# Patient Record
Sex: MALE | Race: WHITE | Employment: OTHER | URBAN - METROPOLITAN AREA
[De-identification: names, ages, dates, MRNs, and addresses within clinical notes are randomized per-mention and may not be internally consistent; named-entity substitution may affect disease eponyms.]

---

## 2017-01-10 ENCOUNTER — OFFICE VISIT (OUTPATIENT)
Dept: CARDIOLOGY CLINIC | Age: 67
End: 2017-01-10

## 2017-01-10 VITALS
BODY MASS INDEX: 29.62 KG/M2 | WEIGHT: 200 LBS | DIASTOLIC BLOOD PRESSURE: 62 MMHG | HEIGHT: 69 IN | SYSTOLIC BLOOD PRESSURE: 110 MMHG | HEART RATE: 66 BPM | OXYGEN SATURATION: 95 %

## 2017-01-10 DIAGNOSIS — I25.10 CORONARY ARTERY DISEASE INVOLVING NATIVE CORONARY ARTERY OF NATIVE HEART WITHOUT ANGINA PECTORIS: Primary | ICD-10-CM

## 2017-01-10 DIAGNOSIS — R06.02 SOB (SHORTNESS OF BREATH): ICD-10-CM

## 2017-01-10 DIAGNOSIS — I48.3 TYPICAL ATRIAL FLUTTER (HCC): ICD-10-CM

## 2017-01-10 DIAGNOSIS — I10 ESSENTIAL HYPERTENSION: ICD-10-CM

## 2017-01-10 DIAGNOSIS — I42.9 CARDIOMYOPATHY (HCC): ICD-10-CM

## 2017-01-10 PROCEDURE — 93000 ELECTROCARDIOGRAM COMPLETE: CPT | Performed by: NURSE PRACTITIONER

## 2017-01-10 PROCEDURE — 99214 OFFICE O/P EST MOD 30 MIN: CPT | Performed by: NURSE PRACTITIONER

## 2017-01-10 RX ORDER — RANITIDINE 150 MG/1
150 TABLET ORAL DAILY
COMMUNITY
Start: 2016-11-07 | End: 2019-01-08

## 2017-01-16 ENCOUNTER — HOSPITAL ENCOUNTER (OUTPATIENT)
Dept: NON INVASIVE DIAGNOSTICS | Age: 67
Discharge: HOME | End: 2017-01-16
Attending: INTERNAL MEDICINE | Admitting: INTERNAL MEDICINE

## 2017-01-16 DIAGNOSIS — R07.9 CHEST PAIN: ICD-10-CM

## 2017-01-16 LAB
LV EF: 50 %
LVEF MODALITY: NORMAL

## 2017-03-21 RX ORDER — FUROSEMIDE 20 MG/1
40 TABLET ORAL DAILY
Qty: 60 TABLET | Refills: 3 | Status: SHIPPED | OUTPATIENT
Start: 2017-03-21 | End: 2017-11-28 | Stop reason: SDUPTHER

## 2017-05-31 RX ORDER — ISOSORBIDE MONONITRATE 30 MG/1
TABLET, EXTENDED RELEASE ORAL
Qty: 30 TABLET | Refills: 1 | Status: SHIPPED | OUTPATIENT
Start: 2017-05-31 | End: 2017-08-22 | Stop reason: SDUPTHER

## 2017-08-23 RX ORDER — ISOSORBIDE MONONITRATE 30 MG/1
TABLET, EXTENDED RELEASE ORAL
Qty: 30 TABLET | Refills: 1 | Status: SHIPPED | OUTPATIENT
Start: 2017-08-23 | End: 2017-11-28 | Stop reason: SDUPTHER

## 2017-11-28 ENCOUNTER — OFFICE VISIT (OUTPATIENT)
Dept: CARDIOLOGY CLINIC | Age: 67
End: 2017-11-28

## 2017-11-28 ENCOUNTER — HOSPITAL ENCOUNTER (OUTPATIENT)
Dept: OTHER | Age: 67
Discharge: HOME | End: 2017-11-28
Attending: NURSE PRACTITIONER | Admitting: NURSE PRACTITIONER

## 2017-11-28 VITALS
HEIGHT: 69 IN | BODY MASS INDEX: 30.21 KG/M2 | DIASTOLIC BLOOD PRESSURE: 82 MMHG | HEART RATE: 86 BPM | SYSTOLIC BLOOD PRESSURE: 142 MMHG | WEIGHT: 204 LBS

## 2017-11-28 DIAGNOSIS — I25.5 ISCHEMIC CARDIOMYOPATHY: ICD-10-CM

## 2017-11-28 DIAGNOSIS — I48.3 TYPICAL ATRIAL FLUTTER (HCC): ICD-10-CM

## 2017-11-28 DIAGNOSIS — Z79.899 LONG-TERM USE OF HIGH-RISK MEDICATION: ICD-10-CM

## 2017-11-28 DIAGNOSIS — I25.10 CORONARY ARTERY DISEASE INVOLVING NATIVE CORONARY ARTERY OF NATIVE HEART WITHOUT ANGINA PECTORIS: Primary | ICD-10-CM

## 2017-11-28 LAB
A/G RATIO: 1.5 (ref 1.1–2.2)
ALBUMIN SERPL-MCNC: 4.2 G/DL (ref 3.4–5)
ALP BLD-CCNC: 71 U/L (ref 40–129)
ALT SERPL-CCNC: 25 U/L (ref 10–40)
ANION GAP SERPL CALCULATED.3IONS-SCNC: 14 MMOL/L (ref 3–16)
AST SERPL-CCNC: 23 U/L (ref 15–37)
BILIRUB SERPL-MCNC: <0.2 MG/DL (ref 0–1)
BUN BLDV-MCNC: 17 MG/DL (ref 7–20)
CALCIUM SERPL-MCNC: 9.8 MG/DL (ref 8.3–10.6)
CHLORIDE BLD-SCNC: 106 MMOL/L (ref 99–110)
CHOLESTEROL, TOTAL: 180 MG/DL (ref 0–199)
CO2: 23 MMOL/L (ref 21–32)
CREAT SERPL-MCNC: 0.8 MG/DL (ref 0.8–1.3)
ESTIMATED AVERAGE GLUCOSE: 131.2 MG/DL
GFR AFRICAN AMERICAN: >60
GFR NON-AFRICAN AMERICAN: >60
GLOBULIN: 2.8 G/DL
GLUCOSE BLD-MCNC: 106 MG/DL (ref 70–99)
HBA1C MFR BLD: 6.2 %
HDLC SERPL-MCNC: 41 MG/DL (ref 40–60)
LDL CHOLESTEROL CALCULATED: 121 MG/DL
POTASSIUM SERPL-SCNC: 5.3 MMOL/L (ref 3.5–5.1)
SODIUM BLD-SCNC: 143 MMOL/L (ref 136–145)
TOTAL PROTEIN: 7 G/DL (ref 6.4–8.2)
TRIGL SERPL-MCNC: 88 MG/DL (ref 0–150)
VLDLC SERPL CALC-MCNC: 18 MG/DL

## 2017-11-28 PROCEDURE — 4004F PT TOBACCO SCREEN RCVD TLK: CPT | Performed by: NURSE PRACTITIONER

## 2017-11-28 PROCEDURE — 1123F ACP DISCUSS/DSCN MKR DOCD: CPT | Performed by: NURSE PRACTITIONER

## 2017-11-28 PROCEDURE — G8598 ASA/ANTIPLAT THER USED: HCPCS | Performed by: NURSE PRACTITIONER

## 2017-11-28 PROCEDURE — G8484 FLU IMMUNIZE NO ADMIN: HCPCS | Performed by: NURSE PRACTITIONER

## 2017-11-28 PROCEDURE — 4040F PNEUMOC VAC/ADMIN/RCVD: CPT | Performed by: NURSE PRACTITIONER

## 2017-11-28 PROCEDURE — 3017F COLORECTAL CA SCREEN DOC REV: CPT | Performed by: NURSE PRACTITIONER

## 2017-11-28 PROCEDURE — 93000 ELECTROCARDIOGRAM COMPLETE: CPT | Performed by: NURSE PRACTITIONER

## 2017-11-28 PROCEDURE — G8417 CALC BMI ABV UP PARAM F/U: HCPCS | Performed by: NURSE PRACTITIONER

## 2017-11-28 PROCEDURE — G8427 DOCREV CUR MEDS BY ELIG CLIN: HCPCS | Performed by: NURSE PRACTITIONER

## 2017-11-28 PROCEDURE — 99214 OFFICE O/P EST MOD 30 MIN: CPT | Performed by: NURSE PRACTITIONER

## 2017-11-28 RX ORDER — ISOSORBIDE MONONITRATE 30 MG/1
TABLET, EXTENDED RELEASE ORAL
Qty: 30 TABLET | Refills: 1 | Status: SHIPPED | OUTPATIENT
Start: 2017-11-28 | End: 2018-07-06 | Stop reason: SDUPTHER

## 2017-11-28 RX ORDER — SIMVASTATIN 20 MG
20 TABLET ORAL NIGHTLY
Qty: 90 TABLET | Refills: 0 | Status: SHIPPED | OUTPATIENT
Start: 2017-11-28 | End: 2019-01-08 | Stop reason: ALTCHOICE

## 2017-11-28 RX ORDER — CARVEDILOL 12.5 MG/1
TABLET ORAL
Qty: 60 TABLET | Refills: 5 | Status: SHIPPED | OUTPATIENT
Start: 2017-11-28 | End: 2019-01-08 | Stop reason: SDUPTHER

## 2017-11-28 RX ORDER — LISINOPRIL 10 MG/1
10 TABLET ORAL DAILY
Qty: 90 TABLET | Refills: 0 | Status: SHIPPED | OUTPATIENT
Start: 2017-11-28 | End: 2018-07-06 | Stop reason: SDUPTHER

## 2017-11-28 RX ORDER — FUROSEMIDE 40 MG/1
40 TABLET ORAL DAILY
Qty: 30 TABLET | Refills: 3 | Status: SHIPPED | OUTPATIENT
Start: 2017-11-28 | End: 2019-01-08

## 2017-11-28 RX ORDER — SPIRONOLACTONE 25 MG/1
12.5 TABLET ORAL DAILY
Qty: 30 TABLET | Refills: 0 | Status: SHIPPED | OUTPATIENT
Start: 2017-11-28 | End: 2019-01-08 | Stop reason: SDUPTHER

## 2017-11-28 NOTE — LETTER
[facility]  [address]  [address] [address]  Phone: [phone]  Fax: [phone]    Demetrius Reagan NP        November 28, 2017     [address] [address]    Patient: Yuly Traylor  MR Number: [id number]  YOB: 1950  Date of Visit: 11/28/2017    Dear Dr. Thao Guido:      [facility]     Outpatient Follow Up Kandy Shepard is 79 y.o. male who presents today with a history of CAD s/p CABG May '14 with post-op short run of SVT and brief AF, CM, HTN and hyperlipidemia. His other history includes carotid stenosis and PVD s/p Lt common and external iliac PTA       CHIEF COMPLAINT / HPI:  Follow Up secondary to CAD & CM    Subjective:   He denies significant chest pain/angina (Lt chest pain with shoulder radiation). He has surgical discomfort if/when he strains. There is SOB if he walks too far, ~ 1 block and going up steps. Yesterday, he was cleaning gutters and became nervous and light headed / jittery. It went away after sitting down and having a cola. The patient has 2 pillow orthopnea; denies PND. The patient has swelling in his legs by the end of the day. The patients weight is stable at 197# . The patient is not experiencing palpitations or dizziness. These symptoms are stable since the last OV. With regard to medication therapy the patient has been (?) compliant with prescribed regimen however he's been out of medications for about a month. They have tolerated therapy to date.      Current Outpatient Prescriptions   Medication Sig Dispense Refill    aspirin 81 MG EC tablet Take 1 tablet by mouth daily      spironolactone (ALDACTONE) 25 MG tablet Take 0.5 tablets by mouth daily 30 tablet 3    lisinopril (PRINIVIL;ZESTRIL) 10 MG tablet Take 1 tablet by mouth daily TAKE 1 TABLET BY MOUTH EVERY DAY 90 tablet 3  simvastatin (ZOCOR) 20 MG tablet Take 1 tablet by mouth nightly 90 tablet 3    fluticasone-salmeterol (ADVAIR DISKUS) 250-50 MCG/DOSE AEPB Inhale 1 puff into the lungs 2 times daily. 3 each 3    isosorbide mononitrate (IMDUR) 30 MG extended release tablet TAKE 1 TABLET BY MOUTH EVERY DAY 30 tablet 1    furosemide (LASIX) 20 MG tablet Take 2 tablets by mouth daily 60 tablet 3    ranitidine (ZANTAC) 150 MG tablet Take 150 mg by mouth daily      rivaroxaban (XARELTO) 20 MG TABS tablet Take 1 tablet by mouth daily (with breakfast) 30 tablet 5    nitroGLYCERIN (NITROSTAT) 0.4 MG SL tablet Place 1 tablet under the tongue every 5 minutes as needed for Chest pain 25 tablet 3    carvedilol (COREG) 12.5 MG tablet TAKE 1 TABLET BY MOUTH TWICE DAILY WITH MEALS 60 tablet 5    albuterol (PROVENTIL HFA;VENTOLIN HFA) 108 (90 BASE) MCG/ACT inhaler Inhale 2 puffs into the lungs every 6 hours as needed for Wheezing. Objective:   PHYSICAL EXAM:    Vitals:    11/28/17 0909 11/28/17 0920 11/28/17 0936   BP: (!) 140/90 (!) 140/90 (!) 142/82   Site: Left Arm Right Arm Left Arm   Position: Sitting Sitting    Cuff Size: Medium Adult Medium Adult Medium Adult   Pulse: 86     Weight: 204 lb (92.5 kg)     Height: 5' 9\" (1.753 m)          VITALS:  BP (!) 140/90 (Site: Right Arm, Position: Sitting, Cuff Size: Medium Adult)   Pulse 86   Ht 5' 9\" (1.753 m)   Wt 204 lb (92.5 kg)   BMI 30.13 kg/m²    CONSTITUTIONAL: Cooperative, no apparent distress, and appears well nourished / developed  NEUROLOGIC:  Awake and orientated to person, place and time. PSYCH: Calm affect. SKIN: Warm and dry. HEENT: Sclera non-icteric, normocephalic, neck supple, no elevation of JVP, normal carotid pulses with no bruits and thyroid normal size.   LUNGS:  No increased work of breathing and clear to auscultation, no crackles or wheezing  CARDIOVASCULAR:  Regular rate 88 and rhythm with no murmurs, gallops, ~statin / diuretic / ace-I / aldosterone antagonist  Comprehensive Metabolic Panel    LIPID PANEL    Hemoglobin A1C         I had the opportunity to review the clinical symptoms and presentation of Leandra Cardoza. Plan:     1. EKG: sinus rhythm 100  2. CMP / lipid with courtesy A1c  3. Resume: lasix 40 mg daily, Xarelto 20 mg daily, imdur 30 mg daily, carvedilol 12.5 mg bid  4. F/U in four weeks    Overall the patient is stable from CV standpoint    I have addresed the patient's cardiac risk factors and adjusted pharmacologic treatment as needed. In addition, I have reinforced the need for patient directed risk factor modification. Further evaluation will be based upon the patient's clinical course and testing results. All questions and concerns were addressed to the patient. Alternatives to my treatment were discussed. The patient is not currently smokin pk every 3-4 days The risks related to smoking were reviewed with the patient. Recommend maintaining a smoke-free lifestyle. Products available for smoking cessation were discussed    Patient is on a beta-blocker  Patient is on an ace-i  Patient is on a statin     Antiplatelet therapy / anti-coagulation has been recommended / prescribed for this patient. Education conducted on adverse reactions including bleeding was discussed. Daily weight, low sodium diet were discussed. Patient instructed to call the office with a weight gain: > 3 # over night or 5# in one week; swelling, SOB/orthopnea/PND    The patient verbalizes understanding not to stop medications without discussing with us. Discussed exercise: 30-60 minutes 7 days/week  Discussed Low saturated fat/HAYDEE diet. Thank you for allowing to us to participate in the care of  [place] . If you have questions, please do not hesitate to call me. I look forward to following Leandra along with you.     Sincerely,        Pam Blue NP

## 2017-11-28 NOTE — COMMUNICATION BODY
[address]     Outpatient Follow Up Note    Germán Jakob Tineo is 79 y.o. male who presents today with a history of CAD s/p CABG May '14 with post-op short run of SVT and brief AF, CM, HTN and hyperlipidemia. His other history includes carotid stenosis and PVD s/p Lt common and external iliac PTA       CHIEF COMPLAINT / HPI:  Follow Up secondary to CAD & CM    Subjective:   He denies significant chest pain/angina (Lt chest pain with shoulder radiation). He has surgical discomfort if/when he strains. There is SOB if he walks too far, ~ 1 block and going up steps. Yesterday, he was cleaning gutters and became nervous and light headed / jittery. It went away after sitting down and having a cola. The patient has 2 pillow orthopnea; denies PND. The patient has swelling in his legs by the end of the day. The patients weight is stable at 197# . The patient is not experiencing palpitations or dizziness. These symptoms are stable since the last OV. With regard to medication therapy the patient has been (?) compliant with prescribed regimen however he's been out of medications for about a month. They have tolerated therapy to date. Current Outpatient Prescriptions   Medication Sig Dispense Refill    aspirin 81 MG EC tablet Take 1 tablet by mouth daily      spironolactone (ALDACTONE) 25 MG tablet Take 0.5 tablets by mouth daily 30 tablet 3    lisinopril (PRINIVIL;ZESTRIL) 10 MG tablet Take 1 tablet by mouth daily TAKE 1 TABLET BY MOUTH EVERY DAY 90 tablet 3    simvastatin (ZOCOR) 20 MG tablet Take 1 tablet by mouth nightly 90 tablet 3    fluticasone-salmeterol (ADVAIR DISKUS) 250-50 MCG/DOSE AEPB Inhale 1 puff into the lungs 2 times daily.  3 each 3    isosorbide mononitrate (IMDUR) 30 MG extended release tablet TAKE 1 TABLET BY MOUTH EVERY DAY 30 tablet 1    furosemide (LASIX) 20 MG tablet Take 2 tablets by mouth daily 60 tablet 3    ranitidine (ZANTAC) 150 MG tablet Take 150 mg by mouth daily      rivaroxaban (XARELTO) 20 MG TABS tablet Take 1 tablet by mouth daily (with breakfast) 30 tablet 5    nitroGLYCERIN (NITROSTAT) 0.4 MG SL tablet Place 1 tablet under the tongue every 5 minutes as needed for Chest pain 25 tablet 3    carvedilol (COREG) 12.5 MG tablet TAKE 1 TABLET BY MOUTH TWICE DAILY WITH MEALS 60 tablet 5    albuterol (PROVENTIL HFA;VENTOLIN HFA) 108 (90 BASE) MCG/ACT inhaler Inhale 2 puffs into the lungs every 6 hours as needed for Wheezing. Objective:   PHYSICAL EXAM:    Vitals:    11/28/17 0909 11/28/17 0920 11/28/17 0936   BP: (!) 140/90 (!) 140/90 (!) 142/82   Site: Left Arm Right Arm Left Arm   Position: Sitting Sitting    Cuff Size: Medium Adult Medium Adult Medium Adult   Pulse: 86     Weight: 204 lb (92.5 kg)     Height: 5' 9\" (1.753 m)          VITALS:  BP (!) 140/90 (Site: Right Arm, Position: Sitting, Cuff Size: Medium Adult)   Pulse 86   Ht 5' 9\" (1.753 m)   Wt 204 lb (92.5 kg)   BMI 30.13 kg/m²    CONSTITUTIONAL: Cooperative, no apparent distress, and appears well nourished / developed  NEUROLOGIC:  Awake and orientated to person, place and time. PSYCH: Calm affect. SKIN: Warm and dry. HEENT: Sclera non-icteric, normocephalic, neck supple, no elevation of JVP, normal carotid pulses with no bruits and thyroid normal size. LUNGS:  No increased work of breathing and clear to auscultation, no crackles or wheezing  CARDIOVASCULAR:  Regular rate 88 and rhythm with no murmurs, gallops, rubs, or abnormal heart sounds, normal PMI. The apical impulses not displaced  JVP less than 8 cm H2O  Heart tones are crisp and normal  Cervical veins are not engorged  The carotid upstroke is normal in amplitude and contour without delay or bruit  JVP is not elevated  ABDOMEN:  Normal bowel sounds, non-distended and non-tender to palpation  EXT: RLE edema, no calf tenderness. Pulses are present bilaterally.     DATA:      Radiology Review:  Pertinent images / reports were reviewed as a part

## 2017-12-01 ENCOUNTER — TELEPHONE (OUTPATIENT)
Dept: CARDIOLOGY CLINIC | Age: 67
End: 2017-12-01

## 2017-12-01 DIAGNOSIS — I10 ESSENTIAL HYPERTENSION: Chronic | ICD-10-CM

## 2017-12-01 DIAGNOSIS — I25.110 CORONARY ARTERY DISEASE INVOLVING NATIVE CORONARY ARTERY OF NATIVE HEART WITH UNSTABLE ANGINA PECTORIS (HCC): Chronic | ICD-10-CM

## 2017-12-01 NOTE — TELEPHONE ENCOUNTER
----- Message from Pam Blue NP sent at 11/30/2017  8:41 AM EST -----  Stop spironolactone and recheck K+ after one week  LDL up: likely reflective of being off meds > recheck in six months

## 2018-07-09 RX ORDER — LISINOPRIL 10 MG/1
TABLET ORAL
Qty: 30 TABLET | Refills: 0 | Status: SHIPPED | OUTPATIENT
Start: 2018-07-09 | End: 2018-10-05 | Stop reason: SDUPTHER

## 2018-07-09 RX ORDER — ISOSORBIDE MONONITRATE 30 MG/1
TABLET, EXTENDED RELEASE ORAL
Qty: 30 TABLET | Refills: 0 | Status: SHIPPED | OUTPATIENT
Start: 2018-07-09 | End: 2019-01-08 | Stop reason: SDUPTHER

## 2018-12-13 RX ORDER — RIVAROXABAN 20 MG/1
TABLET, FILM COATED ORAL
Qty: 30 TABLET | Refills: 0 | Status: SHIPPED | OUTPATIENT
Start: 2018-12-13 | End: 2019-01-08

## 2019-01-08 ENCOUNTER — OFFICE VISIT (OUTPATIENT)
Dept: CARDIOLOGY CLINIC | Age: 69
End: 2019-01-08
Payer: MEDICARE

## 2019-01-08 VITALS
HEIGHT: 69 IN | OXYGEN SATURATION: 95 % | HEART RATE: 70 BPM | WEIGHT: 198.1 LBS | BODY MASS INDEX: 29.34 KG/M2 | DIASTOLIC BLOOD PRESSURE: 80 MMHG | SYSTOLIC BLOOD PRESSURE: 152 MMHG

## 2019-01-08 DIAGNOSIS — R53.83 OTHER FATIGUE: ICD-10-CM

## 2019-01-08 DIAGNOSIS — I10 ESSENTIAL HYPERTENSION: ICD-10-CM

## 2019-01-08 DIAGNOSIS — I48.3 TYPICAL ATRIAL FLUTTER (HCC): ICD-10-CM

## 2019-01-08 DIAGNOSIS — I25.5 ISCHEMIC CARDIOMYOPATHY: ICD-10-CM

## 2019-01-08 DIAGNOSIS — I25.110 CORONARY ARTERY DISEASE INVOLVING NATIVE CORONARY ARTERY OF NATIVE HEART WITH UNSTABLE ANGINA PECTORIS (HCC): Primary | ICD-10-CM

## 2019-01-08 DIAGNOSIS — I65.23 BILATERAL CAROTID ARTERY STENOSIS: ICD-10-CM

## 2019-01-08 PROCEDURE — 1123F ACP DISCUSS/DSCN MKR DOCD: CPT | Performed by: NURSE PRACTITIONER

## 2019-01-08 PROCEDURE — 99214 OFFICE O/P EST MOD 30 MIN: CPT | Performed by: NURSE PRACTITIONER

## 2019-01-08 PROCEDURE — 4004F PT TOBACCO SCREEN RCVD TLK: CPT | Performed by: NURSE PRACTITIONER

## 2019-01-08 PROCEDURE — 4040F PNEUMOC VAC/ADMIN/RCVD: CPT | Performed by: NURSE PRACTITIONER

## 2019-01-08 PROCEDURE — G8598 ASA/ANTIPLAT THER USED: HCPCS | Performed by: NURSE PRACTITIONER

## 2019-01-08 PROCEDURE — G8419 CALC BMI OUT NRM PARAM NOF/U: HCPCS | Performed by: NURSE PRACTITIONER

## 2019-01-08 PROCEDURE — 1101F PT FALLS ASSESS-DOCD LE1/YR: CPT | Performed by: NURSE PRACTITIONER

## 2019-01-08 PROCEDURE — G8427 DOCREV CUR MEDS BY ELIG CLIN: HCPCS | Performed by: NURSE PRACTITIONER

## 2019-01-08 PROCEDURE — G8484 FLU IMMUNIZE NO ADMIN: HCPCS | Performed by: NURSE PRACTITIONER

## 2019-01-08 PROCEDURE — 93000 ELECTROCARDIOGRAM COMPLETE: CPT | Performed by: NURSE PRACTITIONER

## 2019-01-08 PROCEDURE — 3017F COLORECTAL CA SCREEN DOC REV: CPT | Performed by: NURSE PRACTITIONER

## 2019-01-08 RX ORDER — ISOSORBIDE MONONITRATE 30 MG/1
TABLET, EXTENDED RELEASE ORAL
Qty: 90 TABLET | Refills: 5 | OUTPATIENT
Start: 2019-01-08

## 2019-01-08 RX ORDER — ATORVASTATIN CALCIUM 40 MG/1
40 TABLET, FILM COATED ORAL DAILY
COMMUNITY
End: 2019-11-10

## 2019-01-08 RX ORDER — SPIRONOLACTONE 25 MG/1
TABLET ORAL
Qty: 45 TABLET | Refills: 2 | OUTPATIENT
Start: 2019-01-08

## 2019-01-08 RX ORDER — CARVEDILOL 12.5 MG/1
TABLET ORAL
Qty: 60 TABLET | Refills: 5 | Status: SHIPPED | OUTPATIENT
Start: 2019-01-08 | End: 2020-01-08

## 2019-01-08 RX ORDER — SPIRONOLACTONE 25 MG/1
12.5 TABLET ORAL DAILY
Qty: 30 TABLET | Refills: 2 | Status: SHIPPED | OUTPATIENT
Start: 2019-01-08 | End: 2020-01-16

## 2019-01-08 RX ORDER — ISOSORBIDE MONONITRATE 30 MG/1
TABLET, EXTENDED RELEASE ORAL
Qty: 30 TABLET | Refills: 5 | Status: SHIPPED | OUTPATIENT
Start: 2019-01-08 | End: 2019-12-23

## 2019-01-08 RX ORDER — CARVEDILOL 12.5 MG/1
TABLET ORAL
Qty: 180 TABLET | Refills: 5 | OUTPATIENT
Start: 2019-01-08

## 2019-01-08 RX ORDER — WARFARIN SODIUM 5 MG/1
5 TABLET ORAL
Qty: 90 TABLET | Refills: 3 | OUTPATIENT
Start: 2019-01-08

## 2019-01-08 RX ORDER — WARFARIN SODIUM 5 MG/1
5 TABLET ORAL
Qty: 30 TABLET | Refills: 3 | Status: SHIPPED | OUTPATIENT
Start: 2019-01-08 | End: 2019-11-19 | Stop reason: ALTCHOICE

## 2019-01-11 ENCOUNTER — TELEPHONE (OUTPATIENT)
Dept: PHARMACY | Age: 69
End: 2019-01-11

## 2019-01-11 DIAGNOSIS — Z86.79 HX OF ATRIAL FLUTTER: Primary | ICD-10-CM

## 2019-01-14 ENCOUNTER — ANTI-COAG VISIT (OUTPATIENT)
Dept: PHARMACY | Age: 69
End: 2019-01-14
Payer: MEDICARE

## 2019-01-14 DIAGNOSIS — Z86.79 HX OF ATRIAL FLUTTER: ICD-10-CM

## 2019-01-14 LAB — INTERNATIONAL NORMALIZATION RATIO, POC: 3.2

## 2019-01-14 PROCEDURE — 99213 OFFICE O/P EST LOW 20 MIN: CPT

## 2019-01-14 PROCEDURE — 85610 PROTHROMBIN TIME: CPT

## 2019-01-18 ENCOUNTER — TELEPHONE (OUTPATIENT)
Dept: PHARMACY | Age: 69
End: 2019-01-18

## 2019-01-28 ENCOUNTER — ANTI-COAG VISIT (OUTPATIENT)
Dept: PHARMACY | Age: 69
End: 2019-01-28
Payer: MEDICARE

## 2019-01-28 DIAGNOSIS — Z86.79 HX OF ATRIAL FLUTTER: ICD-10-CM

## 2019-01-28 LAB — INTERNATIONAL NORMALIZATION RATIO, POC: 3.1

## 2019-01-28 PROCEDURE — 85610 PROTHROMBIN TIME: CPT

## 2019-01-28 PROCEDURE — 99211 OFF/OP EST MAY X REQ PHY/QHP: CPT

## 2019-02-04 ENCOUNTER — TELEPHONE (OUTPATIENT)
Dept: PHARMACY | Age: 69
End: 2019-02-04

## 2019-02-06 ENCOUNTER — ANTI-COAG VISIT (OUTPATIENT)
Dept: PHARMACY | Age: 69
End: 2019-02-06
Payer: MEDICARE

## 2019-02-06 DIAGNOSIS — Z86.79 HX OF ATRIAL FLUTTER: ICD-10-CM

## 2019-02-06 LAB — INTERNATIONAL NORMALIZATION RATIO, POC: 1.4

## 2019-02-06 PROCEDURE — 85610 PROTHROMBIN TIME: CPT

## 2019-02-06 PROCEDURE — 99211 OFF/OP EST MAY X REQ PHY/QHP: CPT

## 2019-02-13 ENCOUNTER — ANTI-COAG VISIT (OUTPATIENT)
Dept: PHARMACY | Age: 69
End: 2019-02-13
Payer: MEDICARE

## 2019-02-13 DIAGNOSIS — Z86.79 HX OF ATRIAL FLUTTER: ICD-10-CM

## 2019-02-13 LAB — INTERNATIONAL NORMALIZATION RATIO, POC: 2.3

## 2019-02-13 PROCEDURE — 85610 PROTHROMBIN TIME: CPT

## 2019-02-13 PROCEDURE — 99211 OFF/OP EST MAY X REQ PHY/QHP: CPT

## 2019-02-27 ENCOUNTER — ANTI-COAG VISIT (OUTPATIENT)
Dept: PHARMACY | Age: 69
End: 2019-02-27
Payer: MEDICARE

## 2019-02-27 DIAGNOSIS — Z86.79 HX OF ATRIAL FLUTTER: ICD-10-CM

## 2019-02-27 LAB — INTERNATIONAL NORMALIZATION RATIO, POC: 2.7

## 2019-02-27 PROCEDURE — 99211 OFF/OP EST MAY X REQ PHY/QHP: CPT

## 2019-02-27 PROCEDURE — 85610 PROTHROMBIN TIME: CPT

## 2019-03-12 ENCOUNTER — OFFICE VISIT (OUTPATIENT)
Dept: CARDIOLOGY CLINIC | Age: 69
End: 2019-03-12
Payer: MEDICARE

## 2019-03-12 VITALS
BODY MASS INDEX: 30.78 KG/M2 | WEIGHT: 207.8 LBS | SYSTOLIC BLOOD PRESSURE: 124 MMHG | OXYGEN SATURATION: 90 % | HEIGHT: 69 IN | DIASTOLIC BLOOD PRESSURE: 72 MMHG | HEART RATE: 70 BPM

## 2019-03-12 DIAGNOSIS — I48.3 TYPICAL ATRIAL FLUTTER (HCC): ICD-10-CM

## 2019-03-12 DIAGNOSIS — I10 ESSENTIAL HYPERTENSION: ICD-10-CM

## 2019-03-12 DIAGNOSIS — I25.5 ISCHEMIC CARDIOMYOPATHY: ICD-10-CM

## 2019-03-12 DIAGNOSIS — I25.110 CORONARY ARTERY DISEASE INVOLVING NATIVE CORONARY ARTERY OF NATIVE HEART WITH UNSTABLE ANGINA PECTORIS (HCC): Primary | ICD-10-CM

## 2019-03-12 PROCEDURE — G8598 ASA/ANTIPLAT THER USED: HCPCS | Performed by: NURSE PRACTITIONER

## 2019-03-12 PROCEDURE — 1101F PT FALLS ASSESS-DOCD LE1/YR: CPT | Performed by: NURSE PRACTITIONER

## 2019-03-12 PROCEDURE — 1123F ACP DISCUSS/DSCN MKR DOCD: CPT | Performed by: NURSE PRACTITIONER

## 2019-03-12 PROCEDURE — G8484 FLU IMMUNIZE NO ADMIN: HCPCS | Performed by: NURSE PRACTITIONER

## 2019-03-12 PROCEDURE — 4004F PT TOBACCO SCREEN RCVD TLK: CPT | Performed by: NURSE PRACTITIONER

## 2019-03-12 PROCEDURE — 4040F PNEUMOC VAC/ADMIN/RCVD: CPT | Performed by: NURSE PRACTITIONER

## 2019-03-12 PROCEDURE — 99214 OFFICE O/P EST MOD 30 MIN: CPT | Performed by: NURSE PRACTITIONER

## 2019-03-12 PROCEDURE — G8427 DOCREV CUR MEDS BY ELIG CLIN: HCPCS | Performed by: NURSE PRACTITIONER

## 2019-03-12 PROCEDURE — 3017F COLORECTAL CA SCREEN DOC REV: CPT | Performed by: NURSE PRACTITIONER

## 2019-03-12 PROCEDURE — G8417 CALC BMI ABV UP PARAM F/U: HCPCS | Performed by: NURSE PRACTITIONER

## 2019-03-13 ENCOUNTER — TELEPHONE (OUTPATIENT)
Dept: CARDIOLOGY CLINIC | Age: 69
End: 2019-03-13

## 2019-03-27 ENCOUNTER — ANTI-COAG VISIT (OUTPATIENT)
Dept: PHARMACY | Age: 69
End: 2019-03-27
Payer: MEDICARE

## 2019-03-27 DIAGNOSIS — Z86.79 HX OF ATRIAL FLUTTER: ICD-10-CM

## 2019-03-27 LAB — INTERNATIONAL NORMALIZATION RATIO, POC: 2.3

## 2019-03-27 PROCEDURE — 99211 OFF/OP EST MAY X REQ PHY/QHP: CPT

## 2019-03-27 PROCEDURE — 85610 PROTHROMBIN TIME: CPT

## 2019-04-24 ENCOUNTER — ANTI-COAG VISIT (OUTPATIENT)
Dept: PHARMACY | Age: 69
End: 2019-04-24
Payer: MEDICARE

## 2019-04-24 DIAGNOSIS — Z86.79 HX OF ATRIAL FLUTTER: ICD-10-CM

## 2019-04-24 LAB — INTERNATIONAL NORMALIZATION RATIO, POC: 2.3

## 2019-04-24 PROCEDURE — 99211 OFF/OP EST MAY X REQ PHY/QHP: CPT

## 2019-04-24 PROCEDURE — 85610 PROTHROMBIN TIME: CPT

## 2019-04-24 NOTE — PROGRESS NOTES
Mr. Harinder Umanzor \"Elmer\" is a 71 y.o. y/o male with history of Aflutter   He presents today for anticoagulation monitoring and adjustment. Pertinent PMH: CAD, HTN, PVD  Historically on Xarelto, switched d/t cost   Patient Reported Findings:  Yes     No  [x]   []       Patient verifies current dosing regimen as listed   []   [x]       S/S bleeding/bruising/swelling/SOB - states that he is prone to nose bleeds   []   [x]       Blood in urine or stool  []   [x]       Procedures scheduled in the future at this time  []   [x]       Missed Dose   []   [x]       Extra Dose  []   [x]       Change in medications   []   [x]       Change in health/diet/appetite His normal diet consists of high vit k regularly (kale, spinach, broccoli, brussel sprouts, amena and mustard greens), eats them 3x/week. Has some other lower vitamin vegetable other days which include one salad per week. []   [x]       Change in alcohol use- does not drink  []   [x]       Change in activity  []   [x]       Hospital admission  []   [x]       Emergency department visit  []   [x]       Other complaints    Clinical Outcomes:  Yes     No  []   [x]       Major bleeding event  []   [x]       Thromboembolic event  Takes warfarin in PM  Duration of warfarin Therapy: indefinite  INR Range:  2.0-3.0     INR is 2.3 again today   Continue weekly dose of 1.25 mg on Monday only and 2.5 mg all other days of the week. Encouraged to maintain a consistency of vegetables/salads.   Return to clinic in 6 weeks on 6/5    Referring cardiologist is Dr. Liang Rogel  INR (no units)   Date Value   04/24/2019 2.3   03/27/2019 2.3   02/27/2019 2.7   02/13/2019 2.3   12/28/2016 1.00   05/12/2014 1.04

## 2019-06-05 ENCOUNTER — ANTI-COAG VISIT (OUTPATIENT)
Dept: PHARMACY | Age: 69
End: 2019-06-05
Payer: MEDICARE

## 2019-06-05 DIAGNOSIS — Z86.79 HX OF ATRIAL FLUTTER: ICD-10-CM

## 2019-06-05 LAB — INTERNATIONAL NORMALIZATION RATIO, POC: 3.8

## 2019-06-05 PROCEDURE — 85610 PROTHROMBIN TIME: CPT

## 2019-06-05 PROCEDURE — 99211 OFF/OP EST MAY X REQ PHY/QHP: CPT

## 2019-06-05 NOTE — PROGRESS NOTES
Mr. Alber Swartz \"Elmer\" is a 71 y.o. y/o male with history of Aflutter   He presents today for anticoagulation monitoring and adjustment. Pertinent PMH: CAD, HTN, PVD  Historically on Xarelto, switched d/t cost   Patient Reported Findings:  Yes     No  [x]   []       Patient verifies current dosing regimen as listed   []   [x]       S/S bleeding/bruising/swelling/SOB - states that he is prone to nose bleeds   []   [x]       Blood in urine or stool  []   [x]       Procedures scheduled in the future at this time  []   [x]       Missed Dose   []   [x]       Extra Dose  []   [x]       Change in medications   []   [x]       Change in health/diet/appetite His normal diet consists of high vit k regularly (kale, spinach, broccoli, brussel sprouts, amena and mustard greens), eats them 3x/week. Has some other lower vitamin vegetable other days which include one salad per week ---> Pt went on vacation to [place] last week and did not eat any vegetables. He says now that he is home, he will go back to his normal vegetable intake.  []   [x]       Change in alcohol use- does not drink  []   [x]       Change in activity  []   [x]       Hospital admission  []   [x]       Emergency department visit  []   [x]       Other complaints    Clinical Outcomes:  Yes     No  []   [x]       Major bleeding event  []   [x]       Thromboembolic event  Takes warfarin in PM  Duration of warfarin Therapy: indefinite  INR Range:  2.0-3.0     INR is 3.8 d/t decrease in Vit K intake    Hold today's dose and then continue weekly dose of 1.25 mg on Monday only and 2.5 mg all other days of the week. Encouraged to maintain a consistency of vegetables/salads. Return to clinic in 4 weeks on 7/3. Return to 6 week appt when appropriate.     Referring cardiologist is Dr. Diana Otero  INR (no units)   Date Value   06/05/2019 3.8   04/24/2019 2.3   03/27/2019 2.3   02/27/2019 2.7   12/28/2016 1.00   05/12/2014 1.04

## 2019-07-03 ENCOUNTER — ANTI-COAG VISIT (OUTPATIENT)
Dept: PHARMACY | Age: 69
End: 2019-07-03
Payer: MEDICARE

## 2019-07-03 DIAGNOSIS — Z86.79 HX OF ATRIAL FLUTTER: ICD-10-CM

## 2019-07-03 LAB — INTERNATIONAL NORMALIZATION RATIO, POC: 3.1

## 2019-07-03 PROCEDURE — 99211 OFF/OP EST MAY X REQ PHY/QHP: CPT

## 2019-07-03 PROCEDURE — 85610 PROTHROMBIN TIME: CPT

## 2019-08-07 ENCOUNTER — ANTI-COAG VISIT (OUTPATIENT)
Dept: PHARMACY | Age: 69
End: 2019-08-07
Payer: MEDICARE

## 2019-08-07 DIAGNOSIS — Z86.79 HX OF ATRIAL FLUTTER: ICD-10-CM

## 2019-08-07 LAB — INTERNATIONAL NORMALIZATION RATIO, POC: 2.5

## 2019-08-07 PROCEDURE — 99211 OFF/OP EST MAY X REQ PHY/QHP: CPT

## 2019-08-07 PROCEDURE — 85610 PROTHROMBIN TIME: CPT

## 2019-09-18 ENCOUNTER — ANTI-COAG VISIT (OUTPATIENT)
Dept: PHARMACY | Age: 69
End: 2019-09-18
Payer: MEDICARE

## 2019-09-18 DIAGNOSIS — Z86.79 HX OF ATRIAL FLUTTER: ICD-10-CM

## 2019-09-18 LAB — INTERNATIONAL NORMALIZATION RATIO, POC: 4.1

## 2019-09-18 PROCEDURE — 85610 PROTHROMBIN TIME: CPT

## 2019-09-18 PROCEDURE — 99211 OFF/OP EST MAY X REQ PHY/QHP: CPT

## 2019-09-27 ENCOUNTER — APPOINTMENT (OUTPATIENT)
Dept: PHARMACY | Age: 69
End: 2019-09-27
Payer: MEDICARE

## 2019-09-27 ENCOUNTER — TELEPHONE (OUTPATIENT)
Dept: PHARMACY | Age: 69
End: 2019-09-27

## 2019-09-30 ENCOUNTER — ANTI-COAG VISIT (OUTPATIENT)
Dept: PHARMACY | Age: 69
End: 2019-09-30
Payer: MEDICARE

## 2019-09-30 DIAGNOSIS — Z86.79 HX OF ATRIAL FLUTTER: ICD-10-CM

## 2019-09-30 LAB — INTERNATIONAL NORMALIZATION RATIO, POC: 3

## 2019-09-30 PROCEDURE — 99211 OFF/OP EST MAY X REQ PHY/QHP: CPT

## 2019-09-30 PROCEDURE — 85610 PROTHROMBIN TIME: CPT

## 2019-10-24 ENCOUNTER — ANTI-COAG VISIT (OUTPATIENT)
Dept: PHARMACY | Age: 69
End: 2019-10-24
Payer: MEDICARE

## 2019-10-24 DIAGNOSIS — Z86.79 HX OF ATRIAL FLUTTER: ICD-10-CM

## 2019-10-24 LAB — INTERNATIONAL NORMALIZATION RATIO, POC: 4.1

## 2019-10-24 PROCEDURE — 99211 OFF/OP EST MAY X REQ PHY/QHP: CPT

## 2019-10-24 PROCEDURE — 85610 PROTHROMBIN TIME: CPT

## 2019-10-31 ENCOUNTER — TELEPHONE (OUTPATIENT)
Dept: CARDIOLOGY CLINIC | Age: 69
End: 2019-10-31

## 2019-11-10 ENCOUNTER — APPOINTMENT (OUTPATIENT)
Dept: GENERAL RADIOLOGY | Age: 69
DRG: 308 | End: 2019-11-10
Payer: MEDICARE

## 2019-11-10 ENCOUNTER — HOSPITAL ENCOUNTER (INPATIENT)
Age: 69
LOS: 5 days | Discharge: HOME | DRG: 308 | End: 2019-11-15
Attending: EMERGENCY MEDICINE | Admitting: INTERNAL MEDICINE
Payer: MEDICARE

## 2019-11-10 DIAGNOSIS — Z79.01 ANTICOAGULATED ON COUMADIN: ICD-10-CM

## 2019-11-10 DIAGNOSIS — I50.9 ACUTE ON CHRONIC CONGESTIVE HEART FAILURE, UNSPECIFIED HEART FAILURE TYPE (HCC): Primary | ICD-10-CM

## 2019-11-10 DIAGNOSIS — I48.91 ATRIAL FIBRILLATION, UNSPECIFIED TYPE (HCC): ICD-10-CM

## 2019-11-10 PROBLEM — D64.9 ANEMIA: Status: ACTIVE | Noted: 2019-11-10

## 2019-11-10 LAB
ANION GAP SERPL CALCULATED.3IONS-SCNC: 12 MMOL/L (ref 3–16)
BASOPHILS ABSOLUTE: 0.1 K/UL (ref 0–0.2)
BASOPHILS RELATIVE PERCENT: 1.1 %
BUN BLDV-MCNC: 15 MG/DL (ref 7–20)
CALCIUM SERPL-MCNC: 9.3 MG/DL (ref 8.3–10.6)
CHLORIDE BLD-SCNC: 104 MMOL/L (ref 99–110)
CO2: 20 MMOL/L (ref 21–32)
CREAT SERPL-MCNC: 1 MG/DL (ref 0.8–1.3)
EOSINOPHILS ABSOLUTE: 0.1 K/UL (ref 0–0.6)
EOSINOPHILS RELATIVE PERCENT: 0.9 %
GFR AFRICAN AMERICAN: >60
GFR NON-AFRICAN AMERICAN: >60
GLUCOSE BLD-MCNC: 115 MG/DL (ref 70–99)
HCT VFR BLD CALC: 32 % (ref 40.5–52.5)
HEMOGLOBIN: 10 G/DL (ref 13.5–17.5)
INR BLD: 2.89 (ref 0.86–1.14)
LYMPHOCYTES ABSOLUTE: 2 K/UL (ref 1–5.1)
LYMPHOCYTES RELATIVE PERCENT: 29.6 %
MCH RBC QN AUTO: 24.3 PG (ref 26–34)
MCHC RBC AUTO-ENTMCNC: 31.4 G/DL (ref 31–36)
MCV RBC AUTO: 77.6 FL (ref 80–100)
MONOCYTES ABSOLUTE: 0.6 K/UL (ref 0–1.3)
MONOCYTES RELATIVE PERCENT: 8.6 %
NEUTROPHILS ABSOLUTE: 3.9 K/UL (ref 1.7–7.7)
NEUTROPHILS RELATIVE PERCENT: 59.8 %
PDW BLD-RTO: 18 % (ref 12.4–15.4)
PLATELET # BLD: 362 K/UL (ref 135–450)
PMV BLD AUTO: 7.8 FL (ref 5–10.5)
POTASSIUM SERPL-SCNC: 4.7 MMOL/L (ref 3.5–5.1)
PRO-BNP: 5710 PG/ML (ref 0–124)
PRO-BNP: 5884 PG/ML (ref 0–124)
PROTHROMBIN TIME: 33 SEC (ref 9.8–13)
RBC # BLD: 4.12 M/UL (ref 4.2–5.9)
SODIUM BLD-SCNC: 136 MMOL/L (ref 136–145)
TROPONIN: <0.01 NG/ML
WBC # BLD: 6.6 K/UL (ref 4–11)

## 2019-11-10 PROCEDURE — 6370000000 HC RX 637 (ALT 250 FOR IP): Performed by: PHYSICIAN ASSISTANT

## 2019-11-10 PROCEDURE — 80048 BASIC METABOLIC PNL TOTAL CA: CPT

## 2019-11-10 PROCEDURE — 99285 EMERGENCY DEPT VISIT HI MDM: CPT

## 2019-11-10 PROCEDURE — 85610 PROTHROMBIN TIME: CPT

## 2019-11-10 PROCEDURE — 6370000000 HC RX 637 (ALT 250 FOR IP): Performed by: INTERNAL MEDICINE

## 2019-11-10 PROCEDURE — 84439 ASSAY OF FREE THYROXINE: CPT

## 2019-11-10 PROCEDURE — 2060000000 HC ICU INTERMEDIATE R&B

## 2019-11-10 PROCEDURE — 2500000003 HC RX 250 WO HCPCS: Performed by: PHYSICIAN ASSISTANT

## 2019-11-10 PROCEDURE — 84484 ASSAY OF TROPONIN QUANT: CPT

## 2019-11-10 PROCEDURE — 2580000003 HC RX 258: Performed by: INTERNAL MEDICINE

## 2019-11-10 PROCEDURE — 71046 X-RAY EXAM CHEST 2 VIEWS: CPT

## 2019-11-10 PROCEDURE — 6360000002 HC RX W HCPCS: Performed by: PHYSICIAN ASSISTANT

## 2019-11-10 PROCEDURE — 36415 COLL VENOUS BLD VENIPUNCTURE: CPT

## 2019-11-10 PROCEDURE — 6360000002 HC RX W HCPCS: Performed by: INTERNAL MEDICINE

## 2019-11-10 PROCEDURE — 84443 ASSAY THYROID STIM HORMONE: CPT

## 2019-11-10 PROCEDURE — 2500000003 HC RX 250 WO HCPCS: Performed by: INTERNAL MEDICINE

## 2019-11-10 PROCEDURE — 96375 TX/PRO/DX INJ NEW DRUG ADDON: CPT

## 2019-11-10 PROCEDURE — 83880 ASSAY OF NATRIURETIC PEPTIDE: CPT

## 2019-11-10 PROCEDURE — 85025 COMPLETE CBC W/AUTO DIFF WBC: CPT

## 2019-11-10 PROCEDURE — 94640 AIRWAY INHALATION TREATMENT: CPT

## 2019-11-10 PROCEDURE — 93005 ELECTROCARDIOGRAM TRACING: CPT | Performed by: EMERGENCY MEDICINE

## 2019-11-10 PROCEDURE — 96374 THER/PROPH/DIAG INJ IV PUSH: CPT

## 2019-11-10 RX ORDER — HYDRALAZINE HYDROCHLORIDE 20 MG/ML
10 INJECTION INTRAMUSCULAR; INTRAVENOUS EVERY 6 HOURS PRN
Status: DISCONTINUED | OUTPATIENT
Start: 2019-11-10 | End: 2019-11-15 | Stop reason: HOSPADM

## 2019-11-10 RX ORDER — CARVEDILOL 6.25 MG/1
12.5 TABLET ORAL 2 TIMES DAILY WITH MEALS
Status: DISCONTINUED | OUTPATIENT
Start: 2019-11-10 | End: 2019-11-12

## 2019-11-10 RX ORDER — WARFARIN SODIUM 2.5 MG/1
1.25 TABLET ORAL WEEKLY
Status: DISCONTINUED | OUTPATIENT
Start: 2019-11-11 | End: 2019-11-11 | Stop reason: CLARIF

## 2019-11-10 RX ORDER — POTASSIUM CHLORIDE 7.45 MG/ML
10 INJECTION INTRAVENOUS PRN
Status: DISCONTINUED | OUTPATIENT
Start: 2019-11-10 | End: 2019-11-15 | Stop reason: HOSPADM

## 2019-11-10 RX ORDER — SODIUM CHLORIDE 0.9 % (FLUSH) 0.9 %
10 SYRINGE (ML) INJECTION EVERY 12 HOURS SCHEDULED
Status: DISCONTINUED | OUTPATIENT
Start: 2019-11-10 | End: 2019-11-15 | Stop reason: HOSPADM

## 2019-11-10 RX ORDER — ISOSORBIDE MONONITRATE 30 MG/1
30 TABLET, EXTENDED RELEASE ORAL DAILY
Status: DISCONTINUED | OUTPATIENT
Start: 2019-11-10 | End: 2019-11-12

## 2019-11-10 RX ORDER — ACETAMINOPHEN 325 MG/1
650 TABLET ORAL EVERY 4 HOURS PRN
Status: DISCONTINUED | OUTPATIENT
Start: 2019-11-10 | End: 2019-11-15 | Stop reason: HOSPADM

## 2019-11-10 RX ORDER — WARFARIN SODIUM 2.5 MG/1
2.5 TABLET ORAL
Status: DISCONTINUED | OUTPATIENT
Start: 2019-11-10 | End: 2019-11-10

## 2019-11-10 RX ORDER — ALBUTEROL SULFATE 90 UG/1
2 AEROSOL, METERED RESPIRATORY (INHALATION) EVERY 6 HOURS PRN
Status: DISCONTINUED | OUTPATIENT
Start: 2019-11-10 | End: 2019-11-15 | Stop reason: HOSPADM

## 2019-11-10 RX ORDER — FUROSEMIDE 10 MG/ML
40 INJECTION INTRAMUSCULAR; INTRAVENOUS ONCE
Status: COMPLETED | OUTPATIENT
Start: 2019-11-10 | End: 2019-11-10

## 2019-11-10 RX ORDER — ONDANSETRON 2 MG/ML
4 INJECTION INTRAMUSCULAR; INTRAVENOUS EVERY 6 HOURS PRN
Status: DISCONTINUED | OUTPATIENT
Start: 2019-11-10 | End: 2019-11-15 | Stop reason: HOSPADM

## 2019-11-10 RX ORDER — WARFARIN SODIUM 5 MG/1
5 TABLET ORAL
Status: DISCONTINUED | OUTPATIENT
Start: 2019-11-10 | End: 2019-11-10

## 2019-11-10 RX ORDER — ASPIRIN 81 MG/1
324 TABLET, CHEWABLE ORAL ONCE
Status: COMPLETED | OUTPATIENT
Start: 2019-11-10 | End: 2019-11-10

## 2019-11-10 RX ORDER — DILTIAZEM HYDROCHLORIDE 5 MG/ML
10 INJECTION INTRAVENOUS ONCE
Status: COMPLETED | OUTPATIENT
Start: 2019-11-10 | End: 2019-11-10

## 2019-11-10 RX ORDER — FUROSEMIDE 10 MG/ML
20 INJECTION INTRAMUSCULAR; INTRAVENOUS 2 TIMES DAILY
Status: DISCONTINUED | OUTPATIENT
Start: 2019-11-10 | End: 2019-11-14

## 2019-11-10 RX ORDER — SODIUM CHLORIDE 0.9 % (FLUSH) 0.9 %
10 SYRINGE (ML) INJECTION PRN
Status: DISCONTINUED | OUTPATIENT
Start: 2019-11-10 | End: 2019-11-15 | Stop reason: HOSPADM

## 2019-11-10 RX ORDER — SPIRONOLACTONE 25 MG/1
12.5 TABLET ORAL DAILY
Status: DISCONTINUED | OUTPATIENT
Start: 2019-11-10 | End: 2019-11-15 | Stop reason: HOSPADM

## 2019-11-10 RX ORDER — WARFARIN SODIUM 2.5 MG/1
2.5 TABLET ORAL
Status: DISCONTINUED | OUTPATIENT
Start: 2019-11-12 | End: 2019-11-11 | Stop reason: CLARIF

## 2019-11-10 RX ORDER — 0.9 % SODIUM CHLORIDE 0.9 %
500 INTRAVENOUS SOLUTION INTRAVENOUS PRN
Status: DISCONTINUED | OUTPATIENT
Start: 2019-11-10 | End: 2019-11-15 | Stop reason: HOSPADM

## 2019-11-10 RX ORDER — POTASSIUM CHLORIDE 20 MEQ/1
40 TABLET, EXTENDED RELEASE ORAL PRN
Status: DISCONTINUED | OUTPATIENT
Start: 2019-11-10 | End: 2019-11-15 | Stop reason: HOSPADM

## 2019-11-10 RX ORDER — NITROGLYCERIN 0.4 MG/1
0.4 TABLET SUBLINGUAL EVERY 5 MIN PRN
Status: DISCONTINUED | OUTPATIENT
Start: 2019-11-10 | End: 2019-11-15 | Stop reason: HOSPADM

## 2019-11-10 RX ADMIN — CARVEDILOL 12.5 MG: 6.25 TABLET, FILM COATED ORAL at 16:48

## 2019-11-10 RX ADMIN — ISOSORBIDE MONONITRATE 30 MG: 30 TABLET, EXTENDED RELEASE ORAL at 18:06

## 2019-11-10 RX ADMIN — ASPIRIN 81 MG 324 MG: 81 TABLET ORAL at 14:28

## 2019-11-10 RX ADMIN — Medication 2 PUFF: at 20:54

## 2019-11-10 RX ADMIN — WARFARIN SODIUM 2.5 MG: 2.5 TABLET ORAL at 18:06

## 2019-11-10 RX ADMIN — DILTIAZEM HYDROCHLORIDE 10 MG: 5 INJECTION INTRAVENOUS at 14:32

## 2019-11-10 RX ADMIN — DILTIAZEM HYDROCHLORIDE 10 MG: 5 INJECTION INTRAVENOUS at 16:50

## 2019-11-10 RX ADMIN — DILTIAZEM HYDROCHLORIDE 5 MG/HR: 5 INJECTION INTRAVENOUS at 16:53

## 2019-11-10 RX ADMIN — SPIRONOLACTONE 12.5 MG: 25 TABLET ORAL at 17:38

## 2019-11-10 RX ADMIN — FUROSEMIDE 20 MG: 10 INJECTION, SOLUTION INTRAMUSCULAR; INTRAVENOUS at 18:48

## 2019-11-10 RX ADMIN — NITROGLYCERIN 0.5 INCH: 20 OINTMENT TOPICAL at 14:29

## 2019-11-10 RX ADMIN — FUROSEMIDE 40 MG: 10 INJECTION, SOLUTION INTRAMUSCULAR; INTRAVENOUS at 14:31

## 2019-11-10 RX ADMIN — Medication 10 ML: at 20:50

## 2019-11-10 ASSESSMENT — PAIN DESCRIPTION - PAIN TYPE: TYPE: ACUTE PAIN;CHRONIC PAIN

## 2019-11-10 ASSESSMENT — PAIN SCALES - GENERAL
PAINLEVEL_OUTOF10: 0
PAINLEVEL_OUTOF10: 0
PAINLEVEL_OUTOF10: 8

## 2019-11-10 ASSESSMENT — ENCOUNTER SYMPTOMS
FACIAL SWELLING: 0
COUGH: 0
BACK PAIN: 0
SHORTNESS OF BREATH: 1
ABDOMINAL PAIN: 0
NAUSEA: 0
WHEEZING: 0
DIARRHEA: 0
VOMITING: 0
RHINORRHEA: 0
EYE DISCHARGE: 0
STRIDOR: 0
EYE ITCHING: 0

## 2019-11-10 ASSESSMENT — PAIN DESCRIPTION - LOCATION: LOCATION: ABDOMEN

## 2019-11-11 PROBLEM — D50.9 MICROCYTIC ANEMIA: Status: ACTIVE | Noted: 2019-11-11

## 2019-11-11 LAB
A/G RATIO: 1.3 (ref 1.1–2.2)
ALBUMIN SERPL-MCNC: 3.5 G/DL (ref 3.4–5)
ALP BLD-CCNC: 77 U/L (ref 40–129)
ALT SERPL-CCNC: 20 U/L (ref 10–40)
ANION GAP SERPL CALCULATED.3IONS-SCNC: 14 MMOL/L (ref 3–16)
AST SERPL-CCNC: 16 U/L (ref 15–37)
BASOPHILS ABSOLUTE: 0.1 K/UL (ref 0–0.2)
BASOPHILS RELATIVE PERCENT: 0.9 %
BILIRUB SERPL-MCNC: 0.5 MG/DL (ref 0–1)
BUN BLDV-MCNC: 13 MG/DL (ref 7–20)
CALCIUM SERPL-MCNC: 8.6 MG/DL (ref 8.3–10.6)
CHLORIDE BLD-SCNC: 104 MMOL/L (ref 99–110)
CO2: 19 MMOL/L (ref 21–32)
CREAT SERPL-MCNC: 0.9 MG/DL (ref 0.8–1.3)
EKG ATRIAL RATE: 119 BPM
EKG ATRIAL RATE: 144 BPM
EKG DIAGNOSIS: NORMAL
EKG DIAGNOSIS: NORMAL
EKG Q-T INTERVAL: 308 MS
EKG Q-T INTERVAL: 372 MS
EKG QRS DURATION: 90 MS
EKG QRS DURATION: 94 MS
EKG QTC CALCULATION (BAZETT): 442 MS
EKG QTC CALCULATION (BAZETT): 540 MS
EKG R AXIS: 47 DEGREES
EKG R AXIS: 51 DEGREES
EKG T AXIS: 151 DEGREES
EKG T AXIS: 174 DEGREES
EKG VENTRICULAR RATE: 124 BPM
EKG VENTRICULAR RATE: 127 BPM
EOSINOPHILS ABSOLUTE: 0.1 K/UL (ref 0–0.6)
EOSINOPHILS RELATIVE PERCENT: 2.2 %
GFR AFRICAN AMERICAN: >60
GFR NON-AFRICAN AMERICAN: >60
GLOBULIN: 2.7 G/DL
GLUCOSE BLD-MCNC: 93 MG/DL (ref 70–99)
HCT VFR BLD CALC: 28.4 % (ref 40.5–52.5)
HEMOGLOBIN: 8.9 G/DL (ref 13.5–17.5)
INR BLD: 2.75 (ref 0.86–1.14)
IRON SATURATION: 8 % (ref 20–50)
IRON: 25 UG/DL (ref 59–158)
LV EF: 18 %
LVEF MODALITY: NORMAL
LYMPHOCYTES ABSOLUTE: 1.6 K/UL (ref 1–5.1)
LYMPHOCYTES RELATIVE PERCENT: 23.3 %
MCH RBC QN AUTO: 24.1 PG (ref 26–34)
MCHC RBC AUTO-ENTMCNC: 31.5 G/DL (ref 31–36)
MCV RBC AUTO: 76.5 FL (ref 80–100)
MONOCYTES ABSOLUTE: 0.4 K/UL (ref 0–1.3)
MONOCYTES RELATIVE PERCENT: 6 %
NEUTROPHILS ABSOLUTE: 4.7 K/UL (ref 1.7–7.7)
NEUTROPHILS RELATIVE PERCENT: 67.6 %
PDW BLD-RTO: 17.9 % (ref 12.4–15.4)
PLATELET # BLD: 325 K/UL (ref 135–450)
PMV BLD AUTO: 7.6 FL (ref 5–10.5)
POTASSIUM REFLEX MAGNESIUM: 3.9 MMOL/L (ref 3.5–5.1)
PRO-BNP: 3217 PG/ML (ref 0–124)
PROTHROMBIN TIME: 31.4 SEC (ref 9.8–13)
RBC # BLD: 3.71 M/UL (ref 4.2–5.9)
SODIUM BLD-SCNC: 137 MMOL/L (ref 136–145)
T4 FREE: 1.5 NG/DL (ref 0.9–1.8)
TOTAL IRON BINDING CAPACITY: 310 UG/DL (ref 260–445)
TOTAL PROTEIN: 6.2 G/DL (ref 6.4–8.2)
TSH SERPL DL<=0.05 MIU/L-ACNC: 1.14 UIU/ML (ref 0.27–4.2)
WBC # BLD: 6.9 K/UL (ref 4–11)

## 2019-11-11 PROCEDURE — 97530 THERAPEUTIC ACTIVITIES: CPT

## 2019-11-11 PROCEDURE — 6370000000 HC RX 637 (ALT 250 FOR IP): Performed by: PHYSICIAN ASSISTANT

## 2019-11-11 PROCEDURE — 6360000002 HC RX W HCPCS: Performed by: INTERNAL MEDICINE

## 2019-11-11 PROCEDURE — 80053 COMPREHEN METABOLIC PANEL: CPT

## 2019-11-11 PROCEDURE — 83550 IRON BINDING TEST: CPT

## 2019-11-11 PROCEDURE — 2060000000 HC ICU INTERMEDIATE R&B

## 2019-11-11 PROCEDURE — 6370000000 HC RX 637 (ALT 250 FOR IP): Performed by: INTERNAL MEDICINE

## 2019-11-11 PROCEDURE — 97165 OT EVAL LOW COMPLEX 30 MIN: CPT

## 2019-11-11 PROCEDURE — 99222 1ST HOSP IP/OBS MODERATE 55: CPT | Performed by: INTERNAL MEDICINE

## 2019-11-11 PROCEDURE — 83540 ASSAY OF IRON: CPT

## 2019-11-11 PROCEDURE — 93306 TTE W/DOPPLER COMPLETE: CPT

## 2019-11-11 PROCEDURE — 83880 ASSAY OF NATRIURETIC PEPTIDE: CPT

## 2019-11-11 PROCEDURE — 2500000003 HC RX 250 WO HCPCS: Performed by: INTERNAL MEDICINE

## 2019-11-11 PROCEDURE — 94761 N-INVAS EAR/PLS OXIMETRY MLT: CPT

## 2019-11-11 PROCEDURE — 82728 ASSAY OF FERRITIN: CPT

## 2019-11-11 PROCEDURE — 2700000000 HC OXYGEN THERAPY PER DAY

## 2019-11-11 PROCEDURE — 93005 ELECTROCARDIOGRAM TRACING: CPT | Performed by: INTERNAL MEDICINE

## 2019-11-11 PROCEDURE — 85025 COMPLETE CBC W/AUTO DIFF WBC: CPT

## 2019-11-11 PROCEDURE — 85610 PROTHROMBIN TIME: CPT

## 2019-11-11 PROCEDURE — 97161 PT EVAL LOW COMPLEX 20 MIN: CPT

## 2019-11-11 PROCEDURE — 2580000003 HC RX 258: Performed by: INTERNAL MEDICINE

## 2019-11-11 PROCEDURE — 94640 AIRWAY INHALATION TREATMENT: CPT

## 2019-11-11 PROCEDURE — 36415 COLL VENOUS BLD VENIPUNCTURE: CPT

## 2019-11-11 RX ORDER — DIGOXIN 0.25 MG/ML
250 INJECTION INTRAMUSCULAR; INTRAVENOUS
Status: COMPLETED | OUTPATIENT
Start: 2019-11-11 | End: 2019-11-11

## 2019-11-11 RX ORDER — PANTOPRAZOLE SODIUM 40 MG/1
40 TABLET, DELAYED RELEASE ORAL
Status: DISCONTINUED | OUTPATIENT
Start: 2019-11-11 | End: 2019-11-15 | Stop reason: HOSPADM

## 2019-11-11 RX ORDER — WARFARIN SODIUM 2.5 MG/1
2.5 TABLET ORAL
Status: DISCONTINUED | OUTPATIENT
Start: 2019-11-12 | End: 2019-11-12 | Stop reason: DRUGHIGH

## 2019-11-11 RX ORDER — WARFARIN SODIUM 2.5 MG/1
1.25 TABLET ORAL
Status: DISCONTINUED | OUTPATIENT
Start: 2019-11-11 | End: 2019-11-12 | Stop reason: DRUGHIGH

## 2019-11-11 RX ORDER — PANTOPRAZOLE SODIUM 40 MG/1
40 TABLET, DELAYED RELEASE ORAL
Status: DISCONTINUED | OUTPATIENT
Start: 2019-11-12 | End: 2019-11-11

## 2019-11-11 RX ADMIN — Medication 10 ML: at 17:10

## 2019-11-11 RX ADMIN — CARVEDILOL 12.5 MG: 6.25 TABLET, FILM COATED ORAL at 10:01

## 2019-11-11 RX ADMIN — CARVEDILOL 12.5 MG: 6.25 TABLET, FILM COATED ORAL at 17:09

## 2019-11-11 RX ADMIN — WARFARIN SODIUM 1.25 MG: 2.5 TABLET ORAL at 17:08

## 2019-11-11 RX ADMIN — DIGOXIN 250 MCG: 0.25 INJECTION INTRAMUSCULAR; INTRAVENOUS at 12:58

## 2019-11-11 RX ADMIN — SPIRONOLACTONE 12.5 MG: 25 TABLET ORAL at 10:01

## 2019-11-11 RX ADMIN — DILTIAZEM HYDROCHLORIDE 10 MG/HR: 5 INJECTION INTRAVENOUS at 03:55

## 2019-11-11 RX ADMIN — DILTIAZEM HYDROCHLORIDE 10 MG/HR: 5 INJECTION INTRAVENOUS at 17:40

## 2019-11-11 RX ADMIN — FUROSEMIDE 20 MG: 10 INJECTION, SOLUTION INTRAMUSCULAR; INTRAVENOUS at 17:09

## 2019-11-11 RX ADMIN — DILTIAZEM HYDROCHLORIDE 15 MG/HR: 5 INJECTION INTRAVENOUS at 02:01

## 2019-11-11 RX ADMIN — Medication 2 PUFF: at 08:10

## 2019-11-11 RX ADMIN — DIGOXIN 250 MCG: 0.25 INJECTION INTRAMUSCULAR; INTRAVENOUS at 10:00

## 2019-11-11 RX ADMIN — Medication 10 ML: at 21:56

## 2019-11-11 RX ADMIN — PANTOPRAZOLE SODIUM 40 MG: 40 TABLET, DELAYED RELEASE ORAL at 17:08

## 2019-11-11 RX ADMIN — Medication 10 ML: at 10:01

## 2019-11-11 RX ADMIN — Medication 2 PUFF: at 21:21

## 2019-11-11 ASSESSMENT — PAIN SCALES - GENERAL
PAINLEVEL_OUTOF10: 0

## 2019-11-12 LAB
ANION GAP SERPL CALCULATED.3IONS-SCNC: 11 MMOL/L (ref 3–16)
BASOPHILS ABSOLUTE: 0.1 K/UL (ref 0–0.2)
BASOPHILS RELATIVE PERCENT: 1.4 %
BUN BLDV-MCNC: 15 MG/DL (ref 7–20)
CALCIUM SERPL-MCNC: 9.1 MG/DL (ref 8.3–10.6)
CHLORIDE BLD-SCNC: 106 MMOL/L (ref 99–110)
CO2: 24 MMOL/L (ref 21–32)
CREAT SERPL-MCNC: 1 MG/DL (ref 0.8–1.3)
EOSINOPHILS ABSOLUTE: 0.2 K/UL (ref 0–0.6)
EOSINOPHILS RELATIVE PERCENT: 3.7 %
FERRITIN: 43.3 NG/ML (ref 30–400)
GFR AFRICAN AMERICAN: >60
GFR NON-AFRICAN AMERICAN: >60
GLUCOSE BLD-MCNC: 120 MG/DL (ref 70–99)
HCT VFR BLD CALC: 32.3 % (ref 40.5–52.5)
HEMOGLOBIN: 9.9 G/DL (ref 13.5–17.5)
INR BLD: 3.12 (ref 0.86–1.14)
LYMPHOCYTES ABSOLUTE: 1.9 K/UL (ref 1–5.1)
LYMPHOCYTES RELATIVE PERCENT: 28.9 %
MCH RBC QN AUTO: 24 PG (ref 26–34)
MCHC RBC AUTO-ENTMCNC: 30.7 G/DL (ref 31–36)
MCV RBC AUTO: 78.2 FL (ref 80–100)
MONOCYTES ABSOLUTE: 0.6 K/UL (ref 0–1.3)
MONOCYTES RELATIVE PERCENT: 9.1 %
NEUTROPHILS ABSOLUTE: 3.7 K/UL (ref 1.7–7.7)
NEUTROPHILS RELATIVE PERCENT: 56.9 %
PDW BLD-RTO: 18.2 % (ref 12.4–15.4)
PLATELET # BLD: 320 K/UL (ref 135–450)
PMV BLD AUTO: 7.9 FL (ref 5–10.5)
POTASSIUM SERPL-SCNC: 4.9 MMOL/L (ref 3.5–5.1)
PROTHROMBIN TIME: 35.6 SEC (ref 9.8–13)
RBC # BLD: 4.14 M/UL (ref 4.2–5.9)
SODIUM BLD-SCNC: 141 MMOL/L (ref 136–145)
WBC # BLD: 6.5 K/UL (ref 4–11)

## 2019-11-12 PROCEDURE — 6360000002 HC RX W HCPCS: Performed by: INTERNAL MEDICINE

## 2019-11-12 PROCEDURE — 2580000003 HC RX 258: Performed by: INTERNAL MEDICINE

## 2019-11-12 PROCEDURE — 94640 AIRWAY INHALATION TREATMENT: CPT

## 2019-11-12 PROCEDURE — 99233 SBSQ HOSP IP/OBS HIGH 50: CPT | Performed by: NURSE PRACTITIONER

## 2019-11-12 PROCEDURE — 85610 PROTHROMBIN TIME: CPT

## 2019-11-12 PROCEDURE — 6370000000 HC RX 637 (ALT 250 FOR IP): Performed by: INTERNAL MEDICINE

## 2019-11-12 PROCEDURE — 2060000000 HC ICU INTERMEDIATE R&B

## 2019-11-12 PROCEDURE — 6360000002 HC RX W HCPCS: Performed by: NURSE PRACTITIONER

## 2019-11-12 PROCEDURE — 99233 SBSQ HOSP IP/OBS HIGH 50: CPT | Performed by: INTERNAL MEDICINE

## 2019-11-12 PROCEDURE — 2580000003 HC RX 258

## 2019-11-12 PROCEDURE — 80048 BASIC METABOLIC PNL TOTAL CA: CPT

## 2019-11-12 PROCEDURE — 36415 COLL VENOUS BLD VENIPUNCTURE: CPT

## 2019-11-12 PROCEDURE — 85025 COMPLETE CBC W/AUTO DIFF WBC: CPT

## 2019-11-12 PROCEDURE — 6370000000 HC RX 637 (ALT 250 FOR IP): Performed by: PHYSICIAN ASSISTANT

## 2019-11-12 PROCEDURE — 6370000000 HC RX 637 (ALT 250 FOR IP): Performed by: NURSE PRACTITIONER

## 2019-11-12 RX ORDER — SODIUM CHLORIDE 9 MG/ML
INJECTION, SOLUTION INTRAVENOUS
Status: COMPLETED
Start: 2019-11-12 | End: 2019-11-12

## 2019-11-12 RX ORDER — METOPROLOL SUCCINATE 25 MG/1
25 TABLET, EXTENDED RELEASE ORAL DAILY
Status: DISCONTINUED | OUTPATIENT
Start: 2019-11-12 | End: 2019-11-13

## 2019-11-12 RX ORDER — DIGOXIN 0.25 MG/ML
125 INJECTION INTRAMUSCULAR; INTRAVENOUS ONCE
Status: COMPLETED | OUTPATIENT
Start: 2019-11-12 | End: 2019-11-12

## 2019-11-12 RX ORDER — DIGOXIN 125 MCG
125 TABLET ORAL DAILY
Status: DISCONTINUED | OUTPATIENT
Start: 2019-11-13 | End: 2019-11-14

## 2019-11-12 RX ADMIN — Medication 10 ML: at 20:17

## 2019-11-12 RX ADMIN — DIGOXIN 125 MCG: 0.25 INJECTION INTRAMUSCULAR; INTRAVENOUS at 11:19

## 2019-11-12 RX ADMIN — Medication 2 PUFF: at 19:47

## 2019-11-12 RX ADMIN — METOPROLOL SUCCINATE 25 MG: 25 TABLET, FILM COATED, EXTENDED RELEASE ORAL at 13:19

## 2019-11-12 RX ADMIN — Medication: at 12:18

## 2019-11-12 RX ADMIN — FUROSEMIDE 20 MG: 10 INJECTION, SOLUTION INTRAMUSCULAR; INTRAVENOUS at 20:26

## 2019-11-12 RX ADMIN — FUROSEMIDE 20 MG: 10 INJECTION, SOLUTION INTRAMUSCULAR; INTRAVENOUS at 13:21

## 2019-11-12 RX ADMIN — SODIUM CHLORIDE: 9 INJECTION, SOLUTION INTRAVENOUS at 12:18

## 2019-11-12 RX ADMIN — PANTOPRAZOLE SODIUM 40 MG: 40 TABLET, DELAYED RELEASE ORAL at 09:16

## 2019-11-12 RX ADMIN — Medication 10 ML: at 11:49

## 2019-11-12 RX ADMIN — SPIRONOLACTONE 12.5 MG: 25 TABLET ORAL at 11:31

## 2019-11-12 RX ADMIN — IRON SUCROSE 200 MG: 20 INJECTION, SOLUTION INTRAVENOUS at 11:32

## 2019-11-12 ASSESSMENT — PAIN SCALES - GENERAL
PAINLEVEL_OUTOF10: 0
PAINLEVEL_OUTOF10: 0

## 2019-11-13 LAB
HCT VFR BLD CALC: 36.4 % (ref 40.5–52.5)
HEMOGLOBIN: 11.3 G/DL (ref 13.5–17.5)
INR BLD: 2.58 (ref 0.86–1.14)
PROTHROMBIN TIME: 29.4 SEC (ref 9.8–13)

## 2019-11-13 PROCEDURE — 2060000000 HC ICU INTERMEDIATE R&B

## 2019-11-13 PROCEDURE — 2580000003 HC RX 258: Performed by: INTERNAL MEDICINE

## 2019-11-13 PROCEDURE — 92960 CARDIOVERSION ELECTRIC EXT: CPT

## 2019-11-13 PROCEDURE — 36415 COLL VENOUS BLD VENIPUNCTURE: CPT

## 2019-11-13 PROCEDURE — 85018 HEMOGLOBIN: CPT

## 2019-11-13 PROCEDURE — 99233 SBSQ HOSP IP/OBS HIGH 50: CPT | Performed by: NURSE PRACTITIONER

## 2019-11-13 PROCEDURE — 92960 CARDIOVERSION ELECTRIC EXT: CPT | Performed by: INTERNAL MEDICINE

## 2019-11-13 PROCEDURE — 6360000002 HC RX W HCPCS: Performed by: INTERNAL MEDICINE

## 2019-11-13 PROCEDURE — 85610 PROTHROMBIN TIME: CPT

## 2019-11-13 PROCEDURE — 6370000000 HC RX 637 (ALT 250 FOR IP): Performed by: NURSE PRACTITIONER

## 2019-11-13 PROCEDURE — 2700000000 HC OXYGEN THERAPY PER DAY

## 2019-11-13 PROCEDURE — 85014 HEMATOCRIT: CPT

## 2019-11-13 PROCEDURE — 99223 1ST HOSP IP/OBS HIGH 75: CPT | Performed by: INTERNAL MEDICINE

## 2019-11-13 PROCEDURE — 94640 AIRWAY INHALATION TREATMENT: CPT

## 2019-11-13 PROCEDURE — 6370000000 HC RX 637 (ALT 250 FOR IP): Performed by: INTERNAL MEDICINE

## 2019-11-13 PROCEDURE — 5A2204Z RESTORATION OF CARDIAC RHYTHM, SINGLE: ICD-10-PCS | Performed by: INTERNAL MEDICINE

## 2019-11-13 RX ORDER — CARVEDILOL 3.12 MG/1
3.12 TABLET ORAL 2 TIMES DAILY WITH MEALS
Status: DISCONTINUED | OUTPATIENT
Start: 2019-11-13 | End: 2019-11-14

## 2019-11-13 RX ORDER — SODIUM CHLORIDE 0.9 % (FLUSH) 0.9 %
10 SYRINGE (ML) INJECTION EVERY 12 HOURS SCHEDULED
Status: CANCELLED | OUTPATIENT
Start: 2019-11-13

## 2019-11-13 RX ORDER — SODIUM CHLORIDE 0.9 % (FLUSH) 0.9 %
10 SYRINGE (ML) INJECTION PRN
Status: CANCELLED | OUTPATIENT
Start: 2019-11-13

## 2019-11-13 RX ORDER — AMIODARONE HYDROCHLORIDE 200 MG/1
400 TABLET ORAL 2 TIMES DAILY
Status: DISCONTINUED | OUTPATIENT
Start: 2019-11-13 | End: 2019-11-15 | Stop reason: HOSPADM

## 2019-11-13 RX ORDER — DIGOXIN 0.25 MG/ML
250 INJECTION INTRAMUSCULAR; INTRAVENOUS ONCE
Status: COMPLETED | OUTPATIENT
Start: 2019-11-13 | End: 2019-11-13

## 2019-11-13 RX ADMIN — Medication 10 ML: at 15:08

## 2019-11-13 RX ADMIN — CARVEDILOL 3.12 MG: 3.12 TABLET, FILM COATED ORAL at 17:17

## 2019-11-13 RX ADMIN — METOPROLOL SUCCINATE 25 MG: 25 TABLET, FILM COATED, EXTENDED RELEASE ORAL at 08:13

## 2019-11-13 RX ADMIN — Medication 2 PUFF: at 20:11

## 2019-11-13 RX ADMIN — Medication 10 ML: at 21:53

## 2019-11-13 RX ADMIN — FUROSEMIDE 20 MG: 10 INJECTION, SOLUTION INTRAMUSCULAR; INTRAVENOUS at 17:13

## 2019-11-13 RX ADMIN — DIGOXIN 250 MCG: 250 INJECTION, SOLUTION INTRAMUSCULAR; INTRAVENOUS at 09:49

## 2019-11-13 RX ADMIN — FUROSEMIDE 20 MG: 10 INJECTION, SOLUTION INTRAMUSCULAR; INTRAVENOUS at 08:19

## 2019-11-13 RX ADMIN — IRON SUCROSE 200 MG: 20 INJECTION, SOLUTION INTRAVENOUS at 15:07

## 2019-11-13 RX ADMIN — Medication 10 ML: at 17:13

## 2019-11-13 RX ADMIN — SPIRONOLACTONE: 25 TABLET ORAL at 08:13

## 2019-11-13 RX ADMIN — AMIODARONE HYDROCHLORIDE 400 MG: 200 TABLET ORAL at 21:52

## 2019-11-13 RX ADMIN — DIGOXIN 125 MCG: 125 TABLET ORAL at 08:13

## 2019-11-13 RX ADMIN — AMIODARONE HYDROCHLORIDE 400 MG: 200 TABLET ORAL at 09:48

## 2019-11-13 ASSESSMENT — PAIN SCALES - GENERAL
PAINLEVEL_OUTOF10: 0

## 2019-11-14 LAB
ANION GAP SERPL CALCULATED.3IONS-SCNC: 10 MMOL/L (ref 3–16)
BASOPHILS ABSOLUTE: 0.1 K/UL (ref 0–0.2)
BASOPHILS RELATIVE PERCENT: 1.3 %
BUN BLDV-MCNC: 16 MG/DL (ref 7–20)
CALCIUM SERPL-MCNC: 9.5 MG/DL (ref 8.3–10.6)
CHLORIDE BLD-SCNC: 104 MMOL/L (ref 99–110)
CO2: 25 MMOL/L (ref 21–32)
CREAT SERPL-MCNC: 1 MG/DL (ref 0.8–1.3)
EOSINOPHILS ABSOLUTE: 0.2 K/UL (ref 0–0.6)
EOSINOPHILS RELATIVE PERCENT: 3 %
GFR AFRICAN AMERICAN: >60
GFR NON-AFRICAN AMERICAN: >60
GLUCOSE BLD-MCNC: 119 MG/DL (ref 70–99)
HCT VFR BLD CALC: 36.7 % (ref 40.5–52.5)
HEMOGLOBIN: 11.7 G/DL (ref 13.5–17.5)
INR BLD: 1.9 (ref 0.86–1.14)
LYMPHOCYTES ABSOLUTE: 2.1 K/UL (ref 1–5.1)
LYMPHOCYTES RELATIVE PERCENT: 25.3 %
MCH RBC QN AUTO: 24.6 PG (ref 26–34)
MCHC RBC AUTO-ENTMCNC: 32 G/DL (ref 31–36)
MCV RBC AUTO: 77.1 FL (ref 80–100)
MONOCYTES ABSOLUTE: 0.7 K/UL (ref 0–1.3)
MONOCYTES RELATIVE PERCENT: 8.8 %
NEUTROPHILS ABSOLUTE: 5.1 K/UL (ref 1.7–7.7)
NEUTROPHILS RELATIVE PERCENT: 61.6 %
PDW BLD-RTO: 18.1 % (ref 12.4–15.4)
PLATELET # BLD: 354 K/UL (ref 135–450)
PMV BLD AUTO: 7.3 FL (ref 5–10.5)
POTASSIUM SERPL-SCNC: 4.6 MMOL/L (ref 3.5–5.1)
PRO-BNP: 1617 PG/ML (ref 0–124)
PROTHROMBIN TIME: 21.7 SEC (ref 9.8–13)
RBC # BLD: 4.75 M/UL (ref 4.2–5.9)
SODIUM BLD-SCNC: 139 MMOL/L (ref 136–145)
WBC # BLD: 8.2 K/UL (ref 4–11)

## 2019-11-14 PROCEDURE — 85610 PROTHROMBIN TIME: CPT

## 2019-11-14 PROCEDURE — 80048 BASIC METABOLIC PNL TOTAL CA: CPT

## 2019-11-14 PROCEDURE — 6370000000 HC RX 637 (ALT 250 FOR IP): Performed by: INTERNAL MEDICINE

## 2019-11-14 PROCEDURE — 6360000002 HC RX W HCPCS: Performed by: INTERNAL MEDICINE

## 2019-11-14 PROCEDURE — 36415 COLL VENOUS BLD VENIPUNCTURE: CPT

## 2019-11-14 PROCEDURE — 7100000010 HC PHASE II RECOVERY - FIRST 15 MIN

## 2019-11-14 PROCEDURE — 99233 SBSQ HOSP IP/OBS HIGH 50: CPT | Performed by: INTERNAL MEDICINE

## 2019-11-14 PROCEDURE — 97116 GAIT TRAINING THERAPY: CPT

## 2019-11-14 PROCEDURE — 2060000000 HC ICU INTERMEDIATE R&B

## 2019-11-14 PROCEDURE — 94761 N-INVAS EAR/PLS OXIMETRY MLT: CPT

## 2019-11-14 PROCEDURE — 83880 ASSAY OF NATRIURETIC PEPTIDE: CPT

## 2019-11-14 PROCEDURE — 85025 COMPLETE CBC W/AUTO DIFF WBC: CPT

## 2019-11-14 PROCEDURE — 94640 AIRWAY INHALATION TREATMENT: CPT

## 2019-11-14 PROCEDURE — 2580000003 HC RX 258: Performed by: INTERNAL MEDICINE

## 2019-11-14 PROCEDURE — 6370000000 HC RX 637 (ALT 250 FOR IP): Performed by: PHYSICIAN ASSISTANT

## 2019-11-14 RX ORDER — FUROSEMIDE 20 MG/1
20 TABLET ORAL ONCE
Status: COMPLETED | OUTPATIENT
Start: 2019-11-14 | End: 2019-11-14

## 2019-11-14 RX ORDER — FUROSEMIDE 20 MG/1
20 TABLET ORAL 2 TIMES DAILY
Status: DISCONTINUED | OUTPATIENT
Start: 2019-11-14 | End: 2019-11-15 | Stop reason: HOSPADM

## 2019-11-14 RX ORDER — CARVEDILOL 6.25 MG/1
6.25 TABLET ORAL 2 TIMES DAILY WITH MEALS
Status: DISCONTINUED | OUTPATIENT
Start: 2019-11-14 | End: 2019-11-15 | Stop reason: HOSPADM

## 2019-11-14 RX ADMIN — Medication 2 PUFF: at 21:11

## 2019-11-14 RX ADMIN — SACUBITRIL AND VALSARTAN 0.5 TABLET: 24; 26 TABLET, FILM COATED ORAL at 09:50

## 2019-11-14 RX ADMIN — SACUBITRIL AND VALSARTAN 0.5 TABLET: 24; 26 TABLET, FILM COATED ORAL at 20:35

## 2019-11-14 RX ADMIN — Medication 10 ML: at 09:53

## 2019-11-14 RX ADMIN — FUROSEMIDE 20 MG: 20 TABLET ORAL at 09:49

## 2019-11-14 RX ADMIN — Medication 10 ML: at 20:44

## 2019-11-14 RX ADMIN — Medication 2 PUFF: at 10:02

## 2019-11-14 RX ADMIN — APIXABAN 5 MG: 5 TABLET, FILM COATED ORAL at 20:29

## 2019-11-14 RX ADMIN — FUROSEMIDE 20 MG: 20 TABLET ORAL at 20:32

## 2019-11-14 RX ADMIN — SPIRONOLACTONE 12.5 MG: 25 TABLET ORAL at 09:50

## 2019-11-14 RX ADMIN — CARVEDILOL 6.25 MG: 6.25 TABLET, FILM COATED ORAL at 20:31

## 2019-11-14 RX ADMIN — PANTOPRAZOLE SODIUM 40 MG: 40 TABLET, DELAYED RELEASE ORAL at 06:14

## 2019-11-14 RX ADMIN — AMIODARONE HYDROCHLORIDE 400 MG: 200 TABLET ORAL at 09:49

## 2019-11-14 RX ADMIN — APIXABAN 5 MG: 5 TABLET, FILM COATED ORAL at 09:50

## 2019-11-14 RX ADMIN — AMIODARONE HYDROCHLORIDE 400 MG: 200 TABLET ORAL at 20:30

## 2019-11-14 ASSESSMENT — PAIN DESCRIPTION - PAIN TYPE
TYPE: ACUTE PAIN;CHRONIC PAIN
TYPE: ACUTE PAIN;CHRONIC PAIN

## 2019-11-14 ASSESSMENT — PAIN SCALES - GENERAL
PAINLEVEL_OUTOF10: 0

## 2019-11-14 ASSESSMENT — PAIN SCALES - WONG BAKER
WONGBAKER_NUMERICALRESPONSE: 0

## 2019-11-14 ASSESSMENT — PAIN DESCRIPTION - LOCATION
LOCATION: ABDOMEN
LOCATION: ABDOMEN

## 2019-11-15 ENCOUNTER — TELEPHONE (OUTPATIENT)
Dept: CARDIOLOGY CLINIC | Age: 69
End: 2019-11-15

## 2019-11-15 VITALS
OXYGEN SATURATION: 96 % | WEIGHT: 196.87 LBS | HEIGHT: 69 IN | HEART RATE: 99 BPM | BODY MASS INDEX: 29.16 KG/M2 | DIASTOLIC BLOOD PRESSURE: 67 MMHG | SYSTOLIC BLOOD PRESSURE: 101 MMHG | TEMPERATURE: 98 F | RESPIRATION RATE: 18 BRPM

## 2019-11-15 DIAGNOSIS — I48.91 ATRIAL FIBRILLATION WITH RVR (HCC): Primary | ICD-10-CM

## 2019-11-15 LAB
ANION GAP SERPL CALCULATED.3IONS-SCNC: 12 MMOL/L (ref 3–16)
BASOPHILS ABSOLUTE: 0.1 K/UL (ref 0–0.2)
BASOPHILS RELATIVE PERCENT: 1 %
BUN BLDV-MCNC: 16 MG/DL (ref 7–20)
CALCIUM SERPL-MCNC: 9 MG/DL (ref 8.3–10.6)
CHLORIDE BLD-SCNC: 105 MMOL/L (ref 99–110)
CO2: 19 MMOL/L (ref 21–32)
CREAT SERPL-MCNC: 0.9 MG/DL (ref 0.8–1.3)
EOSINOPHILS ABSOLUTE: 0.2 K/UL (ref 0–0.6)
EOSINOPHILS RELATIVE PERCENT: 1.9 %
GFR AFRICAN AMERICAN: >60
GFR NON-AFRICAN AMERICAN: >60
GLUCOSE BLD-MCNC: 131 MG/DL (ref 70–99)
HCT VFR BLD CALC: 38.7 % (ref 40.5–52.5)
HEMOGLOBIN: 11.9 G/DL (ref 13.5–17.5)
LYMPHOCYTES ABSOLUTE: 2.2 K/UL (ref 1–5.1)
LYMPHOCYTES RELATIVE PERCENT: 20.2 %
MCH RBC QN AUTO: 24.1 PG (ref 26–34)
MCHC RBC AUTO-ENTMCNC: 30.6 G/DL (ref 31–36)
MCV RBC AUTO: 78.7 FL (ref 80–100)
MONOCYTES ABSOLUTE: 0.8 K/UL (ref 0–1.3)
MONOCYTES RELATIVE PERCENT: 6.9 %
NEUTROPHILS ABSOLUTE: 7.7 K/UL (ref 1.7–7.7)
NEUTROPHILS RELATIVE PERCENT: 70 %
PDW BLD-RTO: 18.5 % (ref 12.4–15.4)
PLATELET # BLD: 362 K/UL (ref 135–450)
PMV BLD AUTO: 7.4 FL (ref 5–10.5)
POTASSIUM SERPL-SCNC: 4.8 MMOL/L (ref 3.5–5.1)
RBC # BLD: 4.92 M/UL (ref 4.2–5.9)
SODIUM BLD-SCNC: 136 MMOL/L (ref 136–145)
WBC # BLD: 11 K/UL (ref 4–11)

## 2019-11-15 PROCEDURE — 94640 AIRWAY INHALATION TREATMENT: CPT

## 2019-11-15 PROCEDURE — 36415 COLL VENOUS BLD VENIPUNCTURE: CPT

## 2019-11-15 PROCEDURE — 80048 BASIC METABOLIC PNL TOTAL CA: CPT

## 2019-11-15 PROCEDURE — 94761 N-INVAS EAR/PLS OXIMETRY MLT: CPT

## 2019-11-15 PROCEDURE — 2580000003 HC RX 258: Performed by: INTERNAL MEDICINE

## 2019-11-15 PROCEDURE — 99232 SBSQ HOSP IP/OBS MODERATE 35: CPT | Performed by: NURSE PRACTITIONER

## 2019-11-15 PROCEDURE — 6370000000 HC RX 637 (ALT 250 FOR IP): Performed by: PHYSICIAN ASSISTANT

## 2019-11-15 PROCEDURE — 99233 SBSQ HOSP IP/OBS HIGH 50: CPT | Performed by: INTERNAL MEDICINE

## 2019-11-15 PROCEDURE — 85025 COMPLETE CBC W/AUTO DIFF WBC: CPT

## 2019-11-15 PROCEDURE — 6370000000 HC RX 637 (ALT 250 FOR IP): Performed by: INTERNAL MEDICINE

## 2019-11-15 RX ORDER — FUROSEMIDE 20 MG/1
20 TABLET ORAL 2 TIMES DAILY
Qty: 60 TABLET | Refills: 3 | Status: SHIPPED | OUTPATIENT
Start: 2019-11-15 | End: 2020-06-29 | Stop reason: SDUPTHER

## 2019-11-15 RX ORDER — AMIODARONE HYDROCHLORIDE 400 MG/1
400 TABLET ORAL 2 TIMES DAILY
Qty: 60 TABLET | Refills: 3 | Status: SHIPPED | OUTPATIENT
Start: 2019-11-15 | End: 2019-11-15 | Stop reason: SDUPTHER

## 2019-11-15 RX ORDER — AMIODARONE HYDROCHLORIDE 200 MG/1
400 TABLET ORAL DAILY
Qty: 30 TABLET | Refills: 3 | Status: SHIPPED | OUTPATIENT
Start: 2019-11-18 | End: 2019-12-17

## 2019-11-15 RX ORDER — AMIODARONE HYDROCHLORIDE 400 MG/1
400 TABLET ORAL 2 TIMES DAILY
Qty: 60 TABLET | Refills: 3 | Status: SHIPPED | OUTPATIENT
Start: 2019-11-15 | End: 2019-11-15

## 2019-11-15 RX ADMIN — PANTOPRAZOLE SODIUM 40 MG: 40 TABLET, DELAYED RELEASE ORAL at 07:22

## 2019-11-15 RX ADMIN — Medication 10 ML: at 08:38

## 2019-11-15 RX ADMIN — SPIRONOLACTONE 12.5 MG: 25 TABLET ORAL at 08:37

## 2019-11-15 RX ADMIN — Medication 2 PUFF: at 08:05

## 2019-11-15 RX ADMIN — APIXABAN 5 MG: 5 TABLET, FILM COATED ORAL at 08:37

## 2019-11-15 RX ADMIN — SACUBITRIL AND VALSARTAN 0.5 TABLET: 24; 26 TABLET, FILM COATED ORAL at 08:36

## 2019-11-15 RX ADMIN — FUROSEMIDE 20 MG: 20 TABLET ORAL at 08:37

## 2019-11-15 RX ADMIN — AMIODARONE HYDROCHLORIDE 400 MG: 200 TABLET ORAL at 08:37

## 2019-11-15 RX ADMIN — CARVEDILOL 6.25 MG: 6.25 TABLET, FILM COATED ORAL at 08:37

## 2019-11-15 ASSESSMENT — PAIN SCALES - GENERAL
PAINLEVEL_OUTOF10: 0

## 2019-11-16 ENCOUNTER — CARE COORDINATION (OUTPATIENT)
Dept: CASE MANAGEMENT | Age: 69
End: 2019-11-16

## 2019-11-17 ENCOUNTER — CARE COORDINATION (OUTPATIENT)
Dept: CASE MANAGEMENT | Age: 69
End: 2019-11-17

## 2019-11-18 ENCOUNTER — TELEPHONE (OUTPATIENT)
Dept: OTHER | Age: 69
End: 2019-11-18

## 2019-11-18 RX ORDER — AMIODARONE HYDROCHLORIDE 200 MG/1
TABLET ORAL
Qty: 200 TABLET | Refills: 3 | Status: SHIPPED | OUTPATIENT
Start: 2019-11-18 | End: 2019-12-17

## 2019-11-19 ENCOUNTER — OFFICE VISIT (OUTPATIENT)
Dept: CARDIOLOGY CLINIC | Age: 69
End: 2019-11-19
Payer: MEDICARE

## 2019-11-19 VITALS
WEIGHT: 193 LBS | BODY MASS INDEX: 28.58 KG/M2 | HEART RATE: 60 BPM | DIASTOLIC BLOOD PRESSURE: 60 MMHG | OXYGEN SATURATION: 97 % | SYSTOLIC BLOOD PRESSURE: 84 MMHG | HEIGHT: 69 IN

## 2019-11-19 DIAGNOSIS — I95.89 HYPOTENSION DUE TO HYPOVOLEMIA: ICD-10-CM

## 2019-11-19 DIAGNOSIS — I25.5 ISCHEMIC CARDIOMYOPATHY: Primary | ICD-10-CM

## 2019-11-19 DIAGNOSIS — I50.43 ACUTE ON CHRONIC COMBINED SYSTOLIC AND DIASTOLIC CONGESTIVE HEART FAILURE (HCC): ICD-10-CM

## 2019-11-19 DIAGNOSIS — E86.1 HYPOTENSION DUE TO HYPOVOLEMIA: ICD-10-CM

## 2019-11-19 DIAGNOSIS — I48.91 ATRIAL FIBRILLATION WITH RVR (HCC): ICD-10-CM

## 2019-11-19 PROCEDURE — 3017F COLORECTAL CA SCREEN DOC REV: CPT | Performed by: NURSE PRACTITIONER

## 2019-11-19 PROCEDURE — 99214 OFFICE O/P EST MOD 30 MIN: CPT | Performed by: NURSE PRACTITIONER

## 2019-11-19 PROCEDURE — G8427 DOCREV CUR MEDS BY ELIG CLIN: HCPCS | Performed by: NURSE PRACTITIONER

## 2019-11-19 PROCEDURE — 4040F PNEUMOC VAC/ADMIN/RCVD: CPT | Performed by: NURSE PRACTITIONER

## 2019-11-19 PROCEDURE — 1111F DSCHRG MED/CURRENT MED MERGE: CPT | Performed by: NURSE PRACTITIONER

## 2019-11-19 PROCEDURE — G8598 ASA/ANTIPLAT THER USED: HCPCS | Performed by: NURSE PRACTITIONER

## 2019-11-19 PROCEDURE — G8484 FLU IMMUNIZE NO ADMIN: HCPCS | Performed by: NURSE PRACTITIONER

## 2019-11-19 PROCEDURE — G8417 CALC BMI ABV UP PARAM F/U: HCPCS | Performed by: NURSE PRACTITIONER

## 2019-11-19 PROCEDURE — 4004F PT TOBACCO SCREEN RCVD TLK: CPT | Performed by: NURSE PRACTITIONER

## 2019-11-19 PROCEDURE — 1123F ACP DISCUSS/DSCN MKR DOCD: CPT | Performed by: NURSE PRACTITIONER

## 2019-11-19 ASSESSMENT — ENCOUNTER SYMPTOMS
GASTROINTESTINAL NEGATIVE: 1
RESPIRATORY NEGATIVE: 1

## 2019-11-20 ENCOUNTER — TELEPHONE (OUTPATIENT)
Dept: PHARMACY | Age: 69
End: 2019-11-20

## 2019-11-21 ENCOUNTER — TELEPHONE (OUTPATIENT)
Dept: CARDIAC CATH/INVASIVE PROCEDURES | Age: 69
End: 2019-11-21

## 2019-11-22 ENCOUNTER — CARE COORDINATION (OUTPATIENT)
Dept: CASE MANAGEMENT | Age: 69
End: 2019-11-22

## 2019-11-26 ENCOUNTER — HOSPITAL ENCOUNTER (OUTPATIENT)
Dept: CARDIAC CATH/INVASIVE PROCEDURES | Age: 69
Discharge: HOME | End: 2019-11-26
Attending: INTERNAL MEDICINE | Admitting: INTERNAL MEDICINE
Payer: MEDICARE

## 2019-11-26 VITALS
HEART RATE: 62 BPM | WEIGHT: 193 LBS | TEMPERATURE: 98.5 F | SYSTOLIC BLOOD PRESSURE: 152 MMHG | DIASTOLIC BLOOD PRESSURE: 80 MMHG | RESPIRATION RATE: 18 BRPM | BODY MASS INDEX: 28.58 KG/M2 | HEIGHT: 69 IN

## 2019-11-26 LAB
EKG ATRIAL RATE: 300 BPM
EKG ATRIAL RATE: 58 BPM
EKG DIAGNOSIS: NORMAL
EKG DIAGNOSIS: NORMAL
EKG P AXIS: 23 DEGREES
EKG P-R INTERVAL: 206 MS
EKG Q-T INTERVAL: 436 MS
EKG Q-T INTERVAL: 476 MS
EKG QRS DURATION: 84 MS
EKG QRS DURATION: 94 MS
EKG QTC CALCULATION (BAZETT): 467 MS
EKG QTC CALCULATION (BAZETT): 502 MS
EKG R AXIS: 33 DEGREES
EKG R AXIS: 39 DEGREES
EKG T AXIS: 53 DEGREES
EKG T AXIS: 68 DEGREES
EKG VENTRICULAR RATE: 58 BPM
EKG VENTRICULAR RATE: 80 BPM
GFR AFRICAN AMERICAN: >60
GFR NON-AFRICAN AMERICAN: 55
GLUCOSE BLD-MCNC: 130 MG/DL (ref 70–99)
PERFORMED ON: ABNORMAL
POC BUN: 19 MG/DL (ref 7–18)
POC CREATININE: 1.3 MG/DL (ref 0.8–1.3)
POC HEMATOCRIT: 38 % (ref 40.5–52.5)
POC POTASSIUM: 4.5 MMOL/L (ref 3.5–5.1)
POC SAMPLE TYPE: ABNORMAL
POC SODIUM: 141 MMOL/L (ref 136–145)

## 2019-11-26 PROCEDURE — 84132 ASSAY OF SERUM POTASSIUM: CPT

## 2019-11-26 PROCEDURE — 84520 ASSAY OF UREA NITROGEN: CPT

## 2019-11-26 PROCEDURE — 84295 ASSAY OF SERUM SODIUM: CPT

## 2019-11-26 PROCEDURE — 85014 HEMATOCRIT: CPT

## 2019-11-26 PROCEDURE — 7100000010 HC PHASE II RECOVERY - FIRST 15 MIN

## 2019-11-26 PROCEDURE — 82947 ASSAY GLUCOSE BLOOD QUANT: CPT

## 2019-11-26 PROCEDURE — 93005 ELECTROCARDIOGRAM TRACING: CPT | Performed by: INTERNAL MEDICINE

## 2019-11-26 PROCEDURE — 2500000003 HC RX 250 WO HCPCS

## 2019-11-26 PROCEDURE — 92960 CARDIOVERSION ELECTRIC EXT: CPT | Performed by: INTERNAL MEDICINE

## 2019-11-26 PROCEDURE — 92960 CARDIOVERSION ELECTRIC EXT: CPT

## 2019-11-26 PROCEDURE — 82565 ASSAY OF CREATININE: CPT

## 2019-11-27 ENCOUNTER — CARE COORDINATION (OUTPATIENT)
Dept: CASE MANAGEMENT | Age: 69
End: 2019-11-27

## 2019-12-04 ENCOUNTER — CARE COORDINATION (OUTPATIENT)
Dept: CASE MANAGEMENT | Age: 69
End: 2019-12-04

## 2019-12-09 ENCOUNTER — TELEPHONE (OUTPATIENT)
Dept: CARDIAC CATH/INVASIVE PROCEDURES | Age: 69
End: 2019-12-09

## 2019-12-10 ENCOUNTER — ANTI-COAG VISIT (OUTPATIENT)
Dept: PHARMACY | Age: 69
End: 2019-12-10

## 2019-12-10 PROBLEM — Z86.79 HX OF ATRIAL FLUTTER: Status: RESOLVED | Noted: 2019-01-14 | Resolved: 2019-12-10

## 2019-12-17 ENCOUNTER — HOSPITAL ENCOUNTER (OUTPATIENT)
Age: 69
Discharge: HOME | End: 2019-12-17
Payer: MEDICARE

## 2019-12-17 ENCOUNTER — OFFICE VISIT (OUTPATIENT)
Dept: CARDIOLOGY CLINIC | Age: 69
End: 2019-12-17
Payer: MEDICARE

## 2019-12-17 VITALS
HEART RATE: 52 BPM | RESPIRATION RATE: 20 BRPM | SYSTOLIC BLOOD PRESSURE: 104 MMHG | DIASTOLIC BLOOD PRESSURE: 64 MMHG | HEIGHT: 69 IN | BODY MASS INDEX: 30.21 KG/M2 | WEIGHT: 204 LBS

## 2019-12-17 DIAGNOSIS — I48.91 ATRIAL FIBRILLATION WITH RVR (HCC): Primary | ICD-10-CM

## 2019-12-17 DIAGNOSIS — R94.31 ECG ABNORMAL: ICD-10-CM

## 2019-12-17 DIAGNOSIS — Z79.899 ON AMIODARONE THERAPY: ICD-10-CM

## 2019-12-17 LAB
ALT SERPL-CCNC: 18 U/L (ref 10–40)
AST SERPL-CCNC: 15 U/L (ref 15–37)
TSH REFLEX: 2.6 UIU/ML (ref 0.27–4.2)

## 2019-12-17 PROCEDURE — 84443 ASSAY THYROID STIM HORMONE: CPT

## 2019-12-17 PROCEDURE — G8598 ASA/ANTIPLAT THER USED: HCPCS | Performed by: INTERNAL MEDICINE

## 2019-12-17 PROCEDURE — G8417 CALC BMI ABV UP PARAM F/U: HCPCS | Performed by: INTERNAL MEDICINE

## 2019-12-17 PROCEDURE — 84450 TRANSFERASE (AST) (SGOT): CPT

## 2019-12-17 PROCEDURE — 4040F PNEUMOC VAC/ADMIN/RCVD: CPT | Performed by: INTERNAL MEDICINE

## 2019-12-17 PROCEDURE — 93000 ELECTROCARDIOGRAM COMPLETE: CPT | Performed by: INTERNAL MEDICINE

## 2019-12-17 PROCEDURE — 99214 OFFICE O/P EST MOD 30 MIN: CPT | Performed by: INTERNAL MEDICINE

## 2019-12-17 PROCEDURE — 36415 COLL VENOUS BLD VENIPUNCTURE: CPT

## 2019-12-17 PROCEDURE — 4004F PT TOBACCO SCREEN RCVD TLK: CPT | Performed by: INTERNAL MEDICINE

## 2019-12-17 PROCEDURE — 84460 ALANINE AMINO (ALT) (SGPT): CPT

## 2019-12-17 PROCEDURE — G8427 DOCREV CUR MEDS BY ELIG CLIN: HCPCS | Performed by: INTERNAL MEDICINE

## 2019-12-17 PROCEDURE — G8484 FLU IMMUNIZE NO ADMIN: HCPCS | Performed by: INTERNAL MEDICINE

## 2019-12-17 PROCEDURE — 1123F ACP DISCUSS/DSCN MKR DOCD: CPT | Performed by: INTERNAL MEDICINE

## 2019-12-17 PROCEDURE — 3017F COLORECTAL CA SCREEN DOC REV: CPT | Performed by: INTERNAL MEDICINE

## 2019-12-17 RX ORDER — AMIODARONE HYDROCHLORIDE 200 MG/1
200 TABLET ORAL DAILY
Qty: 30 TABLET | Refills: 3
Start: 2019-12-17 | End: 2020-03-24

## 2019-12-20 ENCOUNTER — CARE COORDINATION (OUTPATIENT)
Dept: CASE MANAGEMENT | Age: 69
End: 2019-12-20

## 2019-12-23 RX ORDER — ISOSORBIDE MONONITRATE 30 MG/1
TABLET, EXTENDED RELEASE ORAL
Qty: 90 TABLET | Refills: 1 | Status: SHIPPED | OUTPATIENT
Start: 2019-12-23 | End: 2020-07-15

## 2020-01-03 ENCOUNTER — CARE COORDINATION (OUTPATIENT)
Dept: CASE MANAGEMENT | Age: 70
End: 2020-01-03

## 2020-01-09 RX ORDER — CARVEDILOL 12.5 MG/1
TABLET ORAL
Qty: 60 TABLET | Refills: 1 | Status: SHIPPED | OUTPATIENT
Start: 2020-01-09 | End: 2020-01-20

## 2020-01-16 ENCOUNTER — CARE COORDINATION (OUTPATIENT)
Dept: CASE MANAGEMENT | Age: 70
End: 2020-01-16

## 2020-01-16 NOTE — CARE COORDINATION
[facility] Transitions Follow Up Call    2020    Patient: Pérez Al  Patient : 1950   MRN: <[id number]>  Reason for Admission:   Discharge Date: 19 RARS: Readmission Risk Score: 11         Attempted to contact patient for follow up BPCI-A transition call. Left voicemail message to return call with an update on condition since discharge. Contact information provided. Will continue to follow up. Care Transitions Subsequent and Final Call    Subsequent and Final Calls  Care Transitions Interventions                          Other Interventions:             Follow Up  Future Appointments   Date Time Provider Maricel Choudhury   2020 10:15 AM GEORGE Gil - CNP [facility]   3/3/2020  4:00 PM ECHO ROOM 1 [facility]   2020  3:45 PM Jannet Quijano MD [facility] Castillo LPN

## 2020-01-20 ENCOUNTER — OFFICE VISIT (OUTPATIENT)
Dept: CARDIOLOGY CLINIC | Age: 70
End: 2020-01-20
Payer: MEDICARE

## 2020-01-20 VITALS
DIASTOLIC BLOOD PRESSURE: 62 MMHG | HEART RATE: 54 BPM | SYSTOLIC BLOOD PRESSURE: 100 MMHG | BODY MASS INDEX: 29.92 KG/M2 | HEIGHT: 69 IN | WEIGHT: 202 LBS

## 2020-01-20 PROCEDURE — 4040F PNEUMOC VAC/ADMIN/RCVD: CPT | Performed by: NURSE PRACTITIONER

## 2020-01-20 PROCEDURE — 99214 OFFICE O/P EST MOD 30 MIN: CPT | Performed by: NURSE PRACTITIONER

## 2020-01-20 PROCEDURE — G8417 CALC BMI ABV UP PARAM F/U: HCPCS | Performed by: NURSE PRACTITIONER

## 2020-01-20 PROCEDURE — 4004F PT TOBACCO SCREEN RCVD TLK: CPT | Performed by: NURSE PRACTITIONER

## 2020-01-20 PROCEDURE — 1123F ACP DISCUSS/DSCN MKR DOCD: CPT | Performed by: NURSE PRACTITIONER

## 2020-01-20 PROCEDURE — G8484 FLU IMMUNIZE NO ADMIN: HCPCS | Performed by: NURSE PRACTITIONER

## 2020-01-20 PROCEDURE — 3017F COLORECTAL CA SCREEN DOC REV: CPT | Performed by: NURSE PRACTITIONER

## 2020-01-20 PROCEDURE — G8427 DOCREV CUR MEDS BY ELIG CLIN: HCPCS | Performed by: NURSE PRACTITIONER

## 2020-01-20 RX ORDER — CARVEDILOL 6.25 MG/1
6.25 TABLET ORAL 2 TIMES DAILY WITH MEALS
COMMUNITY
End: 2020-04-01 | Stop reason: SDUPTHER

## 2020-01-20 NOTE — PROGRESS NOTES
[facility]     Outpatient Follow Up Note    Chaka Delacruz is 71 y.o. male who presents today with a history of CAD s/p CABG May '14 with post-op short run of SVT and brief AF, s/p DCCV Nov '19; CM, HTN and hyperlipidemia. His other history includes carotid stenosis and PVD s/p Lt common and external iliac PTA       Interval hx: appt EP 12/17/19:  Amiodarone was decreased   after 6 months if his EF remains loq, will need to implant a ICD for reducing the risk of SCD    CHIEF COMPLAINT / HPI:  Follow Up secondary to CAD    Subjective:   He denies chest discomfort. He has SOB, ie going up/down basement steps 3 x. He does ok walking on level ground as long as he doesn't go far. He walks around the garage at work for about 7 miles total a day and does ok. He sleeps with 2 pillows and denies PND. He's had no swelling. His wt is 196# and stable at home  He's had no palpitations / fluttering. These symptoms are stable since the last OV. His BP has been running 140/60  With regard to medication therapy the patient has been compliant with prescribed regimen. He's been out of medications for about a month.      Past Medical History:   Diagnosis Date    Acid reflux     Acute MI ([address]     CAD (coronary artery disease)     COPD (chronic obstructive pulmonary disease) (HCC)     Diverticulitis     Hypertension     Peripheral vascular disease (HCC)      Social History:    Social History     Tobacco Use   Smoking Status Current Every Day Smoker    Packs/day: 0.50    Years: 50.00    Pack years: 25.00    Types: Cigarettes    Start date: 5/12/2014   Smokeless Tobacco Never Used   Tobacco Comment    5-6 CIGS/DAY     Current Outpatient Medications   Medication Sig Dispense Refill    carvedilol (COREG) 6.25 MG tablet Take 6.25 mg by mouth 2 times daily (with meals)      spironolactone (ALDACTONE) 25 MG tablet TAKE 1/2 TABLET BY MOUTH DAILY 45 tablet 1    isosorbide mononitrate (IMDUR) 30 MG extended release tablet TAKE 1 TABLET BY MOUTH EVERY DAY 90 tablet 1    amiodarone (CORDARONE) 200 MG tablet Take 1 tablet by mouth daily 30 tablet 3    sacubitril-valsartan (ENTRESTO) 24-26 MG per tablet Take 0.5 tablets by mouth 2 times daily 30 tablet 11    apixaban (ELIQUIS) 5 MG TABS tablet Take 1 tablet by mouth 2 times daily 60 tablet 6    furosemide (LASIX) 20 MG tablet Take 1 tablet by mouth 2 times daily 60 tablet 3    fluticasone-salmeterol (ADVAIR DISKUS) 250-50 MCG/DOSE AEPB Inhale 1 puff into the lungs 2 times daily. 3 each 3    albuterol (PROVENTIL HFA;VENTOLIN HFA) 108 (90 BASE) MCG/ACT inhaler Inhale 2 puffs into the lungs every 6 hours as needed for Wheezing.  linaclotide (LINZESS) 145 MCG capsule Take 145 mcg by mouth as needed       nitroGLYCERIN (NITROSTAT) 0.4 MG SL tablet Place 1 tablet under the tongue every 5 minutes as needed for Chest pain (Patient not taking: Reported on 1/20/2020) 25 tablet 3     No current facility-administered medications for this visit. REVIEW OF SYSTEMS:    CONSTITUTIONAL: - weight gain , - fatigue, weakness, night sweats or fever. HEENT: No new vision difficulties or ringing in the ears. RESPIRATORY: No new SOB, PND, orthopnea or cough. CARDIOVASCULAR: See HPI  GI: No nausea, vomiting, diarrhea, constipation, abdominal pain or changes in bowel habits. : No urinary frequency, urgency, incontinence hematuria or dysuria. SKIN: No cyanosis or skin lesions. MUSCULOSKELETAL: No new muscle or joint pain.   NEUROLOGICAL: light headed  PSYCHIATRIC: No anxiety, pain, insomnia or depression    Objective:   PHYSICAL EXAM:    Vitals:    01/20/20 1003 01/20/20 1020   BP: 110/80 100/62   Site: Right Upper Arm    Position: Sitting    Cuff Size: Large Adult    Pulse: 54    Weight: 202 lb (91.6 kg)    Height: 5' 9\" (1.753 m)         VITALS:  /80 (Site: Right Upper Arm, Position: Sitting, Cuff Size: Large Adult)   Pulse 54   Ht 5' 9\" (1.753 m)   Wt 202 lb (91.6 kg)   BMI 29.83 kg/m²   CONSTITUTIONAL: Cooperative, no apparent distress, and appears well nourished / developed  NEUROLOGIC:  Awake and orientated to person, place and time. PSYCH: Calm affect. SKIN: Warm and dry. HEENT: Sclera non-icteric, normocephalic, neck supple, no elevation of JVP, normal carotid pulses with no bruits and thyroid normal size. LUNGS:  No increased work of breathing and clear to auscultation, no crackles or wheezing  CARDIOVASCULAR:  Regular rate 64 and rhythm with no murmurs, gallops, rubs, or abnormal heart sounds, normal PMI. The apical impulses not displaced  JVP less than 8 cm H2O  Heart tones are crisp and normal  Cervical veins are not engorged  The carotid upstroke is normal in amplitude and contour without delay or bruit  JVP is not elevated  ABDOMEN:  Normal bowel sounds, non-distended and non-tender to palpation  EXT: RLE edema, no calf tenderness. Pulses are present bilaterally.     DATA:      LABS: 12/31/18:    trig 83 HCL 34    Na+ 140 K+ 4.2 chl 105 CO2 20 BUN 14 creatinine 0.87 glu 106   A1c 6%      10/29/19:   Na+ 137 K+ 4.6 chl 106 CO2 18 BUN 16 creatinine 1.08 glu 88   H/H 9.3 / 29.9      Radiology Review:  Pertinent images / reports were reviewed as a part of this visit and reveals the following:    [id number] Score for Atrial Fibrillation Stroke Risk   Risk   Factors  Component Value   C CHF Yes 1   H HTN Yes 1   A2 Age >= 76 No,  (75 y.o.) 0   D DM No 0   S2 Prior Stroke/TIA No 0   V Vascular Disease No 1   A Age 74-69 Yes,  (75 y.o.) 1   Sc Sex male 0    [id number]  Score  4   Score last updated 1/20/20 10:33 AM        Stress Test: Nov '16:  Summary    Moderate sized inferior fixed defect consistent with infarction in the    territory of the mid and distal RCA .    Small-moderate sized apical completely reversible defect consistent with    ischemia in the territory of the distal LAD .    Global hypokinesis with LV enlargement and EF 31 %     regimen        I had the opportunity to review the clinical symptoms and presentation of Leandra Cummings. Plan:     1. continue present management   2. Echo as planned and f/u with Dr. Dora Macdonald  3. F/U in 4 months with Dr. Jennifer Shea    Overall the patient is stable from CV standpoint    I have addresed the patient's cardiac risk factors and adjusted pharmacologic treatment as needed. In addition, I have reinforced the need for patient directed risk factor modification. Further evaluation will be based upon the patient's clinical course and testing results. All questions and concerns were addressed to the patient. Alternatives to my treatment were discussed. The patient is currently smoking. The risks related to smoking were reviewed with the patient. Recommend maintaining a smoke-free lifestyle. Products available for smoking cessation were discussed    Patient is on a beta-blocker  Patient is on an ARB  Patient is on a statin     Antiplatelet therapy / anti-coagulation has been recommended / prescribed for this patient. Education conducted on adverse reactions including bleeding was discussed. Daily weight, low sodium diet were discussed. Patient instructed to call the office with a weight gain: > 3 # over night or 5# in one week; swelling, SOB/orthopnea/PND    The patient verbalizes understanding not to stop medications without discussing with us. Discussed exercise: 30-60 minutes 7 days/week; He can't walk far d/t hips/legs hurt. Discussed Low saturated fat diet. Thank you for allowing to us to participate in the care of [address] [place] [address] [address]    Documentation of today's visit sent to PCP

## 2020-01-20 NOTE — COMMUNICATION BODY
daily. 3 each 3    albuterol (PROVENTIL HFA;VENTOLIN HFA) 108 (90 BASE) MCG/ACT inhaler Inhale 2 puffs into the lungs every 6 hours as needed for Wheezing.  linaclotide (LINZESS) 145 MCG capsule Take 145 mcg by mouth as needed       nitroGLYCERIN (NITROSTAT) 0.4 MG SL tablet Place 1 tablet under the tongue every 5 minutes as needed for Chest pain (Patient not taking: Reported on 1/20/2020) 25 tablet 3       Objective:   PHYSICAL EXAM:    Vitals:    01/20/20 1003 01/20/20 1020   BP: 110/80 100/62   Site: Right Upper Arm    Position: Sitting    Cuff Size: Large Adult    Pulse: 54    Weight: 202 lb (91.6 kg)    Height: 5' 9\" (1.753 m)         VITALS:  /80 (Site: Right Upper Arm, Position: Sitting, Cuff Size: Large Adult)   Pulse 54   Ht 5' 9\" (1.753 m)   Wt 202 lb (91.6 kg)   BMI 29.83 kg/m²    CONSTITUTIONAL: Cooperative, no apparent distress, and appears well nourished / developed  NEUROLOGIC:  Awake and orientated to person, place and time. PSYCH: Calm affect. SKIN: Warm and dry. HEENT: Sclera non-icteric, normocephalic, neck supple, no elevation of JVP, normal carotid pulses with no bruits and thyroid normal size. LUNGS:  No increased work of breathing and clear to auscultation, no crackles or wheezing  CARDIOVASCULAR:  Regular rate 64 and rhythm with no murmurs, gallops, rubs, or abnormal heart sounds, normal PMI. The apical impulses not displaced  JVP less than 8 cm H2O  Heart tones are crisp and normal  Cervical veins are not engorged  The carotid upstroke is normal in amplitude and contour without delay or bruit  JVP is not elevated  ABDOMEN:  Normal bowel sounds, non-distended and non-tender to palpation  EXT: RLE edema, no calf tenderness. Pulses are present bilaterally.     DATA:      LABS: 12/31/18:    trig 83 HCL 34    Na+ 140 K+ 4.2 chl 105 CO2 20 BUN 14 creatinine 0.87 glu 106   A1c 6%      10/29/19:   Na+ 137 K+ 4.6 chl 106 CO2 18 BUN 16 creatinine 1.08 glu 88   H/H 9.3 / 29.9      Radiology Review:  Pertinent images / reports were reviewed as a part of this visit and reveals the following:    [id number] Score for Atrial Fibrillation Stroke Risk   Risk   Factors  Component Value   C CHF Yes 1   H HTN Yes 1   A2 Age >= 76 No,  (75 y.o.) 0   D DM No 0   S2 Prior Stroke/TIA No 0   V Vascular Disease No 1   A Age 74-69 Yes,  (75 y.o.) 1   Sc Sex male 0    [id number]  Score  4   Score last updated 1/20/20 10:33 AM    Echo: 11/11/19:  Summary   Moderately dilated left ventricle. Global left ventricular function is severely decreased with ejection   fraction estimated from 15% to 20 %. There is severe diffuse hypokinesis. Grade II diastolic dysfunction with elevated LV filling pressures. The mitral valve normal in structure. Mild mitral regurgitation. The left atrium is severely dilated. Procedure: 11/26/19:  Elective cardioversion, on amiodarone, also on anticoagulation. Assessment:     1. Coronary artery disease involving native coronary artery of native heart without angina pectoris   ~stable : offers no c/o angina  ~RF: continues to smoke  ~ASA / BB / nitrate / statin     2. Ischemic cardiomyopathy   ~LVEF declined : 15-20% on echo from Nov '19  ~EF 50% by echo from Jan '17; improved from inf HK, EF 35-40% on cath Dec '16  ~neg for decompensation : Entesto / carvedilol / lasix    3. Typical atrial flutter [facility])   ~s/p DCCV Nov '19  ~regular to auscultation  ~AC managed in coumadin clinic; INR therapeutic   ~CHADsVASc 4  ~did not follow through with sleep study      4. Essential HTN  ~controlled on current regimen        I had the opportunity to review the clinical symptoms and presentation of Leandra Blair. Plan:     1. continue present management   2. Echo as planned and f/u with Dr. Danyell Santo  3. F/U in 4 months with Dr. Nicole Amos    Overall the patient is stable from CV standpoint    Thank you for allowing to us to participate in the care of [address] [facility] Blas Bates Marissa Katz

## 2020-01-20 NOTE — LETTER
 amiodarone (CORDARONE) 200 MG tablet Take 1 tablet by mouth daily 30 tablet 3    sacubitril-valsartan (ENTRESTO) 24-26 MG per tablet Take 0.5 tablets by mouth 2 times daily 30 tablet 11    apixaban (ELIQUIS) 5 MG TABS tablet Take 1 tablet by mouth 2 times daily 60 tablet 6    furosemide (LASIX) 20 MG tablet Take 1 tablet by mouth 2 times daily 60 tablet 3    fluticasone-salmeterol (ADVAIR DISKUS) 250-50 MCG/DOSE AEPB Inhale 1 puff into the lungs 2 times daily. 3 each 3    albuterol (PROVENTIL HFA;VENTOLIN HFA) 108 (90 BASE) MCG/ACT inhaler Inhale 2 puffs into the lungs every 6 hours as needed for Wheezing.  linaclotide (LINZESS) 145 MCG capsule Take 145 mcg by mouth as needed       nitroGLYCERIN (NITROSTAT) 0.4 MG SL tablet Place 1 tablet under the tongue every 5 minutes as needed for Chest pain (Patient not taking: Reported on 1/20/2020) 25 tablet 3       Objective:   PHYSICAL EXAM:    Vitals:    01/20/20 1003 01/20/20 1020   BP: 110/80 100/62   Site: Right Upper Arm    Position: Sitting    Cuff Size: Large Adult    Pulse: 54    Weight: 202 lb (91.6 kg)    Height: 5' 9\" (1.753 m)         VITALS:  /80 (Site: Right Upper Arm, Position: Sitting, Cuff Size: Large Adult)   Pulse 54   Ht 5' 9\" (1.753 m)   Wt 202 lb (91.6 kg)   BMI 29.83 kg/m²    CONSTITUTIONAL: Cooperative, no apparent distress, and appears well nourished / developed  NEUROLOGIC:  Awake and orientated to person, place and time. PSYCH: Calm affect. SKIN: Warm and dry. HEENT: Sclera non-icteric, normocephalic, neck supple, no elevation of JVP, normal carotid pulses with no bruits and thyroid normal size. LUNGS:  No increased work of breathing and clear to auscultation, no crackles or wheezing  CARDIOVASCULAR:  Regular rate 64 and rhythm with no murmurs, gallops, rubs, or abnormal heart sounds, normal PMI. The apical impulses not displaced  JVP less than 8 cm H2O  Heart tones are crisp and normal  Cervical veins are not engorged The carotid upstroke is normal in amplitude and contour without delay or bruit  JVP is not elevated  ABDOMEN:  Normal bowel sounds, non-distended and non-tender to palpation  EXT: RLE edema, no calf tenderness. Pulses are present bilaterally. DATA:      LABS: 10/29/19:   Na+ 137 K+ 4.6 chl 106 CO2 18 BUN 16 creatinine 1.08 glu 88   H/H 9.3 / 29.9      Radiology Review:  Pertinent images / reports were reviewed as a part of this visit and reveals the following:    [id number] Score for Atrial Fibrillation Stroke Risk   Risk   Factors  Component Value   C CHF Yes 1   H HTN Yes 1   A2 Age >= 76 No,  (75 y.o.) 0   D DM No 0   S2 Prior Stroke/TIA No 0   V Vascular Disease No 1   A Age 74-69 Yes,  (75 y.o.) 1   Sc Sex male 0    [id number]  Score  4   Score last updated 1/20/20 10:33 AM    Echo: 11/11/19:  Summary   Moderately dilated left ventricle. Global left ventricular function is severely decreased with ejection   fraction estimated from 15% to 20 %. There is severe diffuse hypokinesis. Grade II diastolic dysfunction with elevated LV filling pressures. The mitral valve normal in structure. Mild mitral regurgitation. The left atrium is severely dilated. Procedure: 11/26/19:  Elective cardioversion, on amiodarone, also on anticoagulation. Assessment:     1. Coronary artery disease involving native coronary artery of native heart without angina pectoris   ~stable : offers no c/o angina  ~RF: continues to smoke  ~ASA / BB / nitrate / statin     2. Ischemic cardiomyopathy   ~LVEF declined : 15-20% on echo from Nov '19  ~EF 50% by echo from Jan '17; improved from inf HK, EF 35-40% on cath Dec '16  ~neg for decompensation : Entesto / carvedilol / lasix    3. Typical atrial flutter [facility])   ~s/p DCCV Nov '19  ~regular to auscultation  ~AC managed in coumadin clinic; INR therapeutic   ~CHADsVASc 4  ~did not follow through with sleep study      4.  Essential HTN  ~controlled on current regimen

## 2020-01-29 ENCOUNTER — CARE COORDINATION (OUTPATIENT)
Dept: CASE MANAGEMENT | Age: 70
End: 2020-01-29

## 2020-01-29 NOTE — CARE COORDINATION
[facility] Transitions Follow Up Call    2020    Patient: Karen Seals  Patient : 1950   MRN: <[id number]>  Reason for Admission:   Discharge Date: 19 RARS: Readmission Risk Score: 11       Attempted to contact patient for follow up BPCI-A transition call. Left voicemail message to return call with an update on condition since discharge. Contact information provided. Will continue to follow up. Care Transitions Subsequent and Final Call    Subsequent and Final Calls  Care Transitions Interventions                          Other Interventions:             Follow Up  Future Appointments   Date Time Provider Maricel Choudhury   3/3/2020  4:00 PM ECHO ROOM 1 [facility]   2020  3:45 PM Roslyn Garcia MD [facility]   2020 11:00 AM Leonel Galicia MD [facility]       Spring Reich LPN

## 2020-02-06 ENCOUNTER — TELEPHONE (OUTPATIENT)
Dept: CARDIOLOGY CLINIC | Age: 70
End: 2020-02-06

## 2020-02-06 NOTE — TELEPHONE ENCOUNTER
Type of paperwork is being dropped off: (FMLA/ DISABILITY/PATIENT ASSISTANCE)  [employer] [de-identified] treatment [place]      Who is the patient's provider: Sakina Lincoln        Where is the paperwork to be sent: Patient to  paperwork.     michael

## 2020-02-10 ENCOUNTER — CARE COORDINATION (OUTPATIENT)
Dept: CASE MANAGEMENT | Age: 70
End: 2020-02-10

## 2020-02-10 ENCOUNTER — TELEPHONE (OUTPATIENT)
Dept: CARDIOLOGY CLINIC | Age: 70
End: 2020-02-10

## 2020-02-10 NOTE — TELEPHONE ENCOUNTER
Spoke with patient informed him that we have received his paper work and that it will be filled out as soon as possible.

## 2020-02-12 ENCOUNTER — TELEPHONE (OUTPATIENT)
Dept: CARDIOLOGY CLINIC | Age: 70
End: 2020-02-12

## 2020-03-06 ENCOUNTER — TELEPHONE (OUTPATIENT)
Dept: CARDIOLOGY CLINIC | Age: 70
End: 2020-03-06

## 2020-03-11 RX ORDER — PRAVASTATIN SODIUM 80 MG/1
80 TABLET ORAL DAILY
Qty: 90 TABLET | Refills: 1 | Status: SHIPPED | OUTPATIENT
Start: 2020-03-11 | End: 2020-09-30 | Stop reason: SDUPTHER

## 2020-03-11 RX ORDER — PRAVASTATIN SODIUM 80 MG/1
80 TABLET ORAL DAILY
Qty: 30 TABLET | Refills: 5 | Status: SHIPPED | OUTPATIENT
Start: 2020-03-11 | End: 2020-03-11

## 2020-03-11 NOTE — TELEPHONE ENCOUNTER
Pt rx was signed off on on 03/11/2020 as a 30 day with 5 refills--- pt would like a 90 day instead-- resending the rx for the 90 day request.

## 2020-03-24 RX ORDER — AMIODARONE HYDROCHLORIDE 200 MG/1
200 TABLET ORAL DAILY
Qty: 60 TABLET | Refills: 3 | Status: SHIPPED | OUTPATIENT
Start: 2020-03-24 | End: 2020-03-25

## 2020-03-25 RX ORDER — AMIODARONE HYDROCHLORIDE 200 MG/1
200 TABLET ORAL DAILY
Qty: 90 TABLET | Refills: 1 | Status: SHIPPED | OUTPATIENT
Start: 2020-03-25 | End: 2020-09-30 | Stop reason: SDUPTHER

## 2020-04-01 RX ORDER — CARVEDILOL 6.25 MG/1
6.25 TABLET ORAL 2 TIMES DAILY WITH MEALS
Qty: 180 TABLET | Refills: 1 | Status: SHIPPED | OUTPATIENT
Start: 2020-04-01 | End: 2020-06-11 | Stop reason: SDUPTHER

## 2020-04-03 ENCOUNTER — TELEPHONE (OUTPATIENT)
Dept: CARDIOLOGY CLINIC | Age: 70
End: 2020-04-03

## 2020-06-11 RX ORDER — CARVEDILOL 12.5 MG/1
6.25 TABLET ORAL 2 TIMES DAILY WITH MEALS
Qty: 180 TABLET | Refills: 1 | Status: SHIPPED | OUTPATIENT
Start: 2020-06-11 | End: 2020-06-11

## 2020-06-11 RX ORDER — CARVEDILOL 12.5 MG/1
12.5 TABLET ORAL 2 TIMES DAILY WITH MEALS
Qty: 180 TABLET | Refills: 3 | Status: SHIPPED | OUTPATIENT
Start: 2020-06-11 | End: 2021-08-20

## 2020-06-23 NOTE — PROGRESS NOTES
Jerad Peguero MD   pravastatin (PRAVACHOL) 80 MG tablet TAKE 1 TABLET BY MOUTH DAILY 3/11/20  Yes Jerad Peguero MD   spironolactone (ALDACTONE) 25 MG tablet TAKE 1/2 TABLET BY MOUTH DAILY 1/16/20  Yes GEORGE Jamil CNP   isosorbide mononitrate (IMDUR) 30 MG extended release tablet TAKE 1 TABLET BY MOUTH EVERY DAY 12/23/19  Yes GEORGE Jamil CNP   sacubitril-valsartan (ENTRESTO) 24-26 MG per tablet Take 0.5 tablets by mouth 2 times daily 12/17/19  Yes Reagan Mariscal MD   apixaban (ELIQUIS) 5 MG TABS tablet Take 1 tablet by mouth 2 times daily 12/14/19  Yes GEORGE Jamil CNP   furosemide (LASIX) 20 MG tablet Take 1 tablet by mouth 2 times daily 11/15/19  Yes Jose Martinez MD   linaclotide [facility]) 145 MCG capsule Take 145 mcg by mouth as needed    Yes Historical Provider, MD   nitroGLYCERIN (NITROSTAT) 0.4 MG SL tablet Place 1 tablet under the tongue every 5 minutes as needed for Chest pain 11/21/16  Yes Jerad Peguero MD   fluticasone-salmeterol (ADVAIR DISKUS) 250-50 MCG/DOSE AEPB Inhale 1 puff into the lungs 2 times daily. 11/3/14  Yes Sung Middleton MD   albuterol (PROVENTIL HFA;VENTOLIN HFA) 108 (90 BASE) MCG/ACT inhaler Inhale 2 puffs into the lungs every 6 hours as needed for Wheezing. Yes Historical Provider, MD        Allergies:  Patient has no known allergies. Review of Systems:   · Constitutional: there has been no unanticipated weight loss. There's been no change in energy level, sleep pattern, or activity level. · Eyes: No visual changes or diplopia. No scleral icterus. · ENT: No Headaches, hearing loss or vertigo. No mouth sores or sore throat. · Cardiovascular: Reviewed in HPI  · Respiratory: No cough or wheezing, no sputum production. No hematemesis. · Gastrointestinal: No abdominal pain, appetite loss, blood in stools. No change in bowel or bladder habits. · Genitourinary: No dysuria, trouble voiding, or hematuria.   · Musculoskeletal:  No gait from Jan '17; improved from inf HK, EF 35-40% on cath Dec '16  ~neg for decompensation : Entesto / carvedilol / lasix  Echo in 6 months      3. Typical atrial flutter [facility])   ~s/p DCCV Nov '19  ~regular to auscultation  ~AC managed in coumadin clinic; INR therapeutic           4. Essential HTN  ~controlled on current regimen  Blood pressure 118/70, pulse 71, temperature 97.7 °F (36.5 °C), height 5' 9\" (1.753 m), weight 201 lb (91.2 kg). Plan:  Jose Martell has a stable cardiac status. Cardiac test and lab results personally reviewed by me during this office visit and discussed. No med changes. Continue risk factor modifications. Call for any change in symptoms. Return for regular follow up in 6 months with echo. I appreciate the opportunity of cooperating in the care of this individual.    Ivelisse Moncada. [facility], M.D., [facility]    Patient's problem list, medications, allergies, past medical, surgical, social and family histories were reviewed and updated as appropriate. Scribe's attestation: This note was scribed in the presence of  [facility] MD, by Scooter Olivier RN. The scribe's documentation has been prepared under my direction and personally reviewed by me in its entirety. I confirm that the note above accurately reflects all work, treatment, procedures, and medical decision making performed by me.

## 2020-06-29 ENCOUNTER — OFFICE VISIT (OUTPATIENT)
Dept: CARDIOLOGY CLINIC | Age: 70
End: 2020-06-29
Payer: MEDICARE

## 2020-06-29 VITALS
BODY MASS INDEX: 29.77 KG/M2 | HEART RATE: 71 BPM | SYSTOLIC BLOOD PRESSURE: 118 MMHG | TEMPERATURE: 97.7 F | DIASTOLIC BLOOD PRESSURE: 70 MMHG | HEIGHT: 69 IN | WEIGHT: 201 LBS

## 2020-06-29 PROCEDURE — 3017F COLORECTAL CA SCREEN DOC REV: CPT | Performed by: INTERNAL MEDICINE

## 2020-06-29 PROCEDURE — 4040F PNEUMOC VAC/ADMIN/RCVD: CPT | Performed by: INTERNAL MEDICINE

## 2020-06-29 PROCEDURE — 1123F ACP DISCUSS/DSCN MKR DOCD: CPT | Performed by: INTERNAL MEDICINE

## 2020-06-29 PROCEDURE — G8427 DOCREV CUR MEDS BY ELIG CLIN: HCPCS | Performed by: INTERNAL MEDICINE

## 2020-06-29 PROCEDURE — 4004F PT TOBACCO SCREEN RCVD TLK: CPT | Performed by: INTERNAL MEDICINE

## 2020-06-29 PROCEDURE — G8417 CALC BMI ABV UP PARAM F/U: HCPCS | Performed by: INTERNAL MEDICINE

## 2020-06-29 PROCEDURE — 99214 OFFICE O/P EST MOD 30 MIN: CPT | Performed by: INTERNAL MEDICINE

## 2020-06-29 RX ORDER — FUROSEMIDE 20 MG/1
20 TABLET ORAL 2 TIMES DAILY
Qty: 60 TABLET | Refills: 3 | Status: SHIPPED | OUTPATIENT
Start: 2020-06-29 | End: 2020-11-03 | Stop reason: SDUPTHER

## 2020-06-29 RX ORDER — NITROGLYCERIN 0.4 MG/1
0.4 TABLET SUBLINGUAL EVERY 5 MIN PRN
Qty: 25 TABLET | Refills: 3 | Status: SHIPPED | OUTPATIENT
Start: 2020-06-29 | End: 2022-03-15

## 2020-06-29 RX ORDER — SPIRONOLACTONE 25 MG/1
TABLET ORAL
Qty: 45 TABLET | Refills: 1 | Status: SHIPPED | OUTPATIENT
Start: 2020-06-29 | End: 2021-01-05 | Stop reason: SDUPTHER

## 2020-07-15 RX ORDER — ISOSORBIDE MONONITRATE 30 MG/1
TABLET, EXTENDED RELEASE ORAL
Qty: 90 TABLET | Refills: 1 | Status: SHIPPED | OUTPATIENT
Start: 2020-07-15 | End: 2021-04-23 | Stop reason: SDUPTHER

## 2020-08-04 RX ORDER — APIXABAN 5 MG/1
TABLET, FILM COATED ORAL
Qty: 60 TABLET | Refills: 6 | Status: SHIPPED | OUTPATIENT
Start: 2020-08-04 | End: 2021-07-12

## 2020-09-30 RX ORDER — PRAVASTATIN SODIUM 80 MG/1
80 TABLET ORAL DAILY
Qty: 90 TABLET | Refills: 3 | Status: SHIPPED | OUTPATIENT
Start: 2020-09-30 | End: 2022-03-15

## 2020-09-30 RX ORDER — AMIODARONE HYDROCHLORIDE 200 MG/1
200 TABLET ORAL DAILY
Qty: 90 TABLET | Refills: 3 | Status: SHIPPED | OUTPATIENT
Start: 2020-09-30 | End: 2021-12-13

## 2020-11-03 RX ORDER — FUROSEMIDE 20 MG/1
20 TABLET ORAL 2 TIMES DAILY
Qty: 180 TABLET | Refills: 1 | Status: SHIPPED | OUTPATIENT
Start: 2020-11-03 | End: 2020-11-09

## 2020-11-03 NOTE — TELEPHONE ENCOUNTER
RX APPROVAL:      Refill:   Requested Prescriptions      No prescriptions requested or ordered in this encounter      Last OV: 6/29/20  Last EKG:   Last Labs:   Lab Results   Component Value Date    GLUCOSE 131 11/15/2019    BUN 16 11/15/2019    CREATININE 1.3 11/26/2019    CREATININE 0.9 11/15/2019    LABGLOM 55 11/26/2019     11/15/2019    K 4.8 11/15/2019    K 3.9 11/11/2019     11/15/2019    CO2 19 11/15/2019    CALCIUM 9.0 11/15/2019     Lab Results   Component Value Date     11/15/2019     11/15/2019    CO2 19 11/15/2019    ANIONGAP 12 11/15/2019    GLUCOSE 131 11/15/2019    BUN 16 11/15/2019    CREATININE 1.3 11/26/2019    CREATININE 0.9 11/15/2019    LABGLOM 55 11/26/2019    GFRAA >60 11/26/2019    GFRAA >60 01/30/2012    CALCIUM 9.0 11/15/2019    PROT 6.2 11/11/2019    LABALBU 3.5 11/11/2019    BILITOT 0.5 11/11/2019    ALKPHOS 77 11/11/2019    AST 15 12/17/2019    ALT 18 12/17/2019    GLOB 2.7 11/11/2019     Lab Results   Component Value Date    ALT 18 12/17/2019    AST 15 12/17/2019     Lab Results   Component Value Date    K 4.8 11/15/2019    K 3.9 11/11/2019       Plan and labs reviewed

## 2020-11-09 RX ORDER — FUROSEMIDE 20 MG/1
TABLET ORAL
Qty: 180 TABLET | Refills: 3 | Status: SHIPPED | OUTPATIENT
Start: 2020-11-09 | End: 2021-06-08 | Stop reason: SDUPTHER

## 2020-11-09 NOTE — TELEPHONE ENCOUNTER
RX APPROVAL:      Refill:   Requested Prescriptions     Pending Prescriptions Disp Refills    furosemide (LASIX) 20 MG tablet [Pharmacy Med Name: FUROSEMIDE 20MG TABLETS] 60 tablet      Sig: TAKE 1 TABLET BY MOUTH TWICE DAILY      Last OV: 9/2/2020   Last EKG:   Last Labs: Results in Care Everywhere.    Most recent [facility] results list below: [facility] 9/21/20  Lab Results   Component Value Date    GLUCOSE 131 11/15/2019    BUN 16 11/15/2019    CREATININE 1.3 11/26/2019    CREATININE 0.9 11/15/2019    LABGLOM 55 11/26/2019     11/15/2019    K 4.8 11/15/2019    K 3.9 11/11/2019     11/15/2019    CO2 19 11/15/2019    CALCIUM 9.0 11/15/2019     Lab Results   Component Value Date     11/15/2019     11/15/2019    CO2 19 11/15/2019    ANIONGAP 12 11/15/2019    GLUCOSE 131 11/15/2019    BUN 16 11/15/2019    CREATININE 1.3 11/26/2019    CREATININE 0.9 11/15/2019    LABGLOM 55 11/26/2019    GFRAA >60 11/26/2019    GFRAA >60 01/30/2012    CALCIUM 9.0 11/15/2019    PROT 6.2 11/11/2019    LABALBU 3.5 11/11/2019    BILITOT 0.5 11/11/2019    ALKPHOS 77 11/11/2019    AST 15 12/17/2019    ALT 18 12/17/2019    GLOB 2.7 11/11/2019     Lab Results   Component Value Date    ALT 18 12/17/2019    AST 15 12/17/2019     Lab Results   Component Value Date    K 4.8 11/15/2019    K 3.9 11/11/2019       Plan and labs reviewed

## 2021-01-05 RX ORDER — SPIRONOLACTONE 25 MG/1
TABLET ORAL
Qty: 45 TABLET | Refills: 2 | Status: SHIPPED | OUTPATIENT
Start: 2021-01-05 | End: 2021-08-19

## 2021-01-11 RX ORDER — SACUBITRIL AND VALSARTAN 24; 26 MG/1; MG/1
TABLET, FILM COATED ORAL
Qty: 30 TABLET | Refills: 11 | Status: SHIPPED | OUTPATIENT
Start: 2021-01-11 | End: 2022-01-12

## 2021-01-11 NOTE — TELEPHONE ENCOUNTER
Received refill request for entresto from [facility]'s pharmacy.     Last ov:6/9/2020 with LES    Last labs:  alt, ast 12/17/2019    Last Refill: 12/17/2019    Next appointment:patient on recall list for LES at [facility]

## 2021-04-23 NOTE — TELEPHONE ENCOUNTER
Received refill request for isosorbide from [facility] pharmacy. Last ov:6/29/2020 LES    Last Refill:7/15/2020 #90 with 1 refill    Next appointment:on recall list with LES, but not scheduled.

## 2021-04-24 RX ORDER — ISOSORBIDE MONONITRATE 30 MG/1
TABLET, EXTENDED RELEASE ORAL
Qty: 90 TABLET | Refills: 0 | Status: ON HOLD | OUTPATIENT
Start: 2021-04-24 | End: 2022-01-28 | Stop reason: HOSPADM

## 2021-06-08 RX ORDER — FUROSEMIDE 20 MG/1
TABLET ORAL
Qty: 180 TABLET | Refills: 3 | Status: SHIPPED | OUTPATIENT
Start: 2021-06-08 | End: 2022-01-12

## 2021-06-08 NOTE — TELEPHONE ENCOUNTER
Received refill request for furosemide from [facility] pharmacy. Last ov: 6/29/2020 LES    Last labs: cmp 9/21/2020 in care everywhere    Last Refill: 11/9/2020 #180 with 3 refills    Next appointment:on recall list with LES but not yet scheduled.

## 2021-06-16 ENCOUNTER — HOSPITAL ENCOUNTER (OUTPATIENT)
Dept: VASCULAR LAB | Age: 71
Discharge: HOME | End: 2021-06-16
Payer: MEDICARE

## 2021-06-16 DIAGNOSIS — M79.89 LEG SWELLING: ICD-10-CM

## 2021-06-16 PROCEDURE — 93971 EXTREMITY STUDY: CPT

## 2021-06-29 ENCOUNTER — TELEPHONE (OUTPATIENT)
Dept: CARDIOLOGY CLINIC | Age: 71
End: 2021-06-29

## 2021-06-29 NOTE — TELEPHONE ENCOUNTER
Pt is having by pass vascular surgery, Tiffanie Morse from Dr Jacquelyn Bonilla Vascular Surgeon office wants pt to see dr Mayte Osullivan for cardiac clearance   Pt is not scheduled at this time, there are no openings anytime soon with Dr Mayte Osullivan need date and time of when pt can be scheduled.     Tiffanie Morse  [phone]

## 2021-07-12 ENCOUNTER — OFFICE VISIT (OUTPATIENT)
Dept: CARDIOLOGY CLINIC | Age: 71
End: 2021-07-12

## 2021-07-12 VITALS
HEIGHT: 69 IN | HEART RATE: 58 BPM | OXYGEN SATURATION: 96 % | BODY MASS INDEX: 31.4 KG/M2 | WEIGHT: 212 LBS | DIASTOLIC BLOOD PRESSURE: 64 MMHG | SYSTOLIC BLOOD PRESSURE: 118 MMHG

## 2021-07-12 DIAGNOSIS — I25.10 CORONARY ARTERY DISEASE INVOLVING NATIVE CORONARY ARTERY OF NATIVE HEART WITHOUT ANGINA PECTORIS: Primary | ICD-10-CM

## 2021-07-12 DIAGNOSIS — M79.605 LEFT LEG PAIN: ICD-10-CM

## 2021-07-12 DIAGNOSIS — E78.5 HYPERLIPIDEMIA, UNSPECIFIED HYPERLIPIDEMIA TYPE: ICD-10-CM

## 2021-07-12 DIAGNOSIS — R20.0 LEFT LEG NUMBNESS: ICD-10-CM

## 2021-07-12 DIAGNOSIS — I10 ESSENTIAL HYPERTENSION: ICD-10-CM

## 2021-07-12 DIAGNOSIS — Z01.818 PRE-OPERATIVE CLEARANCE: ICD-10-CM

## 2021-07-12 DIAGNOSIS — I50.22 CHRONIC SYSTOLIC HEART FAILURE (HCC): ICD-10-CM

## 2021-07-12 PROCEDURE — 4004F PT TOBACCO SCREEN RCVD TLK: CPT | Performed by: INTERNAL MEDICINE

## 2021-07-12 PROCEDURE — 93000 ELECTROCARDIOGRAM COMPLETE: CPT | Performed by: INTERNAL MEDICINE

## 2021-07-12 PROCEDURE — G8417 CALC BMI ABV UP PARAM F/U: HCPCS | Performed by: INTERNAL MEDICINE

## 2021-07-12 PROCEDURE — 1123F ACP DISCUSS/DSCN MKR DOCD: CPT | Performed by: INTERNAL MEDICINE

## 2021-07-12 PROCEDURE — 3017F COLORECTAL CA SCREEN DOC REV: CPT | Performed by: INTERNAL MEDICINE

## 2021-07-12 PROCEDURE — 4040F PNEUMOC VAC/ADMIN/RCVD: CPT | Performed by: INTERNAL MEDICINE

## 2021-07-12 PROCEDURE — 99214 OFFICE O/P EST MOD 30 MIN: CPT | Performed by: INTERNAL MEDICINE

## 2021-07-12 PROCEDURE — G8427 DOCREV CUR MEDS BY ELIG CLIN: HCPCS | Performed by: INTERNAL MEDICINE

## 2021-07-12 RX ORDER — ASPIRIN 81 MG/1
81 TABLET ORAL DAILY
COMMUNITY
End: 2022-03-15

## 2021-07-12 NOTE — PROGRESS NOTES
[address]   Cardiac Follow Up     Referring Provider:  Jeremiah Parker DO     Chief Complaint   Patient presents with    Cardiac Clearance     Referral from Dr. Silvina Dickinson - Surgery Left Leg      History of Present Illness:  Sindhu Whyte is a 70 y.o.  male with a history of CAD s/p CABG May 2014 with post-op short run of SVT and brief AF, s/p DCCV Nov 2019; also cardiomyopathy, hypertension, and hyperlipidemia. His other history includes carotid stenosis and PAD s/p Lt common & external iliac PTA.      Today, he is here for regular follow up and also CV evaluation for leg surgery (Dr. Mukesh Estevez vascular surgeon). He has had testing at [facility] which ruled out DVT; he said a CT \"did not take. \" He is unsure why he needs surgery, states no one giving him answers. He c/o a lot of pain in his calf and shin when he walks, getting more difficult to walk. He denies exertional chest pain, RODGERS/PND, palpitations, light-headedness, edema. He has had both Covid vaccines. He took himself off Eliquis due to cost (3 months ago); he does take a baby asa. Past Medical History:   has a past medical history of Acid reflux, Acute MI ([address] CAD (coronary artery disease), COPD (chronic obstructive pulmonary disease) ([address] Diverticulitis, Hypertension, and Peripheral vascular disease ([address] Surgical History:   has a past surgical history that includes vascular surgery; Cardiac surgery; Colonoscopy; and Cardiac catheterization. Social History:   reports that he has been smoking cigarettes. He started smoking about 7 years ago. He has a 25.00 pack-year smoking history. He has never used smokeless tobacco. He reports current alcohol use of about 12.0 standard drinks of alcohol per week. He reports that he does not use drugs. Family History:  family history includes Heart Disease in his father, maternal uncle, mother, and sister.      Home Medications:  Prior to Admission medications    Medication Sig Start Date End Date Taking? Authorizing Provider   aspirin 81 MG EC tablet Take 81 mg by mouth daily   Yes Historical Provider, MD   furosemide (LASIX) 20 MG tablet TAKE 1 TABLET BY MOUTH TWICE DAILY 6/8/21  Yes Mike Mckenzie MD   isosorbide mononitrate (IMDUR) 30 MG extended release tablet TAKE 1 TABLET BY MOUTH EVERY DAY 4/24/21  Yes GEORGE Garcia CNP   ENTRESTO 24-26 MG per tablet TAKE 1/2 TABLET BY MOUTH TWICE DAILY 1/11/21  Yes Mike Mckenzie MD   spironolactone (ALDACTONE) 25 MG tablet TAKE 1/2 TABLET BY MOUTH DAILY 1/5/21  Yes Mike Mckenzie MD   amiodarone (CORDARONE) 200 MG tablet Take 1 tablet by mouth daily 9/30/20  Yes Mike Mckenzie MD   pravastatin (PRAVACHOL) 80 MG tablet Take 1 tablet by mouth daily 9/30/20  Yes Mike Mckenzie MD   nitroGLYCERIN (NITROSTAT) 0.4 MG SL tablet Place 1 tablet under the tongue every 5 minutes as needed for Chest pain 6/29/20  Yes Mike Mckenzie MD   carvedilol (COREG) 12.5 MG tablet Take 1 tablet by mouth 2 times daily (with meals) 6/11/20  Yes GEORGE Garcia CNP   linaclotide (LINZESS) 145 MCG capsule Take 145 mcg by mouth as needed    Yes Historical Provider, MD   fluticasone-salmeterol (ADVAIR DISKUS) 250-50 MCG/DOSE AEPB Inhale 1 puff into the lungs 2 times daily. 11/3/14  Yes Monroe Stahl MD   albuterol (PROVENTIL HFA;VENTOLIN HFA) 108 (90 BASE) MCG/ACT inhaler Inhale 2 puffs into the lungs every 6 hours as needed for Wheezing. Yes Historical Provider, MD   ELIQUIS 5 MG TABS tablet TAKE 1 TABLET BY MOUTH TWICE DAILY  Patient not taking: Reported on 7/12/2021 8/4/20   GEORGE Garcia CNP      Allergies:  Patient has no known allergies. Review of Systems:   · Constitutional: there has been no unanticipated weight loss. There's been no change in energy level, sleep pattern, or activity level. · Eyes: No visual changes or diplopia. No scleral icterus. · ENT: No Headaches, hearing loss or vertigo.  No mouth sores or sore throat. · Cardiovascular: Reviewed in HPI  · Respiratory: No cough or wheezing, no sputum production. No hematemesis. · Gastrointestinal: No abdominal pain, appetite loss, blood in stools. No change in bowel or bladder habits. · Genitourinary: No dysuria, trouble voiding, or hematuria. · Musculoskeletal:  No gait disturbance, weakness or joint complaints. · Integumentary: No rash or pruritis. · Neurological: No headache, diplopia, change in muscle strength, numbness or tingling. No change in gait, balance, coordination, mood, affect, memory, mentation, behavior. · Psychiatric: No anxiety, no depression. · Endocrine: No malaise, fatigue or temperature intolerance. No excessive thirst, fluid intake, or urination. No tremor. · Hematologic/Lymphatic: No abnormal bruising or bleeding, blood clots or swollen lymph nodes. · Allergic/Immunologic: No nasal congestion or hives. Physical Examination:    Vitals:    07/12/21 0928   BP: 118/64   Pulse: 58   SpO2: 96%        Wt Readings from Last 1 Encounters:   07/12/21 212 lb (96.2 kg)     Constitutional and General Appearance: NAD  Skin:good turgor,intact without lesions  HEENT: EOMI ,normal  Neck:no JVD     Respiratory:  · Normal excursion and expansion without use of accessory muscles  · Resp Auscultation: Normal breath sounds without dullness  Cardiovascular:  · The apical impulses not displaced  · Heart tones are crisp and normal  · Cervical veins are not engorged  · The carotid upstroke is normal in amplitude and contour without delay or bruit  · Peripheral pulses are symmetrical and full  · There is no clubbing, cyanosis of the extremities.   · No edema  · Femoral Arteries: 2+ and equal  · Pedal Pulses: 2+ and equal  Abdomen:  · No masses or tenderness  · Liver/Spleen: No Abnormalities Noted  Neurological/Psychiatric:  · Alert and oriented in all spheres  · Moves all extremities well  · Exhibits normal gait balance and coordination  · No abnormalities of direction and personally reviewed by me in its entirety. I confirm that the note above accurately reflects all work, treatment, procedures, and medical decision making performed by me.

## 2021-07-14 ENCOUNTER — HOSPITAL ENCOUNTER (OUTPATIENT)
Dept: NON INVASIVE DIAGNOSTICS | Age: 71
Discharge: HOME | End: 2021-07-14
Payer: MEDICARE

## 2021-07-14 DIAGNOSIS — I50.22 CHRONIC SYSTOLIC HEART FAILURE (HCC): ICD-10-CM

## 2021-07-14 DIAGNOSIS — I25.10 CORONARY ARTERY DISEASE INVOLVING NATIVE CORONARY ARTERY OF NATIVE HEART WITHOUT ANGINA PECTORIS: ICD-10-CM

## 2021-07-14 DIAGNOSIS — M79.605 LEFT LEG PAIN: ICD-10-CM

## 2021-07-14 DIAGNOSIS — E78.5 HYPERLIPIDEMIA, UNSPECIFIED HYPERLIPIDEMIA TYPE: ICD-10-CM

## 2021-07-14 LAB
LV EF: 53 %
LVEF MODALITY: NORMAL

## 2021-07-14 PROCEDURE — 6360000002 HC RX W HCPCS: Performed by: INTERNAL MEDICINE

## 2021-07-14 PROCEDURE — 3430000000 HC RX DIAGNOSTIC RADIOPHARMACEUTICAL: Performed by: INTERNAL MEDICINE

## 2021-07-14 PROCEDURE — 93017 CV STRESS TEST TRACING ONLY: CPT | Performed by: INTERNAL MEDICINE

## 2021-07-14 PROCEDURE — A9502 TC99M TETROFOSMIN: HCPCS | Performed by: INTERNAL MEDICINE

## 2021-07-14 PROCEDURE — 78452 HT MUSCLE IMAGE SPECT MULT: CPT | Performed by: INTERNAL MEDICINE

## 2021-07-14 RX ADMIN — REGADENOSON 0.4 MG: 0.08 INJECTION, SOLUTION INTRAVENOUS at 08:25

## 2021-07-14 RX ADMIN — TETROFOSMIN 30 MILLICURIE: 1.38 INJECTION, POWDER, LYOPHILIZED, FOR SOLUTION INTRAVENOUS at 08:38

## 2021-07-14 RX ADMIN — TETROFOSMIN 10 MILLICURIE: 1.38 INJECTION, POWDER, LYOPHILIZED, FOR SOLUTION INTRAVENOUS at 07:43

## 2021-07-14 NOTE — PROGRESS NOTES
Instructed on Lexiscan Stress Test Procedure including possible side effects/ adverse reactions. Patient verbalizes  understanding and denies having any questions . See [address] [place] [address] Cardiology.

## 2021-07-26 ENCOUNTER — TELEPHONE (OUTPATIENT)
Dept: CARDIOLOGY CLINIC | Age: 71
End: 2021-07-26

## 2021-07-26 NOTE — TELEPHONE ENCOUNTER
Blanca Jiménez from Dr. Claire Mohamud office calling requesting cardiac clearance to be faxed to their office at Fax# [phone]. Patient's cardiac clearance OV was on 7/12/21 with LES.

## 2021-07-26 NOTE — TELEPHONE ENCOUNTER
Mr. James Almonte had wanted a 2nd opinion because he was not getting answers to his questions re his claudication and upcoming procedure. I spoke to Mr. James Almonte ([phone]). Because Dr. Marylen Furnace cannot see him until 8/11, he will just proceed with the bypass with Dr. Omkar Olivo as scheduled; he will cancel vj't with Dr. Marylen Furnace. He now understands what the procedure is and how it should improve blood flow to his leg. Recent nuclear stress test stable per Dr. Merritt Aldrich. OK to send letter to Dr. Vinayak Marmolejo office?

## 2021-07-26 NOTE — TELEPHONE ENCOUNTER
Saw LES on 7/12. Stress test was done on 7/14. After seeing result LES referred to Dr. Rosio Turner for decreased pulse and numbness in left leg. Called Dr Liz Bermeo office. Fem pop bypass with Dr Jeane Pope on 8/11. Patient has new pt appt with Dr Rosio Turner on 8/11. Tried to reach patient to clear up confusion. No availability to leave message. Dr Alarcon's office will also to reach him.

## 2021-07-27 NOTE — TELEPHONE ENCOUNTER
He appears to be at moderate surgical risk. No further testing would be helpful. He may proceed with surgery 8/11 with moderate cardiac risk.      (Please have Dr. Tyrese Frausto review when he returns to be sure he aggress.-Surgery on 8/11)    [facility]

## 2021-07-29 NOTE — TELEPHONE ENCOUNTER
Rosa Bryant called back. Patient has been admitted to [facility]. [place] [id number] with worsening vascular issue in left leg. Surgeon will likely move surgery up. Cardiologist at [facility]. [place] [address] will give surgical risk.

## 2021-07-29 NOTE — TELEPHONE ENCOUNTER
Left message for Timmy Jimenez at Dr Alarcon's office. Will leave message for LES to review upon his return.

## 2021-08-19 RX ORDER — SPIRONOLACTONE 25 MG/1
TABLET ORAL
Qty: 45 TABLET | Refills: 2 | Status: SHIPPED | OUTPATIENT
Start: 2021-08-19 | End: 2022-01-12

## 2021-08-20 RX ORDER — CARVEDILOL 12.5 MG/1
TABLET ORAL
Qty: 180 TABLET | Refills: 3 | Status: SHIPPED | OUTPATIENT
Start: 2021-08-20 | End: 2021-10-25

## 2021-10-25 RX ORDER — CARVEDILOL 12.5 MG/1
TABLET ORAL
Qty: 180 TABLET | Refills: 3 | Status: ON HOLD | OUTPATIENT
Start: 2021-10-25 | End: 2022-01-28 | Stop reason: HOSPADM

## 2021-12-13 RX ORDER — AMIODARONE HYDROCHLORIDE 200 MG/1
200 TABLET ORAL DAILY
Qty: 90 TABLET | Refills: 3 | Status: SHIPPED | OUTPATIENT
Start: 2021-12-13 | End: 2022-04-10

## 2022-01-01 NOTE — PATIENT INSTRUCTIONS
OK to stay off Eliquis for now. Continue daily baby aspirin. <<-----Click here for Discharge Medication Review

## 2022-01-12 ENCOUNTER — HOSPITAL ENCOUNTER (INPATIENT)
Age: 72
LOS: 16 days | Discharge: NURSING, REHAB OR LONG-TERM CARE | DRG: 177 | End: 2022-01-28
Attending: INTERNAL MEDICINE | Admitting: INTERNAL MEDICINE
Payer: MEDICARE

## 2022-01-12 ENCOUNTER — TELEPHONE (OUTPATIENT)
Dept: CARDIOLOGY CLINIC | Age: 72
End: 2022-01-12

## 2022-01-12 ENCOUNTER — APPOINTMENT (OUTPATIENT)
Dept: GENERAL RADIOLOGY | Age: 72
DRG: 177 | End: 2022-01-12
Payer: MEDICARE

## 2022-01-12 DIAGNOSIS — J96.01 ACUTE RESPIRATORY FAILURE WITH HYPOXIA (HCC): ICD-10-CM

## 2022-01-12 DIAGNOSIS — U07.1 COVID: Primary | ICD-10-CM

## 2022-01-12 PROBLEM — E78.00 HIGH CHOLESTEROL: Status: ACTIVE | Noted: 2022-01-12

## 2022-01-12 PROBLEM — J96.00 ACUTE RESPIRATORY FAILURE DUE TO COVID-19 (HCC): Status: ACTIVE | Noted: 2022-01-12

## 2022-01-12 LAB
A/G RATIO: 0.9 (ref 1.1–2.2)
ALBUMIN SERPL-MCNC: 3.6 G/DL (ref 3.4–5)
ALP BLD-CCNC: 71 U/L (ref 40–129)
ALT SERPL-CCNC: 17 U/L (ref 10–40)
ANION GAP SERPL CALCULATED.3IONS-SCNC: 16 MMOL/L (ref 3–16)
AST SERPL-CCNC: 18 U/L (ref 15–37)
BASE EXCESS VENOUS: -5.8 MMOL/L (ref -3–3)
BASOPHILS ABSOLUTE: 0 K/UL (ref 0–0.2)
BASOPHILS RELATIVE PERCENT: 0.1 %
BILIRUB SERPL-MCNC: 0.3 MG/DL (ref 0–1)
BUN BLDV-MCNC: 37 MG/DL (ref 7–20)
C-REACTIVE PROTEIN: 97 MG/L (ref 0–5.1)
CALCIUM SERPL-MCNC: 9.3 MG/DL (ref 8.3–10.6)
CARBOXYHEMOGLOBIN: 6.2 % (ref 0–1.5)
CHLORIDE BLD-SCNC: 100 MMOL/L (ref 99–110)
CO2: 18 MMOL/L (ref 21–32)
CREAT SERPL-MCNC: 1.2 MG/DL (ref 0.8–1.3)
D DIMER: 452 NG/ML DDU (ref 0–229)
EOSINOPHILS ABSOLUTE: 0 K/UL (ref 0–0.6)
EOSINOPHILS RELATIVE PERCENT: 0.1 %
GFR AFRICAN AMERICAN: >60
GFR NON-AFRICAN AMERICAN: 60
GLUCOSE BLD-MCNC: 159 MG/DL (ref 70–99)
HCO3 VENOUS: 18.2 MMOL/L (ref 23–29)
HCT VFR BLD CALC: 37.1 % (ref 40.5–52.5)
HEMOGLOBIN, VEN, REDUCED: 5 %
HEMOGLOBIN: 11.5 G/DL (ref 13.5–17.5)
LACTIC ACID, SEPSIS: 2.3 MMOL/L (ref 0.4–1.9)
LACTIC ACID, SEPSIS: 2.5 MMOL/L (ref 0.4–1.9)
LIPASE: 19 U/L (ref 13–60)
LYMPHOCYTES ABSOLUTE: 1.1 K/UL (ref 1–5.1)
LYMPHOCYTES RELATIVE PERCENT: 8.8 %
MCH RBC QN AUTO: 23.7 PG (ref 26–34)
MCHC RBC AUTO-ENTMCNC: 31 G/DL (ref 31–36)
MCV RBC AUTO: 76.6 FL (ref 80–100)
METHEMOGLOBIN VENOUS: 0.1 %
MONOCYTES ABSOLUTE: 0.8 K/UL (ref 0–1.3)
MONOCYTES RELATIVE PERCENT: 6.3 %
NEUTROPHILS ABSOLUTE: 10.7 K/UL (ref 1.7–7.7)
NEUTROPHILS RELATIVE PERCENT: 84.7 %
O2 CONTENT, VEN: 15 VOL %
O2 SAT, VEN: 95 %
O2 THERAPY: ABNORMAL
PCO2, VEN: 30.4 MMHG (ref 40–50)
PDW BLD-RTO: 20.8 % (ref 12.4–15.4)
PH VENOUS: 7.39 (ref 7.35–7.45)
PLATELET # BLD: 280 K/UL (ref 135–450)
PMV BLD AUTO: 8 FL (ref 5–10.5)
PO2, VEN: 77.2 MMHG (ref 25–40)
POTASSIUM SERPL-SCNC: 4.9 MMOL/L (ref 3.5–5.1)
PRO-BNP: 1192 PG/ML (ref 0–124)
PROCALCITONIN: 0.12 NG/ML (ref 0–0.15)
RBC # BLD: 4.84 M/UL (ref 4.2–5.9)
SODIUM BLD-SCNC: 134 MMOL/L (ref 136–145)
TCO2 CALC VENOUS: 43 MMOL/L
TOTAL PROTEIN: 7.6 G/DL (ref 6.4–8.2)
TROPONIN: <0.01 NG/ML
WBC # BLD: 12.6 K/UL (ref 4–11)

## 2022-01-12 PROCEDURE — 2500000003 HC RX 250 WO HCPCS: Performed by: INTERNAL MEDICINE

## 2022-01-12 PROCEDURE — 2700000000 HC OXYGEN THERAPY PER DAY

## 2022-01-12 PROCEDURE — 82803 BLOOD GASES ANY COMBINATION: CPT

## 2022-01-12 PROCEDURE — 84145 PROCALCITONIN (PCT): CPT

## 2022-01-12 PROCEDURE — 99284 EMERGENCY DEPT VISIT MOD MDM: CPT

## 2022-01-12 PROCEDURE — 6370000000 HC RX 637 (ALT 250 FOR IP): Performed by: INTERNAL MEDICINE

## 2022-01-12 PROCEDURE — 83690 ASSAY OF LIPASE: CPT

## 2022-01-12 PROCEDURE — XW033E5 INTRODUCTION OF REMDESIVIR ANTI-INFECTIVE INTO PERIPHERAL VEIN, PERCUTANEOUS APPROACH, NEW TECHNOLOGY GROUP 5: ICD-10-PCS | Performed by: INTERNAL MEDICINE

## 2022-01-12 PROCEDURE — 6360000002 HC RX W HCPCS: Performed by: INTERNAL MEDICINE

## 2022-01-12 PROCEDURE — 6360000002 HC RX W HCPCS: Performed by: PHYSICIAN ASSISTANT

## 2022-01-12 PROCEDURE — 93005 ELECTROCARDIOGRAM TRACING: CPT | Performed by: EMERGENCY MEDICINE

## 2022-01-12 PROCEDURE — 84484 ASSAY OF TROPONIN QUANT: CPT

## 2022-01-12 PROCEDURE — 94761 N-INVAS EAR/PLS OXIMETRY MLT: CPT

## 2022-01-12 PROCEDURE — XW033H5 INTRODUCTION OF TOCILIZUMAB INTO PERIPHERAL VEIN, PERCUTANEOUS APPROACH, NEW TECHNOLOGY GROUP 5: ICD-10-PCS | Performed by: INTERNAL MEDICINE

## 2022-01-12 PROCEDURE — 2580000003 HC RX 258: Performed by: INTERNAL MEDICINE

## 2022-01-12 PROCEDURE — 83605 ASSAY OF LACTIC ACID: CPT

## 2022-01-12 PROCEDURE — 85025 COMPLETE CBC W/AUTO DIFF WBC: CPT

## 2022-01-12 PROCEDURE — 86140 C-REACTIVE PROTEIN: CPT

## 2022-01-12 PROCEDURE — 83880 ASSAY OF NATRIURETIC PEPTIDE: CPT

## 2022-01-12 PROCEDURE — 96374 THER/PROPH/DIAG INJ IV PUSH: CPT

## 2022-01-12 PROCEDURE — 2060000000 HC ICU INTERMEDIATE R&B

## 2022-01-12 PROCEDURE — 71045 X-RAY EXAM CHEST 1 VIEW: CPT

## 2022-01-12 PROCEDURE — 36415 COLL VENOUS BLD VENIPUNCTURE: CPT

## 2022-01-12 PROCEDURE — 85379 FIBRIN DEGRADATION QUANT: CPT

## 2022-01-12 PROCEDURE — 80053 COMPREHEN METABOLIC PANEL: CPT

## 2022-01-12 RX ORDER — POTASSIUM CHLORIDE 20 MEQ/1
40 TABLET, EXTENDED RELEASE ORAL PRN
Status: DISCONTINUED | OUTPATIENT
Start: 2022-01-12 | End: 2022-01-28 | Stop reason: HOSPADM

## 2022-01-12 RX ORDER — 0.9 % SODIUM CHLORIDE 0.9 %
30 INTRAVENOUS SOLUTION INTRAVENOUS PRN
Status: DISCONTINUED | OUTPATIENT
Start: 2022-01-12 | End: 2022-01-17

## 2022-01-12 RX ORDER — NITROGLYCERIN 0.4 MG/1
0.4 TABLET SUBLINGUAL EVERY 5 MIN PRN
Status: DISCONTINUED | OUTPATIENT
Start: 2022-01-12 | End: 2022-01-28 | Stop reason: HOSPADM

## 2022-01-12 RX ORDER — HYDRALAZINE HYDROCHLORIDE 20 MG/ML
10 INJECTION INTRAMUSCULAR; INTRAVENOUS EVERY 6 HOURS PRN
Status: DISCONTINUED | OUTPATIENT
Start: 2022-01-12 | End: 2022-01-28 | Stop reason: HOSPADM

## 2022-01-12 RX ORDER — ISOSORBIDE MONONITRATE 30 MG/1
30 TABLET, EXTENDED RELEASE ORAL DAILY
Status: DISCONTINUED | OUTPATIENT
Start: 2022-01-13 | End: 2022-01-28 | Stop reason: HOSPADM

## 2022-01-12 RX ORDER — SODIUM CHLORIDE 9 MG/ML
INJECTION, SOLUTION INTRAVENOUS CONTINUOUS
Status: DISCONTINUED | OUTPATIENT
Start: 2022-01-12 | End: 2022-01-14

## 2022-01-12 RX ORDER — ACETAMINOPHEN 650 MG/1
650 SUPPOSITORY RECTAL EVERY 6 HOURS PRN
Status: DISCONTINUED | OUTPATIENT
Start: 2022-01-12 | End: 2022-01-28 | Stop reason: HOSPADM

## 2022-01-12 RX ORDER — IPRATROPIUM BROMIDE AND ALBUTEROL SULFATE 2.5; .5 MG/3ML; MG/3ML
1 SOLUTION RESPIRATORY (INHALATION)
Status: DISCONTINUED | OUTPATIENT
Start: 2022-01-13 | End: 2022-01-13

## 2022-01-12 RX ORDER — ONDANSETRON 2 MG/ML
4 INJECTION INTRAMUSCULAR; INTRAVENOUS EVERY 6 HOURS PRN
Status: DISCONTINUED | OUTPATIENT
Start: 2022-01-12 | End: 2022-01-28 | Stop reason: HOSPADM

## 2022-01-12 RX ORDER — SODIUM CHLORIDE 0.9 % (FLUSH) 0.9 %
10 SYRINGE (ML) INJECTION PRN
Status: DISCONTINUED | OUTPATIENT
Start: 2022-01-12 | End: 2022-01-28 | Stop reason: HOSPADM

## 2022-01-12 RX ORDER — ALBUTEROL SULFATE 90 UG/1
2 AEROSOL, METERED RESPIRATORY (INHALATION) EVERY 6 HOURS PRN
Status: DISCONTINUED | OUTPATIENT
Start: 2022-01-12 | End: 2022-01-13

## 2022-01-12 RX ORDER — ASPIRIN 81 MG/1
81 TABLET ORAL DAILY
Status: DISCONTINUED | OUTPATIENT
Start: 2022-01-13 | End: 2022-01-28 | Stop reason: HOSPADM

## 2022-01-12 RX ORDER — CARVEDILOL 6.25 MG/1
12.5 TABLET ORAL 2 TIMES DAILY WITH MEALS
Status: DISCONTINUED | OUTPATIENT
Start: 2022-01-13 | End: 2022-01-18

## 2022-01-12 RX ORDER — DEXAMETHASONE SODIUM PHOSPHATE 10 MG/ML
6 INJECTION, SOLUTION INTRAMUSCULAR; INTRAVENOUS EVERY 24 HOURS
Status: DISCONTINUED | OUTPATIENT
Start: 2022-01-12 | End: 2022-01-12 | Stop reason: DRUGHIGH

## 2022-01-12 RX ORDER — ACETAMINOPHEN 325 MG/1
650 TABLET ORAL EVERY 6 HOURS PRN
Status: DISCONTINUED | OUTPATIENT
Start: 2022-01-12 | End: 2022-01-28 | Stop reason: HOSPADM

## 2022-01-12 RX ORDER — SODIUM CHLORIDE 0.9 % (FLUSH) 0.9 %
5-40 SYRINGE (ML) INJECTION EVERY 12 HOURS SCHEDULED
Status: DISCONTINUED | OUTPATIENT
Start: 2022-01-12 | End: 2022-01-28 | Stop reason: HOSPADM

## 2022-01-12 RX ORDER — SODIUM CHLORIDE 9 MG/ML
25 INJECTION, SOLUTION INTRAVENOUS PRN
Status: DISCONTINUED | OUTPATIENT
Start: 2022-01-12 | End: 2022-01-28 | Stop reason: HOSPADM

## 2022-01-12 RX ORDER — AZITHROMYCIN 250 MG/1
500 TABLET, FILM COATED ORAL EVERY 24 HOURS
Status: DISCONTINUED | OUTPATIENT
Start: 2022-01-12 | End: 2022-01-13

## 2022-01-12 RX ORDER — 0.9 % SODIUM CHLORIDE 0.9 %
500 INTRAVENOUS SOLUTION INTRAVENOUS PRN
Status: DISCONTINUED | OUTPATIENT
Start: 2022-01-12 | End: 2022-01-28 | Stop reason: HOSPADM

## 2022-01-12 RX ORDER — DEXAMETHASONE SODIUM PHOSPHATE 10 MG/ML
6 INJECTION, SOLUTION INTRAMUSCULAR; INTRAVENOUS ONCE
Status: COMPLETED | OUTPATIENT
Start: 2022-01-12 | End: 2022-01-12

## 2022-01-12 RX ORDER — PRAVASTATIN SODIUM 40 MG
80 TABLET ORAL DAILY
Status: DISCONTINUED | OUTPATIENT
Start: 2022-01-13 | End: 2022-01-12

## 2022-01-12 RX ORDER — POTASSIUM CHLORIDE 7.45 MG/ML
10 INJECTION INTRAVENOUS PRN
Status: DISCONTINUED | OUTPATIENT
Start: 2022-01-12 | End: 2022-01-28 | Stop reason: HOSPADM

## 2022-01-12 RX ORDER — AMIODARONE HYDROCHLORIDE 200 MG/1
200 TABLET ORAL DAILY
Status: DISCONTINUED | OUTPATIENT
Start: 2022-01-13 | End: 2022-01-28 | Stop reason: HOSPADM

## 2022-01-12 RX ORDER — ONDANSETRON 4 MG/1
4 TABLET, ORALLY DISINTEGRATING ORAL EVERY 8 HOURS PRN
Status: DISCONTINUED | OUTPATIENT
Start: 2022-01-12 | End: 2022-01-28 | Stop reason: HOSPADM

## 2022-01-12 RX ADMIN — REMDESIVIR 200 MG: 100 INJECTION, POWDER, LYOPHILIZED, FOR SOLUTION INTRAVENOUS at 23:13

## 2022-01-12 RX ADMIN — DEXAMETHASONE SODIUM PHOSPHATE 6 MG: 10 INJECTION, SOLUTION INTRAMUSCULAR; INTRAVENOUS at 16:32

## 2022-01-12 RX ADMIN — ENOXAPARIN SODIUM 40 MG: 40 INJECTION SUBCUTANEOUS at 22:49

## 2022-01-12 RX ADMIN — DEXAMETHASONE SODIUM PHOSPHATE 14 MG: 4 INJECTION, SOLUTION INTRA-ARTICULAR; INTRALESIONAL; INTRAMUSCULAR; INTRAVENOUS; SOFT TISSUE at 23:17

## 2022-01-12 RX ADMIN — SODIUM CHLORIDE: 9 INJECTION, SOLUTION INTRAVENOUS at 23:09

## 2022-01-12 RX ADMIN — AZITHROMYCIN MONOHYDRATE 500 MG: 250 TABLET ORAL at 22:50

## 2022-01-12 RX ADMIN — TOCILIZUMAB 800 MG: 20 INJECTION, SOLUTION, CONCENTRATE INTRAVENOUS at 23:14

## 2022-01-12 RX ADMIN — Medication 10 ML: at 22:51

## 2022-01-12 RX ADMIN — Medication 1000 MG: at 22:50

## 2022-01-12 ASSESSMENT — ENCOUNTER SYMPTOMS
DIARRHEA: 0
ABDOMINAL PAIN: 0
SHORTNESS OF BREATH: 1
VOMITING: 0
WHEEZING: 0
RHINORRHEA: 0
COUGH: 0
NAUSEA: 0

## 2022-01-12 ASSESSMENT — PAIN SCALES - GENERAL
PAINLEVEL_OUTOF10: 0

## 2022-01-12 NOTE — TELEPHONE ENCOUNTER
Spoke to [place] she stated that the patient needs to go to the ED and if he don't want to go to [facility] then he needs to get someone to drive him somewhere else. ~Called and spoke to Fern and he verbalized understanding and he will be driving the patient here to [facility].

## 2022-01-12 NOTE — ED PROVIDER NOTES
[facility]        Pt Name: Yarelis Amador  MRN: [id number]  [place] 1950  Date of evaluation: 1/12/2022  Provider: Donn Felix PA-C  PCP: Sebastian Davis DO  Note Started: 3:58 PM EST       BRANDON. I have evaluated this patient. My supervising physician was available for consultation. CHIEF COMPLAINT       Chief Complaint   Patient presents with    Shortness of Breath     Pt to triage with c/o sob x 2 weeks, states he was at [facility] and was told he had covid, states he has \"been progressively getting worse since being discharged and cant breathe. \"       HISTORY OF PRESENT ILLNESS   (Location, Timing/Onset, Context/Setting, Quality, Duration, Modifying Factors, Severity, Associated Signs and Symptoms)  Note limiting factors. Chief Complaint: SOB     Leandra Noriega is a 70 y.o. male who presents for evaluation of increasing shortness of breath that started this morning. Patient states that he was diagnosed with COVID-19 and was inpatient at [facility] couple of weeks ago. States he has been progressively getting worse but significantly short of breath today. States that he cannot breathe. He has no history of CHF but does have history of COPD. No prior oxygen requirement. He denies any chest pain. No additional information is able to obtain at this time. Nursing Notes were all reviewed and agreed with or any disagreements were addressed in the HPI. REVIEW OF SYSTEMS    (2-9 systems for level 4, 10 or more for level 5)     Review of Systems   Unable to perform ROS: Acuity of condition   Constitutional: Positive for chills. Negative for appetite change and fever. HENT: Negative for congestion and rhinorrhea. Respiratory: Positive for shortness of breath. Negative for cough and wheezing. Cardiovascular: Negative for chest pain.    Gastrointestinal: Negative for abdominal pain, diarrhea, nausea and vomiting. Genitourinary: Negative for difficulty urinating, dysuria and hematuria. Musculoskeletal: Negative for neck pain and neck stiffness. Skin: Negative for rash. Neurological: Negative for headaches. Positives and Pertinent negatives as per HPI. Except as noted above in the ROS, all other systems were reviewed and negative. PAST MEDICAL HISTORY     Past Medical History:   Diagnosis Date    Acid reflux     Acute MI ([facility]     CAD (coronary artery disease)     COPD (chronic obstructive pulmonary disease) ([facility]     Diverticulitis     Hypertension     Peripheral vascular disease ([facility] [facility]          SURGICAL HISTORY     Past Surgical History:   Procedure Laterality Date    CARDIAC CATHETERIZATION      CARDIAC SURGERY      stent x1 --3 yrs, 5/12/14 CABG 3 V    COLONOSCOPY      VASCULAR SURGERY           CURRENTMEDICATIONS       Previous Medications    ALBUTEROL (PROVENTIL HFA;VENTOLIN HFA) 108 (90 BASE) MCG/ACT INHALER    Inhale 2 puffs into the lungs every 6 hours as needed for Wheezing. AMIODARONE (CORDARONE) 200 MG TABLET    TAKE 1 TABLET BY MOUTH DAILY    ASPIRIN 81 MG EC TABLET    Take 81 mg by mouth daily    CARVEDILOL (COREG) 12.5 MG TABLET    TAKE 1 TABLET BY MOUTH TWICE DAILY WITH MEALS    FLUTICASONE-SALMETEROL (ADVAIR DISKUS) 250-50 MCG/DOSE AEPB    Inhale 1 puff into the lungs 2 times daily. ISOSORBIDE MONONITRATE (IMDUR) 30 MG EXTENDED RELEASE TABLET    TAKE 1 TABLET BY MOUTH EVERY DAY    LINACLOTIDE (LINZESS) 145 MCG CAPSULE    Take 145 mcg by mouth as needed     NITROGLYCERIN (NITROSTAT) 0.4 MG SL TABLET    Place 1 tablet under the tongue every 5 minutes as needed for Chest pain    PRAVASTATIN (PRAVACHOL) 80 MG TABLET    Take 1 tablet by mouth daily    SACUBITRIL-VALSARTAN (ENTRESTO PO)    Take by mouth         ALLERGIES     Patient has no known allergies.     FAMILYHISTORY       Family History   Problem Relation Age of Onset    Heart Disease Mother     Heart Disease Sister     Heart Disease Father     Heart Disease Maternal Uncle     Anesth Problems Neg Hx     Malig Hyperten Neg Hx     Hypotension Neg Hx     Malig Hypertherm Neg Hx     Pseudochol. Deficiency Neg Hx           SOCIAL HISTORY       Social History     Tobacco Use    Smoking status: Current Every Day Smoker     Packs/day: 0.50     Years: 50.00     Pack years: 25.00     Types: Cigarettes     Start date: 5/12/2014    Smokeless tobacco: Never Used    Tobacco comment: 5-6 CIGS/DAY   Vaping Use    Vaping Use: Never used   Substance Use Topics    Alcohol use: Yes     Alcohol/week: 12.0 standard drinks     Types: 12 Cans of beer per week     Comment: occasional    Drug use: No       SCREENINGS             PHYSICAL EXAM    (up to 7 for level 4, 8 or more for level 5)     ED Triage Vitals [01/12/22 1547]   BP Temp Temp Source Pulse Resp SpO2 Height Weight   135/78 97.2 °F (36.2 °C) Temporal 78 -- (!) 85 % 5' 9\" (1.753 m) 209 lb (94.8 kg)       Physical Exam  Vitals and nursing note reviewed. Constitutional:       Appearance: He is well-developed. He is ill-appearing. He is not diaphoretic. HENT:      Head: Normocephalic and atraumatic. Right Ear: External ear normal.      Left Ear: External ear normal.      Nose: Nose normal.   Eyes:      General:         Right eye: No discharge. Left eye: No discharge. Cardiovascular:      Rate and Rhythm: Normal rate and regular rhythm. Heart sounds: Normal heart sounds. Pulmonary:      Effort: Pulmonary effort is normal. Tachypnea present. No respiratory distress. Breath sounds: Rhonchi present. Abdominal:      General: There is no distension. Palpations: Abdomen is soft. Tenderness: There is no abdominal tenderness. Musculoskeletal:         General: Normal range of motion. Cervical back: Normal range of motion and neck supple. Skin:     General: Skin is warm and dry.    Neurological:      Mental Status: He is alert and oriented to person, place, and time.    Psychiatric:         Behavior: Behavior normal.         DIAGNOSTIC RESULTS   LABS:    Labs Reviewed   CBC WITH AUTO DIFFERENTIAL - Abnormal; Notable for the following components:       Result Value    WBC 12.6 (*)     Hemoglobin 11.5 (*)     Hematocrit 37.1 (*)     MCV 76.6 (*)     MCH 23.7 (*)     RDW 20.8 (*)     Neutrophils Absolute 10.7 (*)     All other components within normal limits    Narrative:     Performed at:  [facility] [address]   Phone [phone]   COMPREHENSIVE METABOLIC PANEL - Abnormal; Notable for the following components:    Sodium 134 (*)     CO2 18 (*)     Glucose 159 (*)     BUN 37 (*)     GFR Non-African American 60 (*)     Albumin/Globulin Ratio 0.9 (*)     All other components within normal limits    Narrative:     Performed at:  [facility] [address]   Phone 21  - Abnormal; Notable for the following components:    Pro-BNP 1,192 (*)     All other components within normal limits    Narrative:     Performed at:  [facility] [address]   Phone [phone]   D-DIMER, QUANTITATIVE - Abnormal; Notable for the following components:    D-Dimer, Quant 452 (*)     All other components within normal limits    Narrative:     Performed at:  [facility] [address]   Phone [phone]   C-REACTIVE PROTEIN - Abnormal; Notable for the following components:    CRP 97.0 (*)     All other components within normal limits    Narrative:     Performed at:  [facility] [address]   Phone [phone]   LACTATE, SEPSIS - Abnormal; Notable for the following components:    Lactic Acid, Sepsis 2.5 (*)     All other components within normal limits    Narrative:     Performed at:  [facility]  [address] [place],  [place], [address]   Phone [phone]   BLOOD GAS, VENOUS - Abnormal; Notable for the following components:    pCO2, Juan 30.4 (*)     pO2, Juan 77.2 (*)     HCO3, Venous 18.2 (*)     Base Excess, Juan -5.8 (*)     Carboxyhemoglobin 6.2 (*)     All other components within normal limits    Narrative:     Performed at:  [facility] [address] [place],  [place], [address]   Phone [phone]   LIPASE    Narrative:     Performed at:  [facility] [address]  [place], [address]   Phone [phone]   TROPONIN    Narrative:     Performed at:  [facility] [address] [place]  [place], [address]   Phone [phone]   PROCALCITONIN    Narrative:     Performed at:  [facility] [address] [place]  [place], [address]   Phone [phone]   LACTATE, SEPSIS       When ordered only abnormal lab results are displayed. All other labs were within normal range or not returned as of this dictation. EKG: When ordered, EKG's are interpreted by the Emergency Department Physician in the absence of a cardiologist.  Please see their note for interpretation of EKG. RADIOLOGY:   Non-plain film images such as CT, Ultrasound and MRI are read by the radiologist. Plain radiographic images are visualized and preliminarily interpreted by the ED Provider with the below findings:        Interpretation per the Radiologist below, if available at the time of this note:    XR CHEST PORTABLE   Final Result   Bilateral peripheral airspace opacities suggesting COVID pneumonia           No results found.         PROCEDURES   Unless otherwise noted below, none     Procedures    CRITICAL CARE TIME      There was a high probability of life-threatening clinical deterioration in the patient's condition requiring my urgent intervention. I personally saw the patient and independently provided 36 minutes of non-concurrent critical care out of the total shared critical care time provided, excluding separately reportable procedures.       CONSULTS:  None      EMERGENCY DEPARTMENT COURSE and DIFFERENTIAL DIAGNOSIS/MDM:   Vitals:    Vitals:    01/12/22 1547 01/12/22 1630 01/12/22 1714   BP: 135/78     Pulse: 78     Resp:   24   Temp: 97.2 °F (36.2 °C)     TempSrc: Temporal     SpO2: (!) 85% 91% 97%   Weight: 209 lb (94.8 kg)     Height: 5' 9\" (1.753 m)         Patient was given the following medications:  Medications   dexamethasone (PF) (DECADRON) injection 6 mg (6 mg IntraVENous Given 1/12/22 1632)           Patient presents for evaluation of increasing shortness of breath with recent diagnosis of COVID. On exam, he appears short of breath, tachypneic and slightly cyanotic. Satting 83% on 2 L. Bumped up to 4 and will be reevaluated. Coarse breath sounds bilaterally. Chest is nontender and abdomen is benign. Patient was given IV Decadron for COVID hypoxia. Please see attending note for EKG interpretation. On reevaluation, patient still satting in the mid [de-identified]. He was placed on 15 L high flow nasal cannula oxygen by respiratory. CBC and CMP are relatively unremarkable. Troponin negative. Lipase 19. BNP 1192. D-dimer slightly elevated at 452. CRP 97 with a lactate of 2.5 the procalcitonin is 0.12. Empiric antibiotics are withheld. Chest x-ray shows bilateral peripheral airspace opacities suggesting COVID-pneumonia. Patient will be admitted for further evaluation management of respiratory with hypoxia likely secondary to COVID with new oxygen requirement. I spoke with admitting physician, Dr. Elmer Barr, who will resume care of the patient at this time. Patient was informed and agreeable. He is stable for admission. FINAL IMPRESSION      1. COVID    2.  Acute respiratory failure with hypoxia ([address] DISPOSITION/PLAN   DISPOSITION Decision To Admit 01/12/2022 05:34:43 PM      PATIENT REFERRED TO:  No follow-up provider specified.     DISCHARGE MEDICATIONS:  New Prescriptions    No medications on file       DISCONTINUED MEDICATIONS:  Discontinued Medications    ENTRESTO 24-26 MG PER TABLET    TAKE 1/2 TABLET BY MOUTH TWICE DAILY    FUROSEMIDE (LASIX) 20 MG TABLET    TAKE 1 TABLET BY MOUTH TWICE DAILY    SPIRONOLACTONE (ALDACTONE) 25 MG TABLET    TAKE 1/2 TABLET BY MOUTH DAILY              (Please note that portions of this note were completed with a voice recognition program.  Efforts were made to edit the dictations but occasionally words are mis-transcribed.)    Nathan Reyes PA-C (electronically signed)           Salome Warner PA-C  01/12/22 7668

## 2022-01-12 NOTE — H&P
History and Physical  Dr. Enma Reddy  1/12/2022    PCP: Zena Chambers DO    Cc:   Chief Complaint   Patient presents with    Shortness of Breath     Pt to triage with c/o sob x 2 weeks, states he was at [facility] and was told he had covid, states he has \"been progressively getting worse since being discharged and cant breathe. \"       HPI:  Maddie Guadalupe is a 70 y.o. male who has a past medical history of Acid reflux, Acute MI ([facility] [address] CAD (coronary artery disease), COPD (chronic obstructive pulmonary disease) ([facility] [address] Diverticulitis, Hypertension, and Peripheral vascular disease ([facility] [address] Patient presents with Acute respiratory failure due to COVID-19 [facility]). HPI  (1-3 for expanded problem focused, ?4 for detailed/comprehensive)       Maddie Guadalupe is a 70 y.o. male who presents for evaluation of increasing shortness of breath that started this morning. Patient states that he was diagnosed with COVID-19 and was inpatient at [facility] couple of weeks ago. States he has been progressively getting worse but significantly short of breath today. States that he cannot breathe. He has no history of CHF but does have history of COPD. No prior oxygen requirement. He denies any chest pain    In ER, he requires 10-15L high flow  Inflammatory markers all elevated  Pt to be admitted for oxygen support, remdesivir, steroids    Problem list of hospitalization thus far: Active Hospital Problems    Diagnosis     HTN (hypertension) [I10]      Priority: Medium    COVID [U07.1]     Acute respiratory failure due to COVID-19 (HCC) [U07.1, J96.00]     High cholesterol [E78.00]     Acute hypoxemic respiratory failure due to COVID-19 (HCC) [U07.1, J96.01]          Review of Systems: (1 system for EPF, 2-9 for detailed, 10+ for comprehensive)  Constitutional: Negative for chills and fever. HENT: Negative for dental problem, nosebleeds and rhinorrhea. Eyes: Negative for photophobia and visual disturbance. Respiratory: Negative for cough, chest tightness and positive for shortness of breath. Cardiovascular: Negative for chest pain and leg swelling. Gastrointestinal: Negative for diarrhea, nausea and vomiting. Endocrine: Negative for polydipsia and polyphagia. Genitourinary: Negative for frequency, hematuria and urgency. Musculoskeletal: Negative for back pain and myalgias. Skin: Negative for rash. Allergic/Immunologic: Negative for food allergies. Neurological: Negative for dizziness, seizures, syncope and facial asymmetry. Hematological: Negative for adenopathy. Psychiatric/Behavioral: Negative for dysphoric mood. The patient is not nervous/anxious.              Past Medical History:   Past Medical History:   Diagnosis Date    Acid reflux     Acute MI ([facility] [address]     CAD (coronary artery disease)     COPD (chronic obstructive pulmonary disease) ([facility] [address]     Diverticulitis     Hypertension     Peripheral vascular disease ([facility] [address]        Past Surgical History:   Past Surgical History:   Procedure Laterality Date    CARDIAC CATHETERIZATION      CARDIAC SURGERY      stent x1 --3 yrs, 5/12/14 CABG 3 V    COLONOSCOPY      VASCULAR SURGERY         Social History:   Social History     Tobacco History     Smoking Status  Current Every Day Smoker Smoking Start Date  5/12/2014 Smoking Frequency  0.5 packs/day for 50 years (25 pk yrs) Smoking Tobacco Type  Cigarettes    Smokeless Tobacco Use  Never Used    Tobacco Comment  5-6 CIGS/DAY          Alcohol History     Alcohol Use Status  Yes Drinks/Week  12 Cans of beer per week Amount  12.0 standard drinks of alcohol/wk Comment  occasional          Drug Use     Drug Use Status  No          Sexual Activity     Sexually Active  Not Asked                Fam History:   Family History   Problem Relation Age of Onset    Heart Disease Mother     Heart Disease Sister     Heart Disease Father     Heart Disease Maternal Uncle     Anesth Problems Neg Hx     Malig Hyperten Neg Hx     Hypotension Neg Hx     Malig Hypertherm Neg Hx     Pseudochol. Deficiency Neg Hx        PFSH: The above PMHx, PSHx, SocHx, FamHx has been reviewed by myself. (1 area for detailed, 2-3 for comprehensive)      Code Status: Prior    Meds  following list of home medications fromelectronic chart has been reviewed by myself  Prior to Admission medications    Medication Sig Start Date End Date Taking? Authorizing Provider   Sacubitril-Valsartan (ENTRESTO PO) Take by mouth   Yes Historical Provider, MD   amiodarone (CORDARONE) 200 MG tablet TAKE 1 TABLET BY MOUTH DAILY 12/13/21  Yes Jameson Hightower MD   carvedilol (COREG) 12.5 MG tablet TAKE 1 TABLET BY MOUTH TWICE DAILY WITH MEALS 10/25/21  Yes GEORGE Mercedes CNP   aspirin 81 MG EC tablet Take 81 mg by mouth daily   Yes Historical Provider, MD   isosorbide mononitrate (IMDUR) 30 MG extended release tablet TAKE 1 TABLET BY MOUTH EVERY DAY 4/24/21  Yes GEORGE Mercedes CNP   nitroGLYCERIN (NITROSTAT) 0.4 MG SL tablet Place 1 tablet under the tongue every 5 minutes as needed for Chest pain 6/29/20  Yes Jameson Hightower MD   linaclotide [facility]) 145 MCG capsule Take 145 mcg by mouth as needed    Yes Historical Provider, MD   pravastatin (PRAVACHOL) 80 MG tablet Take 1 tablet by mouth daily 9/30/20   Jameson Hightower MD   fluticasone-salmeterol (ADVAIR DISKUS) 250-50 MCG/DOSE AEPB Inhale 1 puff into the lungs 2 times daily. 11/3/14   Urban Ludwig MD   albuterol (PROVENTIL HFA;VENTOLIN HFA) 108 (90 BASE) MCG/ACT inhaler Inhale 2 puffs into the lungs every 6 hours as needed for Wheezing.     Historical Provider, MD         No Known Allergies          EXAM: (2-7 system for EPF/Detailed, ?8 for Comprehensive)  /78   Pulse 78   Temp 97.2 °F (36.2 °C) (Temporal)   Resp 24   Ht 5' 9\" (1.753 m)   Wt 209 lb (94.8 kg)   SpO2 97%   BMI 30.86 kg/m²   Constitutional: vitals as above: alert, appears stated age and cooperative    Psychiatric: normal insight and judgment, oriented to person, place, time, and general circumstances    Head: Normocephalic, without obvious abnormality, atraumatic    Eyes:lids and lashes normal, conjunctivae and sclerae normal and pupils equal, round, reactive to light and accomodation    EMNT: external ears normal, nares midline    Neck: no carotid bruit, supple, symmetrical, trachea midline and thyroid not enlarged, symmetric, no tenderness/mass/nodules     Respiratory: crackles bilat  Cardiovascular: normal rate, regular rhythm, normal S1 and S2 and no murmurs    Gastrointestinal: soft, non-tender, non-distended, normal bowel sounds, no masses or organomegaly    Extremities: no clubbing, no edema    Skin:No rashes or nodules noted.     Neurologic:negative         LABS:  Labs Reviewed   CBC WITH AUTO DIFFERENTIAL - Abnormal; Notable for the following components:       Result Value    WBC 12.6 (*)     Hemoglobin 11.5 (*)     Hematocrit 37.1 (*)     MCV 76.6 (*)     MCH 23.7 (*)     RDW 20.8 (*)     Neutrophils Absolute 10.7 (*)     All other components within normal limits    Narrative:     Performed at:  [facility] [address], [place], [address]   Phone [phone]   COMPREHENSIVE METABOLIC PANEL - Abnormal; Notable for the following components:    Sodium 134 (*)     CO2 18 (*)     Glucose 159 (*)     BUN 37 (*)     GFR Non-African American 60 (*)     Albumin/Globulin Ratio 0.9 (*)     All other components within normal limits    Narrative:     Performed at:  [facility] [address], [place], [address]   Phone 21  - Abnormal; Notable for the following components:    Pro-BNP 1,192 (*)     All other components within normal limits    Narrative:     Performed at:  [facility] [address], [place], [address]   Phone [phone]   D-DIMER, QUANTITATIVE - Abnormal; Notable for the following components:    D-Dimer, Quant 452 (*)     All other components within normal limits    Narrative:     Performed at:  [facility] [address]  [place], [facility] [employer]   Phone [phone]   C-REACTIVE PROTEIN - Abnormal; Notable for the following components:    CRP 97.0 (*)     All other components within normal limits    Narrative:     Performed at:  [facility] [address]  [place], [facility] [employer]   Phone [phone]   LACTATE, SEPSIS - Abnormal; Notable for the following components:    Lactic Acid, Sepsis 2.5 (*)     All other components within normal limits    Narrative:     Performed at:  [facility] [address] [place]  [place], [facility] [employer]   Phone [phone]   BLOOD GAS, VENOUS - Abnormal; Notable for the following components:    pCO2, Juan 30.4 (*)     pO2, Juan 77.2 (*)     HCO3, Venous 18.2 (*)     Base Excess, Juan -5.8 (*)     Carboxyhemoglobin 6.2 (*)     All other components within normal limits    Narrative:     Performed at:  [facility] [address]  [place], [address] [employer]   Phone [phone]   LIPASE    Narrative:     Performed at:  [facility] [address] [place],  [place], [address] [employer]   Phone [phone]   TROPONIN    Narrative:     Performed at:  [facility] [address]  [place], [address] [employer]   Phone [phone]   PROCALCITONIN    Narrative:     Performed at:  [facility] [address]  [place], [address] [employer]   Phone [phone]   LACTATE, SEPSIS         IMAGING:  Imaging results from the ER have been reviewed in the computerized chart.   XR CHEST PORTABLE    Result Date: 1/12/2022  EXAMINATION: ONE XRAY VIEW OF THE CHEST 1/12/2022 4:08 pm COMPARISON: 11/10/2019 HISTORY: ORDERING SYSTEM PROVIDED HISTORY: SOB TECHNOLOGIST PROVIDED HISTORY: Reason for exam:->SOB FINDINGS: Cardiomediastinal silhouette stable. Bilateral peripheral airspace opacities. No pneumothorax. No pleural effusion. Status post median sternotomy. Bilateral peripheral airspace opacities suggesting COVID pneumonia         EKG:   EKG from ER, reviewed by self  it shows sinus rhythm at 75  Old chart reviewed, EKG dated 11/26/19 is reviewed, there is  difference noted. Old study shows sinus carmen at 62    Lab Results   Component Value Date    GLUCOSE 159 01/12/2022     Lab Results   Component Value Date    POCGLU 130 11/26/2019     /78   Pulse 78   Temp 97.2 °F (36.2 °C) (Temporal)   Resp 24   Ht 5' 9\" (1.753 m)   Wt 209 lb (94.8 kg)   SpO2 97%   BMI 30.86 kg/m²     MEDICAL DECISION MAKING:    Principal Problem:    [facility] -New Problem to me. Pt on high flow o2  Plan: admit, cont hi flow o2. Iv decadron ordered. Pharmacy consulted for remdesivir, actemra  Active Problems:    HTN (hypertension) -Established problem. Stable. 135/78  Plan: Pt home BP meds reviewed and will be continued. IV Hydralazine ordered for control of extremely high blood pressures. Will monitor labs to assess Creat/K for possible complications of medications. COVID  Plan: as above. If oxygenation worse, may need to transition to airvo    High cholesterol -Established problem. Stable. Plan: cont on statin, monitor for myalgias    Acute hypoxemic respiratory failure [facility])          Diagnoses as listed above, designated as new or established and plan outlined for each. Data Reviewed:   (1) Lab tests were reviewed or ordered. (1) Radiology tests were reviewed or ordered. (1) Medical test (Echo, EKG, PFT/genoveva) were ordered. (1)History was not obtained from someone other than patient  (1) Old records were reviewed - see HPI/MDM for pertinent details if review done.   (2) Case was discussed with another health care provider: [facility]  (2) Imaging was viewed by myself. (2) EKG  was viewed by myself. Total critical care time caring for this patient with life threatening illness, including direct patient contact, management of life support systems, review of data including imaging and labs, discussions with other team members and physicians at least 34 minutes today        The patient is being placed in inpatient status with the expectation of requiring a hospital stay spanning at least two midnights for care and treatment of the problems noted in the problem list.  This determination is also based on thepatients comorbidities and past medical history, the severity and timing of the signs and symptoms upon presentation.     (Please note that portions of this note were completed with a voice recognition program.  Efforts were made to edit the dictations but occasionally words are mis-transcribed.)      Electronically signed by: Constance Thorpe MD 1/12/2022

## 2022-01-13 ENCOUNTER — APPOINTMENT (OUTPATIENT)
Dept: GENERAL RADIOLOGY | Age: 72
DRG: 177 | End: 2022-01-13
Payer: MEDICARE

## 2022-01-13 LAB
A/G RATIO: 0.9 (ref 1.1–2.2)
ALBUMIN SERPL-MCNC: 3.1 G/DL (ref 3.4–5)
ALP BLD-CCNC: 64 U/L (ref 40–129)
ALT SERPL-CCNC: 13 U/L (ref 10–40)
ANION GAP SERPL CALCULATED.3IONS-SCNC: 15 MMOL/L (ref 3–16)
AST SERPL-CCNC: 16 U/L (ref 15–37)
BASOPHILS ABSOLUTE: 0 K/UL (ref 0–0.2)
BASOPHILS RELATIVE PERCENT: 0.1 %
BILIRUB SERPL-MCNC: 0.3 MG/DL (ref 0–1)
BUN BLDV-MCNC: 35 MG/DL (ref 7–20)
C-REACTIVE PROTEIN: 123.6 MG/L (ref 0–5.1)
CALCIUM SERPL-MCNC: 8.8 MG/DL (ref 8.3–10.6)
CHLORIDE BLD-SCNC: 105 MMOL/L (ref 99–110)
CO2: 17 MMOL/L (ref 21–32)
CREAT SERPL-MCNC: 1.1 MG/DL (ref 0.8–1.3)
D DIMER: 377 NG/ML DDU (ref 0–229)
EKG ATRIAL RATE: 75 BPM
EKG DIAGNOSIS: NORMAL
EKG P AXIS: 91 DEGREES
EKG P-R INTERVAL: 160 MS
EKG Q-T INTERVAL: 444 MS
EKG QRS DURATION: 80 MS
EKG QTC CALCULATION (BAZETT): 495 MS
EKG R AXIS: 30 DEGREES
EKG T AXIS: 60 DEGREES
EKG VENTRICULAR RATE: 75 BPM
EOSINOPHILS ABSOLUTE: 0 K/UL (ref 0–0.6)
EOSINOPHILS RELATIVE PERCENT: 0 %
GFR AFRICAN AMERICAN: >60
GFR NON-AFRICAN AMERICAN: >60
GLUCOSE BLD-MCNC: 191 MG/DL (ref 70–99)
HCT VFR BLD CALC: 31.9 % (ref 40.5–52.5)
HEMOGLOBIN: 10.3 G/DL (ref 13.5–17.5)
LYMPHOCYTES ABSOLUTE: 0.7 K/UL (ref 1–5.1)
LYMPHOCYTES RELATIVE PERCENT: 10.3 %
MCH RBC QN AUTO: 23.9 PG (ref 26–34)
MCHC RBC AUTO-ENTMCNC: 32.2 G/DL (ref 31–36)
MCV RBC AUTO: 74.2 FL (ref 80–100)
MONOCYTES ABSOLUTE: 0.2 K/UL (ref 0–1.3)
MONOCYTES RELATIVE PERCENT: 2.5 %
NEUTROPHILS ABSOLUTE: 5.8 K/UL (ref 1.7–7.7)
NEUTROPHILS RELATIVE PERCENT: 87.1 %
PDW BLD-RTO: 21 % (ref 12.4–15.4)
PLATELET # BLD: 240 K/UL (ref 135–450)
PMV BLD AUTO: 8 FL (ref 5–10.5)
POTASSIUM REFLEX MAGNESIUM: 5.3 MMOL/L (ref 3.5–5.1)
PROCALCITONIN: 0.11 NG/ML (ref 0–0.15)
RBC # BLD: 4.3 M/UL (ref 4.2–5.9)
SODIUM BLD-SCNC: 137 MMOL/L (ref 136–145)
TOTAL PROTEIN: 6.7 G/DL (ref 6.4–8.2)
WBC # BLD: 6.6 K/UL (ref 4–11)

## 2022-01-13 PROCEDURE — 2580000003 HC RX 258: Performed by: INTERNAL MEDICINE

## 2022-01-13 PROCEDURE — 93010 ELECTROCARDIOGRAM REPORT: CPT | Performed by: INTERNAL MEDICINE

## 2022-01-13 PROCEDURE — 97165 OT EVAL LOW COMPLEX 30 MIN: CPT

## 2022-01-13 PROCEDURE — 97530 THERAPEUTIC ACTIVITIES: CPT

## 2022-01-13 PROCEDURE — 80053 COMPREHEN METABOLIC PANEL: CPT

## 2022-01-13 PROCEDURE — 94640 AIRWAY INHALATION TREATMENT: CPT

## 2022-01-13 PROCEDURE — 6370000000 HC RX 637 (ALT 250 FOR IP): Performed by: INTERNAL MEDICINE

## 2022-01-13 PROCEDURE — 36415 COLL VENOUS BLD VENIPUNCTURE: CPT

## 2022-01-13 PROCEDURE — 97116 GAIT TRAINING THERAPY: CPT

## 2022-01-13 PROCEDURE — 71045 X-RAY EXAM CHEST 1 VIEW: CPT

## 2022-01-13 PROCEDURE — 94761 N-INVAS EAR/PLS OXIMETRY MLT: CPT

## 2022-01-13 PROCEDURE — 97161 PT EVAL LOW COMPLEX 20 MIN: CPT

## 2022-01-13 PROCEDURE — 2060000000 HC ICU INTERMEDIATE R&B

## 2022-01-13 PROCEDURE — 6360000002 HC RX W HCPCS: Performed by: INTERNAL MEDICINE

## 2022-01-13 PROCEDURE — 84145 PROCALCITONIN (PCT): CPT

## 2022-01-13 PROCEDURE — 97535 SELF CARE MNGMENT TRAINING: CPT

## 2022-01-13 PROCEDURE — 86140 C-REACTIVE PROTEIN: CPT

## 2022-01-13 PROCEDURE — 85379 FIBRIN DEGRADATION QUANT: CPT

## 2022-01-13 PROCEDURE — 85025 COMPLETE CBC W/AUTO DIFF WBC: CPT

## 2022-01-13 PROCEDURE — 2500000003 HC RX 250 WO HCPCS: Performed by: INTERNAL MEDICINE

## 2022-01-13 RX ORDER — ALBUTEROL SULFATE 90 UG/1
2 AEROSOL, METERED RESPIRATORY (INHALATION) EVERY 4 HOURS PRN
Status: DISCONTINUED | OUTPATIENT
Start: 2022-01-13 | End: 2022-01-28 | Stop reason: HOSPADM

## 2022-01-13 RX ORDER — ALBUTEROL SULFATE 90 UG/1
2 AEROSOL, METERED RESPIRATORY (INHALATION) 2 TIMES DAILY
Status: DISCONTINUED | OUTPATIENT
Start: 2022-01-13 | End: 2022-01-28 | Stop reason: HOSPADM

## 2022-01-13 RX ADMIN — Medication 2 PUFF: at 08:53

## 2022-01-13 RX ADMIN — Medication 2 PUFF: at 21:06

## 2022-01-13 RX ADMIN — SACUBITRIL AND VALSARTAN 0.5 TABLET: 24; 26 TABLET, FILM COATED ORAL at 21:28

## 2022-01-13 RX ADMIN — CARVEDILOL 12.5 MG: 6.25 TABLET, FILM COATED ORAL at 18:20

## 2022-01-13 RX ADMIN — ACETAMINOPHEN 650 MG: 325 TABLET ORAL at 21:29

## 2022-01-13 RX ADMIN — ENOXAPARIN SODIUM 40 MG: 40 INJECTION SUBCUTANEOUS at 21:29

## 2022-01-13 RX ADMIN — SACUBITRIL AND VALSARTAN 0.5 TABLET: 24; 26 TABLET, FILM COATED ORAL at 09:04

## 2022-01-13 RX ADMIN — SODIUM CHLORIDE: 9 INJECTION, SOLUTION INTRAVENOUS at 19:56

## 2022-01-13 RX ADMIN — Medication 10 ML: at 09:17

## 2022-01-13 RX ADMIN — REMDESIVIR 100 MG: 100 INJECTION, POWDER, LYOPHILIZED, FOR SOLUTION INTRAVENOUS at 13:06

## 2022-01-13 RX ADMIN — SODIUM CHLORIDE, PRESERVATIVE FREE 10 ML: 5 INJECTION INTRAVENOUS at 09:05

## 2022-01-13 RX ADMIN — AMIODARONE HYDROCHLORIDE 200 MG: 200 TABLET ORAL at 09:08

## 2022-01-13 RX ADMIN — DEXAMETHASONE SODIUM PHOSPHATE 20 MG: 4 INJECTION, SOLUTION INTRA-ARTICULAR; INTRALESIONAL; INTRAMUSCULAR; INTRAVENOUS; SOFT TISSUE at 09:16

## 2022-01-13 RX ADMIN — Medication 10 ML: at 21:46

## 2022-01-13 RX ADMIN — ENOXAPARIN SODIUM 40 MG: 40 INJECTION SUBCUTANEOUS at 09:04

## 2022-01-13 RX ADMIN — CARVEDILOL 12.5 MG: 6.25 TABLET, FILM COATED ORAL at 09:09

## 2022-01-13 RX ADMIN — ASPIRIN 81 MG: 81 TABLET, COATED ORAL at 09:09

## 2022-01-13 RX ADMIN — ISOSORBIDE MONONITRATE 30 MG: 30 TABLET, EXTENDED RELEASE ORAL at 09:10

## 2022-01-13 ASSESSMENT — PAIN SCALES - GENERAL
PAINLEVEL_OUTOF10: 0
PAINLEVEL_OUTOF10: 5
PAINLEVEL_OUTOF10: 0

## 2022-01-13 NOTE — PLAN OF CARE
Problem: Airway Clearance - Ineffective  Goal: Achieve or maintain patent airway  Outcome: Ongoing     Problem: Gas Exchange - Impaired  Goal: Absence of hypoxia  Outcome: Ongoing     Problem: Breathing Pattern - Ineffective  Goal: Ability to achieve and maintain a regular respiratory rate  Outcome: Ongoing     Problem:  Body Temperature -  Risk of, Imbalanced  Goal: Ability to maintain a body temperature within defined limits  Outcome: Ongoing     Problem: Isolation Precautions - Risk of Spread of Infection  Goal: Prevent transmission of infection  Outcome: Ongoing     Problem: Nutrition Deficits  Goal: Optimize nutritional status  Outcome: Ongoing     Problem: Risk for Fluid Volume Deficit  Goal: Maintain normal heart rhythm  Outcome: Ongoing     Problem: Loneliness or Risk for Loneliness  Goal: Demonstrate positive use of time alone when socialization is not possible  Outcome: Ongoing     Problem: Fatigue  Goal: Verbalize increase energy and improved vitality  Outcome: Ongoing     Problem: Patient Education: Go to Patient Education Activity  Goal: Patient/Family Education  Outcome: Ongoing

## 2022-01-13 NOTE — RT PROTOCOL NOTE
RT Inhaler-Nebulizer Bronchodilator Protocol Note    There is a bronchodilator order in the chart from a provider indicating to follow the RT Bronchodilator Protocol and there is an Initiate RT Inhaler-Nebulizer Bronchodilator Protocol order as well (see protocol at bottom of note). CXR Findings:  XR CHEST PORTABLE    Result Date: 1/12/2022  Bilateral peripheral airspace opacities suggesting COVID pneumonia       The findings from the last RT Protocol Assessment were as follows:   History Pulmonary Disease: Chronic pulmonary disease  Respiratory Pattern: Dyspnea on exertion or RR 21-25 bpm  Breath Sounds: Slightly diminished and/or crackles  Cough: Strong, spontaneous, non-productive  Indication for Bronchodilator Therapy: On home bronchodilators  Bronchodilator Assessment Score: 6    Aerosolized bronchodilator medication orders have been revised according to the RT Inhaler-Nebulizer Bronchodilator Protocol below. Respiratory Therapist to perform RT Therapy Protocol Assessment initially then follow the protocol. Repeat RT Therapy Protocol Assessment PRN for score 0-3 or on second treatment, BID, and PRN for scores above 3. No Indications  adjust the frequency to every 6 hours PRN wheezing or bronchospasm, if no treatments needed after 48 hours then discontinue using Per Protocol order mode. If indication present, adjust the RT bronchodilator orders based on the Bronchodilator Assessment Score as indicated below. Use Inhaler orders unless patient has one or more of the following: on home nebulizer, not able to hold breath for 10 seconds, is not alert and oriented, cannot activate and use MDI correctly, or respiratory rate 25 breaths per minute or more, then use the equivalent nebulizer order(s) with same Frequency and PRN reasons based on the score. If a patient is on this medication at home then do not decrease Frequency below that used at home.     0-3  enter or revise RT bronchodilator order(s) to equivalent RT Bronchodilator order with Frequency of every 4 hours PRN for wheezing or increased work of breathing using Per Protocol order mode. 4-6  enter or revise RT Bronchodilator order(s) to two equivalent RT bronchodilator orders with one order with BID Frequency and one order with Frequency of every 4 hours PRN wheezing or increased work of breathing using Per Protocol order mode. 7-10  enter or revise RT Bronchodilator order(s) to two equivalent RT bronchodilator orders with one order with TID Frequency and one order with Frequency of every 4 hours PRN wheezing or increased work of breathing using Per Protocol order mode. 11-13  enter or revise RT Bronchodilator order(s) to one equivalent RT bronchodilator order with QID Frequency and an Albuterol order with Frequency of every 4 hours PRN wheezing or increased work of breathing using Per Protocol order mode. Greater than 13  enter or revise RT Bronchodilator order(s) to one equivalent RT bronchodilator order with every 4 hours Frequency and an Albuterol order with Frequency of every 2 hours PRN wheezing or increased work of breathing using Per Protocol order mode. RT to enter RT Home Evaluation for COPD & MDI Assessment order using Per Protocol order mode.     Electronically signed by Flory Samuel RCP on 1/13/2022 at 12:07 AM

## 2022-01-13 NOTE — PROGRESS NOTES
Admitted to [address] [place] [address]. Placed patient on heart monitor. 94% on 15L O2 HFNC Aerosolized. Temp 97.2. NSR 66.  RR 25. /75 (95). Denies SOB. Removed all his clothing. No skin breakdown with two nurse evaluation. LS crackles left base. Skin pink warm dry and intact. S1S2. ABD obese round +BSx4. Left leg incisional scar healing. Left posterior tib +palpable pulse and Left dorsalis pedis +weak pulse. Right posterior tib +palpable pulse and right dorsalis pedis pulse +weak pulse. +Covid infection in droplet plus isolation. Released his doctor's orders. States he cannot remember his medications and asks nurse to call his wife. Oriented him to room, white board, call light and instructions re: safety, fall prevention and  Calling nurse for all his needs. SR up x3. Bed alarm engaged. Urinal, tissues, personal cell phone and call light within reach.

## 2022-01-13 NOTE — PROGRESS NOTES
Patient arrived to room 5913 from ER on 15L HFNC. Patient able to ambulate to bed. Orientation to room and call completed. Fall precautions in place. Call light and bedside table within reach.

## 2022-01-13 NOTE — PROGRESS NOTES
Occupational Therapy   Occupational Therapy Initial Assessment  Date: 2022   Patient Name: Erika Nath  MRN: [id number]     : 1950    Date of Service: 2022    Discharge Recommendations:    Erika Nath scored a 19/24 on the AM-PAC ADL Inpatient form. Current research shows that an AM-PAC score of 18 or greater is typically associated with a discharge to the patient's home setting. Based on the patient's AM-PAC score, and their current ADL deficits, it is recommended that the patient have 2-3 sessions per week of Occupational Therapy at d/c to increase the patient's independence. At this time, this patient demonstrates the endurance and safety to discharge home with home health OT services (home vs OP services) and a follow up treatment frequency of 2-3x/wk. Please see assessment section for further patient specific details. If patient discharges prior to next session this note will serve as a discharge summary. Please see below for the latest assessment towards goals. HOME HEALTH CARE: LEVEL 1 STANDARD    - Initial home health evaluation to occur within 24-48 hours, in patient home   - Therapy to evaluate with goal of regaining prior level of functioning   - Therapy to evaluate if patient has [address] needs for personal care    OT Equipment Recommendations  Other: continue to assess pending pt progress    Assessment   Performance deficits / Impairments: Decreased functional mobility ; Decreased ADL status; Decreased endurance;Decreased balance;Decreased high-level IADLs  Assessment: Pt is currently functioning below occupational baseline and demo the deficits listed above, pt would benefit from continued skilled OT services to address these deficits and increase IND, safety, and ease with all occupational pursuits  Treatment Diagnosis: Decreased ADL/IADL status, functional mobility, and functional tranfers d/t Acute respiratory failure due to COVID-19 [facility])  Prognosis: Good  Decision Making: Low Complexity  OT Education: OT Role;Plan of Care;Transfer Training;Energy Conservation  Patient Education: eval, importance of proning/sidelying, d/c recommendations- pt verbalized understanding  Barriers to Learning: none  REQUIRES OT FOLLOW UP: Yes  Activity Tolerance  Activity Tolerance: Patient Tolerated treatment well;Patient limited by fatigue  Activity Tolerance: 02 with bed mobility 87%, 02 with ambulation to chair 85%, 02 with 24' ambulation 81%, static stance edge of recliner 91%, brushing teeth in stand 84%- recovers to above 90% with 2-3 minute rest breaks. BP at /70  Safety Devices  Safety Devices in place: Yes  Type of devices: Left in chair;Call light within reach;Nurse notified           Patient Diagnosis(es): The primary encounter diagnosis was COVID. A diagnosis of Acute respiratory failure with hypoxia (HCC) was also pertinent to this visit. has a past medical history of Acid reflux, Acute MI ([facility] [address] CAD (coronary artery disease), COPD (chronic obstructive pulmonary disease) ([facility] [address] Diverticulitis, Hypertension, and Peripheral vascular disease ([facility] [address] has a past surgical history that includes vascular surgery; Cardiac surgery; Colonoscopy; and Cardiac catheterization. Treatment Diagnosis: Decreased ADL/IADL status, functional mobility, and functional tranfers d/t Acute respiratory failure due to COVID-19 [facility])      Restrictions  Restrictions/Precautions  Restrictions/Precautions: Isolation,Fall Risk (COVID(+))  Required Braces or Orthoses?: No  Position Activity Restriction  Sternal Precautions: 02 with bed mobility 87%, 02 with ambulation to chair 85%, 02 with 24' ambulation 81%, static stance edge of recliner 91%, brushing teeth in stand 84%- recovers to above 90% with 2-3 minute rest breaks. BP at /70  Other position/activity restrictions: Per H&P \"76 y.o. male who presents for evaluation of increasing shortness of breath that started this morning. Patient states that he was diagnosed with COVID-19 and was inpatient at [facility] couple of weeks ago. States he has been progressively getting worse but significantly short of breath today. States that he cannot breathe. He has no history of CHF but does have history of COPD. No prior oxygen requirement. He denies any chest pain. No additional information is able to obtain at this time. \"    Subjective   General  Chart Reviewed: Yes  Patient assessed for rehabilitation services?: Yes  Additional Pertinent Hx: PMH: Acid reflux, Acute MI ([facility] [address] CAD (coronary artery disease), COPD (chronic obstructive pulmonary disease) ([facility] [address] Diverticulitis, Hypertension, and Peripheral vascular disease ([facility] [address]   Family / Caregiver Present: No  Referring Practitioner: Brenda Chang MD  Diagnosis: Acute respiratory failure due to COVID-19 [facility])  Subjective  Subjective: Pt supine in bed upon arrival, pleasant and agreeable to OT evaluation and treat  Patient Currently in Pain: No  Vital Signs  Resp: 21  Patient Currently in Pain: No  Oxygen Therapy  SpO2: 94 %  Pulse Oximeter Device Mode: Continuous  Pulse Oximeter Device Location: Forehead  O2 Device: High flow nasal cannula  O2 Flow Rate (L/min): 15 L/min    Social/Functional History  Social/Functional History  Lives With: Spouse  Type of Home: House  Home Layout: Two level,Laundry in basement  Home Access: Stairs to enter with rails  Entrance Stairs - Number of Steps: 5 STEPS  Entrance Stairs - Rails: Right  Bathroom Shower/Tub: Walk-in shower,Shower chair with back  Bathroom Toilet: Standard  Bathroom Equipment: Grab bars around toilet  ADL Assistance: [address] [place] [address] Avenue: Independent  Homemaking Responsibilities: Yes  Ambulation Assistance: Independent  Transfer Assistance: Independent  Active : Yes  Occupation: Full time employment  Type of occupation:  3 days a week  Additional Comments: pt reports no falls in the last 6 month       Objective   Vision: Impaired  Vision Exceptions: Wears glasses for reading  Hearing: Exceptions to [facility]  Hearing Exceptions: Hard of hearing/hearing concerns    Orientation  Overall Orientation Status: Within Normal Limits  Observation/Palpation  Posture: Good  Balance  Sitting Balance: Supervision  Standing Balance: Stand by assistance  Standing Balance  Time: 5-7 mintues total  Activity: functional mobility/transfers, ADL completion, x3 static stance edge of recliner  Comment: no LOB, no device, decreased standing tolerance d/t fatigue and decreasing 02 sats while in stance  Functional Mobility  Functional - Mobility Device: No device  Activity: Other  Assist Level: Stand by assistance  ADL  Grooming: Setup;Stand by assistance (completed oral care while in stance at sink)  UE Dressing: Setup (gown change)  LE Dressing: Setup;Stand by assistance (don pants)  Additional Comments: pt declined additional ADLs  Tone RUE  RUE Tone: Normotonic  Tone LUE  LUE Tone: Normotonic  Coordination  Movements Are Fluid And Coordinated: Yes  Bed mobility  Supine to Sit: Supervision  Sit to Supine: Unable to assess (pt in recliner chair at EOS)  Scooting: Supervision  Comment: HOB slightly elevated, use of rail  Transfers  Sit to stand: Stand by assistance  Stand to sit: Stand by assistance  Cognition  Overall Cognitive Status: WNL  Perception  Overall Perceptual Status: WFL  Sensation  Overall Sensation Status: [facility]         Plan   Plan  Times per week: 3-5x/wk  Times per day: Daily  Current Treatment Recommendations: Strengthening,Balance Training,Functional Mobility Training,Endurance Training,Patient/Caregiver Education & Training,Safety Education & Training,Equipment Evaluation, Education, & procurement,Self-Care / ADL    G-Code     OutComes Score                                                  AM-PAC Score        AM-[facility] Inpatient Daily Activity Raw Score: 19 (01/13/22 0837)  AM-PAC Inpatient ADL T-Scale Score : 40.22 (01/13/22 1237)  ADL Inpatient CMS 0-100% Score: 42.8 (01/13/22 1237)  ADL Inpatient CMS G-Code Modifier : CK (01/13/22 1237)    Goals  Short term goals  Time Frame for Short term goals: d/c  Short term goal 1: pt will complete functional transfer to ADL surface at MOD I level  Short term goal 2: pt will complete UB ADLs at IND level  Short term goal 3: Pt will complete LB ADLs at MOD I level  Short term goal 4: Pt will complete toileting at MOD I level  Short term goal 5: Pt will IND verbalize and demo use of energy conservation techniques  Long term goals  Time Frame for Long term goals : LTG=STG  Patient Goals   Patient goals : to return home       Therapy Time   Individual Concurrent Group Co-treatment   Time In       1004   Time Out       1100   Minutes       56   Timed Code Treatment Minutes: Maricel Magana, [address] [place], [place] [address]

## 2022-01-13 NOTE — PROGRESS NOTES
Physical Therapy    Facility/Department: [facility] 5C  Initial Assessment    NAME: Neha Murphy  : 1950  MRN: [id number]    Date of Service: 2022    Discharge Recommendations:      PT Equipment Recommendations  Equipment Needed: No  Other: Needs met     Leandra Gonzalez scored a 18/24 on the AM-PAC short mobility form. Current research shows that an AM-PAC score of 18 or greater is typically associated with a discharge to the patient's home setting. Based on the patient's AM-PAC score and their current functional mobility deficits, it is recommended that the patient have 2-3 sessions per week of Physical Therapy at d/c to increase the patient's independence. At this time, this patient demonstrates the endurance and safety to discharge home with 24 supervision and home PT and a follow up treatment frequency of 2-3x/wk. Please see assessment section for further patient specific details. If patient discharges prior to next session this note will serve as a discharge summary. Please see below for the latest assessment towards goals. HOME HEALTH CARE: LEVEL 1 STANDARD  - Initial home health evaluation to occur within 24-48 hours, in patient home   - Therapy to evaluate with goal of regaining prior level of functioning   - Therapy to evaluate if patient has [address] needs for personal care    Assessment   Body structures, Functions, Activity limitations: Decreased functional mobility ; Decreased endurance  Assessment: The patient is a 71 yo male admitted to [facility] for acute respiratory failure after d/c home from [facility] where he was hospitalized for COVID-19. At this time the patient requires 14L of O2 to maintain SpO2 >90% at rest. The patient desaturates with minimal ambulation or dynamic activity but recovers steadily over 2-3 minutes with seated rest. The patient would benefit from continued skilled PT to safely progress tolerance to activity.   Treatment Diagnosis: Decreased tolerance to activity  Prognosis: Good  Decision Making: Medium Complexity  Exam: Balance, functional mobility  Clinical Presentation: Evolving  PT Education: Goals;PT Role;Plan of Care;Energy Conservation  Patient Education: Pt verbalized understanding  Barriers to Learning: None  REQUIRES PT FOLLOW UP: Yes  Activity Tolerance  Activity Tolerance: Patient Tolerated treatment well;Patient limited by endurance;Treatment limited secondary to medical complications (free text)  Activity Tolerance: 02 with bed mobility 87%, 02 with ambulation to chair 85%, 02 with 24' ambulation 81%, static stance edge of recliner 91%, brushing teeth in stand 84%- recovers to above 90% with 2-3 minute rest breaks. BP at /70       Patient Diagnosis(es): The primary encounter diagnosis was COVID. A diagnosis of Acute respiratory failure with hypoxia (HCC) was also pertinent to this visit. has a past medical history of Acid reflux, Acute MI ([facility] [address] CAD (coronary artery disease), COPD (chronic obstructive pulmonary disease) ([facility] [address] Diverticulitis, Hypertension, and Peripheral vascular disease ([facility] [address] has a past surgical history that includes vascular surgery; Cardiac surgery; Colonoscopy; and Cardiac catheterization. Restrictions  Restrictions/Precautions  Restrictions/Precautions: Isolation,Fall Risk (COVID(+))  Required Braces or Orthoses?: No  Position Activity Restriction  Sternal Precautions: 02 with bed mobility 87%, 02 with ambulation to chair 85%, 02 with 24' ambulation 81%, static stance edge of recliner 91%, brushing teeth in stand 84%- recovers to above 90% with 2-3 minute rest breaks. BP at /70  Other position/activity restrictions: Per H&P \"76 y.o. male who presents for evaluation of increasing shortness of breath that started this morning. Patient states that he was diagnosed with COVID-19 and was inpatient at [facility] couple of weeks ago.   States he has been progressively getting worse but significantly short of breath today. States that he cannot breathe. He has no history of CHF but does have history of COPD. No prior oxygen requirement. He denies any chest pain. No additional information is able to obtain at this time. \"     Vision/Hearing  Vision: Impaired  Vision Exceptions: Wears glasses for reading  Hearing: Exceptions to [facility]  Hearing Exceptions: Hard of hearing/hearing concerns       Subjective  General  Chart Reviewed: Yes  Patient assessed for rehabilitation services?: Yes  Family / Caregiver Present: No  Diagnosis: Acute respiratory failure due to COVID-19  Follows Commands: Within Functional Limits  General Comment  Comments: Pt semi-reclined in bed upon therapist arrival.  Subjective  Subjective: Pt agreeable to PT/OT eval. Denies pain.   Pain Screening  Patient Currently in Pain: No  Vital Signs  Patient Currently in Pain: No       Orientation  Orientation  Overall Orientation Status: Within Functional Limits     Social/Functional History  Social/Functional History  Lives With: Spouse  Type of Home: House  Home Layout: Two level,Laundry in basement  Home Access: Stairs to enter with rails  Entrance Stairs - Number of Steps: 5 STEPS  Entrance Stairs - Rails: Right  Bathroom Shower/Tub: Walk-in shower,Shower chair with back  Bathroom Toilet: Standard  Bathroom Equipment: Grab bars around toilet  ADL Assistance: [facility]: Independent  Homemaking Responsibilities: Yes  Ambulation Assistance: Independent  Transfer Assistance: Independent  Active : Yes  Occupation: Full time employment  Type of occupation:  3 days a week  Additional Comments: pt reports no falls in the last 6 month     Cognition   WFL    Objective  AROM RLE (degrees)  RLE AROM: WNL  AROM LLE (degrees)  LLE AROM : WNL  Strength RLE  Strength RLE: WFL  Comment: Grossly at least 3/5 as observed through functional mobility  Strength LLE  Strength LLE: WFL  Comment: Grossly at least 3/5 as observed through functional mobility        Sensation  Overall Sensation Status: WFL     Bed mobility  Supine to Sit: Supervision  Scooting: Supervision  Comment: HOB slightly elevated, use of rail     Transfers  Sit to Stand: Stand by assistance (from EOB and chair)  Stand to sit: Stand by assistance (to chair x2 trials)  Bed to Chair: Stand by assistance (via stand step without AD, therapist assisted with line management)     Ambulation  Ambulation?: Yes  Ambulation 1  Surface: level tile  Device: No Device  Other Apparatus: O2 (14L via HFNC)  Assistance: Stand by assistance  Quality of Gait: Decreased step length (B), increased medial/lateral sway  Distance: 24 ft  Comments: Pt with moderate RODGERS after ambulation trial, requires ~6 minutes seated rest break to feel ready for standing trial. See Act [place]ner for SpO2. Balance  Posture: Good  Sitting - Static: Good  Sitting - Dynamic: Good  Standing - Static: Fair;+  Standing - Dynamic: Fair;+  Comments: Pt sat EOB x5 minutes for room set up indep. Pt stood 4 + 2 minutes with supervision-SBA. Pt with moderate RODGERS standing for burshing teeth on 2nd trial, fatgiues faster. Other activity  See OT note for assist with ADLs. Plan   Plan  Times per week: 2-3x  Current Treatment Recommendations: Strengthening,Functional Mobility Training,Transfer Training,Balance Training,Endurance Training,Gait Training,Stair training,Patient/Caregiver Education & Training,Safety Education & Training,Equipment Evaluation, Education, & procurement,Home Exercise Program  Safety Devices  Type of devices:  All fall risk precautions in place,Left in chair,Call light within reach,Gait belt    AM-PAC Score  AM-PAC Inpatient Mobility Raw Score : 18 (01/13/22 1158)  AM-PAC Inpatient T-Scale Score : 43.63 (01/13/22 1158)  Mobility Inpatient CMS 0-100% Score: 46.58 (01/13/22 1158)  Mobility Inpatient CMS G-Code Modifier : CK (01/13/22 1158)          Goals  Short term goals  Time Frame for Short term goals: Before discharge  Short term goal 1: Pt will complete bed mobility indep  Short term goal 2: Pt will complete sit<>stand indep  Short term goal 3: Pt will ambulate 50 ft with RW and supervision  Short term goal 4: Pt will ascend/descend 5 steps with (R) rail with supervision  Short term goal 5: Pt will tolerate standing x5 minutes without UE support indep in order to complete ADLs  Patient Goals   Patient goals : Go home when I am ready, I don't want to do this again       Therapy Time   Individual Concurrent Group Co-treatment   Time In 1003         Time Out 1059         Minutes 56         Timed Code Treatment Minutes: [address], PT, DPT #[id number]

## 2022-01-13 NOTE — PROGRESS NOTES
Nursing reassessment. VSS. Sinus Neil 54. Denies pain. Remains on HFNC reduced to 14L and O2 sats 94%. RR  21. Fine crackles bases. Resting quietly. Respirations easy. SR up x3. Call light in reach.

## 2022-01-13 NOTE — PLAN OF CARE
Problem: FLUID AND ELECTROLYTE IMBALANCE  Goal: Fluid and electrolyte balance are achieved/maintained  1/13/2022 0005 by Radha Roberts RN  Outcome: Ongoing  1/13/2022 0005 by Radha Roberts RN  Outcome: Ongoing

## 2022-01-13 NOTE — PROGRESS NOTES
Clinical Pharmacy Note    Pharmacy consulted by Dr. Kiley Giraldo to start remdesivir and Actemra. Patient is COVID positive and currently on 15L O2. CRP on admission is 97.0. Patient qualifies for both. Orders have been placed.     Zander Moran, PharmD  1/12/2022 10:08 PM

## 2022-01-13 NOTE — PLAN OF CARE
Problem: Gas Exchange - Impaired  Goal: Promote optimal lung function  1/13/2022 1751 by Kira Santana RN  Outcome: Ongoing  1/13/2022 1014 by Kira Santana RN  Outcome: Ongoing

## 2022-01-13 NOTE — PLAN OF CARE
4 Eyes Skin Assessment     NAME:  Jorge Luis Monroe  YOB: 1950  MEDICAL RECORD NUMBER:  [id number]    The patient is being assess for  Admission    I agree that 2 RN's have performed a thorough Head to Toe Skin Assessment on the patient. ALL assessment sites listed below have been assessed. Areas assessed by both nurses:    Head, Face, Ears, Shoulders, Back, Chest, Arms, Elbows, Hands, Sacrum. Buttock, Coccyx, Ischium and Legs. Feet and Heels        Does the Patient have a Wound?  No noted wound(s)       Nelson Prevention initiated:  Yes   Wound Care Orders initiated:  No    Pressure Injury (Stage 3,4, Unstageable, DTI, NWPT, and Complex wounds) if present place consult order under [de-identified] No    New and Established Ostomies if present place consult order under : No      Nurse 1 eSignature: Electronically signed by Fany Roman RN on 1/12/22 at 10:25 PM EST    **SHARE this note so that the co-signing nurse is able to place an eSignature**    Nurse 2 eSignature: Electronically signed by Jennie Maher RN on 1/12/22 at 11:00 PM EST

## 2022-01-13 NOTE — PROGRESS NOTES
Progress Note - Dr. Skylar Badillo - Internal Medicine  [place] [address] [facility] Ismael Nogueira  [phone]    Hospital Day: 1  Code Status: Full Code  Current Diet: ADULT DIET; Regular        CC: follow up on medical issues    Subjective:   Leandra Garza is a 70 y.o. male. Pt seen and examined  Chart reviewed since last visit, labs and imaging below        Doing ok  Remains in covid icu on high flow 15L  On remdesivir, high dose dexamethasone    He denies chest pain, complains of shortness of breath, denies nausea,  denies emesis. 10 system Review of Systems is reviewed with patient, and pertinent positives are noted in HPI above . Otherwise, Review of systems is negative. I have reviewed the patient's medical and social history in detail and updated the computerized patient record. To recap: He  has a past medical history of Acid reflux, Acute MI ([facility] [address] CAD (coronary artery disease), COPD (chronic obstructive pulmonary disease) ([facility] [address] Diverticulitis, Hypertension, and Peripheral vascular disease ([address] . He  has a past surgical history that includes vascular surgery; Cardiac surgery; Colonoscopy; and Cardiac catheterization. Wendy Stephenson He  reports that he has been smoking cigarettes. He started smoking about 7 years ago. He has a 25.00 pack-year smoking history. He has never used smokeless tobacco. He reports current alcohol use of about 12.0 standard drinks of alcohol per week. He reports that he does not use drugs. .        Active Hospital Problems    Diagnosis Date Noted    HTN (hypertension) [I10] 08/24/2012     Priority: Medium    COVID [U07.1] 01/12/2022    Acute respiratory failure due to COVID-19 ([facility] [address] [U07.1, J96.00] 01/12/2022    High cholesterol [E78.00] 01/12/2022    Acute hypoxemic respiratory failure due to COVID-19 ([facility] [address] [U07.1, J96.01] 01/12/2022       Current Facility-Administered Medications: albuterol sulfate  (90 Base) MCG/ACT inhaler 2 puff, 2 puff, Inhalation, Q4H PRN  albuterol sulfate  (90 Base) MCG/ACT inhaler 2 puff, 2 puff, Inhalation, BID  ipratropium (ATROVENT HFA) 17 MCG/ACT inhaler 2 puff, 2 puff, Inhalation, BID  linaclotide (LINZESS) capsule 145 mcg, 145 mcg, Oral, Daily PRN  nitroGLYCERIN (NITROSTAT) SL tablet 0.4 mg, 0.4 mg, SubLINGual, Q5 Min PRN  isosorbide mononitrate (IMDUR) extended release tablet 30 mg, 30 mg, Oral, Daily  aspirin EC tablet 81 mg, 81 mg, Oral, Daily  carvedilol (COREG) tablet 12.5 mg, 12.5 mg, Oral, BID WC  amiodarone (CORDARONE) tablet 200 mg, 200 mg, Oral, Daily  sacubitril-valsartan (ENTRESTO) 24-26 MG per tablet 0.5 tablet, 0.5 tablet, Oral, BID  0.9 % sodium chloride infusion, , IntraVENous, Continuous  sodium chloride flush 0.9 % injection 5-40 mL, 5-40 mL, IntraVENous, 2 times per day  sodium chloride flush 0.9 % injection 10 mL, 10 mL, IntraVENous, PRN  0.9 % sodium chloride infusion, 25 mL, IntraVENous, PRN  enoxaparin (LOVENOX) injection 40 mg, 40 mg, SubCUTAneous, BID  ondansetron (ZOFRAN-ODT) disintegrating tablet 4 mg, 4 mg, Oral, Q8H PRN **OR** ondansetron (ZOFRAN) injection 4 mg, 4 mg, IntraVENous, Q6H PRN  magnesium hydroxide (MILK OF MAGNESIA) 400 MG/5ML suspension 30 mL, 30 mL, Oral, Daily PRN  acetaminophen (TYLENOL) tablet 650 mg, 650 mg, Oral, Q6H PRN **OR** acetaminophen (TYLENOL) suppository 650 mg, 650 mg, Rectal, Q6H PRN  cefTRIAXone (ROCEPHIN) 1000 mg in sterile water 10 mL IV syringe, 1,000 mg, IntraVENous, Q24H **AND** azithromycin (ZITHROMAX) tablet 500 mg, 500 mg, Oral, Q24H  hydrALAZINE (APRESOLINE) injection 10 mg, 10 mg, IntraVENous, Q6H PRN  potassium chloride (KLOR-CON M) extended release tablet 40 mEq, 40 mEq, Oral, PRN **OR** potassium bicarb-citric acid (EFFER-K) effervescent tablet 40 mEq, 40 mEq, Oral, PRN **OR** potassium chloride 10 mEq/100 mL IVPB (Peripheral Line), 10 mEq, IntraVENous, PRN  0.9 % sodium chloride bolus, 500 mL, IntraVENous, PRN  dexamethasone (DECADRON) 20 mg in sodium chloride 0.9 % IVPB, 20 mg, IntraVENous, Daily  [COMPLETED] remdesivir 200 mg in sodium chloride 0.9 % 250 mL IVPB, 200 mg, IntraVENous, Once **FOLLOWED BY** remdesivir 100 mg in sodium chloride 0.9 % 250 mL IVPB, 100 mg, IntraVENous, Q24H  0.9 % sodium chloride bolus, 30 mL, IntraVENous, PRN         Objective:  /68   Pulse 58   Temp 97.1 °F (36.2 °C) (Temporal)   Resp 22   Ht 5' 9\" (1.753 m)   Wt 204 lb 6.4 oz (92.7 kg)   SpO2 92%   BMI 30.18 kg/m²      Patient Vitals for the past 24 hrs:   BP Temp Temp src Pulse Resp SpO2 Height Weight   01/13/22 0730    58 22 92 %     01/13/22 0700      (!) 86 %     01/13/22 0600 131/68   54 19 92 %     01/13/22 0500 113/61   56 18 95 %     01/13/22 0400 (!) 132/52 97.1 °F (36.2 °C) Temporal 54 21 94 %  204 lb 6.4 oz (92.7 kg)   01/13/22 0317    57 21 91 %     01/13/22 0316    57 21 92 %     01/13/22 0300 (!) 161/83   58 21 92 %     01/13/22 0200 (!) 154/81   62 20 96 %     01/13/22 0100 (!) 154/81   71 23 97 %     01/13/22 0000 (!) 136/53 97 °F (36.1 °C) Temporal 60 18 97 %     01/12/22 2330    61       01/12/22 2327 (!) 152/74 97 °F (36.1 °C) Temporal 66 25 94 %     01/12/22 2300 132/60   53 20 91 %     01/12/22 2230 123/65   61 21 95 %     01/12/22 2215 123/63   62 19 92 %     01/12/22 2200 132/65   64 23 (!) 89 %     01/12/22 2145 (!) 157/68   66 20 93 %     01/12/22 2130 (!) 157/75 97.2 °F (36.2 °C) Temporal 66 25 94 %     01/12/22 2115    62 20 (!) 89 %     01/12/22 2015 137/80   56 22 97 %     01/12/22 1840 (!) 166/76   70 22 96 %     01/12/22 1825 (!) 147/80   70 20 98 %     01/12/22 1810 (!) 159/80   70 24 96 %     01/12/22 1755 (!) 162/80   70 29 90 %     01/12/22 1740 (!) 146/74   67 25 93 %     01/12/22 1725 (!) 148/78   72 21 99 %     01/12/22 1714     24 97 %     01/12/22 1710 (!) 139/118   69 28 98 %     01/12/22 1655 (!) 140/87   77 17 97 %     01/12/22 1640 127/77   75 26 (!) 84 %     01/12/22 1630      91 %     01/12/22 1625    73 26 93 %     01/12/22 1547 135/78 97.2 °F (36.2 °C) Temporal 78  (!) 85 % 5' 9\" (1.753 m) 209 lb (94.8 kg)     Patient Vitals for the past 96 hrs (Last 3 readings):   Weight   01/13/22 0400 204 lb 6.4 oz (92.7 kg)   01/12/22 1547 209 lb (94.8 kg)           Intake/Output Summary (Last 24 hours) at 1/13/2022 7798  Last data filed at 1/13/2022 7540  Gross per 24 hour   Intake 749 ml   Output 600 ml   Net 149 ml         Physical Exam:   Vitals as above  General appearance: alert, appears stated age and cooperative    Head: Normocephalic, without obvious abnormality, atraumatic    Lungs: crackles bilat  Heart: regular rate and rhythm, S1, S2 normal, no murmur    Abdomen: soft, non-tender; bowel sounds normal; no masses, no organomegaly    Extremities: extremities normal, atraumatic, no cyanosis, no edema      Labs:  Lab Results   Component Value Date    WBC 6.6 01/13/2022    HGB 10.3 (L) 01/13/2022    HCT 31.9 (L) 01/13/2022     01/13/2022    CHOL 180 11/28/2017    TRIG 88 11/28/2017    HDL 41 11/28/2017    ALT 13 01/13/2022    AST 16 01/13/2022     01/13/2022    K 5.3 (H) 01/13/2022     01/13/2022    CREATININE 1.1 01/13/2022    BUN 35 (H) 01/13/2022    CO2 17 (L) 01/13/2022    TSH 1.14 11/10/2019    INR 1.90 (H) 11/14/2019    LABA1C 6.2 11/28/2017    LABMICR YES 07/31/2016     Lab Results   Component Value Date    TROPONINI <0.01 01/12/2022       Recent Imaging Results are Reviewed:  XR CHEST PORTABLE    Result Date: 1/12/2022  EXAMINATION: ONE XRAY VIEW OF THE CHEST 1/12/2022 4:08 pm COMPARISON: 11/10/2019 HISTORY: ORDERING SYSTEM PROVIDED HISTORY: SOB TECHNOLOGIST PROVIDED HISTORY: Reason for exam:->SOB FINDINGS: Cardiomediastinal silhouette stable. Bilateral peripheral airspace opacities. No pneumothorax. No pleural effusion. Status post median sternotomy. Bilateral peripheral airspace opacities suggesting COVID pneumonia       Lab Results   Component Value Date    GLUCOSE 191 01/13/2022     Lab Results   Component Value Date    POCGLU 130 11/26/2019     /68   Pulse 58   Temp 97.1 °F (36.2 °C) (Temporal)   Resp 22   Ht 5' 9\" (1.753 m)   Wt 204 lb 6.4 oz (92.7 kg)   SpO2 92%   BMI 30.18 kg/m²     Assessment and Plan:  Principal Problem:    Acute respiratory failure due to COVID-19 [facility]) -Established problem. Stable. Still on high flow o2. Plan: cont for now. If decompensates, may need airvo. Is a full code. Cont iv decadron, iv remdesivir  Active Problems:    HTN (hypertension) -Established problem. Stable. 131/68  Plan: stay on same meds    COVID -Established problem. Stable. Plan: see plan above    High cholesterol -Established problem. Stable. Plan: cont statin    Acute [facility]emic respiratory [facility])      Total critical care time caring for this patient with life threatening illness, including direct patient contact, management of life support systems, review of data including imaging and labs, discussions with other team members and physicians at least 34 minutes today        Dr Woodalld All to cover starting this afternoon.     (Please note that portions of this note were completed with a voice recognition program.  Efforts were made to edit the dictations but occasionally words are mis-transcribed.)        Leanna Degroot MD  1/13/2022

## 2022-01-13 NOTE — PROGRESS NOTES
Confirmed home medications with wife. Per wife, patient gets up ambulates independentlly. Turns ad elizabeth in bed. Reviewed safety plan for patient to always call nurse before getting out of bed. Reviewed the call light system with him. Showed him the locations of the Nurse button on the call light system. Patient verbalized understanding. Per patient's wife, patient is a heavy smoker at least 1.5-2 ppd.  2 weeks ago, he was hospitalized and did not smoke and pt states he would like not to restart smoking. He is not currently drinking but that patient is alcoholic.

## 2022-01-13 NOTE — PLAN OF CARE
Problem: Gas Exchange - Impaired  Goal: Absence of hypoxia  1/13/2022 1014 by Theo Roland RN  Outcome: Ongoing  1/13/2022 0004 by Rick Holder RN  Outcome: Ongoing  1/13/2022 0004 by Rick Holder RN  Outcome: Ongoing  1/13/2022 0002 by Rick Holder RN  Outcome: Ongoing     Problem: Gas Exchange - Impaired  Goal: Promote optimal lung function  Outcome: Ongoing

## 2022-01-13 NOTE — PROGRESS NOTES
Aspiration Screen    Name: Rory Manning  : 1950  Medical Diagnosis: Acute respiratory failure with hypoxia ([facility] [address] [J96.01]  Acute hypoxemic respiratory failure due to COVID-19 (HCC) [U07.1, J96.01]  COVID [U07.1]    Swallow screen to rule out aspiration completed per pneumonia protocol. Patient demonstrates some mild increased risk indicators for potential aspiration per swallow screen at this time. No further swallowing intervention is warranted at this time. Continue current diet as tolerated. Please consult speech therapy for bedside swallow evaluation if symptoms of swallowing dysfunction arise.     Zaida Zuñiga M.A., [address] [place] [address]  Speech-Language Pathologist

## 2022-01-13 NOTE — ED NOTES
[facility]. Pt resting comfortably at this time with no questions/complaints. Pt on 15L High flow sat 97%. Pt updated on POC and has personal belongings and call light within reach. Dinner tray provided.            Patrick [facility]  01/12/22 9262

## 2022-01-13 NOTE — CARE COORDINATION
Discharge Planning Assessment  Readmission Risk Score - 14%   discharge planner met with patient to discuss reason for admission, current living situation, and potential needs at the time of discharge. Below is the information that patient provided to CAROL. Demographics: Yes    Current type of dwellin story home with 5 steps to get in. Patient from ECF/ confirmed with: N/A    Living arrangements: Home with Wife    Level of function/Support: Independent with ADL's    PCP: Dr. Mena Suarez    Last Visit to PCP: Last week    DME: Patient denied any use of DME's. Patient agreed to a home oxygen referral, if the need is determined upon discharge. Active with any community resources/agencies/skilled home care: Patient denied any community service involvement, at this time. Medication compliance issues: Patient denied any issues. Financial issues that could impact healthcare: Chart shows patient has [employer]. Other: Patient reported that he worked for [employer] 30 years as a  at [employer] prior to a leg surgery he had that caused him not to be able to continue. 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 Patient reported that he has been  to his wife for 34 years.       Tentative discharge plan:  Home with Wife    Transportation at the time of discharge: Wife      Reba Mcgill EDWINA MONTERO, [facility]    [phone]

## 2022-01-14 LAB
ANION GAP SERPL CALCULATED.3IONS-SCNC: 12 MMOL/L (ref 3–16)
BASOPHILS ABSOLUTE: 0 K/UL (ref 0–0.2)
BASOPHILS RELATIVE PERCENT: 0.1 %
BUN BLDV-MCNC: 36 MG/DL (ref 7–20)
C-REACTIVE PROTEIN: 76.3 MG/L (ref 0–5.1)
CALCIUM SERPL-MCNC: 8.6 MG/DL (ref 8.3–10.6)
CHLORIDE BLD-SCNC: 109 MMOL/L (ref 99–110)
CO2: 17 MMOL/L (ref 21–32)
CREAT SERPL-MCNC: 1 MG/DL (ref 0.8–1.3)
EOSINOPHILS ABSOLUTE: 0 K/UL (ref 0–0.6)
EOSINOPHILS RELATIVE PERCENT: 0 %
GFR AFRICAN AMERICAN: >60
GFR NON-AFRICAN AMERICAN: >60
GLUCOSE BLD-MCNC: 242 MG/DL (ref 70–99)
GLUCOSE BLD-MCNC: 264 MG/DL (ref 70–99)
GLUCOSE BLD-MCNC: 280 MG/DL (ref 70–99)
GLUCOSE BLD-MCNC: 404 MG/DL (ref 70–99)
HCT VFR BLD CALC: 31.4 % (ref 40.5–52.5)
HEMOGLOBIN: 10 G/DL (ref 13.5–17.5)
LV EF: 53 %
LVEF MODALITY: NORMAL
LYMPHOCYTES ABSOLUTE: 0.5 K/UL (ref 1–5.1)
LYMPHOCYTES RELATIVE PERCENT: 4.8 %
MCH RBC QN AUTO: 24 PG (ref 26–34)
MCHC RBC AUTO-ENTMCNC: 31.9 G/DL (ref 31–36)
MCV RBC AUTO: 75.1 FL (ref 80–100)
MONOCYTES ABSOLUTE: 0.4 K/UL (ref 0–1.3)
MONOCYTES RELATIVE PERCENT: 3.9 %
NEUTROPHILS ABSOLUTE: 8.7 K/UL (ref 1.7–7.7)
NEUTROPHILS RELATIVE PERCENT: 91.2 %
PDW BLD-RTO: 20.7 % (ref 12.4–15.4)
PERFORMED ON: ABNORMAL
PLATELET # BLD: 247 K/UL (ref 135–450)
PMV BLD AUTO: 8.5 FL (ref 5–10.5)
POTASSIUM SERPL-SCNC: 5.2 MMOL/L (ref 3.5–5.1)
RBC # BLD: 4.18 M/UL (ref 4.2–5.9)
SODIUM BLD-SCNC: 138 MMOL/L (ref 136–145)
WBC # BLD: 9.6 K/UL (ref 4–11)

## 2022-01-14 PROCEDURE — 93306 TTE W/DOPPLER COMPLETE: CPT

## 2022-01-14 PROCEDURE — 99223 1ST HOSP IP/OBS HIGH 75: CPT | Performed by: INTERNAL MEDICINE

## 2022-01-14 PROCEDURE — 80048 BASIC METABOLIC PNL TOTAL CA: CPT

## 2022-01-14 PROCEDURE — 2500000003 HC RX 250 WO HCPCS: Performed by: INTERNAL MEDICINE

## 2022-01-14 PROCEDURE — 6360000002 HC RX W HCPCS: Performed by: INTERNAL MEDICINE

## 2022-01-14 PROCEDURE — 36415 COLL VENOUS BLD VENIPUNCTURE: CPT

## 2022-01-14 PROCEDURE — 2700000000 HC OXYGEN THERAPY PER DAY

## 2022-01-14 PROCEDURE — 6370000000 HC RX 637 (ALT 250 FOR IP): Performed by: INTERNAL MEDICINE

## 2022-01-14 PROCEDURE — 2060000000 HC ICU INTERMEDIATE R&B

## 2022-01-14 PROCEDURE — 94761 N-INVAS EAR/PLS OXIMETRY MLT: CPT

## 2022-01-14 PROCEDURE — 85025 COMPLETE CBC W/AUTO DIFF WBC: CPT

## 2022-01-14 PROCEDURE — 2580000003 HC RX 258: Performed by: INTERNAL MEDICINE

## 2022-01-14 PROCEDURE — 94640 AIRWAY INHALATION TREATMENT: CPT

## 2022-01-14 PROCEDURE — 86140 C-REACTIVE PROTEIN: CPT

## 2022-01-14 RX ORDER — FUROSEMIDE 10 MG/ML
80 INJECTION INTRAMUSCULAR; INTRAVENOUS EVERY 8 HOURS
Status: DISCONTINUED | OUTPATIENT
Start: 2022-01-14 | End: 2022-01-15

## 2022-01-14 RX ORDER — DEXTROSE MONOHYDRATE 25 G/50ML
12.5 INJECTION, SOLUTION INTRAVENOUS PRN
Status: DISCONTINUED | OUTPATIENT
Start: 2022-01-14 | End: 2022-01-28 | Stop reason: HOSPADM

## 2022-01-14 RX ORDER — NICOTINE POLACRILEX 4 MG
15 LOZENGE BUCCAL PRN
Status: DISCONTINUED | OUTPATIENT
Start: 2022-01-14 | End: 2022-01-28 | Stop reason: HOSPADM

## 2022-01-14 RX ORDER — DEXTROSE MONOHYDRATE 50 MG/ML
100 INJECTION, SOLUTION INTRAVENOUS PRN
Status: DISCONTINUED | OUTPATIENT
Start: 2022-01-14 | End: 2022-01-28 | Stop reason: HOSPADM

## 2022-01-14 RX ORDER — INSULIN LISPRO 100 [IU]/ML
0-12 INJECTION, SOLUTION INTRAVENOUS; SUBCUTANEOUS EVERY 4 HOURS
Status: DISCONTINUED | OUTPATIENT
Start: 2022-01-14 | End: 2022-01-17 | Stop reason: DRUGHIGH

## 2022-01-14 RX ORDER — INSULIN LISPRO 100 [IU]/ML
0-12 INJECTION, SOLUTION INTRAVENOUS; SUBCUTANEOUS EVERY 4 HOURS
Status: DISCONTINUED | OUTPATIENT
Start: 2022-01-14 | End: 2022-01-14

## 2022-01-14 RX ADMIN — ENOXAPARIN SODIUM 40 MG: 40 INJECTION SUBCUTANEOUS at 20:39

## 2022-01-14 RX ADMIN — AMIODARONE HYDROCHLORIDE 200 MG: 200 TABLET ORAL at 10:01

## 2022-01-14 RX ADMIN — SACUBITRIL AND VALSARTAN 0.5 TABLET: 24; 26 TABLET, FILM COATED ORAL at 20:39

## 2022-01-14 RX ADMIN — FUROSEMIDE 80 MG: 10 INJECTION, SOLUTION INTRAMUSCULAR; INTRAVENOUS at 18:13

## 2022-01-14 RX ADMIN — ASPIRIN 81 MG: 81 TABLET, COATED ORAL at 10:01

## 2022-01-14 RX ADMIN — Medication 2 PUFF: at 21:04

## 2022-01-14 RX ADMIN — ISOSORBIDE MONONITRATE 30 MG: 30 TABLET, EXTENDED RELEASE ORAL at 10:01

## 2022-01-14 RX ADMIN — CARVEDILOL 12.5 MG: 6.25 TABLET, FILM COATED ORAL at 10:01

## 2022-01-14 RX ADMIN — Medication 10 ML: at 20:56

## 2022-01-14 RX ADMIN — INSULIN LISPRO 6 UNITS: 100 INJECTION, SOLUTION INTRAVENOUS; SUBCUTANEOUS at 12:00

## 2022-01-14 RX ADMIN — SACUBITRIL AND VALSARTAN 0.5 TABLET: 24; 26 TABLET, FILM COATED ORAL at 10:01

## 2022-01-14 RX ADMIN — INSULIN LISPRO 12 UNITS: 100 INJECTION, SOLUTION INTRAVENOUS; SUBCUTANEOUS at 20:51

## 2022-01-14 RX ADMIN — REMDESIVIR 100 MG: 100 INJECTION, POWDER, LYOPHILIZED, FOR SOLUTION INTRAVENOUS at 11:19

## 2022-01-14 RX ADMIN — Medication 2 PUFF: at 21:08

## 2022-01-14 RX ADMIN — ENOXAPARIN SODIUM 40 MG: 40 INJECTION SUBCUTANEOUS at 10:02

## 2022-01-14 RX ADMIN — DEXAMETHASONE SODIUM PHOSPHATE 20 MG: 4 INJECTION, SOLUTION INTRA-ARTICULAR; INTRALESIONAL; INTRAMUSCULAR; INTRAVENOUS; SOFT TISSUE at 10:19

## 2022-01-14 RX ADMIN — INSULIN LISPRO 6 UNITS: 100 INJECTION, SOLUTION INTRAVENOUS; SUBCUTANEOUS at 18:18

## 2022-01-14 RX ADMIN — Medication 2 PUFF: at 12:30

## 2022-01-14 RX ADMIN — ACETAMINOPHEN 650 MG: 325 TABLET ORAL at 20:39

## 2022-01-14 RX ADMIN — FUROSEMIDE 80 MG: 10 INJECTION, SOLUTION INTRAMUSCULAR; INTRAVENOUS at 10:02

## 2022-01-14 RX ADMIN — SODIUM CHLORIDE: 9 INJECTION, SOLUTION INTRAVENOUS at 18:17

## 2022-01-14 RX ADMIN — Medication 2 PUFF: at 12:31

## 2022-01-14 RX ADMIN — Medication 10 ML: at 10:02

## 2022-01-14 ASSESSMENT — PAIN SCALES - GENERAL
PAINLEVEL_OUTOF10: 0
PAINLEVEL_OUTOF10: 4
PAINLEVEL_OUTOF10: 6
PAINLEVEL_OUTOF10: 0

## 2022-01-14 ASSESSMENT — PAIN DESCRIPTION - PAIN TYPE: TYPE: ACUTE PAIN

## 2022-01-14 ASSESSMENT — PAIN DESCRIPTION - DESCRIPTORS: DESCRIPTORS: HEADACHE

## 2022-01-14 ASSESSMENT — PAIN DESCRIPTION - LOCATION: LOCATION: HEAD

## 2022-01-14 NOTE — PROGRESS NOTES
Hospitalist Progress Note      PCP: Rudolph Carr DO    Date of Admission: 1/12/2022    Chief Complaint: [address] Course: Admitted to the hospital with COVID-19 pneumonia transferred to the [facility] ICU    Subjective:   Currently tolerating high flow oxygen at 15 L      Medications:  Reviewed    Infusion Medications    dextrose      sodium chloride 75 mL/hr at 01/14/22 1817    sodium chloride       Scheduled Medications    furosemide  80 mg IntraVENous Q8H    insulin lispro  0-12 Units SubCUTAneous Q4H    albuterol sulfate HFA  2 puff Inhalation BID    ipratropium  2 puff Inhalation BID    isosorbide mononitrate  30 mg Oral Daily    aspirin  81 mg Oral Daily    carvedilol  12.5 mg Oral BID WC    amiodarone  200 mg Oral Daily    sacubitril-valsartan  0.5 tablet Oral BID    sodium chloride flush  5-40 mL IntraVENous 2 times per day    enoxaparin  40 mg SubCUTAneous BID    dexamethasone (DECADRON) IVPB  20 mg IntraVENous Daily    remdesivir IVPB  100 mg IntraVENous Q24H     PRN Meds: perflutren lipid microspheres, glucose, dextrose, glucagon (rDNA), dextrose, albuterol sulfate HFA, linaclotide, nitroGLYCERIN, sodium chloride flush, sodium chloride, ondansetron **OR** ondansetron, magnesium hydroxide, acetaminophen **OR** acetaminophen, hydrALAZINE, potassium chloride **OR** potassium alternative oral replacement **OR** potassium chloride, sodium chloride, sodium chloride      Intake/Output Summary (Last 24 hours) at 1/14/2022 1835  Last data filed at 1/14/2022 1305  Gross per 24 hour   Intake 2389.74 ml   Output 3685 ml   Net -1295.26 ml       Physical Exam Performed:    /69   Pulse 50   Temp 97.1 °F (36.2 °C) (Temporal)   Resp 19   Ht 5' 9\" (1.753 m)   Wt 203 lb 7.8 oz (92.3 kg)   SpO2 93%   BMI 30.05 kg/m²     General appearance: No apparent distress, appears stated age and cooperative. HEENT: Pupils equal, round, and reactive to light. Conjunctivae/corneas clear.   Neck: Supple, with full range of motion. No jugular venous distention. Trachea midline. Respiratory:  Normal respiratory effort. Clear to auscultation, bilaterally without Rales/Wheezes/Rhonchi. Cardiovascular: Regular rate and rhythm with normal S1/S2 without murmurs, rubs or gallops. Abdomen: Soft, non-tender, non-distended with normal bowel sounds. Musculoskeletal: No clubbing, cyanosis or edema bilaterally. Full range of motion without deformity. Skin: Skin color, texture, turgor normal.  No rashes or lesions. Neurologic:  Neurovascularly intact without any focal sensory/motor deficits. Cranial nerves: II-XII intact, grossly non-focal.  Psychiatric: Alert and oriented, thought content appropriate, normal insight  Capillary Refill: Brisk,3 seconds, normal   Peripheral Pulses: +2 palpable, equal bilaterally       Labs:   Recent Labs     01/12/22  1610 01/13/22  0525 01/14/22  0442   WBC 12.6* 6.6 9.6   HGB 11.5* 10.3* 10.0*   HCT 37.1* 31.9* 31.4*    240 247     Recent Labs     01/12/22  1610 01/13/22  0525 01/14/22  0442   * 137 138   K 4.9 5.3* 5.2*    105 109   CO2 18* 17* 17*   BUN 37* 35* 36*   CREATININE 1.2 1.1 1.0   CALCIUM 9.3 8.8 8.6     Recent Labs     01/12/22  1610 01/13/22  0525   AST 18 16   ALT 17 13   BILITOT 0.3 0.3   ALKPHOS 71 64     No results for input(s): INR in the last 72 hours.   Recent Labs     01/12/22  1610   TROPONINI <0.01       Urinalysis:      Lab Results   Component Value Date    NITRU POSITIVE 07/31/2016    WBCUA 287 07/31/2016    BACTERIA 4+ 07/31/2016    RBCUA 30 07/31/2016    BLOODU MODERATE 07/31/2016    SPECGRAV 1.028 07/31/2016    GLUCOSEU Negative 07/31/2016    GLUCOSEU NEGATIVE 01/30/2012       Radiology:  XR CHEST PORTABLE   Final Result   No significant change         XR CHEST PORTABLE   Final Result   Bilateral peripheral airspace opacities suggesting COVID pneumonia                 Assessment/Plan:    Active Hospital Problems    Diagnosis     HTN

## 2022-01-14 NOTE — CONSULTS
[address] [facility]  [phone]      Chief Complaint   Patient presents with    Shortness of Breath     Pt to triage with c/o sob x 2 weeks, states he was at [facility] and was told he had covid, states he has \"been progressively getting worse since being discharged and cant breathe. \"        Reason for consult:  Shortness of breath    History of Present Illness:  Edmond Ryan is a 70 y.o. patient who presented to the hospital with complaints of dyspnea. I have been asked to provide consultation regarding further management and testing. He is s/p CABG in 2014. Patient isn't able to tell me who is cardiologist is. Reviewing chart he has been followed by Dr. Antony Larkin who last saw him 7/12/2021 at which time he had a Carie Kindler that was negative. He has not had recent chest pain. Past several weeks he has had worsening RODGERS and orthopnea. He was admitted to [facility]. [facility] last week. Not sure what was done for him there. He states he had been taking outpatient diuretics but they were stopped by the hospitalist at [facility]. He denies diabetes. He is a former smoker. He had a Carie Kindler in 7/2021 that was normal.    Patient found to have COVID+ PCR despite having both vaccine doses. No fever or chills. CXR with severe pulmonary edema on my read. He was admitted here about 2 days ago and managed per COVID protocol with Remdesivir and decadron. He has not been receiving IV diuretics yet. He is currently on 15 LPM face mask support but is able to talk in full sentences without distress on this regimen. During my eval I turned him all the way down and sats maintained > 92%, however he dropped to 80% upon trying to sit up so I could listen to his lungs. He states he was recently told he was supposed to have a \"defibrillator. \"  After discussion it sounds more likely that someone was contemplating a cardioversion. He is currently in sinus rhythm and on oral amiodarone.     His LVEF in 2019 had plummeted to 15-20% but had normalized after medical therapy. He is currently on Entresto and carvedilol. Past Medical History:   has a past medical history of Acid reflux, Acute MI ([facility] [address] CAD (coronary artery disease), COPD (chronic obstructive pulmonary disease) ([facility] Diverticulitis, Hypertension, and Peripheral vascular disease ([facility] Surgical History:   has a past surgical history that includes vascular surgery; Cardiac surgery; Colonoscopy; and Cardiac catheterization. Social History:   reports that he has been smoking cigarettes. He started smoking about 7 years ago. He has a 25.00 pack-year smoking history. He has never used smokeless tobacco. He reports current alcohol use of about 12.0 standard drinks of alcohol per week. He reports that he does not use drugs. Family History:  No family history of premature coronary artery disease, aortic disease, or valve disease. Home Medications:  Were reviewed and are listed in nursing record. and/or listed below  Prior to Admission medications    Medication Sig Start Date End Date Taking?  Authorizing Provider   sacubitril-valsartan (ENTRESTO) 24-26 MG per tablet Take 0.5 tablets by mouth 2 times daily    Yes Historical Provider, MD   amiodarone (CORDARONE) 200 MG tablet TAKE 1 TABLET BY MOUTH DAILY 12/13/21  Yes Wolfgang Cockayne, MD   carvedilol (COREG) 12.5 MG tablet TAKE 1 TABLET BY MOUTH TWICE DAILY WITH MEALS 10/25/21  Yes GEORGE Ashford CNP   aspirin 81 MG EC tablet Take 81 mg by mouth daily   Yes Historical Provider, MD   isosorbide mononitrate (IMDUR) 30 MG extended release tablet TAKE 1 TABLET BY MOUTH EVERY DAY 4/24/21  Yes GEORGE Ashford CNP   pravastatin (PRAVACHOL) 80 MG tablet Take 1 tablet by mouth daily 9/30/20  Yes Wolfgang Cockayne, MD   linaclotide (LINZESS) 145 MCG capsule Take 145 mcg by mouth as needed    Yes Historical Provider, MD   nitroGLYCERIN (NITROSTAT) 0.4 MG SL tablet Place 1 tablet under the tongue every 5 minutes as needed for Chest pain 6/29/20   Rosemarie Barrett MD   fluticasone-salmeterol (ADVAIR DISKUS) 250-50 MCG/DOSE AEPB Inhale 1 puff into the lungs 2 times daily. 11/3/14   Sharla Cruz MD   albuterol (PROVENTIL HFA;VENTOLIN HFA) 108 (90 BASE) MCG/ACT inhaler Inhale 2 puffs into the lungs every 6 hours as needed for Wheezing.     Historical Provider, MD        Current Medications:  Current Facility-Administered Medications   Medication Dose Route Frequency Provider Last Rate Last Admin    perflutren lipid microspheres (DEFINITY) injection 1.65 mg  1.5 mL IntraVENous ONCE PRN Bre Farias MD        albuterol sulfate  (90 Base) MCG/ACT inhaler 2 puff  2 puff Inhalation Q4H PRN Tasha Dimas MD        albuterol sulfate  (90 Base) MCG/ACT inhaler 2 puff  2 puff Inhalation BID Tasha Dimas MD   2 puff at 01/13/22 2106    ipratropium (ATROVENT HFA) 17 MCG/ACT inhaler 2 puff  2 puff Inhalation BID Tasha Dimas MD   2 puff at 01/13/22 2106    linaclotide (LINZESS) capsule 145 mcg  145 mcg Oral Daily PRN Tasha Dimas MD        nitroGLYCERIN (NITROSTAT) SL tablet 0.4 mg  0.4 mg SubLINGual Q5 Min PRN Tasha Dimas MD        isosorbide mononitrate (IMDUR) extended release tablet 30 mg  30 mg Oral Daily Tasha Dimas MD   30 mg at 01/13/22 0910    aspirin EC tablet 81 mg  81 mg Oral Daily Tasha Dimas MD   81 mg at 01/13/22 0909    carvedilol (COREG) tablet 12.5 mg  12.5 mg Oral BID  Tasha Dimas MD   12.5 mg at 01/13/22 1820    amiodarone (CORDARONE) tablet 200 mg  200 mg Oral Daily Tasha Dimas MD   200 mg at 01/13/22 0908    sacubitril-valsartan (ENTRESTO) 24-26 MG per tablet 0.5 tablet  0.5 tablet Oral BID Tasha Dimas MD   0.5 tablet at 01/13/22 2128    0.9 % sodium chloride infusion   IntraVENous Continuous Tasha Dimas MD 75 mL/hr at 01/14/22 0553 Rate Verify at 01/14/22 0553    sodium chloride flush 0.9 % injection 5-40 mL  5-40 mL IntraVENous 2 times per day Elsi Mcdaniels MD   10 mL at 01/13/22 2146    sodium chloride flush 0.9 % injection 10 mL  10 mL IntraVENous PRN Elsi Mcdaniels MD   10 mL at 01/13/22 0905    0.9 % sodium chloride infusion  25 mL IntraVENous PRN Elsi Mcdaniels MD        enoxaparin (LOVENOX) injection 40 mg  40 mg SubCUTAneous BID Elsi Mcdaniels MD   40 mg at 01/13/22 2129    ondansetron (ZOFRAN-ODT) disintegrating tablet 4 mg  4 mg Oral Q8H PRN Elsi Mcdaniels MD        Or    ondansetron [facility]) injection 4 mg  4 mg IntraVENous Q6H PRN Elsi Mcdaniels MD        magnesium hydroxide (MILK OF MAGNESIA) 400 MG/5ML suspension 30 mL  30 mL Oral Daily PRN Elsi Mcdaniels MD        acetaminophen (TYLENOL) tablet 650 mg  650 mg Oral Q6H PRN Elsi Mcdaniels MD   650 mg at 01/13/22 2129    Or    acetaminophen (TYLENOL) suppository 650 mg  650 mg Rectal Q6H PRN Elsi Mcdaniels MD        hydrALAZINE (APRESOLINE) injection 10 mg  10 mg IntraVENous Q6H PRN Elsi Mcdaniels MD        potassium chloride (KLOR-CON M) extended release tablet 40 mEq  40 mEq Oral PRN Elsi Mcdaniels MD        Or    potassium bicarb-citric acid (EFFER-K) effervescent tablet 40 mEq  40 mEq Oral PRN Elsi Mcdaniels MD        Or    potassium chloride 10 mEq/100 mL IVPB (Peripheral Line)  10 mEq IntraVENous PRN Elsi Mcdaniels MD        0.9 % sodium chloride bolus  500 mL IntraVENous PRN Elsi Mcdaniels MD        dexamethasone (DECADRON) 20 mg in sodium chloride 0.9 % IVPB  20 mg IntraVENous Daily Elsi Mcdaniels MD   Stopped at 01/13/22 0955    remdesivir 100 mg in sodium chloride 0.9 % 250 mL IVPB  100 mg IntraVENous Q24H Elsi Mcdaniels MD   Stopped at 01/13/22 1350    0.9 % sodium chloride bolus  30 mL IntraVENous PRN Elsi Mcdaniels MD            Allergies:  Patient has no known allergies. Review of Systems:     · Constitutional: there has been no unanticipated weight loss.  There's been no change in energy level, sleep pattern, or activity level. · Eyes: No visual changes or diplopia. No scleral icterus. · ENT: No Headaches, hearing loss or vertigo. No mouth sores or sore throat. · Cardiovascular: Reviewed in HPI  · Respiratory: No cough or wheezing, no sputum production. No hematemesis. · Gastrointestinal: No abdominal pain, appetite loss, blood in stools. No change in bowel or bladder habits. · Genitourinary: No dysuria, trouble voiding, or hematuria. · Musculoskeletal:  No gait disturbance, weakness or joint complaints. · Integumentary: No rash or pruritis. · Neurological: No headache, diplopia, change in muscle strength, numbness or tingling. No change in gait, balance, coordination, mood, affect, memory, mentation, behavior. · Psychiatric: No anxiety, no depression. · Endocrine: No malaise, fatigue or temperature intolerance. No excessive thirst, fluid intake, or urination. No tremor. · Hematologic/Lymphatic: No abnormal bruising or bleeding, blood clots or swollen lymph nodes. · Allergic/Immunologic: No nasal congestion or hives. Physical Examination:    Vitals:    01/14/22 0401   BP: 136/71   Pulse: 57   Resp: 18   Temp: 97.3 °F (36.3 °C)   SpO2: 97%    Weight: 203 lb 7.8 oz (92.3 kg)       Body mass index is 30.05 kg/m². General Appearance:  Alert, cooperative, no distress, appears stated age. On full face mask.    Head:  Normocephalic, without obvious abnormality, atraumatic   Eyes:  PERRL, conjunctiva/corneas clear       Nose: Nares normal, no drainage or sinus tenderness   Throat: Lips, mucosa, and tongue normal   Neck: Supple, symmetrical, trachea midline, no adenopathy, thyroid: not enlarged, symmetric, no tenderness/mass/nodules, no carotid bruit or JVD       Lungs:   Diffuse crackles, respirations unlabored   Chest Wall:  No tenderness or deformity   Heart:  Regular rate and rhythm, S1, S2 normal, no murmur, rub or gallop   Abdomen:   Soft, non-tender, bowel sounds active all four quadrants,  no masses, no organomegaly           Extremities: Extremities normal, atraumatic, no cyanosis or edema   Pulses: 2+ and symmetric   Skin: Skin color, texture, turgor normal, no rashes or lesions   Psych: Normal mood and affect   Neurologic: Normal gross motor and sensory exam.         Labs  Lab Results   Component Value Date    PROBNP 1,192 01/12/2022    PROBNP 1,617 11/14/2019    PROBNP 3,217 11/11/2019       Lab Results   Component Value Date    CHOL 180 11/28/2017    TRIG 88 11/28/2017    HDL 41 11/28/2017    HDL 37 04/29/2010    LDLCALC 121 11/28/2017    LABVLDL 18 11/28/2017         CBC:   Lab Results   Component Value Date    WBC 9.6 01/14/2022    RBC 4.18 01/14/2022    HGB 10.0 01/14/2022    HCT 31.4 01/14/2022    MCV 75.1 01/14/2022    RDW 20.7 01/14/2022     01/14/2022     CMP:    Lab Results   Component Value Date     01/14/2022    K 5.2 01/14/2022    K 5.3 01/13/2022     01/14/2022    CO2 17 01/14/2022    BUN 36 01/14/2022    CREATININE 1.0 01/14/2022    GFRAA >60 01/14/2022    GFRAA >60 01/30/2012    AGRATIO 0.9 01/13/2022    LABGLOM >60 01/14/2022    GLUCOSE 242 01/14/2022    PROT 6.7 01/13/2022    CALCIUM 8.6 01/14/2022    BILITOT 0.3 01/13/2022    ALKPHOS 64 01/13/2022    AST 16 01/13/2022    ALT 13 01/13/2022     PT/INR:  No results found for: PTINR  Lab Results   Component Value Date    TROPONINI <0.01 01/12/2022       EKG:  I have reviewed EKG with the following interpretation:  Impression:  Poor data quality, interpretation may be adversely affected  Sinus rhythm with Premature atrial complexes with Aberrant conduction  Prolonged QT    Echo:  11/11/2019  LVEF 15-20% with severe diffuse hypkinesis    Stress:   7/2021 - LVEF 53%,      SPECT images demonstrate a small area of mildly decreased perfusion in the    mid inferior, mid-inferolateral, and basal inferior segments.  The defect is    present at rest and worsens with stress, consistent with mixed ischemia and  scar. There is associated wall motion abnormality.        The sum stress score is 8. No visual TID. Calculated TID is 1.09.        Left ventricular ejection fraction is normal at 53%.        Moderate risk scan given ischemia and mildly elevated LV volumes   Per Dr Saeed Dempsey this was a stable finding of moderate risk ischemia so he was allowed to proceed with his vascular surgery        Cath:   12/27/2016 by Dr Manuela Zimmermanr with occluded SVG to RCA,patent LIMA to LAD,patent SVG to diag,OM1 with good retrograde filling of LAD,patent LIMA to LAD  EF 35-40% with inf hypokinesis,LVEDP 25    MRI/EP/Other: DCCV 11/13/2019 - cardioverted to NSR with ERAF and then loaded with amiodarone    Old notes reviewed  Telemetry reviewd  Ekg personally reviewed  Chest xray personally reviewed  Echo, cath, and   Medications and labs reviewed    High complexity/medical decision making due to extensive data review, extensive history review, independent review of data    High risk due to acute illness, evaluation of drug-drug interactions, medication management and diagnostic interventions    Assessment  Patient Active Problem List   Diagnosis    CAD (coronary artery disease)    HTN (hypertension)    Unstable angina ([address]    PVD (peripheral vascular disease) ([address]    COPD (chronic obstructive pulmonary disease) ([address]    Dyspnea    Chest pain    Acute on chronic congestive heart failure ([address]    Atrial fibrillation with RVR ([address]    Anemia    Microcytic anemia    Anticoagulated on Coumadin    Hypotension due to hypovolemia    COVID    Acute respiratory failure due to COVID-19 ([facility]    High cholesterol    Acute hypoxemic respiratory failure due to COVID-19 [facility])           Assessment/Plan:  1. Acute on chronic combined systolic/diastolic heart failure  2. Coronary artery disease  3. Abnormal lexiscan  4.  COVID 19 positive    Overall it appears to me that his CHF is the predominant problem  Formal echo read pending, however LVEF is > 50% so doubtful that he has COVID-myocarditis but rather progression of underlying ischemic cardiomyopathy with volume overload. Plan  1. Start lasix 40 mg IV q8 hours  2. When able to lay flat with just nasal cannual oxygen recommend a heart cath to evaluate his CABG grafts in more detail  3. Continue Entresto, will uptitrate as long as patient's BP/HR tolerate. I will address the patient's cardiac risk factors and adjusted pharmacologic treatment as needed. In addition, I have reinforced the need for patient directed risk factor modification. Tobacco use was discussed with the patient and educated on the negative effects. I have asked the patient to not utilize these agents. Thank you for allowing to us to participate in the care or Leandra Javonfunmilayo Guy. Further evaluation will be based upon the patient's clinical course and testing results. All questions and concerns were addressed to the patient/family. Alternatives to my treatment were discussed. The note was completed using EMR. Every effort was made to ensure accuracy; however, inadvertent computerized transcription errors may be present.       Vale Kasper MD, MD 1/14/2022 8:41 AM

## 2022-01-14 NOTE — CARE COORDINATION
Chart reviewed for discharge planning. Barrier(s) to discharge-      Oxygen - Patient still on 15L HFNC      Other - Patient may need referred for home OT and PT, as well as home oxygen upon discharge. Tentative discharge plan- Patient reported on 1/13 that he will discharge home with his Wife. Tentative discharge date- Unknown    *Case management will continue to follow progress and update discharge plan as needed.     Teresa MONTERO, EDWINA, [facility]    [phone]

## 2022-01-14 NOTE — PLAN OF CARE
Problem: Airway Clearance - Ineffective  Goal: Achieve or maintain patent airway  Outcome: Ongoing     Problem: Gas Exchange - Impaired  Goal: Absence of hypoxia  Outcome: Ongoing     Problem: Breathing Pattern - Ineffective  Goal: Ability to achieve and maintain a regular respiratory rate  Outcome: Ongoing     Problem:  Body Temperature -  Risk of, Imbalanced  Goal: Complications related to the disease process, condition or treatment will be avoided or minimized  Outcome: Ongoing     Problem: Isolation Precautions - Risk of Spread of Infection  Goal: Prevent transmission of infection  Outcome: Ongoing     Problem: Nutrition Deficits  Goal: Optimize nutritional status  Outcome: Ongoing     Problem: Risk for Fluid Volume Deficit  Goal: Maintain normal serum potassium, sodium, calcium, phosphorus, and pH  Outcome: Ongoing     Problem: Loneliness or Risk for Loneliness  Goal: Demonstrate positive use of time alone when socialization is not possible  Outcome: Ongoing     Problem: Fatigue  Goal: Verbalize increase energy and improved vitality  Outcome: Ongoing     Problem: Patient Education: Go to Patient Education Activity  Goal: Patient/Family Education  Outcome: Ongoing     Problem: FLUID AND ELECTROLYTE IMBALANCE  Goal: Fluid and electrolyte balance are achieved/maintained  Outcome: Ongoing     Problem: Pain:  Goal: Pain level will decrease  Description: Pain level will decrease  Outcome: Ongoing

## 2022-01-15 LAB
GLUCOSE BLD-MCNC: 170 MG/DL (ref 70–99)
GLUCOSE BLD-MCNC: 219 MG/DL (ref 70–99)
GLUCOSE BLD-MCNC: 266 MG/DL (ref 70–99)
GLUCOSE BLD-MCNC: 287 MG/DL (ref 70–99)
GLUCOSE BLD-MCNC: 311 MG/DL (ref 70–99)
GLUCOSE BLD-MCNC: 328 MG/DL (ref 70–99)
PERFORMED ON: ABNORMAL

## 2022-01-15 PROCEDURE — 99233 SBSQ HOSP IP/OBS HIGH 50: CPT | Performed by: NURSE PRACTITIONER

## 2022-01-15 PROCEDURE — 94761 N-INVAS EAR/PLS OXIMETRY MLT: CPT

## 2022-01-15 PROCEDURE — 2500000003 HC RX 250 WO HCPCS: Performed by: INTERNAL MEDICINE

## 2022-01-15 PROCEDURE — 2580000003 HC RX 258: Performed by: INTERNAL MEDICINE

## 2022-01-15 PROCEDURE — 2060000000 HC ICU INTERMEDIATE R&B

## 2022-01-15 PROCEDURE — 2700000000 HC OXYGEN THERAPY PER DAY

## 2022-01-15 PROCEDURE — 6360000002 HC RX W HCPCS: Performed by: INTERNAL MEDICINE

## 2022-01-15 PROCEDURE — 94640 AIRWAY INHALATION TREATMENT: CPT

## 2022-01-15 PROCEDURE — 6370000000 HC RX 637 (ALT 250 FOR IP): Performed by: INTERNAL MEDICINE

## 2022-01-15 PROCEDURE — 6360000002 HC RX W HCPCS: Performed by: HOSPITALIST

## 2022-01-15 RX ORDER — FUROSEMIDE 10 MG/ML
40 INJECTION INTRAMUSCULAR; INTRAVENOUS EVERY 8 HOURS
Status: DISCONTINUED | OUTPATIENT
Start: 2022-01-15 | End: 2022-01-22

## 2022-01-15 RX ADMIN — SACUBITRIL AND VALSARTAN 0.5 TABLET: 24; 26 TABLET, FILM COATED ORAL at 10:07

## 2022-01-15 RX ADMIN — ISOSORBIDE MONONITRATE 30 MG: 30 TABLET, EXTENDED RELEASE ORAL at 10:07

## 2022-01-15 RX ADMIN — SODIUM CHLORIDE, PRESERVATIVE FREE 10 ML: 5 INJECTION INTRAVENOUS at 18:29

## 2022-01-15 RX ADMIN — REMDESIVIR 100 MG: 100 INJECTION, POWDER, LYOPHILIZED, FOR SOLUTION INTRAVENOUS at 12:27

## 2022-01-15 RX ADMIN — Medication 2 PUFF: at 20:20

## 2022-01-15 RX ADMIN — Medication 2 PUFF: at 12:47

## 2022-01-15 RX ADMIN — DEXAMETHASONE SODIUM PHOSPHATE 20 MG: 4 INJECTION, SOLUTION INTRA-ARTICULAR; INTRALESIONAL; INTRAMUSCULAR; INTRAVENOUS; SOFT TISSUE at 10:24

## 2022-01-15 RX ADMIN — AMIODARONE HYDROCHLORIDE 200 MG: 200 TABLET ORAL at 10:07

## 2022-01-15 RX ADMIN — INSULIN LISPRO 4 UNITS: 100 INJECTION, SOLUTION INTRAVENOUS; SUBCUTANEOUS at 04:42

## 2022-01-15 RX ADMIN — FUROSEMIDE 40 MG: 10 INJECTION, SOLUTION INTRAMUSCULAR; INTRAVENOUS at 10:16

## 2022-01-15 RX ADMIN — ASPIRIN 81 MG: 81 TABLET, COATED ORAL at 10:07

## 2022-01-15 RX ADMIN — INSULIN LISPRO 2 UNITS: 100 INJECTION, SOLUTION INTRAVENOUS; SUBCUTANEOUS at 10:14

## 2022-01-15 RX ADMIN — FUROSEMIDE 40 MG: 10 INJECTION, SOLUTION INTRAMUSCULAR; INTRAVENOUS at 01:28

## 2022-01-15 RX ADMIN — INSULIN LISPRO 6 UNITS: 100 INJECTION, SOLUTION INTRAVENOUS; SUBCUTANEOUS at 22:20

## 2022-01-15 RX ADMIN — INSULIN LISPRO 6 UNITS: 100 INJECTION, SOLUTION INTRAVENOUS; SUBCUTANEOUS at 12:32

## 2022-01-15 RX ADMIN — ENOXAPARIN SODIUM 40 MG: 40 INJECTION SUBCUTANEOUS at 10:06

## 2022-01-15 RX ADMIN — FUROSEMIDE 40 MG: 10 INJECTION, SOLUTION INTRAMUSCULAR; INTRAVENOUS at 18:30

## 2022-01-15 RX ADMIN — ENOXAPARIN SODIUM 40 MG: 40 INJECTION SUBCUTANEOUS at 20:55

## 2022-01-15 RX ADMIN — Medication 10 ML: at 20:50

## 2022-01-15 RX ADMIN — Medication 10 ML: at 10:16

## 2022-01-15 RX ADMIN — INSULIN LISPRO 8 UNITS: 100 INJECTION, SOLUTION INTRAVENOUS; SUBCUTANEOUS at 18:30

## 2022-01-15 RX ADMIN — INSULIN LISPRO 8 UNITS: 100 INJECTION, SOLUTION INTRAVENOUS; SUBCUTANEOUS at 00:13

## 2022-01-15 RX ADMIN — SACUBITRIL AND VALSARTAN 0.5 TABLET: 24; 26 TABLET, FILM COATED ORAL at 20:55

## 2022-01-15 ASSESSMENT — PAIN SCALES - GENERAL
PAINLEVEL_OUTOF10: 0

## 2022-01-15 NOTE — PLAN OF CARE
Problem: Airway Clearance - Ineffective  Goal: Achieve or maintain patent airway  Outcome: Ongoing     Problem: Gas Exchange - Impaired  Goal: Absence of hypoxia  Outcome: Ongoing  Goal: Promote optimal lung function  Outcome: Ongoing     Problem: Breathing Pattern - Ineffective  Goal: Ability to achieve and maintain a regular respiratory rate  Outcome: Ongoing     Problem:  Body Temperature -  Risk of, Imbalanced  Goal: Ability to maintain a body temperature within defined limits  Outcome: Met This Shift  Goal: Complications related to the disease process, condition or treatment will be avoided or minimized  Outcome: Met This Shift     Problem: Risk for Fluid Volume Deficit  Goal: Maintain normal heart rhythm  Outcome: Ongoing  Goal: Maintain absence of muscle cramping  Outcome: Ongoing  Goal: Maintain normal serum potassium, sodium, calcium, phosphorus, and pH  Outcome: Ongoing     Problem: Fatigue  Goal: Verbalize increase energy and improved vitality  Outcome: Met This Shift     Problem: Patient Education: Go to Patient Education Activity  Goal: Patient/Family Education  Outcome: Ongoing

## 2022-01-15 NOTE — PROGRESS NOTES
[facility]  HEART FAILURE  Progress Note      Admit Date 1/12/2022     Reason for Consult:      Reason for Consultation/Chief Complaint: SOB    HPI:    Reena Mulligan is a 70 y.o. male with PMH CAD, CABG 2014, ICM, HFrEF with recovered EF, COPD, HTN, PVD, AF, DCCV in 2019  admitted with SOB, COVID+      Subjective:  Patient is being seen for SOB. There were no acute overnight cardiac events. Today Mr. Eileen Arias denies chest pain or palpitations, still with SOB but is feeling improvement.        Baseline Weight:    Wt Readings from Last 3 Encounters:   01/15/22 199 lb 1.6 oz (90.3 kg)   07/12/21 212 lb (96.2 kg)   06/29/20 201 lb (91.2 kg)          NYHA Class III  Objective:   /66   Pulse (!) 46   Temp 98 °F (36.7 °C) (Temporal)   Resp 17   Ht 5' 9\" (1.753 m)   Wt 199 lb 1.6 oz (90.3 kg)   SpO2 94%   BMI 29.40 kg/m²       Intake/Output Summary (Last 24 hours) at 1/15/2022 0940  Last data filed at 1/15/2022 0935  Gross per 24 hour   Intake 455 ml   Output 5675 ml   Net -5220 ml      Wt Readings from Last 3 Encounters:   01/15/22 199 lb 1.6 oz (90.3 kg)   07/12/21 212 lb (96.2 kg)   06/29/20 201 lb (91.2 kg)      In: 455 [P.O.:455]  Out: 5675     TELEMETRY: SB    Physical Exam:  General Appearance:  Non-obese/Well Nourished  Respiratory:  · Resp Auscultation: rales bilaterally  Cardiovascular:  · Auscultation: carmen,normal S1S2, no m/g/r/c  · Palpation: Normal    · Pedal Pulses: 2+ and equal   Abdomen:  · Soft, NT, ND, + bs  Extremities:  · No Cyanosis or Clubbing  · Extremities: negative  Neurological/Psychiatric:  · Oriented to time, place, and person  · Non-anxious    MEDICATIONS:   Scheduled Meds:   Scheduled Meds:   furosemide  40 mg IntraVENous Q8H    insulin lispro  0-12 Units SubCUTAneous Q4H    albuterol sulfate HFA  2 puff Inhalation BID    ipratropium  2 puff Inhalation BID    isosorbide mononitrate  30 mg Oral Daily    aspirin  81 mg Oral Daily    carvedilol  12.5 mg Oral BID WC    amiodarone  200 mg Oral Daily    sacubitril-valsartan  0.5 tablet Oral BID    sodium chloride flush  5-40 mL IntraVENous 2 times per day    enoxaparin  40 mg SubCUTAneous BID    dexamethasone (DECADRON) IVPB  20 mg IntraVENous Daily    remdesivir IVPB  100 mg IntraVENous Q24H     Continuous Infusions:   dextrose      sodium chloride       PRN Meds:.perflutren lipid microspheres, glucose, dextrose, glucagon (rDNA), dextrose, albuterol sulfate HFA, linaclotide, nitroGLYCERIN, sodium chloride flush, sodium chloride, ondansetron **OR** ondansetron, magnesium hydroxide, acetaminophen **OR** acetaminophen, hydrALAZINE, potassium chloride **OR** potassium alternative oral replacement **OR** potassium chloride, sodium chloride, sodium chloride  Continuous Infusions:   dextrose      sodium chloride         Intake/Output Summary (Last 24 hours) at 1/15/2022 0940  Last data filed at 1/15/2022 0935  Gross per 24 hour   Intake 455 ml   Output 5675 ml   Net -5220 ml       Lab Data:  CBC:   Lab Results   Component Value Date    WBC 9.6 01/14/2022    HGB 10.0 01/14/2022     01/14/2022     BMP:  Lab Results   Component Value Date     01/14/2022    K 5.2 01/14/2022    K 5.3 01/13/2022     01/14/2022    CO2 17 01/14/2022    BUN 36 01/14/2022    CREATININE 1.0 01/14/2022    GLUCOSE 242 01/14/2022     INR:   Lab Results   Component Value Date    INR 1.90 11/14/2019    INR 2.58 11/13/2019    INR 3.12 11/12/2019        CARDIAC LABS  ENZYMES:  Recent Labs     01/12/22  1610   TROPONINI <0.01     FASTING LIPID PANEL:  Lab Results   Component Value Date    HDL 41 11/28/2017    HDL 37 04/29/2010    LDLCALC 121 11/28/2017    TRIG 88 11/28/2017    TSH 1.14 11/10/2019     LIVER PROFILE:  Lab Results   Component Value Date    AST 16 01/13/2022    AST 18 01/12/2022    ALT 13 01/13/2022    ALT 17 01/12/2022     BNP:   Lab Results   Component Value Date    PROBNP 1,192 01/12/2022    PROBNP 1,617 11/14/2019 PROBNP 3,217 11/11/2019     Iron Studies:    Lab Results   Component Value Date    FERRITIN 43.3 11/11/2019     Lab Results   Component Value Date    IRON 25 (L) 11/11/2019    TIBC 310 11/11/2019    FERRITIN 43.3 11/11/2019          1. WEIGHT: Admit Weight: 209 lb (94.8 kg)      Today  Weight: 199 lb 1.6 oz (90.3 kg)   2. I/O     Intake/Output Summary (Last 24 hours) at 1/15/2022 0940  Last data filed at 1/15/2022 0935  Gross per 24 hour   Intake 455 ml   Output 5675 ml   Net -5220 ml       Cardiac Testing: Echo 1/14/22:   Technically difficult examination. Left ventricular systolic function is low normal with ejection fraction   estimated at 50-55%. No definitive regional wall motion abnormalities are noted. Diastolic filling parameters suggests grade II diastolic dysfunction. Mild mitral regurgitation. The left atrium is mildly dilated. Aortic valve appears sclerotic but opens adequately. Mild aortic regurgitation is present. Inadequate tricuspid regurgitation to estimate systolic pulmonary artery   pressure. Echo:  11/11/2019  LVEF 15-20% with severe diffuse hypkinesis     Stress:   7/2021 - LVEF 53%,       SPECT images demonstrate a small area of mildly decreased perfusion in the    mid inferior, mid-inferolateral, and basal inferior segments. The defect is    present at rest and worsens with stress, consistent with mixed ischemia and    scar. There is associated wall motion abnormality.        The sum stress score is 8. No visual TID. Calculated TID is 1.09.        Left ventricular ejection fraction is normal at 53%.        Moderate risk scan given ischemia and mildly elevated LV volumes       [facility] 12/27/2016      Cath with occluded SVG to RCA,patent LIMA to LAD,patent SVG to diag,OM1 with good retrograde filling of LAD,patent LIMA to LAD  EF 35-40% with inf hypokinesis,LVEDP 25        Assessment/Plan:     1. AHF- 47oo out on IV lasix, continue diuresis  2.  CAD- plan for [facility] when more

## 2022-01-15 NOTE — PROGRESS NOTES
Perfect served Hospitalist on-call. 1/14/22 8:35 PM   [facility] or Facility: [facility] From: Preston Reyes RE: Justin Burdick 1950 RM: [id number] I have seen this is scheduled furosemide 80mg q8hrs for CHF. He is also getting Cont 0.9%NS @ 75ml/hr. I will discontinue the IV fluids.  Need Callback: NO CALLBACK RE ICU ROUTINE  Read 8:35 PM

## 2022-01-15 NOTE — PLAN OF CARE
1/15/22 1:11 AM Perfect served Hospitalist on-call  [phone] Hospital or Facility: [facility] From: Marj Robles RE: Sumi Nails 1950 I have seen cardiologist recommended lasix 40 mg IV q8 hours (per cardiology note Dr Lydia Mixon). Instead scheduled 80 mg IV q8 hours. Can we change the dosage.  Need Callback: NO CALLBACK REQ ICU ROUTINE  Read 1:12 AM

## 2022-01-15 NOTE — PROGRESS NOTES
Hospitalist Progress Note      PCP: Lieutenant Elida DO    Date of Admission: 1/12/2022    Chief Complaint: [address] Course: Admitted to the hospital with COVID-19 pneumonia transferred to the [facility] ICU    Subjective:   Currently tolerating high flow oxygen at 15 L  He dropped to 87 on 13 liters. Medications:  Reviewed    Infusion Medications    dextrose      sodium chloride Stopped (01/15/22 1220)     Scheduled Medications    furosemide  40 mg IntraVENous Q8H    insulin lispro  0-12 Units SubCUTAneous Q4H    albuterol sulfate HFA  2 puff Inhalation BID    ipratropium  2 puff Inhalation BID    isosorbide mononitrate  30 mg Oral Daily    aspirin  81 mg Oral Daily    carvedilol  12.5 mg Oral BID WC    amiodarone  200 mg Oral Daily    sacubitril-valsartan  0.5 tablet Oral BID    sodium chloride flush  5-40 mL IntraVENous 2 times per day    enoxaparin  40 mg SubCUTAneous BID    dexamethasone (DECADRON) IVPB  20 mg IntraVENous Daily    remdesivir IVPB  100 mg IntraVENous Q24H     PRN Meds: perflutren lipid microspheres, glucose, dextrose, glucagon (rDNA), dextrose, albuterol sulfate HFA, linaclotide, nitroGLYCERIN, sodium chloride flush, sodium chloride, ondansetron **OR** ondansetron, magnesium hydroxide, acetaminophen **OR** acetaminophen, hydrALAZINE, potassium chloride **OR** potassium alternative oral replacement **OR** potassium chloride, sodium chloride, sodium chloride      Intake/Output Summary (Last 24 hours) at 1/15/2022 1437  Last data filed at 1/15/2022 1350  Gross per 24 hour   Intake 1622 ml   Output 4575 ml   Net -2953 ml       Physical Exam Performed:    /71   Pulse 55   Temp 96.1 °F (35.6 °C) (Temporal)   Resp 20   Ht 5' 9\" (1.753 m)   Wt 199 lb 1.6 oz (90.3 kg)   SpO2 93%   BMI 29.40 kg/m²     General appearance: No apparent distress, appears stated age and cooperative. HEENT: Pupils equal, round, and reactive to light. Conjunctivae/corneas clear.   Neck: Supple, with full range of motion. No jugular venous distention. Trachea midline. Respiratory:  Normal respiratory effort. Clear to auscultation, bilaterally without Rales/Wheezes/Rhonchi. Cardiovascular: Regular rate and rhythm with normal S1/S2 without murmurs, rubs or gallops. Abdomen: Soft, non-tender, non-distended with normal bowel sounds. Musculoskeletal: No clubbing, cyanosis or edema bilaterally. Full range of motion without deformity. Skin: Skin color, texture, turgor normal.  No rashes or lesions. Neurologic:  Neurovascularly intact without any focal sensory/motor deficits. Cranial nerves: II-XII intact, grossly non-focal.  Psychiatric: Alert and oriented, thought content appropriate, normal insight  Capillary Refill: Brisk,3 seconds, normal   Peripheral Pulses: +2 palpable, equal bilaterally       Labs:   Recent Labs     01/12/22  1610 01/13/22  0525 01/14/22  0442   WBC 12.6* 6.6 9.6   HGB 11.5* 10.3* 10.0*   HCT 37.1* 31.9* 31.4*    240 247     Recent Labs     01/12/22  1610 01/13/22  0525 01/14/22  0442   * 137 138   K 4.9 5.3* 5.2*    105 109   CO2 18* 17* 17*   BUN 37* 35* 36*   CREATININE 1.2 1.1 1.0   CALCIUM 9.3 8.8 8.6     Recent Labs     01/12/22  1610 01/13/22  0525   AST 18 16   ALT 17 13   BILITOT 0.3 0.3   ALKPHOS 71 64     No results for input(s): INR in the last 72 hours.   Recent Labs     01/12/22  1610   TROPONINI <0.01       Urinalysis:      Lab Results   Component Value Date    NITRU POSITIVE 07/31/2016    WBCUA 287 07/31/2016    BACTERIA 4+ 07/31/2016    RBCUA 30 07/31/2016    BLOODU MODERATE 07/31/2016    SPECGRAV 1.028 07/31/2016    GLUCOSEU Negative 07/31/2016    GLUCOSEU NEGATIVE 01/30/2012       Radiology:  XR CHEST PORTABLE   Final Result   No significant change         XR CHEST PORTABLE   Final Result   Bilateral peripheral airspace opacities suggesting COVID pneumonia                 Assessment/Plan:    Active Hospital Problems    Diagnosis     HTN (hypertension) [I10]      Priority: Medium    COVID [U07.1]     Acute respiratory failure due to COVID-19 (HCC) [U07.1, J96.00]     High cholesterol [E78.00]     Acute hypoxemic respiratory failure due to COVID-19 (HCC) [U07.1, J96.01]    Acute respiratory failure due to COVID-19 [facility]) -Established problem. Stable. Still on high flow o2. cont for now. If decompensates, may need airvo. Is a full code. Cont iv decadron, iv remdesivir      HTN (hypertension) -Established problem. Stable. 131/68   stay on same meds    COVID  Stable. see plan above    High cholesterol -Established problem. Stable. cont statin    Acute hypoxemic respiratory failure due to COVID-19 [facility])      DVT Prophylaxis: lovenox  Diet: ADULT DIET; Regular  Code Status: Full Code    PT/OT Eval Status: eval and treat    Dispo -he remains critically ill at this time but is tolerating high flow nasal cannula we will continue to monitor closely if he continues to progress he may end up on Airvo or possibly intubated.     Erby Hodgkin, MD

## 2022-01-15 NOTE — PROGRESS NOTES
Blood pressure 105/71, pulse 53, temperature 96.9 °F (36.1 °C), temperature source Temporal, resp. rate 18, height 5' 9\" (1.753 m), weight 199 lb 1.6 oz (90.3 kg), SpO2 90 %. 15 L High flow o2 cont, placed NRB 15 L indep/intermittently as needed. Spo2 down to mid 80's on 13 L high flow only, increased at this time. perfect serve message notification to hospitalist  R/t carmen allen, response AM Carvedilol held per hospitalist.    A/O x4, call light within reach.

## 2022-01-15 NOTE — RT PROTOCOL NOTE
RT Inhaler-Nebulizer Bronchodilator Protocol Note    There is a bronchodilator order in the chart from a provider indicating to follow the RT Bronchodilator Protocol and there is an Initiate RT Inhaler-Nebulizer Bronchodilator Protocol order as well (see protocol at bottom of note). CXR Findings:  No results found. The findings from the last RT Protocol Assessment were as follows:   History Pulmonary Disease: Chronic pulmonary disease  Respiratory Pattern: Dyspnea on exertion or RR 21-25 bpm  Breath Sounds: Slightly diminished and/or crackles  Cough: Strong, spontaneous, non-productive  Indication for Bronchodilator Therapy: On home bronchodilators  Bronchodilator Assessment Score: 6    Aerosolized bronchodilator medication orders have been revised according to the RT Inhaler-Nebulizer Bronchodilator Protocol below. Respiratory Therapist to perform RT Therapy Protocol Assessment initially then follow the protocol. Repeat RT Therapy Protocol Assessment PRN for score 0-3 or on second treatment, BID, and PRN for scores above 3. No Indications  adjust the frequency to every 6 hours PRN wheezing or bronchospasm, if no treatments needed after 48 hours then discontinue using Per Protocol order mode. If indication present, adjust the RT bronchodilator orders based on the Bronchodilator Assessment Score as indicated below. Use Inhaler orders unless patient has one or more of the following: on home nebulizer, not able to hold breath for 10 seconds, is not alert and oriented, cannot activate and use MDI correctly, or respiratory rate 25 breaths per minute or more, then use the equivalent nebulizer order(s) with same Frequency and PRN reasons based on the score. If a patient is on this medication at home then do not decrease Frequency below that used at home.     0-3  enter or revise RT bronchodilator order(s) to equivalent RT Bronchodilator order with Frequency of every 4 hours PRN for wheezing or increased work of breathing using Per Protocol order mode. 4-6  enter or revise RT Bronchodilator order(s) to two equivalent RT bronchodilator orders with one order with BID Frequency and one order with Frequency of every 4 hours PRN wheezing or increased work of breathing using Per Protocol order mode. 7-10  enter or revise RT Bronchodilator order(s) to two equivalent RT bronchodilator orders with one order with TID Frequency and one order with Frequency of every 4 hours PRN wheezing or increased work of breathing using Per Protocol order mode. 11-13  enter or revise RT Bronchodilator order(s) to one equivalent RT bronchodilator order with QID Frequency and an Albuterol order with Frequency of every 4 hours PRN wheezing or increased work of breathing using Per Protocol order mode. Greater than 13  enter or revise RT Bronchodilator order(s) to one equivalent RT bronchodilator order with every 4 hours Frequency and an Albuterol order with Frequency of every 2 hours PRN wheezing or increased work of breathing using Per Protocol order mode. RT to enter RT Home Evaluation for COPD & MDI Assessment order using Per Protocol order mode.     Electronically signed by Huseyin Marcelino RCP on 1/15/2022 at 5:49 PM

## 2022-01-16 LAB
GLUCOSE BLD-MCNC: 166 MG/DL (ref 70–99)
GLUCOSE BLD-MCNC: 172 MG/DL (ref 70–99)
GLUCOSE BLD-MCNC: 207 MG/DL (ref 70–99)
GLUCOSE BLD-MCNC: 219 MG/DL (ref 70–99)
GLUCOSE BLD-MCNC: 262 MG/DL (ref 70–99)
GLUCOSE BLD-MCNC: 357 MG/DL (ref 70–99)
GLUCOSE BLD-MCNC: 368 MG/DL (ref 70–99)
PERFORMED ON: ABNORMAL

## 2022-01-16 PROCEDURE — 2500000003 HC RX 250 WO HCPCS: Performed by: INTERNAL MEDICINE

## 2022-01-16 PROCEDURE — 6370000000 HC RX 637 (ALT 250 FOR IP): Performed by: INTERNAL MEDICINE

## 2022-01-16 PROCEDURE — 6360000002 HC RX W HCPCS: Performed by: HOSPITALIST

## 2022-01-16 PROCEDURE — 94640 AIRWAY INHALATION TREATMENT: CPT

## 2022-01-16 PROCEDURE — 94761 N-INVAS EAR/PLS OXIMETRY MLT: CPT

## 2022-01-16 PROCEDURE — 2060000000 HC ICU INTERMEDIATE R&B

## 2022-01-16 PROCEDURE — 99232 SBSQ HOSP IP/OBS MODERATE 35: CPT | Performed by: NURSE PRACTITIONER

## 2022-01-16 PROCEDURE — 6360000002 HC RX W HCPCS: Performed by: INTERNAL MEDICINE

## 2022-01-16 PROCEDURE — 2580000003 HC RX 258: Performed by: INTERNAL MEDICINE

## 2022-01-16 PROCEDURE — 2700000000 HC OXYGEN THERAPY PER DAY

## 2022-01-16 RX ADMIN — SACUBITRIL AND VALSARTAN 0.5 TABLET: 24; 26 TABLET, FILM COATED ORAL at 08:10

## 2022-01-16 RX ADMIN — SODIUM CHLORIDE 30 ML: 9 INJECTION, SOLUTION INTRAVENOUS at 10:39

## 2022-01-16 RX ADMIN — FUROSEMIDE 40 MG: 10 INJECTION, SOLUTION INTRAMUSCULAR; INTRAVENOUS at 17:54

## 2022-01-16 RX ADMIN — INSULIN LISPRO 2 UNITS: 100 INJECTION, SOLUTION INTRAVENOUS; SUBCUTANEOUS at 08:15

## 2022-01-16 RX ADMIN — Medication 10 ML: at 20:35

## 2022-01-16 RX ADMIN — Medication 10 ML: at 08:16

## 2022-01-16 RX ADMIN — Medication 2 PUFF: at 08:41

## 2022-01-16 RX ADMIN — ASPIRIN 81 MG: 81 TABLET, COATED ORAL at 08:10

## 2022-01-16 RX ADMIN — INSULIN LISPRO 6 UNITS: 100 INJECTION, SOLUTION INTRAVENOUS; SUBCUTANEOUS at 17:57

## 2022-01-16 RX ADMIN — FUROSEMIDE 40 MG: 10 INJECTION, SOLUTION INTRAMUSCULAR; INTRAVENOUS at 02:21

## 2022-01-16 RX ADMIN — FUROSEMIDE 40 MG: 10 INJECTION, SOLUTION INTRAMUSCULAR; INTRAVENOUS at 10:35

## 2022-01-16 RX ADMIN — REMDESIVIR 100 MG: 100 INJECTION, POWDER, LYOPHILIZED, FOR SOLUTION INTRAVENOUS at 10:42

## 2022-01-16 RX ADMIN — Medication 2 PUFF: at 21:02

## 2022-01-16 RX ADMIN — INSULIN LISPRO 10 UNITS: 100 INJECTION, SOLUTION INTRAVENOUS; SUBCUTANEOUS at 20:35

## 2022-01-16 RX ADMIN — ISOSORBIDE MONONITRATE 30 MG: 30 TABLET, EXTENDED RELEASE ORAL at 08:09

## 2022-01-16 RX ADMIN — INSULIN LISPRO 4 UNITS: 100 INJECTION, SOLUTION INTRAVENOUS; SUBCUTANEOUS at 12:33

## 2022-01-16 RX ADMIN — ENOXAPARIN SODIUM 40 MG: 40 INJECTION SUBCUTANEOUS at 20:35

## 2022-01-16 RX ADMIN — INSULIN LISPRO 2 UNITS: 100 INJECTION, SOLUTION INTRAVENOUS; SUBCUTANEOUS at 00:19

## 2022-01-16 RX ADMIN — Medication 2 PUFF: at 08:40

## 2022-01-16 RX ADMIN — SACUBITRIL AND VALSARTAN 0.5 TABLET: 24; 26 TABLET, FILM COATED ORAL at 20:34

## 2022-01-16 RX ADMIN — ENOXAPARIN SODIUM 40 MG: 40 INJECTION SUBCUTANEOUS at 08:10

## 2022-01-16 RX ADMIN — DEXAMETHASONE SODIUM PHOSPHATE 20 MG: 4 INJECTION, SOLUTION INTRA-ARTICULAR; INTRALESIONAL; INTRAMUSCULAR; INTRAVENOUS; SOFT TISSUE at 11:33

## 2022-01-16 RX ADMIN — INSULIN LISPRO 2 UNITS: 100 INJECTION, SOLUTION INTRAVENOUS; SUBCUTANEOUS at 04:25

## 2022-01-16 RX ADMIN — AMIODARONE HYDROCHLORIDE 200 MG: 200 TABLET ORAL at 08:09

## 2022-01-16 ASSESSMENT — PAIN SCALES - GENERAL
PAINLEVEL_OUTOF10: 0

## 2022-01-16 NOTE — PROGRESS NOTES
ABG drawn x  1 attempt(s) from Right Brachial. Patient had positive modified Dustin's Test with good collateral circulation. Patient was on 100% O2 via ventilator at time of puncture. Pressure held for 5 minutes. No bleeding or bruising noted at puncture site.   Patient tolerated procedure well

## 2022-01-16 NOTE — PROGRESS NOTES
Shift assessment completed. Routine vitals stable. Scheduled medications given. Patient is awake, alert and oriented. Respirations are symmetrical at this time. No  C/o pain noted. Pt voided in urinal and made comfortable in bed. Will cont to monitor. Call light within reach.

## 2022-01-16 NOTE — PROGRESS NOTES
Vitals:    01/15/22 1826   BP: 123/74   Pulse: 53   Resp: 22   Temp: 96 °F (35.6 °C)   SpO2: 95%     Perfect serve message hospitalist of HR, 1700 dose coreg held per provider

## 2022-01-16 NOTE — PROGRESS NOTES
Hospitalist Progress Note      PCP: Sindy Mendez DO    Date of Admission: 1/12/2022    Chief Complaint: [facility] Course: Admitted to the hospital with COVID-19 pneumonia transferred to the [facility] ICU    Subjective:   Overnight he progressed and is now maxed on heated high flow 60 L/min at 100%    Medications:  Reviewed    Infusion Medications    dextrose      sodium chloride Stopped (01/15/22 1220)     Scheduled Medications    furosemide  40 mg IntraVENous Q8H    insulin lispro  0-12 Units SubCUTAneous Q4H    albuterol sulfate HFA  2 puff Inhalation BID    ipratropium  2 puff Inhalation BID    isosorbide mononitrate  30 mg Oral Daily    aspirin  81 mg Oral Daily    carvedilol  12.5 mg Oral BID WC    amiodarone  200 mg Oral Daily    sacubitril-valsartan  0.5 tablet Oral BID    sodium chloride flush  5-40 mL IntraVENous 2 times per day    enoxaparin  40 mg SubCUTAneous BID    dexamethasone (DECADRON) IVPB  20 mg IntraVENous Daily     PRN Meds: perflutren lipid microspheres, glucose, dextrose, glucagon (rDNA), dextrose, albuterol sulfate HFA, linaclotide, nitroGLYCERIN, sodium chloride flush, sodium chloride, ondansetron **OR** ondansetron, magnesium hydroxide, acetaminophen **OR** acetaminophen, hydrALAZINE, potassium chloride **OR** potassium alternative oral replacement **OR** potassium chloride, sodium chloride, sodium chloride      Intake/Output Summary (Last 24 hours) at 1/16/2022 1355  Last data filed at 1/16/2022 1133  Gross per 24 hour   Intake 366 ml   Output 1950 ml   Net -1584 ml       Physical Exam Performed:    BP 91/65   Pulse 52   Temp 96.4 °F (35.8 °C) (Temporal)   Resp 17   Ht 5' 9\" (1.753 m)   Wt 200 lb 9.9 oz (91 kg)   SpO2 92%   BMI 29.63 kg/m²     General appearance: No apparent distress, appears stated age and cooperative. HEENT: Pupils equal, round, and reactive to light. Conjunctivae/corneas clear. Neck: Supple, with full range of motion.  No jugular venous distention. Trachea midline. Respiratory:  Normal respiratory effort. Clear to auscultation, bilaterally without Rales/Wheezes/Rhonchi. Cardiovascular: Regular rate and rhythm with normal S1/S2 without murmurs, rubs or gallops. Abdomen: Soft, non-tender, non-distended with normal bowel sounds. Musculoskeletal: No clubbing, cyanosis or edema bilaterally. Full range of motion without deformity. Skin: Skin color, texture, turgor normal.  No rashes or lesions. Neurologic:  Neurovascularly intact without any focal sensory/motor deficits. Cranial nerves: II-XII intact, grossly non-focal.  Psychiatric: Alert and oriented, thought content appropriate, normal insight  Capillary Refill: Brisk,3 seconds, normal   Peripheral Pulses: +2 palpable, equal bilaterally       Labs:   Recent Labs     01/14/22  0442   WBC 9.6   HGB 10.0*   HCT 31.4*        Recent Labs     01/14/22 0442      K 5.2*      CO2 17*   BUN 36*   CREATININE 1.0   CALCIUM 8.6     No results for input(s): AST, ALT, BILIDIR, BILITOT, ALKPHOS in the last 72 hours. No results for input(s): INR in the last 72 hours. No results for input(s): Jayme Min in the last 72 hours.     Urinalysis:      Lab Results   Component Value Date    NITRU POSITIVE 07/31/2016    WBCUA 287 07/31/2016    BACTERIA 4+ 07/31/2016    RBCUA 30 07/31/2016    BLOODU MODERATE 07/31/2016    SPECGRAV 1.028 07/31/2016    GLUCOSEU Negative 07/31/2016    GLUCOSEU NEGATIVE 01/30/2012       Radiology:  XR CHEST PORTABLE   Final Result   No significant change         XR CHEST PORTABLE   Final Result   Bilateral peripheral airspace opacities suggesting COVID pneumonia                 Assessment/Plan:    Active Hospital Problems    Diagnosis     HTN (hypertension) [I10]      Priority: Medium    COVID [U07.1]     Acute respiratory failure due to COVID-19 ([facility] [address] [U07.1, J96.00]     High cholesterol [E78.00]     Acute hypoxemic respiratory failure due to COVID-19

## 2022-01-16 NOTE — PROGRESS NOTES
Pt. O2 sats decrease when sleep at 77%. When pt is aroused pt 02 sats return to 88-91%. Pt put on non rebreather mask with airvo. O2 sats are in 90's with non- rebreather. Will cont to monitor.

## 2022-01-16 NOTE — PROGRESS NOTES
[facility]  HEART FAILURE  Progress Note      Admit Date 1/12/2022     Reason for Consult:      Reason for Consultation/Chief Complaint: SOB    HPI:    Kenny Hunt is a 70 y.o. male with PMH CAD, CABG 2014, ICM, HFrEF with recovered EF, COPD, HTN, PVD, AF, DCCV in 2019  admitted with SOB, COVID+      Subjective:  Patient is being seen for SOB. There were no acute overnight cardiac events. Today Mr. Sylvie Pritchard denies chest pain or palpitations, c/o increased SOB overnight but is feeling better now with increased O2    Baseline Weight:    Wt Readings from Last 3 Encounters:   01/16/22 200 lb 9.9 oz (91 kg)   07/12/21 212 lb (96.2 kg)   06/29/20 201 lb (91.2 kg)          NYHA Class III  Objective:   /68   Pulse 53   Temp 96.7 °F (35.9 °C) (Temporal)   Resp 18   Ht 5' 9\" (1.753 m)   Wt 200 lb 9.9 oz (91 kg)   SpO2 90%   BMI 29.63 kg/m²       Intake/Output Summary (Last 24 hours) at 1/16/2022 0904  Last data filed at 1/16/2022 8290  Gross per 24 hour   Intake 1413 ml   Output 2350 ml   Net -937 ml      Wt Readings from Last 3 Encounters:   01/16/22 200 lb 9.9 oz (91 kg)   07/12/21 212 lb (96.2 kg)   06/29/20 201 lb (91.2 kg)      In: 5167 [P.O.:720;  I.V.:94.2]  Out: 2350     TELEMETRY: SB    Physical Exam:  General Appearance:  Non-obese/Well Nourished  Respiratory:  · Resp Auscultation: rales bilaterally  Cardiovascular:  · Auscultation: carmen,normal S1S2, no m/g/r/c  · Palpation: Normal    · Pedal Pulses: 2+ and equal   Abdomen:  · Soft, NT, ND, + bs  Extremities:  · No Cyanosis or Clubbing  · Extremities: negative  Neurological/Psychiatric:  · Oriented to time, place, and person  · Non-anxious    MEDICATIONS:   Scheduled Meds:   Scheduled Meds:   furosemide  40 mg IntraVENous Q8H    insulin lispro  0-12 Units SubCUTAneous Q4H    albuterol sulfate HFA  2 puff Inhalation BID    ipratropium  2 puff Inhalation BID    isosorbide mononitrate  30 mg Oral Daily    aspirin  81 mg Oral Daily    carvedilol  12.5 mg Oral BID     amiodarone  200 mg Oral Daily    sacubitril-valsartan  0.5 tablet Oral BID    sodium chloride flush  5-40 mL IntraVENous 2 times per day    enoxaparin  40 mg SubCUTAneous BID    dexamethasone (DECADRON) IVPB  20 mg IntraVENous Daily    remdesivir IVPB  100 mg IntraVENous Q24H     Continuous Infusions:   dextrose      sodium chloride Stopped (01/15/22 1220)     PRN Meds:.perflutren lipid microspheres, glucose, dextrose, glucagon (rDNA), dextrose, albuterol sulfate HFA, linaclotide, nitroGLYCERIN, sodium chloride flush, sodium chloride, ondansetron **OR** ondansetron, magnesium hydroxide, acetaminophen **OR** acetaminophen, hydrALAZINE, potassium chloride **OR** potassium alternative oral replacement **OR** potassium chloride, sodium chloride, sodium chloride  Continuous Infusions:   dextrose      sodium chloride Stopped (01/15/22 1220)       Intake/Output Summary (Last 24 hours) at 1/16/2022 0904  Last data filed at 1/16/2022 0832  Gross per 24 hour   Intake 1413 ml   Output 2350 ml   Net -937 ml       Lab Data:  CBC:   Lab Results   Component Value Date    WBC 9.6 01/14/2022    HGB 10.0 01/14/2022     01/14/2022     BMP:  Lab Results   Component Value Date     01/14/2022    K 5.2 01/14/2022    K 5.3 01/13/2022     01/14/2022    CO2 17 01/14/2022    BUN 36 01/14/2022    CREATININE 1.0 01/14/2022    GLUCOSE 242 01/14/2022     INR:   Lab Results   Component Value Date    INR 1.90 11/14/2019    INR 2.58 11/13/2019    INR 3.12 11/12/2019        CARDIAC LABS  ENZYMES:  No results for input(s): CKMB, CKMBINDEX, TROPONINI in the last 72 hours.     Invalid input(s): CKTOTAL;3  FASTING LIPID PANEL:  Lab Results   Component Value Date    HDL 41 11/28/2017    HDL 37 04/29/2010    LDLCALC 121 11/28/2017    TRIG 88 11/28/2017    TSH 1.14 11/10/2019     LIVER PROFILE:  Lab Results   Component Value Date    AST 16 01/13/2022    AST 18 01/12/2022    ALT 13 01/13/2022 ALT 17 01/12/2022     BNP:   Lab Results   Component Value Date    PROBNP 1,192 01/12/2022    PROBNP 1,617 11/14/2019    PROBNP 3,217 11/11/2019     Iron Studies:    Lab Results   Component Value Date    FERRITIN 43.3 11/11/2019     Lab Results   Component Value Date    IRON 25 (L) 11/11/2019    TIBC 310 11/11/2019    FERRITIN 43.3 11/11/2019          1. WEIGHT: Admit Weight: 209 lb (94.8 kg)      Today  Weight: 200 lb 9.9 oz (91 kg)   2. I/O     Intake/Output Summary (Last 24 hours) at 1/16/2022 0904  Last data filed at 1/16/2022 0832  Gross per 24 hour   Intake 1413 ml   Output 2350 ml   Net -937 ml       Cardiac Testing: Echo 1/14/22:   Technically difficult examination. Left ventricular systolic function is low normal with ejection fraction   estimated at 50-55%. No definitive regional wall motion abnormalities are noted. Diastolic filling parameters suggests grade II diastolic dysfunction. Mild mitral regurgitation. The left atrium is mildly dilated. Aortic valve appears sclerotic but opens adequately. Mild aortic regurgitation is present. Inadequate tricuspid regurgitation to estimate systolic pulmonary artery   pressure. Echo:  11/11/2019  LVEF 15-20% with severe diffuse hypkinesis     Stress:   7/2021 - LVEF 53%,       SPECT images demonstrate a small area of mildly decreased perfusion in the    mid inferior, mid-inferolateral, and basal inferior segments. The defect is    present at rest and worsens with stress, consistent with mixed ischemia and    scar. There is associated wall motion abnormality.        The sum stress score is 8. No visual TID.  Calculated TID is 1.09.        Left ventricular ejection fraction is normal at 53%.        Moderate risk scan given ischemia and mildly elevated LV volumes       [facility] 12/27/2016      Cath with occluded SVG to RCA,patent LIMA to LAD,patent SVG to diag,OM1 with good retrograde filling of LAD,patent LIMA to LAD  EF 35-40% with inf hypokinesis,LVEDP 25        Assessment/Plan:     1. AHF- 5L out on IV lasix, continue diuresis today  2. CAD- plan for LHC when more stable, continur ASA, BB, imdur  3. Abnl ST- as above  4. ICM- continue entresto, imdur, coreg on hold for hypotension  5.  AF - rate consistent in the 50s, BB on hold, continue Amio        I appreciate the opportunity of cooperating in the care of this individual.    GEORGE Jacobsen - CNP, ACNP, [address] 1/16/2022, 9:04 AM  Heart Failure  The [address], [address]  Ph: [phone]      Core Measures:   · Discharge instructions:   · LVEF documented:   · ACEI for LV dysfunction:   · Smoking Cessation:

## 2022-01-17 ENCOUNTER — TELEPHONE (OUTPATIENT)
Dept: CARDIOLOGY CLINIC | Age: 72
End: 2022-01-17

## 2022-01-17 LAB
ANION GAP SERPL CALCULATED.3IONS-SCNC: 16 MMOL/L (ref 3–16)
BUN BLDV-MCNC: 60 MG/DL (ref 7–20)
C-REACTIVE PROTEIN: 11.1 MG/L (ref 0–5.1)
CALCIUM SERPL-MCNC: 8.8 MG/DL (ref 8.3–10.6)
CHLORIDE BLD-SCNC: 99 MMOL/L (ref 99–110)
CO2: 18 MMOL/L (ref 21–32)
CREAT SERPL-MCNC: 1.4 MG/DL (ref 0.8–1.3)
D DIMER: 256 NG/ML DDU (ref 0–229)
GFR AFRICAN AMERICAN: >60
GFR NON-AFRICAN AMERICAN: 50
GLUCOSE BLD-MCNC: 245 MG/DL (ref 70–99)
GLUCOSE BLD-MCNC: 268 MG/DL (ref 70–99)
GLUCOSE BLD-MCNC: 295 MG/DL (ref 70–99)
GLUCOSE BLD-MCNC: 307 MG/DL (ref 70–99)
GLUCOSE BLD-MCNC: 351 MG/DL (ref 70–99)
GLUCOSE BLD-MCNC: 384 MG/DL (ref 70–99)
HCT VFR BLD CALC: 37.6 % (ref 40.5–52.5)
HEMOGLOBIN: 11.7 G/DL (ref 13.5–17.5)
MCH RBC QN AUTO: 23.2 PG (ref 26–34)
MCHC RBC AUTO-ENTMCNC: 31.2 G/DL (ref 31–36)
MCV RBC AUTO: 74.3 FL (ref 80–100)
PDW BLD-RTO: 20.6 % (ref 12.4–15.4)
PERFORMED ON: ABNORMAL
PLATELET # BLD: 317 K/UL (ref 135–450)
PMV BLD AUTO: 8 FL (ref 5–10.5)
POTASSIUM SERPL-SCNC: 4.9 MMOL/L (ref 3.5–5.1)
PRO-BNP: 396 PG/ML (ref 0–124)
RBC # BLD: 5.06 M/UL (ref 4.2–5.9)
SODIUM BLD-SCNC: 133 MMOL/L (ref 136–145)
WBC # BLD: 18 K/UL (ref 4–11)

## 2022-01-17 PROCEDURE — 2700000000 HC OXYGEN THERAPY PER DAY

## 2022-01-17 PROCEDURE — 94640 AIRWAY INHALATION TREATMENT: CPT

## 2022-01-17 PROCEDURE — 6360000002 HC RX W HCPCS: Performed by: INTERNAL MEDICINE

## 2022-01-17 PROCEDURE — 85027 COMPLETE CBC AUTOMATED: CPT

## 2022-01-17 PROCEDURE — 6370000000 HC RX 637 (ALT 250 FOR IP): Performed by: INTERNAL MEDICINE

## 2022-01-17 PROCEDURE — 2580000003 HC RX 258: Performed by: INTERNAL MEDICINE

## 2022-01-17 PROCEDURE — 80048 BASIC METABOLIC PNL TOTAL CA: CPT

## 2022-01-17 PROCEDURE — 94761 N-INVAS EAR/PLS OXIMETRY MLT: CPT

## 2022-01-17 PROCEDURE — 36415 COLL VENOUS BLD VENIPUNCTURE: CPT

## 2022-01-17 PROCEDURE — 85379 FIBRIN DEGRADATION QUANT: CPT

## 2022-01-17 PROCEDURE — 2060000000 HC ICU INTERMEDIATE R&B

## 2022-01-17 PROCEDURE — 99232 SBSQ HOSP IP/OBS MODERATE 35: CPT | Performed by: NURSE PRACTITIONER

## 2022-01-17 PROCEDURE — 6360000002 HC RX W HCPCS: Performed by: HOSPITALIST

## 2022-01-17 PROCEDURE — 83880 ASSAY OF NATRIURETIC PEPTIDE: CPT

## 2022-01-17 PROCEDURE — 86140 C-REACTIVE PROTEIN: CPT

## 2022-01-17 RX ORDER — INSULIN LISPRO 100 [IU]/ML
0-9 INJECTION, SOLUTION INTRAVENOUS; SUBCUTANEOUS NIGHTLY
Status: DISCONTINUED | OUTPATIENT
Start: 2022-01-17 | End: 2022-01-28 | Stop reason: HOSPADM

## 2022-01-17 RX ORDER — INSULIN LISPRO 100 [IU]/ML
0-18 INJECTION, SOLUTION INTRAVENOUS; SUBCUTANEOUS
Status: DISCONTINUED | OUTPATIENT
Start: 2022-01-17 | End: 2022-01-28 | Stop reason: HOSPADM

## 2022-01-17 RX ADMIN — INSULIN LISPRO 10 UNITS: 100 INJECTION, SOLUTION INTRAVENOUS; SUBCUTANEOUS at 00:10

## 2022-01-17 RX ADMIN — DEXAMETHASONE SODIUM PHOSPHATE 20 MG: 4 INJECTION, SOLUTION INTRA-ARTICULAR; INTRALESIONAL; INTRAMUSCULAR; INTRAVENOUS; SOFT TISSUE at 08:28

## 2022-01-17 RX ADMIN — FUROSEMIDE 40 MG: 10 INJECTION, SOLUTION INTRAMUSCULAR; INTRAVENOUS at 18:21

## 2022-01-17 RX ADMIN — Medication 2 PUFF: at 20:27

## 2022-01-17 RX ADMIN — ENOXAPARIN SODIUM 30 MG: 100 INJECTION SUBCUTANEOUS at 21:18

## 2022-01-17 RX ADMIN — SODIUM CHLORIDE 25 ML: 9 INJECTION, SOLUTION INTRAVENOUS at 08:28

## 2022-01-17 RX ADMIN — FUROSEMIDE 40 MG: 10 INJECTION, SOLUTION INTRAMUSCULAR; INTRAVENOUS at 02:07

## 2022-01-17 RX ADMIN — Medication 10 ML: at 08:18

## 2022-01-17 RX ADMIN — ISOSORBIDE MONONITRATE 30 MG: 30 TABLET, EXTENDED RELEASE ORAL at 08:17

## 2022-01-17 RX ADMIN — INSULIN LISPRO 12 UNITS: 100 INJECTION, SOLUTION INTRAVENOUS; SUBCUTANEOUS at 16:47

## 2022-01-17 RX ADMIN — INSULIN LISPRO 4 UNITS: 100 INJECTION, SOLUTION INTRAVENOUS; SUBCUTANEOUS at 03:58

## 2022-01-17 RX ADMIN — AMIODARONE HYDROCHLORIDE 200 MG: 200 TABLET ORAL at 08:17

## 2022-01-17 RX ADMIN — ASPIRIN 81 MG: 81 TABLET, COATED ORAL at 08:18

## 2022-01-17 RX ADMIN — FUROSEMIDE 40 MG: 10 INJECTION, SOLUTION INTRAMUSCULAR; INTRAVENOUS at 08:14

## 2022-01-17 RX ADMIN — Medication 2 PUFF: at 07:07

## 2022-01-17 RX ADMIN — INSULIN LISPRO 15 UNITS: 100 INJECTION, SOLUTION INTRAVENOUS; SUBCUTANEOUS at 12:35

## 2022-01-17 RX ADMIN — INSULIN LISPRO 7 UNITS: 100 INJECTION, SOLUTION INTRAVENOUS; SUBCUTANEOUS at 21:18

## 2022-01-17 RX ADMIN — SACUBITRIL AND VALSARTAN 0.5 TABLET: 24; 26 TABLET, FILM COATED ORAL at 08:18

## 2022-01-17 RX ADMIN — INSULIN LISPRO 6 UNITS: 100 INJECTION, SOLUTION INTRAVENOUS; SUBCUTANEOUS at 08:18

## 2022-01-17 RX ADMIN — ACETAMINOPHEN 650 MG: 325 TABLET ORAL at 18:21

## 2022-01-17 RX ADMIN — ENOXAPARIN SODIUM 40 MG: 40 INJECTION SUBCUTANEOUS at 08:17

## 2022-01-17 RX ADMIN — CARVEDILOL 12.5 MG: 6.25 TABLET, FILM COATED ORAL at 08:17

## 2022-01-17 ASSESSMENT — PAIN SCALES - GENERAL
PAINLEVEL_OUTOF10: 8
PAINLEVEL_OUTOF10: 0

## 2022-01-17 NOTE — PROGRESS NOTES
Assessment completed and documented. VSS. Pt on airvo 50L, 89% satting low 90s. meds given per [facility]. Pt sitting up in bed eating breakfast. Pt denies pain or needs. Call light within reach, will continue to monitor.

## 2022-01-17 NOTE — PLAN OF CARE
Problem: Airway Clearance - Ineffective  Goal: Achieve or maintain patent airway  Outcome: Ongoing     Problem: Gas Exchange - Impaired  Goal: Absence of hypoxia  Outcome: Ongoing     Problem: Breathing Pattern - Ineffective  Goal: Ability to achieve and maintain a regular respiratory rate  Outcome: Ongoing     Problem:  Body Temperature -  Risk of, Imbalanced  Goal: Complications related to the disease process, condition or treatment will be avoided or minimized  Outcome: Ongoing     Problem: Isolation Precautions - Risk of Spread of Infection  Goal: Prevent transmission of infection  Outcome: Ongoing     Problem: Nutrition Deficits  Goal: Optimize nutritional status  Outcome: Ongoing     Problem: Risk for Fluid Volume Deficit  Goal: Maintain normal serum potassium, sodium, calcium, phosphorus, and pH  Outcome: Ongoing     Problem: Loneliness or Risk for Loneliness  Goal: Demonstrate positive use of time alone when socialization is not possible  Outcome: Ongoing     Problem: Fatigue  Goal: Verbalize increase energy and improved vitality  Outcome: Ongoing     Problem: Patient Education: Go to Patient Education Activity  Goal: Patient/Family Education  Outcome: Ongoing     Problem: FLUID AND ELECTROLYTE IMBALANCE  Goal: Fluid and electrolyte balance are achieved/maintained  Outcome: Ongoing     Problem: Pain:  Goal: Pain level will decrease  Description: Pain level will decrease  Outcome: Ongoing     Problem: Falls - Risk of:  Goal: Will remain free from falls  Description: Will remain free from falls  Outcome: Ongoing

## 2022-01-17 NOTE — CARE COORDINATION
Chart reviewed for discharge planning.   Barrier(s) to discharge-      Oxygen - Patient still on 50L HFNC      Other - Patient may need referred for home OT and PT, as well as home oxygen upon discharge.           Tentative discharge plan- Patient reported on 1/13 that he will discharge home with his Wife.      Tentative discharge date- Unknown     *Case management will continue to follow progress and update discharge plan as needed.     Antonino MONTERO, EDWINA, [facility]    [phone]

## 2022-01-17 NOTE — PROGRESS NOTES
Hospitalist Progress Note      PCP: Aysha Natarajan DO    Date of Admission: 1/12/2022    Chief Complaint: [address]: Admitted to the hospital with COVID-19 pneumonia transferred to the [facility] ICU    Subjective:   Overnight he progressed and is now maxed on heated high flow 50 L/min at 95%    Medications:  Reviewed    Infusion Medications    dextrose      sodium chloride Stopped (01/17/22 1008)     Scheduled Medications    enoxaparin  30 mg SubCUTAneous BID    insulin lispro  0-18 Units SubCUTAneous TID WC    insulin lispro  0-9 Units SubCUTAneous Nightly    furosemide  40 mg IntraVENous Q8H    albuterol sulfate HFA  2 puff Inhalation BID    ipratropium  2 puff Inhalation BID    isosorbide mononitrate  30 mg Oral Daily    aspirin  81 mg Oral Daily    carvedilol  12.5 mg Oral BID WC    amiodarone  200 mg Oral Daily    sacubitril-valsartan  0.5 tablet Oral BID    sodium chloride flush  5-40 mL IntraVENous 2 times per day    dexamethasone (DECADRON) IVPB  20 mg IntraVENous Daily     PRN Meds: perflutren lipid microspheres, glucose, dextrose, glucagon (rDNA), dextrose, albuterol sulfate HFA, linaclotide, nitroGLYCERIN, sodium chloride flush, sodium chloride, ondansetron **OR** ondansetron, magnesium hydroxide, acetaminophen **OR** acetaminophen, hydrALAZINE, potassium chloride **OR** potassium alternative oral replacement **OR** potassium chloride, sodium chloride      Intake/Output Summary (Last 24 hours) at 1/17/2022 1541  Last data filed at 1/17/2022 1223  Gross per 24 hour   Intake 934.85 ml   Output 1325 ml   Net -390.15 ml       Physical Exam Performed:    /63   Pulse 59   Temp 97 °F (36.1 °C) (Temporal)   Resp 20   Ht 5' 9\" (1.753 m)   Wt 200 lb (90.7 kg)   SpO2 95%   BMI 29.53 kg/m²     General appearance: No apparent distress, appears stated age and cooperative. HEENT: Pupils equal, round, and reactive to light. Conjunctivae/corneas clear.   Neck: Supple, with full range of motion. No jugular venous distention. Trachea midline. Respiratory:  Normal respiratory effort. Clear to auscultation, bilaterally without Rales/Wheezes/Rhonchi. Cardiovascular: Regular rate and rhythm with normal S1/S2 without murmurs, rubs or gallops. Abdomen: Soft, non-tender, non-distended with normal bowel sounds. Musculoskeletal: No clubbing, cyanosis or edema bilaterally. Full range of motion without deformity. Skin: Skin color, texture, turgor normal.  No rashes or lesions. Neurologic:  Neurovascularly intact without any focal sensory/motor deficits. Cranial nerves: II-XII intact, grossly non-focal.  Psychiatric: Alert and oriented, thought content appropriate, normal insight  Capillary Refill: Brisk,3 seconds, normal   Peripheral Pulses: +2 palpable, equal bilaterally       Labs:   Recent Labs     01/17/22  1118   WBC 18.0*   HGB 11.7*   HCT 37.6*        Recent Labs     01/17/22  1118   *   K 4.9   CL 99   CO2 18*   BUN 60*   CREATININE 1.4*   CALCIUM 8.8     No results for input(s): AST, ALT, BILIDIR, BILITOT, ALKPHOS in the last 72 hours. No results for input(s): INR in the last 72 hours. No results for input(s): Zhen Estrada in the last 72 hours.     Urinalysis:      Lab Results   Component Value Date    NITRU POSITIVE 07/31/2016    WBCUA 287 07/31/2016    BACTERIA 4+ 07/31/2016    RBCUA 30 07/31/2016    BLOODU MODERATE 07/31/2016    SPECGRAV 1.028 07/31/2016    GLUCOSEU Negative 07/31/2016    GLUCOSEU NEGATIVE 01/30/2012       Radiology:  XR CHEST PORTABLE   Final Result   No significant change         XR CHEST PORTABLE   Final Result   Bilateral peripheral airspace opacities suggesting COVID pneumonia                 Assessment/Plan:    Active Hospital Problems    Diagnosis     HTN (hypertension) [I10]      Priority: Medium    COVID [U07.1]     Acute respiratory failure due to COVID-19 ([facility] [address] [U07.1, J96.00]     High cholesterol [E78.00]     Acute hypoxemic

## 2022-01-17 NOTE — PROGRESS NOTES
[facility]  HEART FAILURE  Progress Note      Admit Date 1/12/2022     Reason for Consult:      Reason for Consultation/Chief Complaint: SOB    HPI:    Rory Manning is a 70 y.o. male with PMH CAD, CABG 2014, ICM, HFrEF with recovered EF, COPD, HTN, PVD, AF, DCCV in 2019  admitted with SOB, COVID+      Subjective:  Patient is being seen for SOB. There were no acute overnight cardiac events.    Today Mr. Ronnie Santillan is sitting on the side of the bed, tells me he feels better today, denies CP or palpitations, still with SOB    Baseline Weight:    Wt Readings from Last 3 Encounters:   01/17/22 200 lb (90.7 kg)   07/12/21 212 lb (96.2 kg)   06/29/20 201 lb (91.2 kg)          NYHA Class III  Objective:   /61   Pulse 65   Temp 97.2 °F (36.2 °C) (Temporal)   Resp 17   Ht 5' 9\" (1.753 m)   Wt 200 lb (90.7 kg)   SpO2 93%   BMI 29.53 kg/m²       Intake/Output Summary (Last 24 hours) at 1/17/2022 0946  Last data filed at 1/17/2022 0831  Gross per 24 hour   Intake 1200 ml   Output 2100 ml   Net -900 ml      Wt Readings from Last 3 Encounters:   01/17/22 200 lb (90.7 kg)   07/12/21 212 lb (96.2 kg)   06/29/20 201 lb (91.2 kg)      In: 1200 [P.O.:1200]  Out: 2500     TELEMETRY: SB    Physical Exam:  General Appearance:  Non-obese/Well Nourished  Respiratory:  · Resp Auscultation: rales bilaterally  Cardiovascular:  · Auscultation: regular, normal S1S2, no m/g/r/c  · Palpation: Normal    · Pedal Pulses: 2+ and equal   Abdomen:  · Soft, NT, ND, + bs  Extremities:  · No Cyanosis or Clubbing  · Extremities: negative  Neurological/Psychiatric:  · Oriented to time, place, and person  · Non-anxious    MEDICATIONS:   Scheduled Meds:   Scheduled Meds:   furosemide  40 mg IntraVENous Q8H    insulin lispro  0-12 Units SubCUTAneous Q4H    albuterol sulfate HFA  2 puff Inhalation BID    ipratropium  2 puff Inhalation BID    isosorbide mononitrate  30 mg Oral Daily    aspirin  81 mg Oral Daily    carvedilol  12.5 mg Oral BID     amiodarone  200 mg Oral Daily    sacubitril-valsartan  0.5 tablet Oral BID    sodium chloride flush  5-40 mL IntraVENous 2 times per day    enoxaparin  40 mg SubCUTAneous BID    dexamethasone (DECADRON) IVPB  20 mg IntraVENous Daily     Continuous Infusions:   dextrose      sodium chloride 25 mL (01/17/22 0828)     PRN Meds:.perflutren lipid microspheres, glucose, dextrose, glucagon (rDNA), dextrose, albuterol sulfate HFA, linaclotide, nitroGLYCERIN, sodium chloride flush, sodium chloride, ondansetron **OR** ondansetron, magnesium hydroxide, acetaminophen **OR** acetaminophen, hydrALAZINE, potassium chloride **OR** potassium alternative oral replacement **OR** potassium chloride, sodium chloride, sodium chloride  Continuous Infusions:   dextrose      sodium chloride 25 mL (01/17/22 0828)       Intake/Output Summary (Last 24 hours) at 1/17/2022 0946  Last data filed at 1/17/2022 0831  Gross per 24 hour   Intake 1200 ml   Output 2100 ml   Net -900 ml       Lab Data:  CBC:   Lab Results   Component Value Date    WBC 9.6 01/14/2022    HGB 10.0 01/14/2022     01/14/2022     BMP:  Lab Results   Component Value Date     01/14/2022    K 5.2 01/14/2022    K 5.3 01/13/2022     01/14/2022    CO2 17 01/14/2022    BUN 36 01/14/2022    CREATININE 1.0 01/14/2022    GLUCOSE 242 01/14/2022     INR:   Lab Results   Component Value Date    INR 1.90 11/14/2019    INR 2.58 11/13/2019    INR 3.12 11/12/2019        CARDIAC LABS  ENZYMES:  No results for input(s): CKMB, CKMBINDEX, TROPONINI in the last 72 hours.     Invalid input(s): CKTOTAL;3  FASTING LIPID PANEL:  Lab Results   Component Value Date    HDL 41 11/28/2017    HDL 37 04/29/2010    LDLCALC 121 11/28/2017    TRIG 88 11/28/2017    TSH 1.14 11/10/2019     LIVER PROFILE:  Lab Results   Component Value Date    AST 16 01/13/2022    AST 18 01/12/2022    ALT 13 01/13/2022    ALT 17 01/12/2022     BNP:   Lab Results   Component Value Date PROBNP 1,192 01/12/2022    PROBNP 1,617 11/14/2019    PROBNP 3,217 11/11/2019     Iron Studies:    Lab Results   Component Value Date    FERRITIN 43.3 11/11/2019     Lab Results   Component Value Date    IRON 25 (L) 11/11/2019    TIBC 310 11/11/2019    FERRITIN 43.3 11/11/2019          1. WEIGHT: Admit Weight: 209 lb (94.8 kg)      Today  Weight: 200 lb (90.7 kg)   2. I/O     Intake/Output Summary (Last 24 hours) at 1/17/2022 0946  Last data filed at 1/17/2022 0831  Gross per 24 hour   Intake 1200 ml   Output 2100 ml   Net -900 ml       Cardiac Testing: Echo 1/14/22:   Technically difficult examination. Left ventricular systolic function is low normal with ejection fraction   estimated at 50-55%. No definitive regional wall motion abnormalities are noted. Diastolic filling parameters suggests grade II diastolic dysfunction. Mild mitral regurgitation. The left atrium is mildly dilated. Aortic valve appears sclerotic but opens adequately. Mild aortic regurgitation is present. Inadequate tricuspid regurgitation to estimate systolic pulmonary artery   pressure. Echo:  11/11/2019  LVEF 15-20% with severe diffuse hypkinesis     Stress:   7/2021 - LVEF 53%,       SPECT images demonstrate a small area of mildly decreased perfusion in the    mid inferior, mid-inferolateral, and basal inferior segments. The defect is    present at rest and worsens with stress, consistent with mixed ischemia and    scar. There is associated wall motion abnormality.        The sum stress score is 8. No visual TID. Calculated TID is 1.09.        Left ventricular ejection fraction is normal at 53%.        Moderate risk scan given ischemia and mildly elevated LV volumes       [facility] 12/27/2016      Cath with occluded SVG to RCA,patent LIMA to LAD,patent SVG to diag,OM1 with good retrograde filling of LAD,patent LIMA to LAD  EF 35-40% with inf hypokinesis,LVEDP 25        Assessment/Plan:     1.  AHF- 6L out on IV lasix, labs still pending today, continue diuresis for now  2. CAD- continue ASA, BB, imdur, discussed with Cardio, will not need LHC prior to d/c, will address as an outpt  3. ICM- continue entresto, imdur, coreg   4.  AF -rate improved today, continue Amio and coreg        I appreciate the opportunity of cooperating in the care of this individual.    Mimi Bundy, APRN - CNP, ACNP, [address] 1/17/2022, 9:46 AM  Heart Failure  [facility], [address] [place] [address]  Ph: [phone]      Core Measures:   · Discharge instructions:   · LVEF documented:   · ACEI for LV dysfunction:   · Smoking Cessation:

## 2022-01-17 NOTE — TELEPHONE ENCOUNTER
Wife states pt is in [facility] on COVID unit. Pt had past covid, but not current and is not able to be with pt. Asking to speak to NPKV to get clarification on pt's heart condition. Please call wife before 2 pm today or anytime tomorrow 1/18/22.

## 2022-01-18 LAB
ANION GAP SERPL CALCULATED.3IONS-SCNC: 15 MMOL/L (ref 3–16)
BASOPHILS ABSOLUTE: 0 K/UL (ref 0–0.2)
BASOPHILS RELATIVE PERCENT: 0.1 %
BUN BLDV-MCNC: 70 MG/DL (ref 7–20)
C-REACTIVE PROTEIN: 6.3 MG/L (ref 0–5.1)
CALCIUM SERPL-MCNC: 8.7 MG/DL (ref 8.3–10.6)
CHLORIDE BLD-SCNC: 98 MMOL/L (ref 99–110)
CO2: 19 MMOL/L (ref 21–32)
CREAT SERPL-MCNC: 1.5 MG/DL (ref 0.8–1.3)
D DIMER: 221 NG/ML DDU (ref 0–229)
EOSINOPHILS ABSOLUTE: 0 K/UL (ref 0–0.6)
EOSINOPHILS RELATIVE PERCENT: 0 %
GFR AFRICAN AMERICAN: 56
GFR NON-AFRICAN AMERICAN: 46
GLUCOSE BLD-MCNC: 217 MG/DL (ref 70–99)
GLUCOSE BLD-MCNC: 218 MG/DL (ref 70–99)
GLUCOSE BLD-MCNC: 231 MG/DL (ref 70–99)
GLUCOSE BLD-MCNC: 244 MG/DL (ref 70–99)
GLUCOSE BLD-MCNC: 307 MG/DL (ref 70–99)
GLUCOSE BLD-MCNC: 374 MG/DL (ref 70–99)
HCT VFR BLD CALC: 37 % (ref 40.5–52.5)
HEMOGLOBIN: 11.8 G/DL (ref 13.5–17.5)
LYMPHOCYTES ABSOLUTE: 0.3 K/UL (ref 1–5.1)
LYMPHOCYTES RELATIVE PERCENT: 2.3 %
MCH RBC QN AUTO: 23.9 PG (ref 26–34)
MCHC RBC AUTO-ENTMCNC: 32 G/DL (ref 31–36)
MCV RBC AUTO: 74.5 FL (ref 80–100)
MONOCYTES ABSOLUTE: 0.3 K/UL (ref 0–1.3)
MONOCYTES RELATIVE PERCENT: 2.2 %
NEUTROPHILS ABSOLUTE: 13.3 K/UL (ref 1.7–7.7)
NEUTROPHILS RELATIVE PERCENT: 95.4 %
PDW BLD-RTO: 20.2 % (ref 12.4–15.4)
PERFORMED ON: ABNORMAL
PLATELET # BLD: 284 K/UL (ref 135–450)
PMV BLD AUTO: 7.9 FL (ref 5–10.5)
POTASSIUM SERPL-SCNC: 4.7 MMOL/L (ref 3.5–5.1)
PROCALCITONIN: 0.06 NG/ML (ref 0–0.15)
RBC # BLD: 4.96 M/UL (ref 4.2–5.9)
SODIUM BLD-SCNC: 132 MMOL/L (ref 136–145)
WBC # BLD: 13.9 K/UL (ref 4–11)

## 2022-01-18 PROCEDURE — 99232 SBSQ HOSP IP/OBS MODERATE 35: CPT | Performed by: NURSE PRACTITIONER

## 2022-01-18 PROCEDURE — 85025 COMPLETE CBC W/AUTO DIFF WBC: CPT

## 2022-01-18 PROCEDURE — 6370000000 HC RX 637 (ALT 250 FOR IP): Performed by: STUDENT IN AN ORGANIZED HEALTH CARE EDUCATION/TRAINING PROGRAM

## 2022-01-18 PROCEDURE — 6360000002 HC RX W HCPCS: Performed by: HOSPITALIST

## 2022-01-18 PROCEDURE — 36415 COLL VENOUS BLD VENIPUNCTURE: CPT

## 2022-01-18 PROCEDURE — 6360000002 HC RX W HCPCS: Performed by: INTERNAL MEDICINE

## 2022-01-18 PROCEDURE — 85379 FIBRIN DEGRADATION QUANT: CPT

## 2022-01-18 PROCEDURE — 6370000000 HC RX 637 (ALT 250 FOR IP): Performed by: INTERNAL MEDICINE

## 2022-01-18 PROCEDURE — 2060000000 HC ICU INTERMEDIATE R&B

## 2022-01-18 PROCEDURE — 2700000000 HC OXYGEN THERAPY PER DAY

## 2022-01-18 PROCEDURE — 94640 AIRWAY INHALATION TREATMENT: CPT

## 2022-01-18 PROCEDURE — 94761 N-INVAS EAR/PLS OXIMETRY MLT: CPT

## 2022-01-18 PROCEDURE — 84145 PROCALCITONIN (PCT): CPT

## 2022-01-18 PROCEDURE — 80048 BASIC METABOLIC PNL TOTAL CA: CPT

## 2022-01-18 PROCEDURE — 2580000003 HC RX 258: Performed by: INTERNAL MEDICINE

## 2022-01-18 PROCEDURE — 86140 C-REACTIVE PROTEIN: CPT

## 2022-01-18 PROCEDURE — 97530 THERAPEUTIC ACTIVITIES: CPT

## 2022-01-18 RX ORDER — CARVEDILOL 6.25 MG/1
6.25 TABLET ORAL 2 TIMES DAILY WITH MEALS
Status: DISCONTINUED | OUTPATIENT
Start: 2022-01-18 | End: 2022-01-24

## 2022-01-18 RX ORDER — DEXAMETHASONE SODIUM PHOSPHATE 10 MG/ML
10 INJECTION, SOLUTION INTRAMUSCULAR; INTRAVENOUS DAILY
Status: DISCONTINUED | OUTPATIENT
Start: 2022-01-19 | End: 2022-01-25

## 2022-01-18 RX ADMIN — INSULIN LISPRO 6 UNITS: 100 INJECTION, SOLUTION INTRAVENOUS; SUBCUTANEOUS at 09:00

## 2022-01-18 RX ADMIN — Medication 2 PUFF: at 08:51

## 2022-01-18 RX ADMIN — FUROSEMIDE 40 MG: 10 INJECTION, SOLUTION INTRAMUSCULAR; INTRAVENOUS at 17:14

## 2022-01-18 RX ADMIN — Medication 10 ML: at 09:00

## 2022-01-18 RX ADMIN — Medication 10 ML: at 21:32

## 2022-01-18 RX ADMIN — INSULIN GLARGINE 5 UNITS: 100 INJECTION, SOLUTION SUBCUTANEOUS at 11:13

## 2022-01-18 RX ADMIN — SACUBITRIL AND VALSARTAN 0.5 TABLET: 24; 26 TABLET, FILM COATED ORAL at 21:32

## 2022-01-18 RX ADMIN — FUROSEMIDE 40 MG: 10 INJECTION, SOLUTION INTRAMUSCULAR; INTRAVENOUS at 08:59

## 2022-01-18 RX ADMIN — ASPIRIN 81 MG: 81 TABLET, COATED ORAL at 08:59

## 2022-01-18 RX ADMIN — INSULIN LISPRO 7 UNITS: 100 INJECTION, SOLUTION INTRAVENOUS; SUBCUTANEOUS at 21:33

## 2022-01-18 RX ADMIN — FUROSEMIDE 40 MG: 10 INJECTION, SOLUTION INTRAMUSCULAR; INTRAVENOUS at 02:30

## 2022-01-18 RX ADMIN — DEXAMETHASONE SODIUM PHOSPHATE 20 MG: 4 INJECTION, SOLUTION INTRA-ARTICULAR; INTRALESIONAL; INTRAMUSCULAR; INTRAVENOUS; SOFT TISSUE at 09:16

## 2022-01-18 RX ADMIN — Medication 2 PUFF: at 20:49

## 2022-01-18 RX ADMIN — INSULIN LISPRO 6 UNITS: 100 INJECTION, SOLUTION INTRAVENOUS; SUBCUTANEOUS at 11:13

## 2022-01-18 RX ADMIN — SACUBITRIL AND VALSARTAN 0.5 TABLET: 24; 26 TABLET, FILM COATED ORAL at 09:14

## 2022-01-18 RX ADMIN — ENOXAPARIN SODIUM 30 MG: 100 INJECTION SUBCUTANEOUS at 21:32

## 2022-01-18 RX ADMIN — SODIUM CHLORIDE 25 ML: 9 INJECTION, SOLUTION INTRAVENOUS at 09:16

## 2022-01-18 RX ADMIN — ISOSORBIDE MONONITRATE 30 MG: 30 TABLET, EXTENDED RELEASE ORAL at 08:59

## 2022-01-18 RX ADMIN — INSULIN LISPRO 12 UNITS: 100 INJECTION, SOLUTION INTRAVENOUS; SUBCUTANEOUS at 17:18

## 2022-01-18 RX ADMIN — AMIODARONE HYDROCHLORIDE 200 MG: 200 TABLET ORAL at 08:59

## 2022-01-18 RX ADMIN — ENOXAPARIN SODIUM 30 MG: 100 INJECTION SUBCUTANEOUS at 08:59

## 2022-01-18 ASSESSMENT — PAIN SCALES - GENERAL
PAINLEVEL_OUTOF10: 0

## 2022-01-18 NOTE — PROGRESS NOTES
Assessment completed and documented. VSS. Pt on airvo 50L, 77%. meds given per [facility]. Pt sitting up in bed eating breakfast. Pt denies pain or needs. Call light within reach, will continue to monitor.

## 2022-01-18 NOTE — PROGRESS NOTES
Physical Therapy  Facility/Department: [facility] 5C  Daily Treatment Note  NAME: Maddie Guadalupe  : 1950  MRN: [id number]    Date of Service: 2022    Discharge Recommendations:Leandra Hopkins scored a 16/24 on the AM-PAC short mobility form. Current research shows that an AM-PAC score of 17 or less is typically not associated with a discharge to the patient's home setting. Based on the patient's AM-PAC score and their current functional mobility deficits, it is recommended that the patient have 5-7 sessions per week of Physical Therapy at d/c to increase the patient's independence. At this time, this patient demonstrates the endurance, and/or tolerance for 3 hours of therapy each day, with a treatment frequency of 5-7x/wk. Please see assessment section for further patient specific details. If patient discharges prior to next session this note will serve as a discharge summary. Please see below for the latest assessment towards goals. PT Equipment Recommendations  Equipment Needed: No (TBD)    Assessment   Body structures, Functions, Activity limitations: Decreased functional mobility ; Decreased strength;Decreased endurance;Decreased balance  Assessment: The patient is a 71 yo male admitted to [facility] for acute respiratory failure after d/c home from [facility] where he was hospitalized for COVID-19. At this time the patient is on 50L/min, 77% FiO2 heated high flow. The patient only tolerates sitting EOB 10 min and standing at bedside for 1 min with reported weakness in LEs. The patient would benefit from continued skilled PT to safely progress tolerance to activity.   Treatment Diagnosis: Decreased tolerance to activity  PT Education: Goals;PT Role;Plan of Care;Energy Conservation  Patient Education: Pt verbalized understanding  Barriers to Learning: None  REQUIRES PT FOLLOW UP: Yes  Activity Tolerance  Activity Tolerance: Patient limited by fatigue;Patient limited by endurance  Activity assistance (from EOB)  Stand to sit: Stand by assistance  Comment: Pt declined transfer to the chair with nursing and therapy; encouraged pt to get up in chair later with RN if he's feeling better  Ambulation  Ambulation?: No (unable to tolerate)     Balance  Posture: Fair  Sitting - Static: Good  Sitting - Dynamic: Fair  Standing - Static: Fair  Standing - Dynamic: Fair  Comments: Pt sits EOB for 10 minutes with SBA with BUE support; Pt stood 1x for 1 minute; SpO2 drops to 85% upon initial sitting EOB, then recovered to % while on EOB; pt fatigues quickly with standing         AM-PAC Score  AM-PAC Inpatient Mobility Raw Score : 16 (01/18/22 1332)  AM-PAC Inpatient T-Scale Score : 40.78 (01/18/22 1332)  Mobility Inpatient CMS 0-100% Score: 54.16 (01/18/22 1332)  Mobility Inpatient CMS G-Code Modifier : CK (01/18/22 1332)          Goals  Short term goals  Time Frame for Short term goals: Before discharge  Short term goal 1: Pt will complete bed mobility indep  Short term goal 2: Pt will complete sit<>stand indep  Short term goal 3: Pt will ambulate 50 ft with RW and supervision  Short term goal 4: Pt will ascend/descend 5 steps with (R) rail with supervision  Short term goal 5: Pt will tolerate standing x5 minutes without UE support indep in order to complete ADLs  Patient Goals   Patient goals : Go home when I am ready, I don't want to do this again  No goals met    Plan    Plan  Times per week: 3-5x  Current Treatment Recommendations: Strengthening,Functional Mobility Training,Transfer Training,Balance Training,Endurance Training,Gait Training,Stair training,Patient/Caregiver Education & Training,Safety Education & Training,Equipment Evaluation, Education, & procurement,Home Exercise Program  Safety Devices  Type of devices:  All fall risk precautions in place,Call light within reach,Bed alarm in place,Gait belt,Patient at risk for falls,Left in bed,Nurse notified Cristopher Cardenas RN)     Therapy Time   Individual Concurrent Group Co-treatment   Time In 5054         Time Out 1316         Minutes 30         Timed Code Treatment Minutes: [address] [facility], [address] [facility], [address] [place]

## 2022-01-18 NOTE — PROGRESS NOTES
Nutrition Assessment     Type and Reason for Visit: Initial (LOS)    Nutrition Recommendations/Plan:   No nutrition rec's @ this time. Nutrition Assessment:  Pt's nutrition status is stable AEB po intake greater than 75% of meals & no significant, unintentional wt loss identified. Pt is maxed out on airvo, but still maintaining po intake of % of meals. Pt is at low risk for nutrition compromise @ this time. Malnutrition Assessment:  Malnutrition Status: No malnutrition    Nutrition Related Findings: no edema noted; LBM 1/17      Current Nutrition Therapies:    ADULT DIET;  Regular    Anthropometric Measures:  · Height: 5' 9\" (175.3 cm)  · Current Body Wt: 212 lb (96.2 kg)   · BMI: 31.3    Nutrition Diagnosis:   No nutrition diagnosis at this time     Nutrition Interventions:   Food and/or Nutrient Delivery:  Continue Current Diet  Nutrition Education/Counseling:  Education not indicated   Coordination of Nutrition Care:  Continue to monitor while inpatient    Goals:  po intake to remain greater than 75% of meals       Nutrition Monitoring and Evaluation:   Behavioral-Environmental Outcomes:  None Identified   Food/Nutrient Intake Outcomes:  Food and Nutrient Intake  Physical Signs/Symptoms Outcomes:  None Identified     Discharge Planning:    No discharge needs at this time     Electronically signed by Natalie Nathan RD, LD on 1/18/22 at 2:07 PM EST    Contact: [phone]

## 2022-01-18 NOTE — RT PROTOCOL NOTE
RT Inhaler-Nebulizer Bronchodilator Protocol Note    There is a bronchodilator order in the chart from a provider indicating to follow the RT Bronchodilator Protocol and there is an Initiate RT Inhaler-Nebulizer Bronchodilator Protocol order as well (see protocol at bottom of note). CXR Findings:  No results found. The findings from the last RT Protocol Assessment were as follows:   History Pulmonary Disease: Chronic pulmonary disease  Respiratory Pattern: Dyspnea on exertion or RR 21-25 bpm  Breath Sounds: Slightly diminished and/or crackles  Cough: Strong, spontaneous, non-productive  Indication for Bronchodilator Therapy: On home bronchodilators  Bronchodilator Assessment Score: 6    Aerosolized bronchodilator medication orders have been revised according to the RT Inhaler-Nebulizer Bronchodilator Protocol below. Respiratory Therapist to perform RT Therapy Protocol Assessment initially then follow the protocol. Repeat RT Therapy Protocol Assessment PRN for score 0-3 or on second treatment, BID, and PRN for scores above 3. No Indications  adjust the frequency to every 6 hours PRN wheezing or bronchospasm, if no treatments needed after 48 hours then discontinue using Per Protocol order mode. If indication present, adjust the RT bronchodilator orders based on the Bronchodilator Assessment Score as indicated below. Use Inhaler orders unless patient has one or more of the following: on home nebulizer, not able to hold breath for 10 seconds, is not alert and oriented, cannot activate and use MDI correctly, or respiratory rate 25 breaths per minute or more, then use the equivalent nebulizer order(s) with same Frequency and PRN reasons based on the score. If a patient is on this medication at home then do not decrease Frequency below that used at home.     0-3  enter or revise RT bronchodilator order(s) to equivalent RT Bronchodilator order with Frequency of every 4 hours PRN for wheezing or increased work of breathing using Per Protocol order mode. 4-6  enter or revise RT Bronchodilator order(s) to two equivalent RT bronchodilator orders with one order with BID Frequency and one order with Frequency of every 4 hours PRN wheezing or increased work of breathing using Per Protocol order mode. 7-10  enter or revise RT Bronchodilator order(s) to two equivalent RT bronchodilator orders with one order with TID Frequency and one order with Frequency of every 4 hours PRN wheezing or increased work of breathing using Per Protocol order mode. 11-13  enter or revise RT Bronchodilator order(s) to one equivalent RT bronchodilator order with QID Frequency and an Albuterol order with Frequency of every 4 hours PRN wheezing or increased work of breathing using Per Protocol order mode. Greater than 13  enter or revise RT Bronchodilator order(s) to one equivalent RT bronchodilator order with every 4 hours Frequency and an Albuterol order with Frequency of every 2 hours PRN wheezing or increased work of breathing using Per Protocol order mode. RT to enter RT Home Evaluation for COPD & MDI Assessment order using Per Protocol order mode.     Electronically signed by Nga Baeza RCP on 1/18/2022 at 9:29 AM

## 2022-01-18 NOTE — PROGRESS NOTES
Hospitalist Progress Note      PCP: Jodi Clemente DO    Date of Admission: 1/12/2022    Chief Complaint: [address] Course: Admitted to the hospital with COVID-19 pneumonia transferred to the [facility] ICU    Subjective:   Currently on 77% FiO2, on heated nasal cannula, labs pending. Medications:  Reviewed    Infusion Medications    dextrose      sodium chloride 25 mL (01/18/22 0916)     Scheduled Medications    carvedilol  6.25 mg Oral BID WC    [START ON 1/19/2022] dexamethasone  10 mg IntraVENous Daily    insulin glargine  5 Units SubCUTAneous Daily    enoxaparin  30 mg SubCUTAneous BID    insulin lispro  0-18 Units SubCUTAneous TID WC    insulin lispro  0-9 Units SubCUTAneous Nightly    furosemide  40 mg IntraVENous Q8H    albuterol sulfate HFA  2 puff Inhalation BID    ipratropium  2 puff Inhalation BID    isosorbide mononitrate  30 mg Oral Daily    aspirin  81 mg Oral Daily    amiodarone  200 mg Oral Daily    sacubitril-valsartan  0.5 tablet Oral BID    sodium chloride flush  5-40 mL IntraVENous 2 times per day     PRN Meds: perflutren lipid microspheres, glucose, dextrose, glucagon (rDNA), dextrose, albuterol sulfate HFA, linaclotide, nitroGLYCERIN, sodium chloride flush, sodium chloride, ondansetron **OR** ondansetron, magnesium hydroxide, acetaminophen **OR** acetaminophen, hydrALAZINE, potassium chloride **OR** potassium alternative oral replacement **OR** potassium chloride, sodium chloride      Intake/Output Summary (Last 24 hours) at 1/18/2022 1251  Last data filed at 1/18/2022 0858  Gross per 24 hour   Intake 960 ml   Output 1500 ml   Net -540 ml       Physical Exam Performed:    /64   Pulse 58   Temp 96.5 °F (35.8 °C) (Temporal)   Resp 21   Ht 5' 9\" (1.753 m)   Wt 201 lb (91.2 kg)   SpO2 94%   BMI 29.68 kg/m²     General appearance: No apparent distress, appears stated age and cooperative. HEENT: Pupils equal, round, and reactive to light. Conjunctivae/corneas clear. Neck: Supple, with full range of motion. No jugular venous distention. Trachea midline. Respiratory:  Normal respiratory effort. Clear to auscultation, bilaterally without Rales/Wheezes/Rhonchi. Cardiovascular: Regular rate and rhythm with normal S1/S2 without murmurs, rubs or gallops. Abdomen: Soft, non-tender, non-distended with normal bowel sounds. Musculoskeletal: No clubbing, cyanosis or edema bilaterally. Full range of motion without deformity. Skin: Skin color, texture, turgor normal.  No rashes or lesions. Neurologic:  Neurovascularly intact without any focal sensory/motor deficits. Cranial nerves: II-XII intact, grossly non-focal.  Psychiatric: Alert and oriented, thought content appropriate, normal insight  Capillary Refill: Brisk,3 seconds, normal   Peripheral Pulses: +2 palpable, equal bilaterally       Labs:   Recent Labs     01/17/22  1118 01/18/22  1040   WBC 18.0* 13.9*   HGB 11.7* 11.8*   HCT 37.6* 37.0*    284     Recent Labs     01/17/22  1118 01/18/22  1040   * 132*   K 4.9 4.7   CL 99 98*   CO2 18* 19*   BUN 60* 70*   CREATININE 1.4* 1.5*   CALCIUM 8.8 8.7     No results for input(s): AST, ALT, BILIDIR, BILITOT, ALKPHOS in the last 72 hours. No results for input(s): INR in the last 72 hours. No results for input(s): Chyrel Lions in the last 72 hours.     Urinalysis:      Lab Results   Component Value Date    NITRU POSITIVE 07/31/2016    WBCUA 287 07/31/2016    BACTERIA 4+ 07/31/2016    RBCUA 30 07/31/2016    BLOODU MODERATE 07/31/2016    SPECGRAV 1.028 07/31/2016    GLUCOSEU Negative 07/31/2016    GLUCOSEU NEGATIVE 01/30/2012       Radiology:  XR CHEST PORTABLE   Final Result   No significant change         XR CHEST PORTABLE   Final Result   Bilateral peripheral airspace opacities suggesting COVID pneumonia                 Assessment/Plan:    Active Hospital Problems    Diagnosis     HTN (hypertension) [I10]      Priority: Medium    COVID [U07.1]     Acute respiratory failure due to COVID-19 (HCC) [U07.1, J96.00]     High cholesterol [E78.00]     Acute hypoxemic respiratory failure due to COVID-19 (HCC) [U07.1, J96.01]      Acute respiratory failure due to COVID-19 ([address]  Worse and is now on Heated high flow. Currently on 77% FiO2, improving since yesterday. D-dimer 221, CRP 6.3 complete course of 5 days of remdesivir, currently on Decadron 10 mg daily, had 1 dose of Actemra    HTN (hypertension)    stay on same meds  Controlled    High cholesterol - Stable. cont statin    Hyperglycemia   due to steroids. ,  Obtain hemoglobin A1c. Start Lantus and lispro  Last known hemoglobin A1c 6.2  Continue sliding scale insulin    Coronary artery disease  Cardiology following, continue aspirin, beta-blocker, Imdur, per Ginger no need for left heart cath prior to discharge, will be addressed as an outpatient    Ischemic cardiomyopathy  Continue Entresto, Imdur, Coreg, cardiology following    History of A. fib   Rate is controlled, continue amiodarone and Coreg    DVT Prophylaxis: lovenox  Diet: ADULT DIET; Regular  Code Status: Full Code    PT/OT Eval Status: eval and treat    Dispo -he remains critically ill and has progressed to heated high flow oxygen.   Continue to monitor      Had a long discussion with patient wife, and her sister on the phone, answered all concerns and questions    Orinda Bamberger, MD

## 2022-01-18 NOTE — PROGRESS NOTES
01/18/22 0928   RT Protocol   History Pulmonary Disease 2   Respiratory pattern 2   Breath sounds 2   Cough 0   Bronchodilator Assessment Score 6

## 2022-01-18 NOTE — PROGRESS NOTES
[facility]  HEART FAILURE  Progress Note      Admit Date 1/12/2022     Reason for Consult:      Reason for Consultation/Chief Complaint: SOB    HPI:    Vivian Shaw is a 70 y.o. male with PMH CAD, CABG 2014, ICM, HFrEF with recovered EF, COPD, HTN, PVD, AF, DCCV in 2019  admitted with SOB, COVID+      Subjective:  Patient is being seen for SOB. There were no acute overnight cardiac events.    Today Mr. Raiza Garcia is sleeping today, NAD    Baseline Weight:    Wt Readings from Last 3 Encounters:   01/18/22 201 lb (91.2 kg)   07/12/21 212 lb (96.2 kg)   06/29/20 201 lb (91.2 kg)          NYHA Class III  Objective:   /64   Pulse 58   Temp 96.5 °F (35.8 °C) (Temporal)   Resp 20   Ht 5' 9\" (1.753 m)   Wt 201 lb (91.2 kg)   SpO2 94%   BMI 29.68 kg/m²       Intake/Output Summary (Last 24 hours) at 1/18/2022 2173  Last data filed at 1/18/2022 0858  Gross per 24 hour   Intake 1414.85 ml   Output 1500 ml   Net -85.15 ml      Wt Readings from Last 3 Encounters:   01/18/22 201 lb (91.2 kg)   07/12/21 212 lb (96.2 kg)   06/29/20 201 lb (91.2 kg)      In: 1894.9 [P.O.:1440; I.V.:34.6]  Out: 2300     TELEMETRY: SB    Physical Exam:  General Appearance:  Non-obese/Well Nourished  Respiratory:  · Resp Auscultation: rales bilaterally  Cardiovascular:  · Auscultation: regular, normal S1S2, no m/g/r/c  · Palpation: Normal    · Pedal Pulses: 2+ and equal   Abdomen:  · Soft, NT, ND, + bs  Extremities:  · No Cyanosis or Clubbing  · Extremities: negative  Neurological/Psychiatric:  · Oriented to time, place, and person  · Non-anxious    MEDICATIONS:   Scheduled Meds:   Scheduled Meds:   carvedilol  6.25 mg Oral BID WC    enoxaparin  30 mg SubCUTAneous BID    insulin lispro  0-18 Units SubCUTAneous TID WC    insulin lispro  0-9 Units SubCUTAneous Nightly    furosemide  40 mg IntraVENous Q8H    albuterol sulfate HFA  2 puff Inhalation BID    ipratropium  2 puff Inhalation BID    isosorbide mononitrate  30 mg Oral Daily    aspirin  81 mg Oral Daily    amiodarone  200 mg Oral Daily    sacubitril-valsartan  0.5 tablet Oral BID    sodium chloride flush  5-40 mL IntraVENous 2 times per day    dexamethasone (DECADRON) IVPB  20 mg IntraVENous Daily     Continuous Infusions:   dextrose      sodium chloride 25 mL (01/18/22 0916)     PRN Meds:.perflutren lipid microspheres, glucose, dextrose, glucagon (rDNA), dextrose, albuterol sulfate HFA, linaclotide, nitroGLYCERIN, sodium chloride flush, sodium chloride, ondansetron **OR** ondansetron, magnesium hydroxide, acetaminophen **OR** acetaminophen, hydrALAZINE, potassium chloride **OR** potassium alternative oral replacement **OR** potassium chloride, sodium chloride  Continuous Infusions:   dextrose      sodium chloride 25 mL (01/18/22 0916)       Intake/Output Summary (Last 24 hours) at 1/18/2022 0938  Last data filed at 1/18/2022 0858  Gross per 24 hour   Intake 1414.85 ml   Output 1500 ml   Net -85.15 ml       Lab Data:  CBC:   Lab Results   Component Value Date    WBC 18.0 01/17/2022    HGB 11.7 01/17/2022     01/17/2022     BMP:  Lab Results   Component Value Date     01/17/2022    K 4.9 01/17/2022    K 5.3 01/13/2022    CL 99 01/17/2022    CO2 18 01/17/2022    BUN 60 01/17/2022    CREATININE 1.4 01/17/2022    GLUCOSE 351 01/17/2022     INR:   Lab Results   Component Value Date    INR 1.90 11/14/2019    INR 2.58 11/13/2019    INR 3.12 11/12/2019        CARDIAC LABS  ENZYMES:  No results for input(s): CKMB, CKMBINDEX, TROPONINI in the last 72 hours.     Invalid input(s): CKTOTAL;3  FASTING LIPID PANEL:  Lab Results   Component Value Date    HDL 41 11/28/2017    HDL 37 04/29/2010    LDLCALC 121 11/28/2017    TRIG 88 11/28/2017    TSH 1.14 11/10/2019     LIVER PROFILE:  Lab Results   Component Value Date    AST 16 01/13/2022    AST 18 01/12/2022    ALT 13 01/13/2022    ALT 17 01/12/2022     BNP:   Lab Results   Component Value Date    PROBNP 396 01/17/2022 PROBNP 1,192 01/12/2022    PROBNP 1,617 11/14/2019     Iron Studies:    Lab Results   Component Value Date    FERRITIN 43.3 11/11/2019     Lab Results   Component Value Date    IRON 25 (L) 11/11/2019    TIBC 310 11/11/2019    FERRITIN 43.3 11/11/2019          1. WEIGHT: Admit Weight: 209 lb (94.8 kg)      Today  Weight: 201 lb (91.2 kg)   2. I/O     Intake/Output Summary (Last 24 hours) at 1/18/2022 0938  Last data filed at 1/18/2022 0858  Gross per 24 hour   Intake 1414.85 ml   Output 1500 ml   Net -85.15 ml       Cardiac Testing: Echo 1/14/22:   Technically difficult examination. Left ventricular systolic function is low normal with ejection fraction   estimated at 50-55%. No definitive regional wall motion abnormalities are noted. Diastolic filling parameters suggests grade II diastolic dysfunction. Mild mitral regurgitation. The left atrium is mildly dilated. Aortic valve appears sclerotic but opens adequately. Mild aortic regurgitation is present. Inadequate tricuspid regurgitation to estimate systolic pulmonary artery   pressure. Echo:  11/11/2019  LVEF 15-20% with severe diffuse hypkinesis     Stress:   7/2021 - LVEF 53%,       SPECT images demonstrate a small area of mildly decreased perfusion in the    mid inferior, mid-inferolateral, and basal inferior segments. The defect is    present at rest and worsens with stress, consistent with mixed ischemia and    scar. There is associated wall motion abnormality.        The sum stress score is 8. No visual TID. Calculated TID is 1.09.        Left ventricular ejection fraction is normal at 53%.        Moderate risk scan given ischemia and mildly elevated LV volumes       [facility] 12/27/2016      Cath with occluded SVG to RCA,patent LIMA to LAD,patent SVG to diag,OM1 with good retrograde filling of LAD,patent LIMA to LAD  EF 35-40% with inf hypokinesis,LVEDP 25        Assessment/Plan:     1.  AHF- 6L out on IV lasix, labs still pending today, continue diuresis for now  2. CAD- continue ASA, BB, imdur, discussed with Cardio, will not need C prior to d/c, will address as an outpt  3. ICM- continue entresto, imdur, coreg   4.  AF -carmen again yesterday and coreg held, will decrease dose, continue Amio         I appreciate the opportunity of cooperating in the care of this individual.    Regina Piper, APRN - CNP, ACNP, [address] 1/18/2022, 9:38 AM  Heart Failure  The [address], [facility]  Ph: [phone]      Core Measures:   · Discharge instructions:   · LVEF documented:   · ACEI for LV dysfunction:   · Smoking Cessation:

## 2022-01-19 LAB
ANION GAP SERPL CALCULATED.3IONS-SCNC: 13 MMOL/L (ref 3–16)
BASOPHILS ABSOLUTE: 0 K/UL (ref 0–0.2)
BASOPHILS RELATIVE PERCENT: 0.2 %
BUN BLDV-MCNC: 69 MG/DL (ref 7–20)
CALCIUM SERPL-MCNC: 8.8 MG/DL (ref 8.3–10.6)
CHLORIDE BLD-SCNC: 100 MMOL/L (ref 99–110)
CO2: 21 MMOL/L (ref 21–32)
CREAT SERPL-MCNC: 1.3 MG/DL (ref 0.8–1.3)
EOSINOPHILS ABSOLUTE: 0 K/UL (ref 0–0.6)
EOSINOPHILS RELATIVE PERCENT: 0 %
GFR AFRICAN AMERICAN: >60
GFR NON-AFRICAN AMERICAN: 54
GLUCOSE BLD-MCNC: 212 MG/DL (ref 70–99)
GLUCOSE BLD-MCNC: 246 MG/DL (ref 70–99)
GLUCOSE BLD-MCNC: 410 MG/DL (ref 70–99)
GLUCOSE BLD-MCNC: 450 MG/DL (ref 70–99)
GLUCOSE BLD-MCNC: 453 MG/DL (ref 70–99)
GLUCOSE BLD-MCNC: >600 MG/DL (ref 70–99)
HCT VFR BLD CALC: 36.1 % (ref 40.5–52.5)
HEMOGLOBIN: 11.5 G/DL (ref 13.5–17.5)
LYMPHOCYTES ABSOLUTE: 0.4 K/UL (ref 1–5.1)
LYMPHOCYTES RELATIVE PERCENT: 2.7 %
MCH RBC QN AUTO: 23.9 PG (ref 26–34)
MCHC RBC AUTO-ENTMCNC: 31.9 G/DL (ref 31–36)
MCV RBC AUTO: 75 FL (ref 80–100)
MONOCYTES ABSOLUTE: 0.3 K/UL (ref 0–1.3)
MONOCYTES RELATIVE PERCENT: 2.4 %
NEUTROPHILS ABSOLUTE: 13.5 K/UL (ref 1.7–7.7)
NEUTROPHILS RELATIVE PERCENT: 94.7 %
PDW BLD-RTO: 20.5 % (ref 12.4–15.4)
PERFORMED ON: ABNORMAL
PLATELET # BLD: 274 K/UL (ref 135–450)
PMV BLD AUTO: 8.6 FL (ref 5–10.5)
POTASSIUM SERPL-SCNC: 5 MMOL/L (ref 3.5–5.1)
RBC # BLD: 4.82 M/UL (ref 4.2–5.9)
SODIUM BLD-SCNC: 134 MMOL/L (ref 136–145)
WBC # BLD: 14.3 K/UL (ref 4–11)

## 2022-01-19 PROCEDURE — 6360000002 HC RX W HCPCS: Performed by: INTERNAL MEDICINE

## 2022-01-19 PROCEDURE — 2580000003 HC RX 258: Performed by: INTERNAL MEDICINE

## 2022-01-19 PROCEDURE — 94640 AIRWAY INHALATION TREATMENT: CPT

## 2022-01-19 PROCEDURE — 80048 BASIC METABOLIC PNL TOTAL CA: CPT

## 2022-01-19 PROCEDURE — 2060000000 HC ICU INTERMEDIATE R&B

## 2022-01-19 PROCEDURE — 99232 SBSQ HOSP IP/OBS MODERATE 35: CPT | Performed by: NURSE PRACTITIONER

## 2022-01-19 PROCEDURE — 97530 THERAPEUTIC ACTIVITIES: CPT

## 2022-01-19 PROCEDURE — 6370000000 HC RX 637 (ALT 250 FOR IP): Performed by: NURSE PRACTITIONER

## 2022-01-19 PROCEDURE — 6360000002 HC RX W HCPCS: Performed by: HOSPITALIST

## 2022-01-19 PROCEDURE — 94761 N-INVAS EAR/PLS OXIMETRY MLT: CPT

## 2022-01-19 PROCEDURE — 2700000000 HC OXYGEN THERAPY PER DAY

## 2022-01-19 PROCEDURE — 6360000002 HC RX W HCPCS: Performed by: STUDENT IN AN ORGANIZED HEALTH CARE EDUCATION/TRAINING PROGRAM

## 2022-01-19 PROCEDURE — 36415 COLL VENOUS BLD VENIPUNCTURE: CPT

## 2022-01-19 PROCEDURE — 85025 COMPLETE CBC W/AUTO DIFF WBC: CPT

## 2022-01-19 PROCEDURE — 6370000000 HC RX 637 (ALT 250 FOR IP): Performed by: INTERNAL MEDICINE

## 2022-01-19 PROCEDURE — 6370000000 HC RX 637 (ALT 250 FOR IP): Performed by: STUDENT IN AN ORGANIZED HEALTH CARE EDUCATION/TRAINING PROGRAM

## 2022-01-19 RX ORDER — LANOLIN ALCOHOL/MO/W.PET/CERES
3 CREAM (GRAM) TOPICAL NIGHTLY PRN
Status: DISCONTINUED | OUTPATIENT
Start: 2022-01-19 | End: 2022-01-28 | Stop reason: HOSPADM

## 2022-01-19 RX ADMIN — FUROSEMIDE 40 MG: 10 INJECTION, SOLUTION INTRAMUSCULAR; INTRAVENOUS at 17:28

## 2022-01-19 RX ADMIN — Medication 10 ML: at 10:15

## 2022-01-19 RX ADMIN — Medication 2 PUFF: at 19:50

## 2022-01-19 RX ADMIN — MELATONIN TAB 3 MG 3 MG: 3 TAB at 22:01

## 2022-01-19 RX ADMIN — CARVEDILOL 6.25 MG: 6.25 TABLET, FILM COATED ORAL at 17:28

## 2022-01-19 RX ADMIN — FUROSEMIDE 40 MG: 10 INJECTION, SOLUTION INTRAMUSCULAR; INTRAVENOUS at 10:14

## 2022-01-19 RX ADMIN — ENOXAPARIN SODIUM 30 MG: 100 INJECTION SUBCUTANEOUS at 21:23

## 2022-01-19 RX ADMIN — FUROSEMIDE 40 MG: 10 INJECTION, SOLUTION INTRAMUSCULAR; INTRAVENOUS at 05:20

## 2022-01-19 RX ADMIN — SACUBITRIL AND VALSARTAN 0.5 TABLET: 24; 26 TABLET, FILM COATED ORAL at 21:22

## 2022-01-19 RX ADMIN — ASPIRIN 81 MG: 81 TABLET, COATED ORAL at 10:16

## 2022-01-19 RX ADMIN — SACUBITRIL AND VALSARTAN 0.5 TABLET: 24; 26 TABLET, FILM COATED ORAL at 10:22

## 2022-01-19 RX ADMIN — Medication 2 PUFF: at 08:28

## 2022-01-19 RX ADMIN — Medication 10 ML: at 21:23

## 2022-01-19 RX ADMIN — INSULIN LISPRO 6 UNITS: 100 INJECTION, SOLUTION INTRAVENOUS; SUBCUTANEOUS at 10:14

## 2022-01-19 RX ADMIN — AMIODARONE HYDROCHLORIDE 200 MG: 200 TABLET ORAL at 10:13

## 2022-01-19 RX ADMIN — ISOSORBIDE MONONITRATE 30 MG: 30 TABLET, EXTENDED RELEASE ORAL at 10:13

## 2022-01-19 RX ADMIN — DEXAMETHASONE SODIUM PHOSPHATE 10 MG: 10 INJECTION INTRAMUSCULAR; INTRAVENOUS at 10:13

## 2022-01-19 RX ADMIN — INSULIN LISPRO 18 UNITS: 100 INJECTION, SOLUTION INTRAVENOUS; SUBCUTANEOUS at 12:24

## 2022-01-19 RX ADMIN — CARVEDILOL 6.25 MG: 6.25 TABLET, FILM COATED ORAL at 10:14

## 2022-01-19 RX ADMIN — INSULIN LISPRO 18 UNITS: 100 INJECTION, SOLUTION INTRAVENOUS; SUBCUTANEOUS at 17:28

## 2022-01-19 RX ADMIN — ENOXAPARIN SODIUM 30 MG: 100 INJECTION SUBCUTANEOUS at 10:13

## 2022-01-19 RX ADMIN — INSULIN LISPRO 9 UNITS: 100 INJECTION, SOLUTION INTRAVENOUS; SUBCUTANEOUS at 21:22

## 2022-01-19 ASSESSMENT — PAIN SCALES - GENERAL
PAINLEVEL_OUTOF10: 0

## 2022-01-19 NOTE — PROGRESS NOTES
Shift assessment completed. VSS. Patient in SR on the monitor, denies any pain. A&Ox4. Remains on airvo. Bed alarm engaged. Call light within reach. See flowsheets for further details.

## 2022-01-19 NOTE — PROGRESS NOTES
[facility]  HEART FAILURE  Progress Note      Admit Date 1/12/2022     Reason for Consult:      Reason for Consultation/Chief Complaint: SOB    HPI:    Monae Medel is a 70 y.o. male with PMH CAD, CABG 2014, ICM, HFrEF with recovered EF, COPD, HTN, PVD, AF, DCCV in 2019  admitted with SOB, COVID+. BB has been held and decreased for carmen and hypotension    Subjective:  Patient is being seen for SOB. There were no acute overnight cardiac events. Today Mr. William Campos is sitting up on the side of the bed and is feeling better today, he denies CP or palpitations, still with SOB. Baseline Weight:    Wt Readings from Last 3 Encounters:   01/19/22 192 lb 0.3 oz (87.1 kg)   07/12/21 212 lb (96.2 kg)   06/29/20 201 lb (91.2 kg)          NYHA Class III  Objective:   /66   Pulse 61   Temp 98 °F (36.7 °C) (Temporal)   Resp 16   Ht 5' 9\" (1.753 m)   Wt 192 lb 0.3 oz (87.1 kg)   SpO2 95%   BMI 28.36 kg/m²       Intake/Output Summary (Last 24 hours) at 1/19/2022 1428  Last data filed at 1/19/2022 1424  Gross per 24 hour   Intake 750 ml   Output 1950 ml   Net -1200 ml      Wt Readings from Last 3 Encounters:   01/19/22 192 lb 0.3 oz (87.1 kg)   07/12/21 212 lb (96.2 kg)   06/29/20 201 lb (91.2 kg)      In: 1230 [P.O.:1200;  I.V.:30]  Out: 2850     TELEMETRY: SB    Physical Exam:  General Appearance:  Non-obese/Well Nourished  Respiratory:  · Resp Auscultation: CTA  Cardiovascular:  · Auscultation: regular, normal S1S2, no m/g/r/c  · Palpation: Normal    · Pedal Pulses: 2+ and equal   Abdomen:  · Soft, NT, ND, + bs  Extremities:  · No Cyanosis or Clubbing  · Extremities: negative  Neurological/Psychiatric:  · Oriented to time, place, and person  · Non-anxious    MEDICATIONS:   Scheduled Meds:   Scheduled Meds:   insulin glargine  10 Units SubCUTAneous Daily    carvedilol  6.25 mg Oral BID WC    dexamethasone  10 mg IntraVENous Daily    enoxaparin  30 mg SubCUTAneous BID    insulin lispro  0-18 Units SubCUTAneous TID WC    insulin lispro  0-9 Units SubCUTAneous Nightly    furosemide  40 mg IntraVENous Q8H    albuterol sulfate HFA  2 puff Inhalation BID    ipratropium  2 puff Inhalation BID    isosorbide mononitrate  30 mg Oral Daily    aspirin  81 mg Oral Daily    amiodarone  200 mg Oral Daily    sacubitril-valsartan  0.5 tablet Oral BID    sodium chloride flush  5-40 mL IntraVENous 2 times per day     Continuous Infusions:   dextrose      sodium chloride 25 mL (01/18/22 0916)     PRN Meds:.melatonin, perflutren lipid microspheres, glucose, dextrose, glucagon (rDNA), dextrose, albuterol sulfate HFA, linaclotide, nitroGLYCERIN, sodium chloride flush, sodium chloride, ondansetron **OR** ondansetron, magnesium hydroxide, acetaminophen **OR** acetaminophen, hydrALAZINE, potassium chloride **OR** potassium alternative oral replacement **OR** potassium chloride, sodium chloride  Continuous Infusions:   dextrose      sodium chloride 25 mL (01/18/22 0916)       Intake/Output Summary (Last 24 hours) at 1/19/2022 1428  Last data filed at 1/19/2022 1424  Gross per 24 hour   Intake 750 ml   Output 1950 ml   Net -1200 ml       Lab Data:  CBC:   Lab Results   Component Value Date    WBC 14.3 01/19/2022    HGB 11.5 01/19/2022     01/19/2022     BMP:  Lab Results   Component Value Date     01/19/2022    K 5.0 01/19/2022    K 5.3 01/13/2022     01/19/2022    CO2 21 01/19/2022    BUN 69 01/19/2022    CREATININE 1.3 01/19/2022    GLUCOSE 246 01/19/2022     INR:   Lab Results   Component Value Date    INR 1.90 11/14/2019    INR 2.58 11/13/2019    INR 3.12 11/12/2019        CARDIAC LABS  ENZYMES:  No results for input(s): CKMB, CKMBINDEX, TROPONINI in the last 72 hours.     Invalid input(s): CKTOTAL;3  FASTING LIPID PANEL:  Lab Results   Component Value Date    HDL 41 11/28/2017    HDL 37 04/29/2010    LDLCALC 121 11/28/2017    TRIG 88 11/28/2017    TSH 1.14 11/10/2019     LIVER PROFILE:  Lab Results with good retrograde filling of LAD,patent LIMA to LAD  EF 35-40% with inf hypokinesis,LVEDP 25    Assessment/Plan:     1. AHF- 8L out with IV lasix, continue diuresis  2. CAD- continue ASA, BB, imdur, discussed with Cardio, will not need [place] prior to d/c, will address as an outpt  3. ICM- continue entresto, imdur, coreg   4.  AF -BB dose decreased for carmen/hypotension, rate controlled today, continue Amio        I appreciate the opportunity of cooperating in the care of this individual.    Vikas Beckwith, GEORGE - CNP, ACNP, [address] 1/19/2022, 2:28 PM  Heart Failure  The [address], [address] [place] [address]  Ph: [phone]      Core Measures:   · Discharge instructions:   · LVEF documented:   · ACEI for LV dysfunction:   · Smoking Cessation:

## 2022-01-19 NOTE — PROGRESS NOTES
Hospitalist Progress Note      PCP: Saundra Sahu DO    Date of Admission: 1/12/2022    Chief Complaint: [address] Course: Admitted to the hospital with COVID-19 pneumonia transferred to the [facility] ICU    Subjective:   Currently on 77%>70%  FiO2, on heated nasal cannula, no acute event overnight    Medications:  Reviewed    Infusion Medications    dextrose      sodium chloride 25 mL (01/18/22 0916)     Scheduled Medications    insulin glargine  10 Units SubCUTAneous Daily    carvedilol  6.25 mg Oral BID WC    dexamethasone  10 mg IntraVENous Daily    enoxaparin  30 mg SubCUTAneous BID    insulin lispro  0-18 Units SubCUTAneous TID WC    insulin lispro  0-9 Units SubCUTAneous Nightly    furosemide  40 mg IntraVENous Q8H    albuterol sulfate HFA  2 puff Inhalation BID    ipratropium  2 puff Inhalation BID    isosorbide mononitrate  30 mg Oral Daily    aspirin  81 mg Oral Daily    amiodarone  200 mg Oral Daily    sacubitril-valsartan  0.5 tablet Oral BID    sodium chloride flush  5-40 mL IntraVENous 2 times per day     PRN Meds: melatonin, perflutren lipid microspheres, glucose, dextrose, glucagon (rDNA), dextrose, albuterol sulfate HFA, linaclotide, nitroGLYCERIN, sodium chloride flush, sodium chloride, ondansetron **OR** ondansetron, magnesium hydroxide, acetaminophen **OR** acetaminophen, hydrALAZINE, potassium chloride **OR** potassium alternative oral replacement **OR** potassium chloride, sodium chloride      Intake/Output Summary (Last 24 hours) at 1/19/2022 1204  Last data filed at 1/19/2022 1000  Gross per 24 hour   Intake 750 ml   Output 1525 ml   Net -775 ml       Physical Exam Performed:    /62   Pulse 71   Temp 98.9 °F (37.2 °C) (Temporal)   Resp 20   Ht 5' 9\" (1.753 m)   Wt 192 lb 0.3 oz (87.1 kg)   SpO2 91%   BMI 28.36 kg/m²     General appearance: No apparent distress, appears stated age and cooperative. HEENT: Pupils equal, round, and reactive to light. Problems    Diagnosis     HTN (hypertension) [I10]      Priority: Medium    COVID [U07.1]     Acute respiratory failure due to COVID-19 (HCC) [U07.1, J96.00]     High cholesterol [E78.00]     Acute hypoxemic respiratory failure due to COVID-19 (HCC) [U07.1, J96.01]      Acute respiratory failure due to COVID-19 ([address]  Worse and is now on Heated high flow. Currently on 70% FiO2, improving since yesterday. D-dimer 221, CRP 6.3 complete course of 5 days of remdesivir, currently on Decadron 10 mg daily, had 1 dose of Actemra    HTN (hypertension)    stay on same meds  Controlled    High cholesterol - Stable. cont statin    Hyperglycemia   due to steroids. ,  Obtain hemoglobin A1c. Start Lantus and lispro  Last known hemoglobin A1c 6.2  Continue sliding scale insulin    Coronary artery disease  Cardiology following, continue aspirin, beta-blocker, Imdur, per Crads no need for left heart cath prior to discharge, will be addressed as an outpatient    Ischemic cardiomyopathy  Continue Entresto, Imdur, Coreg, cardiology following    History of A. fib   Rate is controlled, continue amiodarone and Coreg    DVT Prophylaxis: lovenox  Diet: ADULT DIET; Regular  Code Status: Full Code    PT/OT Eval Status: eval and treat    Dispo -he remains critically ill and has progressed to heated high flow oxygen.   Continue to monitor      Elisa Rivas MD

## 2022-01-19 NOTE — PROGRESS NOTES
Physician Progress Note      PATIENT:               Sriram Enrique  CSN #:                  [id number]  :                       1950  ADMIT DATE:       2022 3:59 PM  [address] DATE:  RESPONDING  PROVIDER #:        FADI BRITTON          QUERY TEXT:    Patient admitted with Covid-19 pneumonia. Documentation reflects Acute on   Chronic Combined HF in Cardiology notes dated  and 1/15. .  If possible,   please document in the progress notes and discharge summary if Acute on   Chronic Combined Heart failure was: The medical record reflects the following:  Risk Factors: CAD, HTN, MI, Smoking  Clinical Indicators: Pt presented to ED with increasing SOB, reports recent   diagnosis of Covid. Noted history of CABG, with stenting x2. BNP of 1,192. Cardiology consultant with assessment of severe pulmonary edema on CXR, has   history of LVEF of 15-20% which normalized after medical therapy, currently   taking Entresto and Carvedilol, now with Acute on Chronic combined systolic   diastolic HF, progression of underlying ischemic cardiomyopathy with volume   overload, begin  Lasix IV Q 8 hrs. Per notes of Card consultant on 1/15/22,   Acute heart failure, 4700 out on Lasix continue  Treatment:  Lasix, Entresto, Imdur, Coreg, Echo, Lab work, [employer] monitoring,   Weights.   Options provided:  -- Acute on Chronic Combined Heart failure confirmed after study  -- Acute on Chronic Combined Heart failure ruled out after study  -- Other - I will add my own diagnosis  -- Disagree - Not applicable / Not valid  -- Disagree - Clinically unable to determine / Unknown  -- Refer to Clinical Documentation Reviewer    PROVIDER RESPONSE TEXT:    Acute on chronic diastolic HF    Query created by: Nacho Hudson on 2022 4:59 PM      Electronically signed by:  Ruben Alberts 2022 10:00 AM

## 2022-01-19 NOTE — PLAN OF CARE
Problem: Airway Clearance - Ineffective  Goal: Achieve or maintain patent airway  Outcome: Ongoing     Problem: Gas Exchange - Impaired  Goal: Absence of hypoxia  Outcome: Ongoing  Goal: Promote optimal lung function  Outcome: Ongoing     Problem: Breathing Pattern - Ineffective  Goal: Ability to achieve and maintain a regular respiratory rate  Outcome: Ongoing     Problem:  Body Temperature -  Risk of, Imbalanced  Goal: Ability to maintain a body temperature within defined limits  Outcome: Ongoing  Goal: Will regain or maintain usual level of consciousness  Outcome: Ongoing  Goal: Complications related to the disease process, condition or treatment will be avoided or minimized  Outcome: Ongoing     Problem: Isolation Precautions - Risk of Spread of Infection  Goal: Prevent transmission of infection  Outcome: Ongoing     Problem: Nutrition Deficits  Goal: Optimize nutritional status  Outcome: Ongoing     Problem: Risk for Fluid Volume Deficit  Goal: Maintain normal heart rhythm  Outcome: Ongoing  Goal: Maintain absence of muscle cramping  Outcome: Ongoing  Goal: Maintain normal serum potassium, sodium, calcium, phosphorus, and pH  Outcome: Ongoing     Problem: Loneliness or Risk for Loneliness  Goal: Demonstrate positive use of time alone when socialization is not possible  Outcome: Ongoing     Problem: Fatigue  Goal: Verbalize increase energy and improved vitality  Outcome: Ongoing     Problem: Patient Education: Go to Patient Education Activity  Goal: Patient/Family Education  Outcome: Ongoing     Problem: FLUID AND ELECTROLYTE IMBALANCE  Goal: Fluid and electrolyte balance are achieved/maintained  Outcome: Ongoing     Problem: Pain:  Goal: Pain level will decrease  Description: Pain level will decrease  Outcome: Ongoing  Goal: Control of acute pain  Description: Control of acute pain  Outcome: Ongoing  Goal: Control of chronic pain  Description: Control of chronic pain  Outcome: Ongoing     Problem: Falls - Risk of:  Goal: Will remain free from falls  Description: Will remain free from falls  Outcome: Ongoing  Goal: Absence of physical injury  Description: Absence of physical injury  Outcome: Ongoing

## 2022-01-19 NOTE — PROGRESS NOTES
Physical Therapy  Facility/Department: [facility]C  Daily Treatment Note  NAME: Kena Arboleda  : 1950  MRN: [id number]    Date of Service: 2022    Discharge Recommendations:  Kena Arboleda scored a 15/24 on the AM-PAC short mobility form. Current research shows that an AM-PAC score of 17 or less is typically not associated with a discharge to the patient's home setting. Based on the patient's AM-PAC score and their current functional mobility deficits, it is recommended that the patient have 5-7 sessions per week of Physical Therapy at d/c to increase the patient's independence. At this time, this patient demonstrates the endurance, and/or tolerance for 3 hours of therapy each day, with a treatment frequency of 5-7x/wk. Please see assessment section for further patient specific details. If patient discharges prior to next session this note will serve as a discharge summary. Please see below for the latest assessment towards goals. 5-7 sessions per week   PT Equipment Recommendations  Equipment Needed: No    Assessment   Body structures, Functions, Activity limitations: Decreased functional mobility ; Decreased strength;Decreased endurance;Decreased balance  Assessment: Limites session this date as patient willing to perform seated EOB activities only. SBA for bed mobility and independent to sit EOB with stable oxygen saturation.  Patient would continue to benefit from skilled PT to address above deficits and faciltiate return to baseline function  Treatment Diagnosis: Decreased tolerance to activity  Clinical Presentation: Evolving  PT Education: Goals;PT Role;Plan of Care;Energy Conservation  Patient Education: Proning, attempting to eat meals OOB, performing UE/LE exercises, importance of OOB activity  - Pt verbalized understanding  Barriers to Learning: None  REQUIRES PT FOLLOW UP: Yes  Activity Tolerance  Activity Tolerance: Patient Tolerated treatment well     Patient Diagnosis(es): The primary encounter diagnosis was COVID. A diagnosis of Acute respiratory failure with hypoxia (HCC) was also pertinent to this visit. has a past medical history of Acid reflux, Acute MI ([facility] [address] CAD (coronary artery disease), COPD (chronic obstructive pulmonary disease) ([facility] [address] Diverticulitis, Hypertension, and Peripheral vascular disease ([facility] [address] has a past surgical history that includes vascular surgery; Cardiac surgery; Colonoscopy; and Cardiac catheterization. Restrictions  Restrictions/Precautions  Restrictions/Precautions: Isolation,Fall Risk (COVID+, high fall risk)  Required Braces or Orthoses?: No  Position Activity Restriction  Sternal Precautions: 02 with bed mobility 87%, 02 with ambulation to chair 85%, 02 with 24' ambulation 81%, static stance edge of recliner 91%, brushing teeth in stand 84%- recovers to above 90% with 2-3 minute rest breaks. BP at /70  Other position/activity restrictions: Per H&P \"76 y.o. male who presents for evaluation of increasing shortness of breath that started this morning. Patient states that he was diagnosed with COVID-19 and was inpatient at [facility] couple of weeks ago. States he has been progressively getting worse but significantly short of breath today. States that he cannot breathe. He has no history of CHF but does have history of COPD. No prior oxygen requirement. He denies any chest pain. No additional information is able to obtain at this time. \"  Subjective   General  Chart Reviewed: Yes  Family / Caregiver Present: No  Subjective  Subjective: Reported mild epigastric discomfort in bed -resloved sitting up but reported back pain seated EOB. Agreeable to therapy, stating he just wants to sit EOB  General Comment  Comments: supine in bed upon arrival with alarm on.           Orientation  Orientation  Overall Orientation Status: Within Functional Limits  Cognition      Objective   Bed mobility  Supine to Sit: Stand by assistance  Scooting: Stand by assistance  Comment: SpO2 decreased briefly to 88% on heated high flow O2 sit to supine. Recovered to 93-95% with short rest break. Transfers  Sit to Stand: Unable to assess (declined - states he \"feels too wobbly\")  Bed to Chair: Unable to assess (declined - states he just wants to sit on EOB)        Balance  Sitting - Static: Good  Sitting - Dynamic: Good  Comments: Sat EOB independently 25-30 minutes. SpO2 % on 63% Fi02,50L O2 heated nasal cannula. Other exercises  Other exercises?: No (declined exercises, did perorm teeth brushing task seated EOB)                         AM-PAC Score  AM-PAC Inpatient Mobility Raw Score : 15 (01/19/22 1539)  AM-PAC Inpatient T-Scale Score : 39.45 (01/19/22 1539)  Mobility Inpatient CMS 0-100% Score: 57.7 (01/19/22 1539)  Mobility Inpatient CMS G-Code Modifier : CK (01/19/22 1539)          Goals  Short term goals  Time Frame for Short term goals: Before discharge  Short term goal 1: Pt will complete bed mobility indep  Short term goal 2: Pt will complete sit<>stand indep  Short term goal 3: Pt will ambulate 50 ft with RW and supervision  Short term goal 4: Pt will ascend/descend 5 steps with (R) rail with supervision  Short term goal 5: Pt will tolerate standing x5 minutes without UE support indep in order to complete ADLs  Patient Goals   Patient goals : Go home when I am ready, I don't want to do this again  No goals met this treatment. Plan    Plan  Times per week: 3-5x  Times per day: Daily  Current Treatment Recommendations: Strengthening,Functional Mobility Training,Transfer Training,Balance Training,Endurance Training,Gait Training,Stair training,Patient/Caregiver Education & Training,Safety Education & Training,Home Exercise Program  Safety Devices  Type of devices:  All fall risk precautions in place,Call light within reach,Bed alarm in place,Gait belt,Patient at risk for falls,Left in bed,Nurse notified  Restraints  Initially in place:  No     Therapy Time   Individual Concurrent Group Co-treatment   Time In 1832         Time Out 1453         Minutes 38         Timed Code Treatment Minutes: 821 Hanna Ruiz, PT     Thanks, Cheri Fleischer, PT, DPT [id number]

## 2022-01-20 ENCOUNTER — APPOINTMENT (OUTPATIENT)
Dept: GENERAL RADIOLOGY | Age: 72
DRG: 177 | End: 2022-01-20
Payer: MEDICARE

## 2022-01-20 LAB
ANION GAP SERPL CALCULATED.3IONS-SCNC: 13 MMOL/L (ref 3–16)
BASOPHILS ABSOLUTE: 0 K/UL (ref 0–0.2)
BASOPHILS RELATIVE PERCENT: 0.1 %
BUN BLDV-MCNC: 75 MG/DL (ref 7–20)
CALCIUM SERPL-MCNC: 8.8 MG/DL (ref 8.3–10.6)
CHLORIDE BLD-SCNC: 97 MMOL/L (ref 99–110)
CO2: 21 MMOL/L (ref 21–32)
CREAT SERPL-MCNC: 1.4 MG/DL (ref 0.8–1.3)
EOSINOPHILS ABSOLUTE: 0 K/UL (ref 0–0.6)
EOSINOPHILS RELATIVE PERCENT: 0 %
GFR AFRICAN AMERICAN: >60
GFR NON-AFRICAN AMERICAN: 50
GLUCOSE BLD-MCNC: 151 MG/DL (ref 70–99)
GLUCOSE BLD-MCNC: 186 MG/DL (ref 70–99)
GLUCOSE BLD-MCNC: 241 MG/DL (ref 70–99)
GLUCOSE BLD-MCNC: 301 MG/DL (ref 70–99)
GLUCOSE BLD-MCNC: 434 MG/DL (ref 70–99)
HCT VFR BLD CALC: 35.5 % (ref 40.5–52.5)
HEMOGLOBIN: 11.2 G/DL (ref 13.5–17.5)
LYMPHOCYTES ABSOLUTE: 0.5 K/UL (ref 1–5.1)
LYMPHOCYTES RELATIVE PERCENT: 3.2 %
MCH RBC QN AUTO: 23.5 PG (ref 26–34)
MCHC RBC AUTO-ENTMCNC: 31.6 G/DL (ref 31–36)
MCV RBC AUTO: 74.6 FL (ref 80–100)
MONOCYTES ABSOLUTE: 0.5 K/UL (ref 0–1.3)
MONOCYTES RELATIVE PERCENT: 2.9 %
NEUTROPHILS ABSOLUTE: 14.5 K/UL (ref 1.7–7.7)
NEUTROPHILS RELATIVE PERCENT: 93.8 %
PDW BLD-RTO: 20.2 % (ref 12.4–15.4)
PERFORMED ON: ABNORMAL
PLATELET # BLD: 281 K/UL (ref 135–450)
PMV BLD AUTO: 8.1 FL (ref 5–10.5)
POTASSIUM SERPL-SCNC: 4.9 MMOL/L (ref 3.5–5.1)
RBC # BLD: 4.76 M/UL (ref 4.2–5.9)
SODIUM BLD-SCNC: 131 MMOL/L (ref 136–145)
WBC # BLD: 15.4 K/UL (ref 4–11)

## 2022-01-20 PROCEDURE — 2580000003 HC RX 258: Performed by: INTERNAL MEDICINE

## 2022-01-20 PROCEDURE — 6370000000 HC RX 637 (ALT 250 FOR IP): Performed by: INTERNAL MEDICINE

## 2022-01-20 PROCEDURE — 6370000000 HC RX 637 (ALT 250 FOR IP): Performed by: NURSE PRACTITIONER

## 2022-01-20 PROCEDURE — 36415 COLL VENOUS BLD VENIPUNCTURE: CPT

## 2022-01-20 PROCEDURE — 85025 COMPLETE CBC W/AUTO DIFF WBC: CPT

## 2022-01-20 PROCEDURE — 6360000002 HC RX W HCPCS: Performed by: INTERNAL MEDICINE

## 2022-01-20 PROCEDURE — 6360000002 HC RX W HCPCS: Performed by: HOSPITALIST

## 2022-01-20 PROCEDURE — 6360000002 HC RX W HCPCS: Performed by: STUDENT IN AN ORGANIZED HEALTH CARE EDUCATION/TRAINING PROGRAM

## 2022-01-20 PROCEDURE — 99232 SBSQ HOSP IP/OBS MODERATE 35: CPT | Performed by: NURSE PRACTITIONER

## 2022-01-20 PROCEDURE — 71045 X-RAY EXAM CHEST 1 VIEW: CPT

## 2022-01-20 PROCEDURE — 94761 N-INVAS EAR/PLS OXIMETRY MLT: CPT

## 2022-01-20 PROCEDURE — 2060000000 HC ICU INTERMEDIATE R&B

## 2022-01-20 PROCEDURE — 80048 BASIC METABOLIC PNL TOTAL CA: CPT

## 2022-01-20 PROCEDURE — 94640 AIRWAY INHALATION TREATMENT: CPT

## 2022-01-20 RX ADMIN — Medication 10 ML: at 08:31

## 2022-01-20 RX ADMIN — CARVEDILOL 6.25 MG: 6.25 TABLET, FILM COATED ORAL at 08:30

## 2022-01-20 RX ADMIN — ASPIRIN 81 MG: 81 TABLET, COATED ORAL at 08:30

## 2022-01-20 RX ADMIN — Medication 10 ML: at 21:57

## 2022-01-20 RX ADMIN — FUROSEMIDE 40 MG: 10 INJECTION, SOLUTION INTRAMUSCULAR; INTRAVENOUS at 08:28

## 2022-01-20 RX ADMIN — INSULIN LISPRO 18 UNITS: 100 INJECTION, SOLUTION INTRAVENOUS; SUBCUTANEOUS at 17:21

## 2022-01-20 RX ADMIN — Medication 2 PUFF: at 09:35

## 2022-01-20 RX ADMIN — DEXAMETHASONE SODIUM PHOSPHATE 10 MG: 10 INJECTION INTRAMUSCULAR; INTRAVENOUS at 08:29

## 2022-01-20 RX ADMIN — ENOXAPARIN SODIUM 30 MG: 100 INJECTION SUBCUTANEOUS at 08:30

## 2022-01-20 RX ADMIN — ENOXAPARIN SODIUM 30 MG: 100 INJECTION SUBCUTANEOUS at 21:56

## 2022-01-20 RX ADMIN — FUROSEMIDE 40 MG: 10 INJECTION, SOLUTION INTRAMUSCULAR; INTRAVENOUS at 03:18

## 2022-01-20 RX ADMIN — INSULIN LISPRO 3 UNITS: 100 INJECTION, SOLUTION INTRAVENOUS; SUBCUTANEOUS at 22:07

## 2022-01-20 RX ADMIN — INSULIN LISPRO 12 UNITS: 100 INJECTION, SOLUTION INTRAVENOUS; SUBCUTANEOUS at 12:19

## 2022-01-20 RX ADMIN — AMIODARONE HYDROCHLORIDE 200 MG: 200 TABLET ORAL at 08:30

## 2022-01-20 RX ADMIN — INSULIN LISPRO 3 UNITS: 100 INJECTION, SOLUTION INTRAVENOUS; SUBCUTANEOUS at 08:33

## 2022-01-20 RX ADMIN — SACUBITRIL AND VALSARTAN 0.5 TABLET: 24; 26 TABLET, FILM COATED ORAL at 21:56

## 2022-01-20 ASSESSMENT — PAIN SCALES - GENERAL
PAINLEVEL_OUTOF10: 1
PAINLEVEL_OUTOF10: 0

## 2022-01-20 NOTE — CARE COORDINATION
Chart reviewed for discharge planning.   Barrier(s) to discharge-      Oxygen - Patient still on 45L HFNC      Other - Patient may need referred for home OT and PT, as well as home oxygen upon discharge.           Tentative discharge plan- Patient reported on 1/13 that he will discharge home with his Wife. PT and OT scores may reflect that ARU or SNF needs to be considered.     Tentative discharge date- Unknown     *Case management will continue to follow progress and update discharge plan as needed.       Cynthia MONTERO, EDWINA, [facility]    [phone]    Electronically signed by Stephanie Torres on 1/20/2022 at 12:59 PM

## 2022-01-20 NOTE — PROGRESS NOTES
PM assessment complete and documented. Meds given per MAR. Assisted pt to [facility]. Tolerated well. Airvo secure with sats noted. Assisted pt to bed. Denies further needs at present. Will continue to monitor.

## 2022-01-20 NOTE — PROGRESS NOTES
Sleeping between care. No changes noted in assessment. Pt denies needs at present. Will continue to monitor.

## 2022-01-20 NOTE — PROGRESS NOTES
[facility]  HEART FAILURE  Progress Note      Admit Date 1/12/2022     Reason for Consult:      Reason for Consultation/Chief Complaint: SOB    HPI:    Samuel Vo is a 70 y.o. male with PMH CAD, CABG 2014, ICM, HFrEF with recovered EF, COPD, HTN, PVD, AF, DCCV in 2019  admitted with SOB, COVID+. BB has been held and decreased for carmen and hypotension    Subjective:  Patient is being seen for SOB. There were no acute overnight cardiac events. Today Mr. Yesenia Gonzalez is sleeping, NAD    Baseline Weight:    Wt Readings from Last 3 Encounters:   01/20/22 194 lb 8 oz (88.2 kg)   07/12/21 212 lb (96.2 kg)   06/29/20 201 lb (91.2 kg)          NYHA Class III  Objective:   /68   Pulse 51   Temp 96.5 °F (35.8 °C) (Temporal)   Resp 14   Ht 5' 9\" (1.753 m)   Wt 194 lb 8 oz (88.2 kg)   SpO2 97%   BMI 28.72 kg/m²       Intake/Output Summary (Last 24 hours) at 1/20/2022 0948  Last data filed at 1/20/2022 0850  Gross per 24 hour   Intake 750 ml   Output 1625 ml   Net -875 ml      Wt Readings from Last 3 Encounters:   01/20/22 194 lb 8 oz (88.2 kg)   07/12/21 212 lb (96.2 kg)   06/29/20 201 lb (91.2 kg)      In: 750 [P.O.:720;  I.V.:30]  Out: 2025     TELEMETRY: SB    Physical Exam:  General Appearance:  Non-obese/Well Nourished  Respiratory:  · Resp Auscultation: CTA  Cardiovascular:  · Auscultation: regular, normal S1S2, no m/g/r/c  · Palpation: Normal    · Pedal Pulses: 2+ and equal   Abdomen:  · Soft, NT, ND, + bs  Extremities:  · No Cyanosis or Clubbing  · Extremities: negative  Neurological/Psychiatric:  · Oriented to time, place, and person  · Non-anxious    MEDICATIONS:   Scheduled Meds:   Scheduled Meds:   insulin glargine  10 Units SubCUTAneous Daily    carvedilol  6.25 mg Oral BID WC    dexamethasone  10 mg IntraVENous Daily    enoxaparin  30 mg SubCUTAneous BID    insulin lispro  0-18 Units SubCUTAneous TID WC    insulin lispro  0-9 Units SubCUTAneous Nightly    furosemide  40 mg IntraVENous Q8H    albuterol sulfate HFA  2 puff Inhalation BID    ipratropium  2 puff Inhalation BID    isosorbide mononitrate  30 mg Oral Daily    aspirin  81 mg Oral Daily    amiodarone  200 mg Oral Daily    sacubitril-valsartan  0.5 tablet Oral BID    sodium chloride flush  5-40 mL IntraVENous 2 times per day     Continuous Infusions:   dextrose      sodium chloride 25 mL (01/18/22 0916)     PRN Meds:.melatonin, perflutren lipid microspheres, glucose, dextrose, glucagon (rDNA), dextrose, albuterol sulfate HFA, linaclotide, nitroGLYCERIN, sodium chloride flush, sodium chloride, ondansetron **OR** ondansetron, magnesium hydroxide, acetaminophen **OR** acetaminophen, hydrALAZINE, potassium chloride **OR** potassium alternative oral replacement **OR** potassium chloride, sodium chloride  Continuous Infusions:   dextrose      sodium chloride 25 mL (01/18/22 0916)       Intake/Output Summary (Last 24 hours) at 1/20/2022 0948  Last data filed at 1/20/2022 0850  Gross per 24 hour   Intake 750 ml   Output 1625 ml   Net -875 ml       Lab Data:  CBC:   Lab Results   Component Value Date    WBC 15.4 01/20/2022    HGB 11.2 01/20/2022     01/20/2022     BMP:  Lab Results   Component Value Date     01/20/2022    K 4.9 01/20/2022    K 5.3 01/13/2022    CL 97 01/20/2022    CO2 21 01/20/2022    BUN 75 01/20/2022    CREATININE 1.4 01/20/2022    GLUCOSE 186 01/20/2022     INR:   Lab Results   Component Value Date    INR 1.90 11/14/2019    INR 2.58 11/13/2019    INR 3.12 11/12/2019        CARDIAC LABS  ENZYMES:  No results for input(s): CKMB, CKMBINDEX, TROPONINI in the last 72 hours.     Invalid input(s): CKTOTAL;3  FASTING LIPID PANEL:  Lab Results   Component Value Date    HDL 41 11/28/2017    HDL 37 04/29/2010    LDLCALC 121 11/28/2017    TRIG 88 11/28/2017    TSH 1.14 11/10/2019     LIVER PROFILE:  Lab Results   Component Value Date    AST 16 01/13/2022    AST 18 01/12/2022    ALT 13 01/13/2022    ALT 17 01/12/2022     BNP:   Lab Results   Component Value Date    PROBNP 396 01/17/2022    PROBNP 1,192 01/12/2022    PROBNP 1,617 11/14/2019     Iron Studies:    Lab Results   Component Value Date    FERRITIN 43.3 11/11/2019     Lab Results   Component Value Date    IRON 25 (L) 11/11/2019    TIBC 310 11/11/2019    FERRITIN 43.3 11/11/2019          1. WEIGHT: Admit Weight: 209 lb (94.8 kg)      Today  Weight: 194 lb 8 oz (88.2 kg)   2. I/O     Intake/Output Summary (Last 24 hours) at 1/20/2022 0948  Last data filed at 1/20/2022 0850  Gross per 24 hour   Intake 750 ml   Output 1625 ml   Net -875 ml       Cardiac Testing: Echo 1/14/22:   Technically difficult examination. Left ventricular systolic function is low normal with ejection fraction   estimated at 50-55%. No definitive regional wall motion abnormalities are noted. Diastolic filling parameters suggests grade II diastolic dysfunction. Mild mitral regurgitation. The left atrium is mildly dilated. Aortic valve appears sclerotic but opens adequately. Mild aortic regurgitation is present. Inadequate tricuspid regurgitation to estimate systolic pulmonary artery   pressure. Echo:  11/11/2019  LVEF 15-20% with severe diffuse hypkinesis     Stress:   7/2021 - LVEF 53%,       SPECT images demonstrate a small area of mildly decreased perfusion in the    mid inferior, mid-inferolateral, and basal inferior segments. The defect is    present at rest and worsens with stress, consistent with mixed ischemia and    scar. There is associated wall motion abnormality.        The sum stress score is 8. No visual TID.  Calculated TID is 1.09.        Left ventricular ejection fraction is normal at 53%.        Moderate risk scan given ischemia and mildly elevated LV volumes       [facility] 12/27/2016      Cath with occluded SVG to RCA,patent LIMA to LAD,patent SVG to diag,OM1 with good retrograde filling of LAD,patent LIMA to LAD  EF 35-40% with inf hypokinesis,LVEDP 25    Assessment/Plan:     1. AHF- 9L out with IV lasix, continue diuresis  2. CAD- continue ASA, BB, imdur, discussed with Cardio, will not need C prior to d/c, will address as an outpt  3. ICM- continue entresto, imdur, coreg   4.  AF -BB dose decreased for carmen/hypotension - pt tolerating,  continue Amio        I appreciate the opportunity of cooperating in the care of this individual.    Ciara Dsouza, APRN - CNP, ACNP, [address] 1/20/2022, 9:48 AM  Heart Failure  The [address], [address]  Ph: [phone]      Core Measures:   · Discharge instructions:   · LVEF documented:   · ACEI for LV dysfunction:   · Smoking Cessation:

## 2022-01-20 NOTE — CARE COORDINATION
Discharge Planning:     (CM) called Magalie Cortes at [address] [place] [address] and provided a referral for the patient for LTAC. [facility] following.     Cynthia MONTERO, EDWINA, [facility]    [phone]      Electronically signed by Stephanie Torres on 1/20/2022 at 2:15 PM

## 2022-01-20 NOTE — PROGRESS NOTES
Physical Therapy  Tennillevita Campbell  Attempted PT treatment this afternoon however upon arrival pt reporting he does not want to participate in therapy at this time as he is waiting for lunch. Pt reports he will consider working with therapy if therapist comes back later. Will attempt to follow up with pt as schedule allows.    Idalia Cummnigs [place], DPT [id number]

## 2022-01-20 NOTE — PROGRESS NOTES
Hospitalist Progress Note      PCP: Kaur Nunez DO    Date of Admission: 1/12/2022    Chief Complaint: [address] Course: Admitted to the hospital with COVID-19 pneumonia transferred to the [facility] ICU    Subjective:   Currently on 77%>70%>80%  FiO2, on heated nasal cannula, hyperglycemia, had been started on Lantus. Medications:  Reviewed    Infusion Medications    dextrose      sodium chloride 25 mL (01/18/22 0916)     Scheduled Medications    insulin glargine  15 Units SubCUTAneous BID    carvedilol  6.25 mg Oral BID WC    dexamethasone  10 mg IntraVENous Daily    enoxaparin  30 mg SubCUTAneous BID    insulin lispro  0-18 Units SubCUTAneous TID WC    insulin lispro  0-9 Units SubCUTAneous Nightly    furosemide  40 mg IntraVENous Q8H    albuterol sulfate HFA  2 puff Inhalation BID    ipratropium  2 puff Inhalation BID    isosorbide mononitrate  30 mg Oral Daily    aspirin  81 mg Oral Daily    amiodarone  200 mg Oral Daily    sacubitril-valsartan  0.5 tablet Oral BID    sodium chloride flush  5-40 mL IntraVENous 2 times per day     PRN Meds: melatonin, perflutren lipid microspheres, glucose, dextrose, glucagon (rDNA), dextrose, albuterol sulfate HFA, linaclotide, nitroGLYCERIN, sodium chloride flush, sodium chloride, ondansetron **OR** ondansetron, magnesium hydroxide, acetaminophen **OR** acetaminophen, hydrALAZINE, potassium chloride **OR** potassium alternative oral replacement **OR** potassium chloride, sodium chloride      Intake/Output Summary (Last 24 hours) at 1/20/2022 1342  Last data filed at 1/20/2022 0850  Gross per 24 hour   Intake    Output 1325 ml   Net -1325 ml       Physical Exam Performed:    /68   Pulse 51   Temp 96.6 °F (35.9 °C) (Temporal)   Resp 14   Ht 5' 9\" (1.753 m)   Wt 194 lb 8 oz (88.2 kg)   SpO2 97%   BMI 28.72 kg/m²     General appearance: No apparent distress, appears stated age and cooperative.   HEENT: Pupils equal, round, and reactive to light. Conjunctivae/corneas clear. Neck: Supple, with full range of motion. No jugular venous distention. Trachea midline. Respiratory:  Normal respiratory effort. Clear to auscultation, bilaterally without Rales/Wheezes/Rhonchi. Cardiovascular: Regular rate and rhythm with normal S1/S2 without murmurs, rubs or gallops. Abdomen: Soft, non-tender, non-distended with normal bowel sounds. Musculoskeletal: No clubbing, cyanosis or edema bilaterally. Full range of motion without deformity. Skin: Skin color, texture, turgor normal.  No rashes or lesions. Neurologic:  Neurovascularly intact without any focal sensory/motor deficits. Cranial nerves: II-XII intact, grossly non-focal.  Psychiatric: Alert and oriented, thought content appropriate, normal insight  Capillary Refill: Brisk,3 seconds, normal   Peripheral Pulses: +2 palpable, equal bilaterally       Labs:   Recent Labs     01/18/22  1040 01/19/22  0521 01/20/22  0446   WBC 13.9* 14.3* 15.4*   HGB 11.8* 11.5* 11.2*   HCT 37.0* 36.1* 35.5*    274 281     Recent Labs     01/18/22  1040 01/19/22  0521 01/20/22  0446   * 134* 131*   K 4.7 5.0 4.9   CL 98* 100 97*   CO2 19* 21 21   BUN 70* 69* 75*   CREATININE 1.5* 1.3 1.4*   CALCIUM 8.7 8.8 8.8     No results for input(s): AST, ALT, BILIDIR, BILITOT, ALKPHOS in the last 72 hours. No results for input(s): INR in the last 72 hours. No results for input(s): Ofelia Memo in the last 72 hours. Urinalysis:      Lab Results   Component Value Date    NITRU POSITIVE 07/31/2016    WBCUA 287 07/31/2016    BACTERIA 4+ 07/31/2016    RBCUA 30 07/31/2016    BLOODU MODERATE 07/31/2016    SPECGRAV 1.028 07/31/2016    GLUCOSEU Negative 07/31/2016    GLUCOSEU NEGATIVE 01/30/2012       Radiology:  XR CHEST PORTABLE   Final Result   Improved aeration of the lungs bilaterally with residual interstitial   prominence favoring moderate pulmonary vascular congestion.          XR CHEST PORTABLE   Final Result   No significant change         XR CHEST PORTABLE   Final Result   Bilateral peripheral airspace opacities suggesting COVID pneumonia                 Assessment/Plan:    Active Hospital Problems    Diagnosis     HTN (hypertension) [I10]      Priority: Medium    COVID [U07.1]     Acute respiratory failure due to COVID-19 (HCC) [U07.1, J96.00]     High cholesterol [E78.00]     Acute hypoxemic respiratory failure due to COVID-19 (HCC) [U07.1, J96.01]      Acute respiratory failure due to COVID-19 ([address]  Worse and is now on Heated high flow. Currently on 70% FiO2, improving since yesterday. D-dimer 221, CRP 6.3 complete course of 5 days of remdesivir, currently on Decadron 10 mg daily, had 1 dose of Actemra  CXR with improved aeration of lungs bilaterally    HTN (hypertension)    stay on same meds  Controlled    High cholesterol - Stable. cont statin    Hyperglycemia   due to steroids. ,  Obtain hemoglobin A1c. Start Lantus and lispro  Last known hemoglobin A1c 6.2  Continue sliding scale insulin, start  on Lantus twice daily    Coronary artery disease  Cardiology following, continue aspirin, beta-blocker, Imdur, per Crads no need for left heart cath prior to discharge, will be addressed as an outpatient    Ischemic cardiomyopathy  Continue Entresto, Imdur, Coreg, cardiology following    History of A. fib   Rate is controlled, continue amiodarone and Coreg    DVT Prophylaxis: lovenox  Diet: ADULT DIET; Regular  Code Status: Full Code    PT/OT Eval Status: eval and treat    Dispo -he remains critically ill and has progressed to heated high flow oxygen.   Continue to monitor      Kuldip Raymundo MD

## 2022-01-21 LAB
ANION GAP SERPL CALCULATED.3IONS-SCNC: 15 MMOL/L (ref 3–16)
BASOPHILS ABSOLUTE: 0 K/UL (ref 0–0.2)
BASOPHILS RELATIVE PERCENT: 0.2 %
BUN BLDV-MCNC: 72 MG/DL (ref 7–20)
CALCIUM SERPL-MCNC: 8.7 MG/DL (ref 8.3–10.6)
CHLORIDE BLD-SCNC: 95 MMOL/L (ref 99–110)
CO2: 20 MMOL/L (ref 21–32)
CREAT SERPL-MCNC: 1.4 MG/DL (ref 0.8–1.3)
EOSINOPHILS ABSOLUTE: 0 K/UL (ref 0–0.6)
EOSINOPHILS RELATIVE PERCENT: 0.1 %
GFR AFRICAN AMERICAN: >60
GFR NON-AFRICAN AMERICAN: 50
GLUCOSE BLD-MCNC: 223 MG/DL (ref 70–99)
GLUCOSE BLD-MCNC: 279 MG/DL (ref 70–99)
GLUCOSE BLD-MCNC: 305 MG/DL (ref 70–99)
GLUCOSE BLD-MCNC: 347 MG/DL (ref 70–99)
GLUCOSE BLD-MCNC: 357 MG/DL (ref 70–99)
HCT VFR BLD CALC: 37.1 % (ref 40.5–52.5)
HEMOGLOBIN: 11.8 G/DL (ref 13.5–17.5)
LYMPHOCYTES ABSOLUTE: 0.4 K/UL (ref 1–5.1)
LYMPHOCYTES RELATIVE PERCENT: 2.6 %
MCH RBC QN AUTO: 23.8 PG (ref 26–34)
MCHC RBC AUTO-ENTMCNC: 32 G/DL (ref 31–36)
MCV RBC AUTO: 74.4 FL (ref 80–100)
MONOCYTES ABSOLUTE: 0.4 K/UL (ref 0–1.3)
MONOCYTES RELATIVE PERCENT: 3.3 %
NEUTROPHILS ABSOLUTE: 12.8 K/UL (ref 1.7–7.7)
NEUTROPHILS RELATIVE PERCENT: 93.8 %
PDW BLD-RTO: 20.2 % (ref 12.4–15.4)
PERFORMED ON: ABNORMAL
PLATELET # BLD: 270 K/UL (ref 135–450)
PMV BLD AUTO: 8.3 FL (ref 5–10.5)
POTASSIUM SERPL-SCNC: 4.7 MMOL/L (ref 3.5–5.1)
RBC # BLD: 4.98 M/UL (ref 4.2–5.9)
SODIUM BLD-SCNC: 130 MMOL/L (ref 136–145)
WBC # BLD: 13.7 K/UL (ref 4–11)

## 2022-01-21 PROCEDURE — 94640 AIRWAY INHALATION TREATMENT: CPT

## 2022-01-21 PROCEDURE — 2700000000 HC OXYGEN THERAPY PER DAY

## 2022-01-21 PROCEDURE — 36415 COLL VENOUS BLD VENIPUNCTURE: CPT

## 2022-01-21 PROCEDURE — 99232 SBSQ HOSP IP/OBS MODERATE 35: CPT | Performed by: NURSE PRACTITIONER

## 2022-01-21 PROCEDURE — 94761 N-INVAS EAR/PLS OXIMETRY MLT: CPT

## 2022-01-21 PROCEDURE — 6370000000 HC RX 637 (ALT 250 FOR IP): Performed by: NURSE PRACTITIONER

## 2022-01-21 PROCEDURE — 97530 THERAPEUTIC ACTIVITIES: CPT

## 2022-01-21 PROCEDURE — 6370000000 HC RX 637 (ALT 250 FOR IP): Performed by: INTERNAL MEDICINE

## 2022-01-21 PROCEDURE — 6360000002 HC RX W HCPCS: Performed by: INTERNAL MEDICINE

## 2022-01-21 PROCEDURE — 80048 BASIC METABOLIC PNL TOTAL CA: CPT

## 2022-01-21 PROCEDURE — 6360000002 HC RX W HCPCS: Performed by: STUDENT IN AN ORGANIZED HEALTH CARE EDUCATION/TRAINING PROGRAM

## 2022-01-21 PROCEDURE — 85025 COMPLETE CBC W/AUTO DIFF WBC: CPT

## 2022-01-21 PROCEDURE — 2580000003 HC RX 258: Performed by: INTERNAL MEDICINE

## 2022-01-21 PROCEDURE — 6370000000 HC RX 637 (ALT 250 FOR IP): Performed by: STUDENT IN AN ORGANIZED HEALTH CARE EDUCATION/TRAINING PROGRAM

## 2022-01-21 PROCEDURE — 6360000002 HC RX W HCPCS: Performed by: HOSPITALIST

## 2022-01-21 PROCEDURE — 2060000000 HC ICU INTERMEDIATE R&B

## 2022-01-21 RX ADMIN — ENOXAPARIN SODIUM 30 MG: 100 INJECTION SUBCUTANEOUS at 22:20

## 2022-01-21 RX ADMIN — INSULIN LISPRO 6 UNITS: 100 INJECTION, SOLUTION INTRAVENOUS; SUBCUTANEOUS at 19:34

## 2022-01-21 RX ADMIN — INSULIN LISPRO 15 UNITS: 100 INJECTION, SOLUTION INTRAVENOUS; SUBCUTANEOUS at 12:13

## 2022-01-21 RX ADMIN — SACUBITRIL AND VALSARTAN 0.5 TABLET: 24; 26 TABLET, FILM COATED ORAL at 22:20

## 2022-01-21 RX ADMIN — Medication 2 PUFF: at 09:22

## 2022-01-21 RX ADMIN — INSULIN GLARGINE 5 UNITS: 100 INJECTION, SOLUTION SUBCUTANEOUS at 12:13

## 2022-01-21 RX ADMIN — FUROSEMIDE 40 MG: 10 INJECTION, SOLUTION INTRAMUSCULAR; INTRAVENOUS at 16:45

## 2022-01-21 RX ADMIN — SACUBITRIL AND VALSARTAN 0.5 TABLET: 24; 26 TABLET, FILM COATED ORAL at 10:08

## 2022-01-21 RX ADMIN — DEXAMETHASONE SODIUM PHOSPHATE 10 MG: 10 INJECTION INTRAMUSCULAR; INTRAVENOUS at 08:17

## 2022-01-21 RX ADMIN — FUROSEMIDE 40 MG: 10 INJECTION, SOLUTION INTRAMUSCULAR; INTRAVENOUS at 01:41

## 2022-01-21 RX ADMIN — ISOSORBIDE MONONITRATE 30 MG: 30 TABLET, EXTENDED RELEASE ORAL at 08:17

## 2022-01-21 RX ADMIN — INSULIN LISPRO 12 UNITS: 100 INJECTION, SOLUTION INTRAVENOUS; SUBCUTANEOUS at 16:45

## 2022-01-21 RX ADMIN — ENOXAPARIN SODIUM 30 MG: 100 INJECTION SUBCUTANEOUS at 08:17

## 2022-01-21 RX ADMIN — INSULIN LISPRO 9 UNITS: 100 INJECTION, SOLUTION INTRAVENOUS; SUBCUTANEOUS at 08:21

## 2022-01-21 RX ADMIN — FUROSEMIDE 40 MG: 10 INJECTION, SOLUTION INTRAMUSCULAR; INTRAVENOUS at 08:21

## 2022-01-21 RX ADMIN — Medication 10 ML: at 22:20

## 2022-01-21 RX ADMIN — Medication 2 PUFF: at 21:19

## 2022-01-21 RX ADMIN — CARVEDILOL 6.25 MG: 6.25 TABLET, FILM COATED ORAL at 08:17

## 2022-01-21 RX ADMIN — Medication 10 ML: at 08:17

## 2022-01-21 RX ADMIN — AMIODARONE HYDROCHLORIDE 200 MG: 200 TABLET ORAL at 08:17

## 2022-01-21 RX ADMIN — ASPIRIN 81 MG: 81 TABLET, COATED ORAL at 08:17

## 2022-01-21 ASSESSMENT — PAIN SCALES - GENERAL
PAINLEVEL_OUTOF10: 0

## 2022-01-21 NOTE — PROGRESS NOTES
Patient's wife called and updated. Patient's wife is requesting attending doctor to call and update her personally.

## 2022-01-21 NOTE — PROGRESS NOTES
01/21/22 1300   RT Protocol   History Pulmonary Disease 2   Respiratory pattern 2   Breath sounds 2   Cough 0   Bronchodilator Assessment Score 6

## 2022-01-21 NOTE — PROGRESS NOTES
[facility]  HEART FAILURE  Progress Note      Admit Date 1/12/2022     Reason for Consult:      Reason for Consultation/Chief Complaint: SOB    HPI:    Angelica Harry is a 70 y.o. male with PMH CAD, CABG 2014, ICM, HFrEF with recovered EF, COPD, HTN, PVD, AF, DCCV in 2019  admitted with SOB, COVID+. BB has been held and decreased for carmen and hypotension    Subjective:  Patient is being seen for SOB. There were no acute overnight cardiac events.    Today Mr. Radha Zendejas is feeling a little better, c/o fatigue and continued SOB, no CP or palpitations    Baseline Weight:    Wt Readings from Last 3 Encounters:   01/21/22 187 lb (84.8 kg)   07/12/21 212 lb (96.2 kg)   06/29/20 201 lb (91.2 kg)          NYHA Class III  Objective:   /62   Pulse 77   Temp 97.5 °F (36.4 °C) (Temporal)   Resp 20   Ht 5' 9\" (1.753 m)   Wt 187 lb (84.8 kg)   SpO2 95%   BMI 27.62 kg/m²       Intake/Output Summary (Last 24 hours) at 1/21/2022 6356  Last data filed at 1/21/2022 0932  Gross per 24 hour   Intake 525 ml   Output 1965 ml   Net -1440 ml      Wt Readings from Last 3 Encounters:   01/21/22 187 lb (84.8 kg)   07/12/21 212 lb (96.2 kg)   06/29/20 201 lb (91.2 kg)      In: 525 [P.O.:495; I.V.:30]  Out: 2315     TELEMETRY: SB    Physical Exam:  General Appearance:  Non-obese/Well Nourished  Respiratory:  · Resp Auscultation: CTA  Cardiovascular:  · Auscultation: regular, normal S1S2, no m/g/r/c  · Palpation: Normal    · Pedal Pulses: 2+ and equal   Abdomen:  · Soft, NT, ND, + bs  Extremities:  · No Cyanosis or Clubbing  · Extremities: negative  Neurological/Psychiatric:  · Oriented to time, place, and person  · Non-anxious    MEDICATIONS:   Scheduled Meds:   Scheduled Meds:   insulin glargine  15 Units SubCUTAneous BID    carvedilol  6.25 mg Oral BID WC    dexamethasone  10 mg IntraVENous Daily    enoxaparin  30 mg SubCUTAneous BID    insulin lispro  0-18 Units SubCUTAneous TID WC    insulin lispro  0-9 Units SubCUTAneous Nightly    furosemide  40 mg IntraVENous Q8H    albuterol sulfate HFA  2 puff Inhalation BID    ipratropium  2 puff Inhalation BID    isosorbide mononitrate  30 mg Oral Daily    aspirin  81 mg Oral Daily    amiodarone  200 mg Oral Daily    sacubitril-valsartan  0.5 tablet Oral BID    sodium chloride flush  5-40 mL IntraVENous 2 times per day     Continuous Infusions:   dextrose      sodium chloride 25 mL (01/18/22 0916)     PRN Meds:.melatonin, perflutren lipid microspheres, glucose, dextrose, glucagon (rDNA), dextrose, albuterol sulfate HFA, linaclotide, nitroGLYCERIN, sodium chloride flush, sodium chloride, ondansetron **OR** ondansetron, magnesium hydroxide, acetaminophen **OR** acetaminophen, hydrALAZINE, potassium chloride **OR** potassium alternative oral replacement **OR** potassium chloride, sodium chloride  Continuous Infusions:   dextrose      sodium chloride 25 mL (01/18/22 0916)       Intake/Output Summary (Last 24 hours) at 1/21/2022 0949  Last data filed at 1/21/2022 0932  Gross per 24 hour   Intake 525 ml   Output 1965 ml   Net -1440 ml       Lab Data:  CBC:   Lab Results   Component Value Date    WBC 13.7 01/21/2022    HGB 11.8 01/21/2022     01/21/2022     BMP:  Lab Results   Component Value Date     01/21/2022    K 4.7 01/21/2022    K 5.3 01/13/2022    CL 95 01/21/2022    CO2 20 01/21/2022    BUN 72 01/21/2022    CREATININE 1.4 01/21/2022    GLUCOSE 279 01/21/2022     INR:   Lab Results   Component Value Date    INR 1.90 11/14/2019    INR 2.58 11/13/2019    INR 3.12 11/12/2019        CARDIAC LABS  ENZYMES:  No results for input(s): CKMB, CKMBINDEX, TROPONINI in the last 72 hours.     Invalid input(s): CKTOTAL;3  FASTING LIPID PANEL:  Lab Results   Component Value Date    HDL 41 11/28/2017    HDL 37 04/29/2010    LDLCALC 121 11/28/2017    TRIG 88 11/28/2017    TSH 1.14 11/10/2019     LIVER PROFILE:  Lab Results   Component Value Date    AST 16 01/13/2022    AST 18 01/12/2022    ALT 13 01/13/2022    ALT 17 01/12/2022     BNP:   Lab Results   Component Value Date    PROBNP 396 01/17/2022    PROBNP 1,192 01/12/2022    PROBNP 1,617 11/14/2019     Iron Studies:    Lab Results   Component Value Date    FERRITIN 43.3 11/11/2019     Lab Results   Component Value Date    IRON 25 (L) 11/11/2019    TIBC 310 11/11/2019    FERRITIN 43.3 11/11/2019          1. WEIGHT: Admit Weight: 209 lb (94.8 kg)      Today  Weight: 187 lb (84.8 kg)   2. I/O     Intake/Output Summary (Last 24 hours) at 1/21/2022 0949  Last data filed at 1/21/2022 0932  Gross per 24 hour   Intake 525 ml   Output 1965 ml   Net -1440 ml       Cardiac Testing: Echo 1/14/22:   Technically difficult examination. Left ventricular systolic function is low normal with ejection fraction   estimated at 50-55%. No definitive regional wall motion abnormalities are noted. Diastolic filling parameters suggests grade II diastolic dysfunction. Mild mitral regurgitation. The left atrium is mildly dilated. Aortic valve appears sclerotic but opens adequately. Mild aortic regurgitation is present. Inadequate tricuspid regurgitation to estimate systolic pulmonary artery   pressure. Echo:  11/11/2019  LVEF 15-20% with severe diffuse hypkinesis     Stress:   7/2021 - LVEF 53%,       SPECT images demonstrate a small area of mildly decreased perfusion in the    mid inferior, mid-inferolateral, and basal inferior segments. The defect is    present at rest and worsens with stress, consistent with mixed ischemia and    scar. There is associated wall motion abnormality.        The sum stress score is 8. No visual TID.  Calculated TID is 1.09.        Left ventricular ejection fraction is normal at 53%.        Moderate risk scan given ischemia and mildly elevated LV volumes       [facility] 12/27/2016      Cath with occluded SVG to RCA,patent LIMA to LAD,patent SVG to diag,OM1 with good retrograde filling of LAD,patent LIMA to LAD  EF 35-40% with inf hypokinesis,LVEDP 25    Assessment/Plan:     1. AHF- 10L out with IV lasix, can probably switch to po soon  2. CAD- continue ASA, BB, imdur, will not need C prior to d/c, will address as an outpt  3. ICM- continue entresto, imdur, coreg   4.  AF -BB dose decreased for carmen/hypotension - pt tolerating,  continue Amio        I appreciate the opportunity of cooperating in the care of this individual.    Ailyn Sauceda, APRN - CNP, ACNP, [address] 1/21/2022, 9:49 AM  Heart Failure  The [address], [address]  Ph: [phone]      Core Measures:   · Discharge instructions:   · LVEF documented:   · ACEI for LV dysfunction:   · Smoking Cessation:

## 2022-01-21 NOTE — PROGRESS NOTES
cooperative. HEENT: Pupils equal, round, and reactive to light. Conjunctivae/corneas clear. Neck: Supple, with full range of motion. No jugular venous distention. Trachea midline. Respiratory:  Normal respiratory effort. Clear to auscultation, bilaterally without Rales/Wheezes/Rhonchi. Cardiovascular: Regular rate and rhythm with normal S1/S2 without murmurs, rubs or gallops. Abdomen: Soft, non-tender, non-distended with normal bowel sounds. Musculoskeletal: No clubbing, cyanosis or edema bilaterally. Full range of motion without deformity. Skin: Skin color, texture, turgor normal.  No rashes or lesions. Neurologic:  Neurovascularly intact without any focal sensory/motor deficits. Cranial nerves: II-XII intact, grossly non-focal.  Psychiatric: Alert and oriented, thought content appropriate, normal insight  Capillary Refill: Brisk,3 seconds, normal   Peripheral Pulses: +2 palpable, equal bilaterally       Labs:   Recent Labs     01/19/22  0521 01/20/22  0446 01/21/22  0253   WBC 14.3* 15.4* 13.7*   HGB 11.5* 11.2* 11.8*   HCT 36.1* 35.5* 37.1*    281 270     Recent Labs     01/19/22  0521 01/20/22  0446 01/21/22  0253   * 131* 130*   K 5.0 4.9 4.7    97* 95*   CO2 21 21 20*   BUN 69* 75* 72*   CREATININE 1.3 1.4* 1.4*   CALCIUM 8.8 8.8 8.7     No results for input(s): AST, ALT, BILIDIR, BILITOT, ALKPHOS in the last 72 hours. No results for input(s): INR in the last 72 hours. No results for input(s): Polo Sol in the last 72 hours.     Urinalysis:      Lab Results   Component Value Date    NITRU POSITIVE 07/31/2016    WBCUA 287 07/31/2016    BACTERIA 4+ 07/31/2016    RBCUA 30 07/31/2016    BLOODU MODERATE 07/31/2016    SPECGRAV 1.028 07/31/2016    GLUCOSEU Negative 07/31/2016    GLUCOSEU NEGATIVE 01/30/2012       Radiology:  XR CHEST PORTABLE   Final Result   Improved aeration of the lungs bilaterally with residual interstitial   prominence favoring moderate pulmonary vascular congestion. XR CHEST PORTABLE   Final Result   No significant change         XR CHEST PORTABLE   Final Result   Bilateral peripheral airspace opacities suggesting COVID pneumonia                 Assessment/Plan:    Active Hospital Problems    Diagnosis     HTN (hypertension) [I10]      Priority: Medium    COVID [U07.1]     Acute respiratory failure due to COVID-19 (HCC) [U07.1, J96.00]     High cholesterol [E78.00]     Acute hypoxemic respiratory failure due to COVID-19 (HCC) [U07.1, J96.01]      Acute respiratory failure due to COVID-19 (HCC)  Worse and is now on Heated high flow. Currently on 70% FiO2, improving since yesterday. D-dimer 221, CRP 6.3 complete course of 5 days of remdesivir, currently on Decadron 10 mg daily, had 1 dose of Actemra  CXR with improved aeration of lungs bilaterally    HTN (hypertension)    stay on same meds  Controlled    High cholesterol - Stable. cont statin    Hyperglycemia   due to steroids. ,  Obtain hemoglobin A1c. Start Lantus and lispro  Last known hemoglobin A1c 6.2  Continue sliding scale insulin, start  on Lantus twice daily now increased to 20 units twice daily    Coronary artery disease  Cardiology following, continue aspirin, beta-blocker, Imdur, per Crads no need for left heart cath prior to discharge, will be addressed as an outpatient    Ischemic cardiomyopathy  Continue Entresto, Imdur, Coreg, cardiology following    History of A. fib   Rate is controlled, continue amiodarone and Coreg    DVT Prophylaxis: lovenox  Diet: ADULT DIET; Regular  Code Status: Full Code    PT/OT Eval Status: 16    Dispo -he remains critically ill and has progressed to heated high flow oxygen.   Continue to monitor      Dea Ferreira MD

## 2022-01-21 NOTE — PROGRESS NOTES
Physical Therapy  Facility/Department: [facility]C  Daily Treatment Note  NAME: Rosie Reed  : 1950  MRN: [id number]    Date of Service: 2022    Discharge Recommendations:Leandra Macario Si scored a 17/24 on the AM-PAC short mobility form. Current research shows that an AM-PAC score of 17 or less is typically not associated with a discharge to the patient's home setting. Based on the patient's AM-PAC score and their current functional mobility deficits, it is recommended that the patient have 5-7 sessions per week of Physical Therapy at d/c to increase the patient's independence. At this time, this patient demonstrates the endurance, and/or tolerance for 3 hours of therapy each day, with a treatment frequency of 5-7x/wk. Please see assessment section for further patient specific details. If patient discharges prior to next session this note will serve as a discharge summary. Please see below for the latest assessment towards goals. 5-7 sessions per week   PT Equipment Recommendations  Equipment Needed: No  Other: Needs met    Assessment   Body structures, Functions, Activity limitations: Decreased functional mobility ; Decreased strength;Decreased endurance;Decreased balance  Assessment: Pt presents as below his baseline function. SpO2 improved when coming to sit at EOB and maintained during transfer. However, pt requires CGA for very limited ambulation. Pt would benefit from skilled PT services to promote safe return to PLOF.   Treatment Diagnosis: Decreased tolerance to activity  Prognosis: Good  Exam: Balance, functional mobility  Clinical Presentation: Evolving  PT Education: Goals;PT Role;Plan of Care;Energy Conservation  Patient Education: Proning, attempting to eat meals OOB, performing UE/LE exercises, importance of OOB activity  - Pt verbalized understanding  Barriers to Learning: None  REQUIRES PT FOLLOW UP: Yes  Activity Tolerance  Activity Tolerance: Patient Tolerated treatment well Patient Diagnosis(es): The primary encounter diagnosis was COVID. A diagnosis of Acute respiratory failure with hypoxia (HCC) was also pertinent to this visit. has a past medical history of Acid reflux, Acute MI ([facility] [address] CAD (coronary artery disease), COPD (chronic obstructive pulmonary disease) ([facility] [address] Diverticulitis, Hypertension, and Peripheral vascular disease ([facility] [address] has a past surgical history that includes vascular surgery; Cardiac surgery; Colonoscopy; and Cardiac catheterization. Restrictions  Restrictions/Precautions  Restrictions/Precautions: Isolation,Fall Risk (COVID+, high fall risk)  Required Braces or Orthoses?: No  Position Activity Restriction  Sternal Precautions: 02 with bed mobility 87%, 02 with ambulation to chair 85%, 02 with 24' ambulation 81%, static stance edge of recliner 91%, brushing teeth in stand 84%- recovers to above 90% with 2-3 minute rest breaks. BP at /70  Other position/activity restrictions: Per H&P \"76 y.o. male who presents for evaluation of increasing shortness of breath that started this morning. Patient states that he was diagnosed with COVID-19 and was inpatient at [facility] couple of weeks ago. States he has been progressively getting worse but significantly short of breath today. States that he cannot breathe. He has no history of CHF but does have history of COPD. No prior oxygen requirement. He denies any chest pain. No additional information is able to obtain at this time. \"  Subjective   General  Chart Reviewed: Yes  Response To Previous Treatment: Not applicable  Family / Caregiver Present: No  Subjective  Subjective: Pt reporting 6/10 generalized pain, RN notified.  Agreeable and motivated to get up to bedside chair  General Comment  Comments: Pt supine in bed upon arrival.  Pain Screening  Patient Currently in Pain: Denies  Vital Signs  Patient Currently in Pain: Denies       Orientation  Orientation  Overall Orientation Status: Within Functional Limits  Cognition      Objective   Bed mobility  Supine to Sit: Stand by assistance  Scooting: Stand by assistance  Comment: SpO2 reading at 86% supine in bed. Increasing to 95% once seated at EOB  Transfers  Sit to Stand: Contact guard assistance  Stand to sit: Contact guard assistance  Stand Pivot Transfers: Contact guard assistance  Ambulation  Ambulation?: No  Stairs/Curb  Stairs?: No     Balance  Posture: Fair  Sitting - Static: Good  Sitting - Dynamic: Good  Standing - Static: Fair  Standing - Dynamic: Fair            G-Code     OutComes Score                                                     AM-PAC Score  AM-PAC Inpatient Mobility Raw Score : 17 (01/21/22 1256)  AM-PAC Inpatient T-Scale Score : 42.13 (01/21/22 1256)  Mobility Inpatient CMS 0-100% Score: 50.57 (01/21/22 1256)  Mobility Inpatient CMS G-Code Modifier : CK (01/21/22 1256)          Goals  Short term goals  Time Frame for Short term goals: Before discharge  Short term goal 1: Pt will complete bed mobility indep  Short term goal 2: Pt will complete sit<>stand indep  Short term goal 3: Pt will ambulate 50 ft with RW and supervision  Short term goal 4: Pt will ascend/descend 5 steps with (R) rail with supervision  Short term goal 5: Pt will tolerate standing x5 minutes without UE support indep in order to complete ADLs  Patient Goals   Patient goals : Go home when I am ready, I don't want to do this again   **none met    Plan    Plan  Times per week: 3-5x  Times per day: Daily  Current Treatment Recommendations: Strengthening,Functional Mobility Training,Transfer Training,Balance Training,Endurance Training,Gait Training,Stair training,Patient/Caregiver Education & Training,Safety Education & Training,Home Exercise Program  Safety Devices  Type of devices:  All fall risk precautions in place,Call light within reach,Gait belt,Patient at risk for falls,Nurse notified,Chair alarm in place,Left in chair  Restraints  Initially in place: No     Therapy Time   Individual Concurrent Group Co-treatment   Time In 1122         Time Out 1150         Minutes 28         Timed Code Treatment Minutes: [address] [facility],[address], [place], DPT, [id number]  Bryanna Rodriguez, PT

## 2022-01-21 NOTE — RT PROTOCOL NOTE
RT Inhaler-Nebulizer Bronchodilator Protocol Note    There is a bronchodilator order in the chart from a provider indicating to follow the RT Bronchodilator Protocol and there is an Initiate RT Inhaler-Nebulizer Bronchodilator Protocol order as well (see protocol at bottom of note). CXR Findings:  XR CHEST PORTABLE    Result Date: 1/20/2022  Improved aeration of the lungs bilaterally with residual interstitial prominence favoring moderate pulmonary vascular congestion. The findings from the last RT Protocol Assessment were as follows:   History Pulmonary Disease: Chronic pulmonary disease  Respiratory Pattern: Dyspnea on exertion or RR 21-25 bpm  Breath Sounds: Slightly diminished and/or crackles  Cough: Strong, spontaneous, non-productive  Indication for Bronchodilator Therapy: On home bronchodilators  Bronchodilator Assessment Score: 6    Aerosolized bronchodilator medication orders have been revised according to the RT Inhaler-Nebulizer Bronchodilator Protocol below. Respiratory Therapist to perform RT Therapy Protocol Assessment initially then follow the protocol. Repeat RT Therapy Protocol Assessment PRN for score 0-3 or on second treatment, BID, and PRN for scores above 3. No Indications  adjust the frequency to every 6 hours PRN wheezing or bronchospasm, if no treatments needed after 48 hours then discontinue using Per Protocol order mode. If indication present, adjust the RT bronchodilator orders based on the Bronchodilator Assessment Score as indicated below. Use Inhaler orders unless patient has one or more of the following: on home nebulizer, not able to hold breath for 10 seconds, is not alert and oriented, cannot activate and use MDI correctly, or respiratory rate 25 breaths per minute or more, then use the equivalent nebulizer order(s) with same Frequency and PRN reasons based on the score.   If a patient is on this medication at home then do not decrease Frequency below that used at home. 0-3  enter or revise RT bronchodilator order(s) to equivalent RT Bronchodilator order with Frequency of every 4 hours PRN for wheezing or increased work of breathing using Per Protocol order mode. 4-6  enter or revise RT Bronchodilator order(s) to two equivalent RT bronchodilator orders with one order with BID Frequency and one order with Frequency of every 4 hours PRN wheezing or increased work of breathing using Per Protocol order mode. 7-10  enter or revise RT Bronchodilator order(s) to two equivalent RT bronchodilator orders with one order with TID Frequency and one order with Frequency of every 4 hours PRN wheezing or increased work of breathing using Per Protocol order mode. 11-13  enter or revise RT Bronchodilator order(s) to one equivalent RT bronchodilator order with QID Frequency and an Albuterol order with Frequency of every 4 hours PRN wheezing or increased work of breathing using Per Protocol order mode. Greater than 13  enter or revise RT Bronchodilator order(s) to one equivalent RT bronchodilator order with every 4 hours Frequency and an Albuterol order with Frequency of every 2 hours PRN wheezing or increased work of breathing using Per Protocol order mode. RT to enter RT Home Evaluation for COPD & MDI Assessment order using Per Protocol order mode.     Electronically signed by Luisa Estrada RCP on 1/21/2022 at 2:00 PM

## 2022-01-22 LAB
ANION GAP SERPL CALCULATED.3IONS-SCNC: 16 MMOL/L (ref 3–16)
BASOPHILS ABSOLUTE: 0 K/UL (ref 0–0.2)
BASOPHILS RELATIVE PERCENT: 0.3 %
BUN BLDV-MCNC: 67 MG/DL (ref 7–20)
CALCIUM SERPL-MCNC: 8.6 MG/DL (ref 8.3–10.6)
CHLORIDE BLD-SCNC: 95 MMOL/L (ref 99–110)
CO2: 20 MMOL/L (ref 21–32)
CREAT SERPL-MCNC: 1.3 MG/DL (ref 0.8–1.3)
EOSINOPHILS ABSOLUTE: 0 K/UL (ref 0–0.6)
EOSINOPHILS RELATIVE PERCENT: 0.1 %
GFR AFRICAN AMERICAN: >60
GFR NON-AFRICAN AMERICAN: 54
GLUCOSE BLD-MCNC: 164 MG/DL (ref 70–99)
GLUCOSE BLD-MCNC: 256 MG/DL (ref 70–99)
GLUCOSE BLD-MCNC: 296 MG/DL (ref 70–99)
GLUCOSE BLD-MCNC: 318 MG/DL (ref 70–99)
GLUCOSE BLD-MCNC: 439 MG/DL (ref 70–99)
HCT VFR BLD CALC: 36 % (ref 40.5–52.5)
HEMOGLOBIN: 11.8 G/DL (ref 13.5–17.5)
LYMPHOCYTES ABSOLUTE: 0.5 K/UL (ref 1–5.1)
LYMPHOCYTES RELATIVE PERCENT: 3.5 %
MCH RBC QN AUTO: 24.4 PG (ref 26–34)
MCHC RBC AUTO-ENTMCNC: 32.7 G/DL (ref 31–36)
MCV RBC AUTO: 74.8 FL (ref 80–100)
MONOCYTES ABSOLUTE: 0.4 K/UL (ref 0–1.3)
MONOCYTES RELATIVE PERCENT: 3 %
NEUTROPHILS ABSOLUTE: 13.1 K/UL (ref 1.7–7.7)
NEUTROPHILS RELATIVE PERCENT: 93.1 %
PDW BLD-RTO: 20.4 % (ref 12.4–15.4)
PERFORMED ON: ABNORMAL
PLATELET # BLD: 227 K/UL (ref 135–450)
PMV BLD AUTO: 8.7 FL (ref 5–10.5)
POTASSIUM SERPL-SCNC: 4.9 MMOL/L (ref 3.5–5.1)
RBC # BLD: 4.82 M/UL (ref 4.2–5.9)
SODIUM BLD-SCNC: 131 MMOL/L (ref 136–145)
WBC # BLD: 14.1 K/UL (ref 4–11)

## 2022-01-22 PROCEDURE — 6370000000 HC RX 637 (ALT 250 FOR IP): Performed by: NURSE PRACTITIONER

## 2022-01-22 PROCEDURE — 6360000002 HC RX W HCPCS: Performed by: HOSPITALIST

## 2022-01-22 PROCEDURE — 85025 COMPLETE CBC W/AUTO DIFF WBC: CPT

## 2022-01-22 PROCEDURE — 80048 BASIC METABOLIC PNL TOTAL CA: CPT

## 2022-01-22 PROCEDURE — 6360000002 HC RX W HCPCS: Performed by: STUDENT IN AN ORGANIZED HEALTH CARE EDUCATION/TRAINING PROGRAM

## 2022-01-22 PROCEDURE — 6360000002 HC RX W HCPCS: Performed by: CLINICAL NURSE SPECIALIST

## 2022-01-22 PROCEDURE — 6360000002 HC RX W HCPCS: Performed by: INTERNAL MEDICINE

## 2022-01-22 PROCEDURE — 94640 AIRWAY INHALATION TREATMENT: CPT

## 2022-01-22 PROCEDURE — 36415 COLL VENOUS BLD VENIPUNCTURE: CPT

## 2022-01-22 PROCEDURE — 2060000000 HC ICU INTERMEDIATE R&B

## 2022-01-22 PROCEDURE — 99232 SBSQ HOSP IP/OBS MODERATE 35: CPT | Performed by: CLINICAL NURSE SPECIALIST

## 2022-01-22 PROCEDURE — 2580000003 HC RX 258: Performed by: INTERNAL MEDICINE

## 2022-01-22 PROCEDURE — 6370000000 HC RX 637 (ALT 250 FOR IP): Performed by: INTERNAL MEDICINE

## 2022-01-22 PROCEDURE — 2700000000 HC OXYGEN THERAPY PER DAY

## 2022-01-22 RX ORDER — FUROSEMIDE 10 MG/ML
40 INJECTION INTRAMUSCULAR; INTRAVENOUS 2 TIMES DAILY
Status: DISCONTINUED | OUTPATIENT
Start: 2022-01-22 | End: 2022-01-24

## 2022-01-22 RX ADMIN — ENOXAPARIN SODIUM 30 MG: 100 INJECTION SUBCUTANEOUS at 20:04

## 2022-01-22 RX ADMIN — INSULIN GLARGINE 25 UNITS: 100 INJECTION, SOLUTION SUBCUTANEOUS at 20:11

## 2022-01-22 RX ADMIN — SACUBITRIL AND VALSARTAN 0.5 TABLET: 24; 26 TABLET, FILM COATED ORAL at 20:04

## 2022-01-22 RX ADMIN — Medication 2 PUFF: at 21:23

## 2022-01-22 RX ADMIN — INSULIN LISPRO 3 UNITS: 100 INJECTION, SOLUTION INTRAVENOUS; SUBCUTANEOUS at 09:38

## 2022-01-22 RX ADMIN — ISOSORBIDE MONONITRATE 30 MG: 30 TABLET, EXTENDED RELEASE ORAL at 09:36

## 2022-01-22 RX ADMIN — INSULIN LISPRO 9 UNITS: 100 INJECTION, SOLUTION INTRAVENOUS; SUBCUTANEOUS at 17:43

## 2022-01-22 RX ADMIN — SACUBITRIL AND VALSARTAN 0.5 TABLET: 24; 26 TABLET, FILM COATED ORAL at 09:36

## 2022-01-22 RX ADMIN — INSULIN LISPRO 12 UNITS: 100 INJECTION, SOLUTION INTRAVENOUS; SUBCUTANEOUS at 11:58

## 2022-01-22 RX ADMIN — FUROSEMIDE 40 MG: 10 INJECTION, SOLUTION INTRAMUSCULAR; INTRAVENOUS at 01:49

## 2022-01-22 RX ADMIN — SODIUM CHLORIDE, PRESERVATIVE FREE 10 ML: 5 INJECTION INTRAVENOUS at 17:43

## 2022-01-22 RX ADMIN — ASPIRIN 81 MG: 81 TABLET, COATED ORAL at 09:36

## 2022-01-22 RX ADMIN — FUROSEMIDE 40 MG: 10 INJECTION, SOLUTION INTRAMUSCULAR; INTRAVENOUS at 17:43

## 2022-01-22 RX ADMIN — CARVEDILOL 6.25 MG: 6.25 TABLET, FILM COATED ORAL at 17:43

## 2022-01-22 RX ADMIN — FUROSEMIDE 40 MG: 10 INJECTION, SOLUTION INTRAMUSCULAR; INTRAVENOUS at 09:36

## 2022-01-22 RX ADMIN — DEXAMETHASONE SODIUM PHOSPHATE 10 MG: 10 INJECTION INTRAMUSCULAR; INTRAVENOUS at 09:36

## 2022-01-22 RX ADMIN — CARVEDILOL 6.25 MG: 6.25 TABLET, FILM COATED ORAL at 09:36

## 2022-01-22 RX ADMIN — AMIODARONE HYDROCHLORIDE 200 MG: 200 TABLET ORAL at 09:36

## 2022-01-22 RX ADMIN — ENOXAPARIN SODIUM 30 MG: 100 INJECTION SUBCUTANEOUS at 09:36

## 2022-01-22 RX ADMIN — Medication 10 ML: at 09:37

## 2022-01-22 RX ADMIN — INSULIN LISPRO 9 UNITS: 100 INJECTION, SOLUTION INTRAVENOUS; SUBCUTANEOUS at 20:11

## 2022-01-22 RX ADMIN — Medication 10 ML: at 20:05

## 2022-01-22 ASSESSMENT — PAIN SCALES - GENERAL
PAINLEVEL_OUTOF10: 0

## 2022-01-22 NOTE — PROGRESS NOTES
CHG bath done. Changed gown. Offered oral care. Michelle care with johnson packet. Patient tolerated well.

## 2022-01-22 NOTE — PROGRESS NOTES
[facility]   Daily Progress Note      Admit Date:  1/12/2022    HPI:    Mr. Radha Zendejas is a 70year old male with history of coronary disease, CABG in 3281, ICM, systolic heart failure with recovered LVEF, copd, hypertension, peripheral vascular disease, AF with CV 2019. He was vaccinated with moderna    He is admitted with shortness of breath for 2 weeks and covid + at [facility]. Shortness of breath became worse. No prior oxygen requirement. In ER required 10-15L of high flow. He was hypotensive and bradycardic, beta blocker held  He received decadron, remdesivir and actemra    Subjective:  Patient is being seen for acute on chronic systolic heart failure. There were no acute overnight cardiac events. Creat 1.3 bun 67 potassium 4.9 sodium 131. He is lying in the bed on airvo 30%. He denies any increased shortness of breath, palpitations or chest pain. Weight 204->189 (weight in sept was 211)    Objective:   /70   Pulse 76   Temp 97 °F (36.1 °C) (Temporal)   Resp 17   Ht 5' 9\" (1.753 m)   Wt 187 lb 8 oz (85 kg)   SpO2 92%   BMI 27.69 kg/m²     Intake/Output Summary (Last 24 hours) at 1/22/2022 1128  Last data filed at 1/22/2022 3559  Gross per 24 hour   Intake 270 ml   Output 1650 ml   Net -1380 ml          Physical Exam:  General:  Awake, alert, oriented in NAD  Skin:  Warm and dry. No unusual bruising or rash  Neck:  Supple. No JVD or carotid bruit appreciated  Chest:  Normal effort.   Decreased bilaterally in the bases  Cardiovascular:  RRR, S1/S2, no murmur/gallop/rub  Abdomen:  Soft, nontender, +bowel sounds, obese  Extremities:  No edema  Neurological: No focal deficits  Psychological: Normal mood and affect      Medications:    insulin glargine  25 Units SubCUTAneous BID    carvedilol  6.25 mg Oral BID WC    dexamethasone  10 mg IntraVENous Daily    enoxaparin  30 mg SubCUTAneous BID    insulin lispro  0-18 Units SubCUTAneous TID WC    insulin lispro  0-9 Units SubCUTAneous Nightly    furosemide  40 mg IntraVENous Q8H    albuterol sulfate HFA  2 puff Inhalation BID    ipratropium  2 puff Inhalation BID    isosorbide mononitrate  30 mg Oral Daily    aspirin  81 mg Oral Daily    amiodarone  200 mg Oral Daily    sacubitril-valsartan  0.5 tablet Oral BID    sodium chloride flush  5-40 mL IntraVENous 2 times per day      dextrose      sodium chloride 25 mL (01/18/22 0916)       Lab Data:  CBC:   Recent Labs     01/20/22  0446 01/21/22  0253 01/22/22  0247   WBC 15.4* 13.7* 14.1*   HGB 11.2* 11.8* 11.8*    270 227     BMP:    Recent Labs     01/20/22  0446 01/21/22  0253 01/22/22  0247   * 130* 131*   K 4.9 4.7 4.9   CO2 21 20* 20*   BUN 75* 72* 67*   CREATININE 1.4* 1.4* 1.3     INR:  No results for input(s): INR in the last 72 hours. BNP:  No results for input(s): PROBNP in the last 72 hours. Diagnostics:  Echo 1/14/22:   Technically difficult examination.   Left ventricular systolic function is low normal with ejection fraction   estimated at 50-55%.   No definitive regional wall motion abnormalities are noted.   Diastolic filling parameters suggests grade II diastolic dysfunction.   Mild mitral regurgitation.   The left atrium is mildly dilated.   Aortic valve appears sclerotic but opens adequately.   Mild aortic regurgitation is present.   Inadequate tricuspid regurgitation to estimate systolic pulmonary artery   pressure.     Stress:   7/2021 - LVEF 53%,       SPECT images demonstrate a small area of mildly decreased perfusion in the    mid inferior, mid-inferolateral, and basal inferior segments. The defect is    present at rest and worsens with stress, consistent with mixed ischemia and    scar. There is associated wall motion abnormality.        The sum stress score is 8. No visual TID.  Calculated TID is 1.09.        Left ventricular ejection fraction is normal at 53%.        Moderate risk scan given ischemia and mildly elevated LV volumes    Echo:  11/11/2019  LVEF 15-20% with severe diffuse hypkinesis    [facility] 12/27/2016      Cath with occluded SVG to RCA,patent LIMA to LAD,patent SVG to diag,OM1 with good retrograde filling of LAD,patent LIMA to LAD  EF 35-40% with inf hypokinesis,LVEDP 25        Assessment:    1.  covid + pneumonia  2. CAD  3. Ischemic cardiomyopathy  4. Chronic atrial fib, on amiodarone  5. Acute on chronic systolic heart failure, on bb and arni  6. Essential hypertension  7. Hyponatremia    Plan:    1. Continue coreg 6.25 mg twice a day  2. Continue entresto half 24-26 mg twice a day  3. Will need to resume his aldactone 12.5 mg   4. Cut lasix to 40 mg IV twice a day  5. Add low sodium and fluid restrictions to diet    Discussed with bedside nurse    Discussed with patient who is agreeable with plan of care. Thank you for allowing me to participate in the care of your patient.     Tammie Madison, APRN - CNS, CNS

## 2022-01-22 NOTE — PROGRESS NOTES
Nursing reassessment completed. Temp 97.5. NSR with HR 67.  RR 17.  BP 92/57 (68). His O2 sat is 90% on Airvo 60 %  FiO2 and 30 L  O2 Flow Rate. O2 sat intermittently dips into 89% range even with repositioning up in bed using hercules controls. Coached him to take deep breaths and cough. Less fine crackles in left base. Total urinary output in 12 hours 1350 cc. Patient was given his scheduled 200 am Lasix 40 mg IVP dose. Denies SOB at rest. Turns ad elizabeth in bed. Declined oral care brushing his teeth at this time. SR up x3. Call light in reach. Bed at lowest position. Wheels locked. Bed alarm engaged. Patient pleasant and cooperative with care. Pillows used to keep him turned off his bottom which is slightly pink.

## 2022-01-22 NOTE — PROGRESS NOTES
Hospitalist Progress Note      PCP: Curly Price DO    Date of Admission: 1/12/2022    Chief Complaint: [address] Course: Admitted to the hospital with COVID-19 pneumonia transferred to the [facility] ICU    Subjective:   Currently on 77%>70%>80%>68>59%   FiO2, on heated nasal cannula, hyperglycemia, had been started on Lantus was increased to 25 units twice daily    Medications:  Reviewed    Infusion Medications    dextrose      sodium chloride 25 mL (01/18/22 0916)     Scheduled Medications    insulin glargine  25 Units SubCUTAneous BID    furosemide  40 mg IntraVENous BID    carvedilol  6.25 mg Oral BID WC    dexamethasone  10 mg IntraVENous Daily    enoxaparin  30 mg SubCUTAneous BID    insulin lispro  0-18 Units SubCUTAneous TID WC    insulin lispro  0-9 Units SubCUTAneous Nightly    albuterol sulfate HFA  2 puff Inhalation BID    ipratropium  2 puff Inhalation BID    isosorbide mononitrate  30 mg Oral Daily    aspirin  81 mg Oral Daily    amiodarone  200 mg Oral Daily    sacubitril-valsartan  0.5 tablet Oral BID    sodium chloride flush  5-40 mL IntraVENous 2 times per day     PRN Meds: melatonin, perflutren lipid microspheres, glucose, dextrose, glucagon (rDNA), dextrose, albuterol sulfate HFA, linaclotide, nitroGLYCERIN, sodium chloride flush, sodium chloride, ondansetron **OR** ondansetron, magnesium hydroxide, acetaminophen **OR** acetaminophen, hydrALAZINE, potassium chloride **OR** potassium alternative oral replacement **OR** potassium chloride, sodium chloride      Intake/Output Summary (Last 24 hours) at 1/22/2022 1314  Last data filed at 1/22/2022 1207  Gross per 24 hour   Intake 270 ml   Output 2000 ml   Net -1730 ml       Physical Exam Performed:    BP 94/66   Pulse 79   Temp 97.6 °F (36.4 °C) (Temporal)   Resp 16   Ht 5' 9\" (1.753 m)   Wt 187 lb 8 oz (85 kg)   SpO2 94%   BMI 27.69 kg/m²     General appearance: No apparent distress, appears stated age and cooperative. HEENT: Pupils equal, round, and reactive to light. Conjunctivae/corneas clear. Neck: Supple, with full range of motion. No jugular venous distention. Trachea midline. Respiratory:  Normal respiratory effort. Clear to auscultation, bilaterally without Rales/Wheezes/Rhonchi. Cardiovascular: Regular rate and rhythm with normal S1/S2 without murmurs, rubs or gallops. Abdomen: Soft, non-tender, non-distended with normal bowel sounds. Musculoskeletal: No clubbing, cyanosis or edema bilaterally. Full range of motion without deformity. Skin: Skin color, texture, turgor normal.  No rashes or lesions. Neurologic:  Neurovascularly intact without any focal sensory/motor deficits. Cranial nerves: II-XII intact, grossly non-focal.  Psychiatric: Alert and oriented, thought content appropriate, normal insight  Capillary Refill: Brisk,3 seconds, normal   Peripheral Pulses: +2 palpable, equal bilaterally       Labs:   Recent Labs     01/20/22  0446 01/21/22  0253 01/22/22  0247   WBC 15.4* 13.7* 14.1*   HGB 11.2* 11.8* 11.8*   HCT 35.5* 37.1* 36.0*    270 227     Recent Labs     01/20/22 0446 01/21/22  0253 01/22/22  0247   * 130* 131*   K 4.9 4.7 4.9   CL 97* 95* 95*   CO2 21 20* 20*   BUN 75* 72* 67*   CREATININE 1.4* 1.4* 1.3   CALCIUM 8.8 8.7 8.6     No results for input(s): AST, ALT, BILIDIR, BILITOT, ALKPHOS in the last 72 hours. No results for input(s): INR in the last 72 hours. No results for input(s): Ethelene Soulier in the last 72 hours.     Urinalysis:      Lab Results   Component Value Date    NITRU POSITIVE 07/31/2016    WBCUA 287 07/31/2016    BACTERIA 4+ 07/31/2016    RBCUA 30 07/31/2016    BLOODU MODERATE 07/31/2016    SPECGRAV 1.028 07/31/2016    GLUCOSEU Negative 07/31/2016    GLUCOSEU NEGATIVE 01/30/2012       Radiology:  XR CHEST PORTABLE   Final Result   Improved aeration of the lungs bilaterally with residual interstitial   prominence favoring moderate pulmonary vascular congestion. XR CHEST PORTABLE   Final Result   No significant change         XR CHEST PORTABLE   Final Result   Bilateral peripheral airspace opacities suggesting COVID pneumonia                 Assessment/Plan:    Active Hospital Problems    Diagnosis     Essential hypertension [I10]      Priority: Medium    Ischemic cardiomyopathy [I25.5]     Chronic a-fib (HCC) [I48.20]     Acute on chronic systolic heart failure, NYHA class 3 (HCC) [I50.23]     Hyponatremia [E87.1]     COVID [U07.1]     Acute respiratory failure due to COVID-19 (HCC) [U07.1, J96.00]     High cholesterol [E78.00]     Acute hypoxemic respiratory failure due to COVID-19 (HCC) [U07.1, J96.01]      Acute respiratory failure due to COVID-19 (HCC)  Worse and is now on Heated high flow. Currently on 70% FiO2, improving since yesterday. D-dimer 221, CRP 6.3 complete course of 5 days of remdesivir, currently on Decadron 10 mg daily, had 1 dose of Actemra  CXR with improved aeration of lungs bilaterally    HTN (hypertension)    stay on same meds  Controlled    High cholesterol - Stable. cont statin    Hyperglycemia   due to steroids. ,  Obtain hemoglobin A1c. Start Lantus and lispro  Last known hemoglobin A1c 6.2  Continue sliding scale insulin, start  on Lantus twice daily now increased to 25 units twice daily    Coronary artery disease  Cardiology following, continue aspirin, beta-blocker, Imdur, per Crads no need for left heart cath prior to discharge, will be addressed as an outpatient    Ischemic cardiomyopathy  Continue Entresto, Imdur, Coreg, cardiology following    History of A. fib   Rate is controlled, continue amiodarone and Coreg    DVT Prophylaxis: lovenox  Diet: ADULT DIET; Regular; Low Sodium (2 gm); 2000 ml  Code Status: Full Code    PT/OT Eval Status: 16    Dispo -he remains critically ill and has progressed to heated high flow oxygen.   Continue to monitor      Stephanie Zamorano MD

## 2022-01-22 NOTE — PROGRESS NOTES
Nursing assessment completed. Afebrile 97.8. NSR with HR 64-70. BP 91/57 to 111/61. Patient denies pain. Neuro WNL. Remains on AirVo with FiO2 60 and O2 Flow Rate 30L. His O2 sats will drop to 87% with exertion. RR 18. Takes less than a minute for his O2 sats to rebound back to his baseline. Lungs CTA in all fields diminished in bases. No cough at this time. No acute distress. Skin pink warm dry and intact. Buttocks slightly pink (baseline pre-admission). Scattered bruises healing. Voids 200-250 cc yellow cloudy no odor urine. Self-directed using urinal.  Requesting beverages and ice. No acute distress at this time. SR up x3. Call light in reach. Bed at lowest position. Wheels locked. Patient instructed to call for help. Bed alarm engaged.

## 2022-01-22 NOTE — PROGRESS NOTES
Passing on in report to dayshift nurse to have Dr. Allyssa Vallejo or the Hospitalist who is following patient today to call Mrs. Markos Cannon (patient's wife) at [phone].

## 2022-01-23 LAB
ANION GAP SERPL CALCULATED.3IONS-SCNC: 13 MMOL/L (ref 3–16)
BASOPHILS ABSOLUTE: 0 K/UL (ref 0–0.2)
BASOPHILS RELATIVE PERCENT: 0.2 %
BUN BLDV-MCNC: 61 MG/DL (ref 7–20)
CALCIUM SERPL-MCNC: 8.7 MG/DL (ref 8.3–10.6)
CHLORIDE BLD-SCNC: 99 MMOL/L (ref 99–110)
CO2: 23 MMOL/L (ref 21–32)
CREAT SERPL-MCNC: 1.2 MG/DL (ref 0.8–1.3)
EOSINOPHILS ABSOLUTE: 0 K/UL (ref 0–0.6)
EOSINOPHILS RELATIVE PERCENT: 0 %
GFR AFRICAN AMERICAN: >60
GFR NON-AFRICAN AMERICAN: 60
GLUCOSE BLD-MCNC: 201 MG/DL (ref 70–99)
GLUCOSE BLD-MCNC: 237 MG/DL (ref 70–99)
GLUCOSE BLD-MCNC: 306 MG/DL (ref 70–99)
GLUCOSE BLD-MCNC: 422 MG/DL (ref 70–99)
GLUCOSE BLD-MCNC: 449 MG/DL (ref 70–99)
HCT VFR BLD CALC: 35.7 % (ref 40.5–52.5)
HEMOGLOBIN: 11.5 G/DL (ref 13.5–17.5)
LYMPHOCYTES ABSOLUTE: 0.7 K/UL (ref 1–5.1)
LYMPHOCYTES RELATIVE PERCENT: 4.3 %
MCH RBC QN AUTO: 24.3 PG (ref 26–34)
MCHC RBC AUTO-ENTMCNC: 32.3 G/DL (ref 31–36)
MCV RBC AUTO: 75.2 FL (ref 80–100)
MONOCYTES ABSOLUTE: 0.5 K/UL (ref 0–1.3)
MONOCYTES RELATIVE PERCENT: 3.2 %
NEUTROPHILS ABSOLUTE: 14.4 K/UL (ref 1.7–7.7)
NEUTROPHILS RELATIVE PERCENT: 92.3 %
PDW BLD-RTO: 20.7 % (ref 12.4–15.4)
PERFORMED ON: ABNORMAL
PLATELET # BLD: 211 K/UL (ref 135–450)
PMV BLD AUTO: 8.3 FL (ref 5–10.5)
POTASSIUM SERPL-SCNC: 4.5 MMOL/L (ref 3.5–5.1)
RBC # BLD: 4.75 M/UL (ref 4.2–5.9)
SODIUM BLD-SCNC: 135 MMOL/L (ref 136–145)
WBC # BLD: 15.6 K/UL (ref 4–11)

## 2022-01-23 PROCEDURE — 6360000002 HC RX W HCPCS: Performed by: INTERNAL MEDICINE

## 2022-01-23 PROCEDURE — 2700000000 HC OXYGEN THERAPY PER DAY

## 2022-01-23 PROCEDURE — 6370000000 HC RX 637 (ALT 250 FOR IP): Performed by: STUDENT IN AN ORGANIZED HEALTH CARE EDUCATION/TRAINING PROGRAM

## 2022-01-23 PROCEDURE — 2580000003 HC RX 258: Performed by: INTERNAL MEDICINE

## 2022-01-23 PROCEDURE — 36415 COLL VENOUS BLD VENIPUNCTURE: CPT

## 2022-01-23 PROCEDURE — 6360000002 HC RX W HCPCS: Performed by: STUDENT IN AN ORGANIZED HEALTH CARE EDUCATION/TRAINING PROGRAM

## 2022-01-23 PROCEDURE — 6360000002 HC RX W HCPCS: Performed by: CLINICAL NURSE SPECIALIST

## 2022-01-23 PROCEDURE — 6370000000 HC RX 637 (ALT 250 FOR IP): Performed by: INTERNAL MEDICINE

## 2022-01-23 PROCEDURE — 99232 SBSQ HOSP IP/OBS MODERATE 35: CPT | Performed by: CLINICAL NURSE SPECIALIST

## 2022-01-23 PROCEDURE — 94640 AIRWAY INHALATION TREATMENT: CPT

## 2022-01-23 PROCEDURE — 94761 N-INVAS EAR/PLS OXIMETRY MLT: CPT

## 2022-01-23 PROCEDURE — 80048 BASIC METABOLIC PNL TOTAL CA: CPT

## 2022-01-23 PROCEDURE — 85025 COMPLETE CBC W/AUTO DIFF WBC: CPT

## 2022-01-23 PROCEDURE — 2060000000 HC ICU INTERMEDIATE R&B

## 2022-01-23 PROCEDURE — 6370000000 HC RX 637 (ALT 250 FOR IP): Performed by: NURSE PRACTITIONER

## 2022-01-23 RX ADMIN — FUROSEMIDE 40 MG: 10 INJECTION, SOLUTION INTRAMUSCULAR; INTRAVENOUS at 09:27

## 2022-01-23 RX ADMIN — AMIODARONE HYDROCHLORIDE 200 MG: 200 TABLET ORAL at 09:27

## 2022-01-23 RX ADMIN — INSULIN LISPRO 18 UNITS: 100 INJECTION, SOLUTION INTRAVENOUS; SUBCUTANEOUS at 12:59

## 2022-01-23 RX ADMIN — INSULIN LISPRO 18 UNITS: 100 INJECTION, SOLUTION INTRAVENOUS; SUBCUTANEOUS at 18:05

## 2022-01-23 RX ADMIN — Medication 2 PUFF: at 20:02

## 2022-01-23 RX ADMIN — Medication 2 PUFF: at 08:25

## 2022-01-23 RX ADMIN — ENOXAPARIN SODIUM 30 MG: 100 INJECTION SUBCUTANEOUS at 09:28

## 2022-01-23 RX ADMIN — Medication 10 ML: at 09:28

## 2022-01-23 RX ADMIN — DEXAMETHASONE SODIUM PHOSPHATE 10 MG: 10 INJECTION INTRAMUSCULAR; INTRAVENOUS at 09:27

## 2022-01-23 RX ADMIN — ISOSORBIDE MONONITRATE 30 MG: 30 TABLET, EXTENDED RELEASE ORAL at 09:27

## 2022-01-23 RX ADMIN — SACUBITRIL AND VALSARTAN 0.5 TABLET: 24; 26 TABLET, FILM COATED ORAL at 09:27

## 2022-01-23 RX ADMIN — INSULIN GLARGINE 25 UNITS: 100 INJECTION, SOLUTION SUBCUTANEOUS at 09:33

## 2022-01-23 RX ADMIN — Medication 10 ML: at 20:28

## 2022-01-23 RX ADMIN — MELATONIN TAB 3 MG 3 MG: 3 TAB at 23:17

## 2022-01-23 RX ADMIN — SACUBITRIL AND VALSARTAN 0.5 TABLET: 24; 26 TABLET, FILM COATED ORAL at 20:24

## 2022-01-23 RX ADMIN — INSULIN LISPRO 6 UNITS: 100 INJECTION, SOLUTION INTRAVENOUS; SUBCUTANEOUS at 20:24

## 2022-01-23 RX ADMIN — CARVEDILOL 6.25 MG: 6.25 TABLET, FILM COATED ORAL at 09:27

## 2022-01-23 RX ADMIN — INSULIN LISPRO 6 UNITS: 100 INJECTION, SOLUTION INTRAVENOUS; SUBCUTANEOUS at 09:34

## 2022-01-23 RX ADMIN — ASPIRIN 81 MG: 81 TABLET, COATED ORAL at 09:27

## 2022-01-23 RX ADMIN — INSULIN GLARGINE 25 UNITS: 100 INJECTION, SOLUTION SUBCUTANEOUS at 20:24

## 2022-01-23 RX ADMIN — ENOXAPARIN SODIUM 30 MG: 100 INJECTION SUBCUTANEOUS at 20:23

## 2022-01-23 ASSESSMENT — PAIN SCALES - GENERAL: PAINLEVEL_OUTOF10: 0

## 2022-01-23 NOTE — PROGRESS NOTES
Hospitalist Progress Note      PCP: Garrett Carrillo DO    Date of Admission: 1/12/2022    Chief Complaint: [address]: Admitted to the hospital with COVID-19 pneumonia transferred to the [facility] ICU    Subjective:   Currently on 77%>70%>80%>68>59%  >70%  FiO2, on heated nasal cannula, hyperglycemia, had been started on Lantus was increased to 25 units twice daily    Medications:  Reviewed    Infusion Medications    dextrose      sodium chloride 25 mL (01/18/22 0916)     Scheduled Medications    insulin glargine  25 Units SubCUTAneous BID    furosemide  40 mg IntraVENous BID    carvedilol  6.25 mg Oral BID WC    dexamethasone  10 mg IntraVENous Daily    enoxaparin  30 mg SubCUTAneous BID    insulin lispro  0-18 Units SubCUTAneous TID WC    insulin lispro  0-9 Units SubCUTAneous Nightly    albuterol sulfate HFA  2 puff Inhalation BID    ipratropium  2 puff Inhalation BID    isosorbide mononitrate  30 mg Oral Daily    aspirin  81 mg Oral Daily    amiodarone  200 mg Oral Daily    sacubitril-valsartan  0.5 tablet Oral BID    sodium chloride flush  5-40 mL IntraVENous 2 times per day     PRN Meds: melatonin, perflutren lipid microspheres, glucose, dextrose, glucagon (rDNA), dextrose, albuterol sulfate HFA, linaclotide, nitroGLYCERIN, sodium chloride flush, sodium chloride, ondansetron **OR** ondansetron, magnesium hydroxide, acetaminophen **OR** acetaminophen, hydrALAZINE, potassium chloride **OR** potassium alternative oral replacement **OR** potassium chloride, sodium chloride      Intake/Output Summary (Last 24 hours) at 1/23/2022 1435  Last data filed at 1/23/2022 0532  Gross per 24 hour   Intake 10 ml   Output 1550 ml   Net -1540 ml       Physical Exam Performed:    BP (!) 87/58   Pulse 111   Temp 96.2 °F (35.7 °C) (Temporal)   Resp 16   Ht 5' 9\" (1.753 m)   Wt 200 lb 12.8 oz (91.1 kg)   SpO2 94%   BMI 29.65 kg/m²     General appearance: No apparent distress, appears stated age and cooperative. HEENT: Pupils equal, round, and reactive to light. Conjunctivae/corneas clear. Neck: Supple, with full range of motion. No jugular venous distention. Trachea midline. Respiratory:  Normal respiratory effort. Clear to auscultation, bilaterally without Rales/Wheezes/Rhonchi. Cardiovascular: Regular rate and rhythm with normal S1/S2 without murmurs, rubs or gallops. Abdomen: Soft, non-tender, non-distended with normal bowel sounds. Musculoskeletal: No clubbing, cyanosis or edema bilaterally. Full range of motion without deformity. Skin: Skin color, texture, turgor normal.  No rashes or lesions. Neurologic:  Neurovascularly intact without any focal sensory/motor deficits. Cranial nerves: II-XII intact, grossly non-focal.  Psychiatric: Alert and oriented, thought content appropriate, normal insight  Capillary Refill: Brisk,3 seconds, normal   Peripheral Pulses: +2 palpable, equal bilaterally       Labs:   Recent Labs     01/21/22 0253 01/22/22 0247 01/23/22  0453   WBC 13.7* 14.1* 15.6*   HGB 11.8* 11.8* 11.5*   HCT 37.1* 36.0* 35.7*    227 211     Recent Labs     01/21/22 0253 01/22/22 0247 01/23/22  0453   * 131* 135*   K 4.7 4.9 4.5   CL 95* 95* 99   CO2 20* 20* 23   BUN 72* 67* 61*   CREATININE 1.4* 1.3 1.2   CALCIUM 8.7 8.6 8.7     No results for input(s): AST, ALT, BILIDIR, BILITOT, ALKPHOS in the last 72 hours. No results for input(s): INR in the last 72 hours. No results for input(s): Bennetta Downy in the last 72 hours.     Urinalysis:      Lab Results   Component Value Date    NITRU POSITIVE 07/31/2016    WBCUA 287 07/31/2016    BACTERIA 4+ 07/31/2016    RBCUA 30 07/31/2016    BLOODU MODERATE 07/31/2016    SPECGRAV 1.028 07/31/2016    GLUCOSEU Negative 07/31/2016    GLUCOSEU NEGATIVE 01/30/2012       Radiology:  XR CHEST PORTABLE   Final Result   Improved aeration of the lungs bilaterally with residual interstitial   prominence favoring moderate pulmonary vascular congestion. XR CHEST PORTABLE   Final Result   No significant change         XR CHEST PORTABLE   Final Result   Bilateral peripheral airspace opacities suggesting COVID pneumonia                 Assessment/Plan:    Active Hospital Problems    Diagnosis     Essential hypertension [I10]      Priority: Medium    Ischemic cardiomyopathy [I25.5]     Chronic a-fib (HCC) [I48.20]     Acute on chronic systolic heart failure, NYHA class 3 (HCC) [I50.23]     Hyponatremia [E87.1]     COVID [U07.1]     Acute respiratory failure due to COVID-19 (HCC) [U07.1, J96.00]     High cholesterol [E78.00]     Acute hypoxemic respiratory failure due to COVID-19 (HCC) [U07.1, J96.01]      Acute respiratory failure due to COVID-19 (HCC)  Worse and is now on Heated high flow. Currently on 70% FiO2, improving since yesterday. D-dimer 221, CRP 6.3 complete course of 5 days of remdesivir, currently on Decadron 10 mg daily, had 1 dose of Actemra  CXR with improved aeration of lungs bilaterally    HTN (hypertension)    stay on same meds  Controlled    High cholesterol - Stable. cont statin    Hyperglycemia   due to steroids. ,  Obtain hemoglobin A1c. Start Lantus and lispro  Last known hemoglobin A1c 6.2  Continue sliding scale insulin, start  on Lantus twice daily now increased to 25 units twice daily    Coronary artery disease  Cardiology following, continue aspirin, beta-blocker, Imdur, per Ginger no need for left heart cath prior to discharge, will be addressed as an outpatient    Ischemic cardiomyopathy  Continue Entresto, Imdur, Coreg, cardiology following    History of A. fib   Rate is controlled, continue amiodarone and Coreg    DVT Prophylaxis: lovenox  Diet: ADULT DIET; Regular; Low Sodium (2 gm); 2000 ml  Code Status: Full Code    PT/OT Eval Status: 16    Dispo -he remains critically ill and has progressed to heated high flow oxygen.   Continue to monitor remain high risk for intubation and mechanical ventilation    Due to the immediate potential for life-threatening deterioration due to acute hypoxic respiratory failure secondary to COVID-19 pneumonia, I spent 35 minutes providing critical care. This time is excluding time spent performing procedures.         Saba Reyna MD

## 2022-01-23 NOTE — PROGRESS NOTES
[facility]   Daily Progress Note      Admit Date:  1/12/2022    HPI:    Mr. Nicho Collado is a 70year old male with history of coronary disease, CABG in 0731, ICM, systolic heart failure with recovered LVEF, copd, hypertension, peripheral vascular disease, AF with CV 2019. He was vaccinated with moderna    He is admitted with shortness of breath for 2 weeks and covid + at [facility]. Shortness of breath became worse. No prior oxygen requirement. In ER required 10-15L of high flow. He was hypotensive and bradycardic, beta blocker held  He received decadron, remdesivir and actemra    Subjective:  Patient is being seen for acute on chronic systolic heart failure. There were no acute overnight cardiac events. Creat 1.2 bun 61 potassium 4.5 sodium 135. He is lying in the bed on airvo 70%. He was up going to the bathroom and oxygen increased during that period but improved now. He denies any increased shortness of breath, palpitations or chest pain. -13 L out and making good urine output  Weight 204->189-200 not sure weight on 21 and 22 is accurate (weight in sept was 211)    Objective:   /63   Pulse 99   Temp 96.1 °F (35.6 °C) (Temporal)   Resp 20   Ht 5' 9\" (1.753 m)   Wt 200 lb 12.8 oz (91.1 kg)   SpO2 97%   BMI 29.65 kg/m²       Intake/Output Summary (Last 24 hours) at 1/23/2022 1040  Last data filed at 1/23/2022 0532  Gross per 24 hour   Intake 490 ml   Output 1900 ml   Net -1410 ml          Physical Exam:  General:  Awake, alert, oriented in NAD  Skin:  Warm and dry. No unusual bruising or rash  Neck:  Supple. No JVD or carotid bruit appreciated  Chest:  Normal effort.   Decreased bilaterally in the bases  Cardiovascular:  irreg, S1/S2, no murmur/gallop/rub  Abdomen:  Soft, nontender, +bowel sounds, obese  Extremities:  No edema  Neurological: No focal deficits  Psychological: Normal mood and affect      Medications:    insulin glargine  25 Units SubCUTAneous BID    furosemide  40 mg IntraVENous BID    carvedilol  6.25 mg Oral BID WC    dexamethasone  10 mg IntraVENous Daily    enoxaparin  30 mg SubCUTAneous BID    insulin lispro  0-18 Units SubCUTAneous TID WC    insulin lispro  0-9 Units SubCUTAneous Nightly    albuterol sulfate HFA  2 puff Inhalation BID    ipratropium  2 puff Inhalation BID    isosorbide mononitrate  30 mg Oral Daily    aspirin  81 mg Oral Daily    amiodarone  200 mg Oral Daily    sacubitril-valsartan  0.5 tablet Oral BID    sodium chloride flush  5-40 mL IntraVENous 2 times per day      dextrose      sodium chloride 25 mL (01/18/22 0916)       Lab Data:  CBC:   Recent Labs     01/21/22  0253 01/22/22  0247 01/23/22  0453   WBC 13.7* 14.1* 15.6*   HGB 11.8* 11.8* 11.5*    227 211     BMP:    Recent Labs     01/21/22 0253 01/22/22  0247 01/23/22  0453   * 131* 135*   K 4.7 4.9 4.5   CO2 20* 20* 23   BUN 72* 67* 61*   CREATININE 1.4* 1.3 1.2     INR:  No results for input(s): INR in the last 72 hours. BNP:  No results for input(s): PROBNP in the last 72 hours. Diagnostics:  Echo 1/14/22:   Technically difficult examination.   Left ventricular systolic function is low normal with ejection fraction   estimated at 50-55%.   No definitive regional wall motion abnormalities are noted.   Diastolic filling parameters suggests grade II diastolic dysfunction.   Mild mitral regurgitation.   The left atrium is mildly dilated.   Aortic valve appears sclerotic but opens adequately.   Mild aortic regurgitation is present.   Inadequate tricuspid regurgitation to estimate systolic pulmonary artery   pressure.     Stress:   7/2021 - LVEF 53%,       SPECT images demonstrate a small area of mildly decreased perfusion in the    mid inferior, mid-inferolateral, and basal inferior segments. The defect is    present at rest and worsens with stress, consistent with mixed ischemia and    scar.  There is associated wall motion abnormality.        The sum stress score is 8. No visual TID. Calculated TID is 1.09.        Left ventricular ejection fraction is normal at 53%.        Moderate risk scan given ischemia and mildly elevated LV volumes        Echo:  11/11/2019  LVEF 15-20% with severe diffuse hypkinesis    [facility] 12/27/2016      Cath with occluded SVG to RCA,patent LIMA to LAD,patent SVG to diag,OM1 with good retrograde filling of LAD,patent LIMA to LAD  EF 35-40% with inf hypokinesis,LVEDP 25        Assessment:    1.  covid + pneumonia  2. CAD  3. Ischemic cardiomyopathy  4. Chronic atrial fib, on amiodarone  5. Acute on chronic systolic heart failure, on bb and arni  6. Essential hypertension  7. Hyponatremia    Plan:    1. Continue coreg 6.25 mg twice a day  2. Continue entresto half 24-26 mg twice a day  3. Will need to resume his aldactone 12.5 mg once his BP is improved  4. Continue lasix to 40 mg IV twice a day  5. Add low sodium and fluid restrictions to diet    Discussed with bedside nurse and hospitalist    Discussed with patient who is agreeable with plan of care. Thank you for allowing me to participate in the care of your patient.     Kimberly Ayala, GEORGE - CNS, CNS

## 2022-01-23 NOTE — FLOWSHEET NOTE
01/23/22 1752   Provider Notification   Reason for Communication Evaluate   Provider Name [facility]   Provider Notification Physician   Method of Communication Secure Message   Response Waiting for response   Notification Time 1753   Pt BP trending down throughout shift. /70 at 0800 and now 74/54 MAP 62. Decreasing O2 demand now on 40L at 60% FiO2. Do you want any changes to antihypertensives or diuretics? Is there anything else you would recommend?

## 2022-01-23 NOTE — FLOWSHEET NOTE
01/23/22 1444   Provider Notification   Reason for Communication Evaluate   Provider Name [facility]   Provider Notification Physician   Method of Communication Secure Message   Response Waiting for response   Notification Time 1444   Pt BP trending down throughout shift. /70 at 0800 and now 81/54 MAP 64. Decreasing O2 demand now on 40L at 60% FiO2. Do you want any changes to antihypertensives or diuretics? Is there anything else you would recommend? 1503: No cont.  monitor

## 2022-01-23 NOTE — PROGRESS NOTES
Assessment completed, see doc flowsheets. Pt is A&Ox4 Lung sounds are fine crackles. Pt states in the afternoon, he started to cough a lot and then started to have SOB. VSS. Tele on. Medication given per [facility]. Patient has no needs at this time. Call light within in reach, will continue to monitor.

## 2022-01-24 LAB
ANION GAP SERPL CALCULATED.3IONS-SCNC: 14 MMOL/L (ref 3–16)
BASOPHILS ABSOLUTE: 0 K/UL (ref 0–0.2)
BASOPHILS RELATIVE PERCENT: 0.1 %
BUN BLDV-MCNC: 67 MG/DL (ref 7–20)
CALCIUM SERPL-MCNC: 8.2 MG/DL (ref 8.3–10.6)
CHLORIDE BLD-SCNC: 97 MMOL/L (ref 99–110)
CO2: 19 MMOL/L (ref 21–32)
CREAT SERPL-MCNC: 1.1 MG/DL (ref 0.8–1.3)
EOSINOPHILS ABSOLUTE: 0 K/UL (ref 0–0.6)
EOSINOPHILS RELATIVE PERCENT: 0 %
GFR AFRICAN AMERICAN: >60
GFR NON-AFRICAN AMERICAN: >60
GLUCOSE BLD-MCNC: 123 MG/DL (ref 70–99)
GLUCOSE BLD-MCNC: 150 MG/DL (ref 70–99)
GLUCOSE BLD-MCNC: 224 MG/DL (ref 70–99)
GLUCOSE BLD-MCNC: 260 MG/DL (ref 70–99)
GLUCOSE BLD-MCNC: 419 MG/DL (ref 70–99)
HCT VFR BLD CALC: 35.9 % (ref 40.5–52.5)
HEMOGLOBIN: 11.4 G/DL (ref 13.5–17.5)
LYMPHOCYTES ABSOLUTE: 0.6 K/UL (ref 1–5.1)
LYMPHOCYTES RELATIVE PERCENT: 4 %
MCH RBC QN AUTO: 23.9 PG (ref 26–34)
MCHC RBC AUTO-ENTMCNC: 31.6 G/DL (ref 31–36)
MCV RBC AUTO: 75.6 FL (ref 80–100)
MONOCYTES ABSOLUTE: 0.4 K/UL (ref 0–1.3)
MONOCYTES RELATIVE PERCENT: 3.1 %
NEUTROPHILS ABSOLUTE: 13.1 K/UL (ref 1.7–7.7)
NEUTROPHILS RELATIVE PERCENT: 92.8 %
PDW BLD-RTO: 20.2 % (ref 12.4–15.4)
PERFORMED ON: ABNORMAL
PLATELET # BLD: 192 K/UL (ref 135–450)
PMV BLD AUTO: 8.4 FL (ref 5–10.5)
POTASSIUM SERPL-SCNC: 4.1 MMOL/L (ref 3.5–5.1)
PRO-BNP: 4226 PG/ML (ref 0–124)
RBC # BLD: 4.75 M/UL (ref 4.2–5.9)
SODIUM BLD-SCNC: 130 MMOL/L (ref 136–145)
WBC # BLD: 14.1 K/UL (ref 4–11)

## 2022-01-24 PROCEDURE — 94640 AIRWAY INHALATION TREATMENT: CPT

## 2022-01-24 PROCEDURE — 6360000002 HC RX W HCPCS: Performed by: INTERNAL MEDICINE

## 2022-01-24 PROCEDURE — 2700000000 HC OXYGEN THERAPY PER DAY

## 2022-01-24 PROCEDURE — 6370000000 HC RX 637 (ALT 250 FOR IP): Performed by: CLINICAL NURSE SPECIALIST

## 2022-01-24 PROCEDURE — 6360000002 HC RX W HCPCS: Performed by: STUDENT IN AN ORGANIZED HEALTH CARE EDUCATION/TRAINING PROGRAM

## 2022-01-24 PROCEDURE — 6360000002 HC RX W HCPCS: Performed by: CLINICAL NURSE SPECIALIST

## 2022-01-24 PROCEDURE — 6370000000 HC RX 637 (ALT 250 FOR IP): Performed by: INTERNAL MEDICINE

## 2022-01-24 PROCEDURE — 80048 BASIC METABOLIC PNL TOTAL CA: CPT

## 2022-01-24 PROCEDURE — 83880 ASSAY OF NATRIURETIC PEPTIDE: CPT

## 2022-01-24 PROCEDURE — 36415 COLL VENOUS BLD VENIPUNCTURE: CPT

## 2022-01-24 PROCEDURE — 99232 SBSQ HOSP IP/OBS MODERATE 35: CPT | Performed by: CLINICAL NURSE SPECIALIST

## 2022-01-24 PROCEDURE — 2580000003 HC RX 258: Performed by: INTERNAL MEDICINE

## 2022-01-24 PROCEDURE — 85025 COMPLETE CBC W/AUTO DIFF WBC: CPT

## 2022-01-24 PROCEDURE — 2060000000 HC ICU INTERMEDIATE R&B

## 2022-01-24 PROCEDURE — 94761 N-INVAS EAR/PLS OXIMETRY MLT: CPT

## 2022-01-24 RX ORDER — CARVEDILOL 3.12 MG/1
3.12 TABLET ORAL 2 TIMES DAILY WITH MEALS
Status: DISCONTINUED | OUTPATIENT
Start: 2022-01-24 | End: 2022-01-28 | Stop reason: HOSPADM

## 2022-01-24 RX ORDER — FUROSEMIDE 40 MG/1
40 TABLET ORAL DAILY
Status: DISCONTINUED | OUTPATIENT
Start: 2022-01-25 | End: 2022-01-25

## 2022-01-24 RX ADMIN — Medication 10 ML: at 20:08

## 2022-01-24 RX ADMIN — ASPIRIN 81 MG: 81 TABLET, COATED ORAL at 08:48

## 2022-01-24 RX ADMIN — SACUBITRIL AND VALSARTAN 0.5 TABLET: 24; 26 TABLET, FILM COATED ORAL at 08:48

## 2022-01-24 RX ADMIN — FUROSEMIDE 40 MG: 10 INJECTION, SOLUTION INTRAMUSCULAR; INTRAVENOUS at 08:49

## 2022-01-24 RX ADMIN — INSULIN LISPRO 18 UNITS: 100 INJECTION, SOLUTION INTRAVENOUS; SUBCUTANEOUS at 12:38

## 2022-01-24 RX ADMIN — INSULIN LISPRO 6 UNITS: 100 INJECTION, SOLUTION INTRAVENOUS; SUBCUTANEOUS at 17:48

## 2022-01-24 RX ADMIN — Medication 2 PUFF: at 09:38

## 2022-01-24 RX ADMIN — INSULIN GLARGINE 25 UNITS: 100 INJECTION, SOLUTION SUBCUTANEOUS at 08:50

## 2022-01-24 RX ADMIN — ENOXAPARIN SODIUM 30 MG: 100 INJECTION SUBCUTANEOUS at 08:48

## 2022-01-24 RX ADMIN — ENOXAPARIN SODIUM 30 MG: 100 INJECTION SUBCUTANEOUS at 20:26

## 2022-01-24 RX ADMIN — DEXAMETHASONE SODIUM PHOSPHATE 10 MG: 10 INJECTION INTRAMUSCULAR; INTRAVENOUS at 08:49

## 2022-01-24 RX ADMIN — INSULIN GLARGINE 25 UNITS: 100 INJECTION, SOLUTION SUBCUTANEOUS at 20:30

## 2022-01-24 RX ADMIN — SACUBITRIL AND VALSARTAN 0.5 TABLET: 24; 26 TABLET, FILM COATED ORAL at 20:27

## 2022-01-24 RX ADMIN — ACETAMINOPHEN 650 MG: 325 TABLET ORAL at 12:37

## 2022-01-24 RX ADMIN — INSULIN LISPRO 5 UNITS: 100 INJECTION, SOLUTION INTRAVENOUS; SUBCUTANEOUS at 20:30

## 2022-01-24 RX ADMIN — Medication 10 ML: at 08:49

## 2022-01-24 RX ADMIN — ISOSORBIDE MONONITRATE 30 MG: 30 TABLET, EXTENDED RELEASE ORAL at 08:48

## 2022-01-24 RX ADMIN — AMIODARONE HYDROCHLORIDE 200 MG: 200 TABLET ORAL at 08:48

## 2022-01-24 ASSESSMENT — PAIN DESCRIPTION - ORIENTATION: ORIENTATION: RIGHT

## 2022-01-24 ASSESSMENT — PAIN SCALES - GENERAL
PAINLEVEL_OUTOF10: 5
PAINLEVEL_OUTOF10: 0
PAINLEVEL_OUTOF10: 0
PAINLEVEL_OUTOF10: 9
PAINLEVEL_OUTOF10: 0
PAINLEVEL_OUTOF10: 0
PAINLEVEL_OUTOF10: 9
PAINLEVEL_OUTOF10: 0

## 2022-01-24 ASSESSMENT — PAIN DESCRIPTION - PAIN TYPE: TYPE: ACUTE PAIN

## 2022-01-24 ASSESSMENT — PAIN SCALES - WONG BAKER
WONGBAKER_NUMERICALRESPONSE: 0

## 2022-01-24 ASSESSMENT — PAIN DESCRIPTION - DIRECTION: RADIATING_TOWARDS: FRONTAL

## 2022-01-24 ASSESSMENT — PAIN DESCRIPTION - LOCATION: LOCATION: HEAD

## 2022-01-24 ASSESSMENT — PAIN DESCRIPTION - DESCRIPTORS: DESCRIPTORS: HEADACHE

## 2022-01-24 NOTE — PROGRESS NOTES
[facility]   Daily Progress Note      Admit Date:  1/12/2022    HPI:    Mr. Sheryl Chavez is a 70year old male with history of coronary disease, CABG in 2795, ICM, systolic heart failure with recovered LVEF, copd, hypertension, peripheral vascular disease, AF with CV 2019. He was vaccinated with moderna    He is admitted with shortness of breath for 2 weeks and covid + at [facility]. Shortness of breath became worse. No prior oxygen requirement. In ER required 10-15L of high flow. He was hypotensive and bradycardic, beta blocker held  He received decadron, remdesivir and actemra    Subjective:  Patient is being seen for acute on chronic systolic heart failure. There were no acute overnight cardiac events. Creat 1.1 bun 67 potassium 4.1 sodium 130. He is lying in the bed on airvo 65% and 40 L He is -13 L out     Weight 204->189-200     Objective:   BP (!) 99/53   Pulse 99   Temp 96.4 °F (35.8 °C) (Temporal)   Resp 18   Ht 5' 9\" (1.753 m)   Wt 200 lb 13.4 oz (91.1 kg)   SpO2 94%   BMI 29.66 kg/m²       Intake/Output Summary (Last 24 hours) at 1/24/2022 1141  Last data filed at 1/24/2022 0200  Gross per 24 hour   Intake 720 ml   Output 400 ml   Net 320 ml          Physical Exam:  General:  Awake, alert, oriented in NAD  Skin:  Warm and dry. No unusual bruising or rash  Neck:  Supple. No JVD or carotid bruit appreciated  Chest:  Normal effort.   Decreased bilaterally in the bases  Cardiovascular:  irreg, S1/S2, no murmur/gallop/rub  Abdomen:  Soft, nontender, +bowel sounds, obese  Extremities:  No edema  Neurological: No focal deficits  Psychological: Normal mood and affect      Medications:    insulin glargine  25 Units SubCUTAneous BID    furosemide  40 mg IntraVENous BID    carvedilol  6.25 mg Oral BID WC    dexamethasone  10 mg IntraVENous Daily    enoxaparin  30 mg SubCUTAneous BID    insulin lispro  0-18 Units SubCUTAneous TID WC    insulin lispro  0-9 Units SubCUTAneous Nightly    albuterol sulfate HFA  2 puff Inhalation BID    ipratropium  2 puff Inhalation BID    isosorbide mononitrate  30 mg Oral Daily    aspirin  81 mg Oral Daily    amiodarone  200 mg Oral Daily    sacubitril-valsartan  0.5 tablet Oral BID    sodium chloride flush  5-40 mL IntraVENous 2 times per day      dextrose      sodium chloride 25 mL (01/18/22 0916)       Lab Data:  CBC:   Recent Labs     01/22/22  0247 01/23/22  0453 01/24/22  0242   WBC 14.1* 15.6* 14.1*   HGB 11.8* 11.5* 11.4*    211 192     BMP:    Recent Labs     01/22/22  0247 01/23/22  0453 01/24/22  0242   * 135* 130*   K 4.9 4.5 4.1   CO2 20* 23 19*   BUN 67* 61* 67*   CREATININE 1.3 1.2 1.1     INR:  No results for input(s): INR in the last 72 hours. BNP:  No results for input(s): PROBNP in the last 72 hours. Diagnostics:  Echo 1/14/22:   Technically difficult examination.   Left ventricular systolic function is low normal with ejection fraction   estimated at 50-55%.   No definitive regional wall motion abnormalities are noted.   Diastolic filling parameters suggests grade II diastolic dysfunction.   Mild mitral regurgitation.   The left atrium is mildly dilated.   Aortic valve appears sclerotic but opens adequately.   Mild aortic regurgitation is present.   Inadequate tricuspid regurgitation to estimate systolic pulmonary artery   pressure.     Stress:   7/2021 - LVEF 53%,       SPECT images demonstrate a small area of mildly decreased perfusion in the    mid inferior, mid-inferolateral, and basal inferior segments. The defect is    present at rest and worsens with stress, consistent with mixed ischemia and    scar. There is associated wall motion abnormality.        The sum stress score is 8. No visual TID.  Calculated TID is 1.09.        Left ventricular ejection fraction is normal at 53%.        Moderate risk scan given ischemia and mildly elevated LV volumes        Echo:  11/11/2019  LVEF 15-20% with severe diffuse hypkinesis    [facility] 12/27/2016      Cath with occluded SVG to RCA,patent LIMA to LAD,patent SVG to diag,OM1 with good retrograde filling of LAD,patent LIMA to LAD  EF 35-40% with inf hypokinesis,LVEDP 25        Assessment:    1.  covid + pneumonia  2. CAD  3. Ischemic cardiomyopathy  4. Chronic atrial fib, on amiodarone  5. Acute on chronic systolic heart failure, on bb and arni  6. Essential hypertension  7. Hyponatremia    Plan:    1. Will cut his coreg to 3.125 mg twice a day to help his BP  2. Continue entresto half 24-26 mg twice a day as long as BP>90  3. Will need to resume his aldactone 12.5 mg once his BP is improved  4. Change lasix to 40 mg po daily  5. Continue low sodium and fluid restrictions to diet  6. Add probnp to labs done this morning      Discussed with patient who is agreeable with plan of care. Thank you for allowing me to participate in the care of your patient.     GEORGE Membreno - CNS, CNS

## 2022-01-24 NOTE — PROGRESS NOTES
Assessment complete, VSS. Pt A&O x4. Respirations regular and unlabored at rest on airvo, 65%, 40L. Call light within reach, bed in lowest position, urinal also within reach. Snack provided, all needs addressed at this time.

## 2022-01-24 NOTE — RT PROTOCOL NOTE
RT Nebulizer Bronchodilator Protocol Note    There is a bronchodilator order in the chart from a provider indicating to follow the RT Bronchodilator Protocol and there is an Initiate RT Bronchodilator Protocol order as well (see protocol at bottom of note). CXR Findings:  No results found. The findings from the last RT Protocol Assessment were as follows:  Smoking: Chronic pulmonary disease  Respiratory Pattern: Regular pattern and RR 12-20 bpm  Breath Sounds: Slightly diminished and/or crackles  Cough: Strong, spontaneous, non-productive  Indication for Bronchodilator Therapy: Decreased or absent breath sounds  Bronchodilator Assessment Score: 4    Aerosolized bronchodilator medication orders have been revised according to the RT Nebulizer Bronchodilator Protocol below. Respiratory Therapist to perform RT Therapy Protocol Assessment initially then follow the protocol. Repeat RT Therapy Protocol Assessment PRN for score 0-3 or on second treatment, BID, and PRN for scores above 3. No Indications  adjust the frequency to every 6 hours PRN wheezing or bronchospasm, if no treatments needed after 48 hours then discontinue using Per Protocol order mode. If indication present, adjust the RT bronchodilator orders based on the Bronchodilator Assessment Score as indicated below. If a patient is on this medication at home then do not decrease Frequency below that used at home. 0-3  enter or revise RT bronchodilator order(s) to equivalent RT Bronchodilator order with Frequency of every 4 hours PRN for wheezing or increased work of breathing using Per Protocol order mode. 4-6  enter or revise RT Bronchodilator order(s) to two equivalent RT bronchodilator orders with one order with BID Frequency and one order with Frequency of every 4 hours PRN wheezing or increased work of breathing using Per Protocol order mode.          7-10  enter or revise RT Bronchodilator order(s) to two equivalent RT bronchodilator orders with one order with TID Frequency and one order with Frequency of every 4 hours PRN wheezing or increased work of breathing using Per Protocol order mode. 11-13  enter or revise RT Bronchodilator order(s) to one equivalent RT bronchodilator order with QID Frequency and an Albuterol order with Frequency of every 4 hours PRN wheezing or increased work of breathing using Per Protocol order mode. Greater than 13  enter or revise RT Bronchodilator order(s) to one equivalent RT bronchodilator order with every 4 hours Frequency and an Albuterol order with Frequency of every 2 hours PRN wheezing or increased work of breathing using Per Protocol order mode. RT to enter RT Home Evaluation for COPD & MDI Assessment order using Per Protocol order mode.     Electronically signed by Eli Smyth RCP on 1/24/2022 at 1:58 PM

## 2022-01-24 NOTE — PLAN OF CARE
Problem: Airway Clearance - Ineffective  Goal: Achieve or maintain patent airway  Outcome: Ongoing     Problem: Gas Exchange - Impaired  Goal: Absence of hypoxia  Outcome: Ongoing  Goal: Promote optimal lung function  Outcome: Ongoing     Problem: Breathing Pattern - Ineffective  Goal: Ability to achieve and maintain a regular respiratory rate  Outcome: Ongoing     Problem:  Body Temperature -  Risk of, Imbalanced  Goal: Ability to maintain a body temperature within defined limits  Outcome: Ongoing  Goal: Will regain or maintain usual level of consciousness  Outcome: Ongoing  Goal: Complications related to the disease process, condition or treatment will be avoided or minimized  Outcome: Ongoing     Problem: Isolation Precautions - Risk of Spread of Infection  Goal: Prevent transmission of infection  Outcome: Ongoing     Problem: Nutrition Deficits  Goal: Optimize nutritional status  Outcome: Ongoing     Problem: Risk for Fluid Volume Deficit  Goal: Maintain normal heart rhythm  Outcome: Ongoing  Goal: Maintain absence of muscle cramping  Outcome: Ongoing  Goal: Maintain normal serum potassium, sodium, calcium, phosphorus, and pH  Outcome: Ongoing     Problem: Loneliness or Risk for Loneliness  Goal: Demonstrate positive use of time alone when socialization is not possible  Outcome: Ongoing     Problem: Fatigue  Goal: Verbalize increase energy and improved vitality  Outcome: Ongoing     Problem: Patient Education: Go to Patient Education Activity  Goal: Patient/Family Education  Outcome: Ongoing     Problem: FLUID AND ELECTROLYTE IMBALANCE  Goal: Fluid and electrolyte balance are achieved/maintained  Outcome: Ongoing     Problem: Pain:  Goal: Pain level will decrease  Description: Pain level will decrease  Outcome: Ongoing  Goal: Control of acute pain  Description: Control of acute pain  Outcome: Ongoing  Goal: Control of chronic pain  Description: Control of chronic pain  Outcome: Ongoing     Problem: Falls - Risk of:  Goal: Will remain free from falls  Description: Will remain free from falls  Outcome: Ongoing  Goal: Absence of physical injury  Description: Absence of physical injury  Outcome: Ongoing     Pt. Resting in bed at this time. VSS. Denies pain. Took meds well. Pt. Has urinals near by for elimination needs. IV tender but infused medications. Will monitor. Call light in reach.

## 2022-01-24 NOTE — PROGRESS NOTES
01/24/22 1357   RT Protocol   History Pulmonary Disease 2   Respiratory pattern 0   Breath sounds 2   Cough 0   Indications for Bronchodilator Therapy Decreased or absent breath sounds   Bronchodilator Assessment Score 4 Walk in

## 2022-01-25 LAB
ANION GAP SERPL CALCULATED.3IONS-SCNC: 15 MMOL/L (ref 3–16)
ANISOCYTOSIS: ABNORMAL
BANDED NEUTROPHILS RELATIVE PERCENT: 1 % (ref 0–7)
BASOPHILS ABSOLUTE: 0 K/UL (ref 0–0.2)
BASOPHILS RELATIVE PERCENT: 0 %
BUN BLDV-MCNC: 67 MG/DL (ref 7–20)
CALCIUM SERPL-MCNC: 8.4 MG/DL (ref 8.3–10.6)
CHLORIDE BLD-SCNC: 97 MMOL/L (ref 99–110)
CO2: 18 MMOL/L (ref 21–32)
CREAT SERPL-MCNC: 1.4 MG/DL (ref 0.8–1.3)
EOSINOPHILS ABSOLUTE: 0 K/UL (ref 0–0.6)
EOSINOPHILS RELATIVE PERCENT: 0 %
GFR AFRICAN AMERICAN: >60
GFR NON-AFRICAN AMERICAN: 50
GLUCOSE BLD-MCNC: 129 MG/DL (ref 70–99)
GLUCOSE BLD-MCNC: 197 MG/DL (ref 70–99)
GLUCOSE BLD-MCNC: 197 MG/DL (ref 70–99)
GLUCOSE BLD-MCNC: 233 MG/DL (ref 70–99)
GLUCOSE BLD-MCNC: 281 MG/DL (ref 70–99)
HCT VFR BLD CALC: 36.8 % (ref 40.5–52.5)
HEMOGLOBIN: 11.8 G/DL (ref 13.5–17.5)
LYMPHOCYTES ABSOLUTE: 0.5 K/UL (ref 1–5.1)
LYMPHOCYTES RELATIVE PERCENT: 4 %
MCH RBC QN AUTO: 24.3 PG (ref 26–34)
MCHC RBC AUTO-ENTMCNC: 32.2 G/DL (ref 31–36)
MCV RBC AUTO: 75.6 FL (ref 80–100)
METAMYELOCYTES RELATIVE PERCENT: 1 %
MICROCYTES: ABNORMAL
MONOCYTES ABSOLUTE: 0.6 K/UL (ref 0–1.3)
MONOCYTES RELATIVE PERCENT: 5 %
MYELOCYTE PERCENT: 1 %
NEUTROPHILS ABSOLUTE: 11.6 K/UL (ref 1.7–7.7)
NEUTROPHILS RELATIVE PERCENT: 88 %
OVALOCYTES: ABNORMAL
PDW BLD-RTO: 20.3 % (ref 12.4–15.4)
PERFORMED ON: ABNORMAL
PLATELET # BLD: 176 K/UL (ref 135–450)
PLATELET SLIDE REVIEW: ADEQUATE
PMV BLD AUTO: 8.9 FL (ref 5–10.5)
POTASSIUM SERPL-SCNC: 4.6 MMOL/L (ref 3.5–5.1)
RBC # BLD: 4.87 M/UL (ref 4.2–5.9)
SLIDE REVIEW: ABNORMAL
SODIUM BLD-SCNC: 130 MMOL/L (ref 136–145)
WBC # BLD: 12.7 K/UL (ref 4–11)

## 2022-01-25 PROCEDURE — 85025 COMPLETE CBC W/AUTO DIFF WBC: CPT

## 2022-01-25 PROCEDURE — 80048 BASIC METABOLIC PNL TOTAL CA: CPT

## 2022-01-25 PROCEDURE — 6360000002 HC RX W HCPCS: Performed by: INTERNAL MEDICINE

## 2022-01-25 PROCEDURE — 97530 THERAPEUTIC ACTIVITIES: CPT

## 2022-01-25 PROCEDURE — 97535 SELF CARE MNGMENT TRAINING: CPT

## 2022-01-25 PROCEDURE — 99232 SBSQ HOSP IP/OBS MODERATE 35: CPT | Performed by: CLINICAL NURSE SPECIALIST

## 2022-01-25 PROCEDURE — 6360000002 HC RX W HCPCS: Performed by: STUDENT IN AN ORGANIZED HEALTH CARE EDUCATION/TRAINING PROGRAM

## 2022-01-25 PROCEDURE — 6370000000 HC RX 637 (ALT 250 FOR IP): Performed by: CLINICAL NURSE SPECIALIST

## 2022-01-25 PROCEDURE — 2700000000 HC OXYGEN THERAPY PER DAY

## 2022-01-25 PROCEDURE — 6370000000 HC RX 637 (ALT 250 FOR IP): Performed by: INTERNAL MEDICINE

## 2022-01-25 PROCEDURE — 36415 COLL VENOUS BLD VENIPUNCTURE: CPT

## 2022-01-25 PROCEDURE — 2060000000 HC ICU INTERMEDIATE R&B

## 2022-01-25 PROCEDURE — 94640 AIRWAY INHALATION TREATMENT: CPT

## 2022-01-25 PROCEDURE — 2580000003 HC RX 258: Performed by: INTERNAL MEDICINE

## 2022-01-25 PROCEDURE — 6360000002 HC RX W HCPCS: Performed by: CLINICAL NURSE SPECIALIST

## 2022-01-25 RX ORDER — FUROSEMIDE 10 MG/ML
40 INJECTION INTRAMUSCULAR; INTRAVENOUS 2 TIMES DAILY
Status: DISCONTINUED | OUTPATIENT
Start: 2022-01-25 | End: 2022-01-28

## 2022-01-25 RX ORDER — DEXAMETHASONE SODIUM PHOSPHATE 10 MG/ML
6 INJECTION, SOLUTION INTRAMUSCULAR; INTRAVENOUS DAILY
Status: DISCONTINUED | OUTPATIENT
Start: 2022-01-26 | End: 2022-01-28

## 2022-01-25 RX ADMIN — AMIODARONE HYDROCHLORIDE 200 MG: 200 TABLET ORAL at 08:00

## 2022-01-25 RX ADMIN — CARVEDILOL 3.12 MG: 3.12 TABLET, FILM COATED ORAL at 17:15

## 2022-01-25 RX ADMIN — Medication 2 PUFF: at 20:18

## 2022-01-25 RX ADMIN — SACUBITRIL AND VALSARTAN 0.5 TABLET: 24; 26 TABLET, FILM COATED ORAL at 08:01

## 2022-01-25 RX ADMIN — DEXAMETHASONE SODIUM PHOSPHATE 10 MG: 10 INJECTION INTRAMUSCULAR; INTRAVENOUS at 08:00

## 2022-01-25 RX ADMIN — Medication 2 PUFF: at 20:17

## 2022-01-25 RX ADMIN — ASPIRIN 81 MG: 81 TABLET, COATED ORAL at 08:00

## 2022-01-25 RX ADMIN — ENOXAPARIN SODIUM 30 MG: 100 INJECTION SUBCUTANEOUS at 08:01

## 2022-01-25 RX ADMIN — SACUBITRIL AND VALSARTAN 0.5 TABLET: 24; 26 TABLET, FILM COATED ORAL at 20:46

## 2022-01-25 RX ADMIN — FUROSEMIDE 40 MG: 10 INJECTION, SOLUTION INTRAMUSCULAR; INTRAVENOUS at 17:14

## 2022-01-25 RX ADMIN — ACETAMINOPHEN 650 MG: 325 TABLET ORAL at 21:22

## 2022-01-25 RX ADMIN — INSULIN LISPRO 9 UNITS: 100 INJECTION, SOLUTION INTRAVENOUS; SUBCUTANEOUS at 17:42

## 2022-01-25 RX ADMIN — CARVEDILOL 3.12 MG: 3.12 TABLET, FILM COATED ORAL at 08:00

## 2022-01-25 RX ADMIN — Medication 2 PUFF: at 09:12

## 2022-01-25 RX ADMIN — ENOXAPARIN SODIUM 30 MG: 100 INJECTION SUBCUTANEOUS at 20:46

## 2022-01-25 RX ADMIN — INSULIN GLARGINE 25 UNITS: 100 INJECTION, SOLUTION SUBCUTANEOUS at 21:00

## 2022-01-25 RX ADMIN — Medication 10 ML: at 20:46

## 2022-01-25 RX ADMIN — INSULIN LISPRO 3 UNITS: 100 INJECTION, SOLUTION INTRAVENOUS; SUBCUTANEOUS at 20:59

## 2022-01-25 RX ADMIN — INSULIN GLARGINE 25 UNITS: 100 INJECTION, SOLUTION SUBCUTANEOUS at 09:01

## 2022-01-25 RX ADMIN — FUROSEMIDE 40 MG: 40 TABLET ORAL at 08:00

## 2022-01-25 RX ADMIN — INSULIN LISPRO 3 UNITS: 100 INJECTION, SOLUTION INTRAVENOUS; SUBCUTANEOUS at 12:59

## 2022-01-25 RX ADMIN — Medication 10 ML: at 08:01

## 2022-01-25 ASSESSMENT — PAIN SCALES - GENERAL
PAINLEVEL_OUTOF10: 0
PAINLEVEL_OUTOF10: 0
PAINLEVEL_OUTOF10: 8
PAINLEVEL_OUTOF10: 0
PAINLEVEL_OUTOF10: 8
PAINLEVEL_OUTOF10: 0
PAINLEVEL_OUTOF10: 0

## 2022-01-25 ASSESSMENT — PAIN SCALES - WONG BAKER
WONGBAKER_NUMERICALRESPONSE: 0
WONGBAKER_NUMERICALRESPONSE: 0

## 2022-01-25 ASSESSMENT — PAIN DESCRIPTION - LOCATION: LOCATION: HEAD

## 2022-01-25 NOTE — PROGRESS NOTES
Hospitalist Progress Note      PCP: Curly Price DO    Date of Admission: 1/12/2022    Chief Complaint: [address]: Admitted to the hospital with COVID-19 pneumonia transferred to the [facility] ICU    Subjective:   Currently on 77%>70%>80%>68>59%  >70%  FiO2, on heated nasal cannula, hyperglycemia, had been started on Lantus was increased to 25 units twice daily    Medications:  Reviewed    Infusion Medications    dextrose      sodium chloride 25 mL (01/18/22 0916)     Scheduled Medications    [START ON 1/26/2022] dexamethasone  6 mg IntraVENous Daily    furosemide  40 mg IntraVENous BID    carvedilol  3.125 mg Oral BID WC    insulin glargine  25 Units SubCUTAneous BID    enoxaparin  30 mg SubCUTAneous BID    insulin lispro  0-18 Units SubCUTAneous TID WC    insulin lispro  0-9 Units SubCUTAneous Nightly    albuterol sulfate HFA  2 puff Inhalation BID    ipratropium  2 puff Inhalation BID    isosorbide mononitrate  30 mg Oral Daily    aspirin  81 mg Oral Daily    amiodarone  200 mg Oral Daily    sacubitril-valsartan  0.5 tablet Oral BID    sodium chloride flush  5-40 mL IntraVENous 2 times per day     PRN Meds: melatonin, perflutren lipid microspheres, glucose, dextrose, glucagon (rDNA), dextrose, albuterol sulfate HFA, linaclotide, nitroGLYCERIN, sodium chloride flush, sodium chloride, ondansetron **OR** ondansetron, magnesium hydroxide, acetaminophen **OR** acetaminophen, hydrALAZINE, potassium chloride **OR** potassium alternative oral replacement **OR** potassium chloride, sodium chloride      Intake/Output Summary (Last 24 hours) at 1/25/2022 1649  Last data filed at 1/25/2022 1301  Gross per 24 hour   Intake 1168 ml   Output 1275 ml   Net -107 ml       Physical Exam Performed:    /80   Pulse 92   Temp 96.3 °F (35.7 °C) (Temporal)   Resp 18   Ht 5' 9\" (1.753 m)   Wt 205 lb (93 kg)   SpO2 (!) 85%   BMI 30.27 kg/m²     General appearance: No apparent distress, appears stated age and cooperative. HEENT: Pupils equal, round, and reactive to light. Conjunctivae/corneas clear. Neck: Supple, with full range of motion. No jugular venous distention. Trachea midline. Respiratory:  Normal respiratory effort. Clear to auscultation, bilaterally without Rales/Wheezes/Rhonchi. Cardiovascular: Regular rate and rhythm with normal S1/S2 without murmurs, rubs or gallops. Abdomen: Soft, non-tender, non-distended with normal bowel sounds. Musculoskeletal: No clubbing, cyanosis or edema bilaterally. Full range of motion without deformity. Skin: Skin color, texture, turgor normal.  No rashes or lesions. Neurologic:  Neurovascularly intact without any focal sensory/motor deficits. Cranial nerves: II-XII intact, grossly non-focal.  Psychiatric: Alert and oriented, thought content appropriate, normal insight  Capillary Refill: Brisk,3 seconds, normal   Peripheral Pulses: +2 palpable, equal bilaterally       Labs:   Recent Labs     01/23/22  0453 01/24/22  0242 01/25/22  0301   WBC 15.6* 14.1* 12.7*   HGB 11.5* 11.4* 11.8*   HCT 35.7* 35.9* 36.8*    192 176     Recent Labs     01/23/22  0453 01/24/22  0242 01/25/22  0301   * 130* 130*   K 4.5 4.1 4.6   CL 99 97* 97*   CO2 23 19* 18*   BUN 61* 67* 67*   CREATININE 1.2 1.1 1.4*   CALCIUM 8.7 8.2* 8.4     No results for input(s): AST, ALT, BILIDIR, BILITOT, ALKPHOS in the last 72 hours. No results for input(s): INR in the last 72 hours. No results for input(s): Carol Yisel in the last 72 hours.     Urinalysis:      Lab Results   Component Value Date    NITRU POSITIVE 07/31/2016    WBCUA 287 07/31/2016    BACTERIA 4+ 07/31/2016    RBCUA 30 07/31/2016    BLOODU MODERATE 07/31/2016    SPECGRAV 1.028 07/31/2016    GLUCOSEU Negative 07/31/2016    GLUCOSEU NEGATIVE 01/30/2012       Radiology:  XR CHEST PORTABLE   Final Result   Improved aeration of the lungs bilaterally with residual interstitial   prominence favoring moderate pulmonary vascular congestion. XR CHEST PORTABLE   Final Result   No significant change         XR CHEST PORTABLE   Final Result   Bilateral peripheral airspace opacities suggesting COVID pneumonia                 Assessment/Plan:    Active Hospital Problems    Diagnosis     Essential hypertension [I10]      Priority: Medium    Ischemic cardiomyopathy [I25.5]     Chronic a-fib (HCC) [I48.20]     Acute on chronic systolic heart failure, NYHA class 3 (HCC) [I50.23]     Hyponatremia [E87.1]     COVID [U07.1]     Acute respiratory failure due to COVID-19 (HCC) [U07.1, J96.00]     High cholesterol [E78.00]     Acute hypoxemic respiratory failure due to COVID-19 (HCC) [U07.1, J96.01]      Acute respiratory failure due to COVID-19 (HCC)  Worse and is now on Heated high flow. Currently on 65% FiO2, improving since yesterday. D-dimer 221, CRP 6.3 complete course of 5 days of remdesivir, currently on Decadron 10 mg daily, had 1 dose of Actemra  CXR with improved aeration of lungs bilaterally    HTN (hypertension)    stay on same meds  Controlled    High cholesterol - Stable. cont statin    Hyperglycemia   due to steroids. ,  Obtain hemoglobin A1c. Start Lantus and lispro  Last known hemoglobin A1c 6.2  Continue sliding scale insulin, start  on Lantus twice daily now increased to 25 units twice daily    Coronary artery disease  Cardiology following, continue aspirin, beta-blocker, Imdur, per Ginger no need for left heart cath prior to discharge, will be addressed as an outpatient    Ischemic cardiomyopathy  Continue Entresto, Imdur, Coreg, cardiology following    History of A. fib   Rate is controlled, continue amiodarone and Coreg    DVT Prophylaxis: lovenox  Diet: ADULT DIET; Regular; Low Sodium (2 gm); 2000 ml  Code Status: Full Code    PT/OT Eval Status: 16    Dispo -he remains critically ill and has progressed to heated high flow oxygen. He was on this for several days.   Today he has transitioned to 15 liters High flow NC.     Cristina Guadalupe MD

## 2022-01-25 NOTE — PROGRESS NOTES
Occupational Therapy  Facility/Department: [facility] 5C  Daily Treatment Note  NAME: Abhi Stahl  : 1950  MRN: [id number]    Date of Service: 2022    Discharge Recommendations:    Abhi Stahl scored a 19/24 on the AM-PAC ADL Inpatient form. Current research shows that an AM-PAC score of 17 or less is typically not associated with a discharge to the patient's home setting. Based on the patient's AM-PAC score and their current ADL deficits, it is recommended that the patient have 3-5 sessions per week of Occupational Therapy at d/c to increase the patient's independence. Please see assessment section for further patient specific details. Would benefit from continued therapy due to low activity tolerance and high O2 needs   If patient discharges prior to next session this note will serve as a discharge summary. Please see below for the latest assessment towards goals. OT Equipment Recommendations  Other: continue to assess pending pt progress    Assessment   Performance deficits / Impairments: Decreased functional mobility ; Decreased ADL status; Decreased endurance;Decreased balance;Decreased high-level IADLs  Assessment: Pt is currently functioning below occupational baseline and demo the deficits listed above, pt would benefit from continued skilled OT services to address these deficits and increase IND, safety, and ease with all occupational pursuits  Treatment Diagnosis: Decreased ADL/IADL status, functional mobility, and functional tranfers d/t Acute respiratory failure due to COVID-19 [facility])  Prognosis: Good  OT Education: OT Role;Plan of Care;Transfer Training;Energy Conservation  Patient Education: eval, importance of proning/sidelying, d/c recommendations, review of energy conservation - pt verbalized understanding  Barriers to Learning: none  REQUIRES OT FOLLOW UP: Yes  Activity Tolerance  Activity Tolerance: Patient Tolerated treatment well  Activity Tolerance: pt on 15L high flow 02 - dropping to low 80s with washing feet and donning socks - required encouragement to take rest breaks - pt able to recovery to 93-94% within a few minutes of resting  Safety Devices  Safety Devices in place: Yes  Type of devices: Call light within reach; Chair alarm in place; Left in chair;Nurse notified  Restraints  Initially in place: No         Patient Diagnosis(es): The primary encounter diagnosis was COVID. A diagnosis of Acute respiratory failure with hypoxia (HCC) was also pertinent to this visit. has a past medical history of Acid reflux, Acute MI ([facility] [address] CAD (coronary artery disease), COPD (chronic obstructive pulmonary disease) ([facility] [address] Diverticulitis, Hypertension, and Peripheral vascular disease ([facility] [address] has a past surgical history that includes vascular surgery; Cardiac surgery; Colonoscopy; and Cardiac catheterization. Restrictions  Restrictions/Precautions  Restrictions/Precautions: Isolation,Fall Risk (COVID+, high fall risk)  Required Braces or Orthoses?: No  Position Activity Restriction  Sternal Precautions: 02 with bed mobility 87%, 02 with ambulation to chair 85%, 02 with 24' ambulation 81%, static stance edge of recliner 91%, brushing teeth in stand 84%- recovers to above 90% with 2-3 minute rest breaks. BP at /70  Other position/activity restrictions: Per H&P \"76 y.o. male who presents for evaluation of increasing shortness of breath that started this morning. Patient states that he was diagnosed with COVID-19 and was inpatient at [facility] couple of weeks ago. States he has been progressively getting worse but significantly short of breath today. States that he cannot breathe. He has no history of CHF but does have history of COPD. No prior oxygen requirement. He denies any chest pain. No additional information is able to obtain at this time. \"  Subjective   General  Chart Reviewed: Yes  Patient assessed for rehabilitation services?: Yes  Additional Pertinent Hx: PMH: Acid reflux, Acute MI ([facility] [address] CAD (coronary artery disease), COPD (chronic obstructive pulmonary disease) ([facility] [address] Diverticulitis, Hypertension, and Peripheral vascular disease ([facility] [address]   Family / Caregiver Present: No  Referring Practitioner: Marcela Alvarado MD  Diagnosis: Acute respiratory failure due to COVID-19 [facility])  Subjective  Subjective: pt in bed on arrival - agreeable to session - denies pain      Orientation  Orientation  Overall Orientation Status: Within Normal Limits  Objective    ADL  Grooming: Setup;Stand by assistance  UE Bathing: Stand by assistance;Setup  LE Bathing: Stand by assistance;Setup  UE Dressing: Stand by assistance;Setup  LE Dressing: Stand by assistance;Setup  Additional Comments: pt completed sponge bathing and dressing from edge of bed - after supine rest break SPT to reclining chair           Bed mobility  Supine to Sit: Stand by assistance  Sit to Supine: Stand by assistance  Scooting: Stand by assistance  Transfers  Stand Pivot Transfers: Minimal assistance  Sit to stand: Contact guard assistance  Stand to sit: Contact guard assistance                       Cognition  Overall Cognitive Status: [facility]                                         Plan   Plan  Times per week: 3-5x/wk  Times per day: Daily  Current Treatment Recommendations: Strengthening,Balance Training,Functional Mobility Training,Endurance Training,Patient/Caregiver Education & Training,Safety Education & Training,Equipment Evaluation, Education, & procurement,Self-Care / ADL    AM-[facility] Score        AM-[facility] Inpatient Daily Activity Raw Score: 19 (01/25/22 1513)  AM-PAC Inpatient ADL T-Scale Score : 40.22 (01/25/22 1513)  ADL Inpatient CMS 0-100% Score: 42.8 (01/25/22 1513)  ADL Inpatient CMS G-Code Modifier : CK (01/25/22 1513)    Goals  Short term goals  Time Frame for Short term goals: d/c  Short term goal 1: pt will complete functional transfer to ADL surface at MOD I level - SBA/CGA  Short term goal 2: pt will complete UB ADLs at IND level - SBA/set up  Short term goal 3: Pt will complete LB ADLs at MOD I level - SBA/set up  Short term goal 4: Pt will complete toileting at MOD I level -  Short term goal 5: Pt will IND verbalize and demo use of energy conservation techniques - reviewed, contiue to reinforce  Long term goals  Time Frame for Long term goals : LTG=STG  Patient Goals   Patient goals : to return home       Therapy Time   Individual Concurrent Group Co-treatment   Time In       1419   Time Out       1500   Minutes       41   Timed Code Treatment Minutes: 41 Minutes   Total minutes [address], OT   Fletcher Fofana OTR/L [id number], 1/25/2022, 3:23 PM

## 2022-01-25 NOTE — PROGRESS NOTES
Physical Therapy  Facility/Department: [address]  Daily Treatment Note  NAME: Yarelis Amador  : 1950  MRN: [id number]    Date of Service: 2022    Discharge Recommendations:  3-5 sessions per week   PT Equipment Recommendations  Equipment Needed: No  Other: Needs met    Yarelis Amador scored a 17/24 on the AM-PAC short mobility form. Current research shows that an AM-PAC score of 17 or less is typically not associated with a discharge to the patient's home setting. Based on the patient's AM-PAC score and their current functional mobility deficits, it is recommended that the patient have 3-5 sessions per week of Physical Therapy at d/c to increase the patient's independence. Please see assessment section for further patient specific details. If patient discharges prior to next session this note will serve as a discharge summary. Please see below for the latest assessment towards goals. Assessment   Body structures, Functions, Activity limitations: Decreased functional mobility ; Decreased strength;Decreased endurance;Decreased balance  Assessment: Pt tolerates tx session well this date, requires intermittent rest breaks due to SPO2 desaturation with mobility. Pt requires SBA for STS transfer, CGA for stand step transfer to recliner chair.  Pt continues to present with activity tolerance as greatest hinderance to independence at this time and will continue to benefit from skilled therapy services to continue progression  Treatment Diagnosis: Decreased tolerance to activity  Prognosis: Good  PT Education: Goals;PT Role;Plan of Care;Energy Conservation  Patient Education: d/c recommendations- Pt verbalized understanding  Barriers to Learning: None  REQUIRES PT FOLLOW UP: Yes  Activity Tolerance  Activity Tolerance: Pt completes tx session on 15L O2 via NC with SPO2 dropping to low 80s with washing feet and donning socks - required encouragement to take rest breaks - pt able to recovery to 93-94% within a few minutes of resting     Patient Diagnosis(es): The primary encounter diagnosis was COVID. A diagnosis of Acute respiratory failure with hypoxia (HCC) was also pertinent to this visit. has a past medical history of Acid reflux, Acute MI ([facility] [address] CAD (coronary artery disease), COPD (chronic obstructive pulmonary disease) ([facility] [address] Diverticulitis, Hypertension, and Peripheral vascular disease ([facility] [address] has a past surgical history that includes vascular surgery; Cardiac surgery; Colonoscopy; and Cardiac catheterization. Restrictions  Restrictions/Precautions  Restrictions/Precautions: Isolation,Fall Risk (COVID+, high fall risk)  Required Braces or Orthoses?: No  Position Activity Restriction  Sternal Precautions: 02 with bed mobility 87%, 02 with ambulation to chair 85%, 02 with 24' ambulation 81%, static stance edge of recliner 91%, brushing teeth in stand 84%- recovers to above 90% with 2-3 minute rest breaks. BP at /70  Other position/activity restrictions: Per H&P \"76 y.o. male who presents for evaluation of increasing shortness of breath that started this morning. Patient states that he was diagnosed with COVID-19 and was inpatient at [facility] couple of weeks ago. States he has been progressively getting worse but significantly short of breath today. States that he cannot breathe. He has no history of CHF but does have history of COPD. No prior oxygen requirement. He denies any chest pain. No additional information is able to obtain at this time. \"  Subjective   General  Chart Reviewed: Yes  Response To Previous Treatment: Not applicable  Family / Caregiver Present: No  Subjective  Subjective: Pt supine in bed upon PT/OT entry, denies pain and is agreeable to tx session.   Pain Screening  Patient Currently in Pain: Denies  Vital Signs  Patient Currently in Pain: Denies       Cognition   Cognition  Overall Cognitive Status: WFL  Objective   Bed mobility  Supine to Sit: Stand by assistance  Sit to Supine: Stand by assistance  Scooting: Stand by assistance  Transfers  Sit to Stand: Stand by assistance  Stand to sit: Stand by assistance  Bed to Chair: Contact guard assistance (via stand step with HHA)  Ambulation  Ambulation?: No     Balance  Posture: Fair  Sitting - Static: Good (independent)  Sitting - Dynamic: Good (independent)  Standing - Static: Fair (SBA)  Standing - Dynamic: Fair (CGA)  Comments: Sat EOB independently 20 minutes. AM-PAC Score  AM-PAC Inpatient Mobility Raw Score : 17 (01/25/22 1549)  AM-PAC Inpatient T-Scale Score : 42.13 (01/25/22 1549)  Mobility Inpatient CMS 0-100% Score: 50.57 (01/25/22 1549)  Mobility Inpatient CMS G-Code Modifier : CK (01/25/22 1549)          Goals  Short term goals  Time Frame for Short term goals: Before discharge (no goals met 1/25)  Short term goal 1: Pt will complete bed mobility indep  Short term goal 2: Pt will complete sit<>stand indep  Short term goal 3: Pt will ambulate 50 ft with RW and supervision  Short term goal 4: Pt will ascend/descend 5 steps with (R) rail with supervision  Short term goal 5: Pt will tolerate standing x5 minutes without UE support indep in order to complete ADLs  Patient Goals   Patient goals : Go home when I am ready, I don't want to do this again    Plan    Plan  Times per week: 3-5x  Times per day: Daily  Current Treatment Recommendations: Strengthening,Functional Mobility Training,Transfer Training,Balance Training,Endurance Training,Gait Training,Stair training,Patient/Caregiver Education & Training,Safety Education & Training,Home Exercise Program  Safety Devices  Type of devices:  All fall risk precautions in place,Call light within reach,Gait belt,Patient at risk for falls,Nurse notified,Chair alarm in place,Left in chair  Restraints  Initially in place: No     Therapy Time   Individual Concurrent Group Co-treatment   Time In       1415   Time Out       1500   Minutes       45

## 2022-01-25 NOTE — PROGRESS NOTES
01/25/22 0913   Oxygen Therapy/Pulse Ox   O2 Therapy Oxygen humidified   O2 Device Heated high flow cannula  (weaned to 15L HFNC)   O2 Flow Rate (L/min) 40 L/min   FiO2  53 %   Resp 18   SpO2 98 %   Pulse Oximeter Device Mode Continuous   Pulse Oximeter Device Location Finger

## 2022-01-25 NOTE — PROGRESS NOTES
[facility]   Daily Progress Note      Admit Date:  1/12/2022    HPI:    Mr. Sylvie Pritchard is a 70year old male with history of coronary disease, CABG in 8631, ICM, systolic heart failure with recovered LVEF, copd, hypertension, peripheral vascular disease, AF with CV 2019. He was vaccinated with moderna    He is admitted with shortness of breath for 2 weeks and covid + at [facility]. Shortness of breath became worse. No prior oxygen requirement. In ER required 10-15L of high flow. He was hypotensive and bradycardic, beta blocker held  He received decadron, remdesivir and actemra    Subjective:  Patient is being seen for acute on chronic systolic heart failure. There were no acute overnight cardiac events. Creat 1.1 bun 67 potassium 4.1 sodium 130. He is lying in the bed on airvo 65% and 40 L He is -13 L out     Weight 204->189-200     Objective:   /80   Pulse 92   Temp 96.3 °F (35.7 °C) (Temporal)   Resp 18   Ht 5' 9\" (1.753 m)   Wt 205 lb (93 kg)   SpO2 (!) 85%   BMI 30.27 kg/m²       Intake/Output Summary (Last 24 hours) at 1/25/2022 1123  Last data filed at 1/25/2022 5545  Gross per 24 hour   Intake 860 ml   Output 1975 ml   Net -1115 ml          Physical Exam:  General:  Awake, alert, oriented in NAD  Skin:  Warm and dry. No unusual bruising or rash  Neck:  Supple. No JVD or carotid bruit appreciated  Chest:  Normal effort.   Decreased bilaterally in the bases  Cardiovascular:  irreg, S1/S2, no murmur/gallop/rub  Abdomen:  Soft, nontender, +bowel sounds, obese  Extremities:  No edema  Neurological: No focal deficits  Psychological: Normal mood and affect      Medications:    [START ON 1/26/2022] dexamethasone  6 mg IntraVENous Daily    furosemide  40 mg Oral Daily    carvedilol  3.125 mg Oral BID WC    insulin glargine  25 Units SubCUTAneous BID    enoxaparin  30 mg SubCUTAneous BID    insulin lispro  0-18 Units SubCUTAneous TID WC    insulin lispro  0-9 Units SubCUTAneous Nightly  albuterol sulfate HFA  2 puff Inhalation BID    ipratropium  2 puff Inhalation BID    isosorbide mononitrate  30 mg Oral Daily    aspirin  81 mg Oral Daily    amiodarone  200 mg Oral Daily    sacubitril-valsartan  0.5 tablet Oral BID    sodium chloride flush  5-40 mL IntraVENous 2 times per day      dextrose      sodium chloride 25 mL (01/18/22 0916)       Lab Data:  CBC:   Recent Labs     01/23/22  0453 01/24/22  0242 01/25/22  0301   WBC 15.6* 14.1* 12.7*   HGB 11.5* 11.4* 11.8*    192 176     BMP:    Recent Labs     01/23/22  0453 01/24/22  0242 01/25/22  0301   * 130* 130*   K 4.5 4.1 4.6   CO2 23 19* 18*   BUN 61* 67* 67*   CREATININE 1.2 1.1 1.4*     INR:  No results for input(s): INR in the last 72 hours. BNP:    Recent Labs     01/24/22  0400   PROBNP 4,226*         Diagnostics:  Echo 1/14/22:   Technically difficult examination.   Left ventricular systolic function is low normal with ejection fraction   estimated at 50-55%.   No definitive regional wall motion abnormalities are noted.   Diastolic filling parameters suggests grade II diastolic dysfunction.   Mild mitral regurgitation.   The left atrium is mildly dilated.   Aortic valve appears sclerotic but opens adequately.   Mild aortic regurgitation is present.   Inadequate tricuspid regurgitation to estimate systolic pulmonary artery   pressure.     Stress:   7/2021 - LVEF 53%,       SPECT images demonstrate a small area of mildly decreased perfusion in the    mid inferior, mid-inferolateral, and basal inferior segments. The defect is    present at rest and worsens with stress, consistent with mixed ischemia and    scar. There is associated wall motion abnormality.        The sum stress score is 8. No visual TID.  Calculated TID is 1.09.        Left ventricular ejection fraction is normal at 53%.        Moderate risk scan given ischemia and mildly elevated LV volumes        Echo:  11/11/2019  LVEF 15-20% with severe diffuse hypkinesis    [facility] 12/27/2016      Cath with occluded SVG to RCA,patent LIMA to LAD,patent SVG to diag,OM1 with good retrograde filling of LAD,patent LIMA to LAD  EF 35-40% with inf hypokinesis,LVEDP 25        Assessment:    1.  covid + pneumonia  2. CAD  3. Ischemic cardiomyopathy  4. Chronic atrial fib, on amiodarone  5. Acute on chronic systolic heart failure, on bb and arni  6. Essential hypertension  7. Hyponatremia    Plan:    1. Continue coreg to 3.125 mg twice a day to help his BP  2. Continue entresto half 24-26 mg twice a day as long as BP>90  3. Will need to resume his aldactone 12.5 mg once his BP is improved  4. Will change back to IV lasix to 40 mg bid due to increased bnp while he is still on high oxygen requirements  5. Continue low sodium and fluid restrictions to diet    Discussed with patient who is agreeable with plan of care. Thank you for allowing me to participate in the care of your patient.     Emma Delong, APRN - CNS, CNS

## 2022-01-25 NOTE — CARE COORDINATION
Discharge Planning:     spoke with Mandie Lorenzo at [address] [place] [address] and stated that she is starting the patient's pre-cert for LTAC as the patient in now appropriate. Mandie Lorenzo stated that they do not have beds today so they can't take the patient today. LAWSON spoke with patient who is agreeable to this plan of action. Patient agreeable to go to [facility] and declined any questions, at this time.     Evi MONTERO, [facility]    [phone]    Electronically signed by WINSTON Sal on 1/25/2022 at 12:49 PM

## 2022-01-25 NOTE — PLAN OF CARE
Problem:  Body Temperature -  Risk of, Imbalanced  Goal: Ability to maintain a body temperature within defined limits  Outcome: Met This Shift  Goal: Will regain or maintain usual level of consciousness  Outcome: Met This Shift     Problem: Isolation Precautions - Risk of Spread of Infection  Goal: Prevent transmission of infection  Outcome: Met This Shift     Problem: Nutrition Deficits  Goal: Optimize nutritional status  Outcome: Met This Shift     Problem: Risk for Fluid Volume Deficit  Goal: Maintain normal heart rhythm  Outcome: Met This Shift  Goal: Maintain absence of muscle cramping  Outcome: Met This Shift     Problem: Pain:  Goal: Pain level will decrease  Description: Pain level will decrease  Outcome: Met This Shift  Goal: Control of acute pain  Description: Control of acute pain  Outcome: Met This Shift  Goal: Control of chronic pain  Description: Control of chronic pain  Outcome: Met This Shift     Problem: Falls - Risk of:  Goal: Will remain free from falls  Description: Will remain free from falls  Outcome: Met This Shift  Goal: Absence of physical injury  Description: Absence of physical injury  Outcome: Met This Shift     Problem: Sensory Perception - Impaired:  Goal: Ability to maintain a stable neurologic state will improve  Description: Ability to maintain a stable neurologic state will improve  Outcome: Met This Shift

## 2022-01-25 NOTE — PROGRESS NOTES
Vital signs and assessment completed. Vital signs stable; and medications administered as ordered. See flow sheets for data. Patient resting comfortably; currently on heated high flow at 40L and 53% FIO2 with oxygen saturations at 95%. Weaning as tolerated. Bed brakes locked; bed in lowest position; call light within reach. Will continue to monitor.

## 2022-01-25 NOTE — PROGRESS NOTES
Hospitalist Progress Note      PCP: Rudolph Carr DO    Date of Admission: 1/12/2022    Chief Complaint: [address]: Admitted to the hospital with COVID-19 pneumonia transferred to the [facility] ICU    Subjective:   Currently on 77%>70%>80%>68>59%  >70%  FiO2, on heated nasal cannula, hyperglycemia, had been started on Lantus was increased to 25 units twice daily    Medications:  Reviewed    Infusion Medications    dextrose      sodium chloride 25 mL (01/18/22 0916)     Scheduled Medications    [START ON 1/25/2022] furosemide  40 mg Oral Daily    carvedilol  3.125 mg Oral BID WC    insulin glargine  25 Units SubCUTAneous BID    dexamethasone  10 mg IntraVENous Daily    enoxaparin  30 mg SubCUTAneous BID    insulin lispro  0-18 Units SubCUTAneous TID WC    insulin lispro  0-9 Units SubCUTAneous Nightly    albuterol sulfate HFA  2 puff Inhalation BID    ipratropium  2 puff Inhalation BID    isosorbide mononitrate  30 mg Oral Daily    aspirin  81 mg Oral Daily    amiodarone  200 mg Oral Daily    sacubitril-valsartan  0.5 tablet Oral BID    sodium chloride flush  5-40 mL IntraVENous 2 times per day     PRN Meds: melatonin, perflutren lipid microspheres, glucose, dextrose, glucagon (rDNA), dextrose, albuterol sulfate HFA, linaclotide, nitroGLYCERIN, sodium chloride flush, sodium chloride, ondansetron **OR** ondansetron, magnesium hydroxide, acetaminophen **OR** acetaminophen, hydrALAZINE, potassium chloride **OR** potassium alternative oral replacement **OR** potassium chloride, sodium chloride      Intake/Output Summary (Last 24 hours) at 1/24/2022 2213  Last data filed at 1/24/2022 1843  Gross per 24 hour   Intake 1230 ml   Output 1200 ml   Net 30 ml       Physical Exam Performed:    /64   Pulse 103   Temp 96.1 °F (35.6 °C) (Temporal)   Resp 17   Ht 5' 9\" (1.753 m)   Wt 200 lb 13.4 oz (91.1 kg)   SpO2 93%   BMI 29.66 kg/m²     General appearance: No apparent distress, appears stated age and cooperative. HEENT: Pupils equal, round, and reactive to light. Conjunctivae/corneas clear. Neck: Supple, with full range of motion. No jugular venous distention. Trachea midline. Respiratory:  Normal respiratory effort. Clear to auscultation, bilaterally without Rales/Wheezes/Rhonchi. Cardiovascular: Regular rate and rhythm with normal S1/S2 without murmurs, rubs or gallops. Abdomen: Soft, non-tender, non-distended with normal bowel sounds. Musculoskeletal: No clubbing, cyanosis or edema bilaterally. Full range of motion without deformity. Skin: Skin color, texture, turgor normal.  No rashes or lesions. Neurologic:  Neurovascularly intact without any focal sensory/motor deficits. Cranial nerves: II-XII intact, grossly non-focal.  Psychiatric: Alert and oriented, thought content appropriate, normal insight  Capillary Refill: Brisk,3 seconds, normal   Peripheral Pulses: +2 palpable, equal bilaterally       Labs:   Recent Labs     01/22/22 0247 01/23/22 0453 01/24/22  0242   WBC 14.1* 15.6* 14.1*   HGB 11.8* 11.5* 11.4*   HCT 36.0* 35.7* 35.9*    211 192     Recent Labs     01/22/22 0247 01/23/22  0453 01/24/22  0242   * 135* 130*   K 4.9 4.5 4.1   CL 95* 99 97*   CO2 20* 23 19*   BUN 67* 61* 67*   CREATININE 1.3 1.2 1.1   CALCIUM 8.6 8.7 8.2*     No results for input(s): AST, ALT, BILIDIR, BILITOT, ALKPHOS in the last 72 hours. No results for input(s): INR in the last 72 hours. No results for input(s): Nivia Castor in the last 72 hours.     Urinalysis:      Lab Results   Component Value Date    NITRU POSITIVE 07/31/2016    WBCUA 287 07/31/2016    BACTERIA 4+ 07/31/2016    RBCUA 30 07/31/2016    BLOODU MODERATE 07/31/2016    SPECGRAV 1.028 07/31/2016    GLUCOSEU Negative 07/31/2016    GLUCOSEU NEGATIVE 01/30/2012       Radiology:  XR CHEST PORTABLE   Final Result   Improved aeration of the lungs bilaterally with residual interstitial   prominence favoring moderate pulmonary vascular congestion. XR CHEST PORTABLE   Final Result   No significant change         XR CHEST PORTABLE   Final Result   Bilateral peripheral airspace opacities suggesting COVID pneumonia                 Assessment/Plan:    Active Hospital Problems    Diagnosis     Essential hypertension [I10]      Priority: Medium    Ischemic cardiomyopathy [I25.5]     Chronic a-fib (HCC) [I48.20]     Acute on chronic systolic heart failure, NYHA class 3 (HCC) [I50.23]     Hyponatremia [E87.1]     COVID [U07.1]     Acute respiratory failure due to COVID-19 (HCC) [U07.1, J96.00]     High cholesterol [E78.00]     Acute hypoxemic respiratory failure due to COVID-19 (HCC) [U07.1, J96.01]      Acute respiratory failure due to COVID-19 (HCC)  Worse and is now on Heated high flow. Currently on 65% FiO2, improving since yesterday. D-dimer 221, CRP 6.3 complete course of 5 days of remdesivir, currently on Decadron 10 mg daily, had 1 dose of Actemra  CXR with improved aeration of lungs bilaterally    HTN (hypertension)    stay on same meds  Controlled    High cholesterol - Stable. cont statin    Hyperglycemia   due to steroids. ,  Obtain hemoglobin A1c. Start Lantus and lispro  Last known hemoglobin A1c 6.2  Continue sliding scale insulin, start  on Lantus twice daily now increased to 25 units twice daily    Coronary artery disease  Cardiology following, continue aspirin, beta-blocker, Imdur, per Ginger no need for left heart cath prior to discharge, will be addressed as an outpatient    Ischemic cardiomyopathy  Continue Entresto, Imdur, Coreg, cardiology following    History of A. fib   Rate is controlled, continue amiodarone and Coreg    DVT Prophylaxis: lovenox  Diet: ADULT DIET; Regular; Low Sodium (2 gm); 2000 ml  Code Status: Full Code    PT/OT Eval Status: 16    Dispo -he remains critically ill and has progressed to heated high flow oxygen.   Continue to monitor remain high risk for intubation and mechanical ventilation    Due to the immediate potential for life-threatening deterioration due to acute hypoxic respiratory failure secondary to COVID-19 pneumonia, I spent 35 minutes providing critical care. This time is excluding time spent performing procedures.         Supa Dutton MD

## 2022-01-26 LAB
ANION GAP SERPL CALCULATED.3IONS-SCNC: 15 MMOL/L (ref 3–16)
ANISOCYTOSIS: ABNORMAL
BANDED NEUTROPHILS RELATIVE PERCENT: 5 % (ref 0–7)
BASOPHILS ABSOLUTE: 0 K/UL (ref 0–0.2)
BASOPHILS RELATIVE PERCENT: 0 %
BUN BLDV-MCNC: 71 MG/DL (ref 7–20)
CALCIUM SERPL-MCNC: 8.5 MG/DL (ref 8.3–10.6)
CHLORIDE BLD-SCNC: 96 MMOL/L (ref 99–110)
CO2: 19 MMOL/L (ref 21–32)
CREAT SERPL-MCNC: 1.2 MG/DL (ref 0.8–1.3)
EOSINOPHILS ABSOLUTE: 0.1 K/UL (ref 0–0.6)
EOSINOPHILS RELATIVE PERCENT: 1 %
GFR AFRICAN AMERICAN: >60
GFR NON-AFRICAN AMERICAN: 60
GLUCOSE BLD-MCNC: 106 MG/DL (ref 70–99)
GLUCOSE BLD-MCNC: 157 MG/DL (ref 70–99)
GLUCOSE BLD-MCNC: 163 MG/DL (ref 70–99)
GLUCOSE BLD-MCNC: 237 MG/DL (ref 70–99)
GLUCOSE BLD-MCNC: 334 MG/DL (ref 70–99)
HCT VFR BLD CALC: 38.5 % (ref 40.5–52.5)
HEMOGLOBIN: 12.2 G/DL (ref 13.5–17.5)
LYMPHOCYTES ABSOLUTE: 1.1 K/UL (ref 1–5.1)
LYMPHOCYTES RELATIVE PERCENT: 8 %
MCH RBC QN AUTO: 23.8 PG (ref 26–34)
MCHC RBC AUTO-ENTMCNC: 31.8 G/DL (ref 31–36)
MCV RBC AUTO: 74.8 FL (ref 80–100)
MONOCYTES ABSOLUTE: 0.8 K/UL (ref 0–1.3)
MONOCYTES RELATIVE PERCENT: 6 %
NEUTROPHILS ABSOLUTE: 11.6 K/UL (ref 1.7–7.7)
NEUTROPHILS RELATIVE PERCENT: 80 %
OVALOCYTES: ABNORMAL
PDW BLD-RTO: 21.5 % (ref 12.4–15.4)
PERFORMED ON: ABNORMAL
PLATELET # BLD: 171 K/UL (ref 135–450)
PLATELET SLIDE REVIEW: ADEQUATE
PMV BLD AUTO: 8.8 FL (ref 5–10.5)
POLYCHROMASIA: ABNORMAL
POTASSIUM SERPL-SCNC: 4.9 MMOL/L (ref 3.5–5.1)
RBC # BLD: 5.14 M/UL (ref 4.2–5.9)
SLIDE REVIEW: ABNORMAL
SODIUM BLD-SCNC: 130 MMOL/L (ref 136–145)
WBC # BLD: 13.6 K/UL (ref 4–11)

## 2022-01-26 PROCEDURE — 6370000000 HC RX 637 (ALT 250 FOR IP): Performed by: CLINICAL NURSE SPECIALIST

## 2022-01-26 PROCEDURE — 94640 AIRWAY INHALATION TREATMENT: CPT

## 2022-01-26 PROCEDURE — 80048 BASIC METABOLIC PNL TOTAL CA: CPT

## 2022-01-26 PROCEDURE — 2700000000 HC OXYGEN THERAPY PER DAY

## 2022-01-26 PROCEDURE — 6360000002 HC RX W HCPCS: Performed by: CLINICAL NURSE SPECIALIST

## 2022-01-26 PROCEDURE — 6360000002 HC RX W HCPCS: Performed by: INTERNAL MEDICINE

## 2022-01-26 PROCEDURE — 85025 COMPLETE CBC W/AUTO DIFF WBC: CPT

## 2022-01-26 PROCEDURE — 6370000000 HC RX 637 (ALT 250 FOR IP): Performed by: STUDENT IN AN ORGANIZED HEALTH CARE EDUCATION/TRAINING PROGRAM

## 2022-01-26 PROCEDURE — 6370000000 HC RX 637 (ALT 250 FOR IP): Performed by: INTERNAL MEDICINE

## 2022-01-26 PROCEDURE — 36415 COLL VENOUS BLD VENIPUNCTURE: CPT

## 2022-01-26 PROCEDURE — 99232 SBSQ HOSP IP/OBS MODERATE 35: CPT | Performed by: CLINICAL NURSE SPECIALIST

## 2022-01-26 PROCEDURE — 94761 N-INVAS EAR/PLS OXIMETRY MLT: CPT

## 2022-01-26 PROCEDURE — 2580000003 HC RX 258: Performed by: INTERNAL MEDICINE

## 2022-01-26 PROCEDURE — 2060000000 HC ICU INTERMEDIATE R&B

## 2022-01-26 RX ORDER — SPIRONOLACTONE 25 MG/1
12.5 TABLET ORAL DAILY
Status: DISCONTINUED | OUTPATIENT
Start: 2022-01-26 | End: 2022-01-28 | Stop reason: HOSPADM

## 2022-01-26 RX ADMIN — DEXAMETHASONE SODIUM PHOSPHATE 6 MG: 10 INJECTION INTRAMUSCULAR; INTRAVENOUS at 08:40

## 2022-01-26 RX ADMIN — Medication 10 ML: at 21:13

## 2022-01-26 RX ADMIN — INSULIN LISPRO 3 UNITS: 100 INJECTION, SOLUTION INTRAVENOUS; SUBCUTANEOUS at 12:46

## 2022-01-26 RX ADMIN — Medication 2 PUFF: at 08:34

## 2022-01-26 RX ADMIN — ASPIRIN 81 MG: 81 TABLET, COATED ORAL at 08:41

## 2022-01-26 RX ADMIN — ACETAMINOPHEN 650 MG: 325 TABLET ORAL at 18:03

## 2022-01-26 RX ADMIN — ISOSORBIDE MONONITRATE 30 MG: 30 TABLET, EXTENDED RELEASE ORAL at 08:41

## 2022-01-26 RX ADMIN — FUROSEMIDE 40 MG: 10 INJECTION, SOLUTION INTRAMUSCULAR; INTRAVENOUS at 08:40

## 2022-01-26 RX ADMIN — INSULIN LISPRO 6 UNITS: 100 INJECTION, SOLUTION INTRAVENOUS; SUBCUTANEOUS at 17:42

## 2022-01-26 RX ADMIN — ENOXAPARIN SODIUM 30 MG: 100 INJECTION SUBCUTANEOUS at 08:41

## 2022-01-26 RX ADMIN — CARVEDILOL 3.12 MG: 3.12 TABLET, FILM COATED ORAL at 17:42

## 2022-01-26 RX ADMIN — INSULIN GLARGINE 25 UNITS: 100 INJECTION, SOLUTION SUBCUTANEOUS at 08:42

## 2022-01-26 RX ADMIN — ENOXAPARIN SODIUM 30 MG: 100 INJECTION SUBCUTANEOUS at 21:13

## 2022-01-26 RX ADMIN — FUROSEMIDE 40 MG: 10 INJECTION, SOLUTION INTRAMUSCULAR; INTRAVENOUS at 17:42

## 2022-01-26 RX ADMIN — SACUBITRIL AND VALSARTAN 0.5 TABLET: 24; 26 TABLET, FILM COATED ORAL at 21:13

## 2022-01-26 RX ADMIN — INSULIN GLARGINE 25 UNITS: 100 INJECTION, SOLUTION SUBCUTANEOUS at 21:16

## 2022-01-26 RX ADMIN — INSULIN LISPRO 6 UNITS: 100 INJECTION, SOLUTION INTRAVENOUS; SUBCUTANEOUS at 21:16

## 2022-01-26 RX ADMIN — Medication 2 PUFF: at 20:21

## 2022-01-26 RX ADMIN — SACUBITRIL AND VALSARTAN 0.5 TABLET: 24; 26 TABLET, FILM COATED ORAL at 08:41

## 2022-01-26 RX ADMIN — SPIRONOLACTONE 12.5 MG: 25 TABLET ORAL at 17:42

## 2022-01-26 RX ADMIN — Medication 10 ML: at 08:42

## 2022-01-26 RX ADMIN — AMIODARONE HYDROCHLORIDE 200 MG: 200 TABLET ORAL at 08:41

## 2022-01-26 RX ADMIN — CARVEDILOL 3.12 MG: 3.12 TABLET, FILM COATED ORAL at 08:41

## 2022-01-26 ASSESSMENT — PAIN SCALES - GENERAL
PAINLEVEL_OUTOF10: 0
PAINLEVEL_OUTOF10: 8

## 2022-01-26 NOTE — PROGRESS NOTES
[address]   Daily Progress Note      Admit Date:  1/12/2022    HPI:    Mr. April Bryson is a 70year old male with history of coronary disease, CABG in 1330, ICM, systolic heart failure with recovered LVEF, copd, hypertension, peripheral vascular disease, AF with CV 2019. He was vaccinated with moderna    He is admitted with shortness of breath for 2 weeks and covid + at [facility] [address] Shortness of breath became worse. No prior oxygen requirement. In ER required 10-15L of high flow. He was hypotensive and bradycardic, beta blocker held  He received decadron, remdesivir and actemra    Subjective:  Patient is being seen for acute on chronic systolic heart failure. There were no acute overnight cardiac events. Creat 1.2 bun 71 potassium 4.1 sodium 130. He is lying in the bed on 15 L high flow. Just sitting up in the bed he desats into 88-90%  He is -14 L out   He is out of covid precautions today. Weight 204->189-200-205     Objective:   BP (!) 119/91   Pulse 92   Temp 98.4 °F (36.9 °C) (Temporal)   Resp 22   Ht 5' 9\" (1.753 m)   Wt 205 lb (93 kg)   SpO2 91%   BMI 30.27 kg/m²       Intake/Output Summary (Last 24 hours) at 1/26/2022 1112  Last data filed at 1/26/2022 0430  Gross per 24 hour   Intake 368 ml   Output 1450 ml   Net -1082 ml          Physical Exam:  General:  Awake, alert, oriented in NAD  Skin:  Warm and dry. No unusual bruising or rash  Neck:  Supple. No JVD or carotid bruit appreciated  Chest:  Normal effort.   Decreased bilaterally in the bases but more air moving today  Cardiovascular:  irreg, S1/S2, no murmur/gallop/rub  Abdomen:  Soft, nontender, +bowel sounds, obese  Extremities:  No edema  Neurological: No focal deficits  Psychological: Normal mood and affect      Medications:    dexamethasone  6 mg IntraVENous Daily    furosemide  40 mg IntraVENous BID    carvedilol  3.125 mg Oral BID WC    insulin glargine  25 Units SubCUTAneous BID    enoxaparin  30 mg SubCUTAneous BID  insulin lispro  0-18 Units SubCUTAneous TID     insulin lispro  0-9 Units SubCUTAneous Nightly    albuterol sulfate HFA  2 puff Inhalation BID    ipratropium  2 puff Inhalation BID    isosorbide mononitrate  30 mg Oral Daily    aspirin  81 mg Oral Daily    amiodarone  200 mg Oral Daily    sacubitril-valsartan  0.5 tablet Oral BID    sodium chloride flush  5-40 mL IntraVENous 2 times per day      dextrose      sodium chloride 25 mL (01/18/22 0916)       Lab Data:  CBC:   Recent Labs     01/24/22  0242 01/25/22  0301 01/26/22  0248   WBC 14.1* 12.7* 13.6*   HGB 11.4* 11.8* 12.2*    176 171     BMP:    Recent Labs     01/24/22  0242 01/25/22  0301 01/26/22  0248   * 130* 130*   K 4.1 4.6 4.9   CO2 19* 18* 19*   BUN 67* 67* 71*   CREATININE 1.1 1.4* 1.2     INR:  No results for input(s): INR in the last 72 hours. BNP:    Recent Labs     01/24/22  0400   PROBNP 4,226*         Diagnostics:  Echo 1/14/22:   Technically difficult examination.   Left ventricular systolic function is low normal with ejection fraction   estimated at 50-55%.   No definitive regional wall motion abnormalities are noted.   Diastolic filling parameters suggests grade II diastolic dysfunction.   Mild mitral regurgitation.   The left atrium is mildly dilated.   Aortic valve appears sclerotic but opens adequately.   Mild aortic regurgitation is present.   Inadequate tricuspid regurgitation to estimate systolic pulmonary artery   pressure.     Stress:   7/2021 - LVEF 53%,       SPECT images demonstrate a small area of mildly decreased perfusion in the    mid inferior, mid-inferolateral, and basal inferior segments. The defect is    present at rest and worsens with stress, consistent with mixed ischemia and    scar. There is associated wall motion abnormality.        The sum stress score is 8. No visual TID.  Calculated TID is 1.09.        Left ventricular ejection fraction is normal at 53%.        Moderate risk scan given ischemia and mildly elevated LV volumes        Echo:  11/11/2019  LVEF 15-20% with severe diffuse hypkinesis    [facility] 12/27/2016      Cath with occluded SVG to RCA,patent LIMA to LAD,patent SVG to diag,OM1 with good retrograde filling of LAD,patent LIMA to LAD  EF 35-40% with inf hypokinesis,LVEDP 25        Assessment:    1.  covid + pneumonia  2. CAD  3. Ischemic cardiomyopathy  4. Chronic atrial fib, on amiodarone  5. Acute on chronic systolic heart failure, on bb and arni  6. Essential hypertension  7. Hyponatremia    Plan:    1. Continue coreg to 3.125 mg twice a day to help his BP  2. Continue entresto half 24-26 mg twice a day as long as BP>90  3. Will resume aldactone 12.5 mg daily watch potassium  4. Continue IIV lasix to 40 mg bid   5. Continue low sodium and fluid restrictions to diet    Discussed with patient who is agreeable with plan of care. Thank you for allowing me to participate in the care of your patient.     GEORGE Loredo - CNS, CNS

## 2022-01-26 NOTE — PROGRESS NOTES
distress, appears stated age and cooperative. HEENT: Pupils equal, round, and reactive to light. Conjunctivae/corneas clear. Neck: Supple, with full range of motion. No jugular venous distention. Trachea midline. Respiratory:  Normal respiratory effort. Clear to auscultation, bilaterally without Rales/Wheezes/Rhonchi. Cardiovascular: Regular rate and rhythm with normal S1/S2 without murmurs, rubs or gallops. Abdomen: Soft, non-tender, non-distended with normal bowel sounds. Musculoskeletal: No clubbing, cyanosis or edema bilaterally. Full range of motion without deformity. Skin: Skin color, texture, turgor normal.  No rashes or lesions. Neurologic:  Neurovascularly intact without any focal sensory/motor deficits. Cranial nerves: II-XII intact, grossly non-focal.  Psychiatric: Alert and oriented, thought content appropriate, normal insight  Capillary Refill: Brisk,3 seconds, normal   Peripheral Pulses: +2 palpable, equal bilaterally       Labs:   Recent Labs     01/24/22  0242 01/25/22  0301 01/26/22  0248   WBC 14.1* 12.7* 13.6*   HGB 11.4* 11.8* 12.2*   HCT 35.9* 36.8* 38.5*    176 171     Recent Labs     01/24/22  0242 01/25/22  0301 01/26/22  0248   * 130* 130*   K 4.1 4.6 4.9   CL 97* 97* 96*   CO2 19* 18* 19*   BUN 67* 67* 71*   CREATININE 1.1 1.4* 1.2   CALCIUM 8.2* 8.4 8.5     No results for input(s): AST, ALT, BILIDIR, BILITOT, ALKPHOS in the last 72 hours. No results for input(s): INR in the last 72 hours. No results for input(s): Carol Yisel in the last 72 hours.     Urinalysis:      Lab Results   Component Value Date    NITRU POSITIVE 07/31/2016    WBCUA 287 07/31/2016    BACTERIA 4+ 07/31/2016    RBCUA 30 07/31/2016    BLOODU MODERATE 07/31/2016    SPECGRAV 1.028 07/31/2016    GLUCOSEU Negative 07/31/2016    GLUCOSEU NEGATIVE 01/30/2012       Radiology:  XR CHEST PORTABLE   Final Result   Improved aeration of the lungs bilaterally with residual interstitial   prominence favoring moderate pulmonary vascular congestion. XR CHEST PORTABLE   Final Result   No significant change         XR CHEST PORTABLE   Final Result   Bilateral peripheral airspace opacities suggesting COVID pneumonia                 Assessment/Plan:    Active Hospital Problems    Diagnosis     Essential hypertension [I10]      Priority: Medium    Ischemic cardiomyopathy [I25.5]     Chronic a-fib (HCC) [I48.20]     Acute on chronic systolic heart failure, NYHA class 3 (HCC) [I50.23]     Hyponatremia [E87.1]     COVID [U07.1]     Acute respiratory failure due to COVID-19 (HCC) [U07.1, J96.00]     High cholesterol [E78.00]     Acute hypoxemic respiratory failure due to COVID-19 (HCC) [U07.1, J96.01]      Acute respiratory failure due to COVID-19 (HCC)  Worse and is now on Heated high flow. Currently on 65% FiO2, improving since yesterday. D-dimer 221, CRP 6.3 complete course of 5 days of remdesivir, currently on Decadron 10 mg daily, had 1 dose of Actemra  CXR with improved aeration of lungs bilaterally    HTN (hypertension)    stay on same meds  Controlled    High cholesterol - Stable. cont statin    Hyperglycemia   due to steroids. ,  Obtain hemoglobin A1c. Start Lantus and lispro  Last known hemoglobin A1c 6.2  Continue sliding scale insulin, start  on Lantus twice daily now increased to 25 units twice daily    Coronary artery disease  Cardiology following, continue aspirin, beta-blocker, Imdur, per Crads no need for left heart cath prior to discharge, will be addressed as an outpatient    Ischemic cardiomyopathy  Continue Entresto, Imdur, Coreg, cardiology following    History of A. fib   Rate is controlled, continue amiodarone and Coreg    DVT Prophylaxis: lovenox  Diet: ADULT DIET; Regular; Low Sodium (2 gm); 2000 ml  Code Status: Full Code    PT/OT Eval Status: 16    Dispo -he remains critically ill and has progressed to heated high flow oxygen. He was on this for several days.   Today he has transitioned to 15 liters High flow NC.     Nallely Britt MD

## 2022-01-26 NOTE — CARE COORDINATION
CAROL spoke with Severino Stern at [facility] [employer] regarding patient's pre-cert. Pre-cert has not yet been obtained. [facility] will continue to follow. CAROL informed patient's RN, Cedrick Patel.     Electronically signed by WINSTON Nunez on 1/26/2022 at 11:48 AM

## 2022-01-26 NOTE — PROGRESS NOTES
01/26/22 1200   RT Protocol   History Pulmonary Disease 2   Respiratory pattern 0   Breath sounds 2   Cough 0   Bronchodilator Assessment Score 4

## 2022-01-26 NOTE — RT PROTOCOL NOTE
RT Inhaler-Nebulizer Bronchodilator Protocol Note    There is a bronchodilator order in the chart from a provider indicating to follow the RT Bronchodilator Protocol and there is an Initiate RT Inhaler-Nebulizer Bronchodilator Protocol order as well (see protocol at bottom of note). CXR Findings:  No results found. The findings from the last RT Protocol Assessment were as follows:   History Pulmonary Disease: Chronic pulmonary disease  Respiratory Pattern: Regular pattern and RR 12-20 bpm  Breath Sounds: Slightly diminished and/or crackles  Cough: Strong, spontaneous, non-productive  Indication for Bronchodilator Therapy: Decreased or absent breath sounds  Bronchodilator Assessment Score: 4    Aerosolized bronchodilator medication orders have been revised according to the RT Inhaler-Nebulizer Bronchodilator Protocol below. Respiratory Therapist to perform RT Therapy Protocol Assessment initially then follow the protocol. Repeat RT Therapy Protocol Assessment PRN for score 0-3 or on second treatment, BID, and PRN for scores above 3. No Indications  adjust the frequency to every 6 hours PRN wheezing or bronchospasm, if no treatments needed after 48 hours then discontinue using Per Protocol order mode. If indication present, adjust the RT bronchodilator orders based on the Bronchodilator Assessment Score as indicated below. Use Inhaler orders unless patient has one or more of the following: on home nebulizer, not able to hold breath for 10 seconds, is not alert and oriented, cannot activate and use MDI correctly, or respiratory rate 25 breaths per minute or more, then use the equivalent nebulizer order(s) with same Frequency and PRN reasons based on the score. If a patient is on this medication at home then do not decrease Frequency below that used at home.     0-3  enter or revise RT bronchodilator order(s) to equivalent RT Bronchodilator order with Frequency of every 4 hours PRN for wheezing or increased work of breathing using Per Protocol order mode. 4-6  enter or revise RT Bronchodilator order(s) to two equivalent RT bronchodilator orders with one order with BID Frequency and one order with Frequency of every 4 hours PRN wheezing or increased work of breathing using Per Protocol order mode. 7-10  enter or revise RT Bronchodilator order(s) to two equivalent RT bronchodilator orders with one order with TID Frequency and one order with Frequency of every 4 hours PRN wheezing or increased work of breathing using Per Protocol order mode. 11-13  enter or revise RT Bronchodilator order(s) to one equivalent RT bronchodilator order with QID Frequency and an Albuterol order with Frequency of every 4 hours PRN wheezing or increased work of breathing using Per Protocol order mode. Greater than 13  enter or revise RT Bronchodilator order(s) to one equivalent RT bronchodilator order with every 4 hours Frequency and an Albuterol order with Frequency of every 2 hours PRN wheezing or increased work of breathing using Per Protocol order mode. RT to enter RT Home Evaluation for COPD & MDI Assessment order using Per Protocol order mode.     Electronically signed by Sammie Agosto RCP on 1/26/2022 at 12:16 PM

## 2022-01-27 LAB
ANION GAP SERPL CALCULATED.3IONS-SCNC: 18 MMOL/L (ref 3–16)
ANISOCYTOSIS: ABNORMAL
BASOPHILS ABSOLUTE: 0 K/UL (ref 0–0.2)
BASOPHILS RELATIVE PERCENT: 0 %
BUN BLDV-MCNC: 65 MG/DL (ref 7–20)
CALCIUM SERPL-MCNC: 8.2 MG/DL (ref 8.3–10.6)
CHLORIDE BLD-SCNC: 98 MMOL/L (ref 99–110)
CO2: 18 MMOL/L (ref 21–32)
CREAT SERPL-MCNC: 1.3 MG/DL (ref 0.8–1.3)
EOSINOPHILS ABSOLUTE: 0 K/UL (ref 0–0.6)
EOSINOPHILS RELATIVE PERCENT: 0 %
GFR AFRICAN AMERICAN: >60
GFR NON-AFRICAN AMERICAN: 54
GLUCOSE BLD-MCNC: 233 MG/DL (ref 70–99)
GLUCOSE BLD-MCNC: 251 MG/DL (ref 70–99)
GLUCOSE BLD-MCNC: 307 MG/DL (ref 70–99)
GLUCOSE BLD-MCNC: 318 MG/DL (ref 70–99)
GLUCOSE BLD-MCNC: 356 MG/DL (ref 70–99)
HCT VFR BLD CALC: 38.1 % (ref 40.5–52.5)
HEMOGLOBIN: 12.1 G/DL (ref 13.5–17.5)
IRON SATURATION: 27 % (ref 20–50)
IRON: 68 UG/DL (ref 59–158)
LYMPHOCYTES ABSOLUTE: 1 K/UL (ref 1–5.1)
LYMPHOCYTES RELATIVE PERCENT: 7 %
MCH RBC QN AUTO: 24.3 PG (ref 26–34)
MCHC RBC AUTO-ENTMCNC: 31.9 G/DL (ref 31–36)
MCV RBC AUTO: 76.3 FL (ref 80–100)
METAMYELOCYTES RELATIVE PERCENT: 1 %
MICROCYTES: ABNORMAL
MONOCYTES ABSOLUTE: 0.4 K/UL (ref 0–1.3)
MONOCYTES RELATIVE PERCENT: 3 %
NEUTROPHILS ABSOLUTE: 12.2 K/UL (ref 1.7–7.7)
NEUTROPHILS RELATIVE PERCENT: 89 %
NUCLEATED RED BLOOD CELLS: 1 /100 WBC
OVALOCYTES: ABNORMAL
PDW BLD-RTO: 21.5 % (ref 12.4–15.4)
PERFORMED ON: ABNORMAL
PLATELET # BLD: 152 K/UL (ref 135–450)
PLATELET SLIDE REVIEW: ADEQUATE
PMV BLD AUTO: 8.6 FL (ref 5–10.5)
POTASSIUM SERPL-SCNC: 4.4 MMOL/L (ref 3.5–5.1)
RBC # BLD: 4.99 M/UL (ref 4.2–5.9)
SLIDE REVIEW: ABNORMAL
SODIUM BLD-SCNC: 134 MMOL/L (ref 136–145)
TOTAL IRON BINDING CAPACITY: 248 UG/DL (ref 260–445)
WBC # BLD: 13.6 K/UL (ref 4–11)

## 2022-01-27 PROCEDURE — 6360000002 HC RX W HCPCS: Performed by: INTERNAL MEDICINE

## 2022-01-27 PROCEDURE — 6370000000 HC RX 637 (ALT 250 FOR IP): Performed by: CLINICAL NURSE SPECIALIST

## 2022-01-27 PROCEDURE — 6360000002 HC RX W HCPCS: Performed by: CLINICAL NURSE SPECIALIST

## 2022-01-27 PROCEDURE — 97110 THERAPEUTIC EXERCISES: CPT

## 2022-01-27 PROCEDURE — 6370000000 HC RX 637 (ALT 250 FOR IP): Performed by: INTERNAL MEDICINE

## 2022-01-27 PROCEDURE — 2580000003 HC RX 258: Performed by: INTERNAL MEDICINE

## 2022-01-27 PROCEDURE — 36415 COLL VENOUS BLD VENIPUNCTURE: CPT

## 2022-01-27 PROCEDURE — 85025 COMPLETE CBC W/AUTO DIFF WBC: CPT

## 2022-01-27 PROCEDURE — 2700000000 HC OXYGEN THERAPY PER DAY

## 2022-01-27 PROCEDURE — 97116 GAIT TRAINING THERAPY: CPT

## 2022-01-27 PROCEDURE — 99232 SBSQ HOSP IP/OBS MODERATE 35: CPT | Performed by: CLINICAL NURSE SPECIALIST

## 2022-01-27 PROCEDURE — 97530 THERAPEUTIC ACTIVITIES: CPT

## 2022-01-27 PROCEDURE — 80048 BASIC METABOLIC PNL TOTAL CA: CPT

## 2022-01-27 PROCEDURE — 94761 N-INVAS EAR/PLS OXIMETRY MLT: CPT

## 2022-01-27 PROCEDURE — 83540 ASSAY OF IRON: CPT

## 2022-01-27 PROCEDURE — 83550 IRON BINDING TEST: CPT

## 2022-01-27 PROCEDURE — 94640 AIRWAY INHALATION TREATMENT: CPT

## 2022-01-27 PROCEDURE — 2060000000 HC ICU INTERMEDIATE R&B

## 2022-01-27 RX ADMIN — ACETAMINOPHEN 650 MG: 325 TABLET ORAL at 01:11

## 2022-01-27 RX ADMIN — INSULIN LISPRO 6 UNITS: 100 INJECTION, SOLUTION INTRAVENOUS; SUBCUTANEOUS at 13:22

## 2022-01-27 RX ADMIN — INSULIN LISPRO 6 UNITS: 100 INJECTION, SOLUTION INTRAVENOUS; SUBCUTANEOUS at 20:31

## 2022-01-27 RX ADMIN — Medication 10 ML: at 08:29

## 2022-01-27 RX ADMIN — SACUBITRIL AND VALSARTAN 0.5 TABLET: 24; 26 TABLET, FILM COATED ORAL at 20:28

## 2022-01-27 RX ADMIN — INSULIN GLARGINE 25 UNITS: 100 INJECTION, SOLUTION SUBCUTANEOUS at 08:26

## 2022-01-27 RX ADMIN — INSULIN GLARGINE 25 UNITS: 100 INJECTION, SOLUTION SUBCUTANEOUS at 20:30

## 2022-01-27 RX ADMIN — Medication 10 ML: at 20:28

## 2022-01-27 RX ADMIN — ACETAMINOPHEN 650 MG: 325 TABLET ORAL at 20:28

## 2022-01-27 RX ADMIN — Medication 2 PUFF: at 19:54

## 2022-01-27 RX ADMIN — AMIODARONE HYDROCHLORIDE 200 MG: 200 TABLET ORAL at 08:24

## 2022-01-27 RX ADMIN — SACUBITRIL AND VALSARTAN 0.5 TABLET: 24; 26 TABLET, FILM COATED ORAL at 08:24

## 2022-01-27 RX ADMIN — Medication 2 PUFF: at 08:16

## 2022-01-27 RX ADMIN — ENOXAPARIN SODIUM 30 MG: 100 INJECTION SUBCUTANEOUS at 08:25

## 2022-01-27 RX ADMIN — ENOXAPARIN SODIUM 30 MG: 100 INJECTION SUBCUTANEOUS at 20:28

## 2022-01-27 RX ADMIN — ASPIRIN 81 MG: 81 TABLET, COATED ORAL at 08:26

## 2022-01-27 RX ADMIN — CARVEDILOL 3.12 MG: 3.12 TABLET, FILM COATED ORAL at 08:25

## 2022-01-27 RX ADMIN — ISOSORBIDE MONONITRATE 30 MG: 30 TABLET, EXTENDED RELEASE ORAL at 08:26

## 2022-01-27 RX ADMIN — INSULIN LISPRO 12 UNITS: 100 INJECTION, SOLUTION INTRAVENOUS; SUBCUTANEOUS at 08:27

## 2022-01-27 RX ADMIN — INSULIN LISPRO 9 UNITS: 100 INJECTION, SOLUTION INTRAVENOUS; SUBCUTANEOUS at 17:47

## 2022-01-27 RX ADMIN — CARVEDILOL 3.12 MG: 3.12 TABLET, FILM COATED ORAL at 17:47

## 2022-01-27 RX ADMIN — DEXAMETHASONE SODIUM PHOSPHATE 6 MG: 10 INJECTION INTRAMUSCULAR; INTRAVENOUS at 08:26

## 2022-01-27 RX ADMIN — FUROSEMIDE 40 MG: 10 INJECTION, SOLUTION INTRAMUSCULAR; INTRAVENOUS at 08:25

## 2022-01-27 RX ADMIN — FUROSEMIDE 40 MG: 10 INJECTION, SOLUTION INTRAMUSCULAR; INTRAVENOUS at 17:47

## 2022-01-27 RX ADMIN — SPIRONOLACTONE 12.5 MG: 25 TABLET ORAL at 08:26

## 2022-01-27 ASSESSMENT — PAIN SCALES - GENERAL
PAINLEVEL_OUTOF10: 0
PAINLEVEL_OUTOF10: 0
PAINLEVEL_OUTOF10: 3
PAINLEVEL_OUTOF10: 0
PAINLEVEL_OUTOF10: 0
PAINLEVEL_OUTOF10: 2
PAINLEVEL_OUTOF10: 0
PAINLEVEL_OUTOF10: 0
PAINLEVEL_OUTOF10: 6
PAINLEVEL_OUTOF10: 6

## 2022-01-27 NOTE — PROGRESS NOTES
Asked pt if he had ever had V-tach before. Stated he had. Informed him that he had 5 beats of V-tach. Verbalized understanding. Will continue to monitor.

## 2022-01-27 NOTE — PROGRESS NOTES
[facility]   Daily Progress Note      Admit Date:  1/12/2022    HPI:    Mr. Allyn Price is a 70year old male with history of coronary disease, CABG in 0410, ICM, systolic heart failure with recovered LVEF, copd, hypertension, peripheral vascular disease, AF with CV 2019. He was vaccinated with moderna    He is admitted with shortness of breath for 2 weeks and covid + at [address]. Shortness of breath became worse. No prior oxygen requirement. In ER required 10-15L of high flow. He was hypotensive and bradycardic, beta blocker held  He received decadron, remdesivir and actemra    Subjective:  Patient is being seen for acute on chronic systolic heart failure. There were no acute overnight cardiac events. Creat 1.3 bun 65 potassium 4.4 sodium 134. He is sitting up in the chair and oxygen down to 14 L high flow. -16 L  Weight 204->[phone]    Objective:   BP 92/80   Pulse 90   Temp 96.7 °F (35.9 °C) (Temporal)   Resp 16   Ht 5' 9\" (1.753 m)   Wt 200 lb (90.7 kg)   SpO2 97%   BMI 29.53 kg/m²       Intake/Output Summary (Last 24 hours) at 1/27/2022 1004  Last data filed at 1/27/2022 0840  Gross per 24 hour   Intake 320 ml   Output 2350 ml   Net -2030 ml          Physical Exam:  General:  Awake, alert, oriented in NAD  Skin:  Warm and dry. No unusual bruising or rash  Neck:  Supple. No JVD or carotid bruit appreciated  Chest:  Normal effort.   Decreased bilaterally in the bases but more air moving today  Cardiovascular:  irreg, S1/S2, no murmur/gallop/rub  Abdomen:  Soft, nontender, +bowel sounds, obese  Extremities:  No edema  Neurological: No focal deficits  Psychological: Normal mood and affect      Medications:    spironolactone  12.5 mg Oral Daily    dexamethasone  6 mg IntraVENous Daily    furosemide  40 mg IntraVENous BID    carvedilol  3.125 mg Oral BID     insulin glargine  25 Units SubCUTAneous BID    enoxaparin  30 mg SubCUTAneous BID    insulin lispro  0-18 Units SubCUTAneous TID WC    insulin lispro  0-9 Units SubCUTAneous Nightly    albuterol sulfate HFA  2 puff Inhalation BID    ipratropium  2 puff Inhalation BID    isosorbide mononitrate  30 mg Oral Daily    aspirin  81 mg Oral Daily    amiodarone  200 mg Oral Daily    sacubitril-valsartan  0.5 tablet Oral BID    sodium chloride flush  5-40 mL IntraVENous 2 times per day      dextrose      sodium chloride 25 mL (01/18/22 0916)       Lab Data:  CBC:   Recent Labs     01/25/22  0301 01/26/22 0248 01/27/22 0228   WBC 12.7* 13.6* 13.6*   HGB 11.8* 12.2* 12.1*    171 152     BMP:    Recent Labs     01/25/22 0301 01/26/22 0248 01/27/22 0228   * 130* 134*   K 4.6 4.9 4.4   CO2 18* 19* 18*   BUN 67* 71* 65*   CREATININE 1.4* 1.2 1.3     INR:  No results for input(s): INR in the last 72 hours. BNP:    No results for input(s): PROBNP in the last 72 hours. Diagnostics:  Echo 1/14/22:   Technically difficult examination.   Left ventricular systolic function is low normal with ejection fraction   estimated at 50-55%.   No definitive regional wall motion abnormalities are noted.   Diastolic filling parameters suggests grade II diastolic dysfunction.   Mild mitral regurgitation.   The left atrium is mildly dilated.   Aortic valve appears sclerotic but opens adequately.   Mild aortic regurgitation is present.   Inadequate tricuspid regurgitation to estimate systolic pulmonary artery   pressure.     Stress:   7/2021 - LVEF 53%,       SPECT images demonstrate a small area of mildly decreased perfusion in the    mid inferior, mid-inferolateral, and basal inferior segments. The defect is    present at rest and worsens with stress, consistent with mixed ischemia and    scar. There is associated wall motion abnormality.        The sum stress score is 8. No visual TID.  Calculated TID is 1.09.        Left ventricular ejection fraction is normal at 53%.        Moderate risk scan given ischemia and mildly

## 2022-01-27 NOTE — PROGRESS NOTES
Physical Therapy  Facility/Department: [address]  Daily Treatment Note  NAME: Monae Medel  : 1950  MRN: [id number]    Date of Service: 2022    Discharge Recommendations:Leandra Carr scored a 15/24 on the AM-PAC short mobility form. Current research shows that an AM-PAC score of 17 or less is typically not associated with a discharge to the patient's home setting. Based on the patient's AM-PAC score and their current functional mobility deficits, it is recommended that the patient have 5-7 sessions per week of Physical Therapy at d/c to increase the patient's independence. At this time, this patient demonstrates the endurance, and/or tolerance for 3 hours of therapy each day, with a treatment frequency of 5-7x/wk. Please see assessment section for further patient specific details. If patient discharges prior to next session this note will serve as a discharge summary. Please see below for the latest assessment towards goals. 5-7 sessions per week   PT Equipment Recommendations  Equipment Needed: No  Other: Needs met    Assessment   Body structures, Functions, Activity limitations: Decreased functional mobility ; Decreased strength;Decreased endurance;Decreased balance  Assessment: Pt would benefit from cotx for amb only. Pt requires supervision bed mob, CGA with RW to recliner chair. Self limiting with walking due to fear of falling.  Pt continues to present with activity tolerance as greatest hinderance to independence at this time and will continue to benefit from skilled therapy services to continue progression  Treatment Diagnosis: Decreased tolerance to activity  Prognosis: Good  PT Education: Goals;PT Role;Plan of Care;Energy Conservation;Home Exercise Program;Transfer Training;Functional Mobility Training  Patient Education: d/c recommendations- Pt verbalized understanding  Barriers to Learning: None  REQUIRES PT FOLLOW UP: Yes  Activity Tolerance  Activity Tolerance: Patient Tolerated treatment well;Patient limited by fatigue  Activity Tolerance: LE weakness with standing. Patient Diagnosis(es): The primary encounter diagnosis was COVID. A diagnosis of Acute respiratory failure with hypoxia (HCC) was also pertinent to this visit. has a past medical history of Acid reflux, Acute MI ([facility] CAD (coronary artery disease), COPD (chronic obstructive pulmonary disease) ([facility] Diverticulitis, Hypertension, and Peripheral vascular disease ([facility] has a past surgical history that includes vascular surgery; Cardiac surgery; Colonoscopy; and Cardiac catheterization. Restrictions  Restrictions/Precautions  Restrictions/Precautions: Isolation,Fall Risk (COVID+, high fall risk)  Required Braces or Orthoses?: No  Position Activity Restriction  Sternal Precautions: 02 with bed mobility 87%, 02 with ambulation to chair 85%, 02 with 24' ambulation 81%, static stance edge of recliner 91%, brushing teeth in stand 84%- recovers to above 90% with 2-3 minute rest breaks. BP at /70  Other position/activity restrictions: Per H&P \"76 y.o. male who presents for evaluation of increasing shortness of breath that started this morning. Patient states that he was diagnosed with COVID-19 and was inpatient at [facility] couple of weeks ago. States he has been progressively getting worse but significantly short of breath today. States that he cannot breathe. He has no history of CHF but does have history of COPD. No prior oxygen requirement. He denies any chest pain. No additional information is able to obtain at this time. \"  Subjective   General  Chart Reviewed: Yes  Response To Previous Treatment: Patient with no complaints from previous session. Family / Caregiver Present: No  Subjective  Subjective: Pt denies pain at rest. Agreeable to working with PT. Wanting to use toilet.   General Comment  Comments: Pt supine in bed upon arrival.  Pain Screening  Patient Currently in Pain: Denies  Vital Signs  Patient Currently in Pain: Denies       Orientation  Orientation  Overall Orientation Status: Within Functional Limits  Cognition      Objective   Bed mobility  Supine to Sit: Supervision  Scooting: Supervision  Comment: Pt sat EOB 5 min with good balance. O2 sats 94% at rest on 15L. With activity, drops to 87% and increases with focused breathing. Transfers  Sit to Stand: Contact guard assistance (transfers from EOB, BSC 2 times. VCs for hand placement each transfer)  Stand to sit: Contact guard assistance  Ambulation  Ambulation?: Yes  Ambulation 1  Surface: level tile  Device: Rolling Walker  Other Apparatus: O2 (15LO2)  Assistance: Contact guard assistance  Quality of Gait: steady  Gait Deviations: Slow Mariam  Distance: Pt amb 3 ft x 2 with CGA and RW. Comments: Pt stood for 1 min for assist needed for hygiene after toileting, and again after amb to chair. Pt reporting fatigue and LE weakness, requesting to sit, somewhat self-limiting. Stairs/Curb  Stairs?: No     Balance  Posture: Good  Sitting - Static: Good  Sitting - Dynamic: Good  Standing - Static: Good; - (with RW)  Standing - Dynamic: Fair;+ (with RW)  Exercises  Hip Flexion: 2x10 bilat  Knee Long Arc Quad: 2x10 bilat  Ankle Pumps: HR/TR x 20 bilat  Comments: Difficulty getting good O2 sats read on pulse ox, 87% or better when reading well. Pt in no distress with activity. Comment: toileting, bathing, hygiene, gown change. O2 sats monitored, tolerated well.               G-Code     OutComes Score                                                     AM-PAC Score  AM-PAC Inpatient Mobility Raw Score : 15 (01/27/22 1139)  AM-PAC Inpatient T-Scale Score : 39.45 (01/27/22 1139)  Mobility Inpatient CMS 0-100% Score: 57.7 (01/27/22 1139)  Mobility Inpatient CMS G-Code Modifier : CK (01/27/22 1139)          Goals  Short term goals  Time Frame for Short term goals: Before discharge (no goals met 1/27)  Short term goal 1: Pt will complete bed mobility indep  Short term goal 2: Pt will complete sit<>stand indep  Short term goal 3: Pt will ambulate 50 ft with RW and supervision  Short term goal 4: Pt will ascend/descend 5 steps with (R) rail with supervision  Short term goal 5: Pt will tolerate standing x5 minutes without UE support indep in order to complete ADLs  Patient Goals   Patient goals : Go home when I am ready, I don't want to do this again    Plan    Plan  Times per week: 3-5x  Times per day: Daily  Current Treatment Recommendations: Strengthening,Functional Mobility Training,Transfer Training,Balance Training,Endurance Training,Gait Training,Stair training,Patient/Caregiver Education & Training,Safety Education & Training,Home Exercise Program  Safety Devices  Type of devices:  All fall risk precautions in place,Call light within reach,Gait belt,Patient at risk for falls,Nurse notified,Chair alarm in place,Left in chair  Restraints  Initially in place: No     Therapy Time   Individual Concurrent Group Co-treatment   Time In 0948         Time Out 1035         Minutes 47         Timed Code Treatment Minutes: 14[address], [id number]

## 2022-01-27 NOTE — PROGRESS NOTES
Vitals and assessment completed and documented. VSS. Patient A&Ox4. Medications administered per MAR. Personal items and call light within reach. Will continue to monitor.

## 2022-01-27 NOTE — CARE COORDINATION
Discharge Planning:     (CM) called and left a message for admissions staff, Vania Santillan ([phone]), at [facility] to obtain an update on the patient's pre-cert for LTAC. CM requested a call back and provided contact information. EDWINA [facility]    [phone]    Electronically signed by WINSTON Landers on 1/27/2022 at 9:00 AM      Update at 1423:  CM spoke with Vania Santillan at [facility] who stated that the patient's pre-cert was approved, but there isn't a bed available for the patient yet. Vania Santillan stated she would call CM tomorrow morning to inform of bed status.     EDWINA Teresa [place]    [phone]    Electronically signed by WINSTON Landers on 1/27/2022 at 2:25 PM

## 2022-01-27 NOTE — PROGRESS NOTES
Nutrition Assessment     Type and Reason for Visit: Reassess    Nutrition Recommendations/Plan:   No nutrition rec's @ this time     Nutrition Assessment:  Po intake remains greater than 75% of meals. Pt reports has a great appetite. Remains at low risk for nutrition compromise. Malnutrition Assessment:  Malnutrition Status: No malnutrition    Nutrition Related Findings: LBM 1/25; trace edema BLE      Current Nutrition Therapies:    ADULT DIET; Regular;  Low Sodium (2 gm); 2000 ml    Anthropometric Measures:  · Height: 5' 9\" (175.3 cm)  · Current Body Wt: 200 lb (90.7 kg)   · BMI: 29.5    Nutrition Diagnosis:   No nutrition diagnosis at this time    Nutrition Interventions:   Food and/or Nutrient Delivery:  Continue Current Diet  Nutrition Education/Counseling:  Education not indicated   Coordination of Nutrition Care:  Continue to monitor while inpatient    Goals:  po intake to remain greater than 75% of meals       Nutrition Monitoring and Evaluation:   Behavioral-Environmental Outcomes:  None Identified   Food/Nutrient Intake Outcomes:  Food and Nutrient Intake  Physical Signs/Symptoms Outcomes:  None Identified     Discharge Planning:    No discharge needs at this time     Electronically signed by Sapna Moe RD, LD on 1/27/22 at 11:21 AM EST    Contact: [phone]

## 2022-01-28 LAB
ANION GAP SERPL CALCULATED.3IONS-SCNC: 17 MMOL/L (ref 3–16)
ANISOCYTOSIS: ABNORMAL
ATYPICAL LYMPHOCYTE RELATIVE PERCENT: 2 % (ref 0–6)
BANDED NEUTROPHILS RELATIVE PERCENT: 1 % (ref 0–7)
BASOPHILS ABSOLUTE: 0 K/UL (ref 0–0.2)
BASOPHILS RELATIVE PERCENT: 0 %
BUN BLDV-MCNC: 62 MG/DL (ref 7–20)
CALCIUM SERPL-MCNC: 8.2 MG/DL (ref 8.3–10.6)
CHLORIDE BLD-SCNC: 100 MMOL/L (ref 99–110)
CO2: 19 MMOL/L (ref 21–32)
CREAT SERPL-MCNC: 1.3 MG/DL (ref 0.8–1.3)
EOSINOPHILS ABSOLUTE: 0.1 K/UL (ref 0–0.6)
EOSINOPHILS RELATIVE PERCENT: 1 %
GFR AFRICAN AMERICAN: >60
GFR NON-AFRICAN AMERICAN: 54
GLUCOSE BLD-MCNC: 171 MG/DL (ref 70–99)
GLUCOSE BLD-MCNC: 191 MG/DL (ref 70–99)
GLUCOSE BLD-MCNC: 242 MG/DL (ref 70–99)
GLUCOSE BLD-MCNC: 278 MG/DL (ref 70–99)
GLUCOSE BLD-MCNC: 354 MG/DL (ref 70–99)
HCT VFR BLD CALC: 37.9 % (ref 40.5–52.5)
HEMOGLOBIN: 12.1 G/DL (ref 13.5–17.5)
LYMPHOCYTES ABSOLUTE: 1.4 K/UL (ref 1–5.1)
LYMPHOCYTES RELATIVE PERCENT: 8 %
MACROCYTES: ABNORMAL
MCH RBC QN AUTO: 24.1 PG (ref 26–34)
MCHC RBC AUTO-ENTMCNC: 31.9 G/DL (ref 31–36)
MCV RBC AUTO: 75.5 FL (ref 80–100)
MICROCYTES: ABNORMAL
MONOCYTES ABSOLUTE: 0.6 K/UL (ref 0–1.3)
MONOCYTES RELATIVE PERCENT: 4 %
NEUTROPHILS ABSOLUTE: 12.2 K/UL (ref 1.7–7.7)
NEUTROPHILS RELATIVE PERCENT: 84 %
NUCLEATED RED BLOOD CELLS: 1 /100 WBC
OVALOCYTES: ABNORMAL
PDW BLD-RTO: 21.4 % (ref 12.4–15.4)
PERFORMED ON: ABNORMAL
PLATELET # BLD: 146 K/UL (ref 135–450)
PLATELET SLIDE REVIEW: ADEQUATE
PMV BLD AUTO: 8.2 FL (ref 5–10.5)
POLYCHROMASIA: ABNORMAL
POTASSIUM SERPL-SCNC: 4.3 MMOL/L (ref 3.5–5.1)
PRO-BNP: 1829 PG/ML (ref 0–124)
RBC # BLD: 5.03 M/UL (ref 4.2–5.9)
SLIDE REVIEW: ABNORMAL
SODIUM BLD-SCNC: 136 MMOL/L (ref 136–145)
TEAR DROP CELLS: ABNORMAL
WBC # BLD: 14.4 K/UL (ref 4–11)

## 2022-01-28 PROCEDURE — 6370000000 HC RX 637 (ALT 250 FOR IP): Performed by: INTERNAL MEDICINE

## 2022-01-28 PROCEDURE — 6370000000 HC RX 637 (ALT 250 FOR IP): Performed by: CLINICAL NURSE SPECIALIST

## 2022-01-28 PROCEDURE — 6360000002 HC RX W HCPCS: Performed by: INTERNAL MEDICINE

## 2022-01-28 PROCEDURE — 97530 THERAPEUTIC ACTIVITIES: CPT

## 2022-01-28 PROCEDURE — 94761 N-INVAS EAR/PLS OXIMETRY MLT: CPT

## 2022-01-28 PROCEDURE — 94640 AIRWAY INHALATION TREATMENT: CPT

## 2022-01-28 PROCEDURE — 99232 SBSQ HOSP IP/OBS MODERATE 35: CPT | Performed by: CLINICAL NURSE SPECIALIST

## 2022-01-28 PROCEDURE — 97110 THERAPEUTIC EXERCISES: CPT

## 2022-01-28 PROCEDURE — 80048 BASIC METABOLIC PNL TOTAL CA: CPT

## 2022-01-28 PROCEDURE — 2580000003 HC RX 258: Performed by: INTERNAL MEDICINE

## 2022-01-28 PROCEDURE — 2700000000 HC OXYGEN THERAPY PER DAY

## 2022-01-28 PROCEDURE — 85025 COMPLETE CBC W/AUTO DIFF WBC: CPT

## 2022-01-28 PROCEDURE — 83880 ASSAY OF NATRIURETIC PEPTIDE: CPT

## 2022-01-28 PROCEDURE — 36415 COLL VENOUS BLD VENIPUNCTURE: CPT

## 2022-01-28 RX ORDER — FUROSEMIDE 20 MG/1
20 TABLET ORAL 2 TIMES DAILY
Status: DISCONTINUED | OUTPATIENT
Start: 2022-01-28 | End: 2022-01-28 | Stop reason: HOSPADM

## 2022-01-28 RX ORDER — INSULIN LISPRO 100 [IU]/ML
0-18 INJECTION, SOLUTION INTRAVENOUS; SUBCUTANEOUS
Qty: 3 PEN | Refills: 2 | Status: SHIPPED | OUTPATIENT
Start: 2022-01-28 | End: 2022-03-15

## 2022-01-28 RX ORDER — FUROSEMIDE 20 MG/1
20 TABLET ORAL 2 TIMES DAILY
Qty: 60 TABLET | Refills: 3 | Status: SHIPPED | OUTPATIENT
Start: 2022-01-28 | End: 2022-07-07 | Stop reason: SINTOL

## 2022-01-28 RX ORDER — ISOSORBIDE MONONITRATE 30 MG/1
30 TABLET, EXTENDED RELEASE ORAL DAILY
Qty: 30 TABLET | Refills: 3 | Status: SHIPPED | OUTPATIENT
Start: 2022-01-29 | End: 2022-03-15

## 2022-01-28 RX ORDER — CARVEDILOL 3.12 MG/1
3.12 TABLET ORAL 2 TIMES DAILY WITH MEALS
Qty: 60 TABLET | Refills: 3 | Status: SHIPPED | OUTPATIENT
Start: 2022-01-28 | End: 2022-03-15

## 2022-01-28 RX ADMIN — Medication 10 ML: at 10:00

## 2022-01-28 RX ADMIN — Medication 2 PUFF: at 20:47

## 2022-01-28 RX ADMIN — CARVEDILOL 3.12 MG: 3.12 TABLET, FILM COATED ORAL at 08:52

## 2022-01-28 RX ADMIN — ENOXAPARIN SODIUM 30 MG: 100 INJECTION SUBCUTANEOUS at 20:01

## 2022-01-28 RX ADMIN — SACUBITRIL AND VALSARTAN 0.5 TABLET: 24; 26 TABLET, FILM COATED ORAL at 20:01

## 2022-01-28 RX ADMIN — ASPIRIN 81 MG: 81 TABLET, COATED ORAL at 08:52

## 2022-01-28 RX ADMIN — Medication 10 ML: at 20:08

## 2022-01-28 RX ADMIN — CARVEDILOL 3.12 MG: 3.12 TABLET, FILM COATED ORAL at 16:23

## 2022-01-28 RX ADMIN — INSULIN LISPRO 3 UNITS: 100 INJECTION, SOLUTION INTRAVENOUS; SUBCUTANEOUS at 13:47

## 2022-01-28 RX ADMIN — ENOXAPARIN SODIUM 30 MG: 100 INJECTION SUBCUTANEOUS at 08:53

## 2022-01-28 RX ADMIN — INSULIN GLARGINE 25 UNITS: 100 INJECTION, SOLUTION SUBCUTANEOUS at 08:59

## 2022-01-28 RX ADMIN — Medication 2 PUFF: at 08:29

## 2022-01-28 RX ADMIN — SACUBITRIL AND VALSARTAN 0.5 TABLET: 24; 26 TABLET, FILM COATED ORAL at 08:53

## 2022-01-28 RX ADMIN — SPIRONOLACTONE 12.5 MG: 25 TABLET ORAL at 11:35

## 2022-01-28 RX ADMIN — ISOSORBIDE MONONITRATE 30 MG: 30 TABLET, EXTENDED RELEASE ORAL at 11:35

## 2022-01-28 RX ADMIN — ONDANSETRON 4 MG: 2 INJECTION INTRAMUSCULAR; INTRAVENOUS at 14:13

## 2022-01-28 RX ADMIN — INSULIN LISPRO 3 UNITS: 100 INJECTION, SOLUTION INTRAVENOUS; SUBCUTANEOUS at 17:44

## 2022-01-28 RX ADMIN — INSULIN LISPRO 5 UNITS: 100 INJECTION, SOLUTION INTRAVENOUS; SUBCUTANEOUS at 20:02

## 2022-01-28 RX ADMIN — INSULIN LISPRO 15 UNITS: 100 INJECTION, SOLUTION INTRAVENOUS; SUBCUTANEOUS at 09:50

## 2022-01-28 RX ADMIN — AMIODARONE HYDROCHLORIDE 200 MG: 200 TABLET ORAL at 08:52

## 2022-01-28 RX ADMIN — DEXAMETHASONE SODIUM PHOSPHATE 6 MG: 10 INJECTION INTRAMUSCULAR; INTRAVENOUS at 08:53

## 2022-01-28 RX ADMIN — MAGNESIUM HYDROXIDE 30 ML: 400 SUSPENSION ORAL at 10:51

## 2022-01-28 RX ADMIN — FUROSEMIDE 20 MG: 20 TABLET ORAL at 16:23

## 2022-01-28 ASSESSMENT — PAIN SCALES - GENERAL
PAINLEVEL_OUTOF10: 0

## 2022-01-28 NOTE — DISCHARGE INSTR - COC
Continuity of Care Form    Patient Name: Tiffany Christianson   :  1950  MRN:  [id number]    6 [facility] date:  2022  Discharge date:  2022    Code Status Order: Full Code   Advance Directives:      Admitting Physician:  Lacy Schrader MD  PCP: Sae Ramírez DO    Discharging Nurse: Murali Soto Unit/Room#: Z6H-1536/3713-15  Discharging Unit Phone Number: [phone]    Emergency Contact:   Extended Emergency Contact Information  Primary Emergency Contact: Robinson Mc  Address: Yas Rodriguez [address] [place] [address] [address] Phone: [phone]  Work Phone: [phone]  Relation: Spouse  Secondary Emergency Contact: Urbano Mcclendon [facility] [address] Phone: [phone]  Mobile Phone: [phone]  Relation: Child  Preferred language: English   needed?  No    Past Surgical History:  Past Surgical History:   Procedure Laterality Date    CARDIAC CATHETERIZATION      CARDIAC SURGERY      stent x1 --3 yrs, 14 CABG 3 V    COLONOSCOPY      VASCULAR SURGERY         Immunization History:   Immunization History   Administered Date(s) Administered    COVID-19, Pfizer Purple top, DILUTE for use, 12+ yrs, 30mcg/0.3mL dose 04/10/2021, 2021    Influenza Virus Vaccine 10/03/2014    Pneumococcal Polysaccharide ([id number]) 10/03/2014       Active Problems:  Patient Active Problem List   Diagnosis Code    CAD (coronary artery disease) I25.10    Essential hypertension I10    Unstable angina ([facility]) I20.0    PVD (peripheral vascular disease) ([facility]) I73.9    COPD (chronic obstructive pulmonary disease) ([facility] J44.9    Dyspnea R06.00    Chest pain R07.9    Acute on chronic congestive heart failure ([facility]) I50.9    Atrial fibrillation with RVR ([facility]) I48.91    Anemia D64.9    Microcytic anemia D50.9    Anticoagulated on Coumadin Z79.01    Hypotension due to hypovolemia I95.89, E86.1    COVID U07.1    Acute respiratory failure due to COVID-19 ([facility] U07.1, J96.00    High cholesterol E78.00    Acute hypoxemic respiratory failure due to COVID-19 ([facility]) U07.1, J96.01    Ischemic cardiomyopathy I25.5    Chronic a-fib ([facility]) I48.20    Acute on chronic systolic heart failure, NYHA class 3 ([facility]) I50.23    Hyponatremia E87.1       Isolation/Infection:   Isolation            No Isolation          Patient Infection Status       Infection Onset Added Last Indicated Last Indicated By Review Planned Expiration Resolved Resolved By    COVID-19 01/05/22 01/14/22 01/14/22 Karyle Skene, RN 01/21/22 02/02/22      [place] + 1/5/22            Nurse Assessment:  Last Vital Signs: /66   Pulse 109   Temp 96.7 °F (35.9 °C) (Temporal)   Resp 22   Ht 5' 9\" (1.753 m)   Wt 202 lb 3 oz (91.7 kg)   SpO2 92%   BMI 29.86 kg/m²     Last documented pain score (0-10 scale): Pain Level: 0  Last Weight:   Wt Readings from Last 1 Encounters:   01/28/22 202 lb 3 oz (91.7 kg)     Mental Status:  oriented, alert, coherent, logical, thought processes intact, and able to concentrate and follow conversation    IV Access:  - None    Nursing Mobility/ADLs:  Walking   Assisted  Transfer  Assisted  Bathing  Dependent  Dressing  Dependent  Toileting  Assisted  Feeding  Independent  Med Admin  Assisted  Med Delivery   whole    Wound Care Documentation and Therapy:  Wound 05/13/14 Sacrum Preventative - Mepilex sacral Border (Active)   Number of days: 1647       Incision 05/12/14 Groin Right (Active)   Number of days: 3551       Incision 05/13/14 Sternum (Active)   Number of days: 3345       Incision 05/13/14 Leg (Active)   Number of days: 2817        Elimination:  Continence: Bowel: Yes  Bladder: Yes  Urinary Catheter: None   Colostomy/Ileostomy/Ileal Conduit: No       Date of Last BM: 1/28/2022    Intake/Output Summary (Last 24 hours) at 1/28/2022 1321  Last data filed at 1/28/2022 1134  Gross per 24 hour   Intake 680 ml   Output 2675 ml   Net -1995 ml     I/O last 3 completed shifts:   In: 800 [P.O.:800]  Out: 3900 [Urine:3900]    Safety Concerns: At Risk for Falls    Impairments/Disabilities:      None    Nutrition Therapy:  Current Nutrition Therapy:   - Oral Diet:  Low Sodium (2gm)    Routes of Feeding: Oral  Liquids: 64 oz fluid restriction  Daily Fluid Restriction: yes - amount 64 ounces  Last Modified Barium Swallow with Video (Video Swallowing Test): not done    Treatments at the Time of Hospital Discharge:   Respiratory Treatments: high flow nasal canula  Oxygen Therapy:  is on oxygen at 11 L/min per nasal cannula.   Ventilator:    - No ventilator support    Rehab Therapies: Physical Therapy and Occupational Therapy  Weight Bearing Status/Restrictions: No weight bearing restirctions  Other Medical Equipment (for information only, NOT a DME order):  walker  Other Treatments: none    Patient's personal belongings (please select all that are sent with patient):  clothes    RN SIGNATURE:  Electronically signed by GEORGE Schmid CNP on 1/28/22 at 4:10 PM EST    CASE MANAGEMENT/SOCIAL WORK SECTION    Inpatient Status Date: 1/12/2022    Readmission Risk Assessment Score:  Readmission Risk              Risk of Unplanned Readmission:  26           Discharging to [facility]/ [address]  Phone: [phone]  Fax: [phone]      / signature: Electronically signed by WINSTON Jin on 1/28/2022 at 1:26 PM  [facility]  Social Work -   [phone]      PHYSICIAN SECTION    Prognosis: Good    Condition at Discharge: Stable    Rehab Potential (if transferring to Rehab): Good    Recommended Labs or Other Treatments After Discharge: monitor blood sugar closely as he will likely have decreasing doses of insulin    Physician Certification: I certify the above information and transfer of Madyson Palmer  is necessary for the continuing treatment of the diagnosis listed and that he requires LTAC for less 30 days.      Update Admission H&P: No change in H&P    PHYSICIAN SIGNATURE:  Electronically signed by Debbie Pagan MD on 1/28/22 at 3:19 PM EST

## 2022-01-28 NOTE — PROGRESS NOTES
[facility]   Daily Progress Note      Admit Date:  1/12/2022    HPI:    Mr. Ce Allen is a 70year old male with history of coronary disease, CABG in 5316, ICM, systolic heart failure with recovered LVEF, copd, hypertension, peripheral vascular disease, AF with CV 2019. He was vaccinated with moderna    He is admitted with shortness of breath for 2 weeks and covid + at [facility]. Shortness of breath became worse. No prior oxygen requirement. In ER required 10-15L of high flow. He was hypotensive and bradycardic, beta blocker held  He received decadron, remdesivir and actemra    Subjective:  Patient is being seen for acute on chronic systolic heart failure. There were no acute overnight cardiac events. Creat 1.3 bun 62 potassium 4.3 sodium 136. He is sitting up in the chair and oxygen down to 10 L from 14high flow.  -19L  He continues to drop his sats when he gets up with activity and then recovers  Weight 204->304-228-182-200-202  probnp 2353-1223    Objective:   /68   Pulse 118   Temp 96.7 °F (35.9 °C) (Temporal)   Resp 22   Ht 5' 9\" (1.753 m)   Wt 202 lb 3 oz (91.7 kg)   SpO2 (!) 88%   BMI 29.86 kg/m²       Intake/Output Summary (Last 24 hours) at 1/28/2022 1117  Last data filed at 1/28/2022 0850  Gross per 24 hour   Intake 480 ml   Output 2675 ml   Net -2195 ml          Physical Exam:  General:  Awake, alert, oriented in NAD  Skin:  Warm and dry. No unusual bruising or rash  Neck:  Supple. No JVD or carotid bruit appreciated  Chest:  Normal effort.   Decreased bilaterally in the bases but more air moving today  Cardiovascular:  irreg, S1/S2, no murmur/gallop/rub  Abdomen:  Soft, nontender, +bowel sounds, obese  Extremities:  No edema  Neurological: No focal deficits  Psychological: Normal mood and affect      Medications:    furosemide  20 mg Oral BID    insulin glargine  30 Units SubCUTAneous BID    spironolactone  12.5 mg Oral Daily    carvedilol  3.125 mg Oral BID WC    enoxaparin  30 mg SubCUTAneous BID    insulin lispro  0-18 Units SubCUTAneous TID     insulin lispro  0-9 Units SubCUTAneous Nightly    albuterol sulfate HFA  2 puff Inhalation BID    ipratropium  2 puff Inhalation BID    [Held by provider] isosorbide mononitrate  30 mg Oral Daily    aspirin  81 mg Oral Daily    amiodarone  200 mg Oral Daily    sacubitril-valsartan  0.5 tablet Oral BID    sodium chloride flush  5-40 mL IntraVENous 2 times per day      dextrose      sodium chloride 25 mL (01/18/22 0916)       Lab Data:  CBC:   Recent Labs     01/26/22 0248 01/27/22 0228 01/28/22 0233   WBC 13.6* 13.6* 14.4*   HGB 12.2* 12.1* 12.1*    152 146     BMP:    Recent Labs     01/26/22 0248 01/27/22 0228 01/28/22 0233   * 134* 136   K 4.9 4.4 4.3   CO2 19* 18* 19*   BUN 71* 65* 62*   CREATININE 1.2 1.3 1.3     INR:  No results for input(s): INR in the last 72 hours. BNP:    Recent Labs     01/28/22 0233   PROBNP 1,829*         Diagnostics:  Echo 1/14/22:   Technically difficult examination.   Left ventricular systolic function is low normal with ejection fraction   estimated at 50-55%.   No definitive regional wall motion abnormalities are noted.   Diastolic filling parameters suggests grade II diastolic dysfunction.   Mild mitral regurgitation.   The left atrium is mildly dilated.   Aortic valve appears sclerotic but opens adequately.   Mild aortic regurgitation is present.   Inadequate tricuspid regurgitation to estimate systolic pulmonary artery   pressure.     Stress:   7/2021 - LVEF 53%,       SPECT images demonstrate a small area of mildly decreased perfusion in the    mid inferior, mid-inferolateral, and basal inferior segments. The defect is    present at rest and worsens with stress, consistent with mixed ischemia and    scar. There is associated wall motion abnormality.        The sum stress score is 8. No visual TID.  Calculated TID is 1.09.        Left ventricular ejection fraction is normal at 53%.        Moderate risk scan given ischemia and mildly elevated LV volumes        Echo:  11/11/2019  LVEF 15-20% with severe diffuse hypkinesis    [facility] 12/27/2016      Cath with occluded SVG to RCA,patent LIMA to LAD,patent SVG to diag,OM1 with good retrograde filling of LAD,patent LIMA to LAD  EF 35-40% with inf hypokinesis,LVEDP 25        Assessment:    1.  covid + pneumonia  2. CAD  3. Ischemic cardiomyopathy  4. Chronic atrial fib, on amiodarone  5. Acute on chronic systolic heart failure, on bb and arni  6. Essential hypertension  7. Hyponatremia, resolved    Plan:    1. Continue coreg to 3.125 mg twice a day to help his BP  2. Continue entresto half 24-26 mg twice a day as long as BP>90  3. Continue aldactone 12.5 mg daily watch potassium  4. Stop IV lasix and change to 20 mg twice a day po  5. Continue low sodium and fluid restrictions to diet    Will not see over the weekend    Discussed with patient who is agreeable with plan of care. Thank you for allowing me to participate in the care of your patient.     Juan Garcia, GEORGE - CNS, CNS

## 2022-01-28 NOTE — CARE COORDINATION
Discharge Planning:     (LAWSON) spoke with [facility] admissions staff, Delilah Painter, who stated that the patient was accepted to their facility and that his bed is for today. CM called [facility] and scheduled transportation for 7:00pm tonight to:  [address] [facility], [address] [facility]  Phone: [phone]  Fax: [phone]    CM spoke with patient who agreed to this plan of action. CM assisted patient with calling [facility] admissions staff, Delilah Painter, and patient gave his verbal consent for [facility] LTAC. CM called and left a message for patient's Wife, per patient's request, to update on patient discharging to [facility]. CM provided contact information and requested Wife to call back. CM called Bonnie with [facility] to request that she assist in facilitating the patient's discharge and completion of the [address]. Bonnie agreed to have Dr. Sandhu Friend complete the discharge order for the patient and ROB. CM updated patient's Nurse, Rebel Padron, on the above information. Nurse agreeable to print patient's current [facility] and to call report to [facility] at [phone].           Teresa MONTERO, EDWINA, [facility] -   [phone]    Electronically signed by WINSTON Agarwal on 1/28/2022 at 1:33 PM          Update at [phone]:  Patient's MAR and [address] faxed to Delilah Painter at [address] [place] [address] ([phone]), per Kassi's request.    Electronically signed by WINSTON Agarwal on 1/28/2022 at 3:43 PM

## 2022-01-28 NOTE — PROGRESS NOTES
Physical Therapy  Facility/Department: [address]  Daily Treatment Note  NAME: Kena Arboleda  : 1950  MRN: [id number]    Date of Service: 2022    Discharge Recommendations:   PT Equipment Recommendations  Equipment Needed: No  Other: Defer to next level of care     Leandra Loving scored a 16/24 on the AM-PAC short mobility form. Current research shows that an AM-PAC score of 17 or less is typically not associated with a discharge to the patient's home setting. Based on the patient's AM-PAC score and their current functional mobility deficits, it is recommended that the patient have 3-5 sessions per week of Physical Therapy at d/c to increase the patient's independence. Please see assessment section for further patient specific details. If patient discharges prior to next session this note will serve as a discharge summary. Please see below for the latest assessment towards goals. Assessment   Body structures, Functions, Activity limitations: Decreased functional mobility ; Decreased strength;Decreased endurance;Decreased balance  Assessment: Pt participates well with therapy though he requires some encouragement initially. The patient's tolerance to activity was limited by elevated HR in the 140s-150s this date. The patient was able to take some steps forward but demonstrated rapid fatigue and difficulty with ambulating backwards to get to the chair. The patient was noted to have significant weakness in posterior chain vs anterior chain musculature with LE exercises this date. Recommending continued skilled PT to safely progress tolerance to activity and independence with functional mobility. The patient would benefit from further strength exercises to prevent additional atrophy to (B) LEs.   Treatment Diagnosis: Decreased tolerance to activity  Clinical Presentation: Evolving  PT Education: Goals;PT Role;Plan of Care;Energy Conservation;Home Exercise Program;Transfer Training;Functional Mobility Training;Disease Specific Education  Patient Education: Pt verbalized understanding  Barriers to Learning: None  REQUIRES PT FOLLOW UP: Yes  Activity Tolerance  Activity Tolerance: Patient limited by fatigue;Patient limited by endurance  Activity Tolerance: Vitals at rest: SpO2 99% on 9L,  bpm, BP 90/70. Vitals standing: dizzy,  bpm (1st trial). Vitals after ambulation (3 ft): SpO2 unable to get reading,  bpm, /74. Vitals with seated exercise: SpO2 90s, HR 120s. Patient Diagnosis(es): The primary encounter diagnosis was COVID. A diagnosis of Acute respiratory failure with hypoxia (HCC) was also pertinent to this visit. has a past medical history of Acid reflux, Acute MI ([facility] [address] CAD (coronary artery disease), COPD (chronic obstructive pulmonary disease) ([facility] [address] Diverticulitis, Hypertension, and Peripheral vascular disease ([facility] [address] has a past surgical history that includes vascular surgery; Cardiac surgery; Colonoscopy; and Cardiac catheterization. Restrictions  Restrictions/Precautions  Restrictions/Precautions: Isolation,Fall Risk (COVID+, high fall risk)  Required Braces or Orthoses?: No  Position Activity Restriction  Sternal Precautions: 02 with bed mobility 87%, 02 with ambulation to chair 85%, 02 with 24' ambulation 81%, static stance edge of recliner 91%, brushing teeth in stand 84%- recovers to above 90% with 2-3 minute rest breaks. BP at /70  Other position/activity restrictions: Per H&P \"76 y.o. male who presents for evaluation of increasing shortness of breath that started this morning. Patient states that he was diagnosed with COVID-19 and was inpatient at [facility] couple of weeks ago. States he has been progressively getting worse but significantly short of breath today. States that he cannot breathe. He has no history of CHF but does have history of COPD. No prior oxygen requirement. He denies any chest pain.   No additional information is able manual resistance  Knee Long Arc Quad: x10 reps (B) with manual resistance  Ankle Pumps: HR/TR x 20 bilat  Comments: Rest breaks requires between each set of 10 reps                        AM-PAC Score  AM-PAC Inpatient Mobility Raw Score : 16 (01/28/22 1624)  AM-PAC Inpatient T-Scale Score : 40.78 (01/28/22 1624)  Mobility Inpatient CMS 0-100% Score: 54.16 (01/28/22 1624)  Mobility Inpatient CMS G-Code Modifier : CK (01/28/22 1624)          Goals  Short term goals  Time Frame for Short term goals: Before discharge (no goals met 1/25)  Short term goal 1: Pt will complete bed mobility indep  Short term goal 2: Pt will complete sit<>stand indep  Short term goal 3: Pt will ambulate 50 ft with RW and supervision  Short term goal 4: Pt will ascend/descend 5 steps with (R) rail with supervision  Short term goal 5: Pt will tolerate standing x5 minutes without UE support indep in order to complete ADLs  Patient Goals   Patient goals : Go home when I am ready, I don't want to do this again   None met this date    Plan    Plan  Times per week: 3-5x  Times per day: Daily  Current Treatment Recommendations: Strengthening,Functional Mobility Training,Transfer Training,Balance Training,Endurance Training,Gait Training,Stair training,Patient/Caregiver Education & Training,Safety Education & Training,Home Exercise Program  Safety Devices  Type of devices:  All fall risk precautions in place,Call light within reach,Gait belt,Patient at risk for falls,Nurse notified,Left in chair (Chair alarm not on upon therapist arrival. Pt verbalizes understanding of need to call for assistance.)  Restraints  Initially in place: No     Therapy Time   Individual Concurrent Group Co-treatment   Time In 1407         Time Out 1504         Minutes 57         Timed Code Treatment Minutes: [facility], PT, DPT #[id number]

## 2022-01-28 NOTE — PROGRESS NOTES
Alert, cooperative. Remains on high flow oxygen 11L. A-fib. BP decreased after morning medications, other medications staggered throughout the day, tolerated well. Ate 100% of meals. Medicated for nausea X 1, effective. Heart rate increases 120's with activity and Oxygen drops 80's. Medicated for constipation, assisted to [facility] X3 today, voiding clear yellow urine. Up to chair since lunch. Patient scheduled to transfer to [facility] at 7 pm, transportation delayed, report called to JOVON Pop @ 4 pm.  PT/OT today, legs are weak.

## 2022-01-28 NOTE — PROGRESS NOTES
Hospitalist Progress Note      PCP: Joseluis Castaneda DO    Date of Admission: 1/12/2022    Chief Complaint: [address] Course: Admitted to the hospital with COVID-19 pneumonia transferred to the [facility] [place] ICU    Subjective:   Currently on 77%>70%>80%>68>59%  >70%  FiO2, on heated nasal cannula, hyperglycemia, had been started on Lantus was increased to 25 units twice daily    1/27/2022  Doing well but still on 14-15 liters  Able to come out of contacts.   Pt/ot    Medications:  Reviewed    Infusion Medications    dextrose      sodium chloride 25 mL (01/18/22 0916)     Scheduled Medications    spironolactone  12.5 mg Oral Daily    dexamethasone  6 mg IntraVENous Daily    furosemide  40 mg IntraVENous BID    carvedilol  3.125 mg Oral BID WC    insulin glargine  25 Units SubCUTAneous BID    enoxaparin  30 mg SubCUTAneous BID    insulin lispro  0-18 Units SubCUTAneous TID WC    insulin lispro  0-9 Units SubCUTAneous Nightly    albuterol sulfate HFA  2 puff Inhalation BID    ipratropium  2 puff Inhalation BID    isosorbide mononitrate  30 mg Oral Daily    aspirin  81 mg Oral Daily    amiodarone  200 mg Oral Daily    sacubitril-valsartan  0.5 tablet Oral BID    sodium chloride flush  5-40 mL IntraVENous 2 times per day     PRN Meds: melatonin, perflutren lipid microspheres, glucose, dextrose, glucagon (rDNA), dextrose, albuterol sulfate HFA, linaclotide, nitroGLYCERIN, sodium chloride flush, sodium chloride, ondansetron **OR** ondansetron, magnesium hydroxide, acetaminophen **OR** acetaminophen, hydrALAZINE, potassium chloride **OR** potassium alternative oral replacement **OR** potassium chloride, sodium chloride      Intake/Output Summary (Last 24 hours) at 1/27/2022 2007  Last data filed at 1/27/2022 1800  Gross per 24 hour   Intake 800 ml   Output 2700 ml   Net -1900 ml       Physical Exam Performed:    BP 95/62   Pulse 104   Temp 96.6 °F (35.9 °C) (Temporal)   Resp 18   Ht 5' 9\" (1.753 m) Wt 200 lb (90.7 kg)   SpO2 91%   BMI 29.53 kg/m²     General appearance: No apparent distress, appears stated age and cooperative. HEENT: Pupils equal, round, and reactive to light. Conjunctivae/corneas clear. Neck: Supple, with full range of motion. No jugular venous distention. Trachea midline. Respiratory:  Normal respiratory effort. Clear to auscultation, bilaterally without Rales/Wheezes/Rhonchi. Cardiovascular: Regular rate and rhythm with normal S1/S2 without murmurs, rubs or gallops. Abdomen: Soft, non-tender, non-distended with normal bowel sounds. Musculoskeletal: No clubbing, cyanosis or edema bilaterally. Full range of motion without deformity. Skin: Skin color, texture, turgor normal.  No rashes or lesions. Neurologic:  Neurovascularly intact without any focal sensory/motor deficits. Cranial nerves: II-XII intact, grossly non-focal.  Psychiatric: Alert and oriented, thought content appropriate, normal insight  Capillary Refill: Brisk,3 seconds, normal   Peripheral Pulses: +2 palpable, equal bilaterally       Labs:   Recent Labs     01/25/22  0301 01/26/22 0248 01/27/22 0228   WBC 12.7* 13.6* 13.6*   HGB 11.8* 12.2* 12.1*   HCT 36.8* 38.5* 38.1*    171 152     Recent Labs     01/25/22  0301 01/26/22 0248 01/27/22 0228   * 130* 134*   K 4.6 4.9 4.4   CL 97* 96* 98*   CO2 18* 19* 18*   BUN 67* 71* 65*   CREATININE 1.4* 1.2 1.3   CALCIUM 8.4 8.5 8.2*     No results for input(s): AST, ALT, BILIDIR, BILITOT, ALKPHOS in the last 72 hours. No results for input(s): INR in the last 72 hours. No results for input(s): Consuelo Councilman in the last 72 hours.     Urinalysis:      Lab Results   Component Value Date    NITRU POSITIVE 07/31/2016    WBCUA 287 07/31/2016    BACTERIA 4+ 07/31/2016    RBCUA 30 07/31/2016    BLOODU MODERATE 07/31/2016    SPECGRAV 1.028 07/31/2016    GLUCOSEU Negative 07/31/2016    GLUCOSEU NEGATIVE 01/30/2012       Radiology:  XR CHEST PORTABLE   Final Result Improved aeration of the lungs bilaterally with residual interstitial   prominence favoring moderate pulmonary vascular congestion. XR CHEST PORTABLE   Final Result   No significant change         XR CHEST PORTABLE   Final Result   Bilateral peripheral airspace opacities suggesting COVID pneumonia                 Assessment/Plan:    Active Hospital Problems    Diagnosis     Essential hypertension [I10]      Priority: Medium    Ischemic cardiomyopathy [I25.5]     Chronic a-fib (HCC) [I48.20]     Acute on chronic systolic heart failure, NYHA class 3 (HCC) [I50.23]     Hyponatremia [E87.1]     COVID [U07.1]     Acute respiratory failure due to COVID-19 (HCC) [U07.1, J96.00]     High cholesterol [E78.00]     Acute hypoxemic respiratory failure due to COVID-19 (HCC) [U07.1, J96.01]      Acute respiratory failure due to COVID-19 (HCC)  Worse and is now on Heated high flow. Currently on 65% FiO2, improving since yesterday. D-dimer 221, CRP 6.3 complete course of 5 days of remdesivir, currently on Decadron 10 mg daily, had 1 dose of Actemra  CXR with improved aeration of lungs bilaterally    HTN (hypertension)    stay on same meds  Controlled    High cholesterol - Stable. cont statin    Hyperglycemia   due to steroids. ,  Obtain hemoglobin A1c. Start Lantus and lispro  Last known hemoglobin A1c 6.2  Continue sliding scale insulin, start  on Lantus twice daily now increased to 25 units twice daily    Coronary artery disease  Cardiology following, continue aspirin, beta-blocker, Imdur, per Crads no need for left heart cath prior to discharge, will be addressed as an outpatient    Ischemic cardiomyopathy  Continue Entresto, Imdur, Coreg, cardiology following    History of A. fib   Rate is controlled, continue amiodarone and Coreg    DVT Prophylaxis: lovenox  Diet: ADULT DIET; Regular;  Low Sodium (2 gm); 2000 ml  Code Status: Full Code    PT/OT Eval Status: 17    Dispo -he remains critically ill and has progressed to heated high flow oxygen. He was on this for several days. Today he has transitioned to 15 liters High flow NC.     Marie DelR io MD

## 2022-01-29 VITALS
OXYGEN SATURATION: 98 % | TEMPERATURE: 96.7 F | WEIGHT: 202.19 LBS | RESPIRATION RATE: 19 BRPM | SYSTOLIC BLOOD PRESSURE: 121 MMHG | BODY MASS INDEX: 29.95 KG/M2 | DIASTOLIC BLOOD PRESSURE: 73 MMHG | HEIGHT: 69 IN | HEART RATE: 104 BPM

## 2022-01-29 NOTE — PROGRESS NOTES
Patient Assessed see flow sheet; waiting on transport to LTAC. Blood glucose 278. Patient sitting up in chair call light within reach.

## 2022-01-29 NOTE — DISCHARGE SUMMARY
Hospital Medicine Discharge Summary    Patient: Yarelis Amador     Gender: male  : 1950   Age: 70 y.o. MRN: [id number]    Admitting Physician: Jeferson Pedraza MD  Discharge Physician: Jeferson Pedraza MD     Code Status: Full Code     Admit Date: 2022   Discharge Date:   2022    Disposition:  Long-Term Acute Care    Discharge Diagnoses: Active Hospital Problems    Diagnosis Date Noted    Essential hypertension [I10] 2012     Priority: Medium    Ischemic cardiomyopathy [I25.5]     Chronic a-fib ([facility] [address] [I48.20]     Acute on chronic systolic heart failure, NYHA class 3 (HCC) [I50.23]     Hyponatremia [E87.1]     COVID [U07.1] 2022    Acute respiratory failure due to COVID-19 ([facility] [address] [U07.1, J96.00] 2022    High cholesterol [E78.00] 2022    Acute hypoxemic respiratory failure due to COVID-19 [facility]) [U07.1, J96.01] 2022       Follow-up appointments:  one week    Outpatient to do list: follow up    Condition at Discharge:  [address] Course:   Admitted to the hospital with COVID-19 pneumonia transferred to the [facility] ICU       [facility] [place] [facility]  Worse and is now on Heated high flow. Currently on 65% FiO2, improving since yesterday. D-dimer 221, CRP 6.3 complete course of 5 days of remdesivir, currently on Decadron 10 mg daily, had 1 dose of Actemra  CXR with improved aeration of lungs bilaterally  He is down to 11 liters [place]     HTN (hypertension)    stay on same meds  Controlled  Some meds have been held as he has been [facility] at [facility]     High cholesterol - Stable.     cont statin     Hyperglycemia   due to steroids. ,  Obtain hemoglobin A1c.   Start Lantus and lispro  Last known hemoglobin A1c 6.2  Continue sliding scale insulin, start  on Lantus twice daily now increased to 25 units twice daily  As his steroids dose decreased he will likely be able to have lower dose and might not need any insulin.     Coronary artery disease  Cardiology following, continue aspirin, beta-blocker, Imdur, per Ginger no need for left heart cath prior to discharge, will be addressed as an outpatient  Was on imdur but is has been held for low bP     Ischemic cardiomyopathy  Continue Entresto, Imdur, Coreg, cardiology following  IMDUR HELD  Will need ischemic work up in the future.     History of A. fib   Rate is controlled, continue amiodarone and Coreg       Discharge Medications:   Current Discharge Medication List      START taking these medications    Details   ipratropium (ATROVENT HFA) 17 MCG/ACT inhaler Inhale 2 puffs into the lungs 2 times daily  Qty: 1 each, Refills: 3      insulin lispro, 1 Unit Dial, 100 UNIT/ML SOPN Inject 0-18 Units into the skin 3 times daily (with meals)  Qty: 3 pen, Refills: 2      insulin glargine (LANTUS;BASAGLAR) 100 UNIT/ML injection pen Inject 25 Units into the skin 2 times daily  Qty: 5 pen, Refills: 3           Current Discharge Medication List      CONTINUE these medications which have CHANGED    Details   carvedilol (COREG) 3.125 MG tablet Take 1 tablet by mouth 2 times daily (with meals)  Qty: 60 tablet, Refills: 3      furosemide (LASIX) 20 MG tablet Take 1 tablet by mouth 2 times daily  Qty: 60 tablet, Refills: 3      isosorbide mononitrate (IMDUR) 30 MG extended release tablet Take 1 tablet by mouth daily  Qty: 30 tablet, Refills: 3           Current Discharge Medication List      CONTINUE these medications which have NOT CHANGED    Details   sacubitril-valsartan (ENTRESTO) 24-26 MG per tablet Take 0.5 tablets by mouth 2 times daily       amiodarone (CORDARONE) 200 MG tablet TAKE 1 TABLET BY MOUTH DAILY  Qty: 90 tablet, Refills: 3      aspirin 81 MG EC tablet Take 81 mg by mouth daily      pravastatin (PRAVACHOL) 80 MG tablet Take 1 tablet by mouth daily  Qty: 90 tablet, Refills: 3    Comments: **Patient requests 90 days supply**      linaclotide (LINZESS) 145 MCG capsule Take 145 mcg by mouth as needed       nitroGLYCERIN (NITROSTAT) 0.4 MG SL tablet Place 1 tablet under the tongue every 5 minutes as needed for Chest pain  Qty: 25 tablet, Refills: 3      fluticasone-salmeterol (ADVAIR DISKUS) 250-50 MCG/DOSE AEPB Inhale 1 puff into the lungs 2 times daily. Qty: 3 each, Refills: 3      albuterol (PROVENTIL HFA;VENTOLIN HFA) 108 (90 BASE) MCG/ACT inhaler Inhale 2 puffs into the lungs every 6 hours as needed for Wheezing. Current Discharge Medication List      STOP taking these medications       spironolactone (ALDACTONE) 25 MG tablet Comments:   Reason for Stopping:               Discharge ROS:  A complete review of systems was asked and negative except for still desats with any type of exertion,     Discharge Exam:    /73   Pulse 103   Temp 96.7 °F (35.9 °C) (Temporal)   Resp 18   Ht 5' 9\" (1.753 m)   Wt 202 lb 3 oz (91.7 kg)   SpO2 96%   BMI 29.86 kg/m²   General appearance:  NAD  HEENT:   Normal cephalic, atraumatic, moist mucous membranes, no oropharyngeal erythema or exudate  Neck: Supple, trachea midline, no anterior cervical or SC LAD  Heart[de-identified] Normal s1/s2, RRR, no murmurs, gallops, or rubs. leg edema  Lungs:  Clear to auscultation, bilaterally without Rales/Wheezes/Rhonchi. decreased air movement   Abdomen: Soft, non-tender, non-distended, bowel sounds present, no masses  Musculoskeletal:  No clubbing, no cyanosis, no edema  Skin: No lesion or masses  Neurologic:  Neurovascularly intact without any focal sensory/motor deficits. Cranial nerves: II-XII intact, grossly non-focal.  Psychiatric:  A & O x3  Neuro: Grossly intact, moves all four extremities     Labs:  For convenience and continuity at follow-up the following most recent labs are provided:    Lab Results   Component Value Date    WBC 14.4 01/28/2022    HGB 12.1 01/28/2022    HCT 37.9 01/28/2022    MCV 75.5 01/28/2022     01/28/2022     01/28/2022    K 4.3 01/28/2022    K 5.3 01/13/2022  01/28/2022    CO2 19 01/28/2022    BUN 62 01/28/2022    CREATININE 1.3 01/28/2022    CALCIUM 8.2 01/28/2022    PHOS 3.0 09/23/2014    ALKPHOS 64 01/13/2022    ALT 13 01/13/2022    AST 16 01/13/2022    BILITOT 0.3 01/13/2022    BILIDIR <0.2 06/08/2015    LABALBU 3.1 01/13/2022    LDLCALC 121 11/28/2017    TRIG 88 11/28/2017     Lab Results   Component Value Date    INR 1.90 (H) 11/14/2019    INR 2.58 (H) 11/13/2019    INR 3.12 (H) 11/12/2019       Radiology:  Echo Complete    Result Date: 1/14/2022  Transthoracic Echocardiography Report (TTE)  Demographics   Patient Name       Lydia Peak   Date of Study      01/14/2022         Gender              Male   Patient Number     [id number]         Date of Birth       1950   Visit Number       [id number]          Age                 70 year(s)   Accession Number   [id number]         Room Number         1419   Corporate ID       [id number]            Sonographer         Elvie Koch RVT   Ordering Physician Bryce Ngo MD          Physician           DO AMADA, [facility]  Procedure Type of Study   TTE procedure:ECHOCARDIOGRAM COMPLETE 2D W DOPPLER W COLOR. Procedure Date Date: 01/14/2022 Start: 10:34 AM Study Location: [facility] - Echo Lab Technical Quality: Adequate visualization Additional Indications:Heart failure, COVID POSITIVE hx CABG. Patient Status: STAT Height: 69 inches Weight: 203 pounds BSA: 2.08 m2 BMI: 29.98 kg/m2 BP: 126/54 mmHg  Conclusions   Summary  Technically difficult examination. Left ventricular systolic function is low normal with ejection fraction  estimated at 50-55%. No definitive regional wall motion abnormalities are noted. Diastolic filling parameters suggests grade II diastolic dysfunction. Mild mitral regurgitation. The left atrium is mildly dilated.   Aortic valve appears sclerotic but opens adequately. Mild aortic regurgitation is present. Inadequate tricuspid regurgitation to estimate systolic pulmonary artery  pressure. Signature   ------------------------------------------------------------------  Electronically signed by Cassie Dietz, [facility]  (Interpreting physician) on 01/14/2022 at 11:21 AM  ------------------------------------------------------------------   Findings   Left Ventricle  Left ventricular systolic function is low normal with ejection fraction  estimated at 50-55%. No definitive regional wall motion abnormalities are noted. Normal left ventricular wall thickness. Left ventricle size is normal.  Diastolic filling parameters suggests grade II diastolic dysfunction . Mitral Valve  Mitral valve is structurally normal.  Mild mitral regurgitation. Left Atrium  The left atrium is mildly dilated. Aortic Valve  Aortic valve appears sclerotic but opens adequately. Mild aortic regurgitation is present. Aorta  The aortic root is dilated. Right Ventricle  The right ventricular size appears normal.  The right ventricle is not well visualized. Tricuspid Valve  Tricuspid valve is structurally normal.  Trivial tricuspid regurgitation. Inadequate tricuspid regurgitation to estimate systolic pulmonary artery  pressure. Right Atrium  The right atrium is normal in size. Pulmonic Valve  The pulmonic valve is normal in structure and function. No evidence of pulmonic valve regurgitation. Pericardial Effusion  No pericardial effusion noted. Pleural Effusion  No pleural effusion noted. Miscellaneous  IVC is normal in size (< 2.1 cm) and collapses > 50% with respiration  consistent with normal RA pressure (3 mmHg).   M-Mode/2D Measurements (cm)   LV Diastolic Dimension: 0.73 cm LV Systolic Dimension: 3.15 cm  LV Septum Diastolic: 3.28 cm  LV PW Diastolic: 1 cm           AO Root Dimension: 3.8 cm                                   LA Area: 26.1 cm2  LVOT: 2.8 cm LA volume/Index: 82.3 ml /40 ml/m2  Doppler Measurements   AV Peak Velocity: 180 cm/s     MV Peak E-Wave: 102 cm/s  AV Peak Gradient: 12.96 mmHg   MV Peak A-Wave: 89.2 cm/s  AV Mean Gradient: 5 mmHg       MV E/A Ratio: 1.14  LVOT Peak Velocity: 82.3 cm/s  AV Area (Continuity):2.94 cm2   E' Septal Velocity: 5.44 cm/s  E' Lateral Velocity: 6.81 cm/s  E/E' ratio: 16.9   Aortic Valve   Peak Velocity: 180 cm/s     Mean Velocity: 105 cm/s  Peak Gradient: 12.96 mmHg   Mean Gradient: 5 mmHg  Area (continuity): 2.94 cm2  AV VTI: 40.8 cm  Aorta   Aortic Root: 3.8 cm  Ascending Aorta: 4.4 cm  LVOT Diameter: 2.8 cm      XR CHEST PORTABLE    Result Date: 1/20/2022  EXAMINATION: ONE XRAY VIEW OF THE CHEST 1/20/2022 10:57 am COMPARISON: 01/20/2022 HISTORY: ORDERING SYSTEM PROVIDED HISTORY: follow up TECHNOLOGIST PROVIDED HISTORY: Reason for exam:->follow up Reason for Exam: Follow Up FINDINGS: Cardiomediastinal silhouette is enlarged. No pleural effusion or pneumothorax. Mild interstitial prominence bilaterally, unchanged. Few scattered granulomas. Median sternotomy wires. Improved aeration of the lungs bilaterally with residual interstitial prominence favoring moderate pulmonary vascular congestion. XR CHEST PORTABLE    Result Date: 1/13/2022  EXAMINATION: ONE XRAY VIEW OF THE CHEST 1/13/2022 5:55 am COMPARISON: 1/12/2022 HISTORY: ORDERING SYSTEM PROVIDED HISTORY: pneumonia TECHNOLOGIST PROVIDED HISTORY: Reason for exam:->pneumonia Reason for Exam: pneumonia FINDINGS: Multifocal bilateral airspace disease is overall similar in appearance to the previous exam.     No significant change     XR CHEST PORTABLE    Result Date: 1/12/2022  EXAMINATION: ONE XRAY VIEW OF THE CHEST 1/12/2022 4:08 pm COMPARISON: 11/10/2019 HISTORY: ORDERING SYSTEM PROVIDED HISTORY: SOB TECHNOLOGIST PROVIDED HISTORY: Reason for exam:->SOB FINDINGS: Cardiomediastinal silhouette stable. Bilateral peripheral airspace opacities.   No pneumothorax. No pleural effusion. Status post median sternotomy. Bilateral peripheral airspace opacities suggesting COVID pneumonia             The patient was seen and examined on day of discharge and this discharge summary is in conjunction with any daily progress note from day of discharge. Time Spent on discharge is 35 minutes  in the examination, evaluation, counseling and review of medications and discharge plan. Note that greater  than 30 minutes was spent in preparing discharge papers, discussing discharge with patient, medication review, etc.       Signed:    Isis Tucker MD   1/28/2022      Thank you Braeden Lam DO for the opportunity to be involved in this patient's care.  If you have any questions or concerns please feel free to contact me at [phone]

## 2022-02-02 NOTE — ADT AUTH CERT
Utilization Reviews         Viral Illness, Acute - Care Day 16 (1/27/2022) by Mark Mohs, RN       Review Status Review Entered   Completed 2/1/2022 10:05      Criteria Review      Care Day: 16 Care Date: 1/27/2022 Level of Care: Intermediate Care    Guideline Day 2    Level Of Care    (X) Floor    Clinical Status    (X) * Hypotension absent    (X) * No requirement for mechanical ventilation    ( ) * Oxygenation at baseline or improved    2/1/2022 10:05 AM EST by Diane Medina      O2 per HFNC @ 15L/min > Sats 85-96%, RR 18-22    (X) * Mental status at baseline    Activity    (X) Advance activity as tolerated    Routes    (X) * Oral hydration    (X) Oral or IV medications    (X) Usual diet    Interventions    (X) Possible isolation    (X) Pulse oximetry    2/1/2022 10:05 AM EST by Lawrence Kauron      O2 per HFNC @ 15L/min > Sats 85-96%, RR 18-22    (X) Possible oxygen    2/1/2022 10:05 AM EST by Lawrence Kauron      O2 per HFNC @ 15L/min > Sats 85-96%, RR 18-22    Medications    (X) Possible DVT prophylaxis    2/1/2022 10:05 AM EST by Lawrence Juárez      enoxaparin 30 mgSubCUTAneousBID    * Milestone   Additional Notes   DATE: 1/27/22         Pertinent Updates:   Doing well but still on 14-15 liters   Able to come out of contacts.    Pt/ot         Vitals:BP 95/62   Pulse 104   Temp 96.6 °F (35.9 °C) (Temporal)   Resp 18   Ht 5' 9\" (1.753 m)   Wt 200 lb (90.7 kg)   SpO2 91%   BMI 29.53 kg/m²          Abnl/Pertinent Labs/Radiology/Diagnostic Studies:   1/27/2022 02:23   TIBC: 248 (L)      1/27/2022 02:28   Sodium: 134 (L)   Chloride: 98 (L)   CO2: 18 (L)   BUN: 65 (H)   Anion Gap: 18 (H)   GFR Non-: 54 (A)   Glucose: 356 (H)   CALCIUM, SERUM, 431846: 8.2 (L)   WBC: 13.6 (H)   Hemoglobin Quant: 12.1 (L)   Hematocrit: 38.1 (L)   MCV: 76.3 (L)   MCH: 24.3 (L)   RDW: 21.5 (H)   Neutrophils Absolute: 12.2 (H)   Nucleated Red Blood Cells: 1 (A)   Anisocytosis: Occasional (A) Ovalocytes: 1+ (A)   Microcytes: Occasional (A)   Metamyelocytes Relative: 1 (A)      1/27/2022 07:34   POC Glucose: 318 (H)      1/27/2022 11:46   POC Glucose: 233 (H)      1/27/2022 16:46   POC Glucose: 251 (H)      1/27/2022 19:28   POC Glucose: 307 (H)   --------------      Physical Exam:   General appearance: No apparent distress, appears stated age and cooperative. HEENT: Pupils equal, round, and reactive to light. Conjunctivae/corneas clear. Neck: Supple, with full range of motion. No jugular venous distention. Trachea midline. Respiratory:  Normal respiratory effort. Clear to auscultation, bilaterally without Rales/Wheezes/Rhonchi. Cardiovascular: Regular rate and rhythm with normal S1/S2 without murmurs, rubs or gallops. Abdomen: Soft, non-tender, non-distended with normal bowel sounds. Musculoskeletal: No clubbing, cyanosis or edema bilaterally.  Full range of motion without deformity. Skin: Skin color, texture, turgor normal.  No rashes or lesions. Neurologic:  Neurovascularly intact without any focal sensory/motor deficits. Cranial nerves: II-XII intact, grossly non-focal.   Psychiatric: Alert and oriented, thought content appropriate, normal insight   Capillary Refill: Brisk,3 seconds, normal    Peripheral Pulses: +2 palpable, equal bilaterally          MD Consults/Assessments & Plans:   IM   Acute respiratory failure due to COVID-19 ([facility] [address]   CXR with improved aeration of lungs bilaterally       HTN (hypertension)     stay on same meds   Controlled       High cholesterol - Stable.      cont statin       Hyperglycemia    due to steroids. ,  Obtain hemoglobin A1c.  Start Lantus and lispro   Last known hemoglobin A1c 6.2   Continue sliding scale insulin, start  on Lantus twice daily now increased to 25 units twice daily       Coronary artery disease   Cardiology following, continue aspirin, beta-blocker, Imdur, per Crads no need for left heart cath prior to discharge, will be addressed as an outpatient       Ischemic cardiomyopathy   Continue Entresto, Imdur, Coreg, cardiology following       History of A. fib    Rate is controlled, continue amiodarone and Coreg       DVT Prophylaxis: lovenox   Diet: ADULT DIET; Regular; Low Sodium (2 gm); 2000 ml   Code Status: Full Code   - - - -    Cardio   Plan:   1.  Continue coreg to 3.125 mg twice a day to help his BP   2.  Continue entresto half 24-26 mg twice a day as long as BP>90   3.  Continue aldactone 12.5 mg daily watch potassium   4.  Continue IV lasix to 40 mg bid    5.  Continue low sodium and fluid restrictions to diet   6.  Will check iron studies and probnp in am      ----------------      Medications:   Scheduled Medications   · spironolactone 12.5 mg Oral Daily   · dexamethasone 6 mg IntraVENous Daily   · furosemide 40 mg IntraVENous BID   · carvedilol 3.125 mg Oral BID WC   · insulin glargine 25 Units SubCUTAneous BID   · enoxaparin 30 mg SubCUTAneous BID   · insulin lispro 0-18 Units SubCUTAneous TID WC   · insulin lispro 0-9 Units SubCUTAneous Nightly   · albuterol sulfate HFA 2 puff Inhalation BID   · ipratropium 2 puff Inhalation BID   · isosorbide mononitrate 30 mg Oral Daily   · aspirin 81 mg Oral Daily   · amiodarone 200 mg Oral Daily   · sacubitril-valsartan 0.5 tablet Oral BID   · sodium chloride flush 5-40 mL IntraVENous 2 times per day      PRN   acetaminophen (TYLENOL) tablet 650 mg PO x2         PT/OT/SLP/CM Assessments or Notes:   CM Note: LAWSON spoke with Arin Decker at [address] [place] [address] who stated that the patient's pre-cert was approved, but there isn't a bed available for the patient yet.  Arin Decker stated she would call CM tomorrow morning to inform of bed status.        Viral Illness, Acute - Care Day 13 (1/24/2022) by jJ Rodríguez RN       Review Status Review Entered   Completed 2/1/2022 10:00      Criteria Review      Care Day: 13 Care Date: 1/24/2022 Level of Care: Intermediate Care    Guideline Day 2    Level Of Care    (X) Floor Clinical Status    (X) * Hypotension absent    (X) * No requirement for mechanical ventilation    ( ) * Oxygenation at baseline or improved    2/1/2022 9:59 AM EST by Selwyn Lyons      70%  FiO2, on heated nasal cannula,    (X) * Mental status at baseline    Activity    (X) Advance activity as tolerated    Routes    (X) * Oral hydration    (X) Oral or IV medications    (X) Usual diet    Interventions    (X) Possible isolation    (X) Pulse oximetry    (X) Possible oxygen    2/1/2022 9:59 AM EST by Selwyn Lyons      70%  FiO2, on heated nasal cannula,    Medications    (X) Possible DVT prophylaxis    2/1/2022 9:59 AM EST by Selwyn Lyons      enoxaparin 30 mgSubCUTAneousBID    * Milestone   Additional Notes   DATE: 1/24/22         Relevant baselines: (lab values, vitals, o2 amount/delivery, etc.)         Pertinent Updates:Creat 1.1 bun 67 potassium 4.1 sodium 130.  He is lying in the bed on airvo 65% and 40 L He is -13 L out       IM: Currently on 77%>70%>80%>68>59%  >70%  FiO2, on heated nasal cannula, hyperglycemia, had been started on Lantus was increased to 25 units twice daily         Vitals:BP (!) 99/53   Pulse 99   Temp 96.4 °F (35.8 °C) (Temporal)   Resp 18   Ht 5' 9\" (1.753 m)   Wt 200 lb 13.4 oz (91.1 kg)   SpO2 94%   BMI 29.66 kg/m²      ---------------   Abnl/Pertinent Labs/Radiology/Diagnostic Studies:      1/24/2022 02:42   Sodium: 130 (L)   Potassium: 4.1   Chloride: 97 (L)   CO2: 19 (L)   BUN: 67 (H)   Glucose: 150 (H)   CALCIUM, SERUM, [id number]: 8.2 (L)   WBC: 14.1 (H)   Hemoglobin Quant: 11.4 (L)   Hematocrit: 35.9 (L)   MCV: 75.6 (L)   MCH: 23.9 (L)   RDW: 20.2 (H)   Neutrophils Absolute: 13.1 (H)   Lymphocytes Absolute: 0.6 (L)      1/24/2022 04:00   Pro-BNP: 4,226 (H)      1/24/2022 07:55   POC Glucose: 123 (H)      1/24/2022 12:21   POC Glucose: 419 (H)      1/24/2022 17:35   POC Glucose: 224 (H)      1/24/2022 20:23   POC Glucose: 260 (H)         -------------   Physical Exam: General:  Awake, alert, oriented in NAD   Skin:  Warm and dry.  No unusual bruising or rash   Neck:  Supple.  No JVD or carotid bruit appreciated   Chest:  Normal effort.  Decreased bilaterally in the bases   Cardiovascular:  irreg, S1/S2, no murmur/gallop/rub   Abdomen:  Soft, nontender, +bowel sounds, obese   Extremities:  No edema   Neurological: No focal deficits   Psychological: Normal mood and affect         MD Consults/Assessments & Plans:   Cardio:   Plan:   1.  Will cut his coreg to 3.125 mg twice a day to help his BP   2.  Continue entresto half 24-26 mg twice a day as long as BP>90   3.  Will need to resume his aldactone 12.5 mg once his BP is improved   4.  Change lasix to 40 mg po daily   5.  Continue low sodium and fluid restrictions to diet   6.  Add probnp to labs done this morning   - - - -    IM   Acute respiratory failure due to COVID-19 ([address]   Worse and is now on Heated high flow.  Currently on 65% FiO2, improving since yesterday. D-dimer 221, CRP 6.3 complete course of 5 days of remdesivir, currently on Decadron 10 mg daily, had 1 dose of Actemra   CXR with improved aeration of lungs bilaterally       HTN (hypertension)     stay on same meds   Controlled       High cholesterol - Stable.      cont statin       Hyperglycemia    due to steroids. ,  Obtain hemoglobin A1c.  Start Lantus and lispro   Last known hemoglobin A1c 6.2   Continue sliding scale insulin, start  on Lantus twice daily now increased to 25 units twice daily       Coronary artery disease   Cardiology following, continue aspirin, beta-blocker, Imdur, per Crads no need for left heart cath prior to discharge, will be addressed as an outpatient       Ischemic cardiomyopathy   Continue Entresto, Imdur, Coreg, cardiology following       History of A. fib    Rate is controlled, continue amiodarone and Coreg       DVT Prophylaxis: lovenox   Diet: ADULT DIET; Regular;  Low Sodium (2 gm); 2000 ml   Code Status: Full Code       PT/OT Eval Status: 17       Dispo -he remains critically ill and has progressed to heated high flow oxygen. Continue to monitor remain high risk for intubation and mechanical ventilation   -----------         Medications:   Scheduled Medications   · insulin glargine 25 Units SubCUTAneous BID   · furosemide 40 mg IntraVENous BID   · carvedilol 6.25 mg Oral BID WC   · dexamethasone 10 mg IntraVENous Daily   · enoxaparin 30 mg SubCUTAneous BID   · insulin lispro 0-18 Units SubCUTAneous TID WC   · insulin lispro 0-9 Units SubCUTAneous Nightly   · albuterol sulfate HFA 2 puff Inhalation BID   · ipratropium 2 puff Inhalation BID   · isosorbide mononitrate 30 mg Oral Daily   · aspirin 81 mg Oral Daily   · amiodarone 200 mg Oral Daily   · sacubitril-valsartan 0.5 tablet Oral BID   · sodium chloride flush 5-40 mL IntraVENous 2 times per day      PRN   acetaminophen (TYLENOL) tablet 650 mg PO x1      -------------      PT/OT/SLP/CM Assessments or Notes:       CM Note: Oscar Faith stated that the patient's FiO2 rate is close to being able to accept them to LTAC. Oscar Faith stated that, pending bed availability, the patient may be able to go to [facility] tomorrow if the patient continues to trend down with Fi02 today.

## 2022-02-08 ENCOUNTER — TELEPHONE (OUTPATIENT)
Dept: CARDIOLOGY CLINIC | Age: 72
End: 2022-02-08

## 2022-02-08 NOTE — TELEPHONE ENCOUNTER
Pt son Richy Rao called stating since pat has left F to go to rehab pt has declined, pt 02 drops when he gets up, son would like to know if pt can get transported back to get revaluated make sure if heart is ok?     Pls advise thank you      Richy Rao  J:[phone]

## 2022-02-08 NOTE — TELEPHONE ENCOUNTER
Spoke to patients son about message below. He stated he is worried about his dad. Patients O2 is running low (couldn't give %). Patient is on 10-15L. BP drops when staff tries to get patient out of bed.  Son is wondering if they need to let things run its course or if they should have him go back to hospital.

## 2022-03-15 ENCOUNTER — HOSPITAL ENCOUNTER (INPATIENT)
Age: 72
LOS: 6 days | Discharge: NURSING, REHAB OR LONG-TERM CARE | DRG: 224 | End: 2022-03-21
Attending: EMERGENCY MEDICINE | Admitting: INTERNAL MEDICINE
Payer: MEDICARE

## 2022-03-15 ENCOUNTER — APPOINTMENT (OUTPATIENT)
Dept: GENERAL RADIOLOGY | Age: 72
DRG: 224 | End: 2022-03-15
Payer: MEDICARE

## 2022-03-15 DIAGNOSIS — R07.2 PRECORDIAL CHEST PAIN: Primary | ICD-10-CM

## 2022-03-15 DIAGNOSIS — I50.20 SYSTOLIC CONGESTIVE HEART FAILURE, UNSPECIFIED HF CHRONICITY (HCC): ICD-10-CM

## 2022-03-15 DIAGNOSIS — I73.9 PVD (PERIPHERAL VASCULAR DISEASE) (HCC): Chronic | ICD-10-CM

## 2022-03-15 DIAGNOSIS — R00.2 PALPITATIONS: ICD-10-CM

## 2022-03-15 PROBLEM — U07.1 ACUTE HYPOXEMIC RESPIRATORY FAILURE DUE TO COVID-19 (HCC): Status: RESOLVED | Noted: 2022-01-12 | Resolved: 2022-03-15

## 2022-03-15 PROBLEM — R55 SYNCOPE AND COLLAPSE: Status: ACTIVE | Noted: 2022-03-15

## 2022-03-15 PROBLEM — D50.9 MICROCYTIC ANEMIA: Status: RESOLVED | Noted: 2019-11-11 | Resolved: 2022-03-15

## 2022-03-15 PROBLEM — I50.9 ACUTE ON CHRONIC CONGESTIVE HEART FAILURE (HCC): Status: RESOLVED | Noted: 2019-11-10 | Resolved: 2022-03-15

## 2022-03-15 PROBLEM — J96.01 ACUTE HYPOXEMIC RESPIRATORY FAILURE DUE TO COVID-19 (HCC): Status: RESOLVED | Noted: 2022-01-12 | Resolved: 2022-03-15

## 2022-03-15 LAB
ANION GAP SERPL CALCULATED.3IONS-SCNC: 14 MMOL/L (ref 3–16)
BASE EXCESS VENOUS: -1.5 MMOL/L (ref -3–3)
BASE EXCESS VENOUS: -2.3 MMOL/L (ref -3–3)
BASOPHILS ABSOLUTE: 0.1 K/UL (ref 0–0.2)
BASOPHILS RELATIVE PERCENT: 0.8 %
BUN BLDV-MCNC: 30 MG/DL (ref 7–20)
CALCIUM SERPL-MCNC: 9.3 MG/DL (ref 8.3–10.6)
CARBOXYHEMOGLOBIN: 2.9 % (ref 0–1.5)
CARBOXYHEMOGLOBIN: 3.3 % (ref 0–1.5)
CHLORIDE BLD-SCNC: 103 MMOL/L (ref 99–110)
CO2: 22 MMOL/L (ref 21–32)
CREAT SERPL-MCNC: 1.5 MG/DL (ref 0.8–1.3)
EKG ATRIAL RATE: 122 BPM
EKG DIAGNOSIS: NORMAL
EKG Q-T INTERVAL: 400 MS
EKG QRS DURATION: 98 MS
EKG QTC CALCULATION (BAZETT): 524 MS
EKG R AXIS: 32 DEGREES
EKG T AXIS: 103 DEGREES
EKG VENTRICULAR RATE: 103 BPM
EOSINOPHILS ABSOLUTE: 0.3 K/UL (ref 0–0.6)
EOSINOPHILS RELATIVE PERCENT: 4.4 %
GFR AFRICAN AMERICAN: 56
GFR NON-AFRICAN AMERICAN: 46
GLUCOSE BLD-MCNC: 106 MG/DL (ref 70–99)
GLUCOSE BLD-MCNC: 112 MG/DL (ref 70–99)
GLUCOSE BLD-MCNC: 92 MG/DL (ref 70–99)
HCO3 VENOUS: 23.5 MMOL/L (ref 23–29)
HCO3 VENOUS: 24.4 MMOL/L (ref 23–29)
HCT VFR BLD CALC: 31.7 % (ref 40.5–52.5)
HEMOGLOBIN, VEN, REDUCED: 17 %
HEMOGLOBIN, VEN, REDUCED: 20 %
HEMOGLOBIN: 9.9 G/DL (ref 13.5–17.5)
LACTIC ACID: 2.9 MMOL/L (ref 0.4–2)
LYMPHOCYTES ABSOLUTE: 2.3 K/UL (ref 1–5.1)
LYMPHOCYTES RELATIVE PERCENT: 35.8 %
MCH RBC QN AUTO: 26.5 PG (ref 26–34)
MCHC RBC AUTO-ENTMCNC: 31.3 G/DL (ref 31–36)
MCV RBC AUTO: 84.7 FL (ref 80–100)
METHEMOGLOBIN VENOUS: 0.3 %
METHEMOGLOBIN VENOUS: 0.4 %
MONOCYTES ABSOLUTE: 0.4 K/UL (ref 0–1.3)
MONOCYTES RELATIVE PERCENT: 6.8 %
NEUTROPHILS ABSOLUTE: 3.4 K/UL (ref 1.7–7.7)
NEUTROPHILS RELATIVE PERCENT: 52.2 %
O2 CONTENT, VEN: 11 VOL %
O2 CONTENT, VEN: 11 VOL %
O2 SAT, VEN: 79 %
O2 SAT, VEN: 82 %
O2 THERAPY: ABNORMAL
O2 THERAPY: ABNORMAL
PCO2, VEN: 43.8 MMHG (ref 40–50)
PCO2, VEN: 45.4 MMHG (ref 40–50)
PDW BLD-RTO: 28.1 % (ref 12.4–15.4)
PERFORMED ON: ABNORMAL
PERFORMED ON: NORMAL
PH VENOUS: 7.34 (ref 7.35–7.45)
PH VENOUS: 7.34 (ref 7.35–7.45)
PLATELET # BLD: 221 K/UL (ref 135–450)
PMV BLD AUTO: 8.1 FL (ref 5–10.5)
PO2, VEN: 49.4 MMHG (ref 25–40)
PO2, VEN: 53.4 MMHG (ref 25–40)
POTASSIUM REFLEX MAGNESIUM: 4.3 MMOL/L (ref 3.5–5.1)
RBC # BLD: 3.74 M/UL (ref 4.2–5.9)
SODIUM BLD-SCNC: 139 MMOL/L (ref 136–145)
TCO2 CALC VENOUS: 56 MMOL/L
TCO2 CALC VENOUS: 58 MMOL/L
TROPONIN: 0.08 NG/ML
WBC # BLD: 6.5 K/UL (ref 4–11)

## 2022-03-15 PROCEDURE — 6370000000 HC RX 637 (ALT 250 FOR IP): Performed by: INTERNAL MEDICINE

## 2022-03-15 PROCEDURE — 2700000000 HC OXYGEN THERAPY PER DAY

## 2022-03-15 PROCEDURE — 36415 COLL VENOUS BLD VENIPUNCTURE: CPT

## 2022-03-15 PROCEDURE — 85025 COMPLETE CBC W/AUTO DIFF WBC: CPT

## 2022-03-15 PROCEDURE — 93010 ELECTROCARDIOGRAM REPORT: CPT | Performed by: INTERNAL MEDICINE

## 2022-03-15 PROCEDURE — 94761 N-INVAS EAR/PLS OXIMETRY MLT: CPT

## 2022-03-15 PROCEDURE — 84484 ASSAY OF TROPONIN QUANT: CPT

## 2022-03-15 PROCEDURE — 71045 X-RAY EXAM CHEST 1 VIEW: CPT

## 2022-03-15 PROCEDURE — 99284 EMERGENCY DEPT VISIT MOD MDM: CPT

## 2022-03-15 PROCEDURE — 1200000000 HC SEMI PRIVATE

## 2022-03-15 PROCEDURE — 93005 ELECTROCARDIOGRAM TRACING: CPT | Performed by: EMERGENCY MEDICINE

## 2022-03-15 PROCEDURE — 83605 ASSAY OF LACTIC ACID: CPT

## 2022-03-15 PROCEDURE — 82803 BLOOD GASES ANY COMBINATION: CPT

## 2022-03-15 PROCEDURE — 94640 AIRWAY INHALATION TREATMENT: CPT

## 2022-03-15 PROCEDURE — 80048 BASIC METABOLIC PNL TOTAL CA: CPT

## 2022-03-15 PROCEDURE — 2580000003 HC RX 258: Performed by: EMERGENCY MEDICINE

## 2022-03-15 RX ORDER — BUDESONIDE 0.5 MG/2ML
500 INHALANT ORAL 2 TIMES DAILY
Status: DISCONTINUED | OUTPATIENT
Start: 2022-03-15 | End: 2022-03-15 | Stop reason: ALTCHOICE

## 2022-03-15 RX ORDER — SPIRONOLACTONE 25 MG/1
25 TABLET ORAL DAILY
COMMUNITY
End: 2022-06-15

## 2022-03-15 RX ORDER — BUDESONIDE 0.5 MG/2ML
1 INHALANT ORAL 2 TIMES DAILY
COMMUNITY
End: 2022-07-07

## 2022-03-15 RX ORDER — SENNA AND DOCUSATE SODIUM 50; 8.6 MG/1; MG/1
1 TABLET, FILM COATED ORAL 2 TIMES DAILY
Status: DISCONTINUED | OUTPATIENT
Start: 2022-03-15 | End: 2022-03-21 | Stop reason: HOSPADM

## 2022-03-15 RX ORDER — AMIODARONE HYDROCHLORIDE 200 MG/1
200 TABLET ORAL DAILY
Status: DISCONTINUED | OUTPATIENT
Start: 2022-03-16 | End: 2022-03-21 | Stop reason: HOSPADM

## 2022-03-15 RX ORDER — SPIRONOLACTONE 25 MG/1
25 TABLET ORAL DAILY
Status: DISCONTINUED | OUTPATIENT
Start: 2022-03-16 | End: 2022-03-21 | Stop reason: HOSPADM

## 2022-03-15 RX ORDER — LIDOCAINE 50 MG/G
OINTMENT TOPICAL EVERY 4 HOURS PRN
COMMUNITY
End: 2022-04-21 | Stop reason: ALTCHOICE

## 2022-03-15 RX ORDER — PANTOPRAZOLE SODIUM 40 MG/1
40 TABLET, DELAYED RELEASE ORAL DAILY
COMMUNITY
End: 2022-05-04 | Stop reason: SDUPTHER

## 2022-03-15 RX ORDER — SODIUM CHLORIDE, SODIUM LACTATE, POTASSIUM CHLORIDE, AND CALCIUM CHLORIDE .6; .31; .03; .02 G/100ML; G/100ML; G/100ML; G/100ML
1000 INJECTION, SOLUTION INTRAVENOUS ONCE
Status: COMPLETED | OUTPATIENT
Start: 2022-03-15 | End: 2022-03-15

## 2022-03-15 RX ORDER — ATORVASTATIN CALCIUM 20 MG/1
20 TABLET, FILM COATED ORAL NIGHTLY
Status: DISCONTINUED | OUTPATIENT
Start: 2022-03-15 | End: 2022-03-21 | Stop reason: HOSPADM

## 2022-03-15 RX ORDER — INSULIN LISPRO 100 [IU]/ML
0-18 INJECTION, SOLUTION INTRAVENOUS; SUBCUTANEOUS
Status: DISCONTINUED | OUTPATIENT
Start: 2022-03-15 | End: 2022-03-19 | Stop reason: DRUGHIGH

## 2022-03-15 RX ORDER — GABAPENTIN 100 MG/1
200 CAPSULE ORAL 2 TIMES DAILY
Status: DISCONTINUED | OUTPATIENT
Start: 2022-03-15 | End: 2022-03-21 | Stop reason: HOSPADM

## 2022-03-15 RX ORDER — GABAPENTIN 100 MG/1
100 CAPSULE ORAL DAILY
COMMUNITY

## 2022-03-15 RX ORDER — SENNA AND DOCUSATE SODIUM 50; 8.6 MG/1; MG/1
1 TABLET, FILM COATED ORAL 2 TIMES DAILY
COMMUNITY
End: 2022-07-07

## 2022-03-15 RX ORDER — BUDESONIDE AND FORMOTEROL FUMARATE DIHYDRATE 80; 4.5 UG/1; UG/1
2 AEROSOL RESPIRATORY (INHALATION) 2 TIMES DAILY
Status: DISCONTINUED | OUTPATIENT
Start: 2022-03-15 | End: 2022-03-21 | Stop reason: HOSPADM

## 2022-03-15 RX ORDER — FUROSEMIDE 20 MG/1
20 TABLET ORAL 2 TIMES DAILY
Status: DISCONTINUED | OUTPATIENT
Start: 2022-03-15 | End: 2022-03-21 | Stop reason: HOSPADM

## 2022-03-15 RX ORDER — IPRATROPIUM BROMIDE AND ALBUTEROL SULFATE 2.5; .5 MG/3ML; MG/3ML
1 SOLUTION RESPIRATORY (INHALATION) EVERY 6 HOURS PRN
Status: DISCONTINUED | OUTPATIENT
Start: 2022-03-15 | End: 2022-03-21 | Stop reason: HOSPADM

## 2022-03-15 RX ORDER — INSULIN DETEMIR 100 [IU]/ML
10 INJECTION, SOLUTION SUBCUTANEOUS NIGHTLY
COMMUNITY
End: 2022-04-21 | Stop reason: ALTCHOICE

## 2022-03-15 RX ORDER — ATORVASTATIN CALCIUM 20 MG/1
20 TABLET, FILM COATED ORAL NIGHTLY
COMMUNITY
End: 2022-05-04 | Stop reason: SDUPTHER

## 2022-03-15 RX ORDER — OXYCODONE HYDROCHLORIDE 5 MG/1
5 TABLET ORAL EVERY 6 HOURS PRN
Status: ON HOLD | COMMUNITY
End: 2022-03-18 | Stop reason: SDUPTHER

## 2022-03-15 RX ORDER — ACETAMINOPHEN 325 MG/1
650 TABLET ORAL EVERY 6 HOURS PRN
COMMUNITY

## 2022-03-15 RX ORDER — POLYETHYLENE GLYCOL 3350 17 G/17G
17 POWDER, FOR SOLUTION ORAL DAILY
COMMUNITY
End: 2022-07-07

## 2022-03-15 RX ORDER — TAMSULOSIN HYDROCHLORIDE 0.4 MG/1
0.4 CAPSULE ORAL DAILY
COMMUNITY
End: 2022-10-11 | Stop reason: ALTCHOICE

## 2022-03-15 RX ORDER — INSULIN LISPRO 100 [IU]/ML
5 INJECTION, SOLUTION INTRAVENOUS; SUBCUTANEOUS 2 TIMES DAILY WITH MEALS
Status: DISCONTINUED | OUTPATIENT
Start: 2022-03-15 | End: 2022-03-19 | Stop reason: DRUGHIGH

## 2022-03-15 RX ORDER — IPRATROPIUM BROMIDE AND ALBUTEROL SULFATE 2.5; .5 MG/3ML; MG/3ML
1 SOLUTION RESPIRATORY (INHALATION) EVERY 6 HOURS PRN
COMMUNITY
End: 2022-07-07

## 2022-03-15 RX ORDER — LIDOCAINE 50 MG/G
OINTMENT TOPICAL EVERY 4 HOURS PRN
Status: DISCONTINUED | OUTPATIENT
Start: 2022-03-15 | End: 2022-03-21 | Stop reason: HOSPADM

## 2022-03-15 RX ORDER — MIDODRINE HYDROCHLORIDE 5 MG/1
5 TABLET ORAL 3 TIMES DAILY
COMMUNITY
End: 2022-06-15 | Stop reason: SDUPTHER

## 2022-03-15 RX ORDER — TAMSULOSIN HYDROCHLORIDE 0.4 MG/1
0.4 CAPSULE ORAL NIGHTLY
Status: DISCONTINUED | OUTPATIENT
Start: 2022-03-15 | End: 2022-03-21 | Stop reason: HOSPADM

## 2022-03-15 RX ORDER — MIDODRINE HYDROCHLORIDE 5 MG/1
5 TABLET ORAL
Status: DISCONTINUED | OUTPATIENT
Start: 2022-03-15 | End: 2022-03-21 | Stop reason: HOSPADM

## 2022-03-15 RX ORDER — ASPIRIN 81 MG/1
81 TABLET ORAL DAILY
Status: DISCONTINUED | OUTPATIENT
Start: 2022-03-15 | End: 2022-03-15

## 2022-03-15 RX ORDER — OXYCODONE HYDROCHLORIDE 5 MG/1
5 TABLET ORAL EVERY 6 HOURS PRN
Status: DISCONTINUED | OUTPATIENT
Start: 2022-03-15 | End: 2022-03-21 | Stop reason: HOSPADM

## 2022-03-15 RX ORDER — PANTOPRAZOLE SODIUM 40 MG/1
40 TABLET, DELAYED RELEASE ORAL DAILY
Status: DISCONTINUED | OUTPATIENT
Start: 2022-03-16 | End: 2022-03-21 | Stop reason: HOSPADM

## 2022-03-15 RX ORDER — ACETAMINOPHEN 325 MG/1
650 TABLET ORAL EVERY 6 HOURS PRN
Status: DISCONTINUED | OUTPATIENT
Start: 2022-03-15 | End: 2022-03-21 | Stop reason: HOSPADM

## 2022-03-15 RX ORDER — POLYETHYLENE GLYCOL 3350 17 G/17G
17 POWDER, FOR SOLUTION ORAL DAILY
Status: DISCONTINUED | OUTPATIENT
Start: 2022-03-16 | End: 2022-03-21 | Stop reason: HOSPADM

## 2022-03-15 RX ADMIN — TAMSULOSIN HYDROCHLORIDE 0.4 MG: 0.4 CAPSULE ORAL at 21:40

## 2022-03-15 RX ADMIN — APIXABAN 5 MG: 5 TABLET, FILM COATED ORAL at 21:40

## 2022-03-15 RX ADMIN — ATORVASTATIN CALCIUM 20 MG: 20 TABLET, FILM COATED ORAL at 21:40

## 2022-03-15 RX ADMIN — Medication 2 PUFF: at 20:12

## 2022-03-15 RX ADMIN — SODIUM CHLORIDE, POTASSIUM CHLORIDE, SODIUM LACTATE AND CALCIUM CHLORIDE 1000 ML: 600; 310; 30; 20 INJECTION, SOLUTION INTRAVENOUS at 12:06

## 2022-03-15 RX ADMIN — GABAPENTIN 200 MG: 100 CAPSULE ORAL at 21:40

## 2022-03-15 RX ADMIN — FUROSEMIDE 20 MG: 20 TABLET ORAL at 21:40

## 2022-03-15 ASSESSMENT — PAIN SCALES - GENERAL
PAINLEVEL_OUTOF10: 0
PAINLEVEL_OUTOF10: 0
PAINLEVEL_OUTOF10: 6

## 2022-03-15 NOTE — PROGRESS NOTES
Pharmacy Home Medication Reconciliation Note    A medication reconciliation has been completed for Suhail Mcclellan 1950    Information provided by: facility medication list    The patient's home medication list is as follows:  Prior to Admission medications    Medication Sig Start Date End Date Taking? Authorizing Provider   acetaminophen (TYLENOL) 325 MG tablet Take 650 mg by mouth every 6 hours as needed for Pain   Yes Historical Provider, MD   apixaban (ELIQUIS) 5 MG TABS tablet Take 5 mg by mouth 2 times daily   Yes Historical Provider, MD   atorvastatin (LIPITOR) 20 MG tablet Take 20 mg by mouth at bedtime   Yes Historical Provider, MD   budesonide (PULMICORT) 0.5 MG/2ML nebulizer suspension Take 1 ampule by nebulization 2 times daily   Yes Historical Provider, MD   mometasone-formoterol (DULERA) 200-5 MCG/ACT inhaler Inhale 2 puffs into the lungs every 12 hours   Yes Historical Provider, MD   gabapentin (NEURONTIN) 100 MG capsule Take 200 mg by mouth in the morning and at bedtime. Yes Historical Provider, MD   insulin detemir (LEVEMIR) 100 UNIT/ML injection vial Inject 10 Units into the skin nightly   Yes Historical Provider, MD   insulin aspart (NOVOLOG) 100 UNIT/ML injection cartridge Inject 5 Units into the skin 3 times daily (before meals)   Yes Historical Provider, MD   insulin aspart (NOVOLOG) 100 UNIT/ML injection vial Inject 0-16 Units into the skin 3 times daily (before meals) Sliding scale   Yes Historical Provider, MD   ipratropium-albuterol (DUONEB) 0.5-2.5 (3) MG/3ML SOLN nebulizer solution Inhale 1 vial into the lungs every 6 hours as needed for Shortness of Breath   Yes Historical Provider, MD   lidocaine (XYLOCAINE) 5 % ointment Apply topically every 4 hours as needed for Pain Apply topically as needed.    Yes Historical Provider, MD   metoprolol tartrate (LOPRESSOR) 25 MG tablet Take 25 mg by mouth 2 times daily Hold for SBP <100 or HR <55   Yes Historical Provider, MD   midodrine (PROAMATINE) 5 MG tablet Take 5 mg by mouth 3 times daily Hold for SBP >130   Yes Historical Provider, MD   oxyCODONE (ROXICODONE) 5 MG immediate release tablet Take 5 mg by mouth every 6 hours as needed for Pain.    Yes Historical Provider, MD   polyethylene glycol (MIRALAX) 17 g PACK packet Take 17 g by mouth daily   Yes Historical Provider, MD   pantoprazole (PROTONIX) 40 MG tablet Take 40 mg by mouth daily   Yes Historical Provider, MD   sodium chloride (OCEAN, BABY AYR) 0.65 % nasal spray 1 spray by Nasal route as needed for Congestion   Yes Historical Provider, MD   sennosides-docusate sodium (SENOKOT-S) 8.6-50 MG tablet Take 1 tablet by mouth in the morning and at bedtime   Yes Historical Provider, MD   spironolactone (ALDACTONE) 25 MG tablet Take 25 mg by mouth daily   Yes Historical Provider, MD   tamsulosin (FLOMAX) 0.4 MG capsule Take 0.4 mg by mouth daily   Yes Historical Provider, MD   furosemide (LASIX) 20 MG tablet Take 1 tablet by mouth 2 times daily  Patient taking differently: Take 40 mg by mouth daily  1/28/22  Yes Ann Cedeno MD   amiodarone (CORDARONE) 200 MG tablet TAKE 1 TABLET BY MOUTH DAILY  Patient taking differently: Take 200 mg by mouth 2 times daily  12/13/21  Yes Veda Franco MD   ipratropium (ATROVENT HFA) 17 MCG/ACT inhaler Inhale 2 puffs into the lungs 2 times daily 1/28/22 3/15/22  Ann Cedeno MD   carvedilol (COREG) 3.125 MG tablet Take 1 tablet by mouth 2 times daily (with meals) 1/28/22 3/15/22  Ann Cedeno MD   insulin lispro, 1 Unit Dial, 100 UNIT/ML SOPN Inject 0-18 Units into the skin 3 times daily (with meals) 1/28/22 3/15/22  Ann Cedeno MD   insulin glargine (LANTUS;BASAGLAR) 100 UNIT/ML injection pen Inject 25 Units into the skin 2 times daily 1/28/22 3/15/22  Ann Cedeno MD   isosorbide mononitrate (IMDUR) 30 MG extended release tablet Take 1 tablet by mouth daily 1/29/22 3/15/22  Ann Cedeno MD   sacubitril-valsartan [facility]) 24-55 MG per tablet Take 0.5 tablets by mouth 2 times daily   3/15/22  Historical Provider, MD   aspirin 81 MG EC tablet Take 81 mg by mouth daily  3/15/22  Historical Provider, MD   pravastatin (PRAVACHOL) 80 MG tablet Take 1 tablet by mouth daily 9/30/20 3/15/22  Elaine Gauthier MD   nitroGLYCERIN (NITROSTAT) 0.4 MG SL tablet Place 1 tablet under the tongue every 5 minutes as needed for Chest pain 6/29/20 3/15/22  Elaine Gauthier MD   linaclotide [facility]) 145 MCG capsule Take 145 mcg by mouth as needed   3/15/22  Historical Provider, MD   fluticasone-salmeterol (ADVAIR DISKUS) 250-50 MCG/DOSE AEPB Inhale 1 puff into the lungs 2 times daily. 11/3/14 3/15/22  Shelby Traylor MD   albuterol (PROVENTIL HFA;VENTOLIN HFA) 108 (90 BASE) MCG/ACT inhaler Inhale 2 puffs into the lungs every 6 hours as needed for Wheezing.   3/15/22  Historical Provider, MD        Thank you,  Car Shahid, PharmD, BCCCP

## 2022-03-15 NOTE — PROGRESS NOTES
Patient brought to unit from ED, from [facility]- r/y a syncopal episode while participating with therapy. Patient is A&O, up with walker and one assist for short distances. Wears 2 liters of Oxygen at home, currently on 4 liters.

## 2022-03-16 LAB
ANION GAP SERPL CALCULATED.3IONS-SCNC: 12 MMOL/L (ref 3–16)
BUN BLDV-MCNC: 25 MG/DL (ref 7–20)
CALCIUM IONIZED: 1.25 MMOL/L (ref 1.12–1.32)
CALCIUM SERPL-MCNC: 8.7 MG/DL (ref 8.3–10.6)
CHLORIDE BLD-SCNC: 103 MMOL/L (ref 99–110)
CO2: 24 MMOL/L (ref 21–32)
CREAT SERPL-MCNC: 1.2 MG/DL (ref 0.8–1.3)
EKG ATRIAL RATE: 288 BPM
EKG DIAGNOSIS: NORMAL
EKG Q-T INTERVAL: 400 MS
EKG QRS DURATION: 94 MS
EKG QTC CALCULATION (BAZETT): 489 MS
EKG R AXIS: 21 DEGREES
EKG T AXIS: 63 DEGREES
EKG VENTRICULAR RATE: 90 BPM
GFR AFRICAN AMERICAN: >60
GFR NON-AFRICAN AMERICAN: 59
GLUCOSE BLD-MCNC: 101 MG/DL (ref 70–99)
GLUCOSE BLD-MCNC: 113 MG/DL (ref 70–99)
GLUCOSE BLD-MCNC: 137 MG/DL (ref 70–99)
GLUCOSE BLD-MCNC: 147 MG/DL (ref 70–99)
GLUCOSE BLD-MCNC: 91 MG/DL (ref 70–99)
GLUCOSE BLD-MCNC: 97 MG/DL (ref 70–99)
MAGNESIUM: 1.7 MG/DL (ref 1.8–2.4)
PERFORMED ON: ABNORMAL
PERFORMED ON: NORMAL
PERFORMED ON: NORMAL
POC POTASSIUM: 4.2 MMOL/L (ref 3.5–5.1)
POC SAMPLE TYPE: ABNORMAL
POC SODIUM: 137 MMOL/L (ref 136–145)
POTASSIUM REFLEX MAGNESIUM: 4.1 MMOL/L (ref 3.5–5.1)
SODIUM BLD-SCNC: 139 MMOL/L (ref 136–145)
TSH REFLEX: 1.88 UIU/ML (ref 0.27–4.2)

## 2022-03-16 PROCEDURE — 83735 ASSAY OF MAGNESIUM: CPT

## 2022-03-16 PROCEDURE — 6360000002 HC RX W HCPCS: Performed by: INTERNAL MEDICINE

## 2022-03-16 PROCEDURE — 80048 BASIC METABOLIC PNL TOTAL CA: CPT

## 2022-03-16 PROCEDURE — 5A2204Z RESTORATION OF CARDIAC RHYTHM, SINGLE: ICD-10-PCS | Performed by: INTERNAL MEDICINE

## 2022-03-16 PROCEDURE — 97530 THERAPEUTIC ACTIVITIES: CPT

## 2022-03-16 PROCEDURE — 2000000000 HC ICU R&B

## 2022-03-16 PROCEDURE — 93005 ELECTROCARDIOGRAM TRACING: CPT | Performed by: INTERNAL MEDICINE

## 2022-03-16 PROCEDURE — 36415 COLL VENOUS BLD VENIPUNCTURE: CPT

## 2022-03-16 PROCEDURE — 93005 ELECTROCARDIOGRAM TRACING: CPT | Performed by: NURSE PRACTITIONER

## 2022-03-16 PROCEDURE — 6370000000 HC RX 637 (ALT 250 FOR IP): Performed by: INTERNAL MEDICINE

## 2022-03-16 PROCEDURE — 84132 ASSAY OF SERUM POTASSIUM: CPT

## 2022-03-16 PROCEDURE — 84443 ASSAY THYROID STIM HORMONE: CPT

## 2022-03-16 PROCEDURE — 97116 GAIT TRAINING THERAPY: CPT

## 2022-03-16 PROCEDURE — 97535 SELF CARE MNGMENT TRAINING: CPT

## 2022-03-16 PROCEDURE — 2700000000 HC OXYGEN THERAPY PER DAY

## 2022-03-16 PROCEDURE — 84295 ASSAY OF SERUM SODIUM: CPT

## 2022-03-16 PROCEDURE — 6360000002 HC RX W HCPCS: Performed by: NURSE PRACTITIONER

## 2022-03-16 PROCEDURE — 82947 ASSAY GLUCOSE BLOOD QUANT: CPT

## 2022-03-16 PROCEDURE — 97166 OT EVAL MOD COMPLEX 45 MIN: CPT

## 2022-03-16 PROCEDURE — 94761 N-INVAS EAR/PLS OXIMETRY MLT: CPT

## 2022-03-16 PROCEDURE — 93010 ELECTROCARDIOGRAM REPORT: CPT | Performed by: INTERNAL MEDICINE

## 2022-03-16 PROCEDURE — 94640 AIRWAY INHALATION TREATMENT: CPT

## 2022-03-16 PROCEDURE — 99223 1ST HOSP IP/OBS HIGH 75: CPT | Performed by: INTERNAL MEDICINE

## 2022-03-16 PROCEDURE — 5A12012 PERFORMANCE OF CARDIAC OUTPUT, SINGLE, MANUAL: ICD-10-PCS | Performed by: INTERNAL MEDICINE

## 2022-03-16 PROCEDURE — 97162 PT EVAL MOD COMPLEX 30 MIN: CPT

## 2022-03-16 PROCEDURE — 82330 ASSAY OF CALCIUM: CPT

## 2022-03-16 PROCEDURE — 99024 POSTOP FOLLOW-UP VISIT: CPT | Performed by: INTERNAL MEDICINE

## 2022-03-16 RX ORDER — MAGNESIUM SULFATE 1 G/100ML
1000 INJECTION INTRAVENOUS ONCE
Status: DISCONTINUED | OUTPATIENT
Start: 2022-03-16 | End: 2022-03-16

## 2022-03-16 RX ORDER — MAGNESIUM SULFATE IN WATER 40 MG/ML
2000 INJECTION, SOLUTION INTRAVENOUS ONCE
Status: COMPLETED | OUTPATIENT
Start: 2022-03-16 | End: 2022-03-17

## 2022-03-16 RX ORDER — LORAZEPAM 2 MG/ML
INJECTION INTRAMUSCULAR
Status: DISPENSED
Start: 2022-03-16 | End: 2022-03-17

## 2022-03-16 RX ORDER — LORAZEPAM 2 MG/ML
INJECTION INTRAMUSCULAR
Status: COMPLETED | OUTPATIENT
Start: 2022-03-16 | End: 2022-03-16

## 2022-03-16 RX ADMIN — Medication 2 PUFF: at 20:14

## 2022-03-16 RX ADMIN — SENNOSIDES AND DOCUSATE SODIUM 1 TABLET: 50; 8.6 TABLET ORAL at 08:21

## 2022-03-16 RX ADMIN — GABAPENTIN 200 MG: 100 CAPSULE ORAL at 21:23

## 2022-03-16 RX ADMIN — ATORVASTATIN CALCIUM 20 MG: 20 TABLET, FILM COATED ORAL at 21:24

## 2022-03-16 RX ADMIN — OXYCODONE 5 MG: 5 TABLET ORAL at 10:27

## 2022-03-16 RX ADMIN — POLYETHYLENE GLYCOL 3350 17 G: 17 POWDER, FOR SOLUTION ORAL at 08:20

## 2022-03-16 RX ADMIN — MIDODRINE HYDROCHLORIDE 5 MG: 5 TABLET ORAL at 12:27

## 2022-03-16 RX ADMIN — APIXABAN 5 MG: 5 TABLET, FILM COATED ORAL at 21:24

## 2022-03-16 RX ADMIN — MAGNESIUM SULFATE HEPTAHYDRATE 2000 MG: 40 INJECTION, SOLUTION INTRAVENOUS at 23:01

## 2022-03-16 RX ADMIN — PANTOPRAZOLE SODIUM 40 MG: 40 TABLET, DELAYED RELEASE ORAL at 05:05

## 2022-03-16 RX ADMIN — FUROSEMIDE 20 MG: 20 TABLET ORAL at 08:20

## 2022-03-16 RX ADMIN — FUROSEMIDE 20 MG: 20 TABLET ORAL at 17:39

## 2022-03-16 RX ADMIN — TAMSULOSIN HYDROCHLORIDE 0.4 MG: 0.4 CAPSULE ORAL at 21:24

## 2022-03-16 RX ADMIN — Medication 2 PUFF: at 09:23

## 2022-03-16 RX ADMIN — MIDODRINE HYDROCHLORIDE 5 MG: 5 TABLET ORAL at 17:39

## 2022-03-16 RX ADMIN — METOPROLOL TARTRATE 25 MG: 25 TABLET, FILM COATED ORAL at 08:21

## 2022-03-16 RX ADMIN — ACETAMINOPHEN 650 MG: 325 TABLET ORAL at 17:44

## 2022-03-16 RX ADMIN — ACETAMINOPHEN 650 MG: 325 TABLET ORAL at 03:28

## 2022-03-16 RX ADMIN — SPIRONOLACTONE 25 MG: 25 TABLET ORAL at 08:21

## 2022-03-16 RX ADMIN — AMIODARONE HYDROCHLORIDE 200 MG: 200 TABLET ORAL at 08:21

## 2022-03-16 RX ADMIN — LIDOCAINE: 50 OINTMENT TOPICAL at 03:28

## 2022-03-16 RX ADMIN — LORAZEPAM 1 MG: 2 INJECTION, SOLUTION INTRAMUSCULAR; INTRAVENOUS at 23:23

## 2022-03-16 RX ADMIN — MIDODRINE HYDROCHLORIDE 5 MG: 5 TABLET ORAL at 08:21

## 2022-03-16 RX ADMIN — APIXABAN 5 MG: 5 TABLET, FILM COATED ORAL at 08:21

## 2022-03-16 RX ADMIN — GABAPENTIN 200 MG: 100 CAPSULE ORAL at 08:20

## 2022-03-16 ASSESSMENT — PAIN SCALES - GENERAL
PAINLEVEL_OUTOF10: 7
PAINLEVEL_OUTOF10: 0
PAINLEVEL_OUTOF10: 5
PAINLEVEL_OUTOF10: 0
PAINLEVEL_OUTOF10: 0
PAINLEVEL_OUTOF10: 7
PAINLEVEL_OUTOF10: 0
PAINLEVEL_OUTOF10: 8

## 2022-03-16 ASSESSMENT — PAIN DESCRIPTION - LOCATION: LOCATION: ABDOMEN

## 2022-03-16 ASSESSMENT — PAIN DESCRIPTION - FREQUENCY: FREQUENCY: CONTINUOUS

## 2022-03-16 ASSESSMENT — PAIN DESCRIPTION - PAIN TYPE: TYPE: CHRONIC PAIN

## 2022-03-16 NOTE — H&P
[facility] [address]                     [facility], [address]                              HISTORY AND PHYSICAL    PATIENT NAME: Laith Lamas                    :        1950  MED REC NO:   [id number]                          ROOM:       3322  ACCOUNT NO:   [de-identified]                           ADMIT DATE: 03/15/2022  PROVIDER:     Bryce Maldonado MD    HISTORY OF PRESENT ILLNESS:  The patient is a 77-year-old white  gentleman, came to the emergency room with a history of poor  responsiveness and syncope. The patient is presently a resident at  nursing home in rehab, and had a loss of consciousness. The patient was  given Lactated Ringer IV fluid bolus. He is a resident of [address] [place] [address] and  was told that he was unresponsive. There was no convulsion and the  squad arrived. The patient was recently very alert and awake, but the  monitor shows atrial fibrillation with rapid ventricular response. The  patient states he goes in and out of AFib. The patient denied any chest  pain or shortness of breath. PAST MEDICAL HISTORY:  Pertinent for generalized neuromuscular weakness,  acid reflux, acute MI, atherosclerotic heart disease, COPD,  diverticulosis, hypertension, peripheral vascular disease, and unable to  ambulate. PAST SURGICAL HISTORY:  Pertinent for vascular surgery, cardiac surgery,  colonoscopy, and cardiac catheterization. The patient has a cardiac  stent x1 for the last three years and also had history of three-vessel  aortocoronary bypass graft. FAMILY HISTORY:  Both the parents are . They both had  atherosclerotic heart disease. The patient has one sister who is alive  and still suffers from atherosclerotic heart disease. SOCIAL HISTORY:  The patient is a  man. He has five children. He quit smoking three months ago after 36 years at least half pack a  day. There is no history of alcohol usage of any significance. 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 There is no  history of substance abuse. MEDICATIONS:  The patient is on amiodarone, Eliquis, Lipitor, Symbicort,  Lasix, gabapentin, glargine, insulin lispro, metoprolol tartrate,  ProAmatine, Protonix, MiraLAX, Senokot, Aldactone, and Flomax. ALLERGIES:  No known allergy. REVIEW OF SYSTEMS:  Pertinent for loss of consciousness. There is no  convulsion. No visual blurring or dysphagia. No angina pectoris. No  orthopnea. No paroxysmal nocturnal dyspnea. Does have exertional  shortness of breath. Does admit to chronic nonspecific musculoskeletal  pain. No abdominal pain. No hematemesis. No melena. No genitourinary  complaints. PHYSICAL EXAMINATION:  GENERAL:  He is alert, awake, and oriented x3, moderately distressed  77-year-old white man looking consistent with his stated age. VITAL SIGNS:  His temperature is 98.2, blood pressure 86/73, respiration  is 18, heart rate 110, O2 sat is 95% on 4 L. HEENT:  Oral mucosa is dry. SKIN:  Warm and dry. NECK:  Supple. Mild jugular venous distention. LUNGS:  Bronchovesicular breathing pattern. Bilateral expiratory  rhonchi. HEART:  Irregular rate and rhythm, S1 and S2, tachycardic. ABDOMEN:  Soft and nontender. Bowel sounds present. EXTREMITIES:  Shows trace edema. NEUROLOGIC:  The patient appears grossly intact. LABORATORY DATA:  Lab evaluation shows sodium 139, potassium 4.3,  chloride 103, CO2 of 22, BUN 30, creatinine 1.5, anion gap is 14,  glomerular filtration rate is 46, blood glucose is 278, lactic acid  level 2.9, fasting plasma glucose 112, white blood cell count 6.5,  hemoglobin and hematocrit of 9.9 and 31.7, and platelet count is 648. ABG shows pH of 7.33, pCO2 of 43.8, pO2 of 49.4, and bicarbonate 23.5. DIAGNOSTIC DATA:  Carotid Doppler, the last one was in 2014.    Transthoracic echocardiogram, the recent one was done just on  01/14/2022, so another one is not indicated. Echo from less than two  months ago showed LVEF of 48% to 48%, diastolic filling parameters  suggesting grade 2 diastolic dysfunction with mild mitral regurgitation. The left atrium is mildly dilated and inadequate tricuspid regurgitation  to estimate systolic pulmonary artery pressure. ASSESSMENT:  Syncope, hypotension, atrial fibrillation with rapid  ventricular response. PLAN:  Get him admitted. Treat him with IV hydration. Carotid Doppler  and transthoracic echocardiogram is not indicated any more.         Con Amador MD    D: 03/15/2022 23:35:00       T: 03/16/2022 1:44:16     [email or web address]  Job#: [id number]     Doc#: [id number]    CC:

## 2022-03-16 NOTE — PROGRESS NOTES
Patient Active Problem List   Diagnosis    CAD (coronary artery disease)    Essential hypertension    [facility]    COPD [facility]chronic obstructive [facility]) [facility]    Dyspnea    Atrial fibrillation with RVR ([facility]    Anemia    Anticoagulated on Coumadin    Hypotension due to hypovolemia    COVID    [facility]    High cholesterol    Ischemic cardiomyopathy    Chronic a-fib ([facility]    Acute on chronic systolic heart failure, NYHA class 3 ([facility])    Syncope and collapse   H&dictated

## 2022-03-16 NOTE — PROGRESS NOTES
Pt awake in bed watching tv. VSS and pt denies any needs. Call light in reach and bed alarm engaged.

## 2022-03-16 NOTE — PROGRESS NOTES
Occupational Therapy   Occupational Therapy Initial Assessment  Date: 3/16/2022   Patient Name: Leslee Sutton  MRN: [id number]     : 1950    Date of Service: 3/16/2022    Discharge Recommendations:  Leslee Sutton scored a 17/24 on the AM-PAC ADL Inpatient form. Current research shows that an AM-PAC score of 17 or less is typically not associated with a discharge to the patient's home setting. Based on the patient's AM-PAC score and their current ADL deficits, it is recommended that the patient have 3-5 sessions per week of Occupational Therapy at d/c to increase the patient's independence. Please see assessment section for further patient specific details. If patient discharges prior to next session this note will serve as a discharge summary. Please see below for the latest assessment towards goals. OT Equipment Recommendations  Equipment Needed: No (TBD at next level of care.)    Assessment   Performance deficits / Impairments: Decreased functional mobility ; Decreased ADL status; Decreased endurance  Assessment: Pt is below his baseline level of occupational function, based on the above deficits associated with syncope & collapse. Pt would benefit from continued skilled acute OT services to address these deficits. Treatment Diagnosis: Decreased ADL status, functional mobility, and endurance associated with syncope & collapse. Prognosis: Good  Decision Making: Medium Complexity  History: Pt at SNF after long hospitalization w/severe COVID. Prior to , pt lives w/wife, I ADLs, working FT as . Exam: As above  Assistance / Modification: Min/CGA sit to stand, CGA functional mobility w/RW, I socks, limited standing tolerance. OT Education: OT Role;Plan of Care;Transfer Training  Patient Education: D/C recommendation. Pt independently verbalized and demonstrated understanding.   Barriers to Learning: None  REQUIRES OT FOLLOW UP: Yes  Activity Tolerance  Activity Tolerance: Treatment limited secondary to medical complications (free text)  Activity Tolerance: 91-92% on 4L O2. Decreased to 81% on 4L O2 while donning socks. Able to recover with seated rest break. Decreased to 88% with short distance ambulation. Following teeth brushing decreased to 84-85% seated on BSC with increased time patient denied SOB but still with low SpO2. Returned to bed and Spo2 recovered to 91% on 4L . Safety Devices  Safety Devices in place: Yes  Type of devices: All fall risk precautions in place; Left in bed;Nurse notified;Call light within reach;Gait belt           Patient Diagnosis(es): There were no encounter diagnoses. has a past medical history of Acid reflux, Acute MI ([facility] CAD (coronary artery disease), COPD (chronic obstructive pulmonary disease) ([facility] Diverticulitis, Hypertension, and Peripheral vascular disease ([facility] has a past surgical history that includes vascular surgery; Cardiac surgery; Colonoscopy; and Cardiac catheterization. Treatment Diagnosis: Decreased ADL status, functional mobility, and endurance associated with syncope & collapse. Restrictions  Restrictions/Precautions  Restrictions/Precautions: Fall Risk (high fall risk)  Required Braces or Orthoses?: No  Position Activity Restriction  Other position/activity restrictions: Patient is s/p severe COVID infection with long hospitalization and d/c to [facility]. After pt's therapy session, he was sitting in a w/c about to wheel back to his room, when he passed out. He states that the last thing he remembers. Subjective   General  Chart Reviewed: Yes  Family / Caregiver Present: No  Diagnosis: Syncope & collapse  Subjective  Subjective: Pt supine in bed, pleasant and agreeable to OT eval. Pt reports shingles pain 7/10 at rest. Hurts more at rest. RN notified, and pt not yet due for pain medication. Pt reported that his legs are weak.   Patient Currently in Pain: Yes  Pain Assessment  Pain Assessment: 0-10  Pain Level: 7  Pain Type: Chronic pain  Pain Location: Abdomen  Pain Descriptors:  (Shingles pain)  Pain Frequency: Continuous  Pre Treatment Pain Screening  Intervention List: Patient able to continue with treatment;Nurse/Physician notified  Vital Signs  Oxygen Therapy  SpO2: 91 %  O2 Device: Nasal cannula  O2 Flow Rate (L/min): 4 L/min  Social/Functional History  Social/Functional History  Lives With: Spouse  Type of Home: House  Home Layout: Two level,Laundry in basement  Home Access: Stairs to enter with rails  Entrance Stairs - Number of Steps: 5 PHILIPP  Entrance Stairs - Rails: Right  Bathroom Shower/Tub: Shower chair with back,Walk-in shower  Bathroom Toilet: Standard  Bathroom Equipment: Grab bars around toilet  Home Equipment: [email or web address]  ADL Assistance: Independent  Homemaking Assistance: Independent  Ambulation Assistance: Independent  Transfer Assistance: Independent  Occupation: Full time employment  Type of occupation:  3 days a week  Leisure & Hobbies: Work on hot rods  Additional Comments: [facility] at [facility] getting therapy after long hospitalization. Pt ambulating w/RW and CGA. Needs help w/sit to stand. Able to take a shower with Supervision. Able  to dress No falls past 6 mos. LOC in w/c. Sleeps in a regular bed. Objective   Vision: Impaired  Vision Exceptions: Wears glasses for reading  Hearing: Exceptions to [facility]  Hearing Exceptions: Hard of hearing/hearing concerns    Orientation  Overall Orientation Status: Within Normal Limits  Observation/Palpation  Observation: SpO2 -  Body Mechanics: 91-92% on 4L O2. Decreased to 81% on 4L O2 while donning socks. Able to recover with seated rest break. Decreased to 88% with short distance ambulation. Following teeth brushing decreased to 84-85% seated on BSC with increased time patient denied SOB but still with low SpO2. Returned to bed and Spo2 recovered to 91% on 4L .   Balance  Sitting Balance: Stand by assistance  Standing Balance: Contact guard assistance (w/RW)  Standing Balance  Time: ~45 sec X 2  Activity: functional mobility to/from sink for seated grooming  Functional Mobility  Functional - Mobility Device: Rolling Walker  Activity: Other  Assist Level: Contact guard assistance  ADL  Feeding: Independent; Beverage management  Grooming: Setup (brush teeth, comb hair, wash hair with assist from OT; pt assisted for about 1 min washing hair, until his UEs fatigued. OT completed hair washing, and dried pt's hair.)  LE Dressing: Supervision (don/doff socks seated on EOB; SPO2 down to 81% when don/doff socks)  Additional Comments: Pt ambulated to sink for seated grooming. He stated that he was unable to stand for any of it. Pt's SPO2 to 84 and 85% for an extensive period during seated grooming. SOB decreased, but SPO2 remained 84%.   Tone RUE  RUE Tone: Normotonic  Tone LUE  LUE Tone: Normotonic  Coordination  Movements Are Fluid And Coordinated: Yes     Bed mobility  Sit to Supine: Stand by assistance  Transfers  Stand Step Transfers: Contact guard assistance (w/RW)  Sit to stand: Minimal assistance (from EOB, CGA from [facility] in front of sink, verbal cues for hand placement.)  Stand to sit: Contact guard assistance (to BSC in front of toilet, to EOB)     Cognition  Overall Cognitive Status: WNL        Sensation  Overall Sensation Status: WNL        LUE AROM (degrees)  LUE AROM : WNL  Left Hand AROM (degrees)  Left Hand AROM: WNL  RUE AROM (degrees)  RUE AROM : WNL  Right Hand AROM (degrees)  Right Hand AROM: WNL  LUE Strength  Gross LUE Strength: WNL (5/5 elbow, shoulder)  L Hand General: 5/5  RUE Strength  Gross RUE Strength: WNL (5/5 elbow, shoulder)  R Hand General: 5/5                   Plan   Plan  Times per week: 3-5  Times per day: Daily  Current Treatment Recommendations: Self-Care / ADL,Safety Education & Training,Endurance Training,Functional Mobility Training      AM-PAC Score        AM-PAC Inpatient Daily Activity Raw Score: 17 (03/16/22 1557)  AM-PAC Inpatient ADL T-Scale Score : 37.26 (03/16/22 1557)  ADL Inpatient CMS 0-100% Score: 50.11 (03/16/22 1557)  ADL Inpatient CMS G-Code Modifier : CK (03/16/22 1557)    Goals  Short term goals  Time Frame for Short term goals: Discharge  Short term goal 1: SBA for functional transfers to ADL surfaces w/RW  Short term goal 2: SBA for functional mobility w/RW for ADL activity  Short term goal 3: S/U FOR UB bathing/dressing  Short term goal 4: SBA for LB bathing/dressing  Short term goal 5: Pt to tolerate standing 2-3 min for ADL activity/functional mobility  Patient Goals   Patient goals :  To return to SNF for more rehab       Therapy Time   Individual Concurrent Group Co-treatment   Time In 1439         Time Out 1523         Minutes 44               Timed Code Treatment Minutes:  29 Minutes    Total Treatment Minutes: 44 min     SHAW [address], [address], [place]., OTR/L, [id number]

## 2022-03-16 NOTE — CARE COORDINATION
Patient from [facility]. [place] spoke with Adelina Varela -the admissions liaison who reported that patient can go back  to the facility   but will need a preauthorization before going back. He will not meet the [place]  Floor to SNF criteria program since he came from SNF not home.

## 2022-03-16 NOTE — CARE COORDINATION
CM left a  with [facility] liaison to initiate the precert process, therapy evaluations documented .

## 2022-03-16 NOTE — PROGRESS NOTES
Pt awake in bed eating breakfast. VSS, assessment complete and medications given. Fresh water provided and pt denies any other needs. Call light in reach and bed alarm engaged.

## 2022-03-16 NOTE — PROGRESS NOTES
Asked to evaluate patient for recurrent ventricular tachycardia on telemetry. Noted to have 20 sec of ventricular tachycardia. He was asymptomatic with it. No dizziness or syncope per nursing. VSS. Plans for lexiscan later today, Dr. Jacqui Marcial aware. Labs pending, will correct electrolytes if abnormal. Will discuss with RMM.      Alyssa Augustin, GEORGE-CNP

## 2022-03-16 NOTE — PLAN OF CARE
Problem: Falls - Risk of:  Goal: Will remain free from falls  Description: Will remain free from falls  3/16/2022 0751 by Philippe Looney RN  Outcome: Ongoing   Pt will remain free from falls. Fall precautions in place and alarm engaged. Bedside table and call light within reach. Pt aware to use call light for assistance ambulating. Problem: Skin Integrity:  Goal: Will show no infection signs and symptoms  Description: Will show no infection signs and symptoms  3/16/2022 0751 by Philippe Looney RN  Outcome: Ongoing     Pt will remain free from skin breakdown. Pt turned Q2hrs, skin kept clean and dry, and skin assessed per shift or as needed. Problem: Pain:  Goal: Pain level will decrease  Description: Pain level will decrease  Outcome: Ongoing   Pt can rate pain on a 0-10 scale. Pt pain will remain at a level that is tolerable to pt. PRN pain medication as needed. Problem: Respiratory:  Goal: Ability to maintain adequate ventilation will improve  Description: Ability to maintain adequate ventilation will improve  Outcome: Ongoing   Pt requiring 4 liters 02 NC here, was on 2 liters at rehab and room air at home.  Will try to wean pt to home baseline

## 2022-03-16 NOTE — PROGRESS NOTES
Patient had caffeine, unable to do radha stress today. RN, Nakia Ryan, aware. Keep NPO after midnight. Keep caffeine free starting now.

## 2022-03-16 NOTE — PROGRESS NOTES
Physical Therapy    Facility/Department: [address] NURSING  Initial Assessment    NAME: Reena Mulligan  : 1950  MRN: [id number]    Date of Service: 3/16/2022    Discharge Recommendations:  Reena Mulligan scored a 15/24 on the AM-PAC short mobility form. Current research shows that an AM-PAC score of 17 or less is typically not associated with a discharge to the patient's home setting. Based on the patient's AM-PAC score and their current functional mobility deficits, it is recommended that the patient have 3-5 sessions per week of Physical Therapy at d/c to increase the patient's independence. Please see assessment section for further patient specific details. If patient discharges prior to next session this note will serve as a discharge summary. Please see below for the latest assessment towards goals. 3-5 sessions per week   PT Equipment Recommendations  Equipment Needed: No    Assessment   Body structures, Functions, Activity limitations: Decreased functional mobility ; Decreased balance;Decreased endurance;Decreased strength  Assessment: Patient not at baseline function and would benefit from skilled PT to address baove deficits and faciliate return to baseline function. Has not returned to baseline following recent hospitalization with COVID. Patient fatigues very quickly and presents at increased risk of falls  Treatment Diagnosis: decreased functional mobility, impaired gait, decreased endurance  Prognosis: Good  Decision Making: Medium Complexity  Clinical Presentation: evolving  PT Education: Goals;PT Role;Plan of Care;General Safety; Energy Conservation  Patient Education: d/c recommendation s- verbalized understanding  Barriers to Learning: none  REQUIRES PT FOLLOW UP: Yes  Activity Tolerance  Activity Tolerance: Patient limited by fatigue;Patient Tolerated treatment well  Activity Tolerance: able to continue with frequent rest breaks       Patient Diagnosis(es): There were no encounter diagnoses. has a past medical history of Acid reflux, Acute MI ([facility] [address] CAD (coronary artery disease), COPD (chronic obstructive pulmonary disease) ([facility] [address] Diverticulitis, Hypertension, and Peripheral vascular disease ([facility] [address] has a past surgical history that includes vascular surgery; Cardiac surgery; Colonoscopy; and Cardiac catheterization. Restrictions  Restrictions/Precautions  Restrictions/Precautions: Fall Risk (high fall risk)  Required Braces or Orthoses?: No  Vision/Hearing  Vision: Impaired  Vision Exceptions: Wears glasses for reading  Hearing: Exceptions to [facility]  Hearing Exceptions: Hard of hearing/hearing concerns     Subjective  General  Chart Reviewed: Yes  Family / Caregiver Present: No  Diagnosis: syncope and collapse  Follows Commands: Within Functional Limits  General Comment  Comments: supine in bed upon arrival with alarm on  Subjective  Subjective: /Reported  L flank pain attributed to shingles. Alerted nursing. Agreeable to therapy despite pain. Pain Screening  Patient Currently in Pain: Yes          Orientation  Orientation  Overall Orientation Status: Within Functional Limits  Social/Functional History  Social/Functional History  Lives With: Spouse  Type of Home: House  Home Layout: Two level,Laundry in basement  Home Access: Stairs to enter with rails  Entrance Stairs - Number of Steps: 5 PHILIPP  Entrance Stairs - Rails: Right  Bathroom Shower/Tub: Shower chair with back,Walk-in shower  Bathroom Toilet: Standard  Bathroom Equipment: Grab bars around toilet  Home Equipment: [employer]  ADL Assistance: Independent  Homemaking Assistance: Independent  Ambulation Assistance: Independent  Transfer Assistance: Independent  Occupation: Full time employment  Type of occupation:  3 days a week  Leisure & Hobbies: Work on hot rods  Additional Comments: Pt at [facility] a week getting therapy after long hospitalization. Pt ambulating w/RW and CGA. Needs help w/sit to stand.  Able to take a G-Code Modifier : CK (03/16/22 1528)          Goals  Short term goals  Time Frame for Short term goals:  To be met prior to discharge  Short term goal 1: Sit to/from stand with SBA  Short term goal 2: Ambulate 22' with RW and SBA  Short term goal 3: Bed mobility with supervision  Patient Goals   Patient goals : to walk       Therapy Time   Individual Concurrent Group Co-treatment   Time In 1439         Time Out 1523         Minutes 44         Timed Code Treatment Minutes: [address], PT    Thanks, Kendra Manzanares, PT, DPT [id number]

## 2022-03-16 NOTE — CONSULTS
[address]   Electrophysiology Consultation   Date: 3/16/2022  Reason for Consultation: Atrial fibrillation   Consult Requesting Physician: Gutierrez Dodson MD   Chief Complaint   Patient presents with    Other     from [facility] was told he was unresponsive but when squad arrived awake and alert but monitor shows afib.  patient says goes in and out of a fibe  denies cp or sob       CC: Atrial fibrillation    HPI: Mike Servin is a 67 y.o. male history of CAD, ischemic cardiomyopathy with recovered EF, s/p CABG in 2014, paroxysmal atrial fibrillation, HTN, PAD has been admitted with altered mental status from nursing home. Patient states that he had severe Covid infection and long hospitalization and eventually was discharged to SNF. He finished his rehab and while sitting on a wheelchair reportedly passed out. He states that the last thing he remembers. He states that he finished cardiac rehab and when he woke up he was told that he fainted. He denies any palpitation, chest pain or shortness of breath. Denies any prior syncope. On admission he was found to be in atrial fibrillation. He does have history of paroxysmal atrial fibrillation. He had cardioversion on 11/6/2019. His last ECG available on 1/12/2022 was also sinus rhythm. Assessment:   -Persistent atrial fibrillation  -NSVT  -CAD  -Syncope  -HTN  -PAD    Plan:   - ? Etiology for syncope. Arrhythmia is in differential diagnosis.   On admission he was in atrial fibrillation with rapid ventricular rate although it is unlikely that he fainted due to atrial fibrillation.  -Telemetry with short run of what appears to be NSVT patient appears asymptomatic.  -He has been treated with amiodarone therapy for atrial fibrillation.  -We discussed treatment options which include cardioversion and/or ablation of atrial fibrillation.  -He has history of CAD and had abnormal stress test on 7/2021  -We will ask interventional cardiology Dr. Vasile Woodruff to assess for ischemic work-up.  -If no plan for invasive intervention, will consider cardioversion for the patient.  -I discussed risk-benefit and alternatives with patient.  -Keep n.p.o. after midnight  - High [id number] score and high risk for stroke and thromboembolism. Anticoagulation is recommended. - On Eliquis  - Amiodarone   - Check lab to assess for toxicity:   Check TSH   - AST/ALT normal.   - Lipitor  - Metoprolol  - Aldactone   - Discussed with Dr. Vasile Woodruff. Active Hospital Problems    Diagnosis Date Noted    CAD (coronary artery disease) [I25.10] 2012     Priority: High    Essential hypertension [I10] 2012     Priority: Medium    Syncope and collapse [R55] 03/15/2022    Ischemic cardiomyopathy [I25.5]     Chronic a-fib ([address] [I48.20]     Acute on chronic systolic heart failure, NYHA class 3 (HCC) [I50.23]     COVID [U07.1] 2022    Acute respiratory failure due to COVID-19 ([address] [U07.1, J96.00] 2022    High cholesterol [E78.00] 2022    Hypotension due to hypovolemia [I95.89, E86.1] 2019    Atrial fibrillation with RVR ([facility] [address] [I48.91] 11/10/2019    Anemia [D64.9] 11/10/2019    COPD (chronic obstructive pulmonary disease) ([facility] [address] [J44.9] 10/03/2014    Dyspnea [R06.00] 10/03/2014    PVD (peripheral vascular disease) ([facility] [address] [I73.9] 2014       Diagnostic studies:   ECG: atrial fibrillation with RVR 3/15/2022   EC2022: Sinus rhythm   CXR: 3/15/2022:   Findings compatible with congestive failure however increased density is seen   in the mid lungs bilaterally greater laterally on the left.  Concern is for   possible superimposed pneumonia.       RECOMMENDATION:   Continued follow-up is recommended, preferably with upright PA and lateral   views when feasible. Echo: 2022:    Technically difficult examination. Left ventricular systolic function is low normal with ejection fraction   estimated at 50-55%.    No definitive regional wall motion abnormalities are noted. Diastolic filling parameters suggests grade II diastolic dysfunction. Mild mitral regurgitation. The left atrium is mildly dilated. Aortic valve appears sclerotic but opens adequately. Mild aortic regurgitation is present. Inadequate tricuspid regurgitation to estimate systolic pulmonary artery   pressure. Myoview: 2021:   The overall quality of the study is good. There is subdiaphragmatic    attenuation.        Left ventricular cavity is noted to be mildly enlarged at 147 ml. The right    ventricle is normal in size.        SPECT images demonstrate a small area of mildly decreased perfusion in the    mid inferior, mid-inferolateral, and basal inferior segments. The defect is    present at rest and worsens with stress, consistent with mixed ischemia and    scar. There is associated wall motion abnormality.        The sum stress score is 8. No visual TID. Calculated TID is 1.09.        Left ventricular ejection fraction is normal at 53%.        Moderate risk scan given ischemia and mildly elevated LV volumes       Angiogram: 16:  Cath with occluded SVG to RCA,patent LIMA to LAD,patent SVG to diag,OM1 with good retrograde filling of LAD,patent LIMA to LAD  EF 35-40% with inf hypokinesis,LVEDP 25      I independently reviewed the cardiac diagnostic studies, ECG and relevant imaging studies.      Lab Results   Component Value Date    LVEF 53 2022     Lab Results   Component Value Date    TSH 1.14 11/10/2019       Physical Examination:  Vitals:    22 0815   BP: 100/68   Pulse: 98   Resp: 16   Temp: 97.9 °F (36.6 °C)   SpO2: 91%      In: 1600 [P.O.:600]  Out: 1450    Wt Readings from Last 3 Encounters:   03/15/22 191 lb 3.2 oz (86.7 kg)   22 202 lb 3 oz (91.7 kg)   21 212 lb (96.2 kg)     Temp  Av °F (36.7 °C)  Min: 97.3 °F (36.3 °C)  Max: 98.6 °F (37 °C)  Pulse  Av.2  Min: 81  Max: 110  BP  Min: 82/62  Max: 109/81  SpO2  Avg: 95.2 %  Min: 91 %  Max: 99 %    Intake/Output Summary (Last 24 hours) at 3/16/2022 0919  Last data filed at 3/16/2022 0736  Gross per 24 hour   Intake 1600 ml   Output 1450 ml   Net 150 ml         I independently reviewed all cardiac tracing from cardiac telemetry. · Constitutional: Oriented. No distress. · Head: Normocephalic and atraumatic. · Mouth/Throat: Oropharynx is clear and moist.   · Eyes: Conjunctivae normal. EOM are normal.   · Neck: Neck supple. No JVD present. · Cardiovascular: Normal rate, Irregular rhythm, S1&S2. · Pulmonary/Chest: Bilateral respiratory sounds. No rhonchi. · Abdominal: Soft. No tenderness. · Musculoskeletal: No tenderness. No edema  Left LE scar of prior surgery   · Lymphadenopathy: Has no cervical adenopathy. · Neurological: Alert and oriented. Follows command, No Gross deficit   · Skin: Skin is warm, No rash noted. · Psychiatric: Has a normal behavior       Scheduled Meds:   amiodarone  200 mg Oral Daily    apixaban  5 mg Oral BID    atorvastatin  20 mg Oral Nightly    furosemide  20 mg Oral BID    gabapentin  200 mg Oral BID    insulin lispro (1 Unit Dial)  5 Units SubCUTAneous BID WC    insulin lispro (1 Unit Dial)  0-18 Units SubCUTAneous TID WC    insulin glargine  10 Units SubCUTAneous Nightly    metoprolol tartrate  25 mg Oral BID    midodrine  5 mg Oral TID WC    budesonide-formoterol  2 puff Inhalation BID    pantoprazole  40 mg Oral Daily    polyethylene glycol  17 g Oral Daily    sennosides-docusate sodium  1 tablet Oral BID    spironolactone  25 mg Oral Daily    tamsulosin  0.4 mg Oral Nightly     Continuous Infusions:  PRN Meds:.acetaminophen, ipratropium-albuterol, lidocaine, oxyCODONE, sodium chloride     Review of System:  [x] Full ROS obtained and negative except as mentioned in HPI    Prior to Admission medications    Medication Sig Start Date End Date Taking?  Authorizing Provider   acetaminophen (TYLENOL) 325 MG tablet Take 650 mg by mouth every 6 hours as needed for Pain   Yes Historical Provider, MD   apixaban (ELIQUIS) 5 MG TABS tablet Take 5 mg by mouth 2 times daily   Yes Historical Provider, MD   atorvastatin (LIPITOR) 20 MG tablet Take 20 mg by mouth at bedtime   Yes Historical Provider, MD   budesonide (PULMICORT) 0.5 MG/2ML nebulizer suspension Take 1 ampule by nebulization 2 times daily   Yes Historical Provider, MD   mometasone-formoterol (DULERA) 200-5 MCG/ACT inhaler Inhale 2 puffs into the lungs every 12 hours   Yes Historical Provider, MD   gabapentin (NEURONTIN) 100 MG capsule Take 200 mg by mouth in the morning and at bedtime. Yes Historical Provider, MD   insulin detemir (LEVEMIR) 100 UNIT/ML injection vial Inject 10 Units into the skin nightly   Yes Historical Provider, MD   insulin aspart (NOVOLOG) 100 UNIT/ML injection cartridge Inject 5 Units into the skin 3 times daily (before meals)   Yes Historical Provider, MD   insulin aspart (NOVOLOG) 100 UNIT/ML injection vial Inject 0-16 Units into the skin 3 times daily (before meals) Sliding scale   Yes Historical Provider, MD   ipratropium-albuterol (DUONEB) 0.5-2.5 (3) MG/3ML SOLN nebulizer solution Inhale 1 vial into the lungs every 6 hours as needed for Shortness of Breath   Yes Historical Provider, MD   lidocaine (XYLOCAINE) 5 % ointment Apply topically every 4 hours as needed for Pain Apply topically as needed. Yes Historical Provider, MD   metoprolol tartrate (LOPRESSOR) 25 MG tablet Take 25 mg by mouth 2 times daily Hold for SBP <100 or HR <55   Yes Historical Provider, MD   midodrine (PROAMATINE) 5 MG tablet Take 5 mg by mouth 3 times daily Hold for SBP >130   Yes Historical Provider, MD   oxyCODONE (ROXICODONE) 5 MG immediate release tablet Take 5 mg by mouth every 6 hours as needed for Pain.    Yes Historical Provider, MD   polyethylene glycol (MIRALAX) 17 g PACK packet Take 17 g by mouth daily   Yes Historical Provider, MD   pantoprazole (PROTONIX) 40 MG tablet Take 40 mg by mouth daily   Yes Historical Provider, MD   sodium chloride (OCEAN, BABY AYR) 0.65 % nasal spray 1 spray by Nasal route as needed for Congestion   Yes Historical Provider, MD   sennosides-docusate sodium (SENOKOT-S) 8.6-50 MG tablet Take 1 tablet by mouth in the morning and at bedtime   Yes Historical Provider, MD   spironolactone (ALDACTONE) 25 MG tablet Take 25 mg by mouth daily   Yes Historical Provider, MD   tamsulosin (FLOMAX) 0.4 MG capsule Take 0.4 mg by mouth daily   Yes Historical Provider, MD   furosemide (LASIX) 20 MG tablet Take 1 tablet by mouth 2 times daily  Patient taking differently: Take 40 mg by mouth daily  1/28/22  Yes Kyra Juarez MD   amiodarone (CORDARONE) 200 MG tablet TAKE 1 TABLET BY MOUTH DAILY  Patient taking differently: Take 200 mg by mouth 2 times daily  12/13/21  Yes Ellyn Brown MD       Past Medical History:   Diagnosis Date    Acid reflux     [facility] [address]     CAD (coronary artery disease)     COPD (chronic obstructive pulmonary disease) ([facility] [address] [facility]     Diverticulitis     Hypertension     Peripheral vascular disease ([facility] [address]         Past Surgical History:   Procedure Laterality Date    CARDIAC CATHETERIZATION      CARDIAC SURGERY      stent x1 --3 yrs, 5/12/14 CABG 3 V    COLONOSCOPY      VASCULAR SURGERY         No Known Allergies    Social History:  Reviewed. reports that he quit smoking about 2 months ago. His smoking use included cigarettes. He started smoking about 7 years ago. He has a 25.00 pack-year smoking history. He has never used smokeless tobacco. He reports current alcohol use. He reports that he does not use drugs. Family History:  Reviewed. Reviewed. No family history of SCD. Relevant and available labs, and cardiovascular diagnostics reviewed. Reviewed.    Recent Labs     03/15/22  1109      K 4.3      CO2 22   BUN 30*   CREATININE 1.5*     Recent Labs     03/15/22  1109   WBC 6.5   HGB 9.9*   HCT 31.7*   MCV 84.7        Estimated Creatinine Clearance: 49 mL/min (A) (based on SCr of 1.5 mg/dL (H)). No results found for: BNP    I independently reviewed all cardiac tracing from cardiac telemetry. I independently reviewed relevant and available cardiac diagnostic tests ECG, CXR, Echo, Stress test, Device interrogation, Holter, CT scan. Outside medical records via Care everywhere reviewed and summarized in H&P above. Complex medical condition with multiple medical problems affecting prognosis and outcome of EP interventions  Severe exacerbation of underlying medical condition requiring hospitalization and at risk of decompensation. All questions and concerns were addressed to the patient/family. Alternatives to my treatment were discussed. I have discussed the above stated plan and the patient verbalized understanding and agreed with the plan. NOTE: This report was transcribed using voice recognition software. Every effort was made to ensure accuracy, however, inadvertent computerized transcription errors may be present.      Keisha Monique MD, MPH  [place] [address]   Office: [phone]  Fax: [phone]

## 2022-03-16 NOTE — CARE COORDINATION
Discharge Planning Assessment  RNdischarge planner met with patient/ (and family member) to discuss reason for admission, current living situation, and potential needs at the time of discharge    Demographics/Insurance verified Yes- BCBS    Current type of [place]in[address] [place] [address]    Patient from ECF/SW confirmed with: Panda Person -admissions liaison [place] [facility]    Living arrangements: SNF    Level of function/Support: get assistance from facility's staff    Medication compliance issues: Facility staff administers     Financial issues that could impact healthcare:  No        Tentative discharge plan:  Back to [facility]  But will need a preauthorization before going back. He will not meet the [place]  Floor to SNF criteria program as per Panda Menchaca. .   Discussed and provided facilities of choice if transition to a skilled nursing facility is required at the time of discharge      Discussed with patient and/or family that on the day of discharge home tentative time of discharge will be between 10 AM and noon. Transportation at the time of discharge:  Medical transport.

## 2022-03-17 LAB
ANION GAP SERPL CALCULATED.3IONS-SCNC: 11 MMOL/L (ref 3–16)
BUN BLDV-MCNC: 26 MG/DL (ref 7–20)
CALCIUM SERPL-MCNC: 9 MG/DL (ref 8.3–10.6)
CHLORIDE BLD-SCNC: 104 MMOL/L (ref 99–110)
CO2: 25 MMOL/L (ref 21–32)
CREAT SERPL-MCNC: 1.4 MG/DL (ref 0.8–1.3)
EKG ATRIAL RATE: 88 BPM
EKG DIAGNOSIS: NORMAL
EKG Q-T INTERVAL: 392 MS
EKG QRS DURATION: 92 MS
EKG QTC CALCULATION (BAZETT): 508 MS
EKG R AXIS: 35 DEGREES
EKG T AXIS: 75 DEGREES
EKG VENTRICULAR RATE: 101 BPM
GFR AFRICAN AMERICAN: >60
GFR NON-AFRICAN AMERICAN: 50
GLUCOSE BLD-MCNC: 114 MG/DL (ref 70–99)
GLUCOSE BLD-MCNC: 122 MG/DL (ref 70–99)
GLUCOSE BLD-MCNC: 124 MG/DL (ref 70–99)
GLUCOSE BLD-MCNC: 93 MG/DL (ref 70–99)
MAGNESIUM: 2.4 MG/DL (ref 1.8–2.4)
PERFORMED ON: ABNORMAL
PERFORMED ON: ABNORMAL
PERFORMED ON: NORMAL
POTASSIUM SERPL-SCNC: 4.2 MMOL/L (ref 3.5–5.1)
REASON FOR REJECTION: NORMAL
REJECTED TEST: NORMAL
SODIUM BLD-SCNC: 140 MMOL/L (ref 136–145)

## 2022-03-17 PROCEDURE — 2000000000 HC ICU R&B

## 2022-03-17 PROCEDURE — 93459 L HRT ART/GRFT ANGIO: CPT

## 2022-03-17 PROCEDURE — 83735 ASSAY OF MAGNESIUM: CPT

## 2022-03-17 PROCEDURE — 3E0102A INTRODUCTION OF ANTI-INFECTIVE ENVELOPE INTO SUBCUTANEOUS TISSUE, OPEN APPROACH: ICD-10-PCS | Performed by: INTERNAL MEDICINE

## 2022-03-17 PROCEDURE — 36415 COLL VENOUS BLD VENIPUNCTURE: CPT

## 2022-03-17 PROCEDURE — 33249 INSJ/RPLCMT DEFIB W/LEAD(S): CPT

## 2022-03-17 PROCEDURE — 2580000003 HC RX 258

## 2022-03-17 PROCEDURE — 99153 MOD SED SAME PHYS/QHP EA: CPT

## 2022-03-17 PROCEDURE — C1894 INTRO/SHEATH, NON-LASER: HCPCS

## 2022-03-17 PROCEDURE — 2580000003 HC RX 258: Performed by: INTERNAL MEDICINE

## 2022-03-17 PROCEDURE — 99291 CRITICAL CARE FIRST HOUR: CPT | Performed by: INTERNAL MEDICINE

## 2022-03-17 PROCEDURE — 5A12012 PERFORMANCE OF CARDIAC OUTPUT, SINGLE, MANUAL: ICD-10-PCS | Performed by: INTERNAL MEDICINE

## 2022-03-17 PROCEDURE — C1769 GUIDE WIRE: HCPCS

## 2022-03-17 PROCEDURE — 99152 MOD SED SAME PHYS/QHP 5/>YRS: CPT

## 2022-03-17 PROCEDURE — 6370000000 HC RX 637 (ALT 250 FOR IP): Performed by: INTERNAL MEDICINE

## 2022-03-17 PROCEDURE — 2709999900 HC NON-CHARGEABLE SUPPLY

## 2022-03-17 PROCEDURE — 33249 INSJ/RPLCMT DEFIB W/LEAD(S): CPT | Performed by: INTERNAL MEDICINE

## 2022-03-17 PROCEDURE — 94761 N-INVAS EAR/PLS OXIMETRY MLT: CPT

## 2022-03-17 PROCEDURE — C1722 AICD, SINGLE CHAMBER: HCPCS

## 2022-03-17 PROCEDURE — 93010 ELECTROCARDIOGRAM REPORT: CPT | Performed by: INTERNAL MEDICINE

## 2022-03-17 PROCEDURE — 99152 MOD SED SAME PHYS/QHP 5/>YRS: CPT | Performed by: INTERNAL MEDICINE

## 2022-03-17 PROCEDURE — 80048 BASIC METABOLIC PNL TOTAL CA: CPT

## 2022-03-17 PROCEDURE — B2111ZZ FLUOROSCOPY OF MULTIPLE CORONARY ARTERIES USING LOW OSMOLAR CONTRAST: ICD-10-PCS | Performed by: INTERNAL MEDICINE

## 2022-03-17 PROCEDURE — 0JH608Z INSERTION OF DEFIBRILLATOR GENERATOR INTO CHEST SUBCUTANEOUS TISSUE AND FASCIA, OPEN APPROACH: ICD-10-PCS | Performed by: INTERNAL MEDICINE

## 2022-03-17 PROCEDURE — 2500000003 HC RX 250 WO HCPCS

## 2022-03-17 PROCEDURE — 6360000002 HC RX W HCPCS

## 2022-03-17 PROCEDURE — C1892 INTRO/SHEATH,FIXED,PEEL-AWAY: HCPCS

## 2022-03-17 PROCEDURE — 2700000000 HC OXYGEN THERAPY PER DAY

## 2022-03-17 PROCEDURE — C1760 CLOSURE DEV, VASC: HCPCS

## 2022-03-17 PROCEDURE — 93459 L HRT ART/GRFT ANGIO: CPT | Performed by: INTERNAL MEDICINE

## 2022-03-17 PROCEDURE — 02HK3KZ INSERTION OF DEFIBRILLATOR LEAD INTO RIGHT VENTRICLE, PERCUTANEOUS APPROACH: ICD-10-PCS | Performed by: INTERNAL MEDICINE

## 2022-03-17 PROCEDURE — 94640 AIRWAY INHALATION TREATMENT: CPT

## 2022-03-17 PROCEDURE — B2131ZZ FLUOROSCOPY OF MULTIPLE CORONARY ARTERY BYPASS GRAFTS USING LOW OSMOLAR CONTRAST: ICD-10-PCS | Performed by: INTERNAL MEDICINE

## 2022-03-17 PROCEDURE — 6360000004 HC RX CONTRAST MEDICATION: Performed by: INTERNAL MEDICINE

## 2022-03-17 PROCEDURE — 4A023N7 MEASUREMENT OF CARDIAC SAMPLING AND PRESSURE, LEFT HEART, PERCUTANEOUS APPROACH: ICD-10-PCS | Performed by: INTERNAL MEDICINE

## 2022-03-17 PROCEDURE — C1777 LEAD, AICD, ENDO SINGLE COIL: HCPCS

## 2022-03-17 RX ORDER — SODIUM CHLORIDE 0.9 % (FLUSH) 0.9 %
5-40 SYRINGE (ML) INJECTION PRN
Status: DISCONTINUED | OUTPATIENT
Start: 2022-03-17 | End: 2022-03-21 | Stop reason: HOSPADM

## 2022-03-17 RX ORDER — OXYCODONE HYDROCHLORIDE AND ACETAMINOPHEN 5; 325 MG/1; MG/1
1 TABLET ORAL EVERY 4 HOURS PRN
Status: DISCONTINUED | OUTPATIENT
Start: 2022-03-17 | End: 2022-03-21 | Stop reason: HOSPADM

## 2022-03-17 RX ORDER — SODIUM CHLORIDE 9 MG/ML
INJECTION, SOLUTION INTRAVENOUS CONTINUOUS
Status: DISCONTINUED | OUTPATIENT
Start: 2022-03-17 | End: 2022-03-21

## 2022-03-17 RX ORDER — ACETAMINOPHEN 325 MG/1
650 TABLET ORAL EVERY 4 HOURS PRN
Status: DISCONTINUED | OUTPATIENT
Start: 2022-03-17 | End: 2022-03-21 | Stop reason: HOSPADM

## 2022-03-17 RX ORDER — OXYCODONE HYDROCHLORIDE AND ACETAMINOPHEN 5; 325 MG/1; MG/1
2 TABLET ORAL EVERY 4 HOURS PRN
Status: DISCONTINUED | OUTPATIENT
Start: 2022-03-17 | End: 2022-03-21 | Stop reason: HOSPADM

## 2022-03-17 RX ORDER — SODIUM CHLORIDE 0.9 % (FLUSH) 0.9 %
5-40 SYRINGE (ML) INJECTION EVERY 12 HOURS SCHEDULED
Status: DISCONTINUED | OUTPATIENT
Start: 2022-03-17 | End: 2022-03-21 | Stop reason: HOSPADM

## 2022-03-17 RX ORDER — SODIUM CHLORIDE 9 MG/ML
25 INJECTION, SOLUTION INTRAVENOUS PRN
Status: DISCONTINUED | OUTPATIENT
Start: 2022-03-17 | End: 2022-03-21 | Stop reason: HOSPADM

## 2022-03-17 RX ORDER — MORPHINE SULFATE 2 MG/ML
2 INJECTION, SOLUTION INTRAMUSCULAR; INTRAVENOUS
Status: DISCONTINUED | OUTPATIENT
Start: 2022-03-17 | End: 2022-03-21 | Stop reason: HOSPADM

## 2022-03-17 RX ADMIN — Medication 2 PUFF: at 20:53

## 2022-03-17 RX ADMIN — SODIUM CHLORIDE, PRESERVATIVE FREE 10 ML: 5 INJECTION INTRAVENOUS at 21:02

## 2022-03-17 RX ADMIN — SODIUM CHLORIDE: 9 INJECTION, SOLUTION INTRAVENOUS at 17:39

## 2022-03-17 RX ADMIN — METOPROLOL TARTRATE 25 MG: 25 TABLET, FILM COATED ORAL at 07:56

## 2022-03-17 RX ADMIN — ATORVASTATIN CALCIUM 20 MG: 20 TABLET, FILM COATED ORAL at 20:47

## 2022-03-17 RX ADMIN — METOPROLOL TARTRATE 25 MG: 25 TABLET, FILM COATED ORAL at 20:47

## 2022-03-17 RX ADMIN — TAMSULOSIN HYDROCHLORIDE 0.4 MG: 0.4 CAPSULE ORAL at 20:47

## 2022-03-17 RX ADMIN — IOPAMIDOL 93 ML: 755 INJECTION, SOLUTION INTRAVENOUS at 13:41

## 2022-03-17 RX ADMIN — PANTOPRAZOLE SODIUM 40 MG: 40 TABLET, DELAYED RELEASE ORAL at 07:56

## 2022-03-17 RX ADMIN — GABAPENTIN 200 MG: 100 CAPSULE ORAL at 20:46

## 2022-03-17 RX ADMIN — AMIODARONE HYDROCHLORIDE 200 MG: 200 TABLET ORAL at 07:57

## 2022-03-17 RX ADMIN — OXYCODONE AND ACETAMINOPHEN 2 TABLET: 5; 325 TABLET ORAL at 17:22

## 2022-03-17 RX ADMIN — SODIUM CHLORIDE, PRESERVATIVE FREE 10 ML: 5 INJECTION INTRAVENOUS at 21:01

## 2022-03-17 RX ADMIN — GABAPENTIN 200 MG: 100 CAPSULE ORAL at 07:57

## 2022-03-17 RX ADMIN — Medication 2 PUFF: at 09:03

## 2022-03-17 ASSESSMENT — PAIN SCALES - GENERAL
PAINLEVEL_OUTOF10: 0
PAINLEVEL_OUTOF10: 8
PAINLEVEL_OUTOF10: 0
PAINLEVEL_OUTOF10: 0

## 2022-03-17 NOTE — PROGRESS NOTES
Had sustained v tach with chest pressure then loss of consciousness last evening    Will need cath to determine if ischemic etiology    High risk due to long standing ischemic cardiomyopathy and peripheral vascular disease    Need to stop midodrine

## 2022-03-17 NOTE — PROGRESS NOTES
Physical Therapy  Leandra Cain  Chart reviewed and after speaking with nursing pt is put on hold this date due to code Blue called late last night and waiting on cath today. Will follow up with pt at more appropriate time.    Tamir Felder [place], DPT [id number]

## 2022-03-17 NOTE — OP NOTE
Patient:  Radha Dominguez   :   1950    Procedural Summary  ~Consent:   Obtained written and verbal consent      Risks/benefits explained in detail  ~Procedure:    Left Heart Catheterization  ~Medications:    Procedural sedation with minimal conscious sedation  ~Complications:   None  ~Blood Loss:    <10cc  ~Specimens:    None obtained  ~Pre-sedation re-evaluation: Performed immediately prior to procedure. Medication and Procedural Reconciliation:  An independent trained observer pushed medications at my direction. We monitored the patient's level of consciousness and vital signs/physiologic status throughout the procedure duration (see start and stop times below). Sedation: 3 mg Versed, 100 mcg Fentanyl  Sedation start: 1238  Sedation stop: 1315    Cardiac Cath : Anatomy:   LM-nml   LAD-prox 90%, mid 99%  Cx-nml  RCA-ostial 100%   RPDA- L to R collaterals  LVEDP- 11  SVG to PDA known to be occluded  SVG to Diag, OM2/3 patent  LIMA to LAD patent    Contrast: 93  Flouro Time: 9.6  Access: R radial a    Impression  ~Coronary Angiography w/ severe MVD and 2 patent grafts. ~no ischemic cause for VT  ~normal lvedp        Recommendation  ~Aggressive medical treatment and risk factor modification  ~1. Medications reviewed, no changes at this time. 2. Stop heparin gtt. Post cath IVF. Bedrest.  4. Patient has been advised on the importance of regular exercise of at least 20-30 minutes daily alternating with aerobic and isometric activities. 5. Patient counseled about and offered assistance for smoking cessation   6. No indication for cardiac rehab  7.  MGMT of VT per EP            Naz Gabriel MD, MD 3/17/2022 1:23 PM

## 2022-03-17 NOTE — PROGRESS NOTES
Patient returned from cath lab again, ICD placed left chest, dressing CDI, swathe intact. SR.  VSS. Oxygen same at 4 liters nc. Call light wnl.

## 2022-03-17 NOTE — PROGRESS NOTES
Acute on chronic systolic heart failure, NYHA class 3 ([facility] [I50.23]     COVID [U07.1] 2022    Acute respiratory failure due to COVID-19 ([facility] [U07.1, J96.00] 2022    High cholesterol [E78.00] 2022    Hypotension due to hypovolemia [I95.89, E86.1] 2019    Atrial fibrillation with RVR ([facility] [I48.91] 11/10/2019    Anemia [D64.9] 11/10/2019    COPD (chronic obstructive pulmonary disease) ([facility] [J44.9] 10/03/2014    Dyspnea [R06.00] 10/03/2014    PVD (peripheral vascular disease) ([facility] [I73.9] 2014       Diagnostic studies:   Tele with VT noted. Echo:    Left ventricular systolic function is low normal with ejection fraction   estimated at 50-55%. No definitive regional wall motion abnormalities are noted. Diastolic filling parameters suggests grade II diastolic dysfunction. Mild mitral regurgitation. The left atrium is mildly dilated. Aortic valve appears sclerotic but opens adequately. Mild aortic regurgitation is present. Inadequate tricuspid regurgitation to estimate systolic pulmonary artery   pressure. I independently reviewed the cardiac diagnostic studies, ECG and relevant imaging studies. Physical Examination:  Vitals:    22 1000   BP: (!) 124/111   Pulse: 63   Resp: 11   Temp:    SpO2:       In: 0   Out: 750    Wt Readings from Last 3 Encounters:   22 190 lb 11.2 oz (86.5 kg)   22 202 lb 3 oz (91.7 kg)   21 212 lb (96.2 kg)     Temp  Av.2 °F (36.2 °C)  Min: 96.7 °F (35.9 °C)  Max: 98 °F (36.7 °C)  Pulse  Av.4  Min: 63  Max: 206  BP  Min: 78/51  Max: 143/87  SpO2  Av.3 %  Min: 91 %  Max: 100 %    Intake/Output Summary (Last 24 hours) at 3/17/2022 1124  Last data filed at 3/17/2022 1040  Gross per 24 hour   Intake 0 ml   Output 400 ml   Net -400 ml       I independently reviewed all cardiac tracing from cardiac telemetry. · Constitutional: Oriented. No distress. · Head: Normocephalic and atraumatic. · Mouth/Throat: Oropharynx is clear and moist.   · Eyes: Conjunctivae normal. EOM are normal.   · Neck: Neck supple. No JVD present. · Cardiovascular: Normal rate, regular rhythm, S1&S2. · Pulmonary/Chest: Bilateral respiratory sounds. No rhonchi. · Abdominal: Soft. No tenderness. · Musculoskeletal: No tenderness. No edema    · Lymphadenopathy: Has no cervical adenopathy. · Neurological: Alert and oriented. Follows command, No Gross deficit   · Skin: Skin is warm, No rash noted. · Psychiatric: Has a normal behavior     Scheduled Meds:   LORazepam        amiodarone  200 mg Oral Daily    apixaban  5 mg Oral BID    atorvastatin  20 mg Oral Nightly    furosemide  20 mg Oral BID    gabapentin  200 mg Oral BID    insulin lispro (1 Unit Dial)  5 Units SubCUTAneous BID WC    insulin lispro (1 Unit Dial)  0-18 Units SubCUTAneous TID WC    insulin glargine  10 Units SubCUTAneous Nightly    metoprolol tartrate  25 mg Oral BID    [Held by provider] midodrine  5 mg Oral TID WC    budesonide-formoterol  2 puff Inhalation BID    pantoprazole  40 mg Oral Daily    polyethylene glycol  17 g Oral Daily    sennosides-docusate sodium  1 tablet Oral BID    spironolactone  25 mg Oral Daily    tamsulosin  0.4 mg Oral Nightly     Continuous Infusions:  PRN Meds:acetaminophen, ipratropium-albuterol, lidocaine, oxyCODONE, sodium chloride     Prior to Admission medications    Medication Sig Start Date End Date Taking?  Authorizing Provider   acetaminophen (TYLENOL) 325 MG tablet Take 650 mg by mouth every 6 hours as needed for Pain   Yes Historical Provider, MD   apixaban (ELIQUIS) 5 MG TABS tablet Take 5 mg by mouth 2 times daily   Yes Historical Provider, MD   atorvastatin (LIPITOR) 20 MG tablet Take 20 mg by mouth at bedtime   Yes Historical Provider, MD   budesonide (PULMICORT) 0.5 MG/2ML nebulizer suspension Take 1 ampule by nebulization 2 times daily   Yes Historical Provider, MD   mometasone-formoterol (DULERA) 200-5 MCG/ACT inhaler Inhale 2 puffs into the lungs every 12 hours   Yes Historical Provider, MD   gabapentin (NEURONTIN) 100 MG capsule Take 200 mg by mouth in the morning and at bedtime. Yes Historical Provider, MD   insulin detemir (LEVEMIR) 100 UNIT/ML injection vial Inject 10 Units into the skin nightly   Yes Historical Provider, MD   insulin aspart (NOVOLOG) 100 UNIT/ML injection cartridge Inject 5 Units into the skin 3 times daily (before meals)   Yes Historical Provider, MD   insulin aspart (NOVOLOG) 100 UNIT/ML injection vial Inject 0-16 Units into the skin 3 times daily (before meals) Sliding scale   Yes Historical Provider, MD   ipratropium-albuterol (DUONEB) 0.5-2.5 (3) MG/3ML SOLN nebulizer solution Inhale 1 vial into the lungs every 6 hours as needed for Shortness of Breath   Yes Historical Provider, MD   lidocaine (XYLOCAINE) 5 % ointment Apply topically every 4 hours as needed for Pain Apply topically as needed. Yes Historical Provider, MD   metoprolol tartrate (LOPRESSOR) 25 MG tablet Take 25 mg by mouth 2 times daily Hold for SBP <100 or HR <55   Yes Historical Provider, MD   midodrine (PROAMATINE) 5 MG tablet Take 5 mg by mouth 3 times daily Hold for SBP >130   Yes Historical Provider, MD   oxyCODONE (ROXICODONE) 5 MG immediate release tablet Take 5 mg by mouth every 6 hours as needed for Pain.    Yes Historical Provider, MD   polyethylene glycol (MIRALAX) 17 g PACK packet Take 17 g by mouth daily   Yes Historical Provider, MD   pantoprazole (PROTONIX) 40 MG tablet Take 40 mg by mouth daily   Yes Historical Provider, MD   sodium chloride (OCEAN, BABY AYR) 0.65 % nasal spray 1 spray by Nasal route as needed for Congestion   Yes Historical Provider, MD   sennosides-docusate sodium (SENOKOT-S) 8.6-50 MG tablet Take 1 tablet by mouth in the morning and at bedtime   Yes Historical Provider, MD   spironolactone (ALDACTONE) 25 MG tablet Take 25 mg by mouth daily   Yes Historical Provider, MD tamsulosin (FLOMAX) 0.4 MG capsule Take 0.4 mg by mouth daily   Yes Historical Provider, MD   furosemide (LASIX) 20 MG tablet Take 1 tablet by mouth 2 times daily  Patient taking differently: Take 40 mg by mouth daily  1/28/22  Yes Ann Cedeno MD   amiodarone (CORDARONE) 200 MG tablet TAKE 1 TABLET BY MOUTH DAILY  Patient taking differently: Take 200 mg by mouth 2 times daily  12/13/21  Yes Veda Franco MD       Review of System:  [x] Full ROS obtained and negative except as mentioned in HPI    Relevant and available labs, and cardiovascular diagnostics reviewed. Reviewed. Recent Labs     03/15/22  1109 03/16/22  1306 03/17/22  0107    139 140   K 4.3 4.1 4.2    103 104   CO2 22 24 25   BUN 30* 25* 26*   CREATININE 1.5* 1.2 1.4*     Recent Labs     03/15/22  1109   WBC 6.5   HGB 9.9*   HCT 31.7*   MCV 84.7        Estimated Creatinine Clearance: 52 mL/min (A) (based on SCr of 1.4 mg/dL (H)). No results found for: BNP    I independently reviewed all cardiac tracing from cardiac telemetry. I independently reviewed relevant and available cardiac diagnostic tests ECG, CXR, Echo, Stress test, Device interrogation, Holter, CT scan. Total critical care time was 35 minutes, for caring of this patient with life-threatening condition and organ failure, excluding separately reportable procedures. Services, included in critical care time were chart data review, documentation time, obtaining info from patient, review of nursing notes, and vital sign assessment and management of the patient. Thank you for allowing me to participate in the care of [place]      All questions and concerns were addressed to the patient/family. Alternatives to my treatment were discussed. I have discussed the above stated plan and the patient verbalized understanding and agreed with the plan. NOTE: This report was transcribed using voice recognition software.  Every effort was made to ensure accuracy, however, inadvertent computerized transcription errors may be present.      Padmini Dunn MD, MPH  [facility]   Office: [phone]  Fax: [phone]

## 2022-03-17 NOTE — SIGNIFICANT EVENT
Saw patient during [address]. Patient had series of ventricular tachycardia, eventually became unresponsive. Chest compression was done briefly, then there was return of spontaneous circulation. Per ED, patient was in ventricular tachycardia. Synchronized cardioversion was done at 200 J to convert him to sinus rhythm. Due to urgency of the situation, we did not have enough time to administer anxiolytic before cardioversion. Electrolytes are currently being checked. Patient has been transferred to CDU for close monitoring.       SIGNED: Jennifer Ying MD, MPH . 3/17/2022

## 2022-03-17 NOTE — PROGRESS NOTES
Rapid Response Quick Summary    Room: [facility]3322/3322-01    Assessment of concern / patient:  Pt in v-tach    Physician involved:  Dr Rafael Park    Interventions:  Code blue called when pt became unresponsive. Pulse returned but pt remained in v-tach. Synchronized cardioverted. Disposition:  Plan was already in place for pt to transfer to CVU.

## 2022-03-17 NOTE — PROGRESS NOTES
While getting pt ready for cath lab procedure pt stated \" I think one of my shingles busted open. \" Informed cath lab because this was not passed on in report. Patient over to cath lab. Patient care transferred over to [facility].

## 2022-03-17 NOTE — PROCEDURES
Electrophysiology Procedure Note       Date of Procedure: 3/17/2022  Patient's Name: Gus Atkinson  YOB: 1950   Medical Record Number: [id number]  Procedure Performed by: Mckenzie Espinoza MD    Procedures performed:  · Insertion of MRI compatible right ventricular defibrillator lead under fluoroscopy. · Insertion of a MRI compatible single chamber ICD. · Programming and interrogation of a single chamber AICD. · IV sedation. · Sedation start time: 15:22 PM  · Sedation stop time: 16:37 PM   · An independent trained observer pushed medications at my direction    Indication of procedure:   Gus Atkinson is a 67 y.o. male with ishcemic cardiomyopathy, CAD, cardiomyopathy, HTN, PAD, who has presented with syncope. While in hospital, he had VT arrest and had CPR and external synchronized shock. He is here for implantation of AICD for secondary prevention. Details of procedure: The patient was brought to the electrophysiology laboratory in stable condition. The patient was in a fasting and non-sedated state. The risks, benefits and alternatives of the procedure were discussed with the patient. The risks including, but not limited to, the risks of vascular injury, bleeding, infection, device malfunction, lead dislodgement, radiation exposure, injury to cardiac and surrounding structures (including pneumothorax), stroke, myocardial infarction and death were discussed in detail. The patient opted to proceed with the device implantation. Written informed consent was signed and placed in the chart. Prophylactic antibiotic was given. The patient was prepped and draped in a sterile fashion. A timeout protocol was completed to identify the patient and the procedure being performed. IV sedation was provided with IV Versed, Fentanyl. An incision was made in the left pectoral area after administration of lidocaine. Using electrocautery and blunt dissection, a pocket was created.  Central venous access into the left axillary vein was obtained using the modified Seldinger technique. After central venous access was obtained, a sheath was placed in the axillary vein. A right ventricular lead was advanced into position in the apex under fluoroscopic guidance and using a series of curved stylets. The lead was actively fixated. After confirming appropriate function, the sheath was split and removed. The lead was secured to the underlying tissue using suture material.     The pocket was irrigated with an antibiotic solution. The leads were then connected to the new pulse generator, single chamber AICD which was then placed into the cleaned pocket. The pulse generator was sutured inside the pocket. The pocket was then closed in three separate subcutaneous layers using 2-0 & 3-0 Vicryl and subcuticular layer using 4-0 Vicryl. The skin was covered with pressure dressing. All sponge and needle counts were reported as correct at the end of the procedure. The patient tolerated the procedure well and there were no complications. Post-sedation evaluation was completed. Patient was transported to the holding area in stable condition. EBL less than 20 ml. Lead and device information:       Plan:   The patient will have usual post-implant care, including chest x-ray, and antibiotic therapy and interrogation of the device.

## 2022-03-17 NOTE — PROGRESS NOTES
9663 Dr. Romy Chong was contacted about a 43 beat run of asymptomatic VTACH. Unable to reach Romy Chong. Hospitalist contacted [facility] Per the hospitalist Cardiology was paged at [phone] for another 30 beat run of asymptomatic VTACH. Hospitalist decided to transfer pt to CVICU.

## 2022-03-17 NOTE — PRE SEDATION
Brief Pre-Op Note/Sedation Assessment      Tiffany Christianson  1950  [id number]  11:15 AM    Planned Procedure: Cardiac Catheterization Procedure  Post Procedure Plan: Return to same level of care  Consent: I have discussed with the patient and/or the patient representative the indication, alternatives, and the possible risks and/or complications of the planned procedure and the anesthesia methods. The patient and/or patient representative appear to understand and agree to proceed. Chief Complaint:   Chest Pain/Pressure  NSTEMI      Indications for Cath Procedure:  1. Presentation:  ACS <= 24 hrs, Resuscitated Cardiac Arrest, Cardiac Arrythmia, Cardiomyopathy and LV Dysfunction  2. Anginal Classification within 2 weeks:  CCS III - Symptoms with everyday living activities, i.e., moderate limitation  3. Angina Symptoms Assessment:  Atypical Chest Pain  4. Heart Failure Class within last 2 weeks:  Yes:  Heart Failure Type: Systolic Severity:  Class IV - Symptoms of HF at rest  5. Cardiovascular Instability:  Yes:  Ventricular arrhythmias and Hemodynamic instability (not cardiogenic shock)    Prior Ischemic Workup/Eval:  1. Pre-Procedural Medications: Yes: Aspirin, Beta Blockers and STATIN  2. Stress Test Completed? No    Does Patient need surgery?   Cath Valve Surgery:  No    Pre-Procedure Medical History:  Vital Signs:  BP (!) 124/111   Pulse 63   Temp 96.7 °F (35.9 °C) (Temporal)   Resp 11   Ht 5' 9\" (1.753 m)   Wt 190 lb 11.2 oz (86.5 kg)   SpO2 97%   BMI 28.16 kg/m²     Allergies:  No Known Allergies  Medications:    Current Facility-Administered Medications   Medication Dose Route Frequency Provider Last Rate Last Admin    LORazepam (ATIVAN) 2 MG/ML injection             acetaminophen (TYLENOL) tablet 650 mg  650 mg Oral Q6H PRN Dharmesh Griffin MD   650 mg at 03/16/22 7274    amiodarone (CORDARONE) tablet 200 mg  200 mg Oral Daily Dharmesh Griffin MD   200 mg at 03/17/22 8550    apixaban (ELIQUIS) tablet 5 mg  5 mg Oral BID Ash Dominguez MD   5 mg at 03/16/22 2124    atorvastatin (LIPITOR) tablet 20 mg  20 mg Oral Nightly Ash Dominguez MD   20 mg at 03/16/22 2124    furosemide (LASIX) tablet 20 mg  20 mg Oral BID Ash Dominguez MD   20 mg at 03/16/22 1739    gabapentin (NEURONTIN) capsule 200 mg  200 mg Oral BID Ash Dominguez MD   200 mg at 03/17/22 0757    insulin lispro (1 Unit Dial) 5 Units  5 Units SubCUTAneous BID  Ash Dominguez MD        insulin lispro (1 Unit Dial) 0-18 Units  0-18 Units SubCUTAneous TID  Lupillo Woods MD        insulin glargine (LANTUS;BASAGLAR) injection pen 10 Units  10 Units SubCUTAneous Nightly Ash Dominguez MD        ipratropium-albuterol (DUONEB) nebulizer solution 3 mL  1 vial Inhalation Q6H PRN Ash Dominguez MD        lidocaine (XYLOCAINE) 5 % ointment   Topical Q4H PRN Ash Dominguez MD   Given at 03/16/22 0328    metoprolol tartrate (LOPRESSOR) tablet 25 mg  25 mg Oral BID Ash Dominguez MD   25 mg at 03/17/22 0756    [Held by provider] midodrine (PROAMATINE) tablet 5 mg  5 mg Oral TID  Ash Dominguez MD   5 mg at 03/16/22 1739    budesonide-formoterol (SYMBICORT) 80-4.5 MCG/ACT inhaler 2 puff  2 puff Inhalation BID Ash Dominguez MD   2 puff at 03/17/22 0903    oxyCODONE (ROXICODONE) immediate release tablet 5 mg  5 mg Oral Q6H PRN Ash Dominguez MD   5 mg at 03/16/22 1027    pantoprazole (PROTONIX) tablet 40 mg  40 mg Oral Daily Ash Dominguez MD   40 mg at 03/17/22 0756    polyethylene glycol (GLYCOLAX) packet 17 g  17 g Oral Daily Ash Dominguez MD   17 g at 03/16/22 0820    sennosides-docusate sodium (SENOKOT-S) 8.6-50 MG tablet 1 tablet  1 tablet Oral BID Ash Dominguez MD   1 tablet at 03/16/22 6378    sodium chloride (OCEAN, BABY AYR) 0.65 % nasal spray 1 spray  1 spray Nasal PRN Ash Dominguez MD        spironolactone (ALDACTONE) tablet 25 mg  25 mg Oral Daily Ash Dominguez MD   25 mg at 03/16/22 6391    tamsulosin (FLOMAX) capsule 0.4 mg  0.4 mg Oral Nightly Alfonso Snyder MD   0.4 mg at 03/16/22 7050       Past Medical History:    Past Medical History:   Diagnosis Date    Acid reflux     [facility]     CAD (coronary artery disease)     COPD (chronic obstructive pulmonary disease) (HCC)     Diverticulitis     Hypertension     Peripheral vascular disease ([facility]        Surgical History:    Past Surgical History:   Procedure Laterality Date    CARDIAC CATHETERIZATION      CARDIAC SURGERY      stent x1 --3 yrs, 5/12/14 CABG 3 V    COLONOSCOPY      VASCULAR SURGERY               Pre-Sedation:  Pre-Sedation Documentation and Exam:  I have assessed the patient and reviewed the H&P on the chart. Prior History of Anesthesia Complications:   none    Modified Mallampati:  II (soft palate, uvula, fauces visible)    ASA Classification:  Class 2 - A normal healthy patient with mild systemic disease    Siva Scale: Activity:  2 - Able to move 4 extremities voluntarily on command  Respiration:  2 - Able to breathe deeply and cough freely  Circulation:  2 - BP+/- 20mmHg of normal  Consciousness:  2 - Fully awake  Oxygen Saturation (color):  2 - Able to maintain oxygen saturation >92% on room air    Sedation/Anesthesia Plan:  Guard the patient's safety and welfare. Minimize physical discomfort and pain. Minimize negative psychological responses to treatment by providing sedation and analgesia and maximize the potential amnesia. Patient to meet pre-procedure discharge plan.     Medication Planned:  midazolam intravenously and fentanyl intravenously    Patient is an appropriate candidate for plan of sedation:   yes      Electronically signed by Heriberto Rand MD on 3/17/2022 at 11:15 AM

## 2022-03-17 NOTE — PROGRESS NOTES
Patient brought over to CVU from cath lab and attached to CVU monitoring. Report reviewed with cath lab RN. Right groin soft and no hematoma or oozing noted. Occlusive dressing dry and intact. Pedal pulses intact with doppler. Primary RN Ronald Dick.

## 2022-03-17 NOTE — PROGRESS NOTES
Right groin remains stable, pulses intact with doppler. Cath lath team here now to take patient back to cath lab for placement of ICD.  VSS. No changes.

## 2022-03-18 ENCOUNTER — NURSE ONLY (OUTPATIENT)
Dept: CARDIOLOGY CLINIC | Age: 72
End: 2022-03-18
Payer: MEDICARE

## 2022-03-18 ENCOUNTER — APPOINTMENT (OUTPATIENT)
Dept: GENERAL RADIOLOGY | Age: 72
DRG: 224 | End: 2022-03-18
Payer: MEDICARE

## 2022-03-18 DIAGNOSIS — Z95.810 ICD (IMPLANTABLE CARDIOVERTER-DEFIBRILLATOR), SINGLE, IN SITU: Primary | ICD-10-CM

## 2022-03-18 DIAGNOSIS — R55 SYNCOPE AND COLLAPSE: ICD-10-CM

## 2022-03-18 DIAGNOSIS — I48.20 CHRONIC A-FIB (HCC): ICD-10-CM

## 2022-03-18 DIAGNOSIS — I48.91 ATRIAL FIBRILLATION WITH RVR (HCC): ICD-10-CM

## 2022-03-18 DIAGNOSIS — I25.5 ISCHEMIC CARDIOMYOPATHY: ICD-10-CM

## 2022-03-18 DIAGNOSIS — I50.32 CHRONIC DIASTOLIC HEART FAILURE (HCC): ICD-10-CM

## 2022-03-18 DIAGNOSIS — I50.22 CHRONIC SYSTOLIC HF (HEART FAILURE) (HCC): ICD-10-CM

## 2022-03-18 LAB
ANION GAP SERPL CALCULATED.3IONS-SCNC: 9 MMOL/L (ref 3–16)
BUN BLDV-MCNC: 20 MG/DL (ref 7–20)
CALCIUM SERPL-MCNC: 8.8 MG/DL (ref 8.3–10.6)
CHLORIDE BLD-SCNC: 104 MMOL/L (ref 99–110)
CO2: 23 MMOL/L (ref 21–32)
CREAT SERPL-MCNC: 1 MG/DL (ref 0.8–1.3)
GFR AFRICAN AMERICAN: >60
GFR NON-AFRICAN AMERICAN: >60
GLUCOSE BLD-MCNC: 118 MG/DL (ref 70–99)
GLUCOSE BLD-MCNC: 91 MG/DL (ref 70–99)
GLUCOSE BLD-MCNC: 92 MG/DL (ref 70–99)
GLUCOSE BLD-MCNC: 94 MG/DL (ref 70–99)
HCT VFR BLD CALC: 28.4 % (ref 40.5–52.5)
HEMOGLOBIN: 8.9 G/DL (ref 13.5–17.5)
MAGNESIUM: 2 MG/DL (ref 1.8–2.4)
MCH RBC QN AUTO: 26.4 PG (ref 26–34)
MCHC RBC AUTO-ENTMCNC: 31.3 G/DL (ref 31–36)
MCV RBC AUTO: 84.2 FL (ref 80–100)
PDW BLD-RTO: 26.8 % (ref 12.4–15.4)
PERFORMED ON: ABNORMAL
PERFORMED ON: NORMAL
PERFORMED ON: NORMAL
PLATELET # BLD: 251 K/UL (ref 135–450)
PMV BLD AUTO: 7.7 FL (ref 5–10.5)
POTASSIUM SERPL-SCNC: 4.6 MMOL/L (ref 3.5–5.1)
RBC # BLD: 3.38 M/UL (ref 4.2–5.9)
SODIUM BLD-SCNC: 136 MMOL/L (ref 136–145)
WBC # BLD: 6.8 K/UL (ref 4–11)

## 2022-03-18 PROCEDURE — 2580000003 HC RX 258: Performed by: INTERNAL MEDICINE

## 2022-03-18 PROCEDURE — 99024 POSTOP FOLLOW-UP VISIT: CPT | Performed by: INTERNAL MEDICINE

## 2022-03-18 PROCEDURE — 97530 THERAPEUTIC ACTIVITIES: CPT

## 2022-03-18 PROCEDURE — 83735 ASSAY OF MAGNESIUM: CPT

## 2022-03-18 PROCEDURE — 85027 COMPLETE CBC AUTOMATED: CPT

## 2022-03-18 PROCEDURE — 6370000000 HC RX 637 (ALT 250 FOR IP): Performed by: INTERNAL MEDICINE

## 2022-03-18 PROCEDURE — 97535 SELF CARE MNGMENT TRAINING: CPT

## 2022-03-18 PROCEDURE — 71046 X-RAY EXAM CHEST 2 VIEWS: CPT

## 2022-03-18 PROCEDURE — 97116 GAIT TRAINING THERAPY: CPT

## 2022-03-18 PROCEDURE — 2000000000 HC ICU R&B

## 2022-03-18 PROCEDURE — 80048 BASIC METABOLIC PNL TOTAL CA: CPT

## 2022-03-18 RX ORDER — OXYCODONE HYDROCHLORIDE 5 MG/1
5 TABLET ORAL EVERY 6 HOURS PRN
Qty: 12 TABLET | Refills: 0 | Status: SHIPPED | OUTPATIENT
Start: 2022-03-18 | End: 2022-03-21

## 2022-03-18 RX ADMIN — POLYETHYLENE GLYCOL 3350 17 G: 17 POWDER, FOR SOLUTION ORAL at 08:00

## 2022-03-18 RX ADMIN — SPIRONOLACTONE 25 MG: 25 TABLET ORAL at 07:59

## 2022-03-18 RX ADMIN — FUROSEMIDE 20 MG: 20 TABLET ORAL at 17:45

## 2022-03-18 RX ADMIN — METOPROLOL TARTRATE 25 MG: 25 TABLET, FILM COATED ORAL at 21:07

## 2022-03-18 RX ADMIN — SENNOSIDES AND DOCUSATE SODIUM 1 TABLET: 50; 8.6 TABLET ORAL at 08:00

## 2022-03-18 RX ADMIN — SENNOSIDES AND DOCUSATE SODIUM 1 TABLET: 50; 8.6 TABLET ORAL at 21:08

## 2022-03-18 RX ADMIN — TAMSULOSIN HYDROCHLORIDE 0.4 MG: 0.4 CAPSULE ORAL at 21:08

## 2022-03-18 RX ADMIN — GABAPENTIN 200 MG: 100 CAPSULE ORAL at 08:00

## 2022-03-18 RX ADMIN — SODIUM CHLORIDE, PRESERVATIVE FREE 10 ML: 5 INJECTION INTRAVENOUS at 21:08

## 2022-03-18 RX ADMIN — AMIODARONE HYDROCHLORIDE 200 MG: 200 TABLET ORAL at 07:58

## 2022-03-18 RX ADMIN — APIXABAN 5 MG: 5 TABLET, FILM COATED ORAL at 21:07

## 2022-03-18 RX ADMIN — OXYCODONE AND ACETAMINOPHEN 2 TABLET: 5; 325 TABLET ORAL at 15:27

## 2022-03-18 RX ADMIN — ACETAMINOPHEN 650 MG: 325 TABLET ORAL at 04:11

## 2022-03-18 RX ADMIN — GABAPENTIN 200 MG: 100 CAPSULE ORAL at 21:07

## 2022-03-18 RX ADMIN — PANTOPRAZOLE SODIUM 40 MG: 40 TABLET, DELAYED RELEASE ORAL at 06:19

## 2022-03-18 RX ADMIN — Medication 2 PUFF: at 21:14

## 2022-03-18 RX ADMIN — SODIUM CHLORIDE, PRESERVATIVE FREE 10 ML: 5 INJECTION INTRAVENOUS at 09:03

## 2022-03-18 RX ADMIN — SODIUM CHLORIDE, PRESERVATIVE FREE 10 ML: 5 INJECTION INTRAVENOUS at 09:02

## 2022-03-18 RX ADMIN — FUROSEMIDE 20 MG: 20 TABLET ORAL at 07:59

## 2022-03-18 RX ADMIN — ATORVASTATIN CALCIUM 20 MG: 20 TABLET, FILM COATED ORAL at 21:08

## 2022-03-18 RX ADMIN — APIXABAN 5 MG: 5 TABLET, FILM COATED ORAL at 08:06

## 2022-03-18 ASSESSMENT — PAIN DESCRIPTION - LOCATION
LOCATION: SHOULDER
LOCATION: CHEST
LOCATION: CHEST
LOCATION: SHOULDER

## 2022-03-18 ASSESSMENT — PAIN DESCRIPTION - ORIENTATION
ORIENTATION: LEFT
ORIENTATION: LEFT;ANTERIOR
ORIENTATION: LEFT
ORIENTATION: LEFT;ANTERIOR

## 2022-03-18 ASSESSMENT — PAIN SCALES - GENERAL
PAINLEVEL_OUTOF10: 4
PAINLEVEL_OUTOF10: 0
PAINLEVEL_OUTOF10: 6
PAINLEVEL_OUTOF10: 6
PAINLEVEL_OUTOF10: 10
PAINLEVEL_OUTOF10: 10

## 2022-03-18 ASSESSMENT — PAIN DESCRIPTION - ONSET
ONSET: ON-GOING

## 2022-03-18 ASSESSMENT — PAIN DESCRIPTION - DESCRIPTORS
DESCRIPTORS: SORE

## 2022-03-18 ASSESSMENT — PAIN DESCRIPTION - PROGRESSION
CLINICAL_PROGRESSION: GRADUALLY IMPROVING

## 2022-03-18 ASSESSMENT — PAIN DESCRIPTION - PAIN TYPE
TYPE: SURGICAL PAIN
TYPE: ACUTE PAIN
TYPE: SURGICAL PAIN
TYPE: SURGICAL PAIN
TYPE: ACUTE PAIN
TYPE: SURGICAL PAIN

## 2022-03-18 ASSESSMENT — HEART SCORE: ECG: 1

## 2022-03-18 ASSESSMENT — PAIN DESCRIPTION - FREQUENCY
FREQUENCY: CONTINUOUS

## 2022-03-18 NOTE — PROGRESS NOTES
Department of Internal Medicine  General Internal Medicine   Progress Note      SUBJECTIVE: denies chest pain , no new syncopal episode     History obtained from chart review and the patient  General ROS: positive for  - fatigue and malaise  negative for - chills, fever or night sweats  Psychological ROS: positive for - anxiety and disorientation  negative for - behavioral disorder, hallucinations or hostility  Ophthalmic ROS: negative  Respiratory ROS: positive for - shortness of breath  negative for - cough, hemoptysis or wheezing  Cardiovascular ROS: positive for - dyspnea on exertion  negative for - chest pain  Gastrointestinal ROS: no abdominal pain, change in bowel habits, or black or bloody stools  Genito-Urinary ROS: no dysuria, trouble voiding, or hematuria  Musculoskeletal ROS: chronic pain   Neurological ROS: no TIA or stroke symptoms  Dermatological ROS: negative    OBJECTIVE      Medications      Current Facility-Administered Medications: 0.9 % sodium chloride infusion, , IntraVENous, Continuous  sodium chloride flush 0.9 % injection 5-40 mL, 5-40 mL, IntraVENous, 2 times per day  sodium chloride flush 0.9 % injection 5-40 mL, 5-40 mL, IntraVENous, PRN  0.9 % sodium chloride infusion, 25 mL, IntraVENous, PRN  acetaminophen (TYLENOL) tablet 650 mg, 650 mg, Oral, Q4H PRN  oxyCODONE-acetaminophen (PERCOCET) 5-325 MG per tablet 1 tablet, 1 tablet, Oral, Q4H PRN **OR** oxyCODONE-acetaminophen (PERCOCET) 5-325 MG per tablet 2 tablet, 2 tablet, Oral, Q4H PRN  morphine (PF) injection 2 mg, 2 mg, IntraVENous, Q3H PRN  iopamidol (ISOVUE-370) 76 % injection 93 mL, 93 mL, Other, ONCE PRN  sodium chloride flush 0.9 % injection 5-40 mL, 5-40 mL, IntraVENous, 2 times per day  sodium chloride flush 0.9 % injection 5-40 mL, 5-40 mL, IntraVENous, PRN  0.9 % sodium chloride infusion, 25 mL, IntraVENous, PRN  acetaminophen (TYLENOL) tablet 650 mg, 650 mg, Oral, Q6H PRN  amiodarone (CORDARONE) tablet 200 mg, 200 mg, Oral, Daily  apixaban (ELIQUIS) tablet 5 mg, 5 mg, Oral, BID  atorvastatin (LIPITOR) tablet 20 mg, 20 mg, Oral, Nightly  furosemide (LASIX) tablet 20 mg, 20 mg, Oral, BID  gabapentin (NEURONTIN) capsule 200 mg, 200 mg, Oral, BID  insulin lispro (1 Unit Dial) 5 Units, 5 Units, SubCUTAneous, BID WC  insulin lispro (1 Unit Dial) 0-18 Units, 0-18 Units, SubCUTAneous, TID WC  insulin glargine (LANTUS;BASAGLAR) injection pen 10 Units, 10 Units, SubCUTAneous, Nightly  ipratropium-albuterol (DUONEB) nebulizer solution 3 mL, 1 vial, Inhalation, Q6H PRN  lidocaine (XYLOCAINE) 5 % ointment, , Topical, Q4H PRN  metoprolol tartrate (LOPRESSOR) tablet 25 mg, 25 mg, Oral, BID  [Held by provider] midodrine (PROAMATINE) tablet 5 mg, 5 mg, Oral, TID WC  budesonide-formoterol (SYMBICORT) 80-4.5 MCG/ACT inhaler 2 puff, 2 puff, Inhalation, BID  oxyCODONE (ROXICODONE) immediate release tablet 5 mg, 5 mg, Oral, Q6H PRN  pantoprazole (PROTONIX) tablet 40 mg, 40 mg, Oral, Daily  polyethylene glycol (GLYCOLAX) packet 17 g, 17 g, Oral, Daily  sennosides-docusate sodium (SENOKOT-S) 8.6-50 MG tablet 1 tablet, 1 tablet, Oral, BID  sodium chloride (OCEAN, BABY AYR) 0.65 % nasal spray 1 spray, 1 spray, Nasal, PRN  spironolactone (ALDACTONE) tablet 25 mg, 25 mg, Oral, Daily  tamsulosin (FLOMAX) capsule 0.4 mg, 0.4 mg, Oral, Nightly    Physical      Vitals: /63   Pulse 57   Temp 97.6 °F (36.4 °C) (Temporal)   Resp 20   Ht 5' 9\" (1.753 m)   Wt 190 lb 11.2 oz (86.5 kg)   SpO2 97%   BMI 28.16 kg/m²   Temp: Temp: 97.6 °F (36.4 °C)  Max: Temp  Av.1 °F (36.2 °C)  Min: 96.9 °F (36.1 °C)  Max: 97.6 °F (36.4 °C)  Respiration range:  Resp  Av.9  Min: 13  Max: 24  Pulse Range:  Pulse  Av.4  Min: 57  Max: 72  Blood pressure range:  Systolic (98HQA), GLX:388 , Min:88 , PBZ:599   , Diastolic (95DYJ), USJ:04, Min:51, Max:95    SpO2  Av.9 %  Min: 79 %  Max: 100 %    Intake/Output Summary (Last 24 hours) at 3/18/2022 3431  Last data filed at 3/18/2022 0544  Gross per 24 hour   Intake 900.06 ml   Output 600 ml   Net 300.06 ml       Vent settings:  Pulse  Av.6  Min: 57  Max: 206  Resp  Av.8  Min: 11  Max: 28  SpO2  Av.8 %  Min: 79 %  Max: 100 %    CONSTITUTIONAL:  fatigued, alert, cooperative, mild distress, appears older than stated age and moderately obese  EYES:  Unremarkable   NECK:  Faint bruit , no JVD  and supple, symmetrical, trachea midline  BACK:  symmetric and no curvature  LUNGS:  No increased work of breathing, good air exchange, clear to auscultation bilaterally, no crackles or wheezing  CARDIOVASCULAR:  normal apical pulses, regular rate and rhythm, normal S1 and S2 and no S3  ABDOMEN:  Soft BS + non tender   MUSCULOSKELETAL:  Trace edema   NEUROLOGIC:  Grossly intact   SKIN:  Warm dry and pale  and no bruising or bleeding    Data      Recent Results (from the past 96 hour(s))   CBC with Auto Differential    Collection Time: 03/15/22 11:09 AM   Result Value Ref Range    WBC 6.5 4.0 - 11.0 K/uL    RBC 3.74 (L) 4.20 - 5.90 M/uL    Hemoglobin 9.9 (L) 13.5 - 17.5 g/dL    Hematocrit 31.7 (L) 40.5 - 52.5 %    MCV 84.7 80.0 - 100.0 fL    MCH 26.5 26.0 - 34.0 pg    MCHC 31.3 31.0 - 36.0 g/dL    RDW 28.1 (H) 12.4 - 15.4 %    Platelets 508 479 - 774 K/uL    MPV 8.1 5.0 - 10.5 fL    Neutrophils % 52.2 %    Lymphocytes % 35.8 %    Monocytes % 6.8 %    Eosinophils % 4.4 %    Basophils % 0.8 %    Neutrophils Absolute 3.4 1.7 - 7.7 K/uL    Lymphocytes Absolute 2.3 1.0 - 5.1 K/uL    Monocytes Absolute 0.4 0.0 - 1.3 K/uL    Eosinophils Absolute 0.3 0.0 - 0.6 K/uL    Basophils Absolute 0.1 0.0 - 0.2 K/uL   Basic Metabolic Panel w/ Reflex to MG    Collection Time: 03/15/22 11:09 AM   Result Value Ref Range    Sodium 139 136 - 145 mmol/L    Potassium reflex Magnesium 4.3 3.5 - 5.1 mmol/L    Chloride 103 99 - 110 mmol/L    CO2 22 21 - 32 mmol/L    Anion Gap 14 3 - 16    Glucose 112 (H) 70 - 99 mg/dL    BUN 30 (H) 7 - 20 mg/dL    CREATININE 1.5 (H) 0.8 - 1.3 mg/dL    GFR Non- 46 (A) >60    GFR  56 (A) >60    Calcium 9.3 8.3 - 10.6 mg/dL   Troponin    Collection Time: 03/15/22 11:09 AM   Result Value Ref Range    Troponin 0.08 (H) <0.01 ng/mL   Blood Gas, Venous    Collection Time: 03/15/22 11:09 AM   Result Value Ref Range    pH, Juan 7.338 (L) 7.350 - 7.450    pCO2, Juan 43.8 40.0 - 50.0 mmHg    pO2, Juan 49.4 (H) 25.0 - 40.0 mmHg    HCO3, Venous 23.5 23.0 - 29.0 mmol/L    Base Excess, Juan -2.3 -3.0 - 3.0 mmol/L    O2 Sat, Juan 79 Not Established %    Carboxyhemoglobin 3.3 (H) 0.0 - 1.5 %    MetHgb, Juan 0.4 <1.5 %    TC02 (Calc), Juan 56 Not Established mmol/L    O2 Content, Juan 11 Not Established VOL %    O2 Therapy Unknown     Hemoglobin, Juan, Reduced 20 %   EKG 12 Lead    Collection Time: 03/15/22 11:17 AM   Result Value Ref Range    Ventricular Rate 103 BPM    Atrial Rate 122 BPM    QRS Duration 98 ms    Q-T Interval 400 ms    QTc Calculation (Bazett) 524 ms    R Axis 32 degrees    T Axis 103 degrees    Diagnosis       Atrial fibrillation with rapid ventricular responseST & T wave abnormality, consider lateral ischemiaAbnormal ECGConfirmed by JONNATHAN Alfaro MD ([id number]) on 3/15/2022 12:05:45 PM   Blood Gas, Venous    Collection Time: 03/15/22 12:05 PM   Result Value Ref Range    pH, Juan 7.339 (L) 7.350 - 7.450    pCO2, Juan 45.4 40.0 - 50.0 mmHg    pO2, Juan 53.4 (H) 25.0 - 40.0 mmHg    HCO3, Venous 24.4 23.0 - 29.0 mmol/L    Base Excess, Juan -1.5 -3.0 - 3.0 mmol/L    O2 Sat, Juan 82 Not Established %    Carboxyhemoglobin 2.9 (H) 0.0 - 1.5 %    MetHgb, Juan 0.3 <1.5 %    TC02 (Calc), Juan 58 Not Established mmol/L    O2 Content, Juan 11 Not Established VOL %    O2 Therapy Unknown     Hemoglobin, Juan, Reduced 17 %   Lactic Acid    Collection Time: 03/15/22 12:05 PM   Result Value Ref Range    Lactic Acid 2.9 (H) 0.4 - 2.0 mmol/L   POCT Glucose    Collection Time: 03/15/22  5:42 PM   Result Value Ref Range    POC Glucose 92 70 - 99 mg/dl    Performed on ACCU-CHEK    POCT Glucose    Collection Time: 03/15/22  8:18 PM   Result Value Ref Range    POC Glucose 106 (H) 70 - 99 mg/dl    Performed on ACCU-CHEK    POCT Glucose    Collection Time: 03/16/22  7:36 AM   Result Value Ref Range    POC Glucose 91 70 - 99 mg/dl    Performed on ACCU-CHEK    POCT Glucose    Collection Time: 03/16/22 10:57 AM   Result Value Ref Range    POC Glucose 137 (H) 70 - 99 mg/dl    Performed on ACCU-CHEK    EKG 12 Lead    Collection Time: 03/16/22 12:23 PM   Result Value Ref Range    Ventricular Rate 90 BPM    Atrial Rate 288 BPM    QRS Duration 94 ms    Q-T Interval 400 ms    QTc Calculation (Bazett) 489 ms    R Axis 21 degrees    T Axis 63 degrees    Diagnosis       Atrial fibrillation /flutterAbnormal ECGConfirmed by JONNATHAN Wasserman MD [id number]) on 3/16/2022 4:42:48 PM   TSH with Reflex    Collection Time: 03/16/22  1:06 PM   Result Value Ref Range    TSH 1.88 0.27 - 4.20 uIU/mL   Basic Metabolic Panel w/ Reflex to MG    Collection Time: 03/16/22  1:06 PM   Result Value Ref Range    Sodium 139 136 - 145 mmol/L    Potassium reflex Magnesium 4.1 3.5 - 5.1 mmol/L    Chloride 103 99 - 110 mmol/L    CO2 24 21 - 32 mmol/L    Anion Gap 12 3 - 16    Glucose 147 (H) 70 - 99 mg/dL    BUN 25 (H) 7 - 20 mg/dL    CREATININE 1.2 0.8 - 1.3 mg/dL    GFR Non- 59 (A) >60    GFR African American >60 >60    Calcium 8.7 8.3 - 10.6 mg/dL   Magnesium    Collection Time: 03/16/22  1:06 PM   Result Value Ref Range    Magnesium 1.70 (L) 1.80 - 2.40 mg/dL   POCT Glucose    Collection Time: 03/16/22  4:21 PM   Result Value Ref Range    POC Glucose 101 (H) 70 - 99 mg/dl    Performed on ACCU-CHEK    POCT Glucose    Collection Time: 03/16/22  9:07 PM   Result Value Ref Range    POC Glucose 97 70 - 99 mg/dl    Performed on ACCU-CHEK    EKG 12 Lead    Collection Time: 03/16/22  9:59 PM   Result Value Ref Range    Ventricular Rate 101 BPM    Atrial Rate 88 BPM    QRS Duration 92 ms    Q-T Interval 392 ms    QTc Calculation (Bazett) 508 ms    R Axis 35 degrees    T Axis 75 degrees    Diagnosis       Atrial fibrillation with rapid ventricular responseNonspecific ST and T changesConfirmed by Ailyn Riggs MD, Gerardo Jack ([id number]) on 3/17/2022 12:42:08 PM   POCT Venous    Collection Time: 03/16/22 11:27 PM   Result Value Ref Range    POC Sodium 137 136 - 145 mmol/L    POC Potassium 4.2 3.5 - 5.1 mmol/L    POC Glucose 113 (H) 70 - 99 mg/dl    Calcium, Ion 1.25 1.12 - 1.32 mmol/L    Sample Type JOSEPH     Performed on SEE BELOW    SPECIMEN REJECTION    Collection Time: 03/17/22 12:29 AM   Result Value Ref Range    Rejected Test bmp mg     Reason for Rejection see below    Basic Metabolic Panel    Collection Time: 03/17/22  1:07 AM   Result Value Ref Range    Sodium 140 136 - 145 mmol/L    Potassium 4.2 3.5 - 5.1 mmol/L    Chloride 104 99 - 110 mmol/L    CO2 25 21 - 32 mmol/L    Anion Gap 11 3 - 16    Glucose 124 (H) 70 - 99 mg/dL    BUN 26 (H) 7 - 20 mg/dL    CREATININE 1.4 (H) 0.8 - 1.3 mg/dL    GFR Non-African American 50 (A) >60    GFR African American >60 >60    Calcium 9.0 8.3 - 10.6 mg/dL   Magnesium    Collection Time: 03/17/22  1:07 AM   Result Value Ref Range    Magnesium 2.40 1.80 - 2.40 mg/dL   POCT Glucose    Collection Time: 03/17/22  8:06 AM   Result Value Ref Range    POC Glucose 122 (H) 70 - 99 mg/dl    Performed on ACCU-CHEK    POCT Glucose    Collection Time: 03/17/22  6:43 PM   Result Value Ref Range    POC Glucose 93 70 - 99 mg/dl    Performed on ACCU-CHEK    POCT Glucose    Collection Time: 03/17/22  8:51 PM   Result Value Ref Range    POC Glucose 114 (H) 70 - 99 mg/dl    Performed on ACCU-CHEK    Basic Metabolic Panel    Collection Time: 03/18/22  5:09 AM   Result Value Ref Range    Sodium 136 136 - 145 mmol/L    Potassium 4.6 3.5 - 5.1 mmol/L    Chloride 104 99 - 110 mmol/L    CO2 23 21 - 32 mmol/L    Anion Gap 9 3 - 16    Glucose 91 70 - 99 mg/dL    BUN 20 7 - 20 mg/dL    CREATININE 1.0 0.8 - 1.3 mg/dL    GFR Non-African American >60 >60    GFR African American >60 >60    Calcium 8.8 8.3 - 10.6 mg/dL   CBC    Collection Time: 03/18/22  5:09 AM   Result Value Ref Range    WBC 6.8 4.0 - 11.0 K/uL    RBC 3.38 (L) 4.20 - 5.90 M/uL    Hemoglobin 8.9 (L) 13.5 - 17.5 g/dL    Hematocrit 28.4 (L) 40.5 - 52.5 %    MCV 84.2 80.0 - 100.0 fL    MCH 26.4 26.0 - 34.0 pg    MCHC 31.3 31.0 - 36.0 g/dL    RDW 26.8 (H) 12.4 - 15.4 %    Platelets 379 040 - 327 K/uL    MPV 7.7 5.0 - 10.5 fL   Magnesium    Collection Time: 03/18/22  5:09 AM   Result Value Ref Range    Magnesium 2.00 1.80 - 2.40 mg/dL   POCT Glucose    Collection Time: 03/18/22  7:48 AM   Result Value Ref Range    POC Glucose 94 70 - 99 mg/dl    Performed on ACCU-CHEK    POCT Glucose    Collection Time: 03/18/22 11:24 AM   Result Value Ref Range    POC Glucose 118 (H) 70 - 99 mg/dl    Performed on [address] [place] [address] Problems           Last Modified POA    * (Principal) Syncope and collapse 3/15/2022 Yes    CAD (coronary artery disease) (Chronic) 3/15/2022 Yes    Essential hypertension 3/15/2022 Yes    PVD (peripheral vascular disease) ([address] (Chronic) 3/15/2022 Yes    COPD (chronic obstructive pulmonary disease) ([address] 3/15/2022 Yes    Dyspnea 3/15/2022 Yes    Atrial fibrillation with RVR ([address] 3/15/2022 Yes    Anemia 3/15/2022 Yes    Hypotension due to hypovolemia 3/15/2022 Yes    COVID 3/15/2022 Yes    Acute respiratory failure due to COVID-19 ([address] 3/15/2022 Yes    High cholesterol 3/15/2022 Yes    Ischemic cardiomyopathy 3/15/2022 Yes    Chronic a-fib ([address] 3/15/2022 Yes    Acute on chronic systolic heart failure, NYHA class 3 ([address] 3/15/2022 Yes        ECHO from [facility] reviewed    cardiology   Consult noted    possible invasive cardiac eval

## 2022-03-18 NOTE — PROGRESS NOTES
Shift assessment and AM vitals complete, VSS. AM meds given. Held AM metoprolol r/t 57 HR. R groin site is CDI with no bleeding. Pt had chest xray this AM, possible d/c home if results are WNL. Therapy in to see pt this AM pt tolerated okay, needs assist getting out of bed and Contact guard with walker. Pt will go back to [facility] [place]hab. The care plan and education has been reviewed and mutually agreed upon with the patient. Patient remains free from falls or accidental injury. Room free from clutter. All fall precautions in place. Bed in lowest position with wheels locked and alarm on, call light and belongings within reach, and door open.

## 2022-03-18 NOTE — CONSULTS
[facility]                     [facility], [address]                                  CONSULTATION    PATIENT NAME: Candi Mendez                    :        1950  MED REC NO:   [id number]                          ROOM:       2912  ACCOUNT NO:   [de-identified]                           ADMIT DATE: 03/15/2022  PROVIDER:     Wendy Amador MD    CONSULT DATE:  2022    REASON FOR CONSULTATION:  Request by hospitalist and Electrophysiology  to see for possibility of unstable angina. HISTORY OF PRESENT ILLNESS:  The patient is a 75-year-old gentleman,  well known to my practice. He has a longstanding ischemic  cardiomyopathy. He has recovered his ejection fraction. He is status  post bypass graft surgery with severe diffuse coronary artery disease,  not felt to be amenable to further treatment in past evaluation. He has  had paroxysmal atrial fibrillation with cardioversion approximately  three years ago. After that, his left ventricular function improved  significantly. The patient reports to me that he has discontinued  smoking, however his wife relates to me that he has never been quit  smoking. He does have hypertension, has significant peripheral vascular  disease with revascularization procedures done on his legs. He also has  a very serious COVID pneumonia in spite of vaccination that resulted in  a prolonged hospital course with respiratory failure, persistent atrial  fibrillation ultimately transferred to rehab and then to [facility]. He  was at [facility] on the day of admission, when he suddenly became  unresponsive. He did feel some chest pressure prior to loss of  consciousness, does not know how long he was out for. On admission, he  was found to be in atrial fibrillation.   However, through the night, he  has had bouts of initially nonsustained ventricular tachycardia and then  persistent ventricular tachycardia with recurrent loss of consciousness. He had to be emergently cardioverted last evening. PAST MEDICAL HISTORY:  Includes longstanding coronary artery disease  with bypass graft surgery approximately nine years ago, last cardiac  catheterization in 2019 with chronic occlusion of his right coronary  artery, mammary graft patent, significant peripheral vascular disease  with revascularizations on an uneventful fashion by Dr. Zarina Conner. This was  done on his right leg. Previous bypass graft surgery, last cardiac  catheterization in 2019. He has also had cardioversion for atrial  fibrillation. Chronic obstructive pulmonary disease due to chronic  cigarette use, recent COVID pneumonia with very long protracted hospital  course; persistent atrial fibrillation which now has become paroxysmal.    PAST SURGICAL HISTORY:  Includes the revascularization procedure of his  right leg, previous bypass graft surgery. FAMILY HISTORY:  Noncontributory for premature coronary artery disease. 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 He has a longstanding cigarette use history, but according to him he has  not smoked in two months. Currently his wife feels he still smokes  cigarettes. No current drinking of alcohol. MEDICATIONS:  On admission his medications were Eliquis 5 mg b.i.d.,  Lipitor 20 mg daily, Pulmicort, Dulera, Neurontin, insulin for his  diabetes, Lopressor 25 mg b.i.d., midodrine 5 mg t.i.d., oxycodone  p.r.n., spironolactone, Flomax, furosemide, and amiodarone has been used  for quite some time at 200 mg daily. In the hospital, same medicines  have continued. Midodrine will be changed to a p.r.n. basis. Amiodarone has persisted 200 mg daily. REVIEW OF SYSTEMS:  A 14-system review of system otherwise negative.     PHYSICAL EXAMINATION:  GENERAL:  Currently on physical examination, the patient appears  comfortable at rest.  VITAL SIGNS:  Blood pressure has been varying as 143/87, as well as  78/51, as well as 90/51. NECK:  Neck veins are flat. There are no carotid bruits. LUNGS:  Clear. CARDIOVASCULAR:  Notable for irregular rhythm. No murmur. ABDOMEN:  Nontender. EXTREMITIES:  Without edema. He has diminished pulses at right foot,  diminished pulses at left foot. He has palpable femoral pulses  bilaterally. SKIN:  Warm and dry. NEUROLOGIC:  Moves all extremities well. Cranial nerves II through XII  intact. LABORATORY DATA:  Creatinine is 1.4, potassium 4.2, hemoglobin 9.9,  hematocrit 31.7. EKG shows atrial fibrillation, no acute ST segment changes. Subsequent  EKG showed persistent ventricular tachycardia, run is 0.08. IMPRESSION:  Coronary artery disease with elevated troponin. Initially,  it was felt best to pursue stress testing. However, with the recurrent  ventricular tachycardia, I now feel it is best to do coronary  arteriogram.  These findings were explained with the patient and wife,  and they agreed to pursue this. The patient has expressed DNR wishes. However, I explained to him that for the present time, we need to pursue  with all modalities available. Hopefully, we will not get to need for  ventilator, but if it does in the setting of cardiac procedure, we would  felt to be temporary for therapeutic purposes and that for end of life  measures. Significant peripheral vascular disease which is stable,  paroxysmal atrial fibrillation, paroxysmal ventricular tachycardia  highly suspicious for ischemic etiology. Recent COVID pneumonia with  possibility of myocarditis triggering cardiac dysrhythmias. RECOMMENDATIONS:  I feel it is best to proceed with cardiac  catheterization and we will defer to Electrophysiology for further  evaluation of both atrial and ventricular arrhythmias pending cardiac  catheterization. The patient understands risks, benefits, and agrees to  proceed.     Please note 70 minutes of time is spent with the majority of time in  direct patient contact performing this consultation. The patient will  be stated on intravenous fluids in hopes to protect for any adverse  complications from contrast load.         Rocio Villalpando MD    D: 03/17/2022 19:41:00       T: 03/18/2022 0:08:11     [email or web address]  Job#: [id number]     Doc#: [id number]    CC:

## 2022-03-18 NOTE — CARE COORDINATION
Pre cert has been initiated for return to [facility]. Payor is requesting updated OT evaluations. Therapy staff updated. Tentative transport set with [facility] for 1730 this evening. Addendum: CM was notified by Kacey/ admissions liaison that neither [address] nor [facility] have any beds. CM reviewed in network SNF list with patient and will send referrals to  [facility]. Patient accepted to [facility], pending a pre cert. Janeth Garcia is rescinding their request. CM requested therapy see patient on  for updated notes by both PT / OT staff. Patient requested CM only update his son, Thedore Gowers. Transport canceled.     DONNIE AlonzoN, CCM, RN  [facility]  [phone]

## 2022-03-18 NOTE — DISCHARGE INSTR - COC
Continuity of Care Form    Patient Name: Tiffany Christianson   :  1950  MRN:  [id number]    Admit date:  3/15/2022  Discharge date:  2022    Code Status Order: Full Code   Advance Directives:      Admitting Physician:  Dharmesh Griffin MD  PCP: Sae Ramírez DO    Discharging [facility]: [facility] Unit/Room#: CJD-3074/4608-91  Discharging Unit Phone Number: [phone]    Emergency Contact:   Extended Emergency Contact Information  Primary Emergency Contact: Robinson Mc  Address: [address]           [address] [facility] [employer] [employer] [facility], [address] [place] [address] [address]  Phone: [phone]  Work Phone: [phone]  Relation: Spouse  Secondary Emergency Contact: Urbano Mcclendon [facility] [address] Phone: [phone]  Mobile Phone: [phone]  Relation: Child  Preferred language: English   needed?  No    Past Surgical History:  Past Surgical History:   Procedure Laterality Date    CARDIAC CATHETERIZATION      CARDIAC SURGERY      stent x1 --3 yrs, 14 CABG 3 V    COLONOSCOPY      VASCULAR SURGERY         Immunization History:   Immunization History   Administered Date(s) Administered    COVID-19, [employer] top, DO NOT Dilute, Drew-Sucrose, 12+ yrs, PF, 30 mcg/0.3 mL dose 03/10/2022    COVID-19, Pfizer Purple top, DILUTE for use, 12+ yrs, 30mcg/0.3mL dose 04/10/2021, 2021    Influenza Virus Vaccine 10/03/2014    Pneumococcal Polysaccharide ([id number]) 10/03/2014       Active Problems:  Patient Active Problem List   Diagnosis Code    CAD (coronary artery disease) I25.10    Essential hypertension I10    PVD (peripheral vascular disease) ([facility] I73.9    COPD (chronic obstructive pulmonary disease) ([facility] J44.9    Dyspnea R06.00    Atrial fibrillation with RVR (HCC) I48.91    Anemia D64.9    Anticoagulated on Coumadin Z79.01    Hypotension due to hypovolemia I95.89, E86.1    COVID U07.1    Acute respiratory failure due to COVID-19 ([facility]) U07.1, J96.00    High cholesterol - Oral Diet:  Cardiac    Routes of Feeding: Oral  Liquids: No Restrictions  Daily Fluid Restriction: no  Last Modified Barium Swallow with Video (Video Swallowing Test): not done    Treatments at the Time of Hospital Discharge:   Respiratory Treatments: per medication list  Oxygen Therapy:  is on oxygen at 4 L/min per nasal cannula. Ventilator:    - No ventilator support    Rehab Therapies: Physical Therapy and Occupational Therapy  Weight Bearing Status/Restrictions: No weight bearing restrictions  Other Medical Equipment (for information only, NOT a DME order):  walker  Other Treatments:     Patient's personal belongings (please select all that are sent with patient):  Glasses, Dentures upper    RN SIGNATURE:  Electronically signed by Maddi Chandler RN on 3/21/22 at 2:36 PM EDT    CASE MANAGEMENT/SOCIAL WORK SECTION    Inpatient Status Date:3/15/2022    Readmission Risk Assessment Score:  Readmission Risk              Risk of Unplanned Readmission:  24           Discharging to Facility/ Agency   Name: [facility]  [address], [place], Yas Do [facility] 3  UUVZV:[address]           / signature: SUPRIYA Malin, CCM, RN  [address]    Prognosis: Guarded    Condition at Discharge: Stable    Rehab Potential (if transferring to Rehab): Fair    Recommended Labs or Other Treatments After Discharge: Cardiology follow up   [address] OT     Physician Certification: I certify the above information and transfer of Tiffany Christianson  is necessary for the continuing treatment of the diagnosis listed and that he requires [place] for less 30 days.      Update Admission H&P: No change in H&P    PHYSICIAN SIGNATURE:  Electronically signed by Dharmesh Griffin MD on 3/18/22 at 1:47 PM EDT

## 2022-03-18 NOTE — PROGRESS NOTES
Occupational Therapy  Facility/Department: [facility] CVU  Daily Treatment Note  NAME: Neha Murphy  : 1950  MRN: [id number]    Date of Service: 3/18/2022    Discharge Recommendations:Leandra Gonzalez scored a 17/24 on the AM-PAC ADL Inpatient form. Current research shows that an AM-PAC score of 17 or less is typically not associated with a discharge to the patient's home setting. Based on the patient's AM-PAC score and their current ADL deficits, it is recommended that the patient have 3-5 sessions per week of Occupational Therapy at d/c to increase the patient's independence. Please see assessment section for further patient specific details. If patient discharges prior to next session this note will serve as a discharge summary. Please see below for the latest assessment towards goals. OT Equipment Recommendations  Equipment Needed: No    Assessment   Performance deficits / Impairments: Decreased functional mobility ; Decreased ADL status; Decreased endurance  Assessment: Pt is below his baseline level of occupational function, based on the above deficits associated with syncope & collapse. Pt would benefit from continued skilled acute OT services to address these deficits. Treatment Diagnosis: Decreased ADL status, functional mobility, and endurance associated with syncope & collapse. Prognosis: Good  OT Education: OT Role;Plan of Care;Transfer Training;Precautions (ICD precautions)  Patient Education: Patient verbalized understanding but needs reinforcement of new precautions for ICD placement AEB decreased follow through  REQUIRES OT FOLLOW UP: Yes  Activity Tolerance  Activity Tolerance: Patient limited by fatigue  Safety Devices  Safety Devices in place: Yes  Type of devices: All fall risk precautions in place; Left in bed;Nurse notified;Call light within reach;Gait belt;Bed alarm in place         Patient Diagnosis(es): The primary encounter diagnosis was Precordial chest pain.  Diagnoses of Palpitations, Systolic congestive heart failure, unspecified HF chronicity ([facility] and PVD (peripheral vascular disease) ([facility] were also pertinent to this visit. has a past medical history of Acid reflux, Acute MI ([facility] CAD (coronary artery disease), COPD (chronic obstructive pulmonary disease) ([facility] Diverticulitis, Hypertension, and Peripheral vascular disease ([facility] has a past surgical history that includes vascular surgery; Cardiac surgery; Colonoscopy; and Cardiac catheterization. Restrictions  Restrictions/Precautions  Restrictions/Precautions: Fall Risk (high fall risk)  Required Braces or Orthoses?: No  Implants present? :  (right ventricular defibrillator)  Position Activity Restriction  Other position/activity restrictions: No elevation L shoulder >90 deg. Velco strap in bed for reminder. ICD placement. Subjective   General  Chart Reviewed: Yes  Family / Caregiver Present: No  Diagnosis: Syncope & collapse  Subjective  Subjective: In bed, agreeable to therapy. Patient in pain L shoulder does not rate. RN aware/      Orientation  Orientation  Overall Orientation Status: Within Normal Limits  Objective    ADL  Feeding: Independent; Beverage management  Grooming: Stand by assistance (hair, one handed due to ICD precautions)  UE Bathing: Stand by assistance  LE Bathing: Minimal assistance  UE Dressing: Minimal assistance (gown)  LE Dressing: Minimal assistance (balance during stance. Cues for L UE precautions and to take rest breaks. SOB with exertion)  Toileting: None  Additional Comments: Patient SOB with exertion, recovers after short rest break. Cues for L UE precautions.         Balance  Sitting Balance: Stand by assistance  Standing Balance: Minimal assistance (posterior lean when first up to EOB)  Functional Mobility  Functional - Mobility Device: Rolling Walker  Activity: Other  Assist Level: Contact guard assistance  Functional Mobility Comments: cues for safety and midline in walker  Bed mobility  Supine to Sit: Moderate assistance  Sit to Supine: Stand by assistance  Scooting: Contact guard assistance  Comment: cues for limited use of L UE despite swath in place. Transfers  Sit to stand: Minimal assistance  Stand to sit: Contact guard assistance  Transfer Comments: Cues for hand placement and safety                       Cognition  Overall Cognitive Status: WFL  Cognition Comment: Grossly functional but cues for ICD precautions with L UE                                         Plan   Plan  Times per week: 3-5  Times per day: Daily  Current Treatment Recommendations: Self-Care / ADL,Safety Education & Training,Endurance Training,Functional Mobility Training    AM-PAC Score        AM-[facility] Inpatient Daily Activity Raw Score: 17 (03/18/22 1408)  AM-PAC Inpatient ADL T-Scale Score : 37.26 (03/18/22 1408)  ADL Inpatient CMS 0-100% Score: 50.11 (03/18/22 1408)  ADL Inpatient CMS G-Code Modifier : CK (03/18/22 1408)    Goals  Short term goals  Time Frame for Short term goals: Discharge  Short term goal 1: SBA for functional transfers to ADL surfaces w/RW-Min A ongoing 3/18  Short term goal 2: SBA for functional mobility w/RW for ADL activity-CGA ongoing 3/18  Short term goal 3: S/U FOR UB bathing/dressing-Min A ongoing 3/18  Short term goal 4: SBA for LB bathing/dressing-Min A 3/18  Short term goal 5: Pt to tolerate standing 2-3 min for ADL activity/functional mobility-continue 3/18  Patient Goals   Patient goals : To return to SNF for more rehab       Therapy Time   Individual Concurrent Group Co-treatment   Time In 1335         Time Out 1358         Minutes 23              Timed Code Treatment Minutes:  23  Total Treatment Minutes:  [address] [facility], 15 [facility]  Yuliya Shah [address]

## 2022-03-18 NOTE — PROGRESS NOTES
Department of Internal Medicine  General Internal Medicine   Progress Note      SUBJECTIVE: no syncope  Or chest pain no dizziness     History obtained from chart review and the patient  General ROS: positive for  - fatigue and malaise  negative for - chills, fever or night sweats  Psychological ROS: positive for - anxiety and disorientation  negative for - behavioral disorder, hallucinations or hostility  Ophthalmic ROS: negative  Respiratory ROS: positive for - shortness of breath  negative for - cough, hemoptysis or wheezing  Cardiovascular ROS: positive for - dyspnea on exertion  negative for - chest pain  Gastrointestinal ROS: no abdominal pain, change in bowel habits, or black or bloody stools  Genito-Urinary ROS: no dysuria, trouble voiding, or hematuria  Musculoskeletal ROS: chronic pain   Neurological ROS: no TIA or stroke symptoms  Dermatological ROS: negative    OBJECTIVE      Medications      Current Facility-Administered Medications: 0.9 % sodium chloride infusion, , IntraVENous, Continuous  sodium chloride flush 0.9 % injection 5-40 mL, 5-40 mL, IntraVENous, 2 times per day  sodium chloride flush 0.9 % injection 5-40 mL, 5-40 mL, IntraVENous, PRN  0.9 % sodium chloride infusion, 25 mL, IntraVENous, PRN  acetaminophen (TYLENOL) tablet 650 mg, 650 mg, Oral, Q4H PRN  oxyCODONE-acetaminophen (PERCOCET) 5-325 MG per tablet 1 tablet, 1 tablet, Oral, Q4H PRN **OR** oxyCODONE-acetaminophen (PERCOCET) 5-325 MG per tablet 2 tablet, 2 tablet, Oral, Q4H PRN  morphine (PF) injection 2 mg, 2 mg, IntraVENous, Q3H PRN  iopamidol (ISOVUE-370) 76 % injection 93 mL, 93 mL, Other, ONCE PRN  sodium chloride flush 0.9 % injection 5-40 mL, 5-40 mL, IntraVENous, 2 times per day  sodium chloride flush 0.9 % injection 5-40 mL, 5-40 mL, IntraVENous, PRN  0.9 % sodium chloride infusion, 25 mL, IntraVENous, PRN  acetaminophen (TYLENOL) tablet 650 mg, 650 mg, Oral, Q6H PRN  amiodarone (CORDARONE) tablet 200 mg, 200 mg, Oral, Daily  apixaban (ELIQUIS) tablet 5 mg, 5 mg, Oral, BID  atorvastatin (LIPITOR) tablet 20 mg, 20 mg, Oral, Nightly  furosemide (LASIX) tablet 20 mg, 20 mg, Oral, BID  gabapentin (NEURONTIN) capsule 200 mg, 200 mg, Oral, BID  insulin lispro (1 Unit Dial) 5 Units, 5 Units, SubCUTAneous, BID WC  insulin lispro (1 Unit Dial) 0-18 Units, 0-18 Units, SubCUTAneous, TID WC  insulin glargine (LANTUS;BASAGLAR) injection pen 10 Units, 10 Units, SubCUTAneous, Nightly  ipratropium-albuterol (DUONEB) nebulizer solution 3 mL, 1 vial, Inhalation, Q6H PRN  lidocaine (XYLOCAINE) 5 % ointment, , Topical, Q4H PRN  metoprolol tartrate (LOPRESSOR) tablet 25 mg, 25 mg, Oral, BID  [Held by provider] midodrine (PROAMATINE) tablet 5 mg, 5 mg, Oral, TID WC  budesonide-formoterol (SYMBICORT) 80-4.5 MCG/ACT inhaler 2 puff, 2 puff, Inhalation, BID  oxyCODONE (ROXICODONE) immediate release tablet 5 mg, 5 mg, Oral, Q6H PRN  pantoprazole (PROTONIX) tablet 40 mg, 40 mg, Oral, Daily  polyethylene glycol (GLYCOLAX) packet 17 g, 17 g, Oral, Daily  sennosides-docusate sodium (SENOKOT-S) 8.6-50 MG tablet 1 tablet, 1 tablet, Oral, BID  sodium chloride (OCEAN, BABY AYR) 0.65 % nasal spray 1 spray, 1 spray, Nasal, PRN  spironolactone (ALDACTONE) tablet 25 mg, 25 mg, Oral, Daily  tamsulosin (FLOMAX) capsule 0.4 mg, 0.4 mg, Oral, Nightly    Physical      Vitals: /63   Pulse 57   Temp 97.6 °F (36.4 °C) (Temporal)   Resp 20   Ht 5' 9\" (1.753 m)   Wt 190 lb 11.2 oz (86.5 kg)   SpO2 97%   BMI 28.16 kg/m²   Temp: Temp: 97.6 °F (36.4 °C)  Max: Temp  Av.1 °F (36.2 °C)  Min: 96.9 °F (36.1 °C)  Max: 97.6 °F (36.4 °C)  Respiration range:  Resp  Av.9  Min: 13  Max: 24  Pulse Range:  Pulse  Av.4  Min: 57  Max: 72  Blood pressure range:  Systolic (06TCY), WND:722 , Min:88 , HYK:912   , Diastolic (17BUI), CXW:02, Min:51, Max:95    SpO2  Av.9 %  Min: 79 %  Max: 100 %    Intake/Output Summary (Last 24 hours) at 3/18/2022 2971  Last data filed at 3/18/2022 0544  Gross per 24 hour   Intake 900.06 ml   Output 600 ml   Net 300.06 ml       Vent settings:  Pulse  Av.6  Min: 57  Max: 206  Resp  Av.8  Min: 11  Max: 28  SpO2  Av.8 %  Min: 79 %  Max: 100 %    CONSTITUTIONAL:  fatigued, alert, cooperative, mild distress, appears older than stated age and moderately obese  EYES:  Unremarkable   NECK:  Faint bruit , no JVD  and supple, symmetrical, trachea midline  BACK:  symmetric and no curvature  LUNGS:  No increased work of breathing, good air exchange, clear to auscultation bilaterally, no crackles or wheezing  CARDIOVASCULAR:  normal apical pulses, regular rate and rhythm, normal S1 and S2 and no S3  ABDOMEN:  Soft BS + non tender   MUSCULOSKELETAL:  Trace edema   NEUROLOGIC:  Grossly intact   SKIN:  Warm dry and pale  and no bruising or bleeding    Data      Recent Results (from the past 96 hour(s))   CBC with Auto Differential    Collection Time: 03/15/22 11:09 AM   Result Value Ref Range    WBC 6.5 4.0 - 11.0 K/uL    RBC 3.74 (L) 4.20 - 5.90 M/uL    Hemoglobin 9.9 (L) 13.5 - 17.5 g/dL    Hematocrit 31.7 (L) 40.5 - 52.5 %    MCV 84.7 80.0 - 100.0 fL    MCH 26.5 26.0 - 34.0 pg    MCHC 31.3 31.0 - 36.0 g/dL    RDW 28.1 (H) 12.4 - 15.4 %    Platelets 407 536 - 252 K/uL    MPV 8.1 5.0 - 10.5 fL    Neutrophils % 52.2 %    Lymphocytes % 35.8 %    Monocytes % 6.8 %    Eosinophils % 4.4 %    Basophils % 0.8 %    Neutrophils Absolute 3.4 1.7 - 7.7 K/uL    Lymphocytes Absolute 2.3 1.0 - 5.1 K/uL    Monocytes Absolute 0.4 0.0 - 1.3 K/uL    Eosinophils Absolute 0.3 0.0 - 0.6 K/uL    Basophils Absolute 0.1 0.0 - 0.2 K/uL   Basic Metabolic Panel w/ Reflex to MG    Collection Time: 03/15/22 11:09 AM   Result Value Ref Range    Sodium 139 136 - 145 mmol/L    Potassium reflex Magnesium 4.3 3.5 - 5.1 mmol/L    Chloride 103 99 - 110 mmol/L    CO2 22 21 - 32 mmol/L    Anion Gap 14 3 - 16    Glucose 112 (H) 70 - 99 mg/dL    BUN 30 (H) 7 - 20 mg/dL    CREATININE 1.5 (H) 0.8 - 1.3 mg/dL    GFR Non- 46 (A) >60    GFR  56 (A) >60    Calcium 9.3 8.3 - 10.6 mg/dL   Troponin    Collection Time: 03/15/22 11:09 AM   Result Value Ref Range    Troponin 0.08 (H) <0.01 ng/mL   Blood Gas, Venous    Collection Time: 03/15/22 11:09 AM   Result Value Ref Range    pH, Juan 7.338 (L) 7.350 - 7.450    pCO2, Juan 43.8 40.0 - 50.0 mmHg    pO2, Juan 49.4 (H) 25.0 - 40.0 mmHg    HCO3, Venous 23.5 23.0 - 29.0 mmol/L    Base Excess, Juan -2.3 -3.0 - 3.0 mmol/L    O2 Sat, Juan 79 Not Established %    Carboxyhemoglobin 3.3 (H) 0.0 - 1.5 %    MetHgb, Juan 0.4 <1.5 %    TC02 (Calc), Juan 56 Not Established mmol/L    O2 Content, Juan 11 Not Established VOL %    O2 Therapy Unknown     Hemoglobin, Juan, Reduced 20 %   EKG 12 Lead    Collection Time: 03/15/22 11:17 AM   Result Value Ref Range    Ventricular Rate 103 BPM    Atrial Rate 122 BPM    QRS Duration 98 ms    Q-T Interval 400 ms    QTc Calculation (Bazett) 524 ms    R Axis 32 degrees    T Axis 103 degrees    Diagnosis       Atrial fibrillation with rapid ventricular responseST & T wave abnormality, consider lateral ischemiaAbnormal ECGConfirmed by JONNATHAN Phillips MD ([id number]) on 3/15/2022 12:05:45 PM   Blood Gas, Venous    Collection Time: 03/15/22 12:05 PM   Result Value Ref Range    pH, Juan 7.339 (L) 7.350 - 7.450    pCO2, Juan 45.4 40.0 - 50.0 mmHg    pO2, Juan 53.4 (H) 25.0 - 40.0 mmHg    HCO3, Venous 24.4 23.0 - 29.0 mmol/L    Base Excess, Juan -1.5 -3.0 - 3.0 mmol/L    O2 Sat, Juan 82 Not Established %    Carboxyhemoglobin 2.9 (H) 0.0 - 1.5 %    MetHgb, Juan 0.3 <1.5 %    TC02 (Calc), Juan 58 Not Established mmol/L    O2 Content, Juan 11 Not Established VOL %    O2 Therapy Unknown     Hemoglobin, Juan, Reduced 17 %   Lactic Acid    Collection Time: 03/15/22 12:05 PM   Result Value Ref Range    Lactic Acid 2.9 (H) 0.4 - 2.0 mmol/L   POCT Glucose    Collection Time: 03/15/22  5:42 PM   Result Value Ref Range    POC Glucose 92 70 - 99 mg/dl    Performed on ACCU-CHEK    POCT Glucose    Collection Time: 03/15/22  8:18 PM   Result Value Ref Range    POC Glucose 106 (H) 70 - 99 mg/dl    Performed on ACCU-CHEK    POCT Glucose    Collection Time: 03/16/22  7:36 AM   Result Value Ref Range    POC Glucose 91 70 - 99 mg/dl    Performed on ACCU-CHEK    POCT Glucose    Collection Time: 03/16/22 10:57 AM   Result Value Ref Range    POC Glucose 137 (H) 70 - 99 mg/dl    Performed on ACCU-CHEK    EKG 12 Lead    Collection Time: 03/16/22 12:23 PM   Result Value Ref Range    Ventricular Rate 90 BPM    Atrial Rate 288 BPM    QRS Duration 94 ms    Q-T Interval 400 ms    QTc Calculation (Bazett) 489 ms    R Axis 21 degrees    T Axis 63 degrees    Diagnosis       Atrial fibrillation /flutterAbnormal ECGConfirmed by JONNATHAN Wasserman MD ([id number]) on 3/16/2022 4:42:48 PM   TSH with Reflex    Collection Time: 03/16/22  1:06 PM   Result Value Ref Range    TSH 1.88 0.27 - 4.20 uIU/mL   Basic Metabolic Panel w/ Reflex to MG    Collection Time: 03/16/22  1:06 PM   Result Value Ref Range    Sodium 139 136 - 145 mmol/L    Potassium reflex Magnesium 4.1 3.5 - 5.1 mmol/L    Chloride 103 99 - 110 mmol/L    CO2 24 21 - 32 mmol/L    Anion Gap 12 3 - 16    Glucose 147 (H) 70 - 99 mg/dL    BUN 25 (H) 7 - 20 mg/dL    CREATININE 1.2 0.8 - 1.3 mg/dL    GFR Non- 59 (A) >60    GFR African American >60 >60    Calcium 8.7 8.3 - 10.6 mg/dL   Magnesium    Collection Time: 03/16/22  1:06 PM   Result Value Ref Range    Magnesium 1.70 (L) 1.80 - 2.40 mg/dL   POCT Glucose    Collection Time: 03/16/22  4:21 PM   Result Value Ref Range    POC Glucose 101 (H) 70 - 99 mg/dl    Performed on ACCU-CHEK    POCT Glucose    Collection Time: 03/16/22  9:07 PM   Result Value Ref Range    POC Glucose 97 70 - 99 mg/dl    Performed on ACCU-CHEK    EKG 12 Lead    Collection Time: 03/16/22  9:59 PM   Result Value Ref Range    Ventricular Rate 101 BPM    Atrial Rate 88 BPM    QRS Duration 92 ms    Q-T Interval 392 ms    QTc Calculation (Bazett) 508 ms    R Axis 35 degrees    T Axis 75 degrees    Diagnosis       Atrial fibrillation with rapid ventricular responseNonspecific ST and T changesConfirmed by Dianna Ocampo MD, Esa Angel ([id number]) on 3/17/2022 12:42:08 PM   POCT Venous    Collection Time: 03/16/22 11:27 PM   Result Value Ref Range    POC Sodium 137 136 - 145 mmol/L    POC Potassium 4.2 3.5 - 5.1 mmol/L    POC Glucose 113 (H) 70 - 99 mg/dl    Calcium, Ion 1.25 1.12 - 1.32 mmol/L    Sample Type JOSEPH     Performed on SEE BELOW    SPECIMEN REJECTION    Collection Time: 03/17/22 12:29 AM   Result Value Ref Range    Rejected Test bmp mg     Reason for Rejection see below    Basic Metabolic Panel    Collection Time: 03/17/22  1:07 AM   Result Value Ref Range    Sodium 140 136 - 145 mmol/L    Potassium 4.2 3.5 - 5.1 mmol/L    Chloride 104 99 - 110 mmol/L    CO2 25 21 - 32 mmol/L    Anion Gap 11 3 - 16    Glucose 124 (H) 70 - 99 mg/dL    BUN 26 (H) 7 - 20 mg/dL    CREATININE 1.4 (H) 0.8 - 1.3 mg/dL    GFR Non-African American 50 (A) >60    GFR African American >60 >60    Calcium 9.0 8.3 - 10.6 mg/dL   Magnesium    Collection Time: 03/17/22  1:07 AM   Result Value Ref Range    Magnesium 2.40 1.80 - 2.40 mg/dL   POCT Glucose    Collection Time: 03/17/22  8:06 AM   Result Value Ref Range    POC Glucose 122 (H) 70 - 99 mg/dl    Performed on ACCU-CHEK    POCT Glucose    Collection Time: 03/17/22  6:43 PM   Result Value Ref Range    POC Glucose 93 70 - 99 mg/dl    Performed on ACCU-CHEK    POCT Glucose    Collection Time: 03/17/22  8:51 PM   Result Value Ref Range    POC Glucose 114 (H) 70 - 99 mg/dl    Performed on ACCU-CHEK    Basic Metabolic Panel    Collection Time: 03/18/22  5:09 AM   Result Value Ref Range    Sodium 136 136 - 145 mmol/L    Potassium 4.6 3.5 - 5.1 mmol/L    Chloride 104 99 - 110 mmol/L    CO2 23 21 - 32 mmol/L    Anion Gap 9 3 - 16    Glucose 91 70 - 99 mg/dL    BUN 20 7 - 20 mg/dL    CREATININE 1.0 0.8 - 1.3 mg/dL    GFR Non-African American >60 >60    GFR African American >60 >60    Calcium 8.8 8.3 - 10.6 mg/dL   CBC    Collection Time: 03/18/22  5:09 AM   Result Value Ref Range    WBC 6.8 4.0 - 11.0 K/uL    RBC 3.38 (L) 4.20 - 5.90 M/uL    Hemoglobin 8.9 (L) 13.5 - 17.5 g/dL    Hematocrit 28.4 (L) 40.5 - 52.5 %    MCV 84.2 80.0 - 100.0 fL    MCH 26.4 26.0 - 34.0 pg    MCHC 31.3 31.0 - 36.0 g/dL    RDW 26.8 (H) 12.4 - 15.4 %    Platelets 038 266 - 914 K/uL    MPV 7.7 5.0 - 10.5 fL   Magnesium    Collection Time: 03/18/22  5:09 AM   Result Value Ref Range    Magnesium 2.00 1.80 - 2.40 mg/dL   POCT Glucose    Collection Time: 03/18/22  7:48 AM   Result Value Ref Range    POC Glucose 94 70 - 99 mg/dl    Performed on ACCU-CHEK    POCT Glucose    Collection Time: 03/18/22 11:24 AM   Result Value Ref Range    POC Glucose 118 (H) 70 - 99 mg/dl    Performed on [address] [place] [address] Problems           Last Modified POA    * (Principal) Syncope and collapse 3/15/2022 Yes    CAD (coronary artery disease) (Chronic) 3/15/2022 Yes    Essential hypertension 3/15/2022 Yes    PVD (peripheral vascular disease) (HCC) (Chronic) 3/15/2022 Yes    COPD (chronic obstructive pulmonary disease) ([address] 3/15/2022 Yes    Dyspnea 3/15/2022 Yes    Atrial fibrillation with RVR ([address] 3/15/2022 Yes    Anemia 3/15/2022 Yes    Hypotension due to hypovolemia 3/15/2022 Yes    COVID 3/15/2022 Yes    Acute respiratory failure due to COVID-19 ([address] 3/15/2022 Yes    High cholesterol 3/15/2022 Yes    Ischemic cardiomyopathy 3/15/2022 Yes    Chronic a-fib ([address] 3/15/2022 Yes    Acute on chronic systolic heart failure, NYHA class 3 ([address] 3/15/2022 Yes        Grade 2 daistolic dysfunction on ECHO LVEF 55%  S/p C   LAD 90 to 99 percent  Severe Multi vessel disease  Graft from LIMA to LAD patent Dr Nas Velázquez attended  For recurrent VT    s/p MRI compatible ICD insertion per EP    anticipate going home in am

## 2022-03-18 NOTE — PROGRESS NOTES
Discharge order received, however per case management, previous facility no longer has bed available. Case management to initiate referral process for additional facilities.     Olvin South RN  3/18/2022

## 2022-03-18 NOTE — PROGRESS NOTES
Patient sleeping in bed at this time-arouses to verbal stimuli. VSS. Denies pain. Swathe intact. ICD dressing, clean, dry and intact with minimal old drainage present. R groin site, soft with no hematoma or oozing present. Denies needs at this time.

## 2022-03-18 NOTE — PROGRESS NOTES
Physical Therapy  Facility/Department: [facility]  Daily Treatment Note  NAME: Samuel Vo  : 1950  MRN: [id number]    Date of Service: 3/18/2022    Discharge Recommendations:Leandra Toth scored a 15/24 on the AM-PAC short mobility form. Current research shows that an AM-PAC score of 17 or less is typically not associated with a discharge to the patient's home setting. Based on the patient's AM-PAC score and their current functional mobility deficits, it is recommended that the patient have 3-5 sessions per week of Physical Therapy at d/c to increase the patient's independence. Please see assessment section for further patient specific details. If patient discharges prior to next session this note will serve as a discharge summary. Please see below for the latest assessment towards goals. 3-5 sessions per week   PT Equipment Recommendations  Equipment Needed: No    Assessment   Body structures, Functions, Activity limitations: Decreased functional mobility ; Decreased balance;Decreased endurance;Decreased strength  Assessment: Pt mod A bed mob, CGA transfers, Min A with RW amb. Painful today. Patient not at baseline function and would benefit from skilled PT to address baove deficits and faciliate return to baseline function. Has not returned to baseline following recent hospitalization with COVID. Patient fatigues very quickly and presents at increased risk of falls  Treatment Diagnosis: decreased functional mobility, impaired gait, decreased endurance  Prognosis: Good  Decision Making: Medium Complexity  Clinical Presentation: evolving  PT Education: Goals;PT Role;Plan of Care;General Safety; Energy Conservation;Precautions  Patient Education: d/c recommendation s, reviewed L shoulder precautions after ICD- verbalized understanding but needs reinforcement.   Barriers to Learning: none  REQUIRES PT FOLLOW UP: Yes  Activity Tolerance  Activity Tolerance: Patient limited by fatigue;Patient limited by pain  Activity Tolerance: slow to follow VCs. Patient Diagnosis(es): The primary encounter diagnosis was Precordial chest pain. Diagnoses of Palpitations and Systolic congestive heart failure, unspecified HF chronicity ([facility] were also pertinent to this visit. has a past medical history of Acid reflux, Acute MI ([facility] CAD (coronary artery disease), COPD (chronic obstructive pulmonary disease) ([facility] Diverticulitis, Hypertension, and Peripheral vascular disease ([facility] has a past surgical history that includes vascular surgery; Cardiac surgery; Colonoscopy; and Cardiac catheterization. Restrictions  Restrictions/Precautions  Restrictions/Precautions: Fall Risk (high fall risk)  Required Braces or Orthoses?: No  Implants present? :  (right ventricular defibrillator)  Position Activity Restriction  Other position/activity restrictions: No elevation L shoulder >90 deg. Velco strap in bed for reminder  Subjective   General  Chart Reviewed: Yes  Response To Previous Treatment: Patient with no complaints from previous session. Family / Caregiver Present: Yes (nursing in beginning of treatment.)  Subjective  Subjective: pt reporting 10/10 pain L shoulder at surgical site. Nursing notified. General Comment  Comments: Pt supine in bed upon arrival. Pt leaving for test and needing to get up to . Pain Screening  Patient Currently in Pain: Yes  Pain Assessment  Pain Assessment: 0-10  Pain Level: 10  Pain Type: Surgical pain  Pain Location: Shoulder  Pain Orientation: Left  Vital Signs  Patient Currently in Pain: Yes       Orientation     Cognition      Objective   Bed mobility  Rolling to Left: Minimal assistance  Supine to Sit: Moderate assistance  Scooting: Contact guard assistance  Comment: Pt not following VCs very well. L shoulder pain limiting. Transfers  Sit to Stand: Contact guard assistance (transfers from EOB and .  VC each time for hand placement.)  Stand to sit: Contact guard assistance  Ambulation  Ambulation?: Yes  Ambulation 1  Surface: level tile  Device: Rolling Walker  Other Apparatus: O2 (3LO2)  Assistance: Minimal assistance  Quality of Gait: VCs for upright standing posture, short, shuffling steps. Gait Deviations: Decreased step length  Distance: Pt amb with RW and CGA for 5 ft x 2. Comments: Pt amb to WC, not following VCs and needing Min A to get centered before sitting in WC. Pt left for xray and returned in 5 min later. Then amb WC to recliner chair. Stairs/Curb  Stairs?: No     Balance  Posture: Good  Sitting - Static: Good  Sitting - Dynamic: Good  Standing - Static: Fair;+ (with RW)  Standing - Dynamic: Fair;+ (with RW.)            Comment: Pt positioned in chair for comfort, breakfast tray and needs placed in reach. G-Code     OutComes Score                                                     AM-PAC Score  AM-PAC Inpatient Mobility Raw Score : 15 (03/18/22 0904)  AM-PAC Inpatient T-Scale Score : 39.45 (03/18/22 0904)  Mobility Inpatient CMS 0-100% Score: 57.7 (03/18/22 0904)  Mobility Inpatient CMS G-Code Modifier : CK (03/18/22 0904)          Goals  Short term goals  Time Frame for Short term goals: To be met prior to discharge (no goals met in full this date)  Short term goal 1: Sit to/from stand with SBA  Short term goal 2: Ambulate 22' with RW and SBA  Short term goal 3: Bed mobility with supervision  Patient Goals   Patient goals : to walk    Plan    Plan  Times per week: 3-5  Times per day: Daily  Current Treatment Recommendations: Strengthening,Transfer Training,Endurance Training,Balance Training,Gait Training,Functional Mobility Training,Safety Education & Training,Home Exercise Program,Pain Management  Safety Devices  Type of devices:  All fall risk precautions in place,Call light within reach,Nurse notified,Patient at risk for falls,Chair alarm in place,Left in chair  Restraints  Initially in place: No     Therapy Time   Individual Concurrent Group Co-treatment   Time In 0816         Time Out 0847         Minutes 31         Timed Code Treatment Minutes: Pj Allison, [id number]

## 2022-03-18 NOTE — PROGRESS NOTES
Dr. Odilia Potter bedside to see pt. Stated that from cardiology standpoint, pt ok for d/c. Attending MD Dr. Lee Collier notified via perfect serve.      Mir Dye RN  3/18/2022

## 2022-03-18 NOTE — ED PROVIDER NOTES
Emergency Department Encounter    Patient: Chad Verma  MRN: [id number]  : 1950  Date of Evaluation: 3/18/2022  ED Provider:  Vinita Parrish MD    Triage Chief Complaint:   Other (from [place] [facility] was told he was unresponsive but when squad arrived awake and alert but monitor shows afib.  patient says goes in and out of a fibe  denies cp or sob)    [place]:  Chad Verma is a 67 y.o. male that presents to the ER for evaluation of acute precordial chest pain dyspnea, currently residing in extended care facility has had multiple recent admissions, had a very prolonged hospitalization with Covid pneumonia including non-STEMI, has a history of cardiomyopathy and obstructive coronary lesions. Positive acute dyspnea. Positive chest pain.     ROS - see HPI, below listed is current ROS at time of my eval:  General:  No fevers, no weakness  Eyes:  no discharge  ENT:  No sore throat, no nasal congestion  Cardiovascular:  + chest pain, no palpitations  Respiratory: + shortness of breath, no cough, no wheezing  Gastrointestinal:  No pain, no nausea, no vomiting, no diarrhea  Musculoskeletal:  No muscle pain, no joint pain  Skin:  No rash, no pruritis  Neurologic:  No speech problems, no headache, no extremity numbness, no extremity tingling, no extremity weakness  Psychiatric:  No anxiety  Genitourinary:  No dysuria, no hematuria  Endocrine:  No unexpected weight gain, no unexpected weight loss  Extremities:  no edema, no pain    Past Medical History:   Diagnosis Date    Acid reflux     Acute MI ([address]     CAD (coronary artery disease)     COPD (chronic obstructive pulmonary disease) (HCC)     Diverticulitis     Hypertension     Peripheral vascular disease (HCC)      Past Surgical History:   Procedure Laterality Date    CARDIAC CATHETERIZATION      CARDIAC SURGERY      stent x1 --3 yrs, 14 CABG 3 V    COLONOSCOPY      VASCULAR SURGERY       Family History   Problem Relation Age of Onset    Heart Disease Mother     Heart Disease Sister     Heart Disease Father     Heart Disease Maternal Uncle     Anesth Problems Neg Hx     Malig Hyperten Neg Hx     Hypotension Neg Hx     Malig Hypertherm Neg Hx     Pseudochol. Deficiency Neg Hx      Social History     Socioeconomic History    Marital status:      Spouse name: Not on file    Number of children: Not on file    Years of education: Not on file    Highest education level: Not on file   Occupational History    Not on file   Tobacco Use    Smoking status: Former Smoker     Packs/day: 0.50     Years: 50.00     Pack years: 25.00     Types: Cigarettes     Start date: 2014     Quit date: 12/15/2021     Years since quittin.2    Smokeless tobacco: Never Used    Tobacco comment: 5-6 CIGS/DAY   Vaping Use    Vaping Use: Never used   Substance and Sexual Activity    Alcohol use: Yes     Comment: occasional    Drug use: No    Sexual activity: Not on file   Other Topics Concern    Not on file   Social History Narrative    Not on file     Social Determinants of Health     Financial Resource Strain:     Difficulty of Paying Living Expenses: Not on file   Food Insecurity:     Worried About Running Out of Food in the Last Year: Not on file    Erin of Food in the Last Year: Not on file   Transportation Needs:     Lack of Transportation (Medical): Not on file    Lack of Transportation (Non-Medical):  Not on file   Physical Activity:     Days of Exercise per Week: Not on file    Minutes of Exercise per Session: Not on file   Stress:     Feeling of Stress : Not on file   Social Connections:     Frequency of Communication with Friends and Family: Not on file    Frequency of Social Gatherings with Friends and Family: Not on file    Attends Mandaen Services: Not on file    Active Member of Clubs or Organizations: Not on file    Attends Club or Organization Meetings: Not on file    Marital Status: Not on file   Intimate Partner Violence:     Fear of Current or Ex-Partner: Not on file    Emotionally Abused: Not on file    Physically Abused: Not on file    Sexually Abused: Not on file   Housing Stability:     Unable to Pay for Housing in the Last Year: Not on file    Number of Places Lived in the Last Year: Not on file    Unstable Housing in the Last Year: Not on file     Current Facility-Administered Medications   Medication Dose Route Frequency Provider Last Rate Last Admin    0.9 % sodium chloride infusion   IntraVENous Continuous Tang Fairchild MD 75 mL/hr at 03/17/22 1739 New Bag at 03/17/22 1739    sodium chloride flush 0.9 % injection 5-40 mL  5-40 mL IntraVENous 2 times per day Tang Fairchild MD        sodium chloride flush 0.9 % injection 5-40 mL  5-40 mL IntraVENous PRN Tang Fairchild MD        0.9 % sodium chloride infusion  25 mL IntraVENous PRN Tang Fairchild MD        acetaminophen (TYLENOL) tablet 650 mg  650 mg Oral Q4H PRN Tang Fairchild MD        oxyCODONE-acetaminophen (PERCOCET) 5-325 MG per tablet 1 tablet  1 tablet Oral Q4H PRN Tang Fairchild MD        Or    oxyCODONE-acetaminophen (PERCOCET) 5-325 MG per tablet 2 tablet  2 tablet Oral Q4H PRAMADA Fairchild MD   2 tablet at 03/17/22 1722    morphine (PF) injection 2 mg  2 mg IntraVENous Q3H PRN Tang Fairchild MD        iopamidol (ISOVUE-370) 76 % injection 93 mL  93 mL Other ONCE PRN Tang Fairchild MD        sodium chloride flush 0.9 % injection 5-40 mL  5-40 mL IntraVENous 2 times per day Augusta Brown MD   10 mL at 03/17/22 2101    sodium chloride flush 0.9 % injection 5-40 mL  5-40 mL IntraVENous PRN Augusta Brown MD   10 mL at 03/17/22 2102    0.9 % sodium chloride infusion  25 mL IntraVENous PRN Augusta Brown MD        acetaminophen (TYLENOL) tablet 650 mg  650 mg Oral Q6H PRN Alfred Wheat MD   650 mg at 03/18/22 0411    amiodarone (CORDARONE) tablet 200 mg  200 mg Oral Daily Alfred Wheat MD   200 mg at 03/17/22 9787    apixaban (ELIQUIS) tablet 5 mg  5 mg Oral BID Vincent Lucio MD   5 mg at 03/16/22 2124    atorvastatin (LIPITOR) tablet 20 mg  20 mg Oral Nightly Vincent Lucio MD   20 mg at 03/17/22 2047    furosemide (LASIX) tablet 20 mg  20 mg Oral BID Vincent Lucio MD   20 mg at 03/16/22 1739    gabapentin (NEURONTIN) capsule 200 mg  200 mg Oral BID Vincent Lucio MD   200 mg at 03/17/22 2046    insulin lispro (1 Unit Dial) 5 Units  5 Units SubCUTAneous BID  Vincent Lucio MD        insulin lispro (1 Unit Dial) 0-18 Units  0-18 Units SubCUTAneous TID  Lupillo Moran MD        insulin glargine (LANTUS;BASAGLAR) injection pen 10 Units  10 Units SubCUTAneous Nightly Vincent Lucio MD        ipratropium-albuterol (DUONEB) nebulizer solution 3 mL  1 vial Inhalation Q6H PRN Vincent Lucio MD        lidocaine (XYLOCAINE) 5 % ointment   Topical Q4H PRN Vincent Lucio MD   Given at 03/16/22 0328    metoprolol tartrate (LOPRESSOR) tablet 25 mg  25 mg Oral BID Vincent Lucio MD   25 mg at 03/17/22 2047    [Held by provider] midodrine (PROAMATINE) tablet 5 mg  5 mg Oral TID  Vincent Lucio MD   5 mg at 03/16/22 1739    budesonide-formoterol (SYMBICORT) 80-4.5 MCG/ACT inhaler 2 puff  2 puff Inhalation BID Vincent Lucio MD   2 puff at 03/17/22 2053    oxyCODONE (ROXICODONE) immediate release tablet 5 mg  5 mg Oral Q6H PRN Vincent Lucio MD   5 mg at 03/16/22 1027    pantoprazole (PROTONIX) tablet 40 mg  40 mg Oral Daily Vincent Lucio MD   40 mg at 03/17/22 0756    polyethylene glycol (GLYCOLAX) packet 17 g  17 g Oral Daily Vincent Lucio MD   17 g at 03/16/22 0820    sennosides-docusate sodium (SENOKOT-S) 8.6-50 MG tablet 1 tablet  1 tablet Oral BID Vincent Lucio MD   1 tablet at 03/16/22 6827    sodium chloride (OCEAN, BABY AYR) 0.65 % nasal spray 1 spray  1 spray Nasal PRN Vincent Lucio MD        spironolactone (ALDACTONE) tablet 25 mg  25 mg Oral Daily Vincent Lucio MD   25 mg at 03/16/22 0821    tamsulosin (FLOMAX) capsule 0.4 mg  0.4 mg Oral Nightly Sae Laboy MD   0.4 mg at 03/17/22 2047     No Known Allergies    Nursing Notes Reviewed    Physical Exam:  Triage VS:    ED Triage Vitals   Enc Vitals Group      BP 03/15/22 1100 93/79      Pulse 03/15/22 1100 106      Resp 03/15/22 1100 18      Temp 03/15/22 1100 98.2 °F (36.8 °C)      Temp Source 03/15/22 1814 Oral      SpO2 03/15/22 1100 97 %      Weight 03/15/22 1100 187 lb (84.8 kg)      Height 03/15/22 1100 5' 9\" (1.753 m)      Head Circumference --       Peak Flow --       Pain Score --       Pain Loc --       Pain Edu? --       Excl. in [address]? --        My pulse ox interpretation is - normal    General appearance:  No acute distress. Skin:  Warm. Dry. Eye:  Extraocular movements intact. Ears, nose, mouth and throat:  Oral mucosa moist   Neck:  Trachea midline. Extremity:  No swelling. Normal ROM     Heart:  Regular rate and rhythm, normal S1 & S2, no extra heart sounds. Perfusion:  intact  Respiratory:  Lungs clear to auscultation bilaterally. Respirations nonlabored. Abdominal:  Normal bowel sounds. Soft. Nontender. Non distended. Neurological:  Alert and oriented times 3.              Psychiatric:  Appropriate    I have reviewed and interpreted all of the currently available lab results from this visit (if applicable):  Results for orders placed or performed during the hospital encounter of 03/15/22   CBC with Auto Differential   Result Value Ref Range    WBC 6.5 4.0 - 11.0 K/uL    RBC 3.74 (L) 4.20 - 5.90 M/uL    Hemoglobin 9.9 (L) 13.5 - 17.5 g/dL    Hematocrit 31.7 (L) 40.5 - 52.5 %    MCV 84.7 80.0 - 100.0 fL    MCH 26.5 26.0 - 34.0 pg    MCHC 31.3 31.0 - 36.0 g/dL    RDW 28.1 (H) 12.4 - 15.4 %    Platelets 190 092 - 984 K/uL    MPV 8.1 5.0 - 10.5 fL    Neutrophils % 52.2 %    Lymphocytes % 35.8 %    Monocytes % 6.8 %    Eosinophils % 4.4 %    Basophils % 0.8 %    Neutrophils Absolute 3.4 1.7 - 7.7 K/uL Lymphocytes Absolute 2.3 1.0 - 5.1 K/uL    Monocytes Absolute 0.4 0.0 - 1.3 K/uL    Eosinophils Absolute 0.3 0.0 - 0.6 K/uL    Basophils Absolute 0.1 0.0 - 0.2 K/uL   Basic Metabolic Panel w/ Reflex to MG   Result Value Ref Range    Sodium 139 136 - 145 mmol/L    Potassium reflex Magnesium 4.3 3.5 - 5.1 mmol/L    Chloride 103 99 - 110 mmol/L    CO2 22 21 - 32 mmol/L    Anion Gap 14 3 - 16    Glucose 112 (H) 70 - 99 mg/dL    BUN 30 (H) 7 - 20 mg/dL    CREATININE 1.5 (H) 0.8 - 1.3 mg/dL    GFR Non- 46 (A) >60    GFR  56 (A) >60    Calcium 9.3 8.3 - 10.6 mg/dL   Troponin   Result Value Ref Range    Troponin 0.08 (H) <0.01 ng/mL   Blood Gas, Venous   Result Value Ref Range    pH, Juan 7.338 (L) 7.350 - 7.450    pCO2, Juan 43.8 40.0 - 50.0 mmHg    pO2, Juan 49.4 (H) 25.0 - 40.0 mmHg    HCO3, Venous 23.5 23.0 - 29.0 mmol/L    Base Excess, Juan -2.3 -3.0 - 3.0 mmol/L    O2 Sat, Juan 79 Not Established %    Carboxyhemoglobin 3.3 (H) 0.0 - 1.5 %    MetHgb, Juan 0.4 <1.5 %    TC02 (Calc), Juan 56 Not Established mmol/L    O2 Content, Juan 11 Not Established VOL %    O2 Therapy Unknown     Hemoglobin, Juan, Reduced 20 %   Blood Gas, Venous   Result Value Ref Range    pH, Juan 7.339 (L) 7.350 - 7.450    pCO2, Juan 45.4 40.0 - 50.0 mmHg    pO2, Juan 53.4 (H) 25.0 - 40.0 mmHg    HCO3, Venous 24.4 23.0 - 29.0 mmol/L    Base Excess, Juan -1.5 -3.0 - 3.0 mmol/L    O2 Sat, Juan 82 Not Established %    Carboxyhemoglobin 2.9 (H) 0.0 - 1.5 %    MetHgb, Juan 0.3 <1.5 %    TC02 (Calc), Juan 58 Not Established mmol/L    O2 Content, Juan 11 Not Established VOL %    O2 Therapy Unknown     Hemoglobin, Juan, Reduced 17 %   Lactic Acid   Result Value Ref Range    Lactic Acid 2.9 (H) 0.4 - 2.0 mmol/L   TSH with Reflex   Result Value Ref Range    TSH 1.88 0.27 - 4.20 uIU/mL   Basic Metabolic Panel w/ Reflex to MG   Result Value Ref Range    Sodium 139 136 - 145 mmol/L    Potassium reflex Magnesium 4.1 3.5 - 5.1 mmol/L    Chloride 103 99 - 110 mmol/L    CO2 24 21 - 32 mmol/L    Anion Gap 12 3 - 16    Glucose 147 (H) 70 - 99 mg/dL    BUN 25 (H) 7 - 20 mg/dL    CREATININE 1.2 0.8 - 1.3 mg/dL    GFR Non- 59 (A) >60    GFR African American >60 >60    Calcium 8.7 8.3 - 10.6 mg/dL   Magnesium   Result Value Ref Range    Magnesium 1.70 (L) 1.80 - 2.40 mg/dL   SPECIMEN REJECTION   Result Value Ref Range    Rejected Test bmp mg     Reason for Rejection see below    Basic Metabolic Panel   Result Value Ref Range    Sodium 140 136 - 145 mmol/L    Potassium 4.2 3.5 - 5.1 mmol/L    Chloride 104 99 - 110 mmol/L    CO2 25 21 - 32 mmol/L    Anion Gap 11 3 - 16    Glucose 124 (H) 70 - 99 mg/dL    BUN 26 (H) 7 - 20 mg/dL    CREATININE 1.4 (H) 0.8 - 1.3 mg/dL    GFR Non-African American 50 (A) >60    GFR African American >60 >60    Calcium 9.0 8.3 - 10.6 mg/dL   Magnesium   Result Value Ref Range    Magnesium 2.40 1.80 - 2.40 mg/dL   POCT Glucose   Result Value Ref Range    POC Glucose 92 70 - 99 mg/dl    Performed on ACCU-CHEK    POCT Glucose   Result Value Ref Range    POC Glucose 106 (H) 70 - 99 mg/dl    Performed on ACCU-CHEK    POCT Glucose   Result Value Ref Range    POC Glucose 91 70 - 99 mg/dl    Performed on ACCU-CHEK    POCT Glucose   Result Value Ref Range    POC Glucose 137 (H) 70 - 99 mg/dl    Performed on ACCU-CHEK    POCT Glucose   Result Value Ref Range    POC Glucose 101 (H) 70 - 99 mg/dl    Performed on ACCU-CHEK    POCT Glucose   Result Value Ref Range    POC Glucose 97 70 - 99 mg/dl    Performed on ACCU-CHEK    POCT Venous   Result Value Ref Range    POC Sodium 137 136 - 145 mmol/L    POC Potassium 4.2 3.5 - 5.1 mmol/L    POC Glucose 113 (H) 70 - 99 mg/dl    Calcium, Ion 1.25 1.12 - 1.32 mmol/L    Sample Type JOSEPH     Performed on SEE BELOW    POCT Glucose   Result Value Ref Range    POC Glucose 122 (H) 70 - 99 mg/dl    Performed on ACCU-CHEK    POCT Glucose   Result Value Ref Range    POC Glucose 93 70 - 99 mg/dl    Performed on ACCU-CHEK    POCT Glucose   Result Value Ref Range    POC Glucose 114 (H) 70 - 99 mg/dl    Performed on ACCU-CHEK    EKG 12 Lead   Result Value Ref Range    Ventricular Rate 103 BPM    Atrial Rate 122 BPM    QRS Duration 98 ms    Q-T Interval 400 ms    QTc Calculation (Bazett) 524 ms    R Axis 32 degrees    T Axis 103 degrees    Diagnosis       Atrial fibrillation with rapid ventricular responseST & T wave abnormality, consider lateral ischemiaAbnormal ECGConfirmed by JONNATHAN Oshea MD ([id number]) on 3/15/2022 12:05:45 PM   EKG 12 Lead   Result Value Ref Range    Ventricular Rate 90 BPM    Atrial Rate 288 BPM    QRS Duration 94 ms    Q-T Interval 400 ms    QTc Calculation (Bazett) 489 ms    R Axis 21 degrees    T Axis 63 degrees    Diagnosis       Atrial fibrillation /flutterAbnormal ECGConfirmed by JONNATHAN Oshea MD ([id number]) on 3/16/2022 4:42:48 PM   EKG 12 Lead   Result Value Ref Range    Ventricular Rate 101 BPM    Atrial Rate 88 BPM    QRS Duration 92 ms    Q-T Interval 392 ms    QTc Calculation (Bazett) 508 ms    R Axis 35 degrees    T Axis 75 degrees    Diagnosis       Atrial fibrillation with rapid ventricular responseNonspecific ST and T changesConfirmed by Deepak Oshea MD ([id number]) on 3/17/2022 12:42:08 PM      Radiographs (if obtained):  Radiologist's Report Reviewed:  XR CHEST PORTABLE    Result Date: 3/15/2022  EXAMINATION: ONE XRAY VIEW OF THE CHEST 3/15/2022 11:15 am COMPARISON: Prior study(s) most recent 06/20/2022. HISTORY: ORDERING SYSTEM PROVIDED HISTORY: cp TECHNOLOGIST PROVIDED HISTORY: Reason for exam:->cp Reason for Exam: chest pain FINDINGS: The heart is moderately enlarged. Vessels are congested. There is patchy airspace disease bilaterally greater in the periphery and slightly greater on the left than right. This is faintly seen on prior studies. Sternotomy wires are seen from prior surgery. There is no pneumothorax or significant pleural effusion.      Findings compatible with congestive failure however increased density is seen in the mid lungs bilaterally greater laterally on the left. Concern is for possible superimposed pneumonia. RECOMMENDATION: Continued follow-up is recommended, preferably with upright PA and lateral views when feasible. EKG (if obtained): (All EKG's are interpreted by myself in the absence of a cardiologist)      MDM:  Patient presented with chest pain. Given history and presentation cardiac workup initiated. Patient had labs, EKG, x-ray and troponin ordered. Patient was placed on monitor. Patient's pulse ox is normal.      HEART Score:  Heart Score for chest pain patients  History: Moderately Suspicious  ECG: Non-Specifc repolarization disturbance/LBTB/PM  Patient Age: > 65 years  *Risk factors for Atherosclerotic disease: Positive family History,Coronary Artery Disease,Hypertension,Hypercholesterolemia  Risk Factors: > 3 Risk factors or history of atherosclerotic disease*  Troponin: < 1X normal limit  Heart Score Total: 6     Is a very complex history, including a history of non-STEMI VT A. fib, chronic cardiomyopathy and ischemic cardiomyopathy, he will be admitted for reevaluation clinical condition possible palliative care consultation, case discussed with the admitting medical staff    Clinical Impression:  1. Precordial chest pain    2. Palpitations    3. Systolic congestive heart failure, unspecified HF chronicity ([address]      Disposition referral (if applicable):  No follow-up provider specified. Disposition medications (if applicable):  Current Discharge Medication List          Comment: Please note this report has been produced using speech recognition software and may contain errors related to that system including errors in grammar, punctuation, and spelling, as well as words and phrases that may be inappropriate. Efforts were made to edit the dictations.       Florie Fothergill, MD  74/55/61 [id number]

## 2022-03-18 NOTE — PROGRESS NOTES
[facility] [address]   Electrophysiology Progress Note     Admit Date: 3/15/2022     Reason for follow up: Atrial fibrillation/VT    HPI and Interval History: 67 y.o. male presented with syncope. He has history of CAD, ischemic cardiomyopathy with recovered EF, s/p CABG in 2014, paroxysmal atrial fibrillation, HTN, PAD has been admitted with syncope. While in hospital he had VT and became unresponsive requiring chest compression and synchronized shock of 200 J. He has been transferred to CVU. Patient seen and examined. Clinical notes reviewed. Telemetry reviewed. VT arrest. Has syncope. Cardiac cath with no intervention. S/p single chamber AICD. Assessment:   - VT arrest   - Persistent atrial fibrillation  - CAD  - Syncope  - HTN  - PAD      Plan:   - VT s/p single chamber AICD. - Device interrogation normal today. CXR with no complications. Follow up in device clinic. Continue with Amiodarone therapy. - On Eliquis. - Lipitor  - Metoprolol   - Aldactone. - I have discussed treatment options for atrial fibrillation. He benefits from Afib ablation. This can be arranged as outpatient. He can be discharged home from EP standpoint. Follow up in office. Discussed with nursing staff.        Active Hospital Problems    Diagnosis Date Noted    CAD (coronary artery disease) [I25.10] 08/24/2012     Priority: High    Essential hypertension [I10] 08/24/2012     Priority: Medium    Syncope and collapse [R55] 03/15/2022    Ischemic cardiomyopathy [I25.5]     Chronic a-fib ([facility] [I48.20]     Acute on chronic systolic heart failure, NYHA class 3 ([facility] [I50.23]     COVID [U07.1] 01/12/2022    Acute respiratory failure due to COVID-19 ([address] [U07.1, J96.00] 01/12/2022    High cholesterol [E78.00] 01/12/2022    Hypotension due to hypovolemia [I95.89, E86.1] 11/19/2019    Atrial fibrillation with RVR ([facility] [I48.91] 11/10/2019    Anemia [D64.9] 11/10/2019    COPD (chronic obstructive pulmonary disease) ([facility] [J44.9] 10/03/2014    Dyspnea [R06.00] 10/03/2014    PVD (peripheral vascular disease) ([facility] [I73.9] 2014       Diagnostic studies:   Tele with VT noted. Echo:    Left ventricular systolic function is low normal with ejection fraction   estimated at 50-55%. No definitive regional wall motion abnormalities are noted. Diastolic filling parameters suggests grade II diastolic dysfunction. Mild mitral regurgitation. The left atrium is mildly dilated. Aortic valve appears sclerotic but opens adequately. Mild aortic regurgitation is present. Inadequate tricuspid regurgitation to estimate systolic pulmonary artery   pressure. I independently reviewed the cardiac diagnostic studies, ECG and relevant imaging studies. Physical Examination:  Vitals:    22 0801   BP: 124/63   Pulse: 57   Resp:    Temp:    SpO2:       In: 900.1 [I.V.:900.1]  Out: 600    Wt Readings from Last 3 Encounters:   22 190 lb 11.2 oz (86.5 kg)   22 202 lb 3 oz (91.7 kg)   21 212 lb (96.2 kg)     Temp  Av.1 °F (36.2 °C)  Min: 96.9 °F (36.1 °C)  Max: 97.6 °F (36.4 °C)  Pulse  Av.3  Min: 57  Max: 72  BP  Min: 88/59  Max: 127/95  SpO2  Av.9 %  Min: 79 %  Max: 100 %    Intake/Output Summary (Last 24 hours) at 3/18/2022 0942  Last data filed at 3/18/2022 0544  Gross per 24 hour   Intake 900.06 ml   Output 600 ml   Net 300.06 ml       I independently reviewed all cardiac tracing from cardiac telemetry. · Constitutional: Oriented. No distress. · Head: Normocephalic and atraumatic. · Mouth/Throat: Oropharynx is clear and moist.   · Eyes: Conjunctivae normal. EOM are normal.   · Neck: Neck supple. No JVD present. · Cardiovascular: Normal rate, regular rhythm, S1&S2. · Pulmonary/Chest: Bilateral respiratory sounds. No rhonchi. · Abdominal: Soft. No tenderness. · Musculoskeletal: No tenderness.  No edema    · Lymphadenopathy: Has no cervical adenopathy. · Neurological: Alert and oriented. Follows command, No Gross deficit   · Skin: Skin is warm, No rash noted. · Psychiatric: Has a normal behavior     Scheduled Meds:   sodium chloride flush  5-40 mL IntraVENous 2 times per day    sodium chloride flush  5-40 mL IntraVENous 2 times per day    amiodarone  200 mg Oral Daily    apixaban  5 mg Oral BID    atorvastatin  20 mg Oral Nightly    furosemide  20 mg Oral BID    gabapentin  200 mg Oral BID    insulin lispro (1 Unit Dial)  5 Units SubCUTAneous BID     insulin lispro (1 Unit Dial)  0-18 Units SubCUTAneous TID     insulin glargine  10 Units SubCUTAneous Nightly    metoprolol tartrate  25 mg Oral BID    [Held by provider] midodrine  5 mg Oral TID WC    budesonide-formoterol  2 puff Inhalation BID    pantoprazole  40 mg Oral Daily    polyethylene glycol  17 g Oral Daily    sennosides-docusate sodium  1 tablet Oral BID    spironolactone  25 mg Oral Daily    tamsulosin  0.4 mg Oral Nightly     Continuous Infusions:   sodium chloride Stopped (03/18/22 0543)    sodium chloride      sodium chloride       PRN Meds:sodium chloride flush, sodium chloride, acetaminophen, oxyCODONE-acetaminophen **OR** oxyCODONE-acetaminophen, morphine, iopamidol, sodium chloride flush, sodium chloride, acetaminophen, ipratropium-albuterol, lidocaine, oxyCODONE, sodium chloride     Prior to Admission medications    Medication Sig Start Date End Date Taking?  Authorizing Provider   acetaminophen (TYLENOL) 325 MG tablet Take 650 mg by mouth every 6 hours as needed for Pain   Yes Historical Provider, MD   apixaban (ELIQUIS) 5 MG TABS tablet Take 5 mg by mouth 2 times daily   Yes Historical Provider, MD   atorvastatin (LIPITOR) 20 MG tablet Take 20 mg by mouth at bedtime   Yes Historical Provider, MD   budesonide (PULMICORT) 0.5 MG/2ML nebulizer suspension Take 1 ampule by nebulization 2 times daily   Yes Historical Provider, MD mometasone-formoterol (DULERA) 200-5 MCG/ACT inhaler Inhale 2 puffs into the lungs every 12 hours   Yes Historical Provider, MD   gabapentin (NEURONTIN) 100 MG capsule Take 200 mg by mouth in the morning and at bedtime. Yes Historical Provider, MD   insulin detemir (LEVEMIR) 100 UNIT/ML injection vial Inject 10 Units into the skin nightly   Yes Historical Provider, MD   insulin aspart (NOVOLOG) 100 UNIT/ML injection cartridge Inject 5 Units into the skin 3 times daily (before meals)   Yes Historical Provider, MD   insulin aspart (NOVOLOG) 100 UNIT/ML injection vial Inject 0-16 Units into the skin 3 times daily (before meals) Sliding scale   Yes Historical Provider, MD   ipratropium-albuterol (DUONEB) 0.5-2.5 (3) MG/3ML SOLN nebulizer solution Inhale 1 vial into the lungs every 6 hours as needed for Shortness of Breath   Yes Historical Provider, MD   lidocaine (XYLOCAINE) 5 % ointment Apply topically every 4 hours as needed for Pain Apply topically as needed. Yes Historical Provider, MD   metoprolol tartrate (LOPRESSOR) 25 MG tablet Take 25 mg by mouth 2 times daily Hold for SBP <100 or HR <55   Yes Historical Provider, MD   midodrine (PROAMATINE) 5 MG tablet Take 5 mg by mouth 3 times daily Hold for SBP >130   Yes Historical Provider, MD   oxyCODONE (ROXICODONE) 5 MG immediate release tablet Take 5 mg by mouth every 6 hours as needed for Pain.    Yes Historical Provider, MD   polyethylene glycol (MIRALAX) 17 g PACK packet Take 17 g by mouth daily   Yes Historical Provider, MD   pantoprazole (PROTONIX) 40 MG tablet Take 40 mg by mouth daily   Yes Historical Provider, MD   sodium chloride (OCEAN, BABY AYR) 0.65 % nasal spray 1 spray by Nasal route as needed for Congestion   Yes Historical Provider, MD   sennosides-docusate sodium (SENOKOT-S) 8.6-50 MG tablet Take 1 tablet by mouth in the morning and at bedtime   Yes Historical Provider, MD   spironolactone (ALDACTONE) 25 MG tablet Take 25 mg by mouth daily   Yes Historical Provider, MD   tamsulosin (FLOMAX) 0.4 MG capsule Take 0.4 mg by mouth daily   Yes Historical Provider, MD   furosemide (LASIX) 20 MG tablet Take 1 tablet by mouth 2 times daily  Patient taking differently: Take 40 mg by mouth daily  1/28/22  Yes Pérez Zheng MD   amiodarone (CORDARONE) 200 MG tablet TAKE 1 TABLET BY MOUTH DAILY  Patient taking differently: Take 200 mg by mouth 2 times daily  12/13/21  Yes Nancy Almanza MD       Review of System:  [x] Full ROS obtained and negative except as mentioned in HPI    Relevant and available labs, and cardiovascular diagnostics reviewed. Reviewed. Recent Labs     03/16/22  1306 03/17/22  0107 03/18/22  0509    140 136   K 4.1 4.2 4.6    104 104   CO2 24 25 23   BUN 25* 26* 20   CREATININE 1.2 1.4* 1.0     Recent Labs     03/15/22  1109 03/18/22  0509   WBC 6.5 6.8   HGB 9.9* 8.9*   HCT 31.7* 28.4*   MCV 84.7 84.2    251     Estimated Creatinine Clearance: 73 mL/min (based on SCr of 1 mg/dL). No results found for: BNP    I independently reviewed all cardiac tracing from cardiac telemetry. I independently reviewed relevant and available cardiac diagnostic tests ECG, CXR, Echo, Stress test, Device interrogation, Holter, CT scan. Total critical care time was 35 minutes, for caring of this patient with life-threatening condition and organ failure, excluding separately reportable procedures. Services, included in critical care time were chart data review, documentation time, obtaining info from patient, review of nursing notes, and vital sign assessment and management of the patient. Thank you for allowing me to participate in the care of [address]     All questions and concerns were addressed to the patient/family. Alternatives to my treatment were discussed. I have discussed the above stated plan and the patient verbalized understanding and agreed with the plan.     NOTE: This report was transcribed using voice recognition software. Every effort was made to ensure accuracy, however, inadvertent computerized transcription errors may be present.      Heide Morejon MD, MPH  [facility]   Office: [phone]  Fax: [phone]

## 2022-03-19 LAB
GLUCOSE BLD-MCNC: 116 MG/DL (ref 70–99)
PERFORMED ON: ABNORMAL

## 2022-03-19 PROCEDURE — 94761 N-INVAS EAR/PLS OXIMETRY MLT: CPT

## 2022-03-19 PROCEDURE — 94640 AIRWAY INHALATION TREATMENT: CPT

## 2022-03-19 PROCEDURE — 6370000000 HC RX 637 (ALT 250 FOR IP): Performed by: INTERNAL MEDICINE

## 2022-03-19 PROCEDURE — 2000000000 HC ICU R&B

## 2022-03-19 PROCEDURE — 2700000000 HC OXYGEN THERAPY PER DAY

## 2022-03-19 PROCEDURE — 2580000003 HC RX 258: Performed by: INTERNAL MEDICINE

## 2022-03-19 RX ORDER — CAPSAICIN 0.07 G/100G
CREAM TOPICAL 4 TIMES DAILY PRN
Status: DISCONTINUED | OUTPATIENT
Start: 2022-03-19 | End: 2022-03-21 | Stop reason: HOSPADM

## 2022-03-19 RX ORDER — INSULIN LISPRO 100 [IU]/ML
0-3 INJECTION, SOLUTION INTRAVENOUS; SUBCUTANEOUS NIGHTLY
Status: DISCONTINUED | OUTPATIENT
Start: 2022-03-19 | End: 2022-03-21 | Stop reason: HOSPADM

## 2022-03-19 RX ORDER — INSULIN LISPRO 100 [IU]/ML
0-6 INJECTION, SOLUTION INTRAVENOUS; SUBCUTANEOUS
Status: DISCONTINUED | OUTPATIENT
Start: 2022-03-19 | End: 2022-03-21 | Stop reason: HOSPADM

## 2022-03-19 RX ADMIN — APIXABAN 5 MG: 5 TABLET, FILM COATED ORAL at 08:03

## 2022-03-19 RX ADMIN — Medication 2 PUFF: at 19:26

## 2022-03-19 RX ADMIN — POLYETHYLENE GLYCOL 3350 17 G: 17 POWDER, FOR SOLUTION ORAL at 08:20

## 2022-03-19 RX ADMIN — METOPROLOL TARTRATE 25 MG: 25 TABLET, FILM COATED ORAL at 20:57

## 2022-03-19 RX ADMIN — APIXABAN 5 MG: 5 TABLET, FILM COATED ORAL at 20:58

## 2022-03-19 RX ADMIN — ATORVASTATIN CALCIUM 20 MG: 20 TABLET, FILM COATED ORAL at 20:58

## 2022-03-19 RX ADMIN — OXYCODONE AND ACETAMINOPHEN 2 TABLET: 5; 325 TABLET ORAL at 16:10

## 2022-03-19 RX ADMIN — FUROSEMIDE 20 MG: 20 TABLET ORAL at 08:03

## 2022-03-19 RX ADMIN — OXYCODONE AND ACETAMINOPHEN 2 TABLET: 5; 325 TABLET ORAL at 07:49

## 2022-03-19 RX ADMIN — METOPROLOL TARTRATE 25 MG: 25 TABLET, FILM COATED ORAL at 08:02

## 2022-03-19 RX ADMIN — SENNOSIDES AND DOCUSATE SODIUM 1 TABLET: 50; 8.6 TABLET ORAL at 20:58

## 2022-03-19 RX ADMIN — Medication 2 PUFF: at 09:41

## 2022-03-19 RX ADMIN — GABAPENTIN 200 MG: 100 CAPSULE ORAL at 08:03

## 2022-03-19 RX ADMIN — SENNOSIDES AND DOCUSATE SODIUM 1 TABLET: 50; 8.6 TABLET ORAL at 08:02

## 2022-03-19 RX ADMIN — CAPSAICIN: 0.75 CREAM TOPICAL at 10:32

## 2022-03-19 RX ADMIN — SODIUM CHLORIDE, PRESERVATIVE FREE 10 ML: 5 INJECTION INTRAVENOUS at 08:03

## 2022-03-19 RX ADMIN — TAMSULOSIN HYDROCHLORIDE 0.4 MG: 0.4 CAPSULE ORAL at 20:58

## 2022-03-19 RX ADMIN — SODIUM CHLORIDE, PRESERVATIVE FREE 10 ML: 5 INJECTION INTRAVENOUS at 20:59

## 2022-03-19 RX ADMIN — AMIODARONE HYDROCHLORIDE 200 MG: 200 TABLET ORAL at 08:03

## 2022-03-19 RX ADMIN — SODIUM CHLORIDE, PRESERVATIVE FREE 10 ML: 5 INJECTION INTRAVENOUS at 20:58

## 2022-03-19 RX ADMIN — PANTOPRAZOLE SODIUM 40 MG: 40 TABLET, DELAYED RELEASE ORAL at 06:47

## 2022-03-19 RX ADMIN — SPIRONOLACTONE 25 MG: 25 TABLET ORAL at 08:02

## 2022-03-19 RX ADMIN — GABAPENTIN 200 MG: 100 CAPSULE ORAL at 20:58

## 2022-03-19 RX ADMIN — FUROSEMIDE 20 MG: 20 TABLET ORAL at 17:30

## 2022-03-19 ASSESSMENT — PAIN DESCRIPTION - PROGRESSION

## 2022-03-19 ASSESSMENT — PAIN SCALES - GENERAL
PAINLEVEL_OUTOF10: 10
PAINLEVEL_OUTOF10: 10

## 2022-03-19 NOTE — PROGRESS NOTES
Department of Internal Medicine  General Internal Medicine   Progress Note      SUBJECTIVE: feeling better    History obtained from chart review and the patient  General ROS: positive for  - fatigue and malaise  negative for - chills, fever or night sweats  Psychological ROS: positive for - anxiety and disorientation  negative for - behavioral disorder, hallucinations or hostility  Ophthalmic ROS: negative  Respiratory ROS: positive for - shortness of breath  negative for - cough, hemoptysis or wheezing  Cardiovascular ROS: positive for - dyspnea on exertion  negative for - chest pain  Gastrointestinal ROS: no abdominal pain, change in bowel habits, or black or bloody stools  Genito-Urinary ROS: no dysuria, trouble voiding, or hematuria  Musculoskeletal ROS: chronic pain   Neurological ROS: no TIA or stroke symptoms  Dermatological ROS: negative    OBJECTIVE      Medications      Current Facility-Administered Medications: 0.9 % sodium chloride infusion, , IntraVENous, Continuous  sodium chloride flush 0.9 % injection 5-40 mL, 5-40 mL, IntraVENous, 2 times per day  sodium chloride flush 0.9 % injection 5-40 mL, 5-40 mL, IntraVENous, PRN  0.9 % sodium chloride infusion, 25 mL, IntraVENous, PRN  acetaminophen (TYLENOL) tablet 650 mg, 650 mg, Oral, Q4H PRN  oxyCODONE-acetaminophen (PERCOCET) 5-325 MG per tablet 1 tablet, 1 tablet, Oral, Q4H PRN **OR** oxyCODONE-acetaminophen (PERCOCET) 5-325 MG per tablet 2 tablet, 2 tablet, Oral, Q4H PRN  morphine (PF) injection 2 mg, 2 mg, IntraVENous, Q3H PRN  iopamidol (ISOVUE-370) 76 % injection 93 mL, 93 mL, Other, ONCE PRN  sodium chloride flush 0.9 % injection 5-40 mL, 5-40 mL, IntraVENous, 2 times per day  sodium chloride flush 0.9 % injection 5-40 mL, 5-40 mL, IntraVENous, PRN  0.9 % sodium chloride infusion, 25 mL, IntraVENous, PRN  acetaminophen (TYLENOL) tablet 650 mg, 650 mg, Oral, Q6H PRN  amiodarone (CORDARONE) tablet 200 mg, 200 mg, Oral, Daily  apixaban (ELIQUIS) tablet 5 mg, 5 mg, Oral, BID  atorvastatin (LIPITOR) tablet 20 mg, 20 mg, Oral, Nightly  furosemide (LASIX) tablet 20 mg, 20 mg, Oral, BID  gabapentin (NEURONTIN) capsule 200 mg, 200 mg, Oral, BID  insulin lispro (1 Unit Dial) 5 Units, 5 Units, SubCUTAneous, BID WC  insulin lispro (1 Unit Dial) 0-18 Units, 0-18 Units, SubCUTAneous, TID WC  insulin glargine (LANTUS;BASAGLAR) injection pen 10 Units, 10 Units, SubCUTAneous, Nightly  ipratropium-albuterol (DUONEB) nebulizer solution 3 mL, 1 vial, Inhalation, Q6H PRN  lidocaine (XYLOCAINE) 5 % ointment, , Topical, Q4H PRN  metoprolol tartrate (LOPRESSOR) tablet 25 mg, 25 mg, Oral, BID  [Held by provider] midodrine (PROAMATINE) tablet 5 mg, 5 mg, Oral, TID WC  budesonide-formoterol (SYMBICORT) 80-4.5 MCG/ACT inhaler 2 puff, 2 puff, Inhalation, BID  oxyCODONE (ROXICODONE) immediate release tablet 5 mg, 5 mg, Oral, Q6H PRN  pantoprazole (PROTONIX) tablet 40 mg, 40 mg, Oral, Daily  polyethylene glycol (GLYCOLAX) packet 17 g, 17 g, Oral, Daily  sennosides-docusate sodium (SENOKOT-S) 8.6-50 MG tablet 1 tablet, 1 tablet, Oral, BID  sodium chloride (OCEAN, BABY AYR) 0.65 % nasal spray 1 spray, 1 spray, Nasal, PRN  spironolactone (ALDACTONE) tablet 25 mg, 25 mg, Oral, Daily  tamsulosin (FLOMAX) capsule 0.4 mg, 0.4 mg, Oral, Nightly    Physical      Vitals: BP (!) 119/93   Pulse 79   Temp 97.5 °F (36.4 °C) (Temporal)   Resp 18   Ht 5' 9\" (1.753 m)   Wt 190 lb 11.2 oz (86.5 kg)   SpO2 90%   BMI 28.16 kg/m²   Temp: Temp: 97.5 °F (36.4 °C)  Max: Temp  Av.4 °F (36.3 °C)  Min: 97 °F (36.1 °C)  Max: 97.7 °F (36.5 °C)  Respiration range:  Resp  Av.8  Min: 18  Max: 23  Pulse Range:  Pulse  Av.4  Min: 57  Max: 79  Blood pressure range:  Systolic (44CJY), JCC:549 , Min:93 , CKU:150   , Diastolic (19CYQ), EXF:22, Min:55, Max:93    SpO2  Av.2 %  Min: 90 %  Max: 99 %    Intake/Output Summary (Last 24 hours) at 3/18/2022 2602  Last data filed at 3/18/2022 1450  Gross per 24 hour   Intake 900.06 ml   Output 1225 ml   Net -324.94 ml       Vent settings:  Pulse  Av.2  Min: 57  Max: 206  Resp  Av.8  Min: 11  Max: 28  SpO2  Av.7 %  Min: 79 %  Max: 100 %    CONSTITUTIONAL:  fatigued, alert, cooperative, mild distress, appears older than stated age and moderately obese  EYES:  Unremarkable   NECK:  Faint bruit , no JVD  and supple, symmetrical, trachea midline  BACK:  symmetric and no curvature  LUNGS:  No increased work of breathing, good air exchange, clear to auscultation bilaterally, no crackles or wheezing  CARDIOVASCULAR:  normal apical pulses, regular rate and rhythm, normal S1 and S2 and no S3  ABDOMEN:  Soft BS + non tender   MUSCULOSKELETAL:  Trace edema   NEUROLOGIC:  Grossly intact   SKIN:  Warm dry and pale  and no bruising or bleeding    Data      Recent Results (from the past 96 hour(s))   CBC with Auto Differential    Collection Time: 03/15/22 11:09 AM   Result Value Ref Range    WBC 6.5 4.0 - 11.0 K/uL    RBC 3.74 (L) 4.20 - 5.90 M/uL    Hemoglobin 9.9 (L) 13.5 - 17.5 g/dL    Hematocrit 31.7 (L) 40.5 - 52.5 %    MCV 84.7 80.0 - 100.0 fL    MCH 26.5 26.0 - 34.0 pg    MCHC 31.3 31.0 - 36.0 g/dL    RDW 28.1 (H) 12.4 - 15.4 %    Platelets 375 585 - 767 K/uL    MPV 8.1 5.0 - 10.5 fL    Neutrophils % 52.2 %    Lymphocytes % 35.8 %    Monocytes % 6.8 %    Eosinophils % 4.4 %    Basophils % 0.8 %    Neutrophils Absolute 3.4 1.7 - 7.7 K/uL    Lymphocytes Absolute 2.3 1.0 - 5.1 K/uL    Monocytes Absolute 0.4 0.0 - 1.3 K/uL    Eosinophils Absolute 0.3 0.0 - 0.6 K/uL    Basophils Absolute 0.1 0.0 - 0.2 K/uL   Basic Metabolic Panel w/ Reflex to MG    Collection Time: 03/15/22 11:09 AM   Result Value Ref Range    Sodium 139 136 - 145 mmol/L    Potassium reflex Magnesium 4.3 3.5 - 5.1 mmol/L    Chloride 103 99 - 110 mmol/L    CO2 22 21 - 32 mmol/L    Anion Gap 14 3 - 16    Glucose 112 (H) 70 - 99 mg/dL    BUN 30 (H) 7 - 20 mg/dL    CREATININE 1.5 (H) 0.8 - 1.3 mg/dL GFR Non- 46 (A) >60    GFR  56 (A) >60    Calcium 9.3 8.3 - 10.6 mg/dL   Troponin    Collection Time: 03/15/22 11:09 AM   Result Value Ref Range    Troponin 0.08 (H) <0.01 ng/mL   Blood Gas, Venous    Collection Time: 03/15/22 11:09 AM   Result Value Ref Range    pH, Juan 7.338 (L) 7.350 - 7.450    pCO2, Juan 43.8 40.0 - 50.0 mmHg    pO2, Juan 49.4 (H) 25.0 - 40.0 mmHg    HCO3, Venous 23.5 23.0 - 29.0 mmol/L    Base Excess, Juan -2.3 -3.0 - 3.0 mmol/L    O2 Sat, Juan 79 Not Established %    Carboxyhemoglobin 3.3 (H) 0.0 - 1.5 %    MetHgb, Juan 0.4 <1.5 %    TC02 (Calc), Juan 56 Not Established mmol/L    O2 Content, Juan 11 Not Established VOL %    O2 Therapy Unknown     Hemoglobin, Juan, Reduced 20 %   EKG 12 Lead    Collection Time: 03/15/22 11:17 AM   Result Value Ref Range    Ventricular Rate 103 BPM    Atrial Rate 122 BPM    QRS Duration 98 ms    Q-T Interval 400 ms    QTc Calculation (Bazett) 524 ms    R Axis 32 degrees    T Axis 103 degrees    Diagnosis       Atrial fibrillation with rapid ventricular responseST & T wave abnormality, consider lateral ischemiaAbnormal ECGConfirmed by JONNATHAN Medina MD ([id number]) on 3/15/2022 12:05:45 PM   Blood Gas, Venous    Collection Time: 03/15/22 12:05 PM   Result Value Ref Range    pH, Juan 7.339 (L) 7.350 - 7.450    pCO2, Juan 45.4 40.0 - 50.0 mmHg    pO2, Juan 53.4 (H) 25.0 - 40.0 mmHg    HCO3, Venous 24.4 23.0 - 29.0 mmol/L    Base Excess, Juan -1.5 -3.0 - 3.0 mmol/L    O2 Sat, Juan 82 Not Established %    Carboxyhemoglobin 2.9 (H) 0.0 - 1.5 %    MetHgb, Juan 0.3 <1.5 %    TC02 (Calc), Juan 58 Not Established mmol/L    O2 Content, Juan 11 Not Established VOL %    O2 Therapy Unknown     Hemoglobin, Juan, Reduced 17 %   Lactic Acid    Collection Time: 03/15/22 12:05 PM   Result Value Ref Range    Lactic Acid 2.9 (H) 0.4 - 2.0 mmol/L   POCT Glucose    Collection Time: 03/15/22  5:42 PM   Result Value Ref Range    POC Glucose 92 70 - 99 mg/dl    Performed on ACCU-CHEK    POCT Glucose    Collection Time: 03/15/22  8:18 PM   Result Value Ref Range    POC Glucose 106 (H) 70 - 99 mg/dl    Performed on ACCU-CHEK    POCT Glucose    Collection Time: 03/16/22  7:36 AM   Result Value Ref Range    POC Glucose 91 70 - 99 mg/dl    Performed on ACCU-CHEK    POCT Glucose    Collection Time: 03/16/22 10:57 AM   Result Value Ref Range    POC Glucose 137 (H) 70 - 99 mg/dl    Performed on ACCU-CHEK    EKG 12 Lead    Collection Time: 03/16/22 12:23 PM   Result Value Ref Range    Ventricular Rate 90 BPM    Atrial Rate 288 BPM    QRS Duration 94 ms    Q-T Interval 400 ms    QTc Calculation (Bazett) 489 ms    R Axis 21 degrees    T Axis 63 degrees    Diagnosis       Atrial fibrillation /flutterAbnormal ECGConfirmed by JONNATHAN King MD ([id number]) on 3/16/2022 4:42:48 PM   TSH with Reflex    Collection Time: 03/16/22  1:06 PM   Result Value Ref Range    TSH 1.88 0.27 - 4.20 uIU/mL   Basic Metabolic Panel w/ Reflex to MG    Collection Time: 03/16/22  1:06 PM   Result Value Ref Range    Sodium 139 136 - 145 mmol/L    Potassium reflex Magnesium 4.1 3.5 - 5.1 mmol/L    Chloride 103 99 - 110 mmol/L    CO2 24 21 - 32 mmol/L    Anion Gap 12 3 - 16    Glucose 147 (H) 70 - 99 mg/dL    BUN 25 (H) 7 - 20 mg/dL    CREATININE 1.2 0.8 - 1.3 mg/dL    GFR Non- 59 (A) >60    GFR African American >60 >60    Calcium 8.7 8.3 - 10.6 mg/dL   Magnesium    Collection Time: 03/16/22  1:06 PM   Result Value Ref Range    Magnesium 1.70 (L) 1.80 - 2.40 mg/dL   POCT Glucose    Collection Time: 03/16/22  4:21 PM   Result Value Ref Range    POC Glucose 101 (H) 70 - 99 mg/dl    Performed on ACCU-CHEK    POCT Glucose    Collection Time: 03/16/22  9:07 PM   Result Value Ref Range    POC Glucose 97 70 - 99 mg/dl    Performed on ACCU-CHEK    EKG 12 Lead    Collection Time: 03/16/22  9:59 PM   Result Value Ref Range    Ventricular Rate 101 BPM    Atrial Rate 88 BPM    QRS Duration 92 ms    Q-T Interval 392 ms    QTc Calculation (Philip) 508 ms    R Axis 35 degrees    T Axis 75 degrees    Diagnosis       Atrial fibrillation with rapid ventricular responseNonspecific ST and T changesConfirmed by Catalina Cobos MD, Bel Kam ([id number]) on 3/17/2022 12:42:08 PM   POCT Venous    Collection Time: 03/16/22 11:27 PM   Result Value Ref Range    POC Sodium 137 136 - 145 mmol/L    POC Potassium 4.2 3.5 - 5.1 mmol/L    POC Glucose 113 (H) 70 - 99 mg/dl    Calcium, Ion 1.25 1.12 - 1.32 mmol/L    Sample Type JOSEPH     Performed on SEE BELOW    SPECIMEN REJECTION    Collection Time: 03/17/22 12:29 AM   Result Value Ref Range    Rejected Test bmp mg     Reason for Rejection see below    Basic Metabolic Panel    Collection Time: 03/17/22  1:07 AM   Result Value Ref Range    Sodium 140 136 - 145 mmol/L    Potassium 4.2 3.5 - 5.1 mmol/L    Chloride 104 99 - 110 mmol/L    CO2 25 21 - 32 mmol/L    Anion Gap 11 3 - 16    Glucose 124 (H) 70 - 99 mg/dL    BUN 26 (H) 7 - 20 mg/dL    CREATININE 1.4 (H) 0.8 - 1.3 mg/dL    GFR Non-African American 50 (A) >60    GFR African American >60 >60    Calcium 9.0 8.3 - 10.6 mg/dL   Magnesium    Collection Time: 03/17/22  1:07 AM   Result Value Ref Range    Magnesium 2.40 1.80 - 2.40 mg/dL   POCT Glucose    Collection Time: 03/17/22  8:06 AM   Result Value Ref Range    POC Glucose 122 (H) 70 - 99 mg/dl    Performed on ACCU-CHEK    POCT Glucose    Collection Time: 03/17/22  6:43 PM   Result Value Ref Range    POC Glucose 93 70 - 99 mg/dl    Performed on ACCU-CHEK    POCT Glucose    Collection Time: 03/17/22  8:51 PM   Result Value Ref Range    POC Glucose 114 (H) 70 - 99 mg/dl    Performed on ACCU-CHEK    Basic Metabolic Panel    Collection Time: 03/18/22  5:09 AM   Result Value Ref Range    Sodium 136 136 - 145 mmol/L    Potassium 4.6 3.5 - 5.1 mmol/L    Chloride 104 99 - 110 mmol/L    CO2 23 21 - 32 mmol/L    Anion Gap 9 3 - 16    Glucose 91 70 - 99 mg/dL    BUN 20 7 - 20 mg/dL    CREATININE 1.0 0.8 - 1.3 mg/dL    GFR Non- American >60 >60    GFR African American >60 >60    Calcium 8.8 8.3 - 10.6 mg/dL   CBC    Collection Time: 03/18/22  5:09 AM   Result Value Ref Range    WBC 6.8 4.0 - 11.0 K/uL    RBC 3.38 (L) 4.20 - 5.90 M/uL    Hemoglobin 8.9 (L) 13.5 - 17.5 g/dL    Hematocrit 28.4 (L) 40.5 - 52.5 %    MCV 84.2 80.0 - 100.0 fL    MCH 26.4 26.0 - 34.0 pg    MCHC 31.3 31.0 - 36.0 g/dL    RDW 26.8 (H) 12.4 - 15.4 %    Platelets 441 929 - 474 K/uL    MPV 7.7 5.0 - 10.5 fL   Magnesium    Collection Time: 03/18/22  5:09 AM   Result Value Ref Range    Magnesium 2.00 1.80 - 2.40 mg/dL   POCT Glucose    Collection Time: 03/18/22  7:48 AM   Result Value Ref Range    POC Glucose 94 70 - 99 mg/dl    Performed on ACCU-CHEK    POCT Glucose    Collection Time: 03/18/22 11:24 AM   Result Value Ref Range    POC Glucose 118 (H) 70 - 99 mg/dl    Performed on ACCU-CHEK    POCT Glucose    Collection Time: 03/18/22  4:45 PM   Result Value Ref Range    POC Glucose 92 70 - 99 mg/dl    Performed on [address] [place] [address] Problems           Last Modified POA    * (Principal) Syncope and collapse 3/15/2022 Yes    CAD (coronary artery disease) (Chronic) 3/15/2022 Yes    Essential hypertension 3/15/2022 Yes    PVD (peripheral vascular disease) (HCC) (Chronic) 3/15/2022 Yes    COPD (chronic obstructive pulmonary disease) ([address] 3/15/2022 Yes    Dyspnea 3/15/2022 Yes    Atrial fibrillation with RVR ([address] 3/15/2022 Yes    Anemia 3/15/2022 Yes    Hypotension due to hypovolemia 3/15/2022 Yes    COVID 3/15/2022 Yes    Acute respiratory failure due to COVID-19 ([address] 3/15/2022 Yes    High cholesterol 3/15/2022 Yes    Ischemic cardiomyopathy 3/15/2022 Yes    Chronic a-fib ([address] 3/15/2022 Yes    Acute on chronic systolic heart failure, NYHA class 3 ([address] 3/15/2022 Yes        S/p ICD insertion and synchronized cardioversion  For sustained VT   Not amenable to intervention for his diffuse multi vessel disease  Awaiting NH Placement   Smoking cessation strongly emphasized

## 2022-03-20 LAB
GLUCOSE BLD-MCNC: 122 MG/DL (ref 70–99)
PERFORMED ON: ABNORMAL

## 2022-03-20 PROCEDURE — 97530 THERAPEUTIC ACTIVITIES: CPT

## 2022-03-20 PROCEDURE — 94761 N-INVAS EAR/PLS OXIMETRY MLT: CPT

## 2022-03-20 PROCEDURE — 2700000000 HC OXYGEN THERAPY PER DAY

## 2022-03-20 PROCEDURE — 6370000000 HC RX 637 (ALT 250 FOR IP): Performed by: INTERNAL MEDICINE

## 2022-03-20 PROCEDURE — 97116 GAIT TRAINING THERAPY: CPT

## 2022-03-20 PROCEDURE — 97535 SELF CARE MNGMENT TRAINING: CPT

## 2022-03-20 PROCEDURE — 94640 AIRWAY INHALATION TREATMENT: CPT

## 2022-03-20 PROCEDURE — 2580000003 HC RX 258: Performed by: INTERNAL MEDICINE

## 2022-03-20 PROCEDURE — 2000000000 HC ICU R&B

## 2022-03-20 RX ADMIN — TAMSULOSIN HYDROCHLORIDE 0.4 MG: 0.4 CAPSULE ORAL at 20:52

## 2022-03-20 RX ADMIN — OXYCODONE AND ACETAMINOPHEN 1 TABLET: 5; 325 TABLET ORAL at 08:29

## 2022-03-20 RX ADMIN — PANTOPRAZOLE SODIUM 40 MG: 40 TABLET, DELAYED RELEASE ORAL at 08:25

## 2022-03-20 RX ADMIN — SODIUM CHLORIDE, PRESERVATIVE FREE 10 ML: 5 INJECTION INTRAVENOUS at 20:53

## 2022-03-20 RX ADMIN — FUROSEMIDE 20 MG: 20 TABLET ORAL at 08:25

## 2022-03-20 RX ADMIN — SPIRONOLACTONE 25 MG: 25 TABLET ORAL at 08:25

## 2022-03-20 RX ADMIN — SENNOSIDES AND DOCUSATE SODIUM 1 TABLET: 50; 8.6 TABLET ORAL at 08:25

## 2022-03-20 RX ADMIN — APIXABAN 5 MG: 5 TABLET, FILM COATED ORAL at 20:53

## 2022-03-20 RX ADMIN — NALOXEGOL OXALATE 25 MG: 25 TABLET, FILM COATED ORAL at 15:22

## 2022-03-20 RX ADMIN — AMIODARONE HYDROCHLORIDE 200 MG: 200 TABLET ORAL at 08:25

## 2022-03-20 RX ADMIN — ATORVASTATIN CALCIUM 20 MG: 20 TABLET, FILM COATED ORAL at 20:52

## 2022-03-20 RX ADMIN — APIXABAN 5 MG: 5 TABLET, FILM COATED ORAL at 08:25

## 2022-03-20 RX ADMIN — GABAPENTIN 200 MG: 100 CAPSULE ORAL at 20:53

## 2022-03-20 RX ADMIN — METOPROLOL TARTRATE 25 MG: 25 TABLET, FILM COATED ORAL at 08:25

## 2022-03-20 RX ADMIN — Medication 2 PUFF: at 08:37

## 2022-03-20 RX ADMIN — GABAPENTIN 200 MG: 100 CAPSULE ORAL at 08:25

## 2022-03-20 RX ADMIN — OXYCODONE AND ACETAMINOPHEN 1 TABLET: 5; 325 TABLET ORAL at 19:48

## 2022-03-20 RX ADMIN — SODIUM CHLORIDE, PRESERVATIVE FREE 10 ML: 5 INJECTION INTRAVENOUS at 08:25

## 2022-03-20 RX ADMIN — POLYETHYLENE GLYCOL 3350 17 G: 17 POWDER, FOR SOLUTION ORAL at 08:24

## 2022-03-20 RX ADMIN — Medication 2 PUFF: at 19:15

## 2022-03-20 ASSESSMENT — PAIN DESCRIPTION - LOCATION
LOCATION: SHOULDER;CHEST
LOCATION: SHOULDER;CHEST

## 2022-03-20 ASSESSMENT — PAIN DESCRIPTION - DESCRIPTORS: DESCRIPTORS: SORE

## 2022-03-20 ASSESSMENT — PAIN SCALES - WONG BAKER: WONGBAKER_NUMERICALRESPONSE: 0

## 2022-03-20 ASSESSMENT — PAIN SCALES - GENERAL
PAINLEVEL_OUTOF10: 5
PAINLEVEL_OUTOF10: 10
PAINLEVEL_OUTOF10: 10
PAINLEVEL_OUTOF10: 0
PAINLEVEL_OUTOF10: 6
PAINLEVEL_OUTOF10: 0

## 2022-03-20 ASSESSMENT — PAIN DESCRIPTION - PROGRESSION

## 2022-03-20 ASSESSMENT — PAIN DESCRIPTION - ORIENTATION
ORIENTATION: LEFT
ORIENTATION: LEFT

## 2022-03-20 ASSESSMENT — PAIN - FUNCTIONAL ASSESSMENT: PAIN_FUNCTIONAL_ASSESSMENT: ACTIVITIES ARE NOT PREVENTED

## 2022-03-20 ASSESSMENT — PAIN DESCRIPTION - PAIN TYPE
TYPE: SURGICAL PAIN
TYPE: SURGICAL PAIN

## 2022-03-20 ASSESSMENT — PAIN DESCRIPTION - FREQUENCY: FREQUENCY: CONTINUOUS

## 2022-03-20 ASSESSMENT — PAIN DESCRIPTION - ONSET: ONSET: ON-GOING

## 2022-03-20 NOTE — PROGRESS NOTES
Occupational Therapy  Facility/Department: [facility]  Daily Treatment Note  NAME: Neha Murphy  : 1950  MRN: [id number]    Date of Service: 3/20/2022    Discharge Recommendations: Neha Murphy scored a 17/24 on the AM-PAC ADL Inpatient form. Current research shows that an AM-PAC score of 17 or less is typically not associated with a discharge to the patient's home setting. Based on the patient's AM-PAC score and their current ADL deficits, it is recommended that the patient have 3-5 sessions per week of Occupational Therapy at d/c to increase the patient's independence. Please see assessment section for further patient specific details. If patient discharges prior to next session this note will serve as a discharge summary. Please see below for the latest assessment towards goals. OT Equipment Recommendations  Equipment Needed: No  Other: defer to next level of care    Assessment   Performance deficits / Impairments: Decreased functional mobility ; Decreased ADL status; Decreased endurance  Assessment: Pt is below his baseline level of occupational function, based on the above deficits associated with syncope & collapse. Pt would benefit from continued skilled acute OT services to address these deficits. Treatment Diagnosis: Decreased ADL status, functional mobility, and endurance associated with syncope & collapse. OT Education: OT Role;Plan of Care;Transfer Training;Precautions  Patient Education: Patient verbalized understanding but needs reinforcement of new precautions for ICD placement AEB decreased follow through  Barriers to Learning: None  REQUIRES OT FOLLOW UP: Yes  Activity Tolerance  Activity Tolerance: Patient Tolerated treatment well  Activity Tolerance: Impaired endurance, breaks needed during session due to decreased endurance  Safety Devices  Safety Devices in place: Yes  Type of devices: All fall risk precautions in place;Call light within reach; Patient at risk for falls; Left in chair  Restraints  Initially in place: No         Patient Diagnosis(es): The primary encounter diagnosis was Precordial chest pain. Diagnoses of Palpitations, Systolic congestive heart failure, unspecified HF chronicity ([facility] and PVD (peripheral vascular disease) ([facility] were also pertinent to this visit. has a past medical history of Acid reflux, Acute MI ([facility] CAD (coronary artery disease), COPD (chronic obstructive pulmonary disease) ([facility] Diverticulitis, Hypertension, Peripheral vascular disease ([facility] and Shingles. has a past surgical history that includes vascular surgery; Cardiac surgery; Colonoscopy; and Cardiac catheterization. Restrictions  Restrictions/Precautions  Restrictions/Precautions: Fall Risk  Required Braces or Orthoses?: No  Implants present? : Pacemaker  Position Activity Restriction  Other position/activity restrictions: No elevation L shoulder >90 deg. Velco strap in bed for reminder. ICD placement. Subjective   General  Chart Reviewed: Yes  Response to previous treatment: Patient with no complaints from previous session  Family / Caregiver Present: No  Diagnosis: Syncope & collapse  Subjective  Subjective: Patient supine in bed. Pleasant and cooperative. Patient reporting 10/10 left shoulder/chest pain. Nurse Tobias price gave pain medication  Pain Assessment  Pain Assessment: 0-10  Pain Level: 10  Pain Type: Surgical pain  Pain Location: Shoulder; Chest  Pain Orientation: Left  Vital Signs  Patient Currently in Pain: Yes         Orientation  Orientation  Overall Orientation Status: Within Normal Limits  Objective    ADL  Feeding: Independent  Grooming: Setup;Stand by assistance (wash face, comb hair)  UE Dressing: Minimal assistance (hospital gown, KATHRINE gurrola)  LE Dressing: Minimal assistance (min assist to don pajama pants sitting EOB)  Additional Comments: Patient declined ADLs, increased time for ADLs and mobility due to easily SOB, on 4L.  Sats 88-94% Balance  Sitting Balance: Supervision  Standing Balance: Minimal assistance  Standing Balance  Time: @ 2 minutes  Activity: 30 feet x 2 with RW, 4L of O2  Bed mobility  Supine to Sit: Minimal assistance (Simultaneous filing. User may not have seen previous data.)  Scooting: Contact guard assistance (Simultaneous filing. User may not have seen previous data.)  Comment: Limted due to left UE in swath from ICD precautions  Transfers  Stand Step Transfers: Contact guard assistance  Sit to stand: Minimal assistance  Stand to sit: Contact guard assistance  Transfer Comments: Cues for hand placement and safety                       Cognition  Overall Cognitive Status: WNL                       LUE AROM (degrees)  LUE General AROM: Swath in place from ICD placement                 Plan   Plan  Times per week: 3-5  Times per day: Daily  Current Treatment Recommendations: Self-Care / ADL,Safety Education & Training,Endurance Training,Functional Mobility Training  G-Code     OutComes Score                                                  AM-PAC Score        AM-[facility] Inpatient Daily Activity Raw Score: 17 (03/20/22 0927)  AM-PAC Inpatient ADL T-Scale Score : 37.26 (03/20/22 0927)  ADL Inpatient CMS 0-100% Score: 50.11 (03/20/22 0927)  ADL Inpatient CMS G-Code Modifier : CK (03/20/22 0927)    Goals  Short term goals  Time Frame for Short term goals: Discharge  Short term goal 1: SBA for functional transfers to ADL surfaces w/RW-Min A ongoing 3/20  Short term goal 2: SBA for functional mobility w/RW for ADL activity-CGA ongoing 3/20  Short term goal 3: S/U FOR UB bathing/dressing-Min A ongoing 3/20  Short term goal 4: SBA for LB bathing/dressing-Min A 3/12 to don pajama pants  Short term goal 5: Pt to tolerate standing 2-3 min for ADL activity/functional mobility-continue 3/20, on 4L, easily SOB, decreased endurance  Patient Goals   Patient goals :  To return to SNF for more rehab       Therapy Time   Individual Concurrent Group Co-treatment   Time In       0845   Time Out       0926   Minutes       41        Timed Code Treatment Minutes:  41 Minutes    Total Treatment Minutes: KATHRINE MontanezR/KAMRON [address]

## 2022-03-20 NOTE — PROGRESS NOTES
Physical Therapy  Facility/Department: [facility]  Daily Treatment Note  NAME: Radha Dominguez  : 1950  MRN: [id number]    Date of Service: 3/20/2022    Discharge Recommendations:  Radha Dominguez scored a 17/24 on the AM-PAC short mobility form. Current research shows that an AM-PAC score of 17 or less is typically not associated with a discharge to the patient's home setting. Based on the patient's AM-PAC score and their current functional mobility deficits, it is recommended that the patient have 3-5 sessions per week of Physical Therapy at d/c to increase the patient's independence. Please see assessment section for further patient specific details. If patient discharges prior to next session this note will serve as a discharge summary. Please see below for the latest assessment towards goals. 3-5 sessions per week,24 hour supervision or assist   PT Equipment Recommendations  Equipment Needed: No  Other: Defer to next level of care. Assessment   Body structures, Functions, Activity limitations: Decreased functional mobility ; Decreased balance;Decreased endurance;Decreased strength  Assessment: Patient primarily limited by endurance and fatigue w/ noticeable weakness in LLE during ambulation. Recommend 24 hour assist and continued therapy at d/c. Treatment Diagnosis: decreased functional mobility, impaired gait, decreased endurance  Prognosis: Good  Clinical Presentation: evolving  PT Education: Goals;PT Role;Plan of Care;General Safety; Energy Conservation;Precautions; Functional Mobility Training;Gait Training  Patient Education: d/c recommendation s, reviewed L shoulder precautions after ICD- verbalized understanding but needs reinforcement. Barriers to Learning: none  REQUIRES PT FOLLOW UP: Yes  Activity Tolerance  Activity Tolerance: Patient limited by fatigue;Patient limited by endurance     Patient Diagnosis(es): The primary encounter diagnosis was Precordial chest pain.  Diagnoses of Palpitations, Systolic congestive heart failure, unspecified HF chronicity ([facility] and PVD (peripheral vascular disease) ([facility] were also pertinent to this visit. has a past medical history of Acid reflux, Acute MI ([facility] CAD (coronary artery disease), COPD (chronic obstructive pulmonary disease) ([facility] Diverticulitis, Hypertension, Peripheral vascular disease ([facility] and Shingles. has a past surgical history that includes vascular surgery; Cardiac surgery; Colonoscopy; and Cardiac catheterization. Restrictions  Restrictions/Precautions  Restrictions/Precautions: Fall Risk  Required Braces or Orthoses?: No  Implants present? : Pacemaker  Position Activity Restriction  Other position/activity restrictions: No elevation L shoulder >90 deg. Velco strap in bed for reminder. ICD placement. Subjective   General  Chart Reviewed: Yes  Response To Previous Treatment: Patient with no complaints from previous session. Family / Caregiver Present: No  Subjective  Subjective: Patient reports he has LLE weakness and can't walk far because he gets out of breath. General Comment  Comments: Patient supine in bed. Pain Screening  Patient Currently in Pain: Yes  Pain Assessment  Pain Assessment: Faces  Pain Level: 10  Bashir-Baker Pain Rating: No hurt  Patient's Stated Pain Goal: No pain  Pain Type: Surgical pain  Pain Location: Shoulder; Chest  Pain Orientation: Left  Pain Descriptors: Sore  Pain Frequency: Continuous  Pain Onset: On-going  Clinical Progression: Gradually improving  Functional Pain Assessment: Activities are not prevented  Non-Pharmaceutical Pain Intervention(s): Repositioned; Rest  Response to Pain Intervention: Patient Satisfied  Vital Signs  Patient Currently in Pain: Yes          Orientation  Orientation  Overall Orientation Status: Within Functional Limits    Objective   Transfers  Sit to Stand: Minimal Assistance  Stand to sit: Minimal Assistance  Bed to Chair: Contact guard assistance  Stand Pivot Transfers: Contact guard assistance  Comment: Gait belt donned. Patient required multiple attempts to stand, unable on his own. Ambulation  Ambulation?: Yes  Ambulation 1  Surface: level tile  Device: Rolling Walker  Other Apparatus: O2 (4L)  Assistance: Contact guard assistance  Gait Deviations: Decreased step length;Decreased step height  Distance: 30' x2 w/ seated rest break of several minutes between  Stairs/Curb  Stairs?: No     Balance  Posture: Good  Sitting - Static: Good  Sitting - Dynamic: Good  Standing - Static: Fair;+  Standing - Dynamic: Fair;+      AROM RLE (degrees)  RLE AROM: WFL  AROM LLE (degrees)  LLE AROM : WFL  Strength RLE  Strength RLE: WFL  Strength LLE  Strength LLE: WFL    AM-PAC Score  AM-PAC Inpatient Mobility Raw Score : 17 (03/20/22 0930)  AM-PAC Inpatient T-Scale Score : 42.13 (03/20/22 0930)  Mobility Inpatient CMS 0-100% Score: 50.57 (03/20/22 0930)  Mobility Inpatient CMS G-Code Modifier : CK (03/20/22 0930)          Goals  Short term goals  Time Frame for Short term goals: To be met prior to discharge  Short term goal 1: Sit to/from stand with SBA. (Not met)  Short term goal 2: Ambulate 22' with RW and SBA. (Not met)  Short term goal 3: Bed mobility with supervision. (Not met)  Patient Goals   Patient goals : to walk    Plan    Plan  Times per week: 3-5  Times per day: Daily  Current Treatment Recommendations: Strengthening,Transfer Training,Endurance Training,Balance Training,Gait Training,Functional Mobility Training,Safety Education & Training,Home Exercise Program,Pain Management  Safety Devices  Type of devices:  All fall risk precautions in place,Call light within reach,Nurse notified,Patient at risk for falls,Chair alarm in place,Left in chair,Gait belt  Restraints  Initially in place: No     Therapy Time   Individual Concurrent Group Co-treatment   Time In 0845         Time Out 0926         Minutes 41         Timed Code Treatment Minutes: Jordi Morse Iraj, [address], [place], [address] [place] [address]

## 2022-03-20 NOTE — CARE COORDINATION
SW spoke with Bernadette at [facility] who stated pt's precert has not been approved yet. CAROL will follow up tomorrow.     Electronically signed by WINSTON Pastrana, ANJU on 3/20/2022 at 1:31 PM

## 2022-03-21 VITALS
DIASTOLIC BLOOD PRESSURE: 75 MMHG | WEIGHT: 192.02 LBS | HEIGHT: 69 IN | RESPIRATION RATE: 18 BRPM | BODY MASS INDEX: 28.44 KG/M2 | TEMPERATURE: 97.1 F | HEART RATE: 69 BPM | SYSTOLIC BLOOD PRESSURE: 130 MMHG | OXYGEN SATURATION: 94 %

## 2022-03-21 PROBLEM — I50.32 CHRONIC DIASTOLIC HEART FAILURE (HCC): Status: ACTIVE | Noted: 2022-03-21

## 2022-03-21 LAB
ANION GAP SERPL CALCULATED.3IONS-SCNC: 10 MMOL/L (ref 3–16)
BUN BLDV-MCNC: 18 MG/DL (ref 7–20)
CALCIUM SERPL-MCNC: 9.3 MG/DL (ref 8.3–10.6)
CHLORIDE BLD-SCNC: 101 MMOL/L (ref 99–110)
CO2: 26 MMOL/L (ref 21–32)
CREAT SERPL-MCNC: 1 MG/DL (ref 0.8–1.3)
GFR AFRICAN AMERICAN: >60
GFR NON-AFRICAN AMERICAN: >60
GLUCOSE BLD-MCNC: 107 MG/DL (ref 70–99)
GLUCOSE BLD-MCNC: 151 MG/DL (ref 70–99)
GLUCOSE BLD-MCNC: 188 MG/DL (ref 70–99)
HCT VFR BLD CALC: 29.2 % (ref 40.5–52.5)
HEMOGLOBIN: 9.3 G/DL (ref 13.5–17.5)
MCH RBC QN AUTO: 26.1 PG (ref 26–34)
MCHC RBC AUTO-ENTMCNC: 31.7 G/DL (ref 31–36)
MCV RBC AUTO: 82.4 FL (ref 80–100)
PDW BLD-RTO: 26.7 % (ref 12.4–15.4)
PERFORMED ON: ABNORMAL
PERFORMED ON: ABNORMAL
PLATELET # BLD: 268 K/UL (ref 135–450)
PMV BLD AUTO: 7.3 FL (ref 5–10.5)
POTASSIUM SERPL-SCNC: 4.3 MMOL/L (ref 3.5–5.1)
RBC # BLD: 3.55 M/UL (ref 4.2–5.9)
SODIUM BLD-SCNC: 137 MMOL/L (ref 136–145)
WBC # BLD: 6.2 K/UL (ref 4–11)

## 2022-03-21 PROCEDURE — 97110 THERAPEUTIC EXERCISES: CPT

## 2022-03-21 PROCEDURE — 97116 GAIT TRAINING THERAPY: CPT

## 2022-03-21 PROCEDURE — 80048 BASIC METABOLIC PNL TOTAL CA: CPT

## 2022-03-21 PROCEDURE — 6370000000 HC RX 637 (ALT 250 FOR IP): Performed by: INTERNAL MEDICINE

## 2022-03-21 PROCEDURE — 2580000003 HC RX 258: Performed by: INTERNAL MEDICINE

## 2022-03-21 PROCEDURE — 85027 COMPLETE CBC AUTOMATED: CPT

## 2022-03-21 PROCEDURE — 97530 THERAPEUTIC ACTIVITIES: CPT

## 2022-03-21 RX ADMIN — AMIODARONE HYDROCHLORIDE 200 MG: 200 TABLET ORAL at 08:50

## 2022-03-21 RX ADMIN — SODIUM CHLORIDE, PRESERVATIVE FREE 10 ML: 5 INJECTION INTRAVENOUS at 08:50

## 2022-03-21 RX ADMIN — GABAPENTIN 200 MG: 100 CAPSULE ORAL at 08:50

## 2022-03-21 RX ADMIN — APIXABAN 5 MG: 5 TABLET, FILM COATED ORAL at 08:50

## 2022-03-21 RX ADMIN — METOPROLOL TARTRATE 25 MG: 25 TABLET, FILM COATED ORAL at 08:50

## 2022-03-21 RX ADMIN — SPIRONOLACTONE 25 MG: 25 TABLET ORAL at 08:50

## 2022-03-21 RX ADMIN — PANTOPRAZOLE SODIUM 40 MG: 40 TABLET, DELAYED RELEASE ORAL at 06:29

## 2022-03-21 RX ADMIN — FUROSEMIDE 20 MG: 20 TABLET ORAL at 08:50

## 2022-03-21 ASSESSMENT — PAIN SCALES - GENERAL
PAINLEVEL_OUTOF10: 0

## 2022-03-21 ASSESSMENT — PAIN DESCRIPTION - PROGRESSION
CLINICAL_PROGRESSION: GRADUALLY IMPROVING

## 2022-03-21 NOTE — PLAN OF CARE
Problem: Falls - Risk of:  Goal: Will remain free from falls  Description: Will remain free from falls  Outcome: Completed  Goal: Absence of physical injury  Description: Absence of physical injury  Outcome: Completed     Problem: Skin Integrity:  Goal: Will show no infection signs and symptoms  Description: Will show no infection signs and symptoms  Outcome: Completed  Goal: Absence of new skin breakdown  Description: Absence of new skin breakdown  Outcome: Completed     Problem: Pain:  Goal: Pain level will decrease  Description: Pain level will decrease  Outcome: Completed  Goal: Control of acute pain  Description: Control of acute pain  Outcome: Completed  Goal: Control of chronic pain  Description: Control of chronic pain  Outcome: Completed     Problem: Respiratory:  Goal: Ability to maintain a clear airway will improve  Description: Ability to maintain a clear airway will improve  Outcome: Completed  Goal: Ability to maintain adequate ventilation will improve  Description: Ability to maintain adequate ventilation will improve  Outcome: Completed  Goal: Complications related to the disease process, condition or treatment will be avoided or minimized  Description: Complications related to the disease process, condition or treatment will be avoided or minimized  Outcome: Completed     Problem: Cardiac:  Goal: Cardiovascular alteration will improve  Description: Cardiovascular alteration will improve  Outcome: Completed

## 2022-03-21 NOTE — PROGRESS NOTES
Department of Internal Medicine  General Internal Medicine   Progress Note      SUBJECTIVE: feeling better    History obtained from chart review and the patient  General ROS: positive for  - fatigue and malaise  negative for - chills, fever or night sweats  Psychological ROS: positive for - anxiety and disorientation  negative for - behavioral disorder, hallucinations or hostility  Ophthalmic ROS: negative  Respiratory ROS: positive for - shortness of breath  negative for - cough, hemoptysis or wheezing  Cardiovascular ROS: positive for - dyspnea on exertion  negative for - chest pain  Gastrointestinal ROS: no abdominal pain, change in bowel habits, or black or bloody stools  Genito-Urinary ROS: no dysuria, trouble voiding, or hematuria  Musculoskeletal ROS: chronic pain   Neurological ROS: no TIA or stroke symptoms  Dermatological ROS: negative    OBJECTIVE      Medications      Current Facility-Administered Medications: naloxegol (MOVANTIK) tablet 25 mg, 25 mg, Oral, QAM  insulin lispro (1 Unit Dial) 0-6 Units, 0-6 Units, SubCUTAneous, TID WC  insulin lispro (1 Unit Dial) 0-3 Units, 0-3 Units, SubCUTAneous, Nightly  capsicum (ZOSTRIX) 0.075 % topical cream, , Topical, 4x Daily PRN  sodium chloride flush 0.9 % injection 5-40 mL, 5-40 mL, IntraVENous, 2 times per day  sodium chloride flush 0.9 % injection 5-40 mL, 5-40 mL, IntraVENous, PRN  0.9 % sodium chloride infusion, 25 mL, IntraVENous, PRN  acetaminophen (TYLENOL) tablet 650 mg, 650 mg, Oral, Q4H PRN  oxyCODONE-acetaminophen (PERCOCET) 5-325 MG per tablet 1 tablet, 1 tablet, Oral, Q4H PRN **OR** oxyCODONE-acetaminophen (PERCOCET) 5-325 MG per tablet 2 tablet, 2 tablet, Oral, Q4H PRN  morphine (PF) injection 2 mg, 2 mg, IntraVENous, Q3H PRN  iopamidol (ISOVUE-370) 76 % injection 93 mL, 93 mL, Other, ONCE PRN  sodium chloride flush 0.9 % injection 5-40 mL, 5-40 mL, IntraVENous, 2 times per day  sodium chloride flush 0.9 % injection 5-40 mL, 5-40 mL, IntraVENous, PRN  0.9 % sodium chloride infusion, 25 mL, IntraVENous, PRN  acetaminophen (TYLENOL) tablet 650 mg, 650 mg, Oral, Q6H PRN  amiodarone (CORDARONE) tablet 200 mg, 200 mg, Oral, Daily  apixaban (ELIQUIS) tablet 5 mg, 5 mg, Oral, BID  atorvastatin (LIPITOR) tablet 20 mg, 20 mg, Oral, Nightly  furosemide (LASIX) tablet 20 mg, 20 mg, Oral, BID  gabapentin (NEURONTIN) capsule 200 mg, 200 mg, Oral, BID  insulin glargine (LANTUS;BASAGLAR) injection pen 10 Units, 10 Units, SubCUTAneous, Nightly  ipratropium-albuterol (DUONEB) nebulizer solution 3 mL, 1 vial, Inhalation, Q6H PRN  lidocaine (XYLOCAINE) 5 % ointment, , Topical, Q4H PRN  metoprolol tartrate (LOPRESSOR) tablet 25 mg, 25 mg, Oral, BID  [Held by provider] midodrine (PROAMATINE) tablet 5 mg, 5 mg, Oral, TID WC  budesonide-formoterol (SYMBICORT) 80-4.5 MCG/ACT inhaler 2 puff, 2 puff, Inhalation, BID  oxyCODONE (ROXICODONE) immediate release tablet 5 mg, 5 mg, Oral, Q6H PRN  pantoprazole (PROTONIX) tablet 40 mg, 40 mg, Oral, Daily  polyethylene glycol (GLYCOLAX) packet 17 g, 17 g, Oral, Daily  sennosides-docusate sodium (SENOKOT-S) 8.6-50 MG tablet 1 tablet, 1 tablet, Oral, BID  sodium chloride (OCEAN, BABY AYR) 0.65 % nasal spray 1 spray, 1 spray, Nasal, PRN  spironolactone (ALDACTONE) tablet 25 mg, 25 mg, Oral, Daily  tamsulosin (FLOMAX) capsule 0.4 mg, 0.4 mg, Oral, Nightly    Physical      Vitals: /60   Pulse 80   Temp 97 °F (36.1 °C) (Temporal)   Resp 18   Ht 5' 9\" (1.753 m)   Wt 192 lb 0.3 oz (87.1 kg)   SpO2 96%   BMI 28.36 kg/m²   Temp: Temp: 97 °F (36.1 °C)  Max: Temp  Av.2 °F (36.2 °C)  Min: 97 °F (36.1 °C)  Max: 97.4 °F (36.3 °C)  Respiration range:  Resp  Av.3  Min: 16  Max: 20  Pulse Range:  Pulse  Av.6  Min: 64  Max: 80  Blood pressure range:  Systolic (03GUG), DSZ:306 , Min:96 , JBE:937   , Diastolic (50WSC), RMV:32, Min:56, Max:71    SpO2  Av.9 %  Min: 90 %  Max: 96 %    Intake/Output Summary (Last 24 hours) at 3/21/2022 9821  Last data filed at 3/21/2022 0608  Gross per 24 hour   Intake 120 ml   Output 450 ml   Net -330 ml       Vent settings:  Pulse  Av.3  Min: 62  Max: 206  Resp  Av  Min: 11  Max: 28  SpO2  Av.7 %  Min: 79 %  Max: 100 %    CONSTITUTIONAL:  fatigued, alert, cooperative, mild distress, appears older than stated age and moderately obese  EYES:  Unremarkable   NECK:  Faint bruit , no JVD  and supple, symmetrical, trachea midline  BACK:  symmetric and no curvature  LUNGS:  No increased work of breathing, good air exchange, clear to auscultation bilaterally, no crackles or wheezing  CARDIOVASCULAR:  normal apical pulses, regular rate and rhythm, normal S1 and S2 and no S3  ABDOMEN:  Soft BS + non tender   MUSCULOSKELETAL:  Trace edema   NEUROLOGIC:  Grossly intact   SKIN:  Warm dry and pale  and no bruising or bleeding    Data      Recent Results (from the past 96 hour(s))   POCT Glucose    Collection Time: 22  6:43 PM   Result Value Ref Range    POC Glucose 93 70 - 99 mg/dl    Performed on ACCU-CHEK    POCT Glucose    Collection Time: 22  8:51 PM   Result Value Ref Range    POC Glucose 114 (H) 70 - 99 mg/dl    Performed on ACCU-CHEK    Basic Metabolic Panel    Collection Time: 22  5:09 AM   Result Value Ref Range    Sodium 136 136 - 145 mmol/L    Potassium 4.6 3.5 - 5.1 mmol/L    Chloride 104 99 - 110 mmol/L    CO2 23 21 - 32 mmol/L    Anion Gap 9 3 - 16    Glucose 91 70 - 99 mg/dL    BUN 20 7 - 20 mg/dL    CREATININE 1.0 0.8 - 1.3 mg/dL    GFR Non-African American >60 >60    GFR African American >60 >60    Calcium 8.8 8.3 - 10.6 mg/dL   CBC    Collection Time: 22  5:09 AM   Result Value Ref Range    WBC 6.8 4.0 - 11.0 K/uL    RBC 3.38 (L) 4.20 - 5.90 M/uL    Hemoglobin 8.9 (L) 13.5 - 17.5 g/dL    Hematocrit 28.4 (L) 40.5 - 52.5 %    MCV 84.2 80.0 - 100.0 fL    MCH 26.4 26.0 - 34.0 pg    MCHC 31.3 31.0 - 36.0 g/dL    RDW 26.8 (H) 12.4 - 15.4 %    Platelets 413 975 - 977 K/uL    MPV 7.7 5.0 - 10.5 fL   Magnesium    Collection Time: 03/18/22  5:09 AM   Result Value Ref Range    Magnesium 2.00 1.80 - 2.40 mg/dL   POCT Glucose    Collection Time: 03/18/22  7:48 AM   Result Value Ref Range    POC Glucose 94 70 - 99 mg/dl    Performed on ACCU-CHEK    POCT Glucose    Collection Time: 03/18/22 11:24 AM   Result Value Ref Range    POC Glucose 118 (H) 70 - 99 mg/dl    Performed on ACCU-CHEK    POCT Glucose    Collection Time: 03/18/22  4:45 PM   Result Value Ref Range    POC Glucose 92 70 - 99 mg/dl    Performed on ACCU-CHEK    POCT Glucose    Collection Time: 03/19/22  8:55 PM   Result Value Ref Range    POC Glucose 116 (H) 70 - 99 mg/dl    Performed on ACCU-CHEK    POCT Glucose    Collection Time: 03/20/22  8:55 PM   Result Value Ref Range    POC Glucose 122 (H) 70 - 99 mg/dl    Performed on ACCU-CHEK    POCT Glucose    Collection Time: 03/21/22  8:06 AM   Result Value Ref Range    POC Glucose 107 (H) 70 - 99 mg/dl    Performed on [address] [place] [address] Problems           Last Modified POA    * (Principal) Syncope and collapse 3/15/2022 Yes    CAD (coronary artery disease) (Chronic) 3/15/2022 Yes    Essential hypertension 3/15/2022 Yes    PVD (peripheral vascular disease) (HCC) (Chronic) 3/15/2022 Yes    COPD (chronic obstructive pulmonary disease) ([address] 3/15/2022 Yes    Dyspnea 3/15/2022 Yes    Atrial fibrillation with RVR ([address] 3/15/2022 Yes    Anemia 3/15/2022 Yes    Hypotension due to hypovolemia 3/15/2022 Yes    COVID 3/15/2022 Yes    Acute respiratory failure due to COVID-19 ([address] 3/15/2022 Yes    High cholesterol 3/15/2022 Yes    Ischemic cardiomyopathy 3/15/2022 Yes    Chronic a-fib ([address] 3/15/2022 Yes    Acute on chronic systolic heart failure, NYHA class 3 ([address] 3/15/2022 Yes    Chronic diastolic heart failure ([address] 3/21/2022 Yes        Hemodynamically stable after EP cardiology intervention    awaiting disposition to a SNF

## 2022-03-21 NOTE — PROGRESS NOTES
Department of Internal Medicine  General Internal Medicine   Progress Note      SUBJECTIVE: not feeling light headed no further arrhythmia     History obtained from chart review and the patient  General ROS: positive for  - fatigue and malaise  negative for - chills, fever or night sweats  Psychological ROS: positive for - anxiety and disorientation  negative for - behavioral disorder, hallucinations or hostility  Ophthalmic ROS: negative  Respiratory ROS: positive for - shortness of breath  negative for - cough, hemoptysis or wheezing  Cardiovascular ROS: positive for - dyspnea on exertion  negative for - chest pain  Gastrointestinal ROS: no abdominal pain, change in bowel habits, or black or bloody stools  Genito-Urinary ROS: no dysuria, trouble voiding, or hematuria  Musculoskeletal ROS: chronic pain   Neurological ROS: no TIA or stroke symptoms  Dermatological ROS: negative    OBJECTIVE      Medications      Current Facility-Administered Medications: naloxegol (MOVANTIK) tablet 25 mg, 25 mg, Oral, QAM  insulin lispro (1 Unit Dial) 0-6 Units, 0-6 Units, SubCUTAneous, TID WC  insulin lispro (1 Unit Dial) 0-3 Units, 0-3 Units, SubCUTAneous, Nightly  capsicum (ZOSTRIX) 0.075 % topical cream, , Topical, 4x Daily PRN  0.9 % sodium chloride infusion, , IntraVENous, Continuous  sodium chloride flush 0.9 % injection 5-40 mL, 5-40 mL, IntraVENous, 2 times per day  sodium chloride flush 0.9 % injection 5-40 mL, 5-40 mL, IntraVENous, PRN  0.9 % sodium chloride infusion, 25 mL, IntraVENous, PRN  acetaminophen (TYLENOL) tablet 650 mg, 650 mg, Oral, Q4H PRN  oxyCODONE-acetaminophen (PERCOCET) 5-325 MG per tablet 1 tablet, 1 tablet, Oral, Q4H PRN **OR** oxyCODONE-acetaminophen (PERCOCET) 5-325 MG per tablet 2 tablet, 2 tablet, Oral, Q4H PRN  morphine (PF) injection 2 mg, 2 mg, IntraVENous, Q3H PRN  iopamidol (ISOVUE-370) 76 % injection 93 mL, 93 mL, Other, ONCE PRN  sodium chloride flush 0.9 % injection 5-40 mL, 5-40 mL, IntraVENous, 2 times per day  sodium chloride flush 0.9 % injection 5-40 mL, 5-40 mL, IntraVENous, PRN  0.9 % sodium chloride infusion, 25 mL, IntraVENous, PRN  acetaminophen (TYLENOL) tablet 650 mg, 650 mg, Oral, Q6H PRN  amiodarone (CORDARONE) tablet 200 mg, 200 mg, Oral, Daily  apixaban (ELIQUIS) tablet 5 mg, 5 mg, Oral, BID  atorvastatin (LIPITOR) tablet 20 mg, 20 mg, Oral, Nightly  furosemide (LASIX) tablet 20 mg, 20 mg, Oral, BID  gabapentin (NEURONTIN) capsule 200 mg, 200 mg, Oral, BID  insulin glargine (LANTUS;BASAGLAR) injection pen 10 Units, 10 Units, SubCUTAneous, Nightly  ipratropium-albuterol (DUONEB) nebulizer solution 3 mL, 1 vial, Inhalation, Q6H PRN  lidocaine (XYLOCAINE) 5 % ointment, , Topical, Q4H PRN  metoprolol tartrate (LOPRESSOR) tablet 25 mg, 25 mg, Oral, BID  [Held by provider] midodrine (PROAMATINE) tablet 5 mg, 5 mg, Oral, TID WC  budesonide-formoterol (SYMBICORT) 80-4.5 MCG/ACT inhaler 2 puff, 2 puff, Inhalation, BID  oxyCODONE (ROXICODONE) immediate release tablet 5 mg, 5 mg, Oral, Q6H PRN  pantoprazole (PROTONIX) tablet 40 mg, 40 mg, Oral, Daily  polyethylene glycol (GLYCOLAX) packet 17 g, 17 g, Oral, Daily  sennosides-docusate sodium (SENOKOT-S) 8.6-50 MG tablet 1 tablet, 1 tablet, Oral, BID  sodium chloride (OCEAN, BABY AYR) 0.65 % nasal spray 1 spray, 1 spray, Nasal, PRN  spironolactone (ALDACTONE) tablet 25 mg, 25 mg, Oral, Daily  tamsulosin (FLOMAX) capsule 0.4 mg, 0.4 mg, Oral, Nightly    Physical      Vitals: /60   Pulse 80   Temp 97 °F (36.1 °C) (Temporal)   Resp 18   Ht 5' 9\" (1.753 m)   Wt 192 lb 0.3 oz (87.1 kg)   SpO2 96%   BMI 28.36 kg/m²   Temp: Temp: 97 °F (36.1 °C)  Max: Temp  Av.2 °F (36.2 °C)  Min: 97 °F (36.1 °C)  Max: 97.4 °F (36.3 °C)  Respiration range:  Resp  Av.3  Min: 16  Max: 20  Pulse Range:  Pulse  Av.6  Min: 64  Max: 80  Blood pressure range:  Systolic (33ZDM), LCU:457 , Min:96 , AJC:230   , Diastolic (84ANF), HNO:26, Min:56, Max:71    SpO2  Av.9 %  Min: 90 %  Max: 96 %    Intake/Output Summary (Last 24 hours) at 3/21/2022 0818  Last data filed at 3/21/2022 5206  Gross per 24 hour   Intake 120 ml   Output 450 ml   Net -330 ml       Vent settings:  Pulse  Av.3  Min: 57  Max: 206  Resp  Av  Min: 11  Max: 28  SpO2  Av.7 %  Min: 79 %  Max: 100 %    CONSTITUTIONAL:  fatigued, alert, cooperative, mild distress, appears older than stated age and moderately obese  EYES:  Unremarkable   NECK:  Faint bruit , no JVD  and supple, symmetrical, trachea midline  BACK:  symmetric and no curvature  LUNGS:  No increased work of breathing, good air exchange, clear to auscultation bilaterally, no crackles or wheezing  CARDIOVASCULAR:  normal apical pulses, regular rate and rhythm, normal S1 and S2 and no S3  ABDOMEN:  Soft BS + non tender   MUSCULOSKELETAL:  Trace edema   NEUROLOGIC:  Grossly intact   SKIN:  Warm dry and pale  and no bruising or bleeding    Data      Recent Results (from the past 96 hour(s))   POCT Glucose    Collection Time: 22  6:43 PM   Result Value Ref Range    POC Glucose 93 70 - 99 mg/dl    Performed on ACCU-CHEK    POCT Glucose    Collection Time: 22  8:51 PM   Result Value Ref Range    POC Glucose 114 (H) 70 - 99 mg/dl    Performed on ACCU-CHEK    Basic Metabolic Panel    Collection Time: 22  5:09 AM   Result Value Ref Range    Sodium 136 136 - 145 mmol/L    Potassium 4.6 3.5 - 5.1 mmol/L    Chloride 104 99 - 110 mmol/L    CO2 23 21 - 32 mmol/L    Anion Gap 9 3 - 16    Glucose 91 70 - 99 mg/dL    BUN 20 7 - 20 mg/dL    CREATININE 1.0 0.8 - 1.3 mg/dL    GFR Non-African American >60 >60    GFR African American >60 >60    Calcium 8.8 8.3 - 10.6 mg/dL   CBC    Collection Time: 22  5:09 AM   Result Value Ref Range    WBC 6.8 4.0 - 11.0 K/uL    RBC 3.38 (L) 4.20 - 5.90 M/uL    Hemoglobin 8.9 (L) 13.5 - 17.5 g/dL    Hematocrit 28.4 (L) 40.5 - 52.5 %    MCV 84.2 80.0 - 100.0 fL    MCH 26.4 26.0 - pre-cert and SNF placement  At the [facility] point

## 2022-03-21 NOTE — CARE COORDINATION
Discharge Plan:     Patient discharged to:[facility]  [address]  LQQKN:  VVZ: [facility]  SW/DC Planner faxed, [address] [place] and AVS   Narcotic Prescriptions faxed were:  RN: Leslie Fried) will call report to:       Medical Transport with: [facility]  [phone]   time: 1700  Family advised of discharge?:yes  HENS Submitted?:  yes  All discharge needs met per case management.       DONNIE MalinN, CCM, RN  [facility]  [phone]

## 2022-03-21 NOTE — PROGRESS NOTES
[facility] Post Op Implant/PDE Device Check  Rep Checked Device   Device shows normal function  No parameters have been changed during the current session.

## 2022-03-21 NOTE — PROGRESS NOTES
Physical Therapy  Facility/Department: [facility]  Daily Treatment Note  NAME: Erika Nath  : 1950  MRN: [id number]    Date of Service: 3/21/2022    Discharge Recommendations:Leandra Morrison scored a 17/24 on the AM-PAC short mobility form. Current research shows that an AM-PAC score of 17 or less is typically not associated with a discharge to the patient's home setting. Based on the patient's AM-PAC score and their current functional mobility deficits, it is recommended that the patient have 3-5 sessions per week of Physical Therapy at d/c to increase the patient's independence. Please see assessment section for further patient specific details. If patient discharges prior to next session this note will serve as a discharge summary. Please see below for the latest assessment towards goals. 3-5 sessions per week,24 hour supervision or assist   PT Equipment Recommendations  Equipment Needed: No  Other: Defer to next level of care. Assessment   Body structures, Functions, Activity limitations: Decreased functional mobility ; Decreased balance;Decreased endurance;Decreased strength  Assessment: Pt SBA bed mob, CGA transfers and gait with 4ww. VCs for each transfer and brake locking. Very SOB but able to continue with seated rest. Patient primarily limited by endurance and fatigue w/ noticeable weakness in LLE during ambulation. Recommend 24 hour assist and continued therapy at d/c. Treatment Diagnosis: decreased functional mobility, impaired gait, decreased endurance  Prognosis: Good  Clinical Presentation: evolving  PT Education: Goals;PT Role;Plan of Care;Home Exercise Program;Precautions;Transfer Training;Energy Conservation; Functional Mobility Training;Gait Training  Patient Education: ther ex, DC recommendations, 4ww use - pt verbalized good understanding, needs reinforcement.   Barriers to Learning: none  REQUIRES PT FOLLOW UP: Yes  Activity Tolerance  Activity Tolerance: Patient limited by fatigue;Patient limited by endurance     Patient Diagnosis(es): The primary encounter diagnosis was Precordial chest pain. Diagnoses of Palpitations, Systolic congestive heart failure, unspecified HF chronicity ([facility] and PVD (peripheral vascular disease) ([facility] were also pertinent to this visit. has a past medical history of Acid reflux, Acute MI ([facility] CAD (coronary artery disease), COPD (chronic obstructive pulmonary disease) ([facility] Diverticulitis, Hypertension, Peripheral vascular disease ([facility] and Shingles. has a past surgical history that includes vascular surgery; Cardiac surgery; Colonoscopy; and Cardiac catheterization. Restrictions  Restrictions/Precautions  Restrictions/Precautions: Fall Risk  Required Braces or Orthoses?: No  Implants present? : Pacemaker  Position Activity Restriction  Other position/activity restrictions: No elevation L shoulder >90 deg. Velco strap in bed for reminder. ICD placement. Subjective   General  Chart Reviewed: Yes  Response To Previous Treatment: Patient with no complaints from previous session. Family / Caregiver Present: No  Subjective  Subjective: Pt reporting no pain at rest. Agreeable to working with PT. General Comment  Comments: Pt supine in bed upon arrival.  Pain Screening  Patient Currently in Pain: Denies  Vital Signs  Patient Currently in Pain: Denies       Orientation  Orientation  Overall Orientation Status: Within Functional Limits  Cognition      Objective   Bed mobility  Supine to Sit: Stand by assistance  Scooting: Stand by assistance  Comment: Pt sat EOB with good balance. Transfers  Sit to Stand: Contact guard assistance (transfers from EOB 2 times, 4ww seat 3 times.)  Stand to sit: Contact guard assistance  Comment: VCs for hand placement.   Ambulation  Ambulation?: Yes  Ambulation 1  Surface: level tile  Device: Rollator  Other Apparatus: O2 (4LO2, increased to 6LO2 during middle of amb.)  Assistance: Contact guard assistance  Gait Deviations: Slow Mariam  Distance: Pt amb with 4ww and CGA for 100 ft.x 3  Comments: Pt amb in hallway and becoming SOB. Pt taking 3-4 min seated rest breaks, then able to continue amb. VCs each transfer for brake locking. O2 sats 94% at end of amb on 6LO2. Stairs/Curb  Stairs?: No     Balance  Posture: Good  Sitting - Static: Good  Sitting - Dynamic: Good  Standing - Static: Fair;+ (with 4ww)  Standing - Dynamic: Fair;+ (with 4ww)  Comments: SBA for straight line amb, CGA for direction changed while turning to sit on 4ww seat. Exercises  Gluteal Sets: x 10 bilat  Hip Flexion: seated marching x 10 bilat  Knee Long Arc Quad: x 10 bilat  Ankle Pumps: HR/TR x 10 bilat                        G-Code     OutComes Score                                                     AM-PAC Score  AM-PAC Inpatient Mobility Raw Score : 17 (03/21/22 1400)  AM-PAC Inpatient T-Scale Score : 42.13 (03/21/22 1400)  Mobility Inpatient CMS 0-100% Score: 50.57 (03/21/22 1400)  Mobility Inpatient CMS G-Code Modifier : CK (03/21/22 1400)          Goals  Short term goals  Time Frame for Short term goals: To be met prior to discharge (no goals met in full this date)  Short term goal 1: Sit to/from stand with SBA. (Not met)  Short term goal 2: Ambulate 22' with RW and SBA. (Not met)  Short term goal 3: Bed mobility with supervision. (Not met)  Patient Goals   Patient goals : to walk    Plan    Plan  Times per week: 3-5  Times per day: Daily  Current Treatment Recommendations: Strengthening,Transfer Training,Endurance Training,Balance Training,Gait Training,Functional Mobility Training,Safety Education & Training,Home Exercise Program,Pain Management  Safety Devices  Type of devices:  All fall risk precautions in place,Call light within reach,Nurse notified,Patient at risk for falls,Left in chair,Gait belt  Restraints  Initially in place: No     Therapy Time   Individual Concurrent Group Co-treatment   Time In 1307         Time Out 1346 Minutes 39         Timed Code Treatment Minutes: [facility] , [id number]

## 2022-03-21 NOTE — PROGRESS NOTES
I have been made aware of him having dry crusted lesions of shingles (Herpes Zoster) , I do confirm and believe he is of no infectious threat to fellow residents  In a skilled nursing facility setting

## 2022-03-22 PROCEDURE — 93282 PRGRMG EVAL IMPLANTABLE DFB: CPT | Performed by: INTERNAL MEDICINE

## 2022-03-24 ENCOUNTER — TELEPHONE (OUTPATIENT)
Dept: CARDIOLOGY CLINIC | Age: 72
End: 2022-03-24

## 2022-03-24 ENCOUNTER — NURSE ONLY (OUTPATIENT)
Dept: CARDIOLOGY CLINIC | Age: 72
End: 2022-03-24
Payer: MEDICARE

## 2022-03-24 DIAGNOSIS — I50.22 CHRONIC SYSTOLIC HF (HEART FAILURE) (HCC): ICD-10-CM

## 2022-03-24 DIAGNOSIS — I48.20 CHRONIC A-FIB (HCC): ICD-10-CM

## 2022-03-24 DIAGNOSIS — I48.91 ATRIAL FIBRILLATION WITH RVR (HCC): ICD-10-CM

## 2022-03-24 DIAGNOSIS — Z95.810 ICD (IMPLANTABLE CARDIOVERTER-DEFIBRILLATOR), SINGLE, IN SITU: ICD-10-CM

## 2022-03-24 DIAGNOSIS — I25.5 ISCHEMIC CARDIOMYOPATHY: ICD-10-CM

## 2022-03-24 PROCEDURE — 93290 INTERROG DEV EVAL ICPMS IP: CPT | Performed by: INTERNAL MEDICINE

## 2022-03-24 PROCEDURE — 93282 PRGRMG EVAL IMPLANTABLE DFB: CPT | Performed by: INTERNAL MEDICINE

## 2022-03-24 NOTE — TELEPHONE ENCOUNTER
Pt has a device check scheduled for 12:00 today. Asking how long the appointment will be. Also wanted to let us know he does not have a monitor for the device.

## 2022-03-24 NOTE — PROGRESS NOTES
Patient comes in for programming evaluation for his defibrillator. All sensing and pacing parameters are within normal range. S/p mdtscicd Implant on 3/17/2022  Battery life 11.4 years   <0.1%. No episodes noted. Patient has Chronic AF  Patient remains on Eliquis, amiodarone and metoprolol. Patients incision is healing nicely. Outside bandage removed today. Wound clean, dry and intact. Patient and family educated on wound care. All questions answered. Patient to call the office immediately with any signs on infection. No changes need to be made at this time. Please see interrogation for more detail. Opt[employer]ol is initializing. Patient will follow up in 3 months in office or remotely. Home Monitor ordered today.

## 2022-04-01 NOTE — PROGRESS NOTES
Physician Progress Note      PATIENT:               Micki Martinez  CSN #:                  [id number]  :                       1950  ADMIT DATE:       3/15/2022 10:52 AM  DISCH DATE:        3/21/2022 5:07 PM  RESPONDING  PROVIDER #:        Beth Montes MD          QUERY TEXT:    Patient admitted with Syncope, noted to have hypotension and atrial   fibrillation with rapid ventricular response. If possible, please document in   a progress note and/or discharge summary whether the syncope was: The medical record reflects the following:  Risk Factors: Pt came in after syncopal episode, during admission had CODE   Blue episode for V-Tach  Clinical Indicators: Diagnosed with Persistent Atrial fibrillation and NSVT;   b/p as low as 83/50  Treatment: EP consult  Options provided:  -- Syncope likely due to a arrhythmia (Afib w/RVR)  -- Syncope likely due to hypotension. -- Other - I will add my own diagnosis  -- Disagree - Not applicable / Not valid  -- Disagree - Clinically unable to determine / Unknown  -- Refer to Clinical Documentation Reviewer    PROVIDER RESPONSE TEXT:    The syncope was likely due to hypotension.     Query created by: Jodi Cruz on 3/24/2022 8:29 AM      Electronically signed by:  Beth Montes MD 2022 4:59 PM

## 2022-04-06 ENCOUNTER — HOSPITAL ENCOUNTER (OUTPATIENT)
Dept: CT IMAGING | Age: 72
Discharge: HOME | End: 2022-04-06
Payer: MEDICARE

## 2022-04-06 DIAGNOSIS — M25.571 ACUTE RIGHT ANKLE PAIN: ICD-10-CM

## 2022-04-06 PROCEDURE — 73700 CT LOWER EXTREMITY W/O DYE: CPT

## 2022-04-10 RX ORDER — AMIODARONE HYDROCHLORIDE 100 MG/1
TABLET ORAL
Qty: 30 TABLET | Refills: 3 | Status: ON HOLD | OUTPATIENT
Start: 2022-04-10 | End: 2022-04-23 | Stop reason: HOSPADM

## 2022-04-20 NOTE — PROGRESS NOTES
Patient seen , discharge dictated scripts given , arrangements made , ROB completed .  Discussed with nursing staff  And   If applicable ,  Discussed with  Patient's family , all questions answered and concerns addressed  When applicable

## 2022-04-21 ENCOUNTER — APPOINTMENT (OUTPATIENT)
Dept: CT IMAGING | Age: 72
DRG: 194 | End: 2022-04-21
Payer: MEDICARE

## 2022-04-21 ENCOUNTER — APPOINTMENT (OUTPATIENT)
Dept: GENERAL RADIOLOGY | Age: 72
DRG: 194 | End: 2022-04-21
Payer: MEDICARE

## 2022-04-21 ENCOUNTER — HOSPITAL ENCOUNTER (INPATIENT)
Age: 72
LOS: 2 days | Discharge: HOME HEALTH | DRG: 194 | End: 2022-04-23
Attending: EMERGENCY MEDICINE | Admitting: INTERNAL MEDICINE
Payer: MEDICARE

## 2022-04-21 DIAGNOSIS — R06.00 DYSPNEA, UNSPECIFIED TYPE: Primary | ICD-10-CM

## 2022-04-21 DIAGNOSIS — R55 SYNCOPE AND COLLAPSE: ICD-10-CM

## 2022-04-21 DIAGNOSIS — J18.9 PNEUMONIA DUE TO INFECTIOUS ORGANISM, UNSPECIFIED LATERALITY, UNSPECIFIED PART OF LUNG: ICD-10-CM

## 2022-04-21 LAB
A/G RATIO: 1.3 (ref 1.1–2.2)
ACANTHOCYTES: ABNORMAL
ALBUMIN SERPL-MCNC: 3.9 G/DL (ref 3.4–5)
ALP BLD-CCNC: 105 U/L (ref 40–129)
ALT SERPL-CCNC: 32 U/L (ref 10–40)
ANION GAP SERPL CALCULATED.3IONS-SCNC: 19 MMOL/L (ref 3–16)
ANISOCYTOSIS: ABNORMAL
AST SERPL-CCNC: 30 U/L (ref 15–37)
BASOPHILS ABSOLUTE: 0.1 K/UL (ref 0–0.2)
BASOPHILS RELATIVE PERCENT: 0.8 %
BILIRUB SERPL-MCNC: 0.4 MG/DL (ref 0–1)
BUN BLDV-MCNC: 21 MG/DL (ref 7–20)
CALCIUM SERPL-MCNC: 10 MG/DL (ref 8.3–10.6)
CHLORIDE BLD-SCNC: 100 MMOL/L (ref 99–110)
CO2: 19 MMOL/L (ref 21–32)
CREAT SERPL-MCNC: 1.5 MG/DL (ref 0.8–1.3)
D DIMER: 490 NG/ML DDU (ref 0–229)
EOSINOPHILS ABSOLUTE: 0.2 K/UL (ref 0–0.6)
EOSINOPHILS RELATIVE PERCENT: 1.9 %
GFR AFRICAN AMERICAN: 56
GFR NON-AFRICAN AMERICAN: 46
GLUCOSE BLD-MCNC: 105 MG/DL (ref 70–99)
GLUCOSE BLD-MCNC: 149 MG/DL (ref 70–99)
GLUCOSE BLD-MCNC: 90 MG/DL (ref 70–99)
HCT VFR BLD CALC: 33 % (ref 40.5–52.5)
HEMOGLOBIN: 10.4 G/DL (ref 13.5–17.5)
LACTIC ACID, SEPSIS: 1.2 MMOL/L (ref 0.4–1.9)
LYMPHOCYTES ABSOLUTE: 3.6 K/UL (ref 1–5.1)
LYMPHOCYTES RELATIVE PERCENT: 37.5 %
MCH RBC QN AUTO: 25.8 PG (ref 26–34)
MCHC RBC AUTO-ENTMCNC: 31.5 G/DL (ref 31–36)
MCV RBC AUTO: 81.9 FL (ref 80–100)
MONOCYTES ABSOLUTE: 0.8 K/UL (ref 0–1.3)
MONOCYTES RELATIVE PERCENT: 8.4 %
NEUTROPHILS ABSOLUTE: 5 K/UL (ref 1.7–7.7)
NEUTROPHILS RELATIVE PERCENT: 51.4 %
PDW BLD-RTO: 22.8 % (ref 12.4–15.4)
PERFORMED ON: ABNORMAL
PERFORMED ON: NORMAL
PLATELET # BLD: 267 K/UL (ref 135–450)
PLATELET SLIDE REVIEW: ADEQUATE
PMV BLD AUTO: 7.5 FL (ref 5–10.5)
POIKILOCYTES: ABNORMAL
POLYCHROMASIA: ABNORMAL
POTASSIUM REFLEX MAGNESIUM: 4.4 MMOL/L (ref 3.5–5.1)
PRO-BNP: 1912 PG/ML (ref 0–124)
RBC # BLD: 4.03 M/UL (ref 4.2–5.9)
SLIDE REVIEW: ABNORMAL
SODIUM BLD-SCNC: 138 MMOL/L (ref 136–145)
TOTAL CK: 61 U/L (ref 39–308)
TOTAL PROTEIN: 6.8 G/DL (ref 6.4–8.2)
TROPONIN: 0.08 NG/ML
WBC # BLD: 9.7 K/UL (ref 4–11)

## 2022-04-21 PROCEDURE — 80053 COMPREHEN METABOLIC PANEL: CPT

## 2022-04-21 PROCEDURE — 94761 N-INVAS EAR/PLS OXIMETRY MLT: CPT

## 2022-04-21 PROCEDURE — 85025 COMPLETE CBC W/AUTO DIFF WBC: CPT

## 2022-04-21 PROCEDURE — 72125 CT NECK SPINE W/O DYE: CPT

## 2022-04-21 PROCEDURE — 6360000002 HC RX W HCPCS: Performed by: PHYSICIAN ASSISTANT

## 2022-04-21 PROCEDURE — 36415 COLL VENOUS BLD VENIPUNCTURE: CPT

## 2022-04-21 PROCEDURE — 96365 THER/PROPH/DIAG IV INF INIT: CPT

## 2022-04-21 PROCEDURE — 87040 BLOOD CULTURE FOR BACTERIA: CPT

## 2022-04-21 PROCEDURE — 85379 FIBRIN DEGRADATION QUANT: CPT

## 2022-04-21 PROCEDURE — 2580000003 HC RX 258: Performed by: INTERNAL MEDICINE

## 2022-04-21 PROCEDURE — 6370000000 HC RX 637 (ALT 250 FOR IP): Performed by: INTERNAL MEDICINE

## 2022-04-21 PROCEDURE — 82550 ASSAY OF CK (CPK): CPT

## 2022-04-21 PROCEDURE — 94640 AIRWAY INHALATION TREATMENT: CPT

## 2022-04-21 PROCEDURE — 6360000002 HC RX W HCPCS: Performed by: INTERNAL MEDICINE

## 2022-04-21 PROCEDURE — 2700000000 HC OXYGEN THERAPY PER DAY

## 2022-04-21 PROCEDURE — 83036 HEMOGLOBIN GLYCOSYLATED A1C: CPT

## 2022-04-21 PROCEDURE — 84484 ASSAY OF TROPONIN QUANT: CPT

## 2022-04-21 PROCEDURE — 2580000003 HC RX 258: Performed by: PHYSICIAN ASSISTANT

## 2022-04-21 PROCEDURE — 6360000002 HC RX W HCPCS: Performed by: EMERGENCY MEDICINE

## 2022-04-21 PROCEDURE — 6360000004 HC RX CONTRAST MEDICATION: Performed by: PHYSICIAN ASSISTANT

## 2022-04-21 PROCEDURE — 71045 X-RAY EXAM CHEST 1 VIEW: CPT

## 2022-04-21 PROCEDURE — 94150 VITAL CAPACITY TEST: CPT

## 2022-04-21 PROCEDURE — 83880 ASSAY OF NATRIURETIC PEPTIDE: CPT

## 2022-04-21 PROCEDURE — 83605 ASSAY OF LACTIC ACID: CPT

## 2022-04-21 PROCEDURE — 71260 CT THORAX DX C+: CPT

## 2022-04-21 PROCEDURE — 93005 ELECTROCARDIOGRAM TRACING: CPT | Performed by: EMERGENCY MEDICINE

## 2022-04-21 PROCEDURE — 99285 EMERGENCY DEPT VISIT HI MDM: CPT

## 2022-04-21 PROCEDURE — 6370000000 HC RX 637 (ALT 250 FOR IP): Performed by: EMERGENCY MEDICINE

## 2022-04-21 PROCEDURE — 70450 CT HEAD/BRAIN W/O DYE: CPT

## 2022-04-21 PROCEDURE — 1200000000 HC SEMI PRIVATE

## 2022-04-21 RX ORDER — INSULIN LISPRO 100 [IU]/ML
0-12 INJECTION, SOLUTION INTRAVENOUS; SUBCUTANEOUS
Status: DISCONTINUED | OUTPATIENT
Start: 2022-04-21 | End: 2022-04-23 | Stop reason: HOSPADM

## 2022-04-21 RX ORDER — FUROSEMIDE 40 MG/1
40 TABLET ORAL DAILY
Status: DISCONTINUED | OUTPATIENT
Start: 2022-04-21 | End: 2022-04-23 | Stop reason: HOSPADM

## 2022-04-21 RX ORDER — ACETAMINOPHEN 325 MG/1
650 TABLET ORAL EVERY 6 HOURS PRN
Status: DISCONTINUED | OUTPATIENT
Start: 2022-04-21 | End: 2022-04-23 | Stop reason: HOSPADM

## 2022-04-21 RX ORDER — NICOTINE POLACRILEX 4 MG
15 LOZENGE BUCCAL PRN
Status: DISCONTINUED | OUTPATIENT
Start: 2022-04-21 | End: 2022-04-23 | Stop reason: HOSPADM

## 2022-04-21 RX ORDER — ONDANSETRON 2 MG/ML
4 INJECTION INTRAMUSCULAR; INTRAVENOUS EVERY 6 HOURS PRN
Status: DISCONTINUED | OUTPATIENT
Start: 2022-04-21 | End: 2022-04-21

## 2022-04-21 RX ORDER — 0.9 % SODIUM CHLORIDE 0.9 %
500 INTRAVENOUS SOLUTION INTRAVENOUS PRN
Status: DISCONTINUED | OUTPATIENT
Start: 2022-04-21 | End: 2022-04-23 | Stop reason: HOSPADM

## 2022-04-21 RX ORDER — ACETAMINOPHEN 650 MG/1
650 SUPPOSITORY RECTAL EVERY 6 HOURS PRN
Status: DISCONTINUED | OUTPATIENT
Start: 2022-04-21 | End: 2022-04-23 | Stop reason: HOSPADM

## 2022-04-21 RX ORDER — DEXTROSE MONOHYDRATE 50 MG/ML
100 INJECTION, SOLUTION INTRAVENOUS PRN
Status: DISCONTINUED | OUTPATIENT
Start: 2022-04-21 | End: 2022-04-23 | Stop reason: HOSPADM

## 2022-04-21 RX ORDER — POTASSIUM CHLORIDE 20 MEQ/1
40 TABLET, EXTENDED RELEASE ORAL PRN
Status: DISCONTINUED | OUTPATIENT
Start: 2022-04-21 | End: 2022-04-23 | Stop reason: HOSPADM

## 2022-04-21 RX ORDER — SPIRONOLACTONE 25 MG/1
25 TABLET ORAL DAILY
Status: DISCONTINUED | OUTPATIENT
Start: 2022-04-21 | End: 2022-04-23 | Stop reason: HOSPADM

## 2022-04-21 RX ORDER — SENNA AND DOCUSATE SODIUM 50; 8.6 MG/1; MG/1
1 TABLET, FILM COATED ORAL 2 TIMES DAILY
Status: DISCONTINUED | OUTPATIENT
Start: 2022-04-21 | End: 2022-04-23 | Stop reason: HOSPADM

## 2022-04-21 RX ORDER — POLYETHYLENE GLYCOL 3350 17 G/17G
17 POWDER, FOR SOLUTION ORAL DAILY
Status: DISCONTINUED | OUTPATIENT
Start: 2022-04-21 | End: 2022-04-23 | Stop reason: HOSPADM

## 2022-04-21 RX ORDER — MIDODRINE HYDROCHLORIDE 5 MG/1
5 TABLET ORAL
Status: DISCONTINUED | OUTPATIENT
Start: 2022-04-21 | End: 2022-04-23 | Stop reason: HOSPADM

## 2022-04-21 RX ORDER — IPRATROPIUM BROMIDE AND ALBUTEROL SULFATE 2.5; .5 MG/3ML; MG/3ML
1 SOLUTION RESPIRATORY (INHALATION) 2 TIMES DAILY
Status: DISCONTINUED | OUTPATIENT
Start: 2022-04-21 | End: 2022-04-23 | Stop reason: HOSPADM

## 2022-04-21 RX ORDER — POTASSIUM CHLORIDE 7.45 MG/ML
10 INJECTION INTRAVENOUS PRN
Status: DISCONTINUED | OUTPATIENT
Start: 2022-04-21 | End: 2022-04-23 | Stop reason: HOSPADM

## 2022-04-21 RX ORDER — ASPIRIN 81 MG/1
324 TABLET, CHEWABLE ORAL ONCE
Status: COMPLETED | OUTPATIENT
Start: 2022-04-21 | End: 2022-04-21

## 2022-04-21 RX ORDER — DIAPER,BRIEF,INFANT-TODD,DISP
EACH MISCELLANEOUS 4 TIMES DAILY PRN
Status: DISCONTINUED | OUTPATIENT
Start: 2022-04-21 | End: 2022-04-23 | Stop reason: HOSPADM

## 2022-04-21 RX ORDER — ONDANSETRON 4 MG/1
4 TABLET, ORALLY DISINTEGRATING ORAL EVERY 8 HOURS PRN
Status: DISCONTINUED | OUTPATIENT
Start: 2022-04-21 | End: 2022-04-21

## 2022-04-21 RX ORDER — ENOXAPARIN SODIUM 100 MG/ML
40 INJECTION SUBCUTANEOUS DAILY
Status: DISCONTINUED | OUTPATIENT
Start: 2022-04-21 | End: 2022-04-21 | Stop reason: ALTCHOICE

## 2022-04-21 RX ORDER — MAGNESIUM SULFATE 1 G/100ML
1000 INJECTION INTRAVENOUS ONCE
Status: COMPLETED | OUTPATIENT
Start: 2022-04-21 | End: 2022-04-21

## 2022-04-21 RX ORDER — SODIUM CHLORIDE 9 MG/ML
INJECTION, SOLUTION INTRAVENOUS CONTINUOUS
Status: DISCONTINUED | OUTPATIENT
Start: 2022-04-21 | End: 2022-04-23 | Stop reason: HOSPADM

## 2022-04-21 RX ORDER — INSULIN LISPRO 100 [IU]/ML
0-6 INJECTION, SOLUTION INTRAVENOUS; SUBCUTANEOUS NIGHTLY
Status: DISCONTINUED | OUTPATIENT
Start: 2022-04-21 | End: 2022-04-23 | Stop reason: HOSPADM

## 2022-04-21 RX ORDER — TAMSULOSIN HYDROCHLORIDE 0.4 MG/1
0.4 CAPSULE ORAL DAILY
Status: DISCONTINUED | OUTPATIENT
Start: 2022-04-21 | End: 2022-04-23 | Stop reason: HOSPADM

## 2022-04-21 RX ORDER — GABAPENTIN 100 MG/1
200 CAPSULE ORAL 2 TIMES DAILY
Status: DISCONTINUED | OUTPATIENT
Start: 2022-04-21 | End: 2022-04-23 | Stop reason: HOSPADM

## 2022-04-21 RX ORDER — SODIUM CHLORIDE 0.9 % (FLUSH) 0.9 %
10 SYRINGE (ML) INJECTION PRN
Status: DISCONTINUED | OUTPATIENT
Start: 2022-04-21 | End: 2022-04-23 | Stop reason: HOSPADM

## 2022-04-21 RX ORDER — DIAPER,BRIEF,INFANT-TODD,DISP
EACH MISCELLANEOUS 4 TIMES DAILY PRN
Status: DISCONTINUED | OUTPATIENT
Start: 2022-04-21 | End: 2022-04-21 | Stop reason: RX

## 2022-04-21 RX ORDER — SODIUM CHLORIDE 9 MG/ML
INJECTION, SOLUTION INTRAVENOUS PRN
Status: DISCONTINUED | OUTPATIENT
Start: 2022-04-21 | End: 2022-04-23 | Stop reason: HOSPADM

## 2022-04-21 RX ORDER — IPRATROPIUM BROMIDE AND ALBUTEROL SULFATE 2.5; .5 MG/3ML; MG/3ML
1 SOLUTION RESPIRATORY (INHALATION)
Status: DISCONTINUED | OUTPATIENT
Start: 2022-04-21 | End: 2022-04-21

## 2022-04-21 RX ORDER — IPRATROPIUM BROMIDE AND ALBUTEROL SULFATE 2.5; .5 MG/3ML; MG/3ML
1 SOLUTION RESPIRATORY (INHALATION) EVERY 6 HOURS PRN
Status: DISCONTINUED | OUTPATIENT
Start: 2022-04-21 | End: 2022-04-21

## 2022-04-21 RX ORDER — ATORVASTATIN CALCIUM 20 MG/1
20 TABLET, FILM COATED ORAL NIGHTLY
Status: DISCONTINUED | OUTPATIENT
Start: 2022-04-21 | End: 2022-04-23 | Stop reason: HOSPADM

## 2022-04-21 RX ORDER — PANTOPRAZOLE SODIUM 40 MG/1
40 TABLET, DELAYED RELEASE ORAL DAILY
Status: DISCONTINUED | OUTPATIENT
Start: 2022-04-21 | End: 2022-04-23 | Stop reason: HOSPADM

## 2022-04-21 RX ORDER — SODIUM CHLORIDE 0.9 % (FLUSH) 0.9 %
5-40 SYRINGE (ML) INJECTION EVERY 12 HOURS SCHEDULED
Status: DISCONTINUED | OUTPATIENT
Start: 2022-04-21 | End: 2022-04-23 | Stop reason: HOSPADM

## 2022-04-21 RX ORDER — DEXTROSE MONOHYDRATE 100 MG/ML
12.5 INJECTION, SOLUTION INTRAVENOUS PRN
Status: DISCONTINUED | OUTPATIENT
Start: 2022-04-21 | End: 2022-04-23 | Stop reason: HOSPADM

## 2022-04-21 RX ORDER — AMIODARONE HYDROCHLORIDE 200 MG/1
100 TABLET ORAL DAILY
Status: DISCONTINUED | OUTPATIENT
Start: 2022-04-21 | End: 2022-04-22

## 2022-04-21 RX ORDER — BUDESONIDE 0.5 MG/2ML
500 INHALANT ORAL 2 TIMES DAILY
Status: DISCONTINUED | OUTPATIENT
Start: 2022-04-21 | End: 2022-04-21

## 2022-04-21 RX ORDER — HYDRALAZINE HYDROCHLORIDE 20 MG/ML
10 INJECTION INTRAMUSCULAR; INTRAVENOUS EVERY 6 HOURS PRN
Status: DISCONTINUED | OUTPATIENT
Start: 2022-04-21 | End: 2022-04-23 | Stop reason: HOSPADM

## 2022-04-21 RX ADMIN — ATORVASTATIN CALCIUM 20 MG: 20 TABLET, FILM COATED ORAL at 20:47

## 2022-04-21 RX ADMIN — IPRATROPIUM BROMIDE AND ALBUTEROL SULFATE 1 AMPULE: .5; 3 SOLUTION RESPIRATORY (INHALATION) at 19:38

## 2022-04-21 RX ADMIN — Medication 2 PUFF: at 19:38

## 2022-04-21 RX ADMIN — INSULIN LISPRO 1 UNITS: 100 INJECTION, SOLUTION INTRAVENOUS; SUBCUTANEOUS at 20:47

## 2022-04-21 RX ADMIN — VANCOMYCIN HYDROCHLORIDE 1750 MG: 10 INJECTION, POWDER, LYOPHILIZED, FOR SOLUTION INTRAVENOUS at 18:46

## 2022-04-21 RX ADMIN — SENNOSIDES AND DOCUSATE SODIUM 1 TABLET: 50; 8.6 TABLET ORAL at 20:47

## 2022-04-21 RX ADMIN — ASPIRIN 81 MG 324 MG: 81 TABLET ORAL at 11:01

## 2022-04-21 RX ADMIN — CEFEPIME HYDROCHLORIDE 2000 MG: 2 INJECTION, POWDER, FOR SOLUTION INTRAVENOUS at 15:52

## 2022-04-21 RX ADMIN — MIDODRINE HYDROCHLORIDE 5 MG: 5 TABLET ORAL at 18:47

## 2022-04-21 RX ADMIN — IOPAMIDOL 75 ML: 755 INJECTION, SOLUTION INTRAVENOUS at 13:19

## 2022-04-21 RX ADMIN — GABAPENTIN 200 MG: 100 CAPSULE ORAL at 20:47

## 2022-04-21 RX ADMIN — METOPROLOL TARTRATE 25 MG: 25 TABLET, FILM COATED ORAL at 20:47

## 2022-04-21 RX ADMIN — HYDROCORTISONE: 1 OINTMENT TOPICAL at 19:41

## 2022-04-21 RX ADMIN — MAGNESIUM SULFATE HEPTAHYDRATE 1000 MG: 1 INJECTION, SOLUTION INTRAVENOUS at 11:06

## 2022-04-21 RX ADMIN — SODIUM CHLORIDE: 9 INJECTION, SOLUTION INTRAVENOUS at 18:44

## 2022-04-21 RX ADMIN — APIXABAN 5 MG: 5 TABLET, FILM COATED ORAL at 20:47

## 2022-04-21 RX ADMIN — Medication 10 ML: at 20:48

## 2022-04-21 ASSESSMENT — ENCOUNTER SYMPTOMS
CONSTIPATION: 0
BACK PAIN: 0
NAUSEA: 0
PHOTOPHOBIA: 0
ABDOMINAL PAIN: 0
COUGH: 0
CHEST TIGHTNESS: 0
VOMITING: 0
DIARRHEA: 0
SHORTNESS OF BREATH: 1
COLOR CHANGE: 0

## 2022-04-21 ASSESSMENT — LIFESTYLE VARIABLES
HOW OFTEN DO YOU HAVE A DRINK CONTAINING ALCOHOL: MONTHLY OR LESS
HOW MANY STANDARD DRINKS CONTAINING ALCOHOL DO YOU HAVE ON A TYPICAL DAY: 1 OR 2

## 2022-04-21 ASSESSMENT — PAIN SCALES - GENERAL
PAINLEVEL_OUTOF10: 0
PAINLEVEL_OUTOF10: 0

## 2022-04-21 NOTE — PROGRESS NOTES
Clinical Pharmacy Note: Pharmacy to Dose Vancomycin    Leandra Mcbride is a 67 y.o. male started on Vancomycin for HCAP; consult received from Dr. Marion Armenta to manage therapy. Also receiving the following antibiotics: cefepime. Goal AUC: 400-600 mg/L*hr  Goal Trough Level: 10-15 mcg/mL    Assessment/Plan:  A 1750 mg loading dose was given on 4/21 at 1830  Initiate vancomycin 1250 mg IV every 24 hours. Bayesian modeling predicts an AUC of 487 mg/L*hr and a trough of 15.1 mcg/mL at steady state concentration. A vancomycin random level has been ordered for 4/22 at 1200  Changes in regimen will be determined based on culture results, renal function, and clinical response. Pharmacy will continue to monitor and adjust regimen as necessary. Allergies:  Patient has no known allergies. Recent Labs     04/21/22  0955   CREATININE 1.5*       Recent Labs     04/21/22  0955   WBC 9.7       Ht Readings from Last 1 Encounters:   04/21/22 5' 9\" (1.753 m)        Wt Readings from Last 1 Encounters:   04/21/22 200 lb (90.7 kg)         Estimated Creatinine Clearance: 50 mL/min (A) (based on SCr of 1.5 mg/dL (H)).       Thank you for the consult,    Sonia Good RPh

## 2022-04-21 NOTE — RT PROTOCOL NOTE
order(s) to equivalent RT Bronchodilator order with Frequency of every 4 hours PRN for wheezing or increased work of breathing using Per Protocol order mode. 4-6 - enter or revise RT Bronchodilator order(s) to two equivalent RT bronchodilator orders with one order with BID Frequency and one order with Frequency of every 4 hours PRN wheezing or increased work of breathing using Per Protocol order mode. 7-10 - enter or revise RT Bronchodilator order(s) to two equivalent RT bronchodilator orders with one order with TID Frequency and one order with Frequency of every 4 hours PRN wheezing or increased work of breathing using Per Protocol order mode. 11-13 - enter or revise RT Bronchodilator order(s) to one equivalent RT bronchodilator order with QID Frequency and an Albuterol order with Frequency of every 4 hours PRN wheezing or increased work of breathing using Per Protocol order mode. Greater than 13 - enter or revise RT Bronchodilator order(s) to one equivalent RT bronchodilator order with every 4 hours Frequency and an Albuterol order with Frequency of every 2 hours PRN wheezing or increased work of breathing using Per Protocol order mode. RT to enter RT Home Evaluation for COPD & MDI Assessment order using Per Protocol order mode.     Electronically signed by Quincy Newsome RCP on 4/21/2022 at 6:26 PM

## 2022-04-21 NOTE — PROGRESS NOTES
Pt seen in ED, admission completed. Pt is alert and oriented x 4. Pt lives at home with his wife, and is being admitted for  HCAP. Plan of care updated, all questions answered.

## 2022-04-21 NOTE — PROGRESS NOTES
Pt to unit. Oriented to room and call system. VSS. Pt denies pain, but has shingles, no blisters, requeting hydrocortisone cream. Will request from MD. Bed alarm engaged.

## 2022-04-21 NOTE — ED PROVIDER NOTES
[facility]        Pt Name: Que Herrera  MRN: [id number]  [place] 1950  Date of evaluation: 4/21/2022  Provider: CARIDAD Alexandra  PCP: Rekha Pompa DO  Note Started: 9:53 AM EDT        I have seen and evaluated this patient with my supervising physician Ha Medellin       Chief Complaint   Patient presents with    Shortness of Breath     Pt in via [facility] for shortness of breath. Pt states that he \"fell out\" on the floor due to his trouble breathing. Pt states he has been short of breath since he got out of a nursing home last Tuesday. Pt was in rehab following an MI. Pt is on 3 L of O2 at home with an 800 ft hose. HISTORY OF PRESENT ILLNESS   (Location, Timing/Onset, Context/Setting, Quality, Duration, Modifying Factors, Severity, Associated Signs and Symptoms)  Note limiting factors. Chief Complaint: MANUEL Hung is a 67 y.o. male with past medical history of CAD, COPD, hypertension, peripheral vascular disease who presents to the ED with complaint of shortness of breath. Patient arrives by EMS from home. Patient states he recently had COVID several months ago and states he was sent to a rehab facility secondary to weakness and fatigue. He was recently discharged from the rehab facility on Sunday. Patient states he is currently on 3 L nasal cannula oxygen at home. Patient states this morning he had a syncopal episode. States he is not sure what happened. Patient states he woke up on the ground is not sure how he got there. He denies any injury or trauma. He denies headache or neck pain. Denies lightheadedness, dizziness, visual changes, speech services or numbness/tingling. He denies any chest pain. Denies cough or hemoptysis. Denies pleuritic pain, orthopnea, pedal edema or calf tenderness. He states he does feel short of breath.   He states he had a similar syncopal episode while he is at the rehab facility and was sent to the emergency department back in March. Patient dates he had cardiac catheterization at that time and states he was told he had an irregular heart rhythm and states he had AICD placed several weeks ago. Patient denies any shocks from his AICD. He denies any palpitations. Denies abdominal pain, nausea/vomiting, urinary symptoms or changes in bowel movements. Denies orthopnea, pedal edema or calf tenderness. He is anticoagulated on Eliquis. He denies any injury or trauma throughout. He is unsure how long he was on the ground. He became concerned and called EMS who brought him to the ED for further evaluation and treatment. Nursing Notes were all reviewed and agreed with or any disagreements were addressed in the HPI. REVIEW OF SYSTEMS    (2-9 systems for level 4, 10 or more for level 5)     Review of Systems   Constitutional: Positive for activity change, appetite change and fatigue. Negative for chills, diaphoresis and fever. Eyes: Negative for photophobia and visual disturbance. Respiratory: Positive for shortness of breath. Negative for cough and chest tightness. Cardiovascular: Negative. Negative for chest pain, palpitations and leg swelling. Gastrointestinal: Negative for abdominal pain, constipation, diarrhea, nausea and vomiting. Genitourinary: Negative for decreased urine volume, difficulty urinating, dysuria, flank pain, frequency, hematuria and urgency. Musculoskeletal: Negative for arthralgias, back pain, myalgias, neck pain and neck stiffness. Skin: Negative for color change, pallor, rash and wound. Neurological: Positive for syncope and weakness. Negative for dizziness, tremors, seizures, facial asymmetry, speech difficulty, light-headedness, numbness and headaches. Positives and Pertinent negatives as per HPI. Except as noted above in the ROS, all other systems were reviewed and negative.        PAST MEDICAL HISTORY     Past Medical History:   Diagnosis Date    Acid reflux     Acute MI ([facility]     CAD (coronary artery disease)     COPD (chronic obstructive pulmonary disease) (HCC)     Diverticulitis     Hypertension     Peripheral vascular disease ([facility]     Shingles     per pt, started 12/2021, still visible 3/2022, not open wounds at that time         SURGICAL HISTORY     Past Surgical History:   Procedure Laterality Date   [address]      stent x1 --3 yrs, 5/12/14 CABG 3 V    COLONOSCOPY      PACEMAKER PLACEMENT      VASCULAR SURGERY           CURRENTMEDICATIONS       Previous Medications    ACETAMINOPHEN (TYLENOL) 325 MG TABLET    Take 650 mg by mouth every 6 hours as needed for Pain    AMIODARONE (PACERONE) 100 MG TABLET    TAKE 1 TABLET(100 MG) BY MOUTH DAILY    APIXABAN (ELIQUIS) 5 MG TABS TABLET    Take 5 mg by mouth 2 times daily    ATORVASTATIN (LIPITOR) 20 MG TABLET    Take 20 mg by mouth at bedtime    BUDESONIDE (PULMICORT) 0.5 MG/2ML NEBULIZER SUSPENSION    Take 1 ampule by nebulization 2 times daily    CARBOXYMETHYLCELLULOSE 1 % OPHTHALMIC SOLUTION    1 drop 3 times daily    FUROSEMIDE (LASIX) 20 MG TABLET    Take 1 tablet by mouth 2 times daily    GABAPENTIN (NEURONTIN) 100 MG CAPSULE    Take 200 mg by mouth in the morning and at bedtime. HYDROCORTISONE 2.5 % CREAM    Apply topically 2 times daily Apply topically 2 times daily.     IPRATROPIUM-ALBUTEROL (DUONEB) 0.5-2.5 (3) MG/3ML SOLN NEBULIZER SOLUTION    Inhale 1 vial into the lungs every 6 hours as needed for Shortness of Breath    METOPROLOL TARTRATE (LOPRESSOR) 25 MG TABLET    Take 25 mg by mouth 2 times daily Hold for SBP <100 or HR <55    MIDODRINE (PROAMATINE) 5 MG TABLET    Take 5 mg by mouth 3 times daily Hold for SBP >130    MOMETASONE-FORMOTEROL (DULERA) 200-5 MCG/ACT INHALER    Inhale 2 puffs into the lungs every 12 hours    NALOXEGOL (MOVANTIK) 25 MG TABS TABLET    Take 1 tablet by mouth every morning    PANTOPRAZOLE (PROTONIX) 40 MG TABLET    Take 40 mg by mouth daily    POLYETHYLENE GLYCOL (MIRALAX) 17 G PACK PACKET    Take 17 g by mouth daily    SENNOSIDES-DOCUSATE SODIUM (SENOKOT-S) 8.6-50 MG TABLET    Take 1 tablet by mouth in the morning and at bedtime    SODIUM CHLORIDE (OCEAN, BABY AYR) 0.65 % NASAL SPRAY    1 spray by Nasal route as needed for Congestion    SPIRONOLACTONE (ALDACTONE) 25 MG TABLET    Take 25 mg by mouth daily    TAMSULOSIN (FLOMAX) 0.4 MG CAPSULE    Take 0.4 mg by mouth daily         ALLERGIES     Patient has no known allergies. FAMILYHISTORY       Family History   Problem Relation Age of Onset    Heart Disease Mother     Heart Disease Sister     Heart Disease Father     Heart Disease Maternal Uncle     Anesth Problems Neg Hx     Malig Hyperten Neg Hx     Hypotension Neg Hx     Malig Hypertherm Neg Hx     Pseudochol. Deficiency Neg Hx           SOCIAL HISTORY       Social History     Tobacco Use    Smoking status: Former Smoker     Packs/day: 0.50     Years: 50.00     Pack years: 25.00     Types: Cigarettes     Start date: 2014     Quit date: 12/15/2021     Years since quittin.3    Smokeless tobacco: Never Used    Tobacco comment: 5-6 CIGS/DAY   Vaping Use    Vaping Use: Never used   Substance Use Topics    Alcohol use: Yes     Comment: occasional    Drug use: No       SCREENINGS    [place] Coma Scale  Eye Opening: Spontaneous  Best Verbal Response: Oriented  Best Motor Response: Obeys commands  Richard Coma Scale Score: 15        PHYSICAL EXAM    (up to 7 for level 4, 8 or more for level 5)     ED Triage Vitals [22 0947]   BP Temp Temp src Pulse Resp SpO2 Height Weight   121/74 -- -- 85 22 100 % 5' 9\" (1.753 m) 200 lb (90.7 kg)       Physical Exam  Constitutional:       General: He is not in acute distress. Appearance: He is not ill-appearing, toxic-appearing or diaphoretic. HENT:      Head: Normocephalic and atraumatic.       Comments: Atraumatic. No raccoon eyes or arciniega sign. Right Ear: External ear normal.      Left Ear: External ear normal.   Eyes:      General:         Right eye: No discharge. Left eye: No discharge. Extraocular Movements: Extraocular movements intact. Conjunctiva/sclera: Conjunctivae normal.      Pupils: Pupils are equal, round, and reactive to light. Cardiovascular:      Rate and Rhythm: Normal rate and regular rhythm. Pulses: Normal pulses. Heart sounds: Normal heart sounds. No murmur heard. No friction rub. No gallop. Comments: 2+ radial pulses bilaterally. No pedal edema. No calf tenderness. No JVD. AICD site noted to the left upper chest.  There is no wound dehiscence noted. No erythema or warmth. No bleeding or drainage. No induration or fluctuance. No crepitus. Pulmonary:      Effort: Pulmonary effort is normal. No respiratory distress. Breath sounds: Normal breath sounds. No stridor. No wheezing, rhonchi or rales. Comments: Tachypnea noted. Patient on 3 L nasal cannula oxygen at this time with oxygen saturation 100% on supplemental oxygen. Chest:      Chest wall: No tenderness. Abdominal:      General: Abdomen is flat. Bowel sounds are normal. There is no distension. Palpations: Abdomen is soft. There is no mass. Tenderness: There is no abdominal tenderness. There is no right CVA tenderness, left CVA tenderness, guarding or rebound. Hernia: No hernia is present. Musculoskeletal:         General: Normal range of motion. Cervical back: Normal range of motion and neck supple. No rigidity or tenderness. Comments: No TTP to the midline cervical, thoracic or lumbar spine. No anterior chest wall tenderness. No pelvis instability. There is no TTP to the upper and lower extremities throughout. Full range of motion strength the upper lower extremities throughout. Distal neurovascular intact. Gait deferred.    Lymphadenopathy: Cervical: No cervical adenopathy. Skin:     General: Skin is warm and dry. Coloration: Skin is not pale. Findings: No erythema or rash. Neurological:      Mental Status: He is alert and oriented to person, place, and time. GCS: GCS eye subscore is 4. GCS verbal subscore is 5. GCS motor subscore is 6. Cranial Nerves: Cranial nerves are intact. No cranial nerve deficit, dysarthria or facial asymmetry. Sensory: Sensation is intact. No sensory deficit. Motor: Motor function is intact. No weakness. Comments: Gait deferred. Psychiatric:         Behavior: Behavior normal.         DIAGNOSTIC RESULTS   LABS:    Labs Reviewed   CBC WITH AUTO DIFFERENTIAL - Abnormal; Notable for the following components:       Result Value    RBC 4.03 (*)     Hemoglobin 10.4 (*)     Hematocrit 33.0 (*)     MCH 25.8 (*)     RDW 22.8 (*)     Anisocytosis 2+ (*)     Polychromasia Occasional (*)     Poikilocytes Occasional (*)     Acanthocytes Occasional (*)     All other components within normal limits   COMPREHENSIVE METABOLIC PANEL W/ REFLEX TO MG FOR LOW K - Abnormal; Notable for the following components:    CO2 19 (*)     Anion Gap 19 (*)     Glucose 105 (*)     BUN 21 (*)     CREATININE 1.5 (*)     GFR Non- 46 (*)     GFR  56 (*)     All other components within normal limits   TROPONIN - Abnormal; Notable for the following components:    Troponin 0.08 (*)     All other components within normal limits   BRAIN NATRIURETIC PEPTIDE - Abnormal; Notable for the following components:    Pro-BNP 1,912 (*)     All other components within normal limits   D-DIMER, QUANTITATIVE - Abnormal; Notable for the following components:    D-Dimer, Quant 490 (*)     All other components within normal limits   CULTURE, BLOOD 1   CULTURE, BLOOD 2   CK   URINALYSIS WITH REFLEX TO CULTURE   LACTATE, SEPSIS   LACTATE, SEPSIS       When ordered only abnormal lab results are displayed.  All other labs were within normal range or not returned as of this dictation. EKG: When ordered, EKG's are interpreted by the Emergency Department Physician in the absence of a cardiologist.  Please see their note for interpretation of EKG. RADIOLOGY:   Non-plain film images such as CT, Ultrasound and MRI are read by the radiologist. Plain radiographic images are visualized and preliminarily interpreted by the ED Provider with the below findings:        Interpretation per the Radiologist below, if available at the time of this note:    CT CHEST PULMONARY EMBOLISM W CONTRAST   Final Result   1. No evidence of acute pulmonary embolism. 2. Ascending aortic ectasia with the aorta measuring up to 4.3 cm but no   evidence of dissection. 3. Emphysema and interstitial lung disease worse in the subpleural spaces. Mild bronchiectatic changes. 4. A more focal abnormality in the right lower lobe measuring 1.4 x 1.4 cm of   an ill-defined opacity. This may represent atelectasis or a small area of   consolidation although a nodule should also be considered and followed   appropriately. No active pleural disease. 5. Incidental adrenal nodule measuring 1.7 cm. RECOMMENDATIONS:   Fleischner Society guidelines for follow-up and management of incidentally   detected pulmonary nodules:      Single Solid Nodule:      Nodule size less than 6 mm   In a low-risk patient, no routine follow-up. In a high-risk patient, optional CT at 12 months. Nodule size equals 6-8 mm   In a low-risk patient, CT at 6-12 months, then consider CT at 18-24 months. In a high-risk patient, CT at 6-12 months, then CT at 18-24 months. Nodule size greater than 8 mm         In a low-risk patient, consider CT at 3 months, PET/CT, or tissue sampling. In a high-risk patient, consider CT at 3 months, PET/CT, or tissue sampling. Multiple Solid Nodules:      Nodule size less than 6 mm   In a low-risk patient, no routine follow-up.    In a high-risk patient, optional CT at 12 months. Nodule size equals 6-8 mm   In a low-risk patient, CT at 3-6 months, then consider CT at 18-24 months. In a high-risk patient, CT at 3-6 months, then CT at 18-24 months. Nodule size greater than 8 mm   In a low-risk patient, CT at 3-6 months, then consider CT at 18-24 months. In a high-risk patient, CT at 3-6 months, then CT at 18-24 months. - Low risk patients include individuals with minimal or absent history of   smoking and other known risk factors. - High risk patients include individuals with a history or smoking or known   risk factors. Radiology 2017 http://pubs. rsna.org/doi/full/10.1148/radiol. 8455685771         CT HEAD WO CONTRAST   Final Result   No acute intracranial abnormality. CT CERVICAL SPINE WO CONTRAST   Final Result   No acute abnormality of the cervical spine. XR CHEST PORTABLE   Final Result   Improving interstitial-alveolar opacities, consistent with resolving   pneumonia. Stable cardiomegaly. No results found.         PROCEDURES   Unless otherwise noted below, none     Procedures    CRITICAL CARE TIME       CONSULTS:  IP CONSULT TO INTERNAL MEDICINE  IP CONSULT TO PHARMACY  IP CONSULT TO CARDIOLOGY      EMERGENCY DEPARTMENT COURSE and DIFFERENTIAL DIAGNOSIS/MDM:   Vitals:    Vitals:    04/21/22 1158 04/21/22 1213 04/21/22 1228 04/21/22 1243   BP: 132/78 119/85 (!) 124/94 139/83   Pulse: 65 71 74 69   Resp: 16 8 12 15   Temp:       TempSrc:       SpO2: (!) 75% 99% (!) 84% 100%   Weight:       Height:           Patient was given the following medications:  Medications   cefepime (MAXIPIME) 2000 mg IVPB minibag (2,000 mg IntraVENous New Bag 4/21/22 1552)   vancomycin (VANCOCIN) 1,750 mg in dextrose 5 % 500 mL IVPB (has no administration in time range)   aspirin chewable tablet 324 mg (324 mg Oral Given 4/21/22 1101)   magnesium sulfate 1000 mg in dextrose 5% 100 mL IVPB (0 mg IntraVENous Stopped 4/21/22 1216)   iopamidol (ISOVUE-370) 76 % injection 75 mL (75 mLs IntraVENous Given 4/21/22 1319)           Patient is a 63-year-old male who presents to the ED with complaint of shortness of breath. Patient complaint of shortness of breath and associated weakness/fatigue. He states symptoms been ongoing for quite some time. He is on chronic oxygen at home. On arrival patient is not hypoxic on his home oxygen. IV was established and blood work obtained. CBC showed normal white count and platelets. Hemoglobin 10.4. CMP showed creatinine of 1.5 slightly elevated from previous results but has been at similar level in the past.  CO2 of 19 anion gap of 19. CK was normal.  Troponin 0.08 is been a similar level in the past.  BNP 1900. Chest x-ray showed improving interstitial alveolar opacities consistent with resolving pneumonia. Stable cardiomegaly noted. EKG interpreted by attending. Patient has syncopal episode and CT of the head/cervical spine obtained and showed no acute abnormality. Given patient's slightly elevated creatinine concern for some mild dehydration and did obtain orthostatic vital signs. When patient stood up he apparently became hypoxic and significantly symptomatic. Sounds like with exertion he becomes hypoxic on his home oxygen. D-dimer was obtained. D-dimer extremity elevated at 490. CT of the chest obtained showed no evidence of pulmonary embolism. There does appear to be ascending aortic ectasia measuring up to 4.3 cm with no evidence of dissection. Emphysema and interstitial lung disease noted. Mild bronchiectatic changes noted. There is focal abnormality of the right lower lung measuring 1.4 x 1.4 cm of ill-defined opacity which could represent atelectasis or small area of consolidation. No active pleural disease noted. Adrenal nodule incidentally noted.   Given patient's hypoxia with questionable consolidation on CT scan did order antibiotics with vancomycin and cefepime. Was ordered aspirin here in the emergency department. Patient suffering from shortness of breath with syncopal episode and concern for hypoxia with ambulation/standing and pneumonia. Believe patient benefit from [facility] for further evaluation and treatment. Case discussed with Dr. Kelly Alaniz who admits for patient's PCP who graciously agreed accept patient for admission at this time. Lactic and blood cultures were ordered and pending. Low suspicion for sepsis given presentation relatively unremarkable vital signs and normal white blood cell count. FINAL IMPRESSION      1. Dyspnea, unspecified type    2. Syncope and collapse    3. Pneumonia due to infectious organism, unspecified laterality, unspecified part of lung          DISPOSITION/PLAN   DISPOSITION Admitted 04/21/2022 02:28:17 PM      PATIENT REFERRED TO:  No follow-up provider specified. DISCHARGE MEDICATIONS:  New Prescriptions    No medications on file       DISCONTINUED MEDICATIONS:  Discontinued Medications    INSULIN ASPART (NOVOLOG) 100 UNIT/ML INJECTION CARTRIDGE    Inject 5 Units into the skin 3 times daily (before meals)    INSULIN ASPART (NOVOLOG) 100 UNIT/ML INJECTION VIAL    Inject 0-16 Units into the skin 3 times daily (before meals) Sliding scale    INSULIN DETEMIR (LEVEMIR) 100 UNIT/ML INJECTION VIAL    Inject 10 Units into the skin nightly    LIDOCAINE (XYLOCAINE) 5 % OINTMENT    Apply topically every 4 hours as needed for Pain Apply topically as needed.               (Please note that portions of this note were completed with a voice recognition program.  Efforts were made to edit the dictations but occasionally words are mis-transcribed.)    CARIDAD Crocker (electronically signed)          CARIDAD Vazquez  04/21/22 [address] [place], [place]  04/21/22 161

## 2022-04-21 NOTE — CARE COORDINATION
Discharge Planning Assessment    SW discharge planner met with patient to discuss reason for admission, current living situation, and potential needs at the time of discharge. Demographics/Insurance verified:  Yes    Current type of dwelling:  House    Living arrangements:  w/spouse    Level of function/Support:  Pt reports he just got out of [facility] last Sunday. Reported things were going well but didn't feel like eating and started getting weaker the last few days. Pt was in the kitchen where he passed out. Woke up on the floor and called 911. Spouse is supportive but works during the day. PCP:  Dr. Derick Haas    Last Visit to PCP:  Telephone call on 4/15/22    DME:  4 wheel walker, suction cup grab bar in the shower. Patient reported he could use a shower seat. SW informed this is normally not covered by insurance and can get easily and affordably through [employer] or [address] [place] [address]. Active with any community resources/agencies/skilled home care:  Yes, active w/[facility] and [place] home oxygen services. Left message for [facility] informing of admission. Medication compliance issues:  No    Financial issues that could impact healthcare:  No    Tentative discharge plan:  Unsure at this time. PT/OT evals pending again. Patient is ok with returning to SNF if recommended. Would like [facility] again. Transportation at the time of discharge:  Spouse if home. Ambulance is SNF.     Electronically signed by WINSTON Nunez, SHYAMW on 4/21/2022 at 3:52 PM

## 2022-04-21 NOTE — DISCHARGE SUMMARY
[facility]                     [facility], [address]                               DISCHARGE SUMMARY    PATIENT NAME: Nimisha Draper                    :        1950  MED REC NO:   [id number]                          ROOM:       7639  ACCOUNT NO:   [de-identified]                           ADMIT DATE: 03/15/2022  PROVIDER:     Robb Birmingham MD                  DISCHARGE DATE:  2022    FINAL DIAGNOSES:  1. Syncope. 2.  Hypotension. 3.  Atrial fibrillation with rapid ventricular response. DISCHARGE MEDICATIONS:  1. Movantik 25 mg every morning. 2.  Roxicodone 5 mg every 6 hours p.r.n.  3.  Tylenol 650 q. 6 hours p.r.n.  4.  Eliquis 5 mg p.o. b.i.d.  5.  Lipitor  20 mg once a day. 6.  Pulmicort 0.5 mg nebulized aerosol twice a day. 7.  Dulera 200/5 mcg two puffs twice a day. 8.  Neurontin 200 mg at bedtime. 9.  Levemir 10 units into skin nightly. 10.  NovoLog 5 units three times a day. 11.  DuoNeb one every 4 hours p.r.n. 12.  Xylocaine ointment as directed. 13.  Metoprolol tartarate 25 mg p.o. b.i.d.  14.  ProAmatine 5 mg p.o. t.i.d.  15.  MiraLax powder 17 gm once a day. 16.  Protonix 40 mg once a day. 17.  [place] nasal saline spray as directed. 18.  Senokot S one tablet daily. 19.  Spironolactone 25 mg daily. 20.  Flomax 0.4 mg at bedtime. 21.  Amiodarone 200 mg daily. 22.  Lasix 20 mg once a day. HOSPITAL COURSE:  This 70-year-old white American gentleman with history  of poor responsiveness, syncope, resident of a nursing home. [place] is a  [place] [facility] [place]. Blood pressure was 86/73, temperature 98.2,  respirations 18, heart rate 110. The patient was treated with IV  hydration, carotid Doppler, transthoracic echocardiogram.  The patient  was given PT and OT evaluation and speech evaluation. General condition  improved. PT, OT showed AM-PAC score at 17/24, so definitely qualified  for skilled nursing facility. Transthoracic echocardiogram was done  just recently on 01/14/2022 so it was not needed. LVEF of 50-55%, left  atrium mild dilatation, inadequate tricuspid regurgitation to estimate  systolic pulmonary artery pressure. Vascular studies were not  indicated. The patient needed some time for placement. The patient was  also evaluated by Dr. Shiv Rendon for atrial fibrillation and ventricular  tachycardia. The patient was transferred to ICU. The patient was  treated with antiarrhythmic. The patient was status post single chamber  AICD. Device interrogation was normal.  Chest x-ray showed no  complications. The patient underwent AICD placement. Dr. Chandler Landis  saw the patient from cardiology standpoint. The patient had possible  unstable angina. Cardiac catheterization was recommended since the  patient had both atrial and ventricular arrhythmia. The patient had a  VT arrest and persistent atrial fibrillation. Device interrogation was  normal.  The patient could benefit from atrial fibrillation ablation  which can be arranged as an outpatient. The patient was discharged in  stable condition. On 03/21, the patient was discharged in stable  condition. The patient had ICD insertion and synchronized cardioversion  for sustained ventricular tachycardia. The patient is not amenable to  interventions because he has diffuse multivessel disease. Smoking  cessation was encouraged. The patient was discharged in stable  condition. Lydia Leiva discharged the patient back to [facility].         Earl Rutledge MD    D: 04/20/2022 18:29:18       T: 04/21/2022 12:51:27     [id number]  Job#: [id number]     Doc#: [id number]    CC:

## 2022-04-21 NOTE — ED NOTES
Call from [employer] about the pts pacemaker interrogation. [employer] states that the pt has a single ICD that was last cleared on March 24, 2022. They state that since its implantation on March 17, 2022, the pt has had 767 runs of non sustained VT, 2 runs of SVT with no therapy and 10 runs of atrial arrhythmia. The last event was atrial arrhythmia on April 19. [employer] states that the pacemaker is working as expected. Pts avg Hr is 110 currently 80.       Megan Tavera RN  04/21/22 [id number]

## 2022-04-21 NOTE — H&P
History and Physical  Dr. Dai Grajeda  4/21/2022    PCP: Mary Lou Hassan DO    Cc:   Chief Complaint   Patient presents with    Shortness of Breath     Pt in via [facility] for shortness of breath. Pt states that he \"fell out\" on the floor due to his trouble breathing. Pt states he has been short of breath since he got out of a nursing home last Tuesday. Pt was in rehab following an MI. Pt is on 3 L of O2 at home with an 800 ft hose. HPI:  Autumn Perdomo is a 67 y.o. male who has a past medical history of Acid reflux, Acute MI ([address] CAD (coronary artery disease), COPD (chronic obstructive pulmonary disease) ([facility] [address] Diverticulitis, Hypertension, Peripheral vascular disease ([facility] [address] and Shingles. Patient presents with HCAP (healthcare-associated pneumonia). HPI  (1-3 for expanded problem focused, ?4 for detailed/comprehensive)      67 y.o. male with past medical history of CAD, COPD, hypertension, peripheral vascular disease who presents to the ED with complaint of shortness of breath. Patient arrives by EMS from home. Patient states he recently had COVID several months ago and states he was sent to a rehab facility secondary to weakness and fatigue. He was recently discharged from the rehab facility on Sunday. Patient states he is currently on 3 L nasal cannula oxygen at home. Patient states this morning he had a syncopal episode. States he is not sure what happened. Patient states he woke up on the ground is not sure how he got there. He denies any injury or trauma. He states he had a similar syncopal episode while he is at the rehab facility and was sent to the emergency department back in March. Patient dates he had cardiac catheterization at that time and states he was told he had an irregular heart rhythm and states he had AICD placed several weeks ago. Patient denies any shocks from his AICD. He denies any palpitations. Ct Chest from ER shows   Impression:       1.  No evidence of acute pulmonary embolism. 2. Ascending aortic ectasia with the aorta measuring up to 4.3 cm but no   evidence of dissection. 3. Emphysema and interstitial lung disease worse in the subpleural spaces. Mild bronchiectatic changes. 4. A more focal abnormality in the right lower lobe measuring 1.4 x 1.4 cm of   an ill-defined opacity.  This may represent atelectasis or a small area of   consolidation although a nodule should also be considered and followed   appropriately.  No active pleural disease. Pt to be admitted for tx of poss pna  Pt did have covid in Jan, seems remote to still  Be causing infiltrates  Cards consulted for syncope    Problem list of hospitalization thus far: Active Hospital Problems    Diagnosis     HCAP (healthcare-associated pneumonia) [J18.9]      Priority: Medium    Syncope [R55]      Priority: Medium    ICD (implantable cardioverter-defibrillator), single, in situ [Z95.810]     Syncope and collapse [R55]     COVID [U07.1]     Atrial fibrillation with RVR ([address] [I48.91]          Review of Systems: (1 system for EPF, 2-9 for detailed, 10+ for comprehensive)  Constitutional: Negative for chills and fever. +weakness  HENT: Negative for dental problem, nosebleeds and rhinorrhea. Eyes: Negative for photophobia and visual disturbance. Respiratory: Negative for cough, chest tightness and positive for shortness of breath. Cardiovascular: Negative for chest pain and leg swelling. Gastrointestinal: Negative for diarrhea, nausea and vomiting. Endocrine: Negative for polydipsia and polyphagia. Genitourinary: Negative for frequency, hematuria and urgency. Musculoskeletal: Negative for back pain and myalgias. Skin: Negative for rash. Allergic/Immunologic: Negative for food allergies. Neurological: Negative for dizziness, seizures, syncope and facial asymmetry. Hematological: Negative for adenopathy.      Psychiatric/Behavioral: Negative for dysphoric mood. The patient is not nervous/anxious. Past Medical History:   Past Medical History:   Diagnosis Date    Acid reflux     Acute MI ([facility] [address]     CAD (coronary artery disease)     COPD (chronic obstructive pulmonary disease) (HCC)     Diverticulitis     Hypertension     Peripheral vascular disease ([facility] [address]     Shingles     per pt, started 12/2021, still visible 3/2022, not open wounds at that time       Past Surgical History:   Past Surgical History:   Procedure Laterality Date   [address]      stent x1 --3 yrs, 5/12/14 CABG 3 V    COLONOSCOPY      VASCULAR SURGERY         Social History:   Social History     Tobacco History     Smoking Status  Former Smoker Smoking Start Date  5/12/2014 Quit date  12/15/2021 Smoking Frequency  0.5 packs/day for 50 years (25 pk yrs)    Smoking Tobacco Type  Cigarettes    Smokeless Tobacco Use  Never Used    Tobacco Comment  5-6 CIGS/DAY          Alcohol History     Alcohol Use Status  Yes Comment  occasional          Drug Use     Drug Use Status  No          Sexual Activity     Sexually Active  Not Asked                Fam History:   Family History   Problem Relation Age of Onset    Heart Disease Mother     Heart Disease Sister     Heart Disease Father     Heart Disease Maternal Uncle     Anesth Problems Neg Hx     Malig Hyperten Neg Hx     Hypotension Neg Hx     Malig Hypertherm Neg Hx     Pseudochol. Deficiency Neg Hx        PFSH: The above PMHx, PSHx, SocHx, FamHx has been reviewed by myself. (1 area for detailed, 2-3 for comprehensive)      Code Status: Prior    Meds - following list of home medications fromelectronic chart has been reviewed by myself  Prior to Admission medications    Medication Sig Start Date End Date Taking?  Authorizing Provider   amiodarone (PACERONE) 100 MG tablet TAKE 1 TABLET(100 MG) BY MOUTH DAILY 4/10/22 7/9/22  Lisa Shepard MD   naloxegol (MOVANTIK) 25 MG TABS tablet Take 1 tablet by mouth every morning 3/22/22   Joe Robison MD   acetaminophen (TYLENOL) 325 MG tablet Take 650 mg by mouth every 6 hours as needed for Pain    Historical Provider, MD   apixaban (ELIQUIS) 5 MG TABS tablet Take 5 mg by mouth 2 times daily    Historical Provider, MD   atorvastatin (LIPITOR) 20 MG tablet Take 20 mg by mouth at bedtime    Historical Provider, MD   budesonide (PULMICORT) 0.5 MG/2ML nebulizer suspension Take 1 ampule by nebulization 2 times daily    Historical Provider, MD   mometasone-formoterol (DULERA) 200-5 MCG/ACT inhaler Inhale 2 puffs into the lungs every 12 hours    Historical Provider, MD   gabapentin (NEURONTIN) 100 MG capsule Take 200 mg by mouth in the morning and at bedtime. Historical Provider, MD   insulin detemir (LEVEMIR) 100 UNIT/ML injection vial Inject 10 Units into the skin nightly    Historical Provider, MD   insulin aspart (NOVOLOG) 100 UNIT/ML injection cartridge Inject 5 Units into the skin 3 times daily (before meals)    Historical Provider, MD   insulin aspart (NOVOLOG) 100 UNIT/ML injection vial Inject 0-16 Units into the skin 3 times daily (before meals) Sliding scale    Historical Provider, MD   ipratropium-albuterol (DUONEB) 0.5-2.5 (3) MG/3ML SOLN nebulizer solution Inhale 1 vial into the lungs every 6 hours as needed for Shortness of Breath    Historical Provider, MD   lidocaine (XYLOCAINE) 5 % ointment Apply topically every 4 hours as needed for Pain Apply topically as needed.     Historical Provider, MD   metoprolol tartrate (LOPRESSOR) 25 MG tablet Take 25 mg by mouth 2 times daily Hold for SBP <100 or HR <55    Historical Provider, MD   midodrine (PROAMATINE) 5 MG tablet Take 5 mg by mouth 3 times daily Hold for SBP >130    Historical Provider, MD   polyethylene glycol (MIRALAX) 17 g PACK packet Take 17 g by mouth daily    Historical Provider, MD   pantoprazole (PROTONIX) 40 MG tablet Take 40 mg by mouth daily    Historical Provider, MD   sodium chloride (OCEAN, BABY AYR) 0.65 % nasal spray 1 spray by Nasal route as needed for Congestion    Historical Provider, MD   sennosides-docusate sodium (SENOKOT-S) 8.6-50 MG tablet Take 1 tablet by mouth in the morning and at bedtime    Historical Provider, MD   spironolactone (ALDACTONE) 25 MG tablet Take 25 mg by mouth daily    Historical Provider, MD   tamsulosin (FLOMAX) 0.4 MG capsule Take 0.4 mg by mouth daily    Historical Provider, MD   furosemide (LASIX) 20 MG tablet Take 1 tablet by mouth 2 times daily  Patient taking differently: Take 40 mg by mouth daily  1/28/22   Zafar Concepcion MD         No Known Allergies          EXAM: (2-7 system for EPF/Detailed, ?8 for Comprehensive)  /83   Pulse 69   Temp 97 °F (36.1 °C) (Oral)   Resp 15   Ht 5' 9\" (1.753 m)   Wt 200 lb (90.7 kg)   SpO2 100%   BMI 29.53 kg/m²   Constitutional: vitals as above: alert, appears stated age and cooperative    Psychiatric: normal insight and judgment, oriented to person, place, time, and general circumstances    Head: Normocephalic, without obvious abnormality, atraumatic    Eyes:lids and lashes normal, conjunctivae and sclerae normal and pupils equal, round, reactive to light and accomodation    EMNT: external ears normal, nares midline    Neck: no carotid bruit, supple, symmetrical, trachea midline and thyroid not enlarged, symmetric, no tenderness/mass/nodules     Respiratory: crackles bilaterally with normal respiratory effort    Cardiovascular: normal rate, regular rhythm, normal S1 and S2 and no murmurs    Gastrointestinal: soft, non-tender, non-distended, normal bowel sounds, no masses or organomegaly    Extremities: no clubbing, no edema    Skin:No rashes or nodules noted.     Neurologic:negative         LABS:  Labs Reviewed   CBC WITH AUTO DIFFERENTIAL - Abnormal; Notable for the following components:       Result Value    RBC 4.03 (*)     Hemoglobin 10.4 (*)     Hematocrit 33.0 (*)     MCH 25.8 (*)     RDW 22.8 (*)     Anisocytosis 2+ (*)     Polychromasia Occasional (*)     Poikilocytes Occasional (*)     Acanthocytes Occasional (*)     All other components within normal limits   COMPREHENSIVE METABOLIC PANEL W/ REFLEX TO MG FOR LOW K - Abnormal; Notable for the following components:    CO2 19 (*)     Anion Gap 19 (*)     Glucose 105 (*)     BUN 21 (*)     CREATININE 1.5 (*)     GFR Non- 46 (*)     GFR  56 (*)     All other components within normal limits   TROPONIN - Abnormal; Notable for the following components:    Troponin 0.08 (*)     All other components within normal limits   BRAIN NATRIURETIC PEPTIDE - Abnormal; Notable for the following components:    Pro-BNP 1,912 (*)     All other components within normal limits   D-DIMER, QUANTITATIVE - Abnormal; Notable for the following components:    D-Dimer, Quant 490 (*)     All other components within normal limits   CULTURE, BLOOD 1   CULTURE, BLOOD 2   CK   URINALYSIS WITH REFLEX TO CULTURE   LACTATE, SEPSIS   LACTATE, SEPSIS         IMAGING:  Imaging results from the ER have been reviewed in the computerized chart. CT HEAD WO CONTRAST    Result Date: 4/21/2022  EXAMINATION: CT OF THE HEAD WITHOUT CONTRAST  4/21/2022 9:52 am TECHNIQUE: CT of the head was performed without the administration of intravenous contrast. Dose modulation, iterative reconstruction, and/or weight based adjustment of the mA/kV was utilized to reduce the radiation dose to as low as reasonably achievable. COMPARISON: None. HISTORY: ORDERING SYSTEM PROVIDED HISTORY: syncope TECHNOLOGIST PROVIDED HISTORY: Has a \"code stroke\" or \"stroke alert\" been called? ->No Reason for exam:->syncope Decision Support Exception - unselect if not a suspected or confirmed emergency medical condition->Emergency Medical Condition (MA) Reason for Exam: syncope FINDINGS: BRAIN/VENTRICLES: There is no acute intracranial hemorrhage, mass effect or midline shift.  No abnormal extra-axial fluid collection. The gray-white differentiation is maintained without evidence of an acute infarct. There is prominence of the ventricles and sulci due to global parenchymal volume loss. There are nonspecific areas of hypoattenuation within the periventricular and subcortical white matter, which likely represent chronic microvascular ischemic change. ORBITS: The visualized portion of the orbits demonstrate no acute abnormality. SINUSES: The visualized paranasal sinuses and mastoid air cells demonstrate no acute abnormality. SOFT TISSUES/SKULL: No acute abnormality of the visualized skull or soft tissues. No acute intracranial abnormality. CT CERVICAL SPINE WO CONTRAST    Result Date: 4/21/2022  EXAMINATION: CT OF THE CERVICAL SPINE WITHOUT CONTRAST 4/21/2022 9:52 am TECHNIQUE: CT of the cervical spine was performed without the administration of intravenous contrast. Multiplanar reformatted images are provided for review. Dose modulation, iterative reconstruction, and/or weight based adjustment of the mA/kV was utilized to reduce the radiation dose to as low as reasonably achievable. COMPARISON: None. HISTORY: ORDERING SYSTEM PROVIDED HISTORY: syncope TECHNOLOGIST PROVIDED HISTORY: Reason for exam:->syncope Decision Support Exception - unselect if not a suspected or confirmed emergency medical condition->Emergency Medical Condition (MA) Reason for Exam: syncope FINDINGS: BONES/ALIGNMENT: There is no acute fracture or traumatic malalignment. DEGENERATIVE CHANGES: There is moderate disc space narrowing and endplate osteophyte formation at the C5/6 and C6/7 levels. SOFT TISSUES: There is no prevertebral soft tissue swelling. A trace amount of nonspecific fluid is present within the right mastoid air cells. No acute abnormality of the cervical spine.      CT ANKLE RIGHT WO CONTRAST    Result Date: 4/6/2022  EXAMINATION: CT OF THE RIGHT ANKLE WITHOUT CONTRAST 4/6/2022 9:00 am TECHNIQUE: CT of the right ankle was performed without the administration of intravenous contrast.  Multiplanar reformatted images are provided for review. Dose modulation, iterative reconstruction, and/or weight based adjustment of the mA/kV was utilized to reduce the radiation dose to as low as reasonably achievable. COMPARISON: None. HISTORY ORDERING SYSTEM PROVIDED HISTORY: Acute right ankle pain TECHNOLOGIST PROVIDED HISTORY: Reason for Exam: Acute right ankle pain FINDINGS: Bones/joints: Mild osteopenia. No acute fracture or traumatic malalignment. The talar dome is intact. Few small osseous fragments about the malleoli likely represents sequela of prior ligamentous injury. Joints of the ankle are anatomically aligned and without significant degenerative changes. Mild-to-moderate degenerative changes are noted across the TMT joints. Small os peroneum. Calcaneal enthesopathy is noted. Soft Tissue: No focal soft tissue swelling. No subcutaneous gas. Mild soft tissue swelling about the ankle. Muscles/tendons: Mild thickening of the Achilles tendon. The remaining tendons about the ankle are grossly intact, although evaluation is limited. Thickening of the central cord of the plantar fascia measures up to 9 mm with dystrophic calcification and plantar spurring. No definite acute osseous abnormality. Sequela of age indeterminate ligamentous injury about the ankle. Chronic plantar fasciitis, including thickening of the central cord of the plantar fascia with calcaneal spurring and dystrophic calcification. Mild Achilles tendinosis. Mild to moderate TMT joint degenerative changes. XR CHEST PORTABLE    Result Date: 4/21/2022  EXAMINATION: ONE XRAY VIEW OF THE CHEST 4/21/2022 9:39 am COMPARISON: 03/18/2022 HISTORY: ORDERING SYSTEM PROVIDED HISTORY: cp TECHNOLOGIST PROVIDED HISTORY: Reason for exam:->cp Reason for Exam: Shortness of Breath (Pt in via [facility] for shortness of breath.  Pt states that he \"fell out\" on the floor due to his trouble breathing. Pt states he has been short of breath since he got out of a nursing home last Tuesday. Pt was in rehab following an MI. Pt is on 3 L of O2 at home with an 800 ft hose. ) FINDINGS: Sternotomy wires are present. There is a the subclavian ICD, with leads projecting to the expected right ventricle. Cardiac silhouette is prominent. Interstitial-alveolar opacities are noted. There is improvement in the interval.  There is no pleural effusion or pneumothorax. Improving interstitial-alveolar opacities, consistent with resolving pneumonia. Stable cardiomegaly. CT CHEST PULMONARY EMBOLISM W CONTRAST    Result Date: 4/21/2022  EXAMINATION: CTA OF THE CHEST 4/21/2022 11:56 am TECHNIQUE: CTA of the chest was performed after the administration of intravenous contrast.  Multiplanar reformatted images are provided for review. MIP images are provided for review. Dose modulation, iterative reconstruction, and/or weight based adjustment of the mA/kV was utilized to reduce the radiation dose to as low as reasonably achievable. COMPARISON: 12/29/2016 HISTORY: ORDERING SYSTEM PROVIDED HISTORY: elevated dimer - ro PE TECHNOLOGIST PROVIDED HISTORY: Reason for exam:->elevated dimer - ro PE Decision Support Exception - unselect if not a suspected or confirmed emergency medical condition->Emergency Medical Condition (MA) Reason for Exam: shortness of breath, elevated dimer Additional signs and symptoms: shingles FINDINGS: Pulmonary Arteries: Pulmonary arteries are adequately opacified for evaluation. No evidence of intraluminal filling defect to suggest pulmonary embolism. Main pulmonary artery is normal in caliber. Mediastinum: No evidence of mediastinal lymphadenopathy. The heart and pericardium demonstrate no acute abnormality. The ascending thoracic aorta is ectatic measuring 4.3 cm. No evidence of dissection. Coronary artery calcifications seen.  Lungs/pleura: Changes of emphysema as well as consider CT at 18-24 months. In a high-risk patient, CT at 3-6 months, then CT at 18-24 months. Nodule size greater than 8 mm In a low-risk patient, CT at 3-6 months, then consider CT at 18-24 months. In a high-risk patient, CT at 3-6 months, then CT at 18-24 months. - Low risk patients include individuals with minimal or absent history of smoking and other known risk factors. - High risk patients include individuals with a history or smoking or known risk factors. Radiology 2017 http://pubs. rsna.org/doi/full/10.1148/radiol. 9626594045         EKG:   EKG from ER, reviewed by self - it shows normal sinus at 80  Old chart reviewed, EKG dated 3/16/22 is reviewed, there is  difference noted. Old study shows afib at 80    Lab Results   Component Value Date    GLUCOSE 105 04/21/2022     Lab Results   Component Value Date    POCGLU 188 03/21/2022     /83   Pulse 69   Temp 97 °F (36.1 °C) (Oral)   Resp 15   Ht 5' 9\" (1.753 m)   Wt 200 lb (90.7 kg)   SpO2 100%   BMI 29.53 kg/m²     MEDICAL DECISION MAKING:    Principal Problem:    HCAP (healthcare-associated pneumonia) -New Problem to me. Pt with poss infiltrate on admit cxr  Plan: iv abx started - cover with HCAP abx for now. DuoNeb HHN ordered. O2 as needed. Will check serial CXR. WBC ordered for AM.  Respiratory therapy asked to see. Monitor for signs of antibiotic toxicity with serial exam and lab studies   Active Problems:    Syncope - New Problem to me. Pt also with recent ICD placement  Plan: admit, monitor on tele, ivf, consult cards    Atrial fibrillation  ([address] -Established problem. Stable. In sinus now  Plan: Continue present orders/plan. COVID -Established problem.  Known infection in Jan  Plan: check Procal, CRP today    Syncope and collapse     ICD (implantable cardioverter-defibrillator), single, in situ        I have discussed with the patient the rationale for blood component transfusion; its benefits in treating or preventing fatigue, organ damage, or death; and its risk which includes mild transfusion reactions, rare risk of blood borne infection, or more serious but rare reactions. I have discussed the alternatives to transfusion, including the risk and consequences of not receiving transfusion. The patient had an opportunity to ask questions and had agreed to proceed with transfusion of blood components. Diagnoses as listed above, designated as new or established and plan outlined for each. Data Reviewed:   (1) Lab tests were reviewed or ordered. (1) Radiology tests were reviewed or ordered. (1) Medical test (Echo, EKG, PFT/genoveva) were ordered. (1)History was not obtained from someone other than patient  (1) Old records were reviewed - see HPI/MDM for pertinent details if review done. (2) Case was discussed with another health care provider: [facility] WHIT MCLEAN  (2) Imaging was viewed by myself. (2) EKG  was viewed by myself. The patient is being placed in inpatient status with the expectation of requiring a hospital stay spanning at least two midnights for care and treatment of the problems noted in the problem list.  This determination is also based on thepatients comorbidities and past medical history, the severity and timing of the signs and symptoms upon presentation.     (Please note that portions of this note were completed with a voice recognition program.  Efforts were made to edit the dictations but occasionally words are mis-transcribed.)      Electronically signed by: Verenice Garrett MD 4/21/2022

## 2022-04-21 NOTE — ED NOTES
[employer]tronic interrogator low on battery and unable to be used at this time.       Megan Antonoi RN  04/21/22 [id number]

## 2022-04-21 NOTE — ED PROVIDER NOTES
I independently performed a history and physical on Vigster. I personally saw the patient and performed a substantive portion of the visit including all aspects of the medical decision making. Briefly, this is a 67 y.o. male here for syncope. Patient recently discharged from rehab facility. Has not been eating much for the past 3 days. Has had nausea since. Today, he was making breakfast and while walking, he syncopized. On waking, he had chest discomfort and shortness of breath that was severe. It has since resolved. No fevers or chills or cough. Does not think that his defibrillator fired. Is unsure how long he lost consciousness for. He lives with his wife but she was at work. On exam,   General: Patient is in no acute distress  Skin: No cyanosis  HEENT: Moist mucous membranes  Heart: Regular rate, regular rhythm  Lung: No respiratory distress. ctabl  Abdomen: Soft, nontender  Neuro: Moving all extremities, no facial droop, no slurred speech, answers questions appropriately        EKG  The Ekg interpreted by me in the absence of a cardiologist shows. Normal sinus rhythm  No acute ST changes   HR 81  Prolonged qt compared to 3/22 study    Screenings   [place] Coma Scale  Eye Opening: Spontaneous  Best Verbal Response: Oriented  Best Motor Response: Obeys commands  [place] Coma Scale Score: 15        MDM  Briefly, this is a 67 y.o. male here for syncopal episode with shortness of breath and chest discomfort that has since improved. May be vasovagal, orthostatic, arrhythmia, valvulopathy. Recently had V. fib arrest.  I do believe that patient will require admission to hospitalist service. No headache to indicate intracranial bleed. No stigma of infection. EKG does show prolonged QT, will give some magnesium. Chest x-ray shows resolving pneumonia but is not having symptoms of this.   There is elevated troponin, it is unclear whether or not this is due to LONNIE, this is similar to prior troponin. Is anticoagulated. Doubt pulmonary embolism because of this. D dimer was elevated however. Ct no PE but possible pneumonia; starting coverage for hcap given hypoxia, dyspnea. Not having cough or fevers though. Doubt he is septic. No tachycardia/tachpnea       Patient Referrals:  No follow-up provider specified. Discharge Medications:  New Prescriptions    No medications on file       FINAL IMPRESSION  1. Dyspnea, unspecified type    2. Syncope and collapse        Blood pressure 139/83, pulse 69, temperature 97 °F (36.1 °C), temperature source Oral, resp. rate 15, height 5' 9\" (1.753 m), weight 200 lb (90.7 kg), SpO2 100 %. For further details of [address] [facility] emergency department encounter, please see documentation by advanced practice provideralexandria.         Geovany Ferreira MD  04/21/22 0581

## 2022-04-21 NOTE — ED NOTES
During orthostatic vitals, pts SpO2 dropped to 69 and held consistently at 70% when he stood up. Pts work of breathing increased as well but the pt stated that he did not feel like he was going to pass out. Pt remained alert and oriented.      Lilibeth Waller RN  04/21/22 [id number]

## 2022-04-22 ENCOUNTER — APPOINTMENT (OUTPATIENT)
Dept: GENERAL RADIOLOGY | Age: 72
DRG: 194 | End: 2022-04-22
Payer: MEDICARE

## 2022-04-22 LAB
A/G RATIO: 1.5 (ref 1.1–2.2)
ALBUMIN SERPL-MCNC: 3.4 G/DL (ref 3.4–5)
ALP BLD-CCNC: 86 U/L (ref 40–129)
ALT SERPL-CCNC: 25 U/L (ref 10–40)
ANION GAP SERPL CALCULATED.3IONS-SCNC: 10 MMOL/L (ref 3–16)
AST SERPL-CCNC: 20 U/L (ref 15–37)
BASOPHILS ABSOLUTE: 0.1 K/UL (ref 0–0.2)
BASOPHILS RELATIVE PERCENT: 0.9 %
BILIRUB SERPL-MCNC: 0.3 MG/DL (ref 0–1)
BILIRUBIN URINE: NEGATIVE
BLOOD, URINE: NEGATIVE
BUN BLDV-MCNC: 14 MG/DL (ref 7–20)
C-REACTIVE PROTEIN: 7.1 MG/L (ref 0–5.1)
CALCIUM SERPL-MCNC: 9.2 MG/DL (ref 8.3–10.6)
CHLORIDE BLD-SCNC: 104 MMOL/L (ref 99–110)
CLARITY: CLEAR
CO2: 22 MMOL/L (ref 21–32)
COLOR: YELLOW
CREAT SERPL-MCNC: 1 MG/DL (ref 0.8–1.3)
EKG ATRIAL RATE: 81 BPM
EKG DIAGNOSIS: NORMAL
EKG P AXIS: 60 DEGREES
EKG P-R INTERVAL: 168 MS
EKG Q-T INTERVAL: 450 MS
EKG QRS DURATION: 84 MS
EKG QTC CALCULATION (BAZETT): 522 MS
EKG R AXIS: 9 DEGREES
EKG T AXIS: 61 DEGREES
EKG VENTRICULAR RATE: 81 BPM
EOSINOPHILS ABSOLUTE: 0.2 K/UL (ref 0–0.6)
EOSINOPHILS RELATIVE PERCENT: 2.6 %
ESTIMATED AVERAGE GLUCOSE: 125.5 MG/DL
GFR AFRICAN AMERICAN: >60
GFR NON-AFRICAN AMERICAN: >60
GLUCOSE BLD-MCNC: 100 MG/DL (ref 70–99)
GLUCOSE BLD-MCNC: 116 MG/DL (ref 70–99)
GLUCOSE BLD-MCNC: 132 MG/DL (ref 70–99)
GLUCOSE BLD-MCNC: 171 MG/DL (ref 70–99)
GLUCOSE BLD-MCNC: 85 MG/DL (ref 70–99)
GLUCOSE URINE: NEGATIVE MG/DL
HBA1C MFR BLD: 6 %
HCT VFR BLD CALC: 29.8 % (ref 40.5–52.5)
HEMOGLOBIN: 9.2 G/DL (ref 13.5–17.5)
KETONES, URINE: NEGATIVE MG/DL
LACTIC ACID: 1 MMOL/L (ref 0.4–2)
LEUKOCYTE ESTERASE, URINE: NEGATIVE
LYMPHOCYTES ABSOLUTE: 1.6 K/UL (ref 1–5.1)
LYMPHOCYTES RELATIVE PERCENT: 20.7 %
MCH RBC QN AUTO: 25.3 PG (ref 26–34)
MCHC RBC AUTO-ENTMCNC: 31 G/DL (ref 31–36)
MCV RBC AUTO: 81.4 FL (ref 80–100)
MICROSCOPIC EXAMINATION: NORMAL
MONOCYTES ABSOLUTE: 0.7 K/UL (ref 0–1.3)
MONOCYTES RELATIVE PERCENT: 9.1 %
NEUTROPHILS ABSOLUTE: 5 K/UL (ref 1.7–7.7)
NEUTROPHILS RELATIVE PERCENT: 66.7 %
NITRITE, URINE: NEGATIVE
PDW BLD-RTO: 22.3 % (ref 12.4–15.4)
PERFORMED ON: ABNORMAL
PH UA: 6 (ref 5–8)
PLATELET # BLD: 231 K/UL (ref 135–450)
PMV BLD AUTO: 7.2 FL (ref 5–10.5)
POTASSIUM REFLEX MAGNESIUM: 3.8 MMOL/L (ref 3.5–5.1)
PROCALCITONIN: 0.08 NG/ML (ref 0–0.15)
PROTEIN UA: NEGATIVE MG/DL
RBC # BLD: 3.66 M/UL (ref 4.2–5.9)
SODIUM BLD-SCNC: 136 MMOL/L (ref 136–145)
SPECIFIC GRAVITY UA: <=1.005 (ref 1–1.03)
TOTAL PROTEIN: 5.7 G/DL (ref 6.4–8.2)
URINE REFLEX TO CULTURE: NORMAL
URINE TYPE: NORMAL
UROBILINOGEN, URINE: 0.2 E.U./DL
WBC # BLD: 7.5 K/UL (ref 4–11)

## 2022-04-22 PROCEDURE — 86140 C-REACTIVE PROTEIN: CPT

## 2022-04-22 PROCEDURE — 6370000000 HC RX 637 (ALT 250 FOR IP): Performed by: INTERNAL MEDICINE

## 2022-04-22 PROCEDURE — 97161 PT EVAL LOW COMPLEX 20 MIN: CPT

## 2022-04-22 PROCEDURE — 92526 ORAL FUNCTION THERAPY: CPT

## 2022-04-22 PROCEDURE — 80053 COMPREHEN METABOLIC PANEL: CPT

## 2022-04-22 PROCEDURE — 85025 COMPLETE CBC W/AUTO DIFF WBC: CPT

## 2022-04-22 PROCEDURE — 97530 THERAPEUTIC ACTIVITIES: CPT

## 2022-04-22 PROCEDURE — 2700000000 HC OXYGEN THERAPY PER DAY

## 2022-04-22 PROCEDURE — 81003 URINALYSIS AUTO W/O SCOPE: CPT

## 2022-04-22 PROCEDURE — 93010 ELECTROCARDIOGRAM REPORT: CPT | Performed by: INTERNAL MEDICINE

## 2022-04-22 PROCEDURE — 94640 AIRWAY INHALATION TREATMENT: CPT

## 2022-04-22 PROCEDURE — 2580000003 HC RX 258: Performed by: INTERNAL MEDICINE

## 2022-04-22 PROCEDURE — 94761 N-INVAS EAR/PLS OXIMETRY MLT: CPT

## 2022-04-22 PROCEDURE — 99223 1ST HOSP IP/OBS HIGH 75: CPT | Performed by: INTERNAL MEDICINE

## 2022-04-22 PROCEDURE — 84145 PROCALCITONIN (PCT): CPT

## 2022-04-22 PROCEDURE — 97165 OT EVAL LOW COMPLEX 30 MIN: CPT

## 2022-04-22 PROCEDURE — 97535 SELF CARE MNGMENT TRAINING: CPT

## 2022-04-22 PROCEDURE — 83605 ASSAY OF LACTIC ACID: CPT

## 2022-04-22 PROCEDURE — 1200000000 HC SEMI PRIVATE

## 2022-04-22 PROCEDURE — 92610 EVALUATE SWALLOWING FUNCTION: CPT

## 2022-04-22 PROCEDURE — 6360000002 HC RX W HCPCS: Performed by: INTERNAL MEDICINE

## 2022-04-22 PROCEDURE — 71046 X-RAY EXAM CHEST 2 VIEWS: CPT

## 2022-04-22 PROCEDURE — 36415 COLL VENOUS BLD VENIPUNCTURE: CPT

## 2022-04-22 RX ORDER — AMIODARONE HYDROCHLORIDE 200 MG/1
200 TABLET ORAL DAILY
Status: DISCONTINUED | OUTPATIENT
Start: 2022-04-23 | End: 2022-04-23 | Stop reason: HOSPADM

## 2022-04-22 RX ORDER — METOPROLOL TARTRATE 50 MG/1
50 TABLET, FILM COATED ORAL 2 TIMES DAILY
Status: DISCONTINUED | OUTPATIENT
Start: 2022-04-22 | End: 2022-04-23 | Stop reason: HOSPADM

## 2022-04-22 RX ADMIN — APIXABAN 5 MG: 5 TABLET, FILM COATED ORAL at 20:17

## 2022-04-22 RX ADMIN — PANTOPRAZOLE SODIUM 40 MG: 40 TABLET, DELAYED RELEASE ORAL at 06:49

## 2022-04-22 RX ADMIN — IPRATROPIUM BROMIDE AND ALBUTEROL SULFATE 1 AMPULE: .5; 3 SOLUTION RESPIRATORY (INHALATION) at 09:41

## 2022-04-22 RX ADMIN — GABAPENTIN 200 MG: 100 CAPSULE ORAL at 20:16

## 2022-04-22 RX ADMIN — SENNOSIDES AND DOCUSATE SODIUM 1 TABLET: 50; 8.6 TABLET ORAL at 20:16

## 2022-04-22 RX ADMIN — HYDROCORTISONE: 1 OINTMENT TOPICAL at 10:03

## 2022-04-22 RX ADMIN — MIDODRINE HYDROCHLORIDE 5 MG: 5 TABLET ORAL at 09:55

## 2022-04-22 RX ADMIN — SPIRONOLACTONE 25 MG: 25 TABLET ORAL at 09:56

## 2022-04-22 RX ADMIN — ATORVASTATIN CALCIUM 20 MG: 20 TABLET, FILM COATED ORAL at 20:16

## 2022-04-22 RX ADMIN — METOPROLOL TARTRATE 50 MG: 50 TABLET ORAL at 20:16

## 2022-04-22 RX ADMIN — GABAPENTIN 200 MG: 100 CAPSULE ORAL at 09:55

## 2022-04-22 RX ADMIN — METOPROLOL TARTRATE 25 MG: 25 TABLET, FILM COATED ORAL at 09:56

## 2022-04-22 RX ADMIN — SENNOSIDES AND DOCUSATE SODIUM 1 TABLET: 50; 8.6 TABLET ORAL at 09:56

## 2022-04-22 RX ADMIN — HYDROCORTISONE: 1 OINTMENT TOPICAL at 14:49

## 2022-04-22 RX ADMIN — TAMSULOSIN HYDROCHLORIDE 0.4 MG: 0.4 CAPSULE ORAL at 09:56

## 2022-04-22 RX ADMIN — AMIODARONE HYDROCHLORIDE 100 MG: 200 TABLET ORAL at 09:55

## 2022-04-22 RX ADMIN — APIXABAN 5 MG: 5 TABLET, FILM COATED ORAL at 09:56

## 2022-04-22 RX ADMIN — Medication 10 ML: at 09:00

## 2022-04-22 RX ADMIN — CEFEPIME HYDROCHLORIDE 2000 MG: 2 INJECTION, POWDER, FOR SOLUTION INTRAVENOUS at 04:15

## 2022-04-22 RX ADMIN — Medication 2 PUFF: at 09:41

## 2022-04-22 RX ADMIN — IPRATROPIUM BROMIDE AND ALBUTEROL SULFATE 1 AMPULE: .5; 3 SOLUTION RESPIRATORY (INHALATION) at 20:26

## 2022-04-22 RX ADMIN — NALOXEGOL OXALATE 25 MG: 25 TABLET, FILM COATED ORAL at 06:49

## 2022-04-22 RX ADMIN — MIDODRINE HYDROCHLORIDE 5 MG: 5 TABLET ORAL at 18:15

## 2022-04-22 RX ADMIN — FUROSEMIDE 40 MG: 40 TABLET ORAL at 09:56

## 2022-04-22 RX ADMIN — MIDODRINE HYDROCHLORIDE 5 MG: 5 TABLET ORAL at 12:09

## 2022-04-22 RX ADMIN — HYDROCORTISONE: 1 OINTMENT TOPICAL at 00:51

## 2022-04-22 RX ADMIN — HYDROCORTISONE: 1 OINTMENT TOPICAL at 06:51

## 2022-04-22 RX ADMIN — INSULIN LISPRO 1 UNITS: 100 INJECTION, SOLUTION INTRAVENOUS; SUBCUTANEOUS at 20:24

## 2022-04-22 RX ADMIN — POLYETHYLENE GLYCOL 3350 17 G: 17 POWDER, FOR SOLUTION ORAL at 09:56

## 2022-04-22 RX ADMIN — Medication 2 PUFF: at 20:26

## 2022-04-22 RX ADMIN — Medication 10 ML: at 20:19

## 2022-04-22 RX ADMIN — HYDROCORTISONE: 1 OINTMENT TOPICAL at 20:22

## 2022-04-22 ASSESSMENT — PAIN SCALES - GENERAL
PAINLEVEL_OUTOF10: 0
PAINLEVEL_OUTOF10: 10

## 2022-04-22 ASSESSMENT — PAIN DESCRIPTION - DESCRIPTORS: DESCRIPTORS: BURNING

## 2022-04-22 ASSESSMENT — PAIN DESCRIPTION - ORIENTATION: ORIENTATION: LEFT

## 2022-04-22 ASSESSMENT — PAIN DESCRIPTION - LOCATION: LOCATION: FLANK;ABDOMEN

## 2022-04-22 ASSESSMENT — PAIN DESCRIPTION - PAIN TYPE: TYPE: CHRONIC PAIN

## 2022-04-22 NOTE — DISCHARGE INSTR - COC
Continuity of Care Form    Patient Name: Jacque Lazo   :  1950  MRN:  [id number]    Admit date:  2022  Discharge date:  ***    Code Status Order: Full Code   Advance Directives:      Admitting Physician:  Inés Prado MD  PCP: Elizabeth Penaloza DO    Discharging Nurse: [facility] Unit/Room#: [id number]-[id number]/6142-42  Discharging Unit Phone Number: ***    Emergency Contact:   Extended Emergency Contact Information  Primary Emergency Contact: Blake Holder  Address: [place] [address] [facility] Phone: [phone]  Work Phone: [phone]  Relation: Spouse  Secondary Emergency Contact: [facility] [address] [facility] Phone: [phone]  Mobile Phone: [phone]  Relation: Child  Preferred language: English   needed?  No    Past Surgical History:  Past Surgical History:   Procedure Laterality Date    CARDIAC CATHETERIZATION      CARDIAC SURGERY      stent x1 --3 yrs, 14 CABG 3 V    COLONOSCOPY      PACEMAKER PLACEMENT      VASCULAR SURGERY         Immunization History:   Immunization History   Administered Date(s) Administered    COVID-19, [employer] top, DO NOT Dilute, Drew-Sucrose, 12+ yrs, PF, 30 mcg/0.3 mL dose 03/10/2022    COVID-19, Pfizer Purple top, DILUTE for use, 12+ yrs, 30mcg/0.3mL dose 04/10/2021, 2021    Influenza Virus Vaccine 10/03/2014    Pneumococcal Polysaccharide (Nyxesfcpo04) 10/03/2014       Active Problems:  Patient Active Problem List   Diagnosis Code    CAD (coronary artery disease) I25.10    Essential hypertension I10    PVD (peripheral vascular disease) ([facility] [address] I73.9    COPD (chronic obstructive pulmonary disease) ([facility] [address] J44.9    Dyspnea R06.00    Atrial fibrillation with RVR ([facility]) I48.91    Anemia D64.9    Anticoagulated on Coumadin Z79.01    Hypotension due to hypovolemia I95.89, E86.1    COVID U07.1    Acute respiratory failure due to COVID-19 ([facility]) U07.1, J96.00    High cholesterol E78.00    Ischemic cardiomyopathy I25.5    Chronic a-fib ([facility]) I48.20    Acute on chronic systolic heart failure, NYHA class 3 ([facility]) I50.23    Syncope and collapse R55    ICD (implantable cardioverter-defibrillator), single, in situ Z95.810    Chronic diastolic heart failure ([facility]) I50.32    HCAP (healthcare-associated pneumonia) J18.9    Syncope R55       Isolation/Infection:   Isolation            No Isolation          Patient Infection Status       Infection Onset Added Last Indicated Last Indicated By Review Planned Expiration Resolved Resolved By    None active    Resolved    COVID-19 22 Brittany Mckeon RN   22     [place] + 22            Nurse Assessment:  Last Vital Signs: /70   Pulse 66   Temp 97.8 °F (36.6 °C) (Oral)   Resp 16   Ht 5' 9\" (1.753 m)   Wt 195 lb 9.6 oz (88.7 kg)   SpO2 96%   BMI 28.89 kg/m²     Last documented pain score (0-10 scale): Pain Level: 0  Last Weight:   Wt Readings from Last 1 Encounters:   22 195 lb 9.6 oz (88.7 kg)     Mental Status:  oriented    IV Access:  - None    Nursing Mobility/ADLs:  Walking   Independent  Transfer  Independent  Bathing  Independent  Dressing  Independent  [address]  Independent  Med Delivery   whole    Wound Care Documentation and Therapy:  Wound 14 Sacrum Preventative - Mepilex sacral Border (Active)   Number of days: 8310       Incision 14 Groin Right (Active)   Number of days: 7559       Incision 14 Sternum (Active)   Number of days: 3123       Incision 14 Leg (Active)   Number of days: 0877       Incision 22 Chest Left;Upper; Anterior (Active)   Number of days: 35        Elimination:  Continence:    Bowel: Yes  Bladder: Yes  Urinary Catheter: None   Colostomy/Ileostomy/Ileal Conduit: No       Date of Last BM: 22    Intake/Output Summary (Last 24 hours) at 2022 1331  Last data filed at 2022 0045  Gross per 24 hour   Intake --   Output 500 ml   Net -500 ml     I/O last 3 completed shifts:  In: -   Out: 500 [Urine:500]    Safety Concerns: At Risk for Falls    Impairments/Disabilities:      None    Nutrition Therapy:  Current Nutrition Therapy:   - Oral Diet:  Carb Control 4 carbs/meal (1800kcals/day)    Routes of Feeding: Oral  Liquids: No Restrictions  Daily Fluid Restriction: no  Last Modified Barium Swallow with Video (Video Swallowing Test): not done    Treatments at the Time of Hospital Discharge:   Respiratory Treatments: Nebulizer treatments  Oxygen Therapy:  is on oxygen at 3 L/min per nasal cannula.   Ventilator:    - No ventilator support    Rehab Therapies: home oxygen therapy  Weight Bearing Status/Restrictions: No weight bearing restrictions  Other Medical Equipment (for information only, NOT a DME order):  none  Other Treatments: N/a    Patient's personal belongings (please select all that are sent with patient):  Phone, wallet, clothing, shoes     RN SIGNATURE:  Electronically signed by Anne-Marie Ruby RN on 4/23/22 at 4:18 PM EDT    CASE MANAGEMENT/SOCIAL WORK SECTION    Inpatient Status Date: 04/21/2022    Readmission Risk Assessment Score:  26  Readmission Risk              Risk of Unplanned Readmission:  29           Discharging to Facility/ Agency     [facility] [address] [facility]  Phone: [phone]  Fax: Thom Esparza, [address]  P: [phone]  F: [phone]          / signature:   Electronically signed by Tera Easley RN on 4/22/2022 at 1:32 PM      PHYSICIAN SECTION    Prognosis: Fair    Condition at Discharge: Stable    Rehab Potential (if transferring to Rehab): Good    Recommended Labs or Other Treatments After Discharge: ***    Physician Certification: I certify the above information and transfer of [facility]   is necessary for the continuing treatment of the diagnosis listed and that he requires Home Care for greater 30 days.      Update Admission H&P: No change in H&P    PHYSICIAN SIGNATURE:  Electronically signed by Martina Fowler MD on 4/23/22 at 10:56 AM EDT

## 2022-04-22 NOTE — PROGRESS NOTES
[facility] [address]   Phone: [phone]    Occupational Therapy    [x] Initial Evaluation            [] Daily Treatment Note         [] Discharge Summary      Patient: Isabella Bautista   : 1950   MRN: [id number]   Date of Service:  2022    Admitting Diagnosis: Syncope  Current Admission Summary: 67 y. o. male with past medical history of CAD, COPD, hypertension, peripheral vascular disease who presents to the ED with complaint of shortness of breath.  Patient arrives by EMS from home.  Patient states he recently had COVID several months ago and states he was sent to a rehab facility secondary to weakness and fatigue.  He was recently discharged from the rehab facility on . Rony Huff states he is currently on 3 L nasal cannula oxygen at home.  Patient states this morning he had a syncopal episode.  States he is not sure what happened. Rony Huff states he woke up on the ground is not sure how he got there. Nilesh Garcia denies any injury or trauma. Past Medical History:  has a past medical history of Acid reflux, Acute MI ([address] CAD (coronary artery disease), COPD (chronic obstructive pulmonary disease) ([address] Diverticulitis, Hypertension, Peripheral vascular disease ([address] and Shingles. Past Surgical History:  has a past surgical history that includes vascular surgery; Cardiac surgery; Colonoscopy; Cardiac catheterization; and pacemaker placement. Discharge Recommendations: Isabella Bautista scored a 19/24 on the AM-PAC ADL Inpatient form. Current research shows that an AM-PAC score of 18 or greater is typically associated with a discharge to the patient's home setting. Based on the patient's AM-PAC score, and their current ADL deficits, it is recommended that the patient have 2-3 sessions per week of Occupational Therapy at d/c to increase the patient's independence.   At this time, this patient demonstrates the endurance and safety to discharge home with home services (home vs OP services) and a follow up treatment frequency of 2-3x/wk. Please see assessment section for further patient specific details. If patient discharges prior to next session this note will serve as a discharge summary. Please see below for the latest assessment towards goals. HOME HEALTH CARE: LEVEL 3 SAFETY     - Initial home health evaluation to occur within 24-48 hours, in patient home   - Therapy evaluations in home within 24-48 hours of discharge; including DME and home safety   - Frontload therapy 5 days, then 3x a week   - Therapy to evaluate if patient has [address] needs for personal care   -  evaluation within 24-48 hours, includes evaluation of resources and insurance to determine AL, [place], LTC, and Medicaid options       DME Required For Discharge: TTB vs Shower chair, continue to [place]    Precautions/Restrictions: fall risk (HIGH FALL RISK)  Weight Bearing Restrictions: no restrictions  []? Right Upper Extremity        []? Left Upper Extremity         []? Right Lower Extremity         []? Left Lower Extremity             Positional Restrictions:no positional restrictions    Pre-Admission Information     Lives With: spouse    Type of Home: house  Home Layout: two level, laundry in basement, bedroom/bathroom upstairs  Home Access: 5 step to enter with handrail. Handrails are located on R side. Bathroom Layout: tub/shower unit  Bathroom Equipment: standard toilet  Home Equipment: rolling walker, rollator - 4 wheeled walker, single point cane  Transfer Assistance: Independent without use of device  Ambulation Assistance:Independent without use of device  ADL Assistance: independent with all ADL's (sponge bathing d/t no shower chair and legs giving out)  IADL Assistance: independent with homemaking tasks  Active : [x] yes  []no  Current Employment: retired.   Occupation: self service station  Hobbies:   Recent Falls: no recent falls, in rehab 4 weeks  Examination     Vision:   Vision Gross Assessment: Impaired and Vision Corrective Device: wears glasses for reading  Hearing:   hard of hearing  Sensation:   reduced light touch to (L) LE, pt reporting decreased sensation in L foot distal from midfoot (states this is from past surgery to LLE)  Proprioception:    WFL  Tone:   Normotonic  Coordination Testing:   WFL    ROM:   (B) UE ROM WFL -- Full ROM of L shoulder limited d/t hx pacemaker  Strength:   (B) UE strength grossly     Decision Making: low complexity  Clinical Presentation: stable    Subjective    General: Patient sidelying in bed upon arrival, agreeable to evaluation. Reports 10/10 pain in (L) flank d/t shingles, pt declines need for pain intervention           Activities of Daily Living    Basic Activities of Daily Living  Grooming: stand by assistance (in stance at sink for hand hygiene)  Toileting: supervision. Equipment: none       Functional Mobility    Bed Mobility  Supine to Sit: stand by assistance  Comments:  Transfers  Sit to stand transfer completed at stand by assistance  Stand to sit transfer completed at stand by assistance  Toilet transfer: Completed at supervision. Mobility technique: ambulating. Equipment utilized: standard toilet  Comments:  Functional Mobility:  Sitting Balance: supervision. Duration: 15 minutes EOB. Standing Balance: stand by assistance. Duration: 3-4 min total.  Activity: stance at sink, ambulation, stance for ADL at toilet. Functional Mobility: rolling walker. stand by assistance.   Activity: to/from bathroom    Other Therapeutic Interventions      Functional Outcomes  AM-PAC Inpatient Daily Activity Raw Score: 19    Cognition  Overall Cognitive Status: WFL with inconsistency  Arousal/Alterness: appropriate responses to stimuli, inconsistent responses to stimuli  Following Commands: follows all commands without difficulty, inconsistently follows commands  Attention Span: appears intact, attends with cues to redirect  Safety Judgement: decreased awareness of need for safety  Problem Solving: able to problem solve independently  Insights: fully aware of deficits  Initiation: requires cues for some  Sequencing: requires cues for some  Orientation:    alert and oriented x 4  Command Following:   [facility]     Education    Barriers To Learning: cognition  Patient Education: patient educated on OT role and benefits and plan of care  Learning Assessment:  patient verbalizes understanding, would benefit from continued reinforcement    Assessment    Impairments Requiring Therapeutic Intervention: decreased functional mobility, decreased ADL status, decreased cognition, decreased endurance and decreased balance  Prognosis: excellent  Clinical Assessment: Patient presents below baseline d/t above deficits; he is able to complete mobility and ADL with SBA but requires use of RW for stability. Continued OT indicated in order to facilitate return to PLOF  Safety Interventions: patient left in chair, chair alarm in place, call light within reach, gait belt, nurse notified and family/caregiver present       Plan    Frequency: 3-5 x/per week  Current Treatment Recommendations: strengthening, balance training, functional mobility training, transfer training, endurance training, patient/caregiver education, ADL/self-care training, home management training and safety education    Goals      Short Term Goals:   To be completed in: discharge  Patient will complete upper body ADL at mod I  Patient will complete lower body ADL at mod I   Patient will complete toileting at mod I  Patient will complete functional transfers at mod I  Patient will complete functional mobility at mod I       Therapy Session Time     Individual Group Co-treatment   Time In    1103   Time Out    1211   Minutes    68        Timed Code Treatment Minutes:   53 minutes    Total Treatment Minutes:  68 minutes    Electronically Signed By: KATHRINE Roach MOT OTR/L [id number]

## 2022-04-22 NOTE — CONSULTS
[facility]   Electrophysiology Consultation   Date: 4/22/2022  Reason for Consultation: Syncope   Consult Requesting Physician: Amanda Gore MD   Chief Complaint   Patient presents with    Shortness of Breath     Pt in via [facility] for shortness of breath. Pt states that he \"fell out\" on the floor due to his trouble breathing. Pt states he has been short of breath since he got out of a nursing home last Tuesday. Pt was in rehab following an MI. Pt is on 3 L of O2 at home with an 800 ft hose. CC: Syncope    HPI: Sindhu Whyte is a 67 y.o. male who has presented to the hospital with syncope. He was preparing his breakfast, when felt severe shortness of breath, checked his oxygen tube and tried to go sit but passed out. He was alone at home. When woke up felt SOB and some chest pressure and and called EMS. His SOB and chest discomfort have been resolved. Denies any palpitation. Or AICD shock. No urinary or bowel incontinence. Patient has had another episode of syncope in the past when he was admitted in March. Had VT and AICD was implanted. Cardiac cath with patent LIMA to LAD, known occluded SVG to RCA and patent graft to Diagonal and LCx. No intervention done. Patient has history of CAD, ischemic cardiomyopathy with recovered EF, s/p CABG in 2014,  paroxysmal atrial fibrillation, HTN, and PAD. Single chamber AICD implantation on 3/15/2022. Patient has COPD and on oxygen at home. In emergency room, it has been reported that he had significant hypoxemia with was symptomatic. CT of the chest with no COPD and he was given antibiotic therapy.      Assessment:   - Syncope   - Ischemic cardiomyopathy  - History of VT  - CAD  - HTN  - COPD on oxygen. Plan:   - AICD interrogated. No sustained arrhythmia on the day of syncope on 4/21/2022.   - He is on oxygen and reportedly he was hypoxic in ER with significant symptoms.   - Hypoxia can cause syncope.    - Further evaluation of (91.7 kg)     Temp  Av.1 °F (36.7 °C)  Min: 97.8 °F (36.6 °C)  Max: 98.5 °F (36.9 °C)  Pulse  Av.8  Min: 64  Max: 77  BP  Min: 93/51  Max: 139/83  SpO2  Av.2 %  Min: 84 %  Max: 100 %    Intake/Output Summary (Last 24 hours) at 2022 1218  Last data filed at 2022 0045  Gross per 24 hour   Intake --   Output 500 ml   Net -500 ml         I independently reviewed all cardiac tracing from cardiac telemetry. · Constitutional: Oriented. No distress. · Head: Normocephalic and atraumatic. · Mouth/Throat: Oropharynx is clear and moist.   · Eyes: Conjunctivae normal. EOM are normal.   · Neck: Neck supple. No JVD present. · Cardiovascular: Normal rate, regular rhythm, S1&S2. · Pulmonary/Chest: Bilateral respiratory sounds. No rhonchi. · Abdominal: Soft. No tenderness. · Musculoskeletal: No tenderness. No edema    · Lymphadenopathy: Has no cervical adenopathy. · Neurological: Alert and oriented. Follows command, No Gross deficit   · Skin: Skin is warm, No rash noted.    · Psychiatric: Has a normal behavior       Scheduled Meds:   furosemide  40 mg Oral Daily    apixaban  5 mg Oral BID    atorvastatin  20 mg Oral Nightly    mometasone-formoterol  2 puff Inhalation Q12H    gabapentin  200 mg Oral BID    metoprolol tartrate  25 mg Oral BID    midodrine  5 mg Oral TID     polyethylene glycol  17 g Oral Daily    pantoprazole  40 mg Oral Daily    sennosides-docusate sodium  1 tablet Oral BID    spironolactone  25 mg Oral Daily    tamsulosin  0.4 mg Oral Daily    naloxegol  25 mg Oral QAM    amiodarone  100 mg Oral Daily    sodium chloride flush  5-40 mL IntraVENous 2 times per day    insulin lispro  0-12 Units SubCUTAneous TID     insulin lispro  0-6 Units SubCUTAneous Nightly    ipratropium-albuterol  1 ampule Inhalation BID     Continuous Infusions:   sodium chloride 75 mL/hr at 22 5195    sodium chloride      dextrose      dextrose       PRN Meds:.acetaminophen, sodium chloride, sodium chloride flush, sodium chloride, magnesium hydroxide, acetaminophen **OR** acetaminophen, hydrALAZINE, sodium chloride, potassium chloride **OR** potassium alternative oral replacement **OR** potassium chloride, glucose, dextrose, glucagon (rDNA), dextrose, hydrocortisone     Review of System:  [x] Full ROS obtained and negative except as mentioned in HPI    Prior to Admission medications    Medication Sig Start Date End Date Taking? Authorizing Provider   hydrocortisone 2.5 % cream Apply topically 2 times daily Apply topically 2 times daily. Yes Historical Provider, MD   carboxymethylcellulose 1 % ophthalmic solution 1 drop 3 times daily   Yes Historical Provider, MD   amiodarone (PACERONE) 100 MG tablet TAKE 1 TABLET(100 MG) BY MOUTH DAILY  Patient taking differently: 200 mg  4/10/22 7/9/22  Jb Wilkerson MD   naloxegol (MOVANTIK) 25 MG TABS tablet Take 1 tablet by mouth every morning 3/22/22   Roberto Bustos MD   acetaminophen (TYLENOL) 325 MG tablet Take 650 mg by mouth every 6 hours as needed for Pain    Historical Provider, MD   apixaban (ELIQUIS) 5 MG TABS tablet Take 5 mg by mouth 2 times daily    Historical Provider, MD   atorvastatin (LIPITOR) 20 MG tablet Take 20 mg by mouth at bedtime    Historical Provider, MD   budesonide (PULMICORT) 0.5 MG/2ML nebulizer suspension Take 1 ampule by nebulization 2 times daily  Patient not taking: Reported on 4/21/2022    Historical Provider, MD   mometasone-formoterol (DULERA) 200-5 MCG/ACT inhaler Inhale 2 puffs into the lungs every 12 hours    Historical Provider, MD   gabapentin (NEURONTIN) 100 MG capsule Take 200 mg by mouth in the morning and at bedtime.     Historical Provider, MD   ipratropium-albuterol (DUONEB) 0.5-2.5 (3) MG/3ML SOLN nebulizer solution Inhale 1 vial into the lungs every 6 hours as needed for Shortness of Breath  Patient not taking: Reported on 4/21/2022    Historical Provider, MD   metoprolol tartrate (LOPRESSOR) 25 MG tablet Take 25 mg by mouth 2 times daily Hold for SBP <100 or HR <55    Historical Provider, MD   midodrine (PROAMATINE) 5 MG tablet Take 5 mg by mouth 3 times daily Hold for SBP >130    Historical Provider, MD   polyethylene glycol (MIRALAX) 17 g PACK packet Take 17 g by mouth daily    Historical Provider, MD   pantoprazole (PROTONIX) 40 MG tablet Take 40 mg by mouth daily    Historical Provider, MD   sodium chloride (OCEAN, BABY AYR) 0.65 % nasal spray 1 spray by Nasal route as needed for Congestion    Historical Provider, MD   sennosides-docusate sodium (SENOKOT-S) 8.6-50 MG tablet Take 1 tablet by mouth in the morning and at bedtime    Historical Provider, MD   spironolactone (ALDACTONE) 25 MG tablet Take 25 mg by mouth daily    Historical Provider, MD   tamsulosin (FLOMAX) 0.4 MG capsule Take 0.4 mg by mouth daily    Historical Provider, MD   furosemide (LASIX) 20 MG tablet Take 1 tablet by mouth 2 times daily  Patient taking differently: Take 40 mg by mouth daily  1/28/22   Ellis Alberto MD       Past Medical History:   Diagnosis Date    Acid reflux     Acute MI ([address]     CAD (coronary artery disease)     [facility]     Diverticulitis     Hypertension     Peripheral vascular disease [facility] [address] [facility]     Shingles     per pt, started 12/2021, still visible 3/2022, not open wounds at that time        Past Surgical History:   Procedure Laterality Date    CARDIAC CATHETERIZATION      CARDIAC SURGERY      stent x1 --3 yrs, 5/12/14 CABG 3 V    COLONOSCOPY      PACEMAKER PLACEMENT      VASCULAR SURGERY         No Known Allergies    Social History:  Reviewed. reports that he quit smoking about 4 months ago. His smoking use included cigarettes. He started smoking about 7 years ago. He has a 25.00 pack-year smoking history. He has never used smokeless tobacco. He reports current alcohol use. He reports that he does not use drugs.      Family History:  Reviewed. Reviewed. No family history of SCD. Relevant and available labs, and cardiovascular diagnostics reviewed. Reviewed. Recent Labs     04/21/22  0955 04/22/22  0631    136   K 4.4 3.8    104   CO2 19* 22   BUN 21* 14   CREATININE 1.5* 1.0     Recent Labs     04/21/22  0955 04/22/22  0631   WBC 9.7 7.5   HGB 10.4* 9.2*   HCT 33.0* 29.8*   MCV 81.9 81.4    231     Estimated Creatinine Clearance: 74 mL/min (based on SCr of 1 mg/dL). No results found for: BNP    I independently reviewed all cardiac tracing from cardiac telemetry. I independently reviewed relevant and available cardiac diagnostic tests ECG, CXR, Echo, Stress test, Device interrogation, Holter, CT scan. Complex medical condition with multiple medical problems affecting prognosis and outcome of EP interventions    All questions and concerns were addressed to the patient/family. Alternatives to my treatment were discussed. I have discussed the above stated plan and the patient verbalized understanding and agreed with the plan. NOTE: This report was transcribed using voice recognition software. Every effort was made to ensure accuracy, however, inadvertent computerized transcription errors may be present.      Mena Boyd MD, MPH  [facility]   Office: [phone]  Fax: [phone]

## 2022-04-22 NOTE — PROGRESS NOTES
Progress Note - Dr. Belia Silveira - Internal Medicine  PCP: Angelique Rodriguez [place] [id number] [facility] Ismael Nogueira  [phone]    Hospital Day: 1  Code Status: Full Code  Current Diet: ADULT DIET; Regular; 4 carb choices (60 gm/meal)        CC: follow up on medical issues    Subjective:   Leandra Mejia is a 67 y.o. male. Pt seen and examined  Chart reviewed since last visit, labs and imaging below      Doing better  No recurrence of syncope    breathng better  Wbc procal normal  abx stopped    SLP eval recommended  This has been ordered    He denies chest pain, denies shortness of breath, denies nausea,  denies emesis. 10 system Review of Systems is reviewed with patient, and pertinent positives are noted in HPI above . Otherwise, Review of systems is negative. I have reviewed the patient's medical and social history in detail and updated the computerized patient record. To recap: He  has a past medical history of Acid reflux, Acute MI ([facility] CAD (coronary artery disease), COPD (chronic obstructive pulmonary disease) ([facility] Diverticulitis, Hypertension, Peripheral vascular disease ([facility] and Shingles. . He  has a past surgical history that includes vascular surgery; Cardiac surgery; Colonoscopy; Cardiac catheterization; and pacemaker placement. Amalia Reyes He  reports that he quit smoking about 4 months ago. His smoking use included cigarettes. He started smoking about 7 years ago. He has a 25.00 pack-year smoking history. He has never used smokeless tobacco. He reports current alcohol use. He reports that he does not use drugs. .        Active Hospital Problems    Diagnosis Date Noted    Syncope [R55] 04/21/2022     Priority: Medium    HCAP (healthcare-associated pneumonia) [J18.9] 04/21/2022     Priority: Medium    ICD (implantable cardioverter-defibrillator), single, in situ [Z95.810] 03/18/2022    Syncope and collapse [R55] 03/15/2022    COVID [U07.1] 01/12/2022    Atrial fibrillation with RVR ([facility] [I48.91] 11/10/2019       Current Facility-Administered Medications: furosemide (LASIX) tablet 40 mg, 40 mg, Oral, Daily  acetaminophen (TYLENOL) tablet 650 mg, 650 mg, Oral, Q6H PRN  apixaban (ELIQUIS) tablet 5 mg, 5 mg, Oral, BID  atorvastatin (LIPITOR) tablet 20 mg, 20 mg, Oral, Nightly  mometasone-formoterol (DULERA) 200-5 MCG/ACT inhaler 2 puff, 2 puff, Inhalation, Q12H  gabapentin (NEURONTIN) capsule 200 mg, 200 mg, Oral, BID  metoprolol tartrate (LOPRESSOR) tablet 25 mg, 25 mg, Oral, BID  midodrine (PROAMATINE) tablet 5 mg, 5 mg, Oral, TID WC  polyethylene glycol (GLYCOLAX) packet 17 g, 17 g, Oral, Daily  pantoprazole (PROTONIX) tablet 40 mg, 40 mg, Oral, Daily  sodium chloride (OCEAN, BABY AYR) 0.65 % nasal spray 1 spray, 1 spray, Nasal, PRN  sennosides-docusate sodium (SENOKOT-S) 8.6-50 MG tablet 1 tablet, 1 tablet, Oral, BID  spironolactone (ALDACTONE) tablet 25 mg, 25 mg, Oral, Daily  tamsulosin (FLOMAX) capsule 0.4 mg, 0.4 mg, Oral, Daily  naloxegol (MOVANTIK) tablet 25 mg, 25 mg, Oral, QAM  amiodarone (CORDARONE) tablet 100 mg, 100 mg, Oral, Daily  0.9 % sodium chloride infusion, , IntraVENous, Continuous  sodium chloride flush 0.9 % injection 5-40 mL, 5-40 mL, IntraVENous, 2 times per day  sodium chloride flush 0.9 % injection 10 mL, 10 mL, IntraVENous, PRN  0.9 % sodium chloride infusion, , IntraVENous, PRN  magnesium hydroxide (MILK OF MAGNESIA) 400 MG/5ML suspension 30 mL, 30 mL, Oral, Daily PRN  acetaminophen (TYLENOL) tablet 650 mg, 650 mg, Oral, Q6H PRN **OR** acetaminophen (TYLENOL) suppository 650 mg, 650 mg, Rectal, Q6H PRN  hydrALAZINE (APRESOLINE) injection 10 mg, 10 mg, IntraVENous, Q6H PRN  0.9 % sodium chloride bolus, 500 mL, IntraVENous, PRN  potassium chloride (KLOR-CON M) extended release tablet 40 mEq, 40 mEq, Oral, PRN **OR** potassium bicarb-citric acid (EFFER-K) effervescent tablet 40 mEq, 40 mEq, Oral, PRN **OR** potassium chloride 10 mEq/100 mL IVPB (Peripheral Line), 10 mEq, IntraVENous, PRN  insulin lispro (HUMALOG) injection vial 0-12 Units, 0-12 Units, SubCUTAneous, TID WC  insulin lispro (HUMALOG) injection vial 0-6 Units, 0-6 Units, SubCUTAneous, Nightly  glucose (GLUTOSE) 40 % oral gel 15 g, 15 g, Oral, PRN  dextrose 10 % infusion, 12.5 g, IntraVENous, PRN  glucagon (rDNA) injection 1 mg, 1 mg, IntraMUSCular, PRN  dextrose 5 % solution, 100 mL/hr, IntraVENous, PRN  ipratropium-albuterol (DUONEB) nebulizer solution 1 ampule, 1 ampule, Inhalation, BID  hydrocortisone 1 % ointment, , Topical, 4x Daily PRN         Objective:  /67   Pulse 66   Temp 98.5 °F (36.9 °C) (Oral)   Resp 18   Ht 5' 9\" (1.753 m)   Wt 195 lb 9.6 oz (88.7 kg)   SpO2 98%   BMI 28.89 kg/m²      Patient Vitals for the past 24 hrs:   BP Temp Temp src Pulse Resp SpO2 Height Weight   04/22/22 0509 107/67 98.5 °F (36.9 °C) Oral 66 18 98 % -- 195 lb 9.6 oz (88.7 kg)   04/22/22 0045 (!) 93/51 98.3 °F (36.8 °C) Oral 65 18 97 % -- --   04/21/22 2042 (!) 95/59 98 °F (36.7 °C) Oral 74 18 97 % -- --   04/21/22 1938 -- -- -- -- -- 97 % -- --   04/21/22 1845 106/69 -- -- 73 -- -- -- 193 lb 3.2 oz (87.6 kg)   04/21/22 1715 136/83 97.8 °F (36.6 °C) Oral 77 18 100 % -- --   04/21/22 1243 139/83 -- -- 69 15 100 % -- --   04/21/22 1228 (!) 124/94 -- -- 74 12 (!) 84 % -- --   04/21/22 1213 119/85 -- -- 71 8 99 % -- --   04/21/22 1158 132/78 -- -- 65 16 (!) 75 % -- --   04/21/22 1143 138/85 -- -- 68 8 -- -- --   04/21/22 1128 (!) 145/59 -- -- 76 -- 100 % -- --   04/21/22 1113 123/64 -- -- 90 26 98 % -- --   04/21/22 1058 124/76 -- -- 70 8 100 % -- --   04/21/22 1049 -- -- -- -- -- 100 % -- --   04/21/22 1045 -- -- -- -- 22 (!) 69 % -- --   04/21/22 1025 -- 97 °F (36.1 °C) Oral -- -- -- -- --   04/21/22 0947 121/74 -- -- 85 22 100 % 5' 9\" (1.753 m) 200 lb (90.7 kg)     Patient Vitals for the past 96 hrs (Last 3 readings):   Weight   04/22/22 0509 195 lb 9.6 oz (88.7 kg)   04/21/22 1845 193 lb 3.2 oz (87.6 kg) 04/21/22 0947 200 lb (90.7 kg)           Intake/Output Summary (Last 24 hours) at 4/22/2022 0057  Last data filed at 4/22/2022 0045  Gross per 24 hour   Intake --   Output 500 ml   Net -500 ml         Physical Exam:   Vitals as above  General appearance: alert, appears stated age and cooperative    Head: Normocephalic, without obvious abnormality, atraumatic    Lungs: clear to auscultation bilaterally    Heart: regular rate and rhythm, S1, S2 normal, no murmur    Abdomen: soft, non-tender; bowel sounds normal; no masses, no organomegaly    Extremities: extremities normal, atraumatic, no cyanosis, no edema      Labs:  Lab Results   Component Value Date    WBC 7.5 04/22/2022    HGB 9.2 (L) 04/22/2022    HCT 29.8 (L) 04/22/2022     04/22/2022    CHOL 180 11/28/2017    TRIG 88 11/28/2017    HDL 41 11/28/2017    ALT 25 04/22/2022    AST 20 04/22/2022     04/22/2022    K 3.8 04/22/2022     04/22/2022    CREATININE 1.0 04/22/2022    BUN 14 04/22/2022    CO2 22 04/22/2022    TSH 1.14 11/10/2019    INR 1.90 (H) 11/14/2019    LABA1C 6.2 11/28/2017    LABMICR YES 07/31/2016     Lab Results   Component Value Date    CKTOTAL 61 04/21/2022    TROPONINI 0.08 (H) 04/21/2022       Recent Imaging Results are Reviewed:  CT HEAD WO CONTRAST    Result Date: 4/21/2022  EXAMINATION: CT OF THE HEAD WITHOUT CONTRAST  4/21/2022 9:52 am TECHNIQUE: CT of the head was performed without the administration of intravenous contrast. Dose modulation, iterative reconstruction, and/or weight based adjustment of the mA/kV was utilized to reduce the radiation dose to as low as reasonably achievable. COMPARISON: None. HISTORY: ORDERING SYSTEM PROVIDED HISTORY: syncope TECHNOLOGIST PROVIDED HISTORY: Has a \"code stroke\" or \"stroke alert\" been called? ->No Reason for exam:->syncope Decision Support Exception - unselect if not a suspected or confirmed emergency medical condition->Emergency Medical Condition (MA) Reason for Exam: syncope FINDINGS: BRAIN/VENTRICLES: There is no acute intracranial hemorrhage, mass effect or midline shift. No abnormal extra-axial fluid collection. The gray-white differentiation is maintained without evidence of an acute infarct. There is prominence of the ventricles and sulci due to global parenchymal volume loss. There are nonspecific areas of hypoattenuation within the periventricular and subcortical white matter, which likely represent chronic microvascular ischemic change. ORBITS: The visualized portion of the orbits demonstrate no acute abnormality. SINUSES: The visualized paranasal sinuses and mastoid air cells demonstrate no acute abnormality. SOFT TISSUES/SKULL: No acute abnormality of the visualized skull or soft tissues. No acute intracranial abnormality. CT CERVICAL SPINE WO CONTRAST    Result Date: 4/21/2022  EXAMINATION: CT OF THE CERVICAL SPINE WITHOUT CONTRAST 4/21/2022 9:52 am TECHNIQUE: CT of the cervical spine was performed without the administration of intravenous contrast. Multiplanar reformatted images are provided for review. Dose modulation, iterative reconstruction, and/or weight based adjustment of the mA/kV was utilized to reduce the radiation dose to as low as reasonably achievable. COMPARISON: None. HISTORY: ORDERING SYSTEM PROVIDED HISTORY: syncope TECHNOLOGIST PROVIDED HISTORY: Reason for exam:->syncope Decision Support Exception - unselect if not a suspected or confirmed emergency medical condition->Emergency Medical Condition (MA) Reason for Exam: syncope FINDINGS: BONES/ALIGNMENT: There is no acute fracture or traumatic malalignment. DEGENERATIVE CHANGES: There is moderate disc space narrowing and endplate osteophyte formation at the C5/6 and C6/7 levels. SOFT TISSUES: There is no prevertebral soft tissue swelling. A trace amount of nonspecific fluid is present within the right mastoid air cells. No acute abnormality of the cervical spine.      CT ANKLE RIGHT WO CONTRAST    Result Date: 4/6/2022  EXAMINATION: CT OF THE RIGHT ANKLE WITHOUT CONTRAST 4/6/2022 9:00 am TECHNIQUE: CT of the right ankle was performed without the administration of intravenous contrast.  Multiplanar reformatted images are provided for review. Dose modulation, iterative reconstruction, and/or weight based adjustment of the mA/kV was utilized to reduce the radiation dose to as low as reasonably achievable. COMPARISON: None. HISTORY ORDERING SYSTEM PROVIDED HISTORY: Acute right ankle pain TECHNOLOGIST PROVIDED HISTORY: Reason for Exam: Acute right ankle pain FINDINGS: Bones/joints: Mild osteopenia. No acute fracture or traumatic malalignment. The talar dome is intact. Few small osseous fragments about the malleoli likely represents sequela of prior ligamentous injury. Joints of the ankle are anatomically aligned and without significant degenerative changes. Mild-to-moderate degenerative changes are noted across the TMT joints. Small os peroneum. Calcaneal enthesopathy is noted. Soft Tissue: No focal soft tissue swelling. No subcutaneous gas. Mild soft tissue swelling about the ankle. Muscles/tendons: Mild thickening of the Achilles tendon. The remaining tendons about the ankle are grossly intact, although evaluation is limited. Thickening of the central cord of the plantar fascia measures up to 9 mm with dystrophic calcification and plantar spurring. No definite acute osseous abnormality. Sequela of age indeterminate ligamentous injury about the ankle. Chronic plantar fasciitis, including thickening of the central cord of the plantar fascia with calcaneal spurring and dystrophic calcification. Mild Achilles tendinosis. Mild to moderate TMT joint degenerative changes.      XR CHEST PORTABLE    Result Date: 4/21/2022  EXAMINATION: ONE XRAY VIEW OF THE CHEST 4/21/2022 9:39 am COMPARISON: 03/18/2022 HISTORY: ORDERING SYSTEM PROVIDED HISTORY: cp TECHNOLOGIST PROVIDED HISTORY: Reason for exam:->cp Reason for Exam: Shortness of Breath (Pt in via [facility] for shortness of breath. Pt states that he \"fell out\" on the floor due to his trouble breathing. Pt states he has been short of breath since he got out of a nursing home last Tuesday. Pt was in rehab following an MI. Pt is on 3 L of O2 at home with an 800 ft hose. ) FINDINGS: Sternotomy wires are present. There is a the subclavian ICD, with leads projecting to the expected right ventricle. Cardiac silhouette is prominent. Interstitial-alveolar opacities are noted. There is improvement in the interval.  There is no pleural effusion or pneumothorax. Improving interstitial-alveolar opacities, consistent with resolving pneumonia. Stable cardiomegaly. CT CHEST PULMONARY EMBOLISM W CONTRAST    Result Date: 4/21/2022  EXAMINATION: CTA OF THE CHEST 4/21/2022 11:56 am TECHNIQUE: CTA of the chest was performed after the administration of intravenous contrast.  Multiplanar reformatted images are provided for review. MIP images are provided for review. Dose modulation, iterative reconstruction, and/or weight based adjustment of the mA/kV was utilized to reduce the radiation dose to as low as reasonably achievable. COMPARISON: 12/29/2016 HISTORY: ORDERING SYSTEM PROVIDED HISTORY: elevated dimer - ro PE TECHNOLOGIST PROVIDED HISTORY: Reason for exam:->elevated dimer - ro PE Decision Support Exception - unselect if not a suspected or confirmed emergency medical condition->Emergency Medical Condition (MA) Reason for Exam: shortness of breath, elevated dimer Additional signs and symptoms: shingles FINDINGS: Pulmonary Arteries: Pulmonary arteries are adequately opacified for evaluation. No evidence of intraluminal filling defect to suggest pulmonary embolism. Main pulmonary artery is normal in caliber. Mediastinum: No evidence of mediastinal lymphadenopathy. The heart and pericardium demonstrate no acute abnormality.   The ascending thoracic aorta is follow-up. In a high-risk patient, optional CT at 12 months. Nodule size equals 6-8 mm In a low-risk patient, CT at 3-6 months, then consider CT at 18-24 months. In a high-risk patient, CT at 3-6 months, then CT at 18-24 months. Nodule size greater than 8 mm In a low-risk patient, CT at 3-6 months, then consider CT at 18-24 months. In a high-risk patient, CT at 3-6 months, then CT at 18-24 months. - Low risk patients include individuals with minimal or absent history of smoking and other known risk factors. - High risk patients include individuals with a history or smoking or known risk factors. Radiology 2017 http://pubs. rsna.org/doi/full/10.1148/radiol. 0208128005       Lab Results   Component Value Date    GLUCOSE 85 04/22/2022     Lab Results   Component Value Date    POCGLU 100 04/22/2022     /67   Pulse 66   Temp 98.5 °F (36.9 °C) (Oral)   Resp 18   Ht 5' 9\" (1.753 m)   Wt 195 lb 9.6 oz (88.7 kg)   SpO2 98%   BMI 28.89 kg/m²     Assessment and Plan:  Principal Problem:    Syncope -Established problem. Stable. No recurrence  Plan: pt/ot to see. Active Problems:    HCAP (healthcare-associated pneumonia) -Established problem, now resolved. Plan: procal neg. Wbc normal.    Atrial fibrillation with RVR ([address] -Established problem. Stable. In sinus  Plan: Continue present orders/plan. COVID -Established problem. Stable.   Previous infection in 2022  Plan: cont to monitor    Syncope and collapse    ICD (implantable cardioverter-defibrillator), single, in situ      (Please note that portions of this note were completed with a voice recognition program.  Efforts were made to edit the dictations but occasionally words are mis-transcribed.)        Rosa Hawkins MD  4/22/2022

## 2022-04-22 NOTE — PROGRESS NOTES
Aspiration Screen    Name: Rose Hammans  : 1950  Medical Diagnosis: Syncope and collapse [R55]  HCAP (healthcare-associated pneumonia) [J18.9]  Dyspnea, unspecified type [R06.00]  Pneumonia due to infectious organism, unspecified laterality, unspecified part of lung [J18.9]    Swallow screen to assess for aspiration risk completed per pneumonia protocol. Patient demonstrates some increased risk indicators for potential dysphagia / aspiration per swallow screen. RECOMMEND: Clinical Swallow Evaluation at bedside to assess swallowing function, rule out aspiration, and determine appropriate diet level.      Dana Montero M.A., [address] [place] [address]  Speech-Language Pathologist

## 2022-04-22 NOTE — CARE COORDINATION
Discharge Planning     Noted consult for discharge planning. Chart reviewed. Noted recommendations for home with home care. CM met with patient. Introduced self and explained role of CM and discharge planning. Patient is agreeable to home with home care. Patient is active with [facility]. Patient has a (Home O2) from [address] [place] [address] (/[place]). No other needs identified. Patient will continue to be followed through discharge.      [place] [address] Rx needed on DC and to be faxed [facility]     [facility]  Phone: [phone]  Fax: [phone]    Pt will need an updated home O2 eval if dose changes are made and orders faxed to    [address] [place], [place] [address], [address]  P: [phone]  F: [phone]    Electronically signed by Shanell Burnham RN on 4/22/2022 at 1:30 PM

## 2022-04-22 NOTE — PROGRESS NOTES
Facility/Department: [address] ONCOLOGY  Initial Assessment  DYSPHAGIA BEDSIDE SWALLOW EVALUATION     Patient: Shiv Hastings   : 1950   MRN: [id number]      Evaluation Date: 2022   Admitting Diagnosis: Syncope and collapse [R55]  HCAP (healthcare-associated pneumonia) [J18.9]  Dyspnea, unspecified type [R06.00]  Pneumonia due to infectious organism, unspecified laterality, unspecified part of lung [J18.9]  Pain: back pain reported                         H&P:  \"71 y. o. male with past medical history of CAD, COPD, hypertension, peripheral vascular disease who presents to the ED with complaint of shortness of breath.  Patient arrives by EMS from home.  Patient states he recently had COVID several months ago and states he was sent to a rehab facility secondary to weakness and fatigue.  He was recently discharged from the rehab facility on . Antoinette Dugan states he is currently on 3 L nasal cannula oxygen at home.  Patient states this morning he had a syncopal episode.  States he is not sure what happened. Alvenia Ritchie states he woke up on the ground is not sure how he got there. Hira Guess denies any injury or trauma. \"      Imaging:  Chest X-ray: 2022  Impression   Previous sternotomy left-sided single lead pacemaker/ICD seen in place. Moderate pulmonary edema pattern suspected with an ill-defined left basilar   opacity more prominent on the current exam.  No pneumothorax.  Recommend   comparison with clinical exam and follow-up films. Head CT: 2022  Impression   No acute intracranial abnormality. Modified Barium Swallow Study: none per chart review    History/Prior Level of Function:   Living Status: private residence  Prior Dysphagia History: none per chart review  Reason for referral: SLP evaluation orders received due to respiratory status     Dysphagia Impressions/Diagnosis: Oropharyngeal Dysphagia   Pt alert and receptive, positioned upright in chair on 4L O2 via nasal cannula. Pt reports feeling food and liquid getting \"stuck\" in his throat and chest. Pt reports this is not new and has been experiencing it for a while. Oral mechanism exam revealed functional labial and lingual ROM and coordination. Pt with adequate dentition for mastication. Pt agreeable to PO presentations fed independently of thin liquids via cup and straw, puree, and regular solids. Oral phase characterized by adequate oral acceptance and labial seal, no anterior spillage. Mildly prolonged, but functional mastication of regular solids. Pharyngeal phase characterized by mildly delayed swallow initiation with mildly reduced laryngeal elevation. Pt with throat clear x 1 following puree trial. Pt described feeling puree and soft solids \"stuck\". Cued pt for liquid wash. No other overt s/s of aspiration observed across consistencies. Recommend regular texture diet with thin liquids, medications 1 at a time whole with water or puree. Pt may benefit from GI consult to further evaluate esophageal phase of swallow. Will continue to follow to ensure diet tolerance. Recommended Diet and Intervention 4/22/2022:  Diet Solids Recommendation:  Regular texture diet  Liquid Consistency Recommendation: Thin liquids  Recommended form of Meds: Whole with water or Meds in puree      Recommend completion of Fiberoptic Endoscopic Evaluation of Swallowing (FEES) to further assess pharyngeal phase of swallow     Compensatory Swallowing Strategies:  Upright as possible with all PO intake , Small bites/sips , Eat/feed slowly, Remain upright 30-45 min     SHORT TERM DYSPHAGIA GOALS/PLAN OF CARE: Speech therapy for dysphagia tx 3-5 times per week during acute care stay.     Pt will functionally tolerate recommended diet with no overt clinical s/s of aspiration   Pt will demonstrate understanding of aspiration risk and precautions via education/demonstration with occasional prompting  If clinical s/s of aspiration/penetration continue to be noted, Pt will participate in Modified Barium Swallow Study     Dysphagia Therapeutic Intervention:  Diet Tolerance Monitoring , Patient/Family Education , Therapeutic Trials with SLP     Discharge Recommendations: Discharge recommendations to be determined pending ongoing follow-up during acute care stay    Patient Positioning: Upright in chair    Current Diet Level (prior to evaluation): Regular texture diet  Thin liquids      Respiratory Status:   []Room Air   [x]O2 via nasal cannula: 4L   []Other:    Dentition:  [x]Adequate  []Dentures   []Missing Many Teeth  []Edentulous  []Other:    Baseline Vocal Quality:  [x]Normal  []Dysphonic   []Aphonic   []Hoarse  []Wet  []Weak  []Other:      Volitional Swallow:   []Absent   []Delayed     [x]Adequate     []Required use of drink     Oral Mechanism Exam:  [x]WFL []Mild   [] Moderate  []Severe  []To be assessed  Impaired:   []Left side      []Right side    []Labial ROM/Coordination    []Labial Symmetry   []Lingual ROM/Coordination   []Lingual Symmetry  []Gag  []Other:     Oral Phase: []WFL [x]Mild   [] Moderate  []Severe  []To be assessed   [x]Impaired/Prolonged Mastication:   []Spillage Left:   []Spillage Right:  []Pocketing Left:   []Pocketing Right:   []Decreased Anterior to Posterior Transit:   []Suspected Premature Bolus Loss:   []Lingual/Palatal Residue:   []Other:     Pharyngeal Phase: []WFL [x]Mild   [] Moderate  []Severe  []To be assessed   [x]Delayed Swallow:   []Suspected Pharyngeal Pooling:   [x]Decreased Laryngeal Elevation:   []Absent Swallow:  []Wet Vocal Quality:   [x]Throat Clearing-Immediate:   []Throat Clearing-Delayed:   []Cough-Immediate:   []Cough-Delayed:  []Change in Vital Signs:  []Suspected Delayed Pharyngeal Clearing:  []Other:       Eating Assistance:  [x]Independent  []Setup or clean-up assistance   [] Supervision or touching assistance   [] Partial or moderate assistance   [] Substantial or maximal assistance  [] Dependent       EDUCATION:

## 2022-04-22 NOTE — PROGRESS NOTES
[facility] [address]   Phone: [phone]    Physical Therapy    [x] Initial Evaluation            [] Daily Treatment Note         [] Discharge Summary      Patient: Channing Belle   : 1950   MRN: [id number]   Date of Service:  2022    Admitting Diagnosis: Syncope  Current Admission Summary: 67 y. o. male with past medical history of CAD, COPD, hypertension, peripheral vascular disease who presents to the ED with complaint of shortness of breath.  Patient arrives by EMS from home.  Patient states he recently had COVID several months ago and states he was sent to a rehab facility secondary to weakness and fatigue.  He was recently discharged from the rehab facility on . Lotus Kevin states he is currently on 3 L nasal cannula oxygen at home.  Patient states this morning he had a syncopal episode.  States he is not sure what happened. Lotus Kevin states he woke up on the ground is not sure how he got there. Sandra Samantha denies any injury or trauma. Past Medical History:  has a past medical history of Acid reflux, Acute MI ([address] CAD (coronary artery disease), COPD (chronic obstructive pulmonary disease) ([address] Diverticulitis, Hypertension, Peripheral vascular disease ([address] and Shingles. Past Surgical History:  has a past surgical history that includes vascular surgery; Cardiac surgery; Colonoscopy; Cardiac catheterization; and pacemaker placement. Discharge Recommendations: Channing Belle scored a 18/24 on the AM-PAC short mobility form. Current research shows that an AM-PAC score of 18 or greater is typically associated with a discharge to the patient's home setting. Based on the patient's AM-PAC score and their current functional mobility deficits, it is recommended that the patient have 2-3 sessions per week of Physical Therapy at d/c to increase the patient's independence.   At this time, this patient demonstrates the endurance and safety to discharge home with HHPT and a follow up treatment frequency of 2-3x/wk. Please see assessment section for further patient specific details. HOME HEALTH CARE: LEVEL 3 SAFETY  - Initial home health evaluation to occur within 24-48 hours, in patient home   - Therapy evaluations in home within 24-48 hours of discharge; including DME and home safety   - Frontload therapy 5 days, then 3x a week   - Therapy to evaluate if patient has [address] needs for personal care   -  evaluation within 24-48 hours, includes evaluation of resources and insurance to determine [place], [place], LTC, and Medicaid options     If patient discharges prior to next session this note will serve as a discharge summary. Please see below for the latest assessment towards goals. DME Required For Discharge: none, pt has RW at home and would benefit from use of RW upon d/c for safe mobility in the home environment    Precautions/Restrictions: fall risk (HIGH FALL RISK)  Weight Bearing Restrictions: no restrictions  [] Right Upper Extremity  [] Left Upper Extremity [] Right Lower Extremity  [] Left Lower Extremity     Positional Restrictions:no positional restrictions    Pre-Admission Information       Lives With: spouse                  Type of Home: house  Home Layout: two level, laundry in basement, bedroom/bathroom upstairs  Home Access: 5 step to enter with handrail. Handrails are located on R side. Bathroom Layout: tub/shower unit  Bathroom Equipment: standard toilet  Home Equipment: rolling walker, rollator - 4 wheeled walker, single point cane  Transfer Assistance: Independent without use of device  Ambulation Assistance:Independent without use of device  ADL Assistance: independent with all ADL's (sponge bathing d/t no shower chair and legs giving out)  IADL Assistance: independent with homemaking tasks  Active : [x]? yes             []?no  Current Employment: retired.   Occupation: self service [employer]  Hobbies:   Recent Falls: no recent falls, in rehab 4 weeks    Examination     Vision:   Vision Gross Assessment: Impaired and Vision Corrective Device: wears glasses for reading  Hearing:   hard of hearing  Sensation:   reduced light touch to (L) LE, pt reporting decreased sensation in L foot distal from midfoot (states this is from past surgery to LLE)  Tone:   Normotonic  Coordination Testing:   WFL    ROM:   (B) LE ROM WFL  Strength:   RLE grossly 4/5  L hip grossly 4-/5, knee grossly 4+/5, ankle grossly 4-/5    Decision Making: low complexity  Clinical Presentation: stable    Subjective    General: Pt L sidelying in bed upon arrival. Pt agreeable to PT/OT eval. Pt reporting chronic 10/10 pain in L flank at site of shingles infection. Pain: 10/10  Pain Interventions: patient denies pain interventions      Orthostatic BP taken during session  Supine BP: 106/59 HR: 67  Seated BP: 111/70 HR: 74  Standing BP: 117/68 HR: 93       Functional Mobility    Bed Mobility  Supine to Sit: stand by assistance  Scooting: stand by assistance  Comments: Pt reporting slight dizziness with position changes, however reports alleviation of symptoms within ~1 minute. Transfers  Sit to stand transfer completed at stand by assistance  Stand to sit transfer completed at stand by assistance  Comments: x1 EOB, x1 toilet; pt able to perform with safe hand placement when transferring to RW  Ambulation  Surface:level surface  Assistive Device: rolling walker  Assistance: stand by assistance  Distance: 18'+5'+8'   Gait Mechanics: slightly widened Luis Manuel, slightly decreased lakeshia, forward flexed trunk  Comments:    Stair Mobility  Stair mobility not completed on this date.   Comments:  Balance  Static Sitting Balance: Good (-), pt able to sit EOB during vitals assessment and seated at toilet with supervision ~15 minutes total   Dynamic Sitting Balance: Good (-), pt able to sit at toilet for toileting tasks and seated pericare with supervision ~3-5 minutes total   Static Standing Balance: Fair (+), SBA standing with RW for UE support (stood at sink ~3-5 minutes for ADLs)  Dynamic Standing Balance: Fair (+), SBA standing with RW for UE support  Comments: Pt noted to have increased tremors to all extremities with prolonged standing at sink following toileting, pt reporting increased pain in back and stating feeling more fatigued following task at sink. Other Therapeutic Interventions      Functional Outcomes  -PAC Inpatient Mobility Raw Score : 18               Cognition  Overall Cognitive Status: Impaired  Arousal/Alterness: appropriate responses to stimuli  Following Commands: follows one step commands with increased time  Memory: decreased recall of biographical information  Safety Judgement: good awareness of safety precautions  Orientation:    alert and oriented x 4    Education    Barriers To Learning: none  Patient Education: patient educated on goals, PT role and benefits, plan of care, general safety and discharge recommendations  Learning Assessment:  patient verbalizes and demonstrates understanding    Assessment    Impairments Requiring Therapeutic Intervention: decreased endurance and decreased balance  Prognosis: good  Clinical Assessment: Pt presents slightly below baseline functional mobility with impaired activity tolerance and decreased standing balance impairing his ability to perform functional mobility safely and independently. Pt with PLOF of MOD I with walker however currently requires SBA for mobility with RW. Pt would benefit from acute PT services to address deficits and facilitate return to PLOF.    Safety Interventions: patient left in chair, chair alarm in place, call light within reach, gait belt, patient at risk for falls and nurse notified (RN in room at end of session)    Plan    Frequency: 3-5 x/per week  Current Treatment Recommendations: strengthening, balance training, functional mobility training, transfer training, gait training, stair training, endurance training, neuromuscular re-education, home exercise program, safety education and equipment evaluation/education    Goals    Patient Goals: to get home     Short Term Goals:   To be completed: upon d/c  Short term goal 1: Pt will perform bed mobility MOD I   Short term goal 2: Pt will perform transfers with LRAD MOD I   Short term goal 3: Pt will ambulate 48' with LRAD MOD I   Short term goal 4: Pt will negotiate 5 steps with R HR and supervision      Therapy Session Time      Individual Group Co-treatment   Time In     1103   Time Out     1211   Minutes     68     Timed Code Treatment Minutes:   53    Total Treatment Minutes:  68    Electronically Signed By: Mechelle Hubbard, Delfina Coleman, DPT [id number]

## 2022-04-23 VITALS
SYSTOLIC BLOOD PRESSURE: 98 MMHG | BODY MASS INDEX: 28.71 KG/M2 | WEIGHT: 193.8 LBS | DIASTOLIC BLOOD PRESSURE: 61 MMHG | RESPIRATION RATE: 18 BRPM | HEART RATE: 73 BPM | OXYGEN SATURATION: 95 % | TEMPERATURE: 98.1 F | HEIGHT: 69 IN

## 2022-04-23 LAB
ANION GAP SERPL CALCULATED.3IONS-SCNC: 11 MMOL/L (ref 3–16)
BASOPHILS ABSOLUTE: 0 K/UL (ref 0–0.2)
BASOPHILS RELATIVE PERCENT: 0.8 %
BUN BLDV-MCNC: 14 MG/DL (ref 7–20)
CALCIUM SERPL-MCNC: 9 MG/DL (ref 8.3–10.6)
CHLORIDE BLD-SCNC: 107 MMOL/L (ref 99–110)
CO2: 23 MMOL/L (ref 21–32)
CREAT SERPL-MCNC: 1.1 MG/DL (ref 0.8–1.3)
EOSINOPHILS ABSOLUTE: 0.2 K/UL (ref 0–0.6)
EOSINOPHILS RELATIVE PERCENT: 3.5 %
GFR AFRICAN AMERICAN: >60
GFR NON-AFRICAN AMERICAN: >60
GLUCOSE BLD-MCNC: 108 MG/DL (ref 70–99)
GLUCOSE BLD-MCNC: 109 MG/DL (ref 70–99)
GLUCOSE BLD-MCNC: 121 MG/DL (ref 70–99)
HCT VFR BLD CALC: 27.8 % (ref 40.5–52.5)
HEMOGLOBIN: 8.9 G/DL (ref 13.5–17.5)
LYMPHOCYTES ABSOLUTE: 1.7 K/UL (ref 1–5.1)
LYMPHOCYTES RELATIVE PERCENT: 27 %
MCH RBC QN AUTO: 25.7 PG (ref 26–34)
MCHC RBC AUTO-ENTMCNC: 31.8 G/DL (ref 31–36)
MCV RBC AUTO: 80.8 FL (ref 80–100)
MONOCYTES ABSOLUTE: 0.7 K/UL (ref 0–1.3)
MONOCYTES RELATIVE PERCENT: 11.1 %
NEUTROPHILS ABSOLUTE: 3.6 K/UL (ref 1.7–7.7)
NEUTROPHILS RELATIVE PERCENT: 57.6 %
PDW BLD-RTO: 21.9 % (ref 12.4–15.4)
PERFORMED ON: ABNORMAL
PERFORMED ON: ABNORMAL
PLATELET # BLD: 228 K/UL (ref 135–450)
PMV BLD AUTO: 7.5 FL (ref 5–10.5)
POTASSIUM SERPL-SCNC: 4.1 MMOL/L (ref 3.5–5.1)
RBC # BLD: 3.45 M/UL (ref 4.2–5.9)
SODIUM BLD-SCNC: 141 MMOL/L (ref 136–145)
WBC # BLD: 6.2 K/UL (ref 4–11)

## 2022-04-23 PROCEDURE — 2700000000 HC OXYGEN THERAPY PER DAY

## 2022-04-23 PROCEDURE — 94761 N-INVAS EAR/PLS OXIMETRY MLT: CPT

## 2022-04-23 PROCEDURE — 6370000000 HC RX 637 (ALT 250 FOR IP): Performed by: INTERNAL MEDICINE

## 2022-04-23 PROCEDURE — 99233 SBSQ HOSP IP/OBS HIGH 50: CPT | Performed by: INTERNAL MEDICINE

## 2022-04-23 PROCEDURE — 94640 AIRWAY INHALATION TREATMENT: CPT

## 2022-04-23 PROCEDURE — 85025 COMPLETE CBC W/AUTO DIFF WBC: CPT

## 2022-04-23 PROCEDURE — 80048 BASIC METABOLIC PNL TOTAL CA: CPT

## 2022-04-23 PROCEDURE — 2580000003 HC RX 258: Performed by: INTERNAL MEDICINE

## 2022-04-23 PROCEDURE — 36415 COLL VENOUS BLD VENIPUNCTURE: CPT

## 2022-04-23 RX ORDER — AMIODARONE HYDROCHLORIDE 200 MG/1
200 TABLET ORAL DAILY
Qty: 30 TABLET | Refills: 1 | Status: SHIPPED | OUTPATIENT
Start: 2022-04-24

## 2022-04-23 RX ORDER — METOPROLOL TARTRATE 50 MG/1
50 TABLET, FILM COATED ORAL 2 TIMES DAILY
Qty: 60 TABLET | Refills: 1 | Status: SHIPPED | OUTPATIENT
Start: 2022-04-23 | End: 2022-05-04 | Stop reason: SDUPTHER

## 2022-04-23 RX ADMIN — AMIODARONE HYDROCHLORIDE 200 MG: 200 TABLET ORAL at 09:38

## 2022-04-23 RX ADMIN — IPRATROPIUM BROMIDE AND ALBUTEROL SULFATE 1 AMPULE: .5; 3 SOLUTION RESPIRATORY (INHALATION) at 09:11

## 2022-04-23 RX ADMIN — NALOXEGOL OXALATE 25 MG: 25 TABLET, FILM COATED ORAL at 05:33

## 2022-04-23 RX ADMIN — SENNOSIDES AND DOCUSATE SODIUM 1 TABLET: 50; 8.6 TABLET ORAL at 08:59

## 2022-04-23 RX ADMIN — TAMSULOSIN HYDROCHLORIDE 0.4 MG: 0.4 CAPSULE ORAL at 08:59

## 2022-04-23 RX ADMIN — MIDODRINE HYDROCHLORIDE 5 MG: 5 TABLET ORAL at 14:24

## 2022-04-23 RX ADMIN — POLYETHYLENE GLYCOL 3350 17 G: 17 POWDER, FOR SOLUTION ORAL at 08:59

## 2022-04-23 RX ADMIN — Medication 2 PUFF: at 09:11

## 2022-04-23 RX ADMIN — FUROSEMIDE 40 MG: 40 TABLET ORAL at 09:00

## 2022-04-23 RX ADMIN — SPIRONOLACTONE 25 MG: 25 TABLET ORAL at 09:11

## 2022-04-23 RX ADMIN — PANTOPRAZOLE SODIUM 40 MG: 40 TABLET, DELAYED RELEASE ORAL at 05:33

## 2022-04-23 RX ADMIN — MIDODRINE HYDROCHLORIDE 5 MG: 5 TABLET ORAL at 09:00

## 2022-04-23 RX ADMIN — GABAPENTIN 200 MG: 100 CAPSULE ORAL at 08:59

## 2022-04-23 RX ADMIN — SODIUM CHLORIDE: 9 INJECTION, SOLUTION INTRAVENOUS at 05:36

## 2022-04-23 RX ADMIN — APIXABAN 5 MG: 5 TABLET, FILM COATED ORAL at 08:59

## 2022-04-23 RX ADMIN — Medication 10 ML: at 08:59

## 2022-04-23 RX ADMIN — HYDROCORTISONE: 1 OINTMENT TOPICAL at 08:57

## 2022-04-23 ASSESSMENT — PAIN DESCRIPTION - ORIENTATION: ORIENTATION: LEFT

## 2022-04-23 ASSESSMENT — PAIN SCALES - GENERAL
PAINLEVEL_OUTOF10: 10
PAINLEVEL_OUTOF10: 10

## 2022-04-23 ASSESSMENT — PAIN DESCRIPTION - LOCATION: LOCATION: ABDOMEN;FLANK

## 2022-04-23 ASSESSMENT — PAIN DESCRIPTION - PAIN TYPE: TYPE: CHRONIC PAIN

## 2022-04-23 ASSESSMENT — PAIN DESCRIPTION - DESCRIPTORS: DESCRIPTORS: BURNING

## 2022-04-23 NOTE — PLAN OF CARE
Problem: ABCDS Injury Assessment  Goal: Absence of physical injury  Outcome: Completed     Problem: Skin/Tissue Integrity  Goal: Absence of new skin breakdown  Description: 1. Monitor for areas of redness and/or skin breakdown  2. Assess vascular access sites hourly  3. Every 4-6 hours minimum:  Change oxygen saturation probe site  4. Every 4-6 hours:  If on nasal continuous positive airway pressure, respiratory therapy assess nares and determine need for appliance change or resting period.   Outcome: Completed     Problem: Pain  Goal: Verbalizes/displays adequate comfort level or baseline comfort level  Outcome: Completed     Problem: Chronic Conditions and Co-morbidities  Goal: Patient's chronic conditions and co-morbidity symptoms are monitored and maintained or improved  Outcome: Completed     Problem: Safety - Adult  Goal: Free from fall injury  Outcome: Completed

## 2022-04-23 NOTE — PROGRESS NOTES
[facility]   Electrophysiology progress note  Date: 4/23/2022  Reason for Consultation: Syncope   Consult Requesting Physician: Neymar Mendez MD   Chief Complaint   Patient presents with    Shortness of Breath     Pt in via [facility] for shortness of breath. Pt states that he \"fell out\" on the floor due to his trouble breathing. Pt states he has been short of breath since he got out of a nursing home last Tuesday. Pt was in rehab following an MI. Pt is on 3 L of O2 at home with an 800 ft hose. CC: Syncope    HPI: Alfrieda Meigs is a 67 y.o. male who has presented to the hospital with syncope. He was preparing his breakfast, when felt severe shortness of breath, checked his oxygen tube and tried to go sit but passed out. He was alone at home. When woke up felt SOB and some chest pressure and and called EMS. His SOB and chest discomfort have been resolved. Denies any palpitation. Or AICD shock. No urinary or bowel incontinence. Patient has had another episode of syncope in the past when he was admitted in March. Had VT and AICD was implanted. Cardiac cath with patent LIMA to LAD, known occluded SVG to RCA and patent graft to Diagonal and LCx. No intervention done. Patient has history of CAD, ischemic cardiomyopathy with recovered EF, s/p CABG in 2014,  paroxysmal atrial fibrillation, HTN, and PAD. Single chamber AICD implantation on 3/15/2022. Patient has COPD and on oxygen at home. In emergency room, it has been reported that he had significant hypoxemia with was symptomatic. CT of the chest with no COPD and he was given antibiotic therapy.        HPI and Interval History:   Patient seen and examined. Clinical notes reviewed. Telemetry reviewed. No new complaint today. No major events overnight. Denies having chest pain, shortness of breath, dyspnea on exertion, Orthopnea, PND at the time of this visit. He is feeling fine.     Active Hospital Problems    Diagnosis Date Noted    Syncope [R55] 2022     Priority: Medium    HCAP (healthcare-associated pneumonia) [J18.9] 2022     Priority: Medium    ICD (implantable cardioverter-defibrillator), single, in situ [Z95.810] 2022    Syncope and collapse [R55] 03/15/2022    COVID [U07.1] 2022    Atrial fibrillation with RVR ([address] [I48.91] 11/10/2019       Diagnostic studies:     ECG: Sinus rhythm, prolonged QT interval.     Echo: 2022:   Left ventricular systolic function is low normal with ejection fraction   estimated at 50-55%. No definitive regional wall motion abnormalities are noted. Diastolic filling parameters suggests grade II diastolic dysfunction. Mild mitral regurgitation. The left atrium is mildly dilated. Aortic valve appears sclerotic but opens adequately. Mild aortic regurgitation is present. Inadequate tricuspid regurgitation to estimate systolic pulmonary artery   pressure. I independently reviewed the cardiac diagnostic studies, ECG and relevant imaging studies. Lab Results   Component Value Date    LVEF 53 2022     Lab Results   Component Value Date    TSH 1.14 11/10/2019       Physical Examination:  Vitals:    22 0911   BP:    Pulse:    Resp: 18   Temp:    SpO2: 96%      In: -   Out: 1050    Wt Readings from Last 3 Encounters:   22 193 lb 12.8 oz (87.9 kg)   22 192 lb 0.3 oz (87.1 kg)   22 202 lb 3 oz (91.7 kg)     Temp  Av.9 °F (36.6 °C)  Min: 97.4 °F (36.3 °C)  Max: 98.3 °F (36.8 °C)  Pulse  Av.8  Min: 54  Max: 65  BP  Min: 91/51  Max: 106/69  SpO2  Av.6 %  Min: 91 %  Max: 98 %    Intake/Output Summary (Last 24 hours) at 2022 1213  Last data filed at 2022 1120  Gross per 24 hour   Intake --   Output 1050 ml   Net -1050 ml         I independently reviewed all cardiac tracing from cardiac telemetry. · Constitutional: Oriented. No distress. · Head: Normocephalic and atraumatic.    · Mouth/Throat: Oropharynx is clear and moist.   · Eyes: Conjunctivae normal. EOM are normal.   · Neck: Neck supple. No JVD present. · Cardiovascular: Normal rate, regular rhythm, S1&S2. · Pulmonary/Chest: Bilateral respiratory sounds. No rhonchi. · Abdominal: Soft. No tenderness. · Musculoskeletal: No tenderness. No edema    · Lymphadenopathy: Has no cervical adenopathy. · Neurological: Alert and oriented. Follows command, No Gross deficit   · Skin: Skin is warm, No rash noted. · Psychiatric: Has a normal behavior       Scheduled Meds:   amiodarone  200 mg Oral Daily    metoprolol tartrate  50 mg Oral BID    furosemide  40 mg Oral Daily    apixaban  5 mg Oral BID    atorvastatin  20 mg Oral Nightly    mometasone-formoterol  2 puff Inhalation Q12H    gabapentin  200 mg Oral BID    midodrine  5 mg Oral TID WC    polyethylene glycol  17 g Oral Daily    pantoprazole  40 mg Oral Daily    sennosides-docusate sodium  1 tablet Oral BID    spironolactone  25 mg Oral Daily    tamsulosin  0.4 mg Oral Daily    naloxegol  25 mg Oral QAM    sodium chloride flush  5-40 mL IntraVENous 2 times per day    insulin lispro  0-12 Units SubCUTAneous TID WC    insulin lispro  0-6 Units SubCUTAneous Nightly    ipratropium-albuterol  1 ampule Inhalation BID     Continuous Infusions:   sodium chloride 75 mL/hr at 04/23/22 0536    sodium chloride      dextrose      dextrose       PRN Meds:.acetaminophen, sodium chloride, sodium chloride flush, sodium chloride, magnesium hydroxide, acetaminophen **OR** acetaminophen, hydrALAZINE, sodium chloride, potassium chloride **OR** potassium alternative oral replacement **OR** potassium chloride, glucose, dextrose, glucagon (rDNA), dextrose, hydrocortisone     Review of System:  [x] Full ROS obtained and negative except as mentioned in HPI    Prior to Admission medications    Medication Sig Start Date End Date Taking?  Authorizing Provider   amiodarone (CORDARONE) 200 MG tablet Take 1 tablet by mouth daily 4/24/22  Yes Bertha Nettles MD   metoprolol tartrate (LOPRESSOR) 50 MG tablet Take 1 tablet by mouth 2 times daily 4/23/22  Yes Bertha Nettles MD   hydrocortisone 2.5 % cream Apply topically 2 times daily Apply topically 2 times daily. Yes Historical Provider, MD   carboxymethylcellulose 1 % ophthalmic solution 1 drop 3 times daily   Yes Historical Provider, MD   naloxegol (MOVANTIK) 25 MG TABS tablet Take 1 tablet by mouth every morning 3/22/22   Becky Johnson MD   acetaminophen (TYLENOL) 325 MG tablet Take 650 mg by mouth every 6 hours as needed for Pain    Historical Provider, MD   apixaban (ELIQUIS) 5 MG TABS tablet Take 5 mg by mouth 2 times daily    Historical Provider, MD   atorvastatin (LIPITOR) 20 MG tablet Take 20 mg by mouth at bedtime    Historical Provider, MD   budesonide (PULMICORT) 0.5 MG/2ML nebulizer suspension Take 1 ampule by nebulization 2 times daily  Patient not taking: Reported on 4/21/2022    Historical Provider, MD   mometasone-formoterol (DULERA) 200-5 MCG/ACT inhaler Inhale 2 puffs into the lungs every 12 hours    Historical Provider, MD   gabapentin (NEURONTIN) 100 MG capsule Take 200 mg by mouth in the morning and at bedtime.     Historical Provider, MD   ipratropium-albuterol (DUONEB) 0.5-2.5 (3) MG/3ML SOLN nebulizer solution Inhale 1 vial into the lungs every 6 hours as needed for Shortness of Breath  Patient not taking: Reported on 4/21/2022    Historical Provider, MD   midodrine (PROAMATINE) 5 MG tablet Take 5 mg by mouth 3 times daily Hold for SBP >130    Historical Provider, MD   polyethylene glycol (MIRALAX) 17 g PACK packet Take 17 g by mouth daily    Historical Provider, MD   pantoprazole (PROTONIX) 40 MG tablet Take 40 mg by mouth daily    Historical Provider, MD   sodium chloride (OCEAN, BABY AYR) 0.65 % nasal spray 1 spray by Nasal route as needed for Congestion    Historical Provider, MD   sennosides-docusate sodium (SENOKOT-S) 8.6-50 MG tablet Take 1 tablet by mouth in the morning and at bedtime    Historical Provider, MD   spironolactone (ALDACTONE) 25 MG tablet Take 25 mg by mouth daily    Historical Provider, MD   tamsulosin (FLOMAX) 0.4 MG capsule Take 0.4 mg by mouth daily    Historical Provider, MD   furosemide (LASIX) 20 MG tablet Take 1 tablet by mouth 2 times daily  Patient taking differently: Take 40 mg by mouth daily  1/28/22   Fiordaliza Ray MD       Past Medical History:   Diagnosis Date    Acid reflux     [facility]     CAD (coronary artery disease)     [facility]     Diverticulitis     Hypertension     Peripheral vascular disease ([facility]     Shingles     per pt, started 12/2021, still visible 3/2022, not open wounds at that time        Past Surgical History:   Procedure Laterality Date   [address]      stent x1 --3 yrs, 5/12/14 CABG 3 V    COLONOSCOPY      PACEMAKER PLACEMENT      VASCULAR SURGERY         No Known Allergies    Social History:  Reviewed. reports that he quit smoking about 4 months ago. His smoking use included cigarettes. He started smoking about 7 years ago. He has a 25.00 pack-year smoking history. He has never used smokeless tobacco. He reports current alcohol use. He reports that he does not use drugs. Family History:  Reviewed. Reviewed. No family history of SCD. Relevant and available labs, and cardiovascular diagnostics reviewed. Reviewed. Recent Labs     04/21/22  0955 04/22/22  0631 04/23/22  0417    136 141   K 4.4 3.8 4.1    104 107   CO2 19* 22 23   BUN 21* 14 14   CREATININE 1.5* 1.0 1.1     Recent Labs     04/21/22  0955 04/22/22  0631 04/23/22  0417   WBC 9.7 7.5 6.2   HGB 10.4* 9.2* 8.9*   HCT 33.0* 29.8* 27.8*   MCV 81.9 81.4 80.8    231 228     Estimated Creatinine Clearance: 67 mL/min (based on SCr of 1.1 mg/dL).    No results found for: BNP    Assessment/Plan:   - Syncope   No further episodes. Likely due to hypoxia. No arrhythmia related to the episode. - paroxysmal atrial fibrillation  Device interrogation also shows episodes of Afib. Continue amiodarone and metoprolol at current dose. [id number] score at least 3(CMP, CAD, age)  Continue apixiban      - Ischemic cardiomyopathy    Compensated. - History of VT    S/p ICD  He has had NSVT on device interrogation, not related to the time of syncope. On amiodarone       - CAD    No chest pain or sign of ischemia. continue statin.    - HTN    BP is well controlled. Continue current meds. - COPD on oxygen. Stable    - ICD  Normal function     F/u in office and a few months. Will sign off.

## 2022-04-23 NOTE — PROGRESS NOTES
VSS. Call light within reach. Assessment complete at this time. No signs of distress or additional needs at this time. Scheduled medications given. Pt assisted with charging mobile phone.

## 2022-04-23 NOTE — DISCHARGE SUMMARY
[facility] -- Physician Discharge Summary     Channing Belle  1950  MRN: [id number]    Admit Date: 4/21/2022  Discharge Date: No discharge date for patient encounter. Attending MD: Andrey Hendrix MD  Discharging MD: Andrey Hendrix MD  PCP: Jay Harris DO [id number] [facility] R* [phone]    Admission Diagnosis: Syncope and collapse [R55]  HCAP (healthcare-associated pneumonia) [J18.9]  Dyspnea, unspecified type [R06.00]  Pneumonia due to infectious organism, unspecified laterality, unspecified part of lung [J18.9]  DISCHARGE DIAGNOSIS: same    Full Hospital Problem List:  Active Hospital Problems    Diagnosis Date Noted    Syncope [R55] 04/21/2022     Priority: Medium    HCAP (healthcare-associated pneumonia) [J18.9] 04/21/2022     Priority: Medium    ICD (implantable cardioverter-defibrillator), single, in situ [Z95.810] 03/18/2022    Syncope and collapse [R55] 03/15/2022    COVID [U07.1] 01/12/2022    Atrial fibrillation with RVR ([facility] [address] [I48.91] 11/10/2019           Hospital Course:   67 y. o. male with past medical history of CAD, COPD, hypertension, peripheral vascular disease who presents to the ED with complaint of shortness of breath.  Patient arrives by EMS from home.  Patient states he recently had COVID several months ago and states he was sent to a rehab facility secondary to weakness and fatigue.  He was recently discharged from the rehab facility on Sunday.  Patient states he is currently on 3 L nasal cannula oxygen at home.  Patient states this morning he had a syncopal episode.  States he is not sure what happened. Lotus Kevin states he woke up on the ground is not sure how he got there. Sandra Singh denies any injury or trauma.        He states he had a similar syncopal episode while he is at the rehab facility and was sent to the emergency department back in March.  Patient dates he had cardiac catheterization at that time and states he was told he had an irregular heart rhythm and states he had AICD placed several weeks ago. Aylin Hahn denies any shocks from his AICD. Melania Del Cid denies any palpitations.         Ct Chest from ER shows    Impression:       1. No evidence of acute pulmonary embolism. 2. Ascending aortic ectasia with the aorta measuring up to 4.3 cm but no   evidence of dissection. 3. Emphysema and interstitial lung disease worse in the subpleural spaces. Mild bronchiectatic changes. 4. A more focal abnormality in the right lower lobe measuring 1.4 x 1.4 cm of   an ill-defined opacity.  This may represent atelectasis or a small area of   consolidation although a nodule should also be considered and followed   appropriately.  No active pleural disease.          Pt to be admitted for tx of poss pna  Pt did have covid in Jan, seems remote to still  Be causing infiltrates  However, followup cxr and procalcitonin point away from infection and all abx stopped. Pt remains afebrile, and wbc wnl    Cards consulted for syncope  Per EP     Plan:   - AICD interrogated. No sustained arrhythmia on the day of syncope on 4/21/2022.   - He is on oxygen and reportedly he was hypoxic in ER with significant symptoms.   - Hypoxia can cause syncope. - Further evaluation of lung issues with primary team.   - Device interrogation also shows episodes of Afib. Also he has had NSVT on device interrogation, not related to the time of syncope. - Rates need better control during episodes of Afib. - Amiodarone dose 200 mg and increase Metoprolol 50 mg bid.        Pt is provided rx for new doses of amio and toprol    Seen by pt/ot - both services recommend home d/c      Consults made during Hospitalization:  IP CONSULT TO INTERNAL MEDICINE  IP CONSULT TO PHARMACY  IP CONSULT TO CARDIOLOGY  IP CONSULT TO HOME CARE NEEDS    Treatment team at time of Discharge: Treatment Team: Attending Provider: Riana Mckenna MD; Consulting Physician: Riana Mckenna MD; Consulting Physician: Nathanael Arana MD; Speech Language Pathologist: Paul Mclaughlin, LARISSA; Registered Nurse: Marge Berrios, RN; : Yany Maria RN    Imaging Results:  XR CHEST (2 VW)    Result Date: 4/22/2022  EXAMINATION: TWO XRAY VIEWS OF THE CHEST 4/22/2022 9:13 am COMPARISON: April 21, 2022. HISTORY: ORDERING SYSTEM PROVIDED HISTORY: pna TECHNOLOGIST PROVIDED HISTORY: Reason for exam:->pna Reason for Exam: Pneumonia FINDINGS: A single frontal and 2 lateral views of the chest are done. Again noted are median sternotomy wires and left-sided single lead pacemaker/ICD seen in place. Lungs demonstrate increased reticular markings seen bilaterally within ill-defined opacity seen in the left lower lobe slightly more prominent on the current study. The cardiac silhouette is magnified. Left costophrenic angle is blunted. There is no pneumothorax. Previous sternotomy left-sided single lead pacemaker/ICD seen in place. Moderate pulmonary edema pattern suspected with an ill-defined left basilar opacity more prominent on the current exam.  No pneumothorax. Recommend comparison with clinical exam and follow-up films. CT HEAD WO CONTRAST    Result Date: 4/21/2022  EXAMINATION: CT OF THE HEAD WITHOUT CONTRAST  4/21/2022 9:52 am TECHNIQUE: CT of the head was performed without the administration of intravenous contrast. Dose modulation, iterative reconstruction, and/or weight based adjustment of the mA/kV was utilized to reduce the radiation dose to as low as reasonably achievable. COMPARISON: None. HISTORY: ORDERING SYSTEM PROVIDED HISTORY: syncope TECHNOLOGIST PROVIDED HISTORY: Has a \"code stroke\" or \"stroke alert\" been called? ->No Reason for exam:->syncope Decision Support Exception - unselect if not a suspected or confirmed emergency medical condition->Emergency Medical Condition (MA) Reason for Exam: syncope FINDINGS: BRAIN/VENTRICLES: There is no acute intracranial hemorrhage, mass effect or midline shift. No abnormal extra-axial fluid collection. The gray-white differentiation is maintained without evidence of an acute infarct. There is prominence of the ventricles and sulci due to global parenchymal volume loss. There are nonspecific areas of hypoattenuation within the periventricular and subcortical white matter, which likely represent chronic microvascular ischemic change. ORBITS: The visualized portion of the orbits demonstrate no acute abnormality. SINUSES: The visualized paranasal sinuses and mastoid air cells demonstrate no acute abnormality. SOFT TISSUES/SKULL: No acute abnormality of the visualized skull or soft tissues. No acute intracranial abnormality. CT CERVICAL SPINE WO CONTRAST    Result Date: 4/21/2022  EXAMINATION: CT OF THE CERVICAL SPINE WITHOUT CONTRAST 4/21/2022 9:52 am TECHNIQUE: CT of the cervical spine was performed without the administration of intravenous contrast. Multiplanar reformatted images are provided for review. Dose modulation, iterative reconstruction, and/or weight based adjustment of the mA/kV was utilized to reduce the radiation dose to as low as reasonably achievable. COMPARISON: None. HISTORY: ORDERING SYSTEM PROVIDED HISTORY: syncope TECHNOLOGIST PROVIDED HISTORY: Reason for exam:->syncope Decision Support Exception - unselect if not a suspected or confirmed emergency medical condition->Emergency Medical Condition (MA) Reason for Exam: syncope FINDINGS: BONES/ALIGNMENT: There is no acute fracture or traumatic malalignment. DEGENERATIVE CHANGES: There is moderate disc space narrowing and endplate osteophyte formation at the C5/6 and C6/7 levels. SOFT TISSUES: There is no prevertebral soft tissue swelling. A trace amount of nonspecific fluid is present within the right mastoid air cells. No acute abnormality of the cervical spine.      CT ANKLE RIGHT WO CONTRAST    Result Date: 4/6/2022  EXAMINATION: CT OF THE RIGHT ANKLE WITHOUT CONTRAST 4/6/2022 9:00 am TECHNIQUE: CT of the right ankle was performed without the administration of intravenous contrast.  Multiplanar reformatted images are provided for review. Dose modulation, iterative reconstruction, and/or weight based adjustment of the mA/kV was utilized to reduce the radiation dose to as low as reasonably achievable. COMPARISON: None. HISTORY ORDERING SYSTEM PROVIDED HISTORY: Acute right ankle pain TECHNOLOGIST PROVIDED HISTORY: Reason for Exam: Acute right ankle pain FINDINGS: Bones/joints: Mild osteopenia. No acute fracture or traumatic malalignment. The talar dome is intact. Few small osseous fragments about the malleoli likely represents sequela of prior ligamentous injury. Joints of the ankle are anatomically aligned and without significant degenerative changes. Mild-to-moderate degenerative changes are noted across the TMT joints. Small os peroneum. Calcaneal enthesopathy is noted. Soft Tissue: No focal soft tissue swelling. No subcutaneous gas. Mild soft tissue swelling about the ankle. Muscles/tendons: Mild thickening of the Achilles tendon. The remaining tendons about the ankle are grossly intact, although evaluation is limited. Thickening of the central cord of the plantar fascia measures up to 9 mm with dystrophic calcification and plantar spurring. No definite acute osseous abnormality. Sequela of age indeterminate ligamentous injury about the ankle. Chronic plantar fasciitis, including thickening of the central cord of the plantar fascia with calcaneal spurring and dystrophic calcification. Mild Achilles tendinosis. Mild to moderate TMT joint degenerative changes. XR CHEST PORTABLE    Result Date: 4/21/2022  EXAMINATION: ONE XRAY VIEW OF THE CHEST 4/21/2022 9:39 am COMPARISON: 03/18/2022 HISTORY: ORDERING SYSTEM PROVIDED HISTORY: cp TECHNOLOGIST PROVIDED HISTORY: Reason for exam:->cp Reason for Exam: Shortness of Breath (Pt in via [facility] for shortness of breath.  Pt states that he \"fell out\" on the floor due to his trouble breathing. Pt states he has been short of breath since he got out of a nursing home last Tuesday. Pt was in rehab following an MI. Pt is on 3 L of O2 at home with an 800 ft hose. ) FINDINGS: Sternotomy wires are present. There is a the subclavian ICD, with leads projecting to the expected right ventricle. Cardiac silhouette is prominent. Interstitial-alveolar opacities are noted. There is improvement in the interval.  There is no pleural effusion or pneumothorax. Improving interstitial-alveolar opacities, consistent with resolving pneumonia. Stable cardiomegaly. CT CHEST PULMONARY EMBOLISM W CONTRAST    Result Date: 4/21/2022  EXAMINATION: CTA OF THE CHEST 4/21/2022 11:56 am TECHNIQUE: CTA of the chest was performed after the administration of intravenous contrast.  Multiplanar reformatted images are provided for review. MIP images are provided for review. Dose modulation, iterative reconstruction, and/or weight based adjustment of the mA/kV was utilized to reduce the radiation dose to as low as reasonably achievable. COMPARISON: 12/29/2016 HISTORY: ORDERING SYSTEM PROVIDED HISTORY: elevated dimer - ro PE TECHNOLOGIST PROVIDED HISTORY: Reason for exam:->elevated dimer - ro PE Decision Support Exception - unselect if not a suspected or confirmed emergency medical condition->Emergency Medical Condition (MA) Reason for Exam: shortness of breath, elevated dimer Additional signs and symptoms: shingles FINDINGS: Pulmonary Arteries: Pulmonary arteries are adequately opacified for evaluation. No evidence of intraluminal filling defect to suggest pulmonary embolism. Main pulmonary artery is normal in caliber. Mediastinum: No evidence of mediastinal lymphadenopathy. The heart and pericardium demonstrate no acute abnormality. The ascending thoracic aorta is ectatic measuring 4.3 cm. No evidence of dissection. Coronary artery calcifications seen. Lungs/pleura: Changes of emphysema as well as interstitial lung disease with subpleural fibrotic changes. Mild bronchiectatic changes seen. No evidence of consolidative infiltrates. In the right lower lobe a pleural based ill-defined density measures 1.4 x 1.4 cm. This may represent an area of atelectasis however follow-up is indicated to rule out neoplasm. Multiple calcified granulomata seen. No active pleural disease. Upper Abdomen: A left adrenal nodule measures 1.7 cm. No other significant abnormality. Soft Tissues/Bones: Postthoracotomy changes. No other acute abnormality. 1. No evidence of acute pulmonary embolism. 2. Ascending aortic ectasia with the aorta measuring up to 4.3 cm but no evidence of dissection. 3. Emphysema and interstitial lung disease worse in the subpleural spaces. Mild bronchiectatic changes. 4. A more focal abnormality in the right lower lobe measuring 1.4 x 1.4 cm of an ill-defined opacity. This may represent atelectasis or a small area of consolidation although a nodule should also be considered and followed appropriately. No active pleural disease. 5. Incidental adrenal nodule measuring 1.7 cm. RECOMMENDATIONS: Fleischner Society guidelines for follow-up and management of incidentally detected pulmonary nodules: Single Solid Nodule: Nodule size less than 6 mm In a low-risk patient, no routine follow-up. In a high-risk patient, optional CT at 12 months. Nodule size equals 6-8 mm In a low-risk patient, CT at 6-12 months, then consider CT at 18-24 months. In a high-risk patient, CT at 6-12 months, then CT at 18-24 months. Nodule size greater than 8 mm In a low-risk patient, consider CT at 3 months, PET/CT, or tissue sampling. In a high-risk patient, consider CT at 3 months, PET/CT, or tissue sampling. Multiple Solid Nodules: Nodule size less than 6 mm In a low-risk patient, no routine follow-up. In a high-risk patient, optional CT at 12 months.  Nodule size equals 6-8 mm In a low-risk patient, CT at 3-6 months, then consider CT at 18-24 months. In a high-risk patient, CT at 3-6 months, then CT at 18-24 months. Nodule size greater than 8 mm In a low-risk patient, CT at 3-6 months, then consider CT at 18-24 months. In a high-risk patient, CT at 3-6 months, then CT at 18-24 months. - Low risk patients include individuals with minimal or absent history of smoking and other known risk factors. - High risk patients include individuals with a history or smoking or known risk factors. Radiology 2017 http://pubs. rsna.org/doi/full/10.1148/radiol. 8696028361         Discharge Exam: (2-7 system for EPF/Detailed, ?8 for Comprehensive)  /63   Pulse 56   Temp 98.2 °F (36.8 °C) (Oral)   Resp 18   Ht 5' 9\" (1.753 m)   Wt 193 lb 12.8 oz (87.9 kg)   SpO2 96%   BMI 28.62 kg/m²   Constitutional: vitals as above: alert, appears stated age and cooperative    Psychiatric: normal insight and judgment, oriented to person, place, time, and general circumstances    Head: Normocephalic, without obvious abnormality, atraumatic    Eyes:lids and lashes normal, conjunctivae and sclerae normal and pupils equal, round, reactive to light and accomodation    EMNT: external ears normal, nares midline    Neck: no carotid bruit, supple, symmetrical, trachea midline and thyroid not enlarged, symmetric, no tenderness/mass/nodules     Respiratory: clear to auscultation and percussion bilaterally with normal respiratory effort    Cardiovascular: normal rate, regular rhythm, normal S1 and S2 and no murmurs    Gastrointestinal: soft, non-tender, non-distended, normal bowel sounds, no masses or organomegaly    Extremities: no clubbing, no edema    Skin:No rashes or nodules noted.     Neurologic:negative             Disposition: home    Condition: stable    Discharge Medications:  [unfilled]       Medication List      CHANGE how you take these medications     amiodarone 200 MG tablet; Commonly known as: CORDARONE; Take 1 tablet by   mouth daily; Start taking on: April 24, 2022; What changed: medication   strength, See the new instructions.  furosemide 20 MG tablet; Commonly known as: LASIX; Take 1 tablet by   mouth 2 times daily; What changed: how much to take, when to take this   metoprolol tartrate 50 MG tablet; Commonly known as: LOPRESSOR; Take 1   tablet by mouth 2 times daily; What changed: medication strength, how much   to take, additional instructions     CONTINUE taking these medications     acetaminophen 325 MG tablet; Commonly known as: TYLENOL   apixaban 5 MG Tabs tablet; Commonly known as: ELIQUIS   atorvastatin 20 MG tablet; Commonly known as: LIPITOR   budesonide 0.5 MG/2ML nebulizer suspension; Commonly known as: PULMICORT   carboxymethylcellulose 1 % ophthalmic solution   Dulera 200-5 MCG/ACT inhaler; Generic drug: mometasone-formoterol   gabapentin 100 MG capsule; Commonly known as: NEURONTIN   hydrocortisone 2.5 % cream   ipratropium-albuterol 0.5-2.5 (3) MG/3ML Soln nebulizer solution;   Commonly known as: DUONEB   midodrine 5 MG tablet; Commonly known as: PROAMATINE   naloxegol 25 MG Tabs tablet; Commonly known as: MOVANTIK; Take 1 tablet   by mouth every morning   pantoprazole 40 MG tablet; Commonly known as: PROTONIX   polyethylene glycol 17 g Pack packet;  Commonly known as: MIRALAX   sennosides-docusate sodium 8.6-50 MG tablet; Commonly known as:   SENOKOT-S   sodium chloride 0.65 % nasal spray; Commonly known as: OCEAN, BABY AYR   spironolactone 25 MG tablet; Commonly known as: ALDACTONE   tamsulosin 0.4 MG capsule; Commonly known as: FLOMAX     STOP taking these medications     insulin aspart 100 UNIT/ML injection cartridge; Commonly known as:   NovoLOG   Levemir 100 UNIT/ML injection vial; Generic drug: insulin detemir   lidocaine 5 % ointment; Commonly known as: XYLOCAINE   NovoLOG 100 UNIT/ML injection vial; Generic drug: insulin aspart         Allergies:  No Known Allergies    Follow up Instructions: Follow-up with PCP: CARLOS HOGAN, DO in 2 wk . Total time spent on day of discharge including face-to-face visit, examination, documentation, counseling, preparation of discharge plans and followup, and discharge medicine reconciliation and presciptions is 33 minutes      ---       Subjective from day of d/c:   Matty Li is a 67 y.o. male. Pt seen and examined  Chart reviewed since last visit, labs and imaging below      Doing well  No issues  HR in 50s  No lightheadness  No syncope recurrence    He denies chest pain, denies shortness of breath, denies nausea,  denies emesis. 10 system Review of Systems is reviewed with patient, and pertinent positives are noted in HPI above . Otherwise, Review of systems is negative. I have reviewed the patient's medical and social history in detail and updated the computerized patient record. To recap: He  has a past medical history of Acid reflux, Acute MI ([address] CAD (coronary artery disease), COPD (chronic obstructive pulmonary disease) ([address] Diverticulitis, Hypertension, Peripheral vascular disease ([address] and Shingles. . He  has a past surgical history that includes vascular surgery; Cardiac surgery; Colonoscopy; Cardiac catheterization; and pacemaker placement. Constance Vargas He  reports that he quit smoking about 4 months ago. His smoking use included cigarettes. He started smoking about 7 years ago. He has a 25.00 pack-year smoking history. He has never used smokeless tobacco. He reports current alcohol use. He reports that he does not use drugs. .      Current Facility-Administered Medications: amiodarone (CORDARONE) tablet 200 mg, 200 mg, Oral, Daily  metoprolol tartrate (LOPRESSOR) tablet 50 mg, 50 mg, Oral, BID  furosemide (LASIX) tablet 40 mg, 40 mg, Oral, Daily  acetaminophen (TYLENOL) tablet 650 mg, 650 mg, Oral, Q6H PRN  apixaban (ELIQUIS) tablet 5 mg, 5 mg, Oral, BID  atorvastatin (LIPITOR) tablet 20 mg, 20 mg, Oral, Nightly  mometasone-formoterol (DULERA) 200-5 MCG/ACT inhaler 2 puff, 2 puff, Inhalation, Q12H  gabapentin (NEURONTIN) capsule 200 mg, 200 mg, Oral, BID  midodrine (PROAMATINE) tablet 5 mg, 5 mg, Oral, TID WC  polyethylene glycol (GLYCOLAX) packet 17 g, 17 g, Oral, Daily  pantoprazole (PROTONIX) tablet 40 mg, 40 mg, Oral, Daily  sodium chloride (OCEAN, BABY AYR) 0.65 % nasal spray 1 spray, 1 spray, Nasal, PRN  sennosides-docusate sodium (SENOKOT-S) 8.6-50 MG tablet 1 tablet, 1 tablet, Oral, BID  spironolactone (ALDACTONE) tablet 25 mg, 25 mg, Oral, Daily  tamsulosin (FLOMAX) capsule 0.4 mg, 0.4 mg, Oral, Daily  naloxegol (MOVANTIK) tablet 25 mg, 25 mg, Oral, QAM  0.9 % sodium chloride infusion, , IntraVENous, Continuous  sodium chloride flush 0.9 % injection 5-40 mL, 5-40 mL, IntraVENous, 2 times per day  sodium chloride flush 0.9 % injection 10 mL, 10 mL, IntraVENous, PRN  0.9 % sodium chloride infusion, , IntraVENous, PRN  magnesium hydroxide (MILK OF MAGNESIA) 400 MG/5ML suspension 30 mL, 30 mL, Oral, Daily PRN  acetaminophen (TYLENOL) tablet 650 mg, 650 mg, Oral, Q6H PRN **OR** acetaminophen (TYLENOL) suppository 650 mg, 650 mg, Rectal, Q6H PRN  hydrALAZINE (APRESOLINE) injection 10 mg, 10 mg, IntraVENous, Q6H PRN  0.9 % sodium chloride bolus, 500 mL, IntraVENous, PRN  potassium chloride (KLOR-CON M) extended release tablet 40 mEq, 40 mEq, Oral, PRN **OR** potassium bicarb-citric acid (EFFER-K) effervescent tablet 40 mEq, 40 mEq, Oral, PRN **OR** potassium chloride 10 mEq/100 mL IVPB (Peripheral Line), 10 mEq, IntraVENous, PRN  insulin lispro (HUMALOG) injection vial 0-12 Units, 0-12 Units, SubCUTAneous, TID WC  insulin lispro (HUMALOG) injection vial 0-6 Units, 0-6 Units, SubCUTAneous, Nightly  glucose (GLUTOSE) 40 % oral gel 15 g, 15 g, Oral, PRN  dextrose 10 % infusion, 12.5 g, IntraVENous, PRN  glucagon (rDNA) injection 1 mg, 1 mg, IntraMUSCular, PRN  dextrose 5 % solution, 100 mL/hr, IntraVENous, PRN  ipratropium-albuterol (DUONEB) nebulizer solution 1 ampule, 1 ampule, Inhalation, BID  hydrocortisone 1 % ointment, , Topical, 4x Daily PRN         Objective (exam): see above    Labs at time of d/c:  Lab Results   Component Value Date    WBC 6.2 04/23/2022    HGB 8.9 (L) 04/23/2022    HCT 27.8 (L) 04/23/2022     04/23/2022    CHOL 180 11/28/2017    TRIG 88 11/28/2017    HDL 41 11/28/2017    ALT 25 04/22/2022    AST 20 04/22/2022     04/23/2022    K 4.1 04/23/2022     04/23/2022    CREATININE 1.1 04/23/2022    BUN 14 04/23/2022    CO2 23 04/23/2022    TSH 1.14 11/10/2019    INR 1.90 (H) 11/14/2019    LABA1C 6.0 04/21/2022    LABMICR Not Indicated 04/22/2022     Lab Results   Component Value Date    CKTOTAL 61 04/21/2022    TROPONINI 0.08 (H) 04/21/2022       (Please note that portions of this note were completed with a voice recognition program.  Efforts were made to edit the dictations but occasionally words are mis-transcribed.)      Signed:  Amanda Gore MD  4/23/2022

## 2022-04-23 NOTE — PROGRESS NOTES
Pt discharged from unit to car of spouse with possessions and discharge paperwork, including paper prescriptions. Discharge packet reviewed with pt and pt questions answered.

## 2022-04-25 LAB
BLOOD CULTURE, ROUTINE: NORMAL
CULTURE, BLOOD 2: NORMAL

## 2022-04-26 ENCOUNTER — TELEPHONE (OUTPATIENT)
Dept: CARDIOLOGY CLINIC | Age: 72
End: 2022-04-26

## 2022-04-26 NOTE — TELEPHONE ENCOUNTER
Spoke to patient wife stated she would like Dr Krishna Lewis to call her some time tomorrow to discuss patients medication list. [phone]. Several of patient medications changed with hospital stays and nursing home. Patient has been in and out of hospital for the last 4 months. 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

## 2022-04-26 NOTE — TELEPHONE ENCOUNTER
Pt's wife called back, states pt has other cardiac medications such as Spironolactone, eliquis, atorvastatin and furosemide that needs filled. Wife states pt is home and very weak and not able to get him out. Pt has been to the hosp several xs in last 3 mo and they keep sending him home. Wife is not sure how she can get him here for an appt. Please call to discuss/advise.

## 2022-04-26 NOTE — TELEPHONE ENCOUNTER
Pt's EC calling to ask who will be refilling his medications, and on which he is to remain. He is at home, but has no energy, sob, not hungry. The home health nurse will see him 4/27/22, and call us with an assessment.

## 2022-04-26 NOTE — TELEPHONE ENCOUNTER
Cardiac meds will be filled by EP . With symptoms of sob, pt needs to schedule follow up with HF. He had an apt in Feb that was no show. Please call him and schedule him to see HF and let him know we will fill his cardiac meds . Also please have HHN call with his symptoms and weight.

## 2022-04-29 NOTE — PROGRESS NOTES
[address]   Cardiac Follow Up     Referring Provider:  Chin Machado DO     Chief Complaint   Patient presents with    Coronary Artery Disease    Atrial Fibrillation    Cardiomyopathy      History of Present Illness:  Leandra Baptiste is a 67 y.o.  male with a history of CAD s/p CABG May 2014 with post-op short run of SVT and brief AF, s/p DCCV Nov 2019; also cardiomyopathy, hypertension, and hyperlipidemia. His other history includes carotid stenosis and PAD s/p Lt common & external iliac PTA.      At last office visit, he was here for CV evaluation for leg surgery (Dr. Mitch Serra vascular surgeon). He has had testing at [facility] which ruled out DVT; he said a CT \"did not take. \" He was unsure why he needs surgery, states no one giving him answers. He has had both Covid vaccines. He took himself off Eliquis at this time due to cost (3 months ago); he does take a baby asa. In August 2021, pt was admitted to the medical floor with pain & discoloration of the left foot. Left lower extremity diagnostic angiogram, Left common femoral artery and external iliac artery retrograde endarterectomy with bovine pericardial patch angioplasty and left common femoral artery to mid popliteal artery bypass with ipsilateral reversed great saphenous vein and application of negative pressure wound VAC therapy to the left leg were done  on 7/30/2021. He was on heparin gtt. It was switched to eliquis on discharge. Today, he is here for follow up apt related to sob, \"covid brain. \" He states he is feeling better. He is now able to get around and walk more -  with walker he states he can go up and down the hallway . He has been suffering from shingles (rash to left side and a knot) and does have pain from it, was using hydrocortisone and gabapentin but it does not really help. He has been taking lasix 20 mg BID over that couple of days. Denies dizziness except when he stood up to get his BP taken.  He also c/o nausea but he is able to eat. Pt no longer smokes. Recent admission with sustained v.tach. Had cardiac cath with stable findings then ICD placed    Past Medical History:   has a past medical history of Acid reflux, Acute MI ([facility] [address] CAD (coronary artery disease), COPD (chronic obstructive pulmonary disease) ([facility] [address] Diverticulitis, Hypertension, Peripheral vascular disease ([facility] [address] and Shingles. Surgical History:   has a past surgical history that includes vascular surgery; Cardiac surgery; Colonoscopy; Cardiac catheterization; and pacemaker placement. Social History:   reports that he quit smoking about 4 months ago. His smoking use included cigarettes. He started smoking about 7 years ago. He has a 25.00 pack-year smoking history. He has never used smokeless tobacco. He reports current alcohol use. He reports that he does not use drugs. Family History:  family history includes Heart Disease in his father, maternal uncle, mother, and sister. Home Medications:  Prior to Admission medications    Medication Sig Start Date End Date Taking? Authorizing Provider   atorvastatin (LIPITOR) 20 MG tablet Take 1 tablet by mouth at bedtime 5/4/22  Yes Jb Wilkerson MD   pantoprazole (PROTONIX) 40 MG tablet Take 1 tablet by mouth daily 5/4/22  Yes Jb Wilkerson MD   metoprolol tartrate (LOPRESSOR) 50 MG tablet Take 1 tablet by mouth 2 times daily 5/4/22  Yes Jb Wilkerson MD   furosemide (LASIX) 40 MG tablet Take 1 tablet by mouth daily 5/4/22  Yes Jb Wilkerson MD   gabapentin (NEURONTIN) 100 MG capsule Intended supply: 90 days  Take 200 mg nightly 5/4/22 5/4/23 Yes Jb Wilkerson MD   amiodarone (CORDARONE) 200 MG tablet Take 1 tablet by mouth daily 4/24/22  Yes Andrey Hendrix MD   hydrocortisone 2.5 % cream Apply topically 2 times daily Apply topically 2 times daily.    Yes Historical Provider, MD   carboxymethylcellulose 1 % ophthalmic solution 1 drop 3 times daily   Yes Historical Provider, MD   naloxegol (MOVANTIK) 25 MG TABS tablet Take 1 tablet by mouth every morning 3/22/22  Yes Charley Luna MD   acetaminophen (TYLENOL) 325 MG tablet Take 650 mg by mouth every 6 hours as needed for Pain   Yes Historical Provider, MD   apixaban (ELIQUIS) 5 MG TABS tablet Take 5 mg by mouth 2 times daily   Yes Historical Provider, MD   budesonide (PULMICORT) 0.5 MG/2ML nebulizer suspension Take 1 ampule by nebulization 2 times daily    Yes Historical Provider, MD   mometasone-formoterol (DULERA) 200-5 MCG/ACT inhaler Inhale 2 puffs into the lungs every 12 hours   Yes Historical Provider, MD   gabapentin (NEURONTIN) 100 MG capsule Take 200 mg by mouth in the morning and at bedtime. Yes Historical Provider, MD   midodrine (PROAMATINE) 5 MG tablet Take 5 mg by mouth 3 times daily Hold for SBP >130   Yes Historical Provider, MD   polyethylene glycol (MIRALAX) 17 g PACK packet Take 17 g by mouth daily   Yes Historical Provider, MD   sodium chloride (OCEAN, BABY AYR) 0.65 % nasal spray 1 spray by Nasal route as needed for Congestion   Yes Historical Provider, MD   sennosides-docusate sodium (SENOKOT-S) 8.6-50 MG tablet Take 1 tablet by mouth in the morning and at bedtime   Yes Historical Provider, MD   spironolactone (ALDACTONE) 25 MG tablet Take 25 mg by mouth daily   Yes Historical Provider, MD   tamsulosin (FLOMAX) 0.4 MG capsule Take 0.4 mg by mouth daily   Yes Historical Provider, MD   ipratropium-albuterol (DUONEB) 0.5-2.5 (3) MG/3ML SOLN nebulizer solution Inhale 1 vial into the lungs every 6 hours as needed for Shortness of Breath  Patient not taking: Reported on 5/4/2022    Historical Provider, MD   furosemide (LASIX) 20 MG tablet Take 1 tablet by mouth 2 times daily  Patient not taking: Reported on 5/4/2022 1/28/22   Tank Bonilla MD      Allergies:  Patient has no known allergies. Review of Systems:   · Constitutional: there has been no unanticipated weight loss.  There's been no change in energy level, sleep pattern, or activity level.     · Eyes: No visual changes or diplopia. No scleral icterus. · ENT: No Headaches, hearing loss or vertigo. No mouth sores or sore throat. · Cardiovascular: Reviewed in HPI  · Respiratory: No cough or wheezing, no sputum production. No hematemesis. · Gastrointestinal: No abdominal pain, appetite loss, blood in stools. No change in bowel or bladder habits. · Genitourinary: No dysuria, trouble voiding, or hematuria. · Musculoskeletal:  No gait disturbance, weakness or joint complaints. · Integumentary: No rash or pruritis. · Neurological: No headache, diplopia, change in muscle strength, numbness or tingling. No change in gait, balance, coordination, mood, affect, memory, mentation, behavior. · Psychiatric: No anxiety, no depression. · Endocrine: No malaise, fatigue or temperature intolerance. No excessive thirst, fluid intake, or urination. No tremor. · Hematologic/Lymphatic: No abnormal bruising or bleeding, blood clots or swollen lymph nodes. · Allergic/Immunologic: No nasal congestion or hives. Physical Examination:    Vitals:    05/04/22 1512   BP: 110/60        Wt Readings from Last 1 Encounters:   05/04/22 190 lb 12.8 oz (86.5 kg)     Constitutional and General Appearance: NAD  Skin:good turgor,intact without lesions  HEENT: EOMI ,normal  Neck:no JVD     Respiratory:  · Normal excursion and expansion without use of accessory muscles  · Resp Auscultation: Normal breath sounds without dullness  Cardiovascular:  · The apical impulses not displaced  · Heart tones are crisp and normal  · Cervical veins are not engorged  · The carotid upstroke is normal in amplitude and contour without delay or bruit  · Peripheral pulses are symmetrical and full  · There is no clubbing, cyanosis of the extremities.   · No edema  · Femoral Arteries: 2+ and equal  · Pedal Pulses: 2+ and equal  Abdomen:  · No masses or tenderness  · Liver/Spleen: No Abnormalities Noted  Neurological/Psychiatric:  · Alert and oriented in all spheres  · Moves all extremities well  · Exhibits normal gait balance and coordination  · No abnormalities of mood, affect, memory, mentation, or behavior are noted    7/14/21 Stress test  Summary    The overall quality of the study is good. There is subdiaphragmatic    attenuation.    Left ventricular cavity is noted to be mildly enlarged at 147 ml. The right    ventricle is normal in size.    SPECT images demonstrate a small area of mildly decreased perfusion in the    mid inferior, mid-inferolateral, and basal inferior segments. The defect is    present at rest and worsens with stress, consistent with mixed ischemia and    scar. There is associated wall motion abnormality.    The sum stress score is 8. No visual TID. Calculated TID is 1.09.    Left ventricular ejection fraction is normal at 53%.    Moderate risk scan given ischemia and mildly elevated LV volumes         1/14/22 Echo  Summary   Technically difficult examination. Left ventricular systolic function is low normal with ejection fraction   estimated at 50-55%. No definitive regional wall motion abnormalities are noted. Diastolic filling parameters suggests grade II diastolic dysfunction. Mild mitral regurgitation. The left atrium is mildly dilated. Aortic valve appears sclerotic but opens adequately. Mild aortic regurgitation is present. Inadequate tricuspid regurgitation to estimate systolic pulmonary artery   pressure. Venous dopplers 6/16/21  IMPRESSION:   1.  Negative for deep vein thrombosis in the left lower extremity. 2.  Incidental note of a calcified plaque in the left popliteal artery with at least moderate narrowing.     [facility] 12/2016  Cath with occluded SVG to RCA,patent LIMA to LAD,patent SVG to diag,OM1 with good retrograde filling of LAD,patent LIMA to LAD  EF 35-40% with inf hypokinesis,LVEDP 25  Will add daily lasix ,aldactone,imdur,f/up in 1 month  Also needs echo    Assessment:           Coronary artery disease / CABG 2014  Stable, no anginal symptoms    [facility] 12/2016> Stable, EF 35-40%  Cath 1/22 with patent grafts     Ischemic cardiomyopathy   Stable, compensated. Taking Lasix  ECHO 2019> EF 15-20%   ECHO Jan 2017> EF 50%   [facility] Dec 2016> EF 35-40%  4/21/22 BNP 1910  EF improved     PAD  Stents to LLE years ago  S/p endarterectomy     Typical paroxysmal atrial flutter  s/p DCCV Nov 2019  HR regular & controlled  Taking amiodarone        Hyperlipidemia  Stable on Lipitor     Essential HTN  Stable  Blood pressure 110/60, height 5' 9\" (1.753 m), weight 190 lb 12.8 oz (86.5 kg). stop flomax   Rechecked by me 104/60 (sitting) and 110/60 (standing)     Plan:  Cardiac test and lab results personally reviewed by me during this office visit and discussed. Stop flomax  Lasix 40 mg daily  capsaicin cream OTC for shingles pain to rash   Gabapentin 200 mg every night  Follow up in 2 mos  Call if any new or worsening symptoms    I appreciate the opportunity of cooperating in the care of this individual.    Maki Pat M.D., [facility]    Patient's problem list, medications, allergies, past medical, surgical, social and family histories were reviewed and updated as appropriate. Scribe's attestation: This note was scribed in the presence of Dr Galindo Pat by Gabriel Sandoval, JOVON. The scribe's documentation has been prepared under my direction and personally reviewed by me in its entirety. I confirm that the note above accurately reflects all work, treatment, procedures, and medical decision making performed by me.

## 2022-05-04 ENCOUNTER — OFFICE VISIT (OUTPATIENT)
Dept: CARDIOLOGY CLINIC | Age: 72
End: 2022-05-04
Payer: MEDICARE

## 2022-05-04 VITALS
DIASTOLIC BLOOD PRESSURE: 60 MMHG | BODY MASS INDEX: 28.26 KG/M2 | WEIGHT: 190.8 LBS | SYSTOLIC BLOOD PRESSURE: 110 MMHG | HEIGHT: 69 IN

## 2022-05-04 DIAGNOSIS — I73.9 PVD (PERIPHERAL VASCULAR DISEASE) (HCC): Chronic | ICD-10-CM

## 2022-05-04 DIAGNOSIS — E78.00 HIGH CHOLESTEROL: ICD-10-CM

## 2022-05-04 DIAGNOSIS — I25.5 ISCHEMIC CARDIOMYOPATHY: ICD-10-CM

## 2022-05-04 DIAGNOSIS — I25.119 CORONARY ARTERY DISEASE INVOLVING NATIVE CORONARY ARTERY OF NATIVE HEART WITH ANGINA PECTORIS (HCC): Primary | Chronic | ICD-10-CM

## 2022-05-04 DIAGNOSIS — I48.91 ATRIAL FIBRILLATION WITH RVR (HCC): ICD-10-CM

## 2022-05-04 PROCEDURE — 99024 POSTOP FOLLOW-UP VISIT: CPT | Performed by: INTERNAL MEDICINE

## 2022-05-04 RX ORDER — PANTOPRAZOLE SODIUM 40 MG/1
40 TABLET, DELAYED RELEASE ORAL DAILY
Qty: 30 TABLET | Refills: 0 | Status: SHIPPED | OUTPATIENT
Start: 2022-05-04 | End: 2022-06-15

## 2022-05-04 RX ORDER — METOPROLOL TARTRATE 50 MG/1
50 TABLET, FILM COATED ORAL 2 TIMES DAILY
Qty: 60 TABLET | Refills: 1 | Status: SHIPPED | OUTPATIENT
Start: 2022-05-04 | End: 2022-07-07 | Stop reason: SINTOL

## 2022-05-04 RX ORDER — GABAPENTIN 100 MG/1
CAPSULE ORAL
Qty: 90 CAPSULE | Refills: 3 | Status: SHIPPED | OUTPATIENT
Start: 2022-05-04 | End: 2023-05-04

## 2022-05-04 RX ORDER — ATORVASTATIN CALCIUM 20 MG/1
20 TABLET, FILM COATED ORAL NIGHTLY
Qty: 30 TABLET | Refills: 1 | Status: SHIPPED | OUTPATIENT
Start: 2022-05-04 | End: 2022-08-01

## 2022-05-04 RX ORDER — FUROSEMIDE 40 MG/1
40 TABLET ORAL DAILY
Qty: 90 TABLET | Refills: 3 | Status: SHIPPED | OUTPATIENT
Start: 2022-05-04 | End: 2022-07-07 | Stop reason: SINTOL

## 2022-05-04 NOTE — PATIENT INSTRUCTIONS
Lasix 40 mg daily  capsacin cream OTC for shingles pain  Gabapentin 200 mg every night  Stop flomax  Follow up in 2 mos  Call if any new or worsening symptoms

## 2022-05-11 ENCOUNTER — TELEPHONE (OUTPATIENT)
Dept: CASE MANAGEMENT | Age: 72
End: 2022-05-11

## 2022-05-11 NOTE — TELEPHONE ENCOUNTER
Call to Dr Indu Graves [phone], s/w Ebony Dunlap. CT scan from 4/21/22 with need for follow up. Report faxed to [phone].

## 2022-06-02 ENCOUNTER — NURSE ONLY (OUTPATIENT)
Dept: CARDIOLOGY CLINIC | Age: 72
End: 2022-06-02
Payer: MEDICARE

## 2022-06-02 DIAGNOSIS — I50.22 CHRONIC SYSTOLIC HF (HEART FAILURE) (HCC): ICD-10-CM

## 2022-06-02 DIAGNOSIS — I50.23 ACUTE ON CHRONIC SYSTOLIC HEART FAILURE, NYHA CLASS 3 (HCC): ICD-10-CM

## 2022-06-02 DIAGNOSIS — I48.91 ATRIAL FIBRILLATION WITH RVR (HCC): ICD-10-CM

## 2022-06-02 DIAGNOSIS — I48.20 CHRONIC A-FIB (HCC): ICD-10-CM

## 2022-06-02 DIAGNOSIS — Z95.810 ICD (IMPLANTABLE CARDIOVERTER-DEFIBRILLATOR), SINGLE, IN SITU: ICD-10-CM

## 2022-06-02 DIAGNOSIS — I25.5 ISCHEMIC CARDIOMYOPATHY: ICD-10-CM

## 2022-06-02 PROCEDURE — 93296 REM INTERROG EVL PM/IDS: CPT | Performed by: INTERNAL MEDICINE

## 2022-06-02 PROCEDURE — 93295 DEV INTERROG REMOTE 1/2/MLT: CPT | Performed by: INTERNAL MEDICINE

## 2022-06-02 PROCEDURE — 93297 REM INTERROG DEV EVAL ICPMS: CPT | Performed by: INTERNAL MEDICINE

## 2022-06-02 NOTE — PROGRESS NOTES
We received remote transmission from patient's monitor at home. Transmission shows normal sensing and pacing function. Pt has known AF and remains on Eliquis. EP physician will review. See interrogation under cardiology tab in the [address] [place] [address] [address] field for more details. Optivol slightly elevated. Report sent to HF team for review.

## 2022-06-15 RX ORDER — MIDODRINE HYDROCHLORIDE 5 MG/1
5 TABLET ORAL 3 TIMES DAILY
Qty: 90 TABLET | Refills: 0 | Status: SHIPPED | OUTPATIENT
Start: 2022-06-15 | End: 2022-08-16

## 2022-06-15 RX ORDER — SPIRONOLACTONE 25 MG/1
TABLET ORAL
Qty: 45 TABLET | Refills: 3 | Status: SHIPPED | OUTPATIENT
Start: 2022-06-15 | End: 2022-07-07 | Stop reason: SINTOL

## 2022-06-15 RX ORDER — PANTOPRAZOLE SODIUM 40 MG/1
40 TABLET, DELAYED RELEASE ORAL DAILY
Qty: 30 TABLET | Refills: 0 | Status: SHIPPED | OUTPATIENT
Start: 2022-06-15 | End: 2022-10-11 | Stop reason: ALTCHOICE

## 2022-06-15 NOTE — TELEPHONE ENCOUNTER
Med refill    Pt called stating he received the RX from [facility] when he was in the hospital and pharmacy will not fill it unless they get a new RX     midodrine (PROAMATINE) 5 MG tablet  5 mg  Take 5 mg by mouth 3 times daily Hold for SBP >130      [id number] Lovina Kawasaki, [address] [phone] [place] Number [phone]   [address] [facility] [address]   Phone:  [phone]  Fax:  [phone]

## 2022-06-16 ENCOUNTER — TELEPHONE (OUTPATIENT)
Dept: CARDIOLOGY CLINIC | Age: 72
End: 2022-06-16

## 2022-06-16 NOTE — TELEPHONE ENCOUNTER
Naomi Beebe patient's wife called in stating that patient has been in and out of the hospital since Jan and since then every time he goes they make changes to his medication. She said that patient is not getting any better he seems to be getting weaker more fatigue. He's not eating the way he used to, everything has changed. Patient just sits there. Patient had a pacemaker implanted March of 2022. Patient fell last night he got up turned around and blacked out. Wife states she doesn't know if he's getting up too fast or not. She said patient also feel about a week go, and both times when he has fallen he falls straight on his face and ends up looking like someone has beaten him up. Naomi Beebe is wondering does she need to bring him in to be seen. She is requesting a call back. Please call wife as sh has some concerns and would like to discuss.     Clay Machuca can be reached at [phone]

## 2022-06-17 DIAGNOSIS — I50.22 CHRONIC SYSTOLIC HF (HEART FAILURE) (HCC): ICD-10-CM

## 2022-06-17 DIAGNOSIS — R53.1 WEAKNESS: ICD-10-CM

## 2022-06-17 DIAGNOSIS — R06.02 SHORTNESS OF BREATH: ICD-10-CM

## 2022-06-17 DIAGNOSIS — I48.20 CHRONIC A-FIB (HCC): Primary | ICD-10-CM

## 2022-06-18 ENCOUNTER — HOSPITAL ENCOUNTER (OUTPATIENT)
Age: 72
Discharge: HOME | End: 2022-06-18
Payer: MEDICARE

## 2022-06-18 DIAGNOSIS — R06.02 SHORTNESS OF BREATH: ICD-10-CM

## 2022-06-18 DIAGNOSIS — R53.1 WEAKNESS: ICD-10-CM

## 2022-06-18 DIAGNOSIS — I48.20 CHRONIC A-FIB (HCC): ICD-10-CM

## 2022-06-18 DIAGNOSIS — I50.22 CHRONIC SYSTOLIC HF (HEART FAILURE) (HCC): ICD-10-CM

## 2022-06-18 LAB
ANION GAP SERPL CALCULATED.3IONS-SCNC: 14 MMOL/L (ref 3–16)
BUN BLDV-MCNC: 24 MG/DL (ref 7–20)
CALCIUM SERPL-MCNC: 9.8 MG/DL (ref 8.3–10.6)
CHLORIDE BLD-SCNC: 105 MMOL/L (ref 99–110)
CO2: 22 MMOL/L (ref 21–32)
CREAT SERPL-MCNC: 1.3 MG/DL (ref 0.8–1.3)
GFR AFRICAN AMERICAN: >60
GFR NON-AFRICAN AMERICAN: 54
GLUCOSE BLD-MCNC: 112 MG/DL (ref 70–99)
HCT VFR BLD CALC: 35 % (ref 40.5–52.5)
HEMOGLOBIN: 11.1 G/DL (ref 13.5–17.5)
MCH RBC QN AUTO: 25.1 PG (ref 26–34)
MCHC RBC AUTO-ENTMCNC: 31.7 G/DL (ref 31–36)
MCV RBC AUTO: 79.1 FL (ref 80–100)
PDW BLD-RTO: 21 % (ref 12.4–15.4)
PLATELET # BLD: 208 K/UL (ref 135–450)
PMV BLD AUTO: 8.3 FL (ref 5–10.5)
POTASSIUM SERPL-SCNC: 5.2 MMOL/L (ref 3.5–5.1)
PRO-BNP: 789 PG/ML (ref 0–124)
RBC # BLD: 4.43 M/UL (ref 4.2–5.9)
SODIUM BLD-SCNC: 141 MMOL/L (ref 136–145)
WBC # BLD: 9.4 K/UL (ref 4–11)

## 2022-06-18 PROCEDURE — 85027 COMPLETE CBC AUTOMATED: CPT

## 2022-06-18 PROCEDURE — 36415 COLL VENOUS BLD VENIPUNCTURE: CPT

## 2022-06-18 PROCEDURE — 80048 BASIC METABOLIC PNL TOTAL CA: CPT

## 2022-06-18 PROCEDURE — 83880 ASSAY OF NATRIURETIC PEPTIDE: CPT

## 2022-06-20 ENCOUNTER — TELEPHONE (OUTPATIENT)
Dept: CARDIOLOGY CLINIC | Age: 72
End: 2022-06-20

## 2022-06-20 NOTE — TELEPHONE ENCOUNTER
Made attempt to contact the patient and NO VM set up at this time . Unable to [facility] .  Will try again later

## 2022-06-20 NOTE — TELEPHONE ENCOUNTER
----- Message from Claritza Busby MD sent at 6/20/2022  6:42 AM EDT -----  Labs are ok.  Consider office visit soon with NP

## 2022-06-21 PROBLEM — F17.200 TOBACCO DEPENDENCE SYNDROME: Status: ACTIVE | Noted: 2022-06-21

## 2022-06-21 NOTE — TELEPHONE ENCOUNTER
Made attempt to contact the patient and NO VM set up at this time . Unable to [facility] .

## 2022-06-21 NOTE — PROGRESS NOTES
[facility]   Electrophysiology Follow up   Date: 6/28/2022  I had the privilege of visiting Channing Belle in the office. CC:  VT   HPI: Channing Belle is a 67 y.o. male with PMH  CAD, ischemic cardiomyopathy with recovered EF , s/p CABG 2014  and paroxysmal atrial fibrillation. HTN, PAD      CABG in 2014. He had afib post CABG. Cardioversion was tried initially which failed. He was started on amiodarone therapy by Dr. Alexia Lawson. He also was started on optimal medical therapy for his newly diagnosed cardiomyopathy. DCCV 11/2019     Hospitalized 01/12/2022 - covid pneumonia- DC to long term care     03/15/2022 presents to ED  From SNF via EMS s/p syncopal episode. Found to be in atrial fibrillation. He had an episode of sustained VT with LOC requiring  Chest compressions and cardioversion 03/17/2022. LHC showed no ischemic cause of VT    - s/p single chamber ICD 03/17/2022 04/21/2022 - to ED  for syncope- no arrhythmia or shock noted on interrogation. He was reportedly hypoxic in there ER with significant symptoms . No sustained arrhythmias on  Interrogation at time of event. Treated for pneumonia . Amiodarone dose 200 mg daily  and lopressor increased to 50 BID      Interval history :    Tia Zavala presents in follow up to his implant. He wife states he has a poor appetite and is not active. Denies any chest pain, SOB, palpitation. His activity level has been reduced and wife is concerned about it. Assessment and plan:       - Syncope -VT      Abnormal Stress  07/2021      had sustained VT while IP 03/2022 with CP and eventual LOC- Cardioverted. LHC  03/15/2022 - no ischemic cause for VT     continue amiodarone and lopressor     s/p single chamber ICD 03/17/2022    The CIED was interrogated and programmed and I supervised and reviewed all the data. All findings and changes are in device interrogation sheet and reflect my personal interpretation and changes and is scanned to [employer]. 11.3 years remaining,  <  0.1%   Presenting VS @ 46 bpm, NSVT episodes noted      - Paroxysmal atrial fibrillation:               EKG Today   Sinus Neil rate 50       [place] 4.4%  on device today.                        S/p DCCV per  11/26/19   Continue lopressor 50 mg BID                       Continue  Amiodarone to 200 mg daily    ~ ALT 25,AST 20 04/22/2022 - recheck in October     ~ TSH 1.88 03/16/2022     ~    I  Have discussed with patient side effects of amiodarone including but not limited to thyroid disease, discoloration of skin and cornea and pulmonary fibrosis. Patient would like to continue with it.        Patient has a [id number] Score of at least 5  ( age, HTN,CHF,CAD,PAD )      Eliquis 5 mg BID     We had discussed AC and risks of stroke before. ~ creatinine 1.3 06/18/2022                          Treatment options including cardioversion and/or ablation were discussed with patient. Risks, benefits and alternative of each treatment options were explained. All questions answered.                We discussed treatment options including antiarrhythmics, cardioversion, rate control with anticoagulation, and ablation in detail with patient. We discussed progressive nature of atrial fibrillation    - treatment options have been discussed in the past. Patient has elected to continue with medical therapy. Not interested in ablation.                    - Ischemic cardiomyopathy, severe LV dysfunction: Recovered    - LVEF 01/14/2022 50-55%    - not on ace since 2018 d/t non compliance    - continue BB, Aldactone, Lasix             - CAD: stable. Continue with medical therapy. [facility]  03/2022 with Sever MVD and 2 patent grafts   - follows with       - HTN:     Controlled. Continue current medications.      - Smoking cessation discussed.      Encourage increase in activity as tolerated. Walking.    Follow up in one year     Patient Active Problem List    Diagnosis Date Noted    CAD (coronary artery disease) 08/24/2012    Tobacco dependence syndrome 06/21/2022    VT (ventricular tachycardia) ([address]     HCAP (healthcare-associated pneumonia) 04/21/2022    Syncope 04/21/2022    Benign essential HTN 08/24/2012    Chronic diastolic heart failure ([address] 03/21/2022    ICD (implantable cardioverter-defibrillator), single, in situ 03/18/2022    Syncope and collapse 03/15/2022    Ischemic cardiomyopathy     Chronic a-fib ([address]     Acute on chronic systolic heart failure, NYHA class 3 ([address]     COVID 01/12/2022    Acute respiratory failure due to COVID-19 ([address] 01/12/2022    High cholesterol 01/12/2022    Hypotension due to hypovolemia 11/19/2019    Anticoagulated on Coumadin     Atrial fibrillation with RVR ([address] 11/10/2019    Anemia 11/10/2019    COPD (chronic obstructive pulmonary disease) ([address] 10/03/2014    Dyspnea 10/03/2014    PVD (peripheral vascular disease) ([address] 05/12/2014     Diagnostic studies: ECG 6/28/22   sinus carmen rate 50  493 QTcH,  100 QRS    LHC 03/17/2022 -   Impression  ~Coronary Angiography w/ severe MVD and 2 patent grafts. ~no ischemic cause for VT  ~normal lvedp     Echo 01/14/2022   Summary   Technically difficult examination. Left ventricular systolic function is low normal with ejection fraction   estimated at 50-55%. No definitive regional wall motion abnormalities are noted. Diastolic filling parameters suggests grade II diastolic dysfunction. Mild mitral regurgitation. The left atrium is mildly dilated. Aortic valve appears sclerotic but opens adequately. Mild aortic regurgitation is present. Inadequate tricuspid regurgitation to estimate systolic pulmonary artery   pressure. Myoview: 7/2021:   The overall quality of the study is good. There is subdiaphragmatic   attenuation.      Left ventricular cavity is noted to be mildly enlarged at 147 ml.  The right   ventricle is normal in size.      SPECT images demonstrate a small area of mildly decreased perfusion in the   mid inferior, mid-inferolateral, and basal inferior segments. The defect is   present at rest and worsens with stress, consistent with mixed ischemia and   scar. There is associated wall motion abnormality.      The sum stress score is 8. No visual TID. Calculated TID is 1.09.      Left ventricular ejection fraction is normal at 53%.      Moderate risk scan given ischemia and mildly elevated LV volumes         Echo 11/11/19      Summary   Moderately dilated left ventricle.   Global left ventricular function is severely decreased with ejection   fraction estimated from 15% to 20 %.   There is severe diffuse hypokinesis.   Grade II diastolic dysfunction with elevated LV filling pressures.   The mitral valve normal in structure. Mild mitral regurgitation.   The left atrium is severely dilated.     Echo: 1/16/17      Summary   -Normal left ventricle size. There is concentric left ventricular   hypertrophy. Left ventricular function is low normal with ejection fraction   estimated at 50%. Cannot exclude septal wall motion abnormality.   -Mild mitral regurgitation is present.   -Aortic valve appears sclerotic but opens adequately. Trivial aortic   regurgitation is present.   -There is mild tricuspid regurgitation with RVSP estimated at 26 mmHg.     Angiogram: 12/27/16:  Cath with occluded SVG to RCA,patent LIMA to LAD,patent SVG to diag,OM1 with good retrograde filling of LAD,patent LIMA to LAD  EF 35-40% with inf hypokinesis,LVEDP 25        Nuclear stress test 11/21/16  Summary   Moderate sized inferior fixed defect consistent with infarction in the   territory of the mid and distal RCA .   Small-moderate sized apical completely reversible defect consistent with   ischemia in the territory of the distal LAD .   Global hypokinesis with LV enlargement and EF 31 %      Recommendation   Cardiac catheterization. This to evaluate for ischemic cardiomyopat      I independently reviewed the cardiac diagnostic studies, ECG and relevant imaging studies. Lab Results   Component Value Date    LVEF 53 01/14/2022     Lab Results   Component Value Date    TSH 1.14 11/10/2019         Physical Examination:  Vitals:    06/28/22 1256   BP: 100/60   Pulse: 50   SpO2: 95%      Wt Readings from Last 3 Encounters:   06/28/22 189 lb 3.2 oz (85.8 kg)   05/04/22 190 lb 12.8 oz (86.5 kg)   04/23/22 193 lb 12.8 oz (87.9 kg)       · Constitutional: Oriented. No distress. · Head: Normocephalic and atraumatic. · Mouth/Throat: Oropharynx is clear and moist.   · Eyes: Conjunctivae normal. EOM are normal.   · Neck: Neck supple. No JVD present. · Cardiovascular: Normal rate, regular rhythm, S1&S2. · Pulmonary/Chest: Bilateral respiratory sounds. No rhonchi. · Abdominal: Soft. No tenderness. · Musculoskeletal: No tenderness. No edema    · Lymphadenopathy: Has no cervical adenopathy. · Neurological: Alert and oriented. Follows command, No Gross deficit   · Skin: Skin is warm, No rash noted. · Psychiatric: Has a normal behavior       Review of System:  [x] Full ROS obtained and negative except as mentioned in HPI    Prior to Admission medications    Medication Sig Start Date End Date Taking?  Authorizing Provider   spironolactone (ALDACTONE) 25 MG tablet TAKE 1/2 TABLET BY MOUTH DAILY  Patient taking differently: Take 25 mg by mouth daily  6/15/22  Yes Arelis Bryant MD   midodrine (PROAMATINE) 5 MG tablet Take 1 tablet by mouth 3 times daily Hold for SBP >130 6/15/22  Yes Arelis Bryant MD   atorvastatin (LIPITOR) 20 MG tablet Take 1 tablet by mouth at bedtime 5/4/22  Yes Arelis Bryant MD   metoprolol tartrate (LOPRESSOR) 50 MG tablet Take 1 tablet by mouth 2 times daily  Patient taking differently: Take 50 mg by mouth daily  5/4/22  Yes Arelis Bryant MD   furosemide (LASIX) 40 MG tablet Take 1 tablet by mouth daily 5/4/22  Yes Arelis Bryant MD   gabapentin (NEURONTIN) 100 MG capsule Intended 5/4/2022 1/28/22   Reilly Zeng MD       No Known Allergies    Social History:  Reviewed. reports that he quit smoking about 6 months ago. His smoking use included cigarettes. He started smoking about 8 years ago. He has a 25.00 pack-year smoking history. He has never used smokeless tobacco. He reports current alcohol use. He reports that he does not use drugs. Family History:  Reviewed. Reviewed. No family history of SCD. Relevant and available labs, and cardiovascular diagnostics reviewed. Reviewed. I independently reviewed relevant and available cardiac diagnostic tests ECG, CXR, Echo, Stress test, Device interrogation, Holter, CT scan. Complex medical condition with multiple medical problems affecting prognosis and outcome of EP interventions    All questions and concerns were addressed to the patient/family. Alternatives to my treatment were discussed. I have discussed the above stated plan and the patient verbalized understanding and agreed with the plan. Scribe attestation: This note was scribed in the presence of Melissa Hyde MD by Soren Miller RN  Physician Attestation: I, Dr. Melissa Hyde, confirm that the scribe's documentation has been prepared under my direction and personally reviewed by me in its entirety. I also confirm that the note above accurately reflects all work, treatment, procedures, and medical decision making performed by me. NOTE: This report was transcribed using voice recognition software. Every effort was made to ensure accuracy, however, inadvertent computerized transcription errors may be present.      Melissa Hyde MD, MPH  [address]   Office: [phone]  Fax: [phone]

## 2022-06-28 ENCOUNTER — OFFICE VISIT (OUTPATIENT)
Dept: CARDIOLOGY CLINIC | Age: 72
End: 2022-06-28

## 2022-06-28 ENCOUNTER — NURSE ONLY (OUTPATIENT)
Dept: CARDIOLOGY CLINIC | Age: 72
End: 2022-06-28
Payer: MEDICARE

## 2022-06-28 VITALS
DIASTOLIC BLOOD PRESSURE: 60 MMHG | HEART RATE: 50 BPM | SYSTOLIC BLOOD PRESSURE: 100 MMHG | BODY MASS INDEX: 28.02 KG/M2 | OXYGEN SATURATION: 95 % | HEIGHT: 69 IN | WEIGHT: 189.2 LBS

## 2022-06-28 DIAGNOSIS — I10 BENIGN ESSENTIAL HTN: ICD-10-CM

## 2022-06-28 DIAGNOSIS — Z95.810 ICD (IMPLANTABLE CARDIOVERTER-DEFIBRILLATOR), SINGLE, IN SITU: ICD-10-CM

## 2022-06-28 DIAGNOSIS — I25.5 ISCHEMIC CARDIOMYOPATHY: ICD-10-CM

## 2022-06-28 DIAGNOSIS — I48.20 CHRONIC A-FIB (HCC): ICD-10-CM

## 2022-06-28 DIAGNOSIS — F17.200 TOBACCO DEPENDENCE SYNDROME: ICD-10-CM

## 2022-06-28 DIAGNOSIS — I47.20 VT (VENTRICULAR TACHYCARDIA): Primary | ICD-10-CM

## 2022-06-28 DIAGNOSIS — I48.91 ATRIAL FIBRILLATION WITH RVR (HCC): ICD-10-CM

## 2022-06-28 DIAGNOSIS — I50.22 CHRONIC SYSTOLIC HF (HEART FAILURE) (HCC): ICD-10-CM

## 2022-06-28 DIAGNOSIS — I50.32 CHRONIC DIASTOLIC HEART FAILURE (HCC): ICD-10-CM

## 2022-06-28 DIAGNOSIS — R55 SYNCOPE, UNSPECIFIED SYNCOPE TYPE: ICD-10-CM

## 2022-06-28 DIAGNOSIS — I47.20 VT (VENTRICULAR TACHYCARDIA): ICD-10-CM

## 2022-06-28 PROCEDURE — 1124F ACP DISCUSS-NO DSCNMKR DOCD: CPT | Performed by: INTERNAL MEDICINE

## 2022-06-28 PROCEDURE — 93290 INTERROG DEV EVAL ICPMS IP: CPT | Performed by: INTERNAL MEDICINE

## 2022-06-28 PROCEDURE — 93289 INTERROG DEVICE EVAL HEART: CPT | Performed by: INTERNAL MEDICINE

## 2022-06-28 PROCEDURE — 93000 ELECTROCARDIOGRAM COMPLETE: CPT | Performed by: INTERNAL MEDICINE

## 2022-06-28 PROCEDURE — 99214 OFFICE O/P EST MOD 30 MIN: CPT | Performed by: INTERNAL MEDICINE

## 2022-06-28 NOTE — PROGRESS NOTES
Advised pt to do stretching exercise pt verbalized understanding   Patient comes in for programming evaluation for his defibrillator. All sensing and pacing parameters are within normal range. Battery life 11.3 years   <0.1%. 427 NSVT episodes noted. EMGs seen appear to be RVR. Patient has Chronic AF  AF with a 4.4% burden. OBSERVATIONS  · AF >= 6 hr for 2 days. · Avg. Ventricular Rate >= 100 bpm during AF (>= 6 hr) for 2 days. 1 SVT episode noted. Patient remains on Eliquis, amiodarone and metoprolol. 3 month check today-we lowered the output to preserve battery life and meet a 2:! Safety margin. Please see interrogation for more detail. Optivol was recently elevated. · Possible OptiVol fluid accumulation: 02-Jun-2022 -- 27-Jun-2022. Back at baseline today. Patient will see Dr. Jessica Edmond today and follow up in 3 months in office or remotely.

## 2022-07-05 ENCOUNTER — TELEPHONE (OUTPATIENT)
Dept: CARDIOLOGY CLINIC | Age: 72
End: 2022-07-05

## 2022-07-05 NOTE — TELEPHONE ENCOUNTER
Spoke with wife,probably an orthostatic drop. Will stop lasix and spironolactone.  Give appointment soon

## 2022-07-05 NOTE — TELEPHONE ENCOUNTER
Patient's wife called in stating that on Saturday July 2, 2022 between 3pm-5pm her  was walking to the kitchen to the fridge and started getting weak like a person was having a small seizure. She wasn't sure if he was being shocked or not, because there wasn't;t any noise. Then the next thing she knew he passed out. This has been the third time in  Month. Mrs. Nichol Joshi states that patient looked like he was dying literally. She is not sure of how long he was  Unconscious, but when he came to he kept saying he was ok he was ok. AN hour later she checked his Bp and 90/40. She is not sure if his Blood sugar bottomed out if that was an issue that contributed to his passing out. The next day when patient woke up he was ok he just seem tired and fatigue most of the day.     Mrs. Nichol Joshi is asking that Dr. Gio Landry or his RN call her back at [phone]

## 2022-07-05 NOTE — TELEPHONE ENCOUNTER
Pt called and I went over the message per Dr Ada Shaw message when he talked with wife with a verbal understanding from pt.

## 2022-07-06 NOTE — PROGRESS NOTES
[facility]   Cardiac Follow Up     Referring Provider:  Garrison Boyle, DO     Chief Complaint   Patient presents with    Coronary Artery Disease    Cardiomyopathy    Congestive Heart Failure      History of Present Illness:  Kezia Dobbs is a 67 y.o.  male with a history of CAD s/p CABG May 2014 with post-op short run of SVT and brief AF, s/p DCCV Nov 2019; also cardiomyopathy, hypertension, and hyperlipidemia, PVD. His other history includes carotid stenosis and PAD s/p Lt common & external iliac PTA. Alfred Gonzalez He was hospitalized Jan 2022 for covid pneumonia. In March 15 he came to ER from SNF for syncope. Found to be in afib, had an episode of sustained VT requiring chest compressions. LHC unremarkable. He had a single chamber ICD placed 3/17/22. On 4/21 he went to the ER again with syncope, found to be hypoxic. No arrhythmias on interrogation, treated for pnuemonia, lopressor increased to 50 mg bid      Last visit he was taken off of flomax and started on gabapentin andcapsaicin cream  For shingles. On 7/2 he had an witnessed syncopal episode in the kitchen. Wife reports he was walking to the refrigerator, and got weak. Looked like he was having a \"seizure\" and passed out. Wife reports this has happened three times in the past month. He  Did not seek medical attention. Wife called the office on 7/5 to notify us. He was taken off of lasix and aldactone due to suspected orthostatic hypotension  Today, he states he is still tobacco free. Drinks gatorade and chews ice all day. He has no blood in stools or urine. He has no chest pain, shortness of breath palpitations. He has weakness, dizziness when he stands up. Gabapentin is helping alleviate his myalgias from shingles.      Patient is compliant  with medication, has orthostatic hypotension      Past Medical History:   has a past medical history of Acid reflux, Acute MI ([address] CAD (coronary artery disease), COPD (chronic obstructive pulmonary Respiratory:  · Normal excursion and expansion without use of accessory muscles  · Resp Auscultation: Normal breath sounds without dullness  Cardiovascular:  · The apical impulses not displaced  · Heart tones are crisp and normal  · Cervical veins are not engorged  · The carotid upstroke is normal in amplitude and contour without delay or bruit  · Peripheral pulses are symmetrical and full  · There is no clubbing, cyanosis of the extremities. · No edema  · Femoral Arteries: 2+ and equal  · Pedal Pulses: 2+ and equal  Abdomen:  · No masses or tenderness  · Liver/Spleen: No Abnormalities Noted  Neurological/Psychiatric:  · Alert and oriented in all spheres  · Moves all extremities well  · Exhibits normal gait balance and coordination  · No abnormalities of mood, affect, memory, mentation, or behavior are noted    7/14/21 Stress test  Summary    The overall quality of the study is good. There is subdiaphragmatic    attenuation.    Left ventricular cavity is noted to be mildly enlarged at 147 ml. The right    ventricle is normal in size.    SPECT images demonstrate a small area of mildly decreased perfusion in the    mid inferior, mid-inferolateral, and basal inferior segments. The defect is    present at rest and worsens with stress, consistent with mixed ischemia and    scar. There is associated wall motion abnormality.    The sum stress score is 8. No visual TID. Calculated TID is 1.09.    Left ventricular ejection fraction is normal at 53%.    Moderate risk scan given ischemia and mildly elevated LV volumes         1/14/22 Echo  Summary   Technically difficult examination. Left ventricular systolic function is low normal with ejection fraction   estimated at 50-55%. No definitive regional wall motion abnormalities are noted. Diastolic filling parameters suggests grade II diastolic dysfunction. Mild mitral regurgitation. The left atrium is mildly dilated.    Aortic valve appears sclerotic but opens adequately. Mild aortic regurgitation is present. Inadequate tricuspid regurgitation to estimate systolic pulmonary artery   pressure. Venous dopplers 6/16/21  IMPRESSION:   1.  Negative for deep vein thrombosis in the left lower extremity. 2.  Incidental note of a calcified plaque in the left popliteal artery with at least moderate narrowing. [facility] 12/2016  Cath with occluded SVG to RCA,patent LIMA to LAD,patent SVG to diag,OM1 with good retrograde filling of LAD,patent LIMA to LAD  EF 35-40% with inf hypokinesis,LVEDP 25  Will add daily lasix ,aldactone,imdur,f/up in 1 month  Also needs echo    Assessment:    1. Hypotension/syncope  - new problem  Plan  - stop flomax and metoprolol, continue to not take aldactone and lasix  -( may take lasix prn if has swelling, but will get up slowly to avoid hypotension/dizziness)    2. CAD- CABG 2014  -  1/2022 stable grafts  EF 35-40%  -  no chf on exam  Plan  -  Continue lipitor  -  Not on asa due to eliquis  -  Will hold/stop lopressor due to hypotension        3. Ischemic cardiomyopathy  -  2019 echo EF 15-20%  -  4/21/22  BNP 1910  -  6/18/22  bnp 789  -   no chf on exam  Plan  -  Spironolactone and lasix stopped due hypotension -7/5  -  Will stop lopressor due to hypotension  -  May take lasix as needed for swelling, will take caution when getting up    4. Hypertension  6/18/22  k 5.2  Bun 24 creat 1.3  - 78/38  Plan  - stop flomax,lopressor    5. Hyperlipidemia  2017 ldl 121 hdl 41  Plan  - continue lipitor      6. Typical atrial flutter  2019 [facility]  Plan  - continue eliquis  - rate controlled with amiodarone  Plan  Follow upas scheduled                I appreciate the opportunity of cooperating in the care of this individual.    Russell Flores M.D., [facility]    Patient's problem list, medications, allergies, past medical, surgical, social and family histories were reviewed and updated as appropriate. Scribe's attestation:  This note was scribed in the

## 2022-07-07 ENCOUNTER — OFFICE VISIT (OUTPATIENT)
Dept: CARDIOLOGY CLINIC | Age: 72
End: 2022-07-07
Payer: MEDICARE

## 2022-07-07 VITALS
HEIGHT: 69 IN | DIASTOLIC BLOOD PRESSURE: 38 MMHG | BODY MASS INDEX: 28.45 KG/M2 | WEIGHT: 192.1 LBS | HEART RATE: 48 BPM | SYSTOLIC BLOOD PRESSURE: 78 MMHG | OXYGEN SATURATION: 98 %

## 2022-07-07 DIAGNOSIS — R00.1 BRADYCARDIA: ICD-10-CM

## 2022-07-07 DIAGNOSIS — I10 BENIGN ESSENTIAL HTN: ICD-10-CM

## 2022-07-07 DIAGNOSIS — I48.91 ATRIAL FIBRILLATION WITH RVR (HCC): Primary | ICD-10-CM

## 2022-07-07 PROCEDURE — 1124F ACP DISCUSS-NO DSCNMKR DOCD: CPT | Performed by: INTERNAL MEDICINE

## 2022-07-07 PROCEDURE — 99214 OFFICE O/P EST MOD 30 MIN: CPT | Performed by: INTERNAL MEDICINE

## 2022-07-07 PROCEDURE — 93000 ELECTROCARDIOGRAM COMPLETE: CPT | Performed by: INTERNAL MEDICINE

## 2022-07-22 RX ORDER — FUROSEMIDE 20 MG/1
TABLET ORAL
Qty: 180 TABLET | Refills: 3 | OUTPATIENT
Start: 2022-07-22

## 2022-08-01 RX ORDER — ATORVASTATIN CALCIUM 20 MG/1
20 TABLET, FILM COATED ORAL NIGHTLY
Qty: 30 TABLET | Refills: 1 | Status: SHIPPED | OUTPATIENT
Start: 2022-08-01

## 2022-08-01 NOTE — TELEPHONE ENCOUNTER
Received refill request for Atorvastatin from [address] [place] [address].     Last ov:07/07/2022 LES    Last labs:10/27/2021 Lipid    Last Refill:05/04/2022 # 30 tabs w/ 1 refill    Next appointment:08/24/2022 LES

## 2022-08-16 RX ORDER — MIDODRINE HYDROCHLORIDE 5 MG/1
TABLET ORAL
Qty: 90 TABLET | Refills: 0 | Status: SHIPPED | OUTPATIENT
Start: 2022-08-16

## 2022-08-16 NOTE — TELEPHONE ENCOUNTER
Received refill request for Midodrine from [facility].     Last ov: 07/07/2022 LES    Last Refill: 06/15/2022 #90 w/ 0 refills    Next appointment: 08/24/2022 LES

## 2022-09-01 ENCOUNTER — NURSE ONLY (OUTPATIENT)
Dept: CARDIOLOGY CLINIC | Age: 72
End: 2022-09-01
Payer: MEDICARE

## 2022-09-01 ENCOUNTER — TELEPHONE (OUTPATIENT)
Dept: CARDIOLOGY CLINIC | Age: 72
End: 2022-09-01

## 2022-09-01 DIAGNOSIS — I48.20 CHRONIC A-FIB (HCC): ICD-10-CM

## 2022-09-01 DIAGNOSIS — R06.02 SOB (SHORTNESS OF BREATH): Primary | ICD-10-CM

## 2022-09-01 DIAGNOSIS — I50.22 CHRONIC SYSTOLIC HF (HEART FAILURE) (HCC): ICD-10-CM

## 2022-09-01 DIAGNOSIS — Z95.810 ICD (IMPLANTABLE CARDIOVERTER-DEFIBRILLATOR), SINGLE, IN SITU: ICD-10-CM

## 2022-09-01 DIAGNOSIS — I25.5 ISCHEMIC CARDIOMYOPATHY: ICD-10-CM

## 2022-09-01 DIAGNOSIS — I48.91 ATRIAL FIBRILLATION WITH RVR (HCC): ICD-10-CM

## 2022-09-01 DIAGNOSIS — I10 BENIGN ESSENTIAL HTN: ICD-10-CM

## 2022-09-01 PROCEDURE — 93296 REM INTERROG EVL PM/IDS: CPT | Performed by: INTERNAL MEDICINE

## 2022-09-01 PROCEDURE — 93297 REM INTERROG DEV EVAL ICPMS: CPT | Performed by: CLINICAL NURSE SPECIALIST

## 2022-09-01 PROCEDURE — 93295 DEV INTERROG REMOTE 1/2/MLT: CPT | Performed by: INTERNAL MEDICINE

## 2022-09-01 RX ORDER — FUROSEMIDE 40 MG/1
40 TABLET ORAL DAILY
COMMUNITY

## 2022-09-01 NOTE — TELEPHONE ENCOUNTER
Spoke to patient he is taking 40 mg daily, no new medications, watching fluid intake, using salt on his food not watching salt intake, sob with exertion, current weight 197, gained 5 pds in the last 3 months, LES stopped some medications last month and he has had no more episodes of passing out, since then he has noticed he has been getting more sob.

## 2022-09-01 NOTE — PROGRESS NOTES
We received remote transmission from patient's monitor at home. Transmission shows normal sensing and pacing function. Noted NSVT  and AF. AF doesn't appear to be real. Pt is on Eliquis and Toprol. EP physician will review. See interrogation under cardiology tab in the [address] [place] [address] [address] field for more details. Optivol is elevated. HF team to review. .0%   < 0.1%    End of 91-day monitoring period 9-1-22.

## 2022-09-01 NOTE — RESULT ENCOUNTER NOTE
Reviewed, optivol is way up is he taking any lasix now? He should try to take daily until f/u with Barbar Crumbly next week.  Farzaneh Black

## 2022-09-01 NOTE — TELEPHONE ENCOUNTER
Take extra 20mg lasix once a day until he sees Julian, get labs before that visit so she can possible restart genoveva.  Dora Braden

## 2022-09-09 RX ORDER — SPIRONOLACTONE 25 MG/1
TABLET ORAL
Qty: 45 TABLET | Refills: 2 | OUTPATIENT
Start: 2022-09-09

## 2022-09-09 NOTE — TELEPHONE ENCOUNTER
spironolactone (ALDACTONE) 25 MG tablet    The original prescription was discontinued on 1/12/2022 by Tasia Romero RN for the following reason: LIST CLEANUP. Renewing this prescription may not be appropriate.

## 2022-09-15 ENCOUNTER — HOSPITAL ENCOUNTER (OUTPATIENT)
Age: 72
Discharge: HOME | End: 2022-09-15
Payer: MEDICARE

## 2022-09-15 ENCOUNTER — OFFICE VISIT (OUTPATIENT)
Dept: CARDIOLOGY CLINIC | Age: 72
End: 2022-09-15
Payer: MEDICARE

## 2022-09-15 VITALS
HEIGHT: 69 IN | TEMPERATURE: 64 F | DIASTOLIC BLOOD PRESSURE: 76 MMHG | WEIGHT: 196.6 LBS | SYSTOLIC BLOOD PRESSURE: 132 MMHG | BODY MASS INDEX: 29.12 KG/M2

## 2022-09-15 DIAGNOSIS — I25.10 CORONARY ARTERY DISEASE INVOLVING NATIVE CORONARY ARTERY OF NATIVE HEART WITHOUT ANGINA PECTORIS: Chronic | ICD-10-CM

## 2022-09-15 DIAGNOSIS — E78.2 MIXED HYPERLIPIDEMIA: ICD-10-CM

## 2022-09-15 DIAGNOSIS — I48.92 ATRIAL FLUTTER, UNSPECIFIED TYPE (HCC): ICD-10-CM

## 2022-09-15 DIAGNOSIS — I50.32 CHRONIC DIASTOLIC HEART FAILURE (HCC): Primary | ICD-10-CM

## 2022-09-15 DIAGNOSIS — I25.5 ISCHEMIC CARDIOMYOPATHY: ICD-10-CM

## 2022-09-15 DIAGNOSIS — R06.83 SNORES: ICD-10-CM

## 2022-09-15 DIAGNOSIS — I95.89 OTHER SPECIFIED HYPOTENSION: ICD-10-CM

## 2022-09-15 PROBLEM — I95.9 HYPOTENSION: Status: ACTIVE | Noted: 2019-11-19

## 2022-09-15 LAB
ANION GAP SERPL CALCULATED.3IONS-SCNC: 17 MMOL/L (ref 3–16)
BUN BLDV-MCNC: 19 MG/DL (ref 7–20)
CALCIUM SERPL-MCNC: 9.7 MG/DL (ref 8.3–10.6)
CHLORIDE BLD-SCNC: 105 MMOL/L (ref 99–110)
CO2: 21 MMOL/L (ref 21–32)
CREAT SERPL-MCNC: 1.2 MG/DL (ref 0.8–1.3)
GFR AFRICAN AMERICAN: >60
GFR NON-AFRICAN AMERICAN: 59
GLUCOSE BLD-MCNC: 126 MG/DL (ref 70–99)
HCT VFR BLD CALC: 36.3 % (ref 40.5–52.5)
HEMOGLOBIN: 11.6 G/DL (ref 13.5–17.5)
MCH RBC QN AUTO: 25.6 PG (ref 26–34)
MCHC RBC AUTO-ENTMCNC: 32.1 G/DL (ref 31–36)
MCV RBC AUTO: 79.6 FL (ref 80–100)
PDW BLD-RTO: 19.3 % (ref 12.4–15.4)
PLATELET # BLD: 289 K/UL (ref 135–450)
PMV BLD AUTO: 7.6 FL (ref 5–10.5)
POTASSIUM SERPL-SCNC: 4.5 MMOL/L (ref 3.5–5.1)
PRO-BNP: 1004 PG/ML (ref 0–124)
RBC # BLD: 4.55 M/UL (ref 4.2–5.9)
SODIUM BLD-SCNC: 143 MMOL/L (ref 136–145)
WBC # BLD: 10.2 K/UL (ref 4–11)

## 2022-09-15 PROCEDURE — 99214 OFFICE O/P EST MOD 30 MIN: CPT | Performed by: NURSE PRACTITIONER

## 2022-09-15 PROCEDURE — 1124F ACP DISCUSS-NO DSCNMKR DOCD: CPT | Performed by: NURSE PRACTITIONER

## 2022-09-15 PROCEDURE — 83880 ASSAY OF NATRIURETIC PEPTIDE: CPT

## 2022-09-15 PROCEDURE — 36415 COLL VENOUS BLD VENIPUNCTURE: CPT

## 2022-09-15 PROCEDURE — 80048 BASIC METABOLIC PNL TOTAL CA: CPT

## 2022-09-15 PROCEDURE — 85027 COMPLETE CBC AUTOMATED: CPT

## 2022-09-15 RX ORDER — POLYETHYLENE GLYCOL 3350 17 G/17G
17 POWDER, FOR SOLUTION ORAL DAILY
COMMUNITY

## 2022-09-15 NOTE — PATIENT INSTRUCTIONS
Get blood work checked today    I will call you with results and we will make a plan     Weigh yourself daily. Call for weight gain of 3 lbs overnight, 5 lbs in week.  Do not consume >2000 mg of sodium daily or >64 oz of fluid daily        Referral placed for sleep medicine

## 2022-09-15 NOTE — PROGRESS NOTES
[facility], [place], operated by [facility]     Outpatient Follow Up Note    CHIEF COMPLAINT / HPI:  Follow Up secondary to dizziness, hypotension     Subjective:   Mary Montelongo is 67 y.o. male who presents today with a history of hypotension, syncope, VT, CAD s/p CABG , ICM s/p ICD 3/2022, HLD, atrial flutter      Today, Mr Jesus Alberto Rebollar says his shortness of breath and weights have been stable. His wife thinks SOB has worsened, though. He started smoking again; some days he doesn't smoke any and some days he smokes 2-3 cigarettes a day. Denies palpitations or swelling. He experienced dizziness recently while bending over in the shed. Denies any dizziness or syncope since medications were changed at [facility] with Dr Roseanne Starr in July. Says he has been consistently taking 40 mg of lasix daily. Home blood pressures have been stable and he only takes midodrine if SBP <100. Last dose was a couple weeks ago. His wife thinks he stops breathing while he sleeps. Optivol was elevated  but he decided not to take the extra lasix as recommended by [facility] and he never had blood work drawn. Mr Jesus Alberto Rebollar admits to eating a lot of salt and drinking a lot of fluid. With regard to medication therapy the patient has been compliant with prescribed regimen. They have tolerated therapy to date.      Past Medical History:   Diagnosis Date    Acid reflux     Acute MI (HCC)     CAD (coronary artery disease)     COPD (chronic obstructive pulmonary disease) (HCC)     Diverticulitis     Hypertension     Peripheral vascular disease ([address]     Shingles     per pt, started 2021, still visible 3/2022, not open wounds at that time     Social History:    Social History     Tobacco Use   Smoking Status Former    Packs/day: 0.50    Years: 50.00    Pack years: 25.00    Types: Cigarettes    Start date: 2014    Quit date: 12/15/2021    Years since quittin.7   Smokeless Tobacco Never   Tobacco Comments    5-6 CIGS/DAY     Current Medications:  Current Outpatient Medications Medication Sig Dispense Refill    furosemide (LASIX) 40 MG tablet Take 40 mg by mouth daily      midodrine (PROAMATINE) 5 MG tablet TAKE 1 TABLET BY MOUTH THREE TIMES DAILY HOLD FOR SYSTOLIC BLOOD PRESSURE GREATER THAN 130 90 tablet 0    atorvastatin (LIPITOR) 20 MG tablet TAKE 1 TABLET BY MOUTH AT BEDTIME 30 tablet 1    Cholecalciferol (VITAMIN D3) 125 MCG (5000 UT) TABS Take by mouth      Zinc 22.5 MG TABS Take by mouth      Ascorbic Acid (VITAMIN C PO) Take 5,000 mcg by mouth      pantoprazole (PROTONIX) 40 MG tablet TAKE 1 TABLET BY MOUTH DAILY 30 tablet 0    gabapentin (NEURONTIN) 100 MG capsule Intended supply: 90 days  Take 200 mg nightly 90 capsule 3    amiodarone (CORDARONE) 200 MG tablet Take 1 tablet by mouth daily 30 tablet 1    carboxymethylcellulose 1 % ophthalmic solution 1 drop 3 times daily      naloxegol (MOVANTIK) 25 MG TABS tablet Take 1 tablet by mouth every morning 30 tablet 0    acetaminophen (TYLENOL) 325 MG tablet Take 650 mg by mouth every 6 hours as needed for Pain      apixaban (ELIQUIS) 5 MG TABS tablet Take 5 mg by mouth daily       gabapentin (NEURONTIN) 100 MG capsule Take 100 mg by mouth daily. tamsulosin (FLOMAX) 0.4 MG capsule Take 0.4 mg by mouth daily       No current facility-administered medications for this visit. REVIEW OF SYSTEMS:    CONSTITUTIONAL: No major weight gain or loss, fatigue, weakness, night sweats or fever. HEENT: No new vision difficulties or ringing in the ears. RESPIRATORY: No new SOB, PND, orthopnea or cough. CARDIOVASCULAR: See HPI  GI: No nausea, vomiting, diarrhea, constipation, abdominal pain or changes in bowel habits. : No urinary frequency, urgency, incontinence hematuria or dysuria. SKIN: No cyanosis or skin lesions. MUSCULOSKELETAL: No new muscle or joint pain. NEUROLOGICAL: No syncope or TIA-like symptoms.   PSYCHIATRIC: No anxiety, pain, insomnia or depression    Objective:   PHYSICAL EXAM:      Wt Readings from Last 3 Encounters:   07/07/22 192 lb 1.6 oz (87.1 kg)   06/28/22 189 lb 3.2 oz (85.8 kg)   05/04/22 190 lb 12.8 oz (86.5 kg)          VITALS:  /76 (Position: Sitting)   Temp (!) 64 °F (17.8 °C)   Ht 5' 8.5\" (1.74 m)   Wt 196 lb 9.6 oz (89.2 kg)   BMI 29.46 kg/m²   CONSTITUTIONAL: Cooperative, no apparent distress, and appears well nourished / developed  NEUROLOGIC:  Awake and orientated to person, place and time. PSYCH: Calm affect. SKIN: Warm and dry. HEENT: Sclera non-icteric, normocephalic, neck supple, no elevation of JVP, normal carotid pulses with no bruits and thyroid normal size. LUNGS:  No increased work of breathing and clear to auscultation, no crackles or wheezing  CARDIOVASCULAR:  Regular rate and rhythm with no murmurs, gallops, rubs, or abnormal heart sounds, normal PMI. The apical impulses not displaced  Heart tones are crisp and normal  Cervical veins are not engorged  The carotid upstroke is normal in amplitude and contour without delay or bruit  JVP is not elevated  ABDOMEN:  Normal bowel sounds, non-distended and non-tender to palpation  EXT: No edema, no calf tenderness. Pulses are present bilaterally.     DATA:    Lab Results   Component Value Date    ALT 25 04/22/2022    AST 20 04/22/2022    ALKPHOS 86 04/22/2022    BILITOT 0.3 04/22/2022     Lab Results   Component Value Date    CREATININE 1.3 06/18/2022    BUN 24 (H) 06/18/2022     06/18/2022    K 5.2 (H) 06/18/2022     06/18/2022    CO2 22 06/18/2022     Lab Results   Component Value Date    TSH 1.14 11/10/2019     Lab Results   Component Value Date    WBC 9.4 06/18/2022    HGB 11.1 (L) 06/18/2022    HCT 35.0 (L) 06/18/2022    MCV 79.1 (L) 06/18/2022     06/18/2022     No components found for: CHLPL  Lab Results   Component Value Date    TRIG 88 11/28/2017    TRIG 105 06/08/2015    TRIG 81 05/12/2014     Lab Results   Component Value Date    HDL 41 11/28/2017    HDL 30 (L) 06/08/2015    HDL 39 (L) 05/12/2014     Lab Results   Component Value Date    LDLCALC 121 (H) 11/28/2017    LDLCALC 73 06/08/2015    LDLCALC 139 (H) 05/12/2014     Lab Results   Component Value Date    LABVLDL 18 11/28/2017    LABVLDL 21 06/08/2015    LABVLDL 16 05/12/2014      No results found for: BNP  Radiology Review:  Pertinent images / reports were reviewed as a part of this visit and reveals the following:    Last Echo: 1/14/22   Summary   Technically difficult examination. Left ventricular systolic function is low normal with ejection fraction   estimated at 50-55%. No definitive regional wall motion abnormalities are noted. Diastolic filling parameters suggests grade II diastolic dysfunction. Mild mitral regurgitation. The left atrium is mildly dilated. Aortic valve appears sclerotic but opens adequately. Mild aortic regurgitation is present. Inadequate tricuspid regurgitation to estimate systolic pulmonary artery   pressure. Last Stress Test: 7/14/21  Summary    The overall quality of the study is good. There is subdiaphragmatic    attenuation. Left ventricular cavity is noted to be mildly enlarged at 147 ml. The right    ventricle is normal in size. SPECT images demonstrate a small area of mildly decreased perfusion in the    mid inferior, mid-inferolateral, and basal inferior segments. The defect is    present at rest and worsens with stress, consistent with mixed ischemia and    scar. There is associated wall motion abnormality. The sum stress score is 8. No visual TID. Calculated TID is 1.09. Left ventricular ejection fraction is normal at 53%.         Moderate risk scan given ischemia and mildly elevated LV volumes        Recommendation    Discussed with Dr. Guero Hurst, patient to follow up     Last Angiogram: 12/2016  Cath with occluded SVG to RCA,patent LIMA to LAD,patent SVG to diag,OM1 with good retrograde filling of LAD,patent LIMA to LAD  EF 35-40% with inf hypokinesis,LVEDP 25  Will add daily lasix ,aldactone,imdur,f/up in 1 month  Also needs echo     Last EC/10/22  Sinus  Bradycardia   WITHIN NORMAL LIMITS    [id number] Score for Atrial Fibrillation Stroke Risk   Risk   Factors  Component Value   C CHF Yes 1   H HTN Yes 1   A2 Age >= 76 No,  (73 y.o.) 0   D DM No 0   S2 Prior Stroke/TIA No 0   V Vascular Disease No 0   A Age 74-69 Yes,  (73 y.o.) 1   Sc Sex male 0    [id number]  Score  3   Score last updated 22 1:91 PM EDT    Click here for a link to the UpToDate guideline \"Atrial Fibrillation: Anticoagulation therapy to prevent embolization    Disclaimer: Risk Score calculation is dependent on accuracy of patient problem list and past encounter diagnosis. Assessment:      Diagnosis Orders   1.  2. Chronic diastolic heart failure ([address]   Ischemic cardiomyopathy   ~ denies symptoms. Optivol elevated   ~ Echo 22 EF 50-55%, grade II DD  ~ Echo  EF 15-20%  ~ s/p single chamber ICD 3/17/22  ~ spironolactone, BB stopped d/t hypotension  ~ 40 mg lasix daily       3. Coronary artery disease involving native coronary artery of native heart without angina pectoris   ~ stable, no complaints of angina   ~ GXT 21 mildly decreased perfusion in the mid inferior, mid-inferolateral, and basal inferior segments. Defect present at rest and worsens on stress, c/w mixed ischemia and scar. There is associated wall motion abnormality   ~ s/p CABG   ~ statin, no ASA d/t eliquis and no BB d/t hypotension        4. Other specified hypotension   ~ stable since medication adjustments in July  ~ /76 in office today  ~ no c/o dizziness or syncope since July       5. Mixed hyperlipidemia   ~ atorvastatin 20  ~ lipids 10/2021  HDL 41 LDL 84 Trig 128       6. Atrial flutter, unspecified type ([address]   ~ stable, denies palpitations   ~ s/p DCCV   ~ eliquis / amiodarone /        7.  Snores   ~ wife offers pt snores and stops breathing at times during the night  Bal William MD, Sleep Medicine, [facility]      8. Syncope / VT           ~ sustained VT 3/2022 > chest compressions and single chamber ICD placed 3/17/22           ~ no BB d/t hypotension            ~ follows with EP    9. Tobacco abuse           ~ admits to smoking again, smoking 2-3 cigarettes some days            ~ encouraged smoking cessation     I had the opportunity to review the clinical symptoms and presentation of Leandra Dobbs. Plan:     Get blood work checked today. Patient prefers to see results before increasing lasix for a few days d/t elevated optivol reading. I will call you with results when they are in  Referral placed for sleep medicine   Encouraged smoking cessation  Weigh yourself daily. Call for weight gain of 3 lbs overnight, 5 lbs in week. Do not consume >2000 mg of sodium daily or >64 oz of fluid daily   RTO in 3-4 weeks to reassess symptoms / optivol     Overall the patient is stable from CV standpoint    I have addresed the patient's cardiac risk factors and adjusted pharmacologic treatment as needed. In addition, I have reinforced the need for patient directed risk factor modification. Further evaluation will be based upon the patient's clinical course and testing results. All questions and concerns were addressed to the patient. The patient is currently smoking. The risks related to smoking were reviewed with the patient. Recommend maintaining a smoke-free lifestyle. Products available for smoking cessation were discussed in detail. Patient is not on a beta-blocker; no MI  Patient is not on an ace-i/ARB; no sHF  Patient is on a statin    Anti-coagulation has been recommended / prescribed for this patient. Education conducted on adverse reactions including bleeding was discussed. Angiotension inhibitor/angiotension receptor blocker has not been prescribed / recommended for congestive heart failure (hypotension). Daily weight, low sodium diet were discussed.  Patient instructed to call the office with a weight gain: > 3 # over night or 5# in one week; swelling, SOB/orthopnea/PND    The patient verbalizes understanding not to stop medications without discussing with us. Discussed exercise: 30-60 minutes 7 days/week  Discussed Low saturated fat/HAYDEE diet. Thank you for allowing to us to participate in the care of [address]     Electronically signed by GEORGE Davila CNP on 9/14/2022 at 9:02 PM     Documentation of today's visit sent to PCP

## 2022-09-16 ENCOUNTER — TELEPHONE (OUTPATIENT)
Dept: CARDIOLOGY CLINIC | Age: 72
End: 2022-09-16

## 2022-09-16 NOTE — TELEPHONE ENCOUNTER
Called Rosie Toribio and went over blood work results. He will take an extra 20 mg of lasix in the afternoon for the next 5 days. He has follow up appt 10/11 to reassess symptoms.

## 2022-10-10 ENCOUNTER — NURSE ONLY (OUTPATIENT)
Dept: CARDIOLOGY CLINIC | Age: 72
End: 2022-10-10
Payer: MEDICARE

## 2022-10-10 DIAGNOSIS — I25.5 ISCHEMIC CARDIOMYOPATHY: Primary | ICD-10-CM

## 2022-10-10 DIAGNOSIS — I50.22 CHRONIC SYSTOLIC HF (HEART FAILURE) (HCC): ICD-10-CM

## 2022-10-10 DIAGNOSIS — Z95.810 ICD (IMPLANTABLE CARDIOVERTER-DEFIBRILLATOR), SINGLE, IN SITU: ICD-10-CM

## 2022-10-10 PROCEDURE — 93297 REM INTERROG DEV EVAL ICPMS: CPT | Performed by: NURSE PRACTITIONER

## 2022-10-10 PROCEDURE — G2066 INTER DEVC REMOTE 30D: HCPCS | Performed by: NURSE PRACTITIONER

## 2022-10-10 NOTE — PROGRESS NOTES
Remote transmission received from patient's single chamber ICD home monitor. Transmission shows normal sensing and pacing function. No new arrhythmias/ events recorded. Optivol is within normal range. TriageHF Heart Failure Risk Status on 10-Oct-2022 is Medium*    NP will review. See interrogation under cardiology tab in the [address] [place] [address] [address] field for more details. Will continue to monitor remotely. (End of 31-day monitoring period 10/10/22).

## 2022-10-11 ENCOUNTER — OFFICE VISIT (OUTPATIENT)
Dept: CARDIOLOGY CLINIC | Age: 72
End: 2022-10-11
Payer: MEDICARE

## 2022-10-11 ENCOUNTER — HOSPITAL ENCOUNTER (OUTPATIENT)
Age: 72
Discharge: HOME | End: 2022-10-11
Payer: MEDICARE

## 2022-10-11 VITALS
SYSTOLIC BLOOD PRESSURE: 122 MMHG | HEART RATE: 64 BPM | OXYGEN SATURATION: 93 % | HEIGHT: 69 IN | DIASTOLIC BLOOD PRESSURE: 70 MMHG | BODY MASS INDEX: 29.06 KG/M2 | WEIGHT: 196.2 LBS

## 2022-10-11 DIAGNOSIS — I25.5 ISCHEMIC CARDIOMYOPATHY: ICD-10-CM

## 2022-10-11 DIAGNOSIS — I25.10 CORONARY ARTERY DISEASE INVOLVING NATIVE CORONARY ARTERY OF NATIVE HEART WITHOUT ANGINA PECTORIS: Chronic | ICD-10-CM

## 2022-10-11 DIAGNOSIS — I47.20 VT (VENTRICULAR TACHYCARDIA): ICD-10-CM

## 2022-10-11 DIAGNOSIS — R06.02 SHORTNESS OF BREATH: ICD-10-CM

## 2022-10-11 DIAGNOSIS — I25.5 ISCHEMIC CARDIOMYOPATHY: Primary | ICD-10-CM

## 2022-10-11 DIAGNOSIS — I95.89 OTHER SPECIFIED HYPOTENSION: ICD-10-CM

## 2022-10-11 DIAGNOSIS — I50.32 CHRONIC DIASTOLIC HEART FAILURE (HCC): ICD-10-CM

## 2022-10-11 DIAGNOSIS — E78.2 MIXED HYPERLIPIDEMIA: ICD-10-CM

## 2022-10-11 DIAGNOSIS — I48.92 ATRIAL FLUTTER, UNSPECIFIED TYPE (HCC): ICD-10-CM

## 2022-10-11 LAB
ANION GAP SERPL CALCULATED.3IONS-SCNC: 13 MMOL/L (ref 3–16)
BUN BLDV-MCNC: 21 MG/DL (ref 7–20)
CALCIUM SERPL-MCNC: 9.5 MG/DL (ref 8.3–10.6)
CHLORIDE BLD-SCNC: 105 MMOL/L (ref 99–110)
CO2: 23 MMOL/L (ref 21–32)
CREAT SERPL-MCNC: 1.3 MG/DL (ref 0.8–1.3)
GFR AFRICAN AMERICAN: >60
GFR NON-AFRICAN AMERICAN: 54
GLUCOSE BLD-MCNC: 160 MG/DL (ref 70–99)
POTASSIUM SERPL-SCNC: 4.5 MMOL/L (ref 3.5–5.1)
PRO-BNP: 1390 PG/ML (ref 0–124)
SODIUM BLD-SCNC: 141 MMOL/L (ref 136–145)

## 2022-10-11 PROCEDURE — 1124F ACP DISCUSS-NO DSCNMKR DOCD: CPT | Performed by: NURSE PRACTITIONER

## 2022-10-11 PROCEDURE — 36415 COLL VENOUS BLD VENIPUNCTURE: CPT

## 2022-10-11 PROCEDURE — 80048 BASIC METABOLIC PNL TOTAL CA: CPT

## 2022-10-11 PROCEDURE — 83880 ASSAY OF NATRIURETIC PEPTIDE: CPT

## 2022-10-11 PROCEDURE — 99214 OFFICE O/P EST MOD 30 MIN: CPT | Performed by: NURSE PRACTITIONER

## 2022-10-11 NOTE — PROGRESS NOTES
[facility]     Outpatient Follow Up Note    CHIEF COMPLAINT / HPI:  Follow Up secondary to dizziness, hypotension     Subjective:   Kenny Rousseau is 67 y.o. male who presents today with a history of hypotension, syncope, VT, CAD s/p CABG , ICM s/p ICD 3/2022, HLD, atrial flutter      LOV lasix burst given for elevated optivol and BNP. Today, Mr Colton Yi says his weight is down and he denies any shortness of breath or swelling. Optivol checked 10/10 and was within normal range. He continues to use oxygen at night. Midodrine is used only if BP low. He admits to eating more than 2000 mg of sodium daily. Mr Colton Yi is very anxious to start working again. With regard to medication therapy the patient has been compliant with prescribed regimen. They have tolerated therapy to date.      Past Medical History:   Diagnosis Date    Acid reflux     Acute MI (HCC)     CAD (coronary artery disease)     COPD (chronic obstructive pulmonary disease) (HCC)     Diverticulitis     Hypertension     Peripheral vascular disease ([facility] [address]     Shingles     per pt, started 2021, still visible 3/2022, not open wounds at that time     Social History:    Social History     Tobacco Use   Smoking Status Former    Packs/day: 0.50    Years: 50.00    Pack years: 25.00    Types: Cigarettes    Start date: 2014    Quit date: 12/15/2021    Years since quittin.8   Smokeless Tobacco Never   Tobacco Comments    5-6 CIGS/DAY     Current Medications:  Current Outpatient Medications   Medication Sig Dispense Refill    OXYGEN Inhale into the lungs 1.5 liters nightly      polyethylene glycol (MIRALAX) 17 g PACK packet Take 17 g by mouth daily      furosemide (LASIX) 40 MG tablet Take 40 mg by mouth daily      midodrine (PROAMATINE) 5 MG tablet TAKE 1 TABLET BY MOUTH THREE TIMES DAILY HOLD FOR SYSTOLIC BLOOD PRESSURE GREATER THAN 130 90 tablet 0    atorvastatin (LIPITOR) 20 MG tablet TAKE 1 TABLET BY MOUTH AT BEDTIME 30 tablet 1 Cholecalciferol (VITAMIN D3) 125 MCG (5000 UT) TABS Take by mouth      Zinc 22.5 MG TABS Take by mouth      Ascorbic Acid (VITAMIN C PO) Take 5,000 mcg by mouth      gabapentin (NEURONTIN) 100 MG capsule Intended supply: 90 days  Take 200 mg nightly (Patient taking differently: Take 300 mg by mouth. Intended supply: 90 days  Take 200 mg nightly) 90 capsule 3    amiodarone (CORDARONE) 200 MG tablet Take 1 tablet by mouth daily 30 tablet 1    acetaminophen (TYLENOL) 325 MG tablet Take 650 mg by mouth every 6 hours as needed for Pain      apixaban (ELIQUIS) 5 MG TABS tablet Take 5 mg by mouth daily       gabapentin (NEURONTIN) 100 MG capsule Take 100 mg by mouth daily. No current facility-administered medications for this visit. REVIEW OF SYSTEMS:    CONSTITUTIONAL: No major weight gain or loss, fatigue, weakness, night sweats or fever. HEENT: No new vision difficulties or ringing in the ears. RESPIRATORY: No new SOB, PND, orthopnea or cough. CARDIOVASCULAR: See HPI  GI: No nausea, vomiting, diarrhea, constipation, abdominal pain or changes in bowel habits. : No urinary frequency, urgency, incontinence hematuria or dysuria. SKIN: No cyanosis or skin lesions. MUSCULOSKELETAL: No new muscle or joint pain. NEUROLOGICAL: No syncope or TIA-like symptoms. PSYCHIATRIC: No anxiety, pain, insomnia or depression    Objective:   PHYSICAL EXAM:      Wt Readings from Last 3 Encounters:   10/11/22 196 lb 3.2 oz (89 kg)   09/15/22 196 lb 9.6 oz (89.2 kg)   07/07/22 192 lb 1.6 oz (87.1 kg)          VITALS:  /70 (Site: Left Upper Arm, Position: Sitting, Cuff Size: Large Adult)   Pulse 64   Ht 5' 8.5\" (1.74 m)   Wt 196 lb 3.2 oz (89 kg)   SpO2 93%   BMI 29.40 kg/m²   CONSTITUTIONAL: Cooperative, no apparent distress, and appears well nourished / developed  NEUROLOGIC:  Awake and orientated to person, place and time. PSYCH: Calm affect. SKIN: Warm and dry.   HEENT: Sclera non-icteric, normocephalic, neck supple, no elevation of JVP, normal carotid pulses with no bruits and thyroid normal size. LUNGS:  No increased work of breathing and clear to auscultation, no crackles or wheezing  CARDIOVASCULAR:  Regular rate and rhythm with no murmurs, gallops, rubs, or abnormal heart sounds, normal PMI. The apical impulses not displaced  Heart tones are crisp and normal  Cervical veins are not engorged  The carotid upstroke is normal in amplitude and contour without delay or bruit  JVP is not elevated  ABDOMEN:  Normal bowel sounds, non-distended and non-tender to palpation  EXT: No edema, no calf tenderness. Pulses are present bilaterally. DATA:    Lab Results   Component Value Date    ALT 25 04/22/2022    AST 20 04/22/2022    ALKPHOS 86 04/22/2022    BILITOT 0.3 04/22/2022     Lab Results   Component Value Date    CREATININE 1.2 09/15/2022    BUN 19 09/15/2022     09/15/2022    K 4.5 09/15/2022     09/15/2022    CO2 21 09/15/2022     Lab Results   Component Value Date    TSH 1.14 11/10/2019     Lab Results   Component Value Date    WBC 10.2 09/15/2022    HGB 11.6 (L) 09/15/2022    HCT 36.3 (L) 09/15/2022    MCV 79.6 (L) 09/15/2022     09/15/2022     No components found for: CHLPL  Lab Results   Component Value Date    TRIG 88 11/28/2017    TRIG 105 06/08/2015    TRIG 81 05/12/2014     Lab Results   Component Value Date    HDL 41 11/28/2017    HDL 30 (L) 06/08/2015    HDL 39 (L) 05/12/2014     Lab Results   Component Value Date    LDLCALC 121 (H) 11/28/2017    LDLCALC 73 06/08/2015    LDLCALC 139 (H) 05/12/2014     Lab Results   Component Value Date    LABVLDL 18 11/28/2017    LABVLDL 21 06/08/2015    LABVLDL 16 05/12/2014      No results found for: BNP  Radiology Review:  Pertinent images / reports were reviewed as a part of this visit and reveals the following:    Last Echo: 1/14/22   Summary   Technically difficult examination.    Left ventricular systolic function is low normal with ejection fraction estimated at 50-55%. No definitive regional wall motion abnormalities are noted. Diastolic filling parameters suggests grade II diastolic dysfunction. Mild mitral regurgitation. The left atrium is mildly dilated. Aortic valve appears sclerotic but opens adequately. Mild aortic regurgitation is present. Inadequate tricuspid regurgitation to estimate systolic pulmonary artery   pressure. Last Stress Test: 21  Summary    The overall quality of the study is good. There is subdiaphragmatic    attenuation. Left ventricular cavity is noted to be mildly enlarged at 147 ml. The right    ventricle is normal in size. SPECT images demonstrate a small area of mildly decreased perfusion in the    mid inferior, mid-inferolateral, and basal inferior segments. The defect is    present at rest and worsens with stress, consistent with mixed ischemia and    scar. There is associated wall motion abnormality. The sum stress score is 8. No visual TID. Calculated TID is 1.09. Left ventricular ejection fraction is normal at 53%.         Moderate risk scan given ischemia and mildly elevated LV volumes        Recommendation    Discussed with Dr. Leonard Draper, patient to follow up     Last Angiogram: 2016  Cath with occluded SVG to RCA,patent LIMA to LAD,patent SVG to diag,OM1 with good retrograde filling of LAD,patent LIMA to LAD  EF 35-40% with inf hypokinesis,LVEDP 25  Will add daily lasix ,aldactone,imdur,f/up in 1 month  Also needs echo     Last EC/10/22  Sinus  Bradycardia   WITHIN NORMAL LIMITS    [id number] Score for Atrial Fibrillation Stroke Risk   Risk   Factors  Component Value   C CHF Yes 1   H HTN Yes 1   A2 Age >= 76 No,  (73 y.o.) 0   D DM No 0   S2 Prior Stroke/TIA No 0   V Vascular Disease No 0   A Age 74-69 Yes,  (73 y.o.) 1   Sc Sex male 0    [id number]  Score  3   Score last updated 22 4:40 PM EDT    Click here for a link to the UpToDate guideline \"Atrial Fibrillation: Anticoagulation therapy to prevent embolization    Disclaimer: Risk Score calculation is dependent on accuracy of patient problem list and past encounter diagnosis. Assessment:      Diagnosis Orders   1.  2. Chronic diastolic heart failure ([address]   Ischemic cardiomyopathy   ~ appears compensated. Berhane Rene was within range on 10/10  ~ Echo 1/14/22 EF 50-55%, grade II DD  ~ Echo 2019 EF 15-20%  ~ s/p single chamber ICD 3/17/22  ~ spironolactone, BB stopped d/t hypotension  ~ 40 mg lasix daily       3. Coronary artery disease involving native coronary artery of native heart without angina pectoris   ~ stable, no complaints of angina   ~ GXT 7/14/21 mildly decreased perfusion in the mid inferior, mid-inferolateral, and basal inferior segments. Defect present at rest and worsens on stress, c/w mixed ischemia and scar. There is associated wall motion abnormality   ~ s/p CABG 2014  ~ statin, no ASA d/t eliquis and no BB d/t hypotension        4. Other specified hypotension   ~ stable since medication adjustments in July  ~ /70 in office today  ~ no c/o dizziness or syncope since July       5. Mixed hyperlipidemia   ~ atorvastatin 20  ~ lipids 10/2021  HDL 41 LDL 84 Trig 128       6. Atrial flutter, unspecified type ([address]   ~ stable, denies palpitations   ~ s/p DCCV 2019  ~ eliquis / amiodarone        7. Snores   ~ referral sent for sleep medicine LOV        8. Syncope / VT         ~ sustained VT 3/2022 > chest compressions and single chamber ICD placed 3/17/22         ~ no BB d/t hypotension          ~ follows with EP    9. Tobacco abuse         ~ admits to smoking again, smoking 2-3 cigarettes some days          ~ encouraged smoking cessation     I had the opportunity to review the clinical symptoms and presentation of Leandra Pérez. Plan:     Patient stable. Encouraged compliance with sodium/fluid restrictions. RTO in 3 months with Dr Carolyn Lu   3.    Encouraged smoking cessation    Overall the patient is stable from CV standpoint    I have addresed the patient's cardiac risk factors and adjusted pharmacologic treatment as needed. In addition, I have reinforced the need for patient directed risk factor modification. Further evaluation will be based upon the patient's clinical course and testing results. All questions and concerns were addressed to the patient. The patient is currently smoking. The risks related to smoking were reviewed with the patient. Recommend maintaining a smoke-free lifestyle. Products available for smoking cessation were discussed in detail. Patient is not on a beta-blocker; no MI  Patient is not on an ace-i/ARB; no sHF  Patient is on a statin    Anti-coagulation has been recommended / prescribed for this patient. Education conducted on adverse reactions including bleeding was discussed. Angiotension inhibitor/angiotension receptor blocker has not been prescribed / recommended for congestive heart failure (hypotension). Daily weight, low sodium diet were discussed. Patient instructed to call the office with a weight gain: > 3 # over night or 5# in one week; swelling, SOB/orthopnea/PND    The patient verbalizes understanding not to stop medications without discussing with us. Discussed exercise: 30-60 minutes 7 days/week  Discussed Low saturated fat/HAYDEE diet. Thank you for allowing to us to participate in the care of [address]     Electronically signed by GEORGE Vogel CNP on 10/11/2022 at 11:37 AM     Documentation of today's visit sent to PCP

## 2022-10-12 ENCOUNTER — TELEPHONE (OUTPATIENT)
Dept: CARDIOLOGY CLINIC | Age: 72
End: 2022-10-12

## 2022-10-12 NOTE — TELEPHONE ENCOUNTER
Pt had labs done today and asking if KA had a chance to speak to LES about pt going back to work.  Please call to advise

## 2022-10-12 NOTE — TELEPHONE ENCOUNTER
I spoke with pt and relayed the message below per npramon. Pt verbalized understanding.      Carlos Velez, APRN - CNP  [facility] Staff 1 hour ago (3:40 PM)     KA  Please let patient know that Dr Amy Wade has not gotten back to me yet but I will let him know as soon as I hear something

## 2022-10-13 ENCOUNTER — TELEPHONE (OUTPATIENT)
Dept: CARDIOLOGY CLINIC | Age: 72
End: 2022-10-13

## 2022-10-13 NOTE — TELEPHONE ENCOUNTER
Pt called in requesting  call back from [facility]. Pt states he would like to know if she has spoken to LES yet and if his blood work results are in.     Pt can be reached [phone]

## 2022-10-14 NOTE — TELEPHONE ENCOUNTER
Please let pt know that I am out of office today. Blood work results were sent to him via [employer]: Blood work was stable but I wanted him to focus on low sodium diet as discussed in office. Also, I have not heard back from Dr Amanda Myers yet.  I will let him know as soon as I hear something

## 2022-10-14 NOTE — TELEPHONE ENCOUNTER
Pt called  to speak with tobin regarding his results and when can he go back to work.   Please advise

## 2022-10-17 ENCOUNTER — TELEPHONE (OUTPATIENT)
Dept: CARDIOLOGY CLINIC | Age: 72
End: 2022-10-17

## 2022-10-17 NOTE — LETTER
[address] [facility] [address] [place] [address]  [address]  Phone: [phone]  Fax: [phone]    Kimmie Yanez MD        October 25, 2022     Patient: Hasmukh Hassan   YOB: 1950   Date of Visit: 10/17/2022       To Whom It May Concern: It is my medical opinion that Callie Quarry is ok to return to work. .    If you have any questions or concerns, please don't hesitate to call.     Sincerely,    Kimmie Yanez MD

## 2022-10-17 NOTE — TELEPHONE ENCOUNTER
I spoke with pt and went over his labs with him. He verbalized understanding. He was agreeable that Dr Jarrett Dears can address this RTW on Monday. He stated that it has been 5-6 weeks since he had an episode and feels fine.  He would like to return

## 2022-10-24 NOTE — TELEPHONE ENCOUNTER
Do you want to compose letter? I don't think employer will take \"try to return\" If you want to compose , I will print for pt to hav him .

## 2022-10-24 NOTE — TELEPHONE ENCOUNTER
Pt calling back about RTW note. States it is suppose to be for Part Time. He would like to go back as soon as possible.  Please call pt Tues 10/25/22 to advise

## 2022-11-07 RX ORDER — CARVEDILOL 12.5 MG/1
TABLET ORAL
Qty: 180 TABLET | Refills: 3 | OUTPATIENT
Start: 2022-11-07

## 2022-11-10 ENCOUNTER — TELEPHONE (OUTPATIENT)
Dept: CARDIOLOGY CLINIC | Age: 72
End: 2022-11-10

## 2022-11-10 NOTE — TELEPHONE ENCOUNTER
Pt called his Pharmacy to get his refill for furosemide (Lasix) 40mg. Pt was informed that the med was cancel by the office. Pt states that med help to keep the fluid off hie heart so he won't go into chf.  Pt is requesting a call from the office to discuss this matter and to ask what med LES can prescribe him to help control his fluid.   Please advise

## 2022-11-11 RX ORDER — FUROSEMIDE 40 MG/1
40 TABLET ORAL DAILY
Qty: 60 TABLET | Refills: 3 | Status: SHIPPED | OUTPATIENT
Start: 2022-11-11

## 2022-11-17 ENCOUNTER — NURSE ONLY (OUTPATIENT)
Dept: CARDIOLOGY CLINIC | Age: 72
End: 2022-11-17
Payer: MEDICARE

## 2022-11-17 DIAGNOSIS — I50.22 CHRONIC SYSTOLIC HF (HEART FAILURE) (HCC): ICD-10-CM

## 2022-11-17 DIAGNOSIS — I25.5 ISCHEMIC CARDIOMYOPATHY: ICD-10-CM

## 2022-11-17 DIAGNOSIS — Z95.810 ICD (IMPLANTABLE CARDIOVERTER-DEFIBRILLATOR), SINGLE, IN SITU: Primary | ICD-10-CM

## 2022-11-17 PROCEDURE — 93297 REM INTERROG DEV EVAL ICPMS: CPT | Performed by: NURSE PRACTITIONER

## 2022-11-17 PROCEDURE — G2066 INTER DEVC REMOTE 30D: HCPCS | Performed by: NURSE PRACTITIONER

## 2022-11-17 NOTE — PROGRESS NOTES
Remote transmission received from patient's single chamber ICD home monitor. Transmission shows normal sensing and pacing function. No new arrhythmias/ events recorded. Optivol is within normal range. TriageHF Heart Failure Risk Status on 17-Nov-2022 is Medium*    NP will review. See interrogation under cardiology tab in the [address] [place] [address] [address] field for more details. Will continue to monitor remotely.     (End of 31-day monitoring period 11/17/22)

## 2022-12-22 ENCOUNTER — NURSE ONLY (OUTPATIENT)
Dept: CARDIOLOGY CLINIC | Age: 72
End: 2022-12-22
Payer: MEDICARE

## 2022-12-22 DIAGNOSIS — I25.5 ISCHEMIC CARDIOMYOPATHY: ICD-10-CM

## 2022-12-22 DIAGNOSIS — I50.22 CHRONIC SYSTOLIC HF (HEART FAILURE) (HCC): ICD-10-CM

## 2022-12-22 DIAGNOSIS — Z95.810 ICD (IMPLANTABLE CARDIOVERTER-DEFIBRILLATOR), SINGLE, IN SITU: Primary | ICD-10-CM

## 2022-12-22 PROCEDURE — 93295 DEV INTERROG REMOTE 1/2/MLT: CPT | Performed by: INTERNAL MEDICINE

## 2022-12-22 PROCEDURE — 93296 REM INTERROG EVL PM/IDS: CPT | Performed by: INTERNAL MEDICINE

## 2022-12-22 NOTE — PROGRESS NOTES
Remote transmission received from patient's single chamber ICD home monitor. Transmission shows normal sensing and pacing function. NSVT and 9.4% AF burden noted (amiodarone, Eliquis). Optivol is within normal range. TriageHF Heart Failure Risk Status on 22-Dec-2022 is Medium*    EP/ NP will review. See interrogation under cardiology tab in the [address] [place] [address] [address] field for more details. Will continue to monitor remotely. (End of 91-day monitoring period 12/22/22).

## 2023-02-03 ENCOUNTER — NURSE ONLY (OUTPATIENT)
Dept: CARDIOLOGY CLINIC | Age: 73
End: 2023-02-03

## 2023-02-03 ENCOUNTER — HOSPITAL ENCOUNTER (OUTPATIENT)
Age: 73
Discharge: HOME | End: 2023-02-03
Payer: MEDICARE

## 2023-02-03 ENCOUNTER — OFFICE VISIT (OUTPATIENT)
Dept: CARDIOLOGY CLINIC | Age: 73
End: 2023-02-03
Payer: MEDICARE

## 2023-02-03 VITALS
SYSTOLIC BLOOD PRESSURE: 134 MMHG | BODY MASS INDEX: 30.13 KG/M2 | DIASTOLIC BLOOD PRESSURE: 68 MMHG | HEIGHT: 69 IN | WEIGHT: 203.4 LBS

## 2023-02-03 DIAGNOSIS — I10 BENIGN ESSENTIAL HTN: ICD-10-CM

## 2023-02-03 DIAGNOSIS — I25.5 ISCHEMIC CARDIOMYOPATHY: ICD-10-CM

## 2023-02-03 DIAGNOSIS — Z79.899 LONG-TERM USE OF HIGH-RISK MEDICATION: ICD-10-CM

## 2023-02-03 DIAGNOSIS — R06.02 SOB (SHORTNESS OF BREATH): ICD-10-CM

## 2023-02-03 DIAGNOSIS — I10 PRIMARY HYPERTENSION: ICD-10-CM

## 2023-02-03 DIAGNOSIS — I50.23 ACUTE ON CHRONIC SYSTOLIC HEART FAILURE, NYHA CLASS 3 (HCC): ICD-10-CM

## 2023-02-03 DIAGNOSIS — Z95.810 ICD (IMPLANTABLE CARDIOVERTER-DEFIBRILLATOR), SINGLE, IN SITU: Primary | ICD-10-CM

## 2023-02-03 DIAGNOSIS — I47.20 VT (VENTRICULAR TACHYCARDIA): ICD-10-CM

## 2023-02-03 DIAGNOSIS — I50.32 CHRONIC DIASTOLIC HEART FAILURE (HCC): ICD-10-CM

## 2023-02-03 DIAGNOSIS — I25.10 CORONARY ARTERY DISEASE INVOLVING NATIVE CORONARY ARTERY OF NATIVE HEART WITHOUT ANGINA PECTORIS: Primary | ICD-10-CM

## 2023-02-03 PROCEDURE — 3074F SYST BP LT 130 MM HG: CPT | Performed by: NURSE PRACTITIONER

## 2023-02-03 PROCEDURE — 3078F DIAST BP <80 MM HG: CPT | Performed by: NURSE PRACTITIONER

## 2023-02-03 PROCEDURE — 36415 COLL VENOUS BLD VENIPUNCTURE: CPT

## 2023-02-03 PROCEDURE — 80048 BASIC METABOLIC PNL TOTAL CA: CPT

## 2023-02-03 PROCEDURE — 1124F ACP DISCUSS-NO DSCNMKR DOCD: CPT | Performed by: NURSE PRACTITIONER

## 2023-02-03 PROCEDURE — 83880 ASSAY OF NATRIURETIC PEPTIDE: CPT

## 2023-02-03 PROCEDURE — 99214 OFFICE O/P EST MOD 30 MIN: CPT | Performed by: NURSE PRACTITIONER

## 2023-02-03 PROCEDURE — 84443 ASSAY THYROID STIM HORMONE: CPT

## 2023-02-03 RX ORDER — AMIODARONE HYDROCHLORIDE 200 MG/1
200 TABLET ORAL DAILY
Qty: 30 TABLET | Refills: 0 | Status: SHIPPED | OUTPATIENT
Start: 2023-02-03 | End: 2023-02-07

## 2023-02-03 NOTE — PROGRESS NOTES
Neema Morales transmission received 02.03.23 @ 12:45pm from [facility] for patient's single chamber ICD. Pt seeing NPTS today. REASON FOR TRANSMISSION  Presence of cardiac device  TECHNICAL FINDINGS  No device or lead performance issue(s) observed  ADDITIONAL NOTES  DEVICE ASSESSMENT: Single-lead ICD programmed mode: VVI / 40 bpm. Available daily battery/lead measurements within expected range. Lead trends stable. ARRHYTHMIA SUMMARY: Clinical Status since 28Jun2022 Based on programmed zones, device detected: 23 VT-NS episodes, most recent on 20Dec2022. 10 AF episodes, most recent on 20Dec2022. See attached episode list & stored EGMs for details and rhythm determination. OBSERVATIONS: Possible intrathoracic fluid accumulation based on Optivol/Thoracic Impedance data. TriageHF Heart Failure Risk Status on 03-Feb-2023 is Medium*. See Cardiac Compass. Device appears to be currently functioning as programmed. Pt on amiodarone, Eliquis. NP will review. See interrogation under cardiology tab in the [address] [facility] [address] field for more details.

## 2023-02-03 NOTE — PROGRESS NOTES
[facility]     Outpatient Follow Up Note    Abigail Vargas is 68 y.o. male who presents today with a history of CAD s/p CABG May '14 with post-op short run of SVT and brief AF, s/p DCCV ; CM, s/p ICD , HTN and hyperlipidemia. His other history includes carotid stenosis and PVD s/p Lt common and external iliac PTA         CHIEF COMPLAINT / HPI:  Follow Up secondary to coronary artery disease    Subjective:   He has SOB going/up steps that has been noticeable the past month or so. He is able to walk  ~ moderate distance before needing to stop (hips hurt and to catch his breath). He can work all day long without feeling SOB ([employer])    The patient denies orthopnea/PND. The patient does not have swelling unless he's been up all day standing. The patients weight is stable at 196# . He's up ~9#/4 months by office scales. He feels bloated but likes to eat/munch before bed. The patient is not experiencing palpitations or dizziness. He denies significant chest pain. His BP has been ~ 138/63. When its < 120/ he takes midodrine. He hasn't needed it in 2-3 months    These symptoms show no change since the last OV. With regard to medication therapy the patient has been compliant with prescribed regimen. They have tolerated therapy to date.      Past Medical History:   Diagnosis Date    Acid reflux     Acute MI (HCC)     CAD (coronary artery disease)     COPD (chronic obstructive pulmonary disease) (HCC)     Diverticulitis     Hypertension     Peripheral vascular disease ([address]     Shingles     per pt, started 2021, still visible 3/2022, not open wounds at that time     Social History:    Social History     Tobacco Use   Smoking Status Former    Packs/day: 0.50    Years: 50.00    Pack years: 25.00    Types: Cigarettes    Start date: 2014    Quit date: 12/15/2021    Years since quittin.1   Smokeless Tobacco Never   Tobacco Comments    5-6 CIGS/DAY     Current Medications:  Current Outpatient Medications   Medication Sig Dispense Refill    furosemide (LASIX) 40 MG tablet Take 1 tablet by mouth daily 60 tablet 3    OXYGEN Inhale into the lungs 1.5 liters nightly      polyethylene glycol (MIRALAX) 17 g PACK packet Take 17 g by mouth daily      midodrine (PROAMATINE) 5 MG tablet TAKE 1 TABLET BY MOUTH THREE TIMES DAILY HOLD FOR SYSTOLIC BLOOD PRESSURE GREATER THAN 130 90 tablet 0    atorvastatin (LIPITOR) 20 MG tablet TAKE 1 TABLET BY MOUTH AT BEDTIME 30 tablet 1    Cholecalciferol (VITAMIN D3) 125 MCG (5000 UT) TABS Take by mouth      Zinc 22.5 MG TABS Take by mouth daily      Ascorbic Acid (VITAMIN C PO) Take 5,000 mcg by mouth      amiodarone (CORDARONE) 200 MG tablet Take 1 tablet by mouth daily 30 tablet 1    acetaminophen (TYLENOL) 325 MG tablet Take 650 mg by mouth every 6 hours as needed for Pain      apixaban (ELIQUIS) 5 MG TABS tablet Take 5 mg by mouth daily       gabapentin (NEURONTIN) 100 MG capsule Intended supply: 90 days  Take 200 mg nightly (Patient not taking: Reported on 2/3/2023) 90 capsule 3    gabapentin (NEURONTIN) 100 MG capsule Take 100 mg by mouth daily. (Patient not taking: Reported on 2/3/2023)       No current facility-administered medications for this visit. REVIEW OF SYSTEMS:    CONSTITUTIONAL: No major weight gain or loss, fatigue, weakness, night sweats or fever. HEENT: No new vision difficulties or ringing in the ears. RESPIRATORY: No new SOB, PND, orthopnea or cough. CARDIOVASCULAR: See HPI  GI: No nausea, vomiting, diarrhea, constipation, abdominal pain or changes in bowel habits. : No urinary frequency, urgency, incontinence hematuria or dysuria. SKIN: No cyanosis or skin lesions. MUSCULOSKELETAL: No new muscle or joint pain. NEUROLOGICAL: No syncope or TIA-like symptoms.   PSYCHIATRIC: No anxiety, pain, insomnia or depression    Objective:   PHYSICAL EXAM:        Vitals:    02/03/23 1130 02/03/23 1214   BP: (!) 160/80 134/68   Site: Left Upper Arm    Position: Sitting    Cuff Size: Large Adult    Weight: 203 lb 6.4 oz (92.3 kg)    Height: 5' 8.5\" (1.74 m)        VITALS:  /68   Ht 5' 8.5\" (1.74 m)   Wt 203 lb 6.4 oz (92.3 kg)   BMI 30.48 kg/m²   CONSTITUTIONAL: Cooperative, no apparent distress, and appears well nourished / developed  NEUROLOGIC:  Awake and orientated to person, place and time. PSYCH: Calm affect. SKIN: Warm and dry; erythema Lt lower, lateral back. HEENT: Sclera non-icteric, normocephalic, neck supple, no elevation of JVP, normal carotid pulses with no bruits and thyroid normal size. LUNGS:  No increased work of breathing and diminished LLL  CARDIOVASCULAR:  Regular rate 80 and rhythm with no murmurs, gallops, rubs, or abnormal heart sounds, normal PMI. The apical impulses not displaced  JVP less than 8 cm H2O  Heart tones are crisp and normal  Cervical veins are not engorged  The carotid upstroke is normal in amplitude and contour without delay or bruit  JVP is not elevated  ABDOMEN:  Normal bowel sounds, non-distended and non-tender to palpation  EXT: No edema, no calf tenderness. Pulses are present bilaterally.     DATA:    Lab Results   Component Value Date    ALT 25 04/22/2022    AST 20 04/22/2022    ALKPHOS 86 04/22/2022    BILITOT 0.3 04/22/2022     Lab Results   Component Value Date    CREATININE 1.3 10/11/2022    BUN 21 (H) 10/11/2022     10/11/2022    K 4.5 10/11/2022     10/11/2022    CO2 23 10/11/2022     Lab Results   Component Value Date    TSH 1.14 11/10/2019     Lab Results   Component Value Date    WBC 10.2 09/15/2022    HGB 11.6 (L) 09/15/2022    HCT 36.3 (L) 09/15/2022    MCV 79.6 (L) 09/15/2022     09/15/2022       10/27/21:   Ref Range & Units 1 yr ago Comments   CHOLESTEROL <200 mg/dL 151     TRIGLYCERIDE <150 mg/dL 128     HDL CHOLESTEROL >50 mg/dL 41 Low      LDL CHOLESTEROL (CALCULATED) <130 mg/dL 84     TOTAL NON HDL CHOL <130 mg/dL 110         Radiology Review:  Pertinent images / reports were reviewed as a part of this visit and reveals the following:    [facility] Dec '16  Cath with occluded SVG to RCA,patent LIMA to LAD,patent SVG to diag,OM1 with good retrograde filling of LAD,patent LIMA to LAD  EF 35-40% with inf hypokinesis,LVEDP 25    Stress test: July '21;  Summary    The overall quality of the study is good. There is subdiaphragmatic    attenuation. Left ventricular cavity is noted to be mildly enlarged at 147 ml. The right    ventricle is normal in size. SPECT images demonstrate a small area of mildly decreased perfusion in the    mid inferior, mid-inferolateral, and basal inferior segments. The defect is    present at rest and worsens with stress, consistent with mixed ischemia and    scar. There is associated wall motion abnormality. The sum stress score is 8. No visual TID. Calculated TID is 1.09. Left ventricular ejection fraction is normal at 53%. Moderate risk scan given ischemia and mildly elevated LV volumes       [id number] Score for Atrial Fibrillation Stroke Risk   Risk   Factors  Component Value   C CHF Yes 1   H HTN Yes 1   A2 Age >= 76 No,  (78 y.o.) 0   D DM No 0   S2 Prior Stroke/TIA No 0   V Vascular Disease No 0   A Age 74-69 Yes,  (78 y.o.) 1   Sc Sex male 0    [id number]  Score  3   Score last updated 2/6/23 7:34 AM EST      CTA chest : Feb' 22  No CT evidence of acute pulmonary embolism. Slight interval increase in the degree of predominantly peripheral bilateral pulmonary opacities when compared with the CT of the chest from 2/2/2022. Findings remain consistent with Covid pneumonia. Superimposed component of pulmonary edema not   excluded. Stable fusiform dilatation of the ascending thoracic aorta measuring up to 4 cm. Last Echo: Feb '22  Summary:   Aortic Valve leaflets appear thickened. The left ventricular wall motion is normal.   The left ventricular function is moderately reduced.    Overall left ventricular ejection fraction is estimated to be 35-40%. The systolic pulmonary artery pressure cannot be estimated due to insufficient   tricuspid regurgitation. LA dimension: 5.5 cm (2.1-3.7)       Device interrogation: 2/3/23:  ARRHYTHMIA SUMMARY: Clinical Status since 28Jun2022 Based on programmed zones, device detected: 23 VT-NS episodes, most recent on  20Dec2022. 10 AF episodes, most recent on 20Dec2022. See attached episode list & stored EGMs for details and rhythm determination. OBSERVATIONS: Possible intrathoracic fluid accumulation based on Optivol/Thoracic Impedance data. [facility] Heart Failure Risk Status on 03-Feb -2023 is Medium*. See Cardiac Compass. Device appears to be currently functioning as programmed. Pt on amiodarone, Eliquis. Assessment:      Diagnosis Orders   1. Coronary artery disease involving native coronary artery of native heart without angina pectoris   ~stable : denies angina  ~s/p CABG May '14  ~statin        2. Ischemic cardiomyopathy   ~EF 35-40% on echo from Feb '22  ~EF 53% on NM in July '21  ~s/p ICD March '22  ~metoprolol stopped d/t hypotension Jun '22  ~AF & VT noted in Dec '22 on device Echo 2D w doppler w color complete      3. Primary hypertension   ~elevated on arrival   ~controlled after recheck       4. Chronic diastolic heart failure (HCC)   ~mild volume overload with thoracic impedence check today  ~lasix 40 mg daily   ~has not been on ace/ARB d/t hypotension. Last used midodrine 2-3 months ago       5. SOB (shortness of breath)   ~vague description of SOB vs from hip pain  ~diminished LLL to ausculation with wt gain  Brain Natriuretic Peptide    Comprehensive Metabolic Panel      6. Long-term use of high-risk medication   ~amiodarone   ~taking Eliquis once daily ; ??? TSH with Reflex        I had the opportunity to review the clinical symptoms and presentation of Leandra Quezada.    Plan:     CMP/BNP to better assess SOB from volume overload  Echocardiogram : reassess LVEF  TSH AAD  Increase Eliquis to 5 mg bid  Resume metoprolol 25 mg bid : AF/VT  F/U in six weeks / test dependent    Overall the patient is stable from CV standpoint    I have addresed the patient's cardiac risk factors and adjusted pharmacologic treatment as needed. In addition, I have reinforced the need for patient directed risk factor modification. Further evaluation will be based upon the patient's clinical course and testing results. All questions and concerns were addressed to the patient. Alternatives to my treatment were discussed. The patient is not currently smoking. The risks related to smoking were reviewed with the patient. Recommend maintaining a smoke-free lifestyle. Patient is not on a beta-blocker : hx hypotension   Patient is not on an ace-i/ARB : d/t hypotension along with spironolactone  Patient is on a statin    anti-coagulation has been recommended / prescribed for this patient. Education conducted on adverse reactions including bleeding was discussed. Angiotension inhibitor/angiotension receptor blocker has __ been prescribed / recommended for congestive heart failure. Daily weight, low sodium diet were discussed. Patient instructed to call the office with a weight gain: > 3 # over night or 5# in one week; swelling, SOB/orthopnea/PND    The patient verbalizes understanding not to stop medications without discussing with us. Discussed exercise: 30-60 minutes 7 days/week  Discussed Low saturated fat/HAYDEE diet. Thank you for allowing to us to participate in the care of [address]    Documentation of today's visit sent to PCP

## 2023-02-03 NOTE — PATIENT INSTRUCTIONS
DOCS - [facility] - [place]  [phone] Google reviews  Skin care clinic in [facility]  Address: [address] [place]  Hours:   Open ? Closes 5? PM  Phone: [phone]    Labs today before making any medication changes    Echocardiogram : reassess your heart's strength    Appt in 6 weeks depending on your test results

## 2023-02-04 LAB
ANION GAP SERPL CALCULATED.3IONS-SCNC: 17 MMOL/L (ref 3–16)
BUN BLDV-MCNC: 19 MG/DL (ref 7–20)
CALCIUM SERPL-MCNC: 9.6 MG/DL (ref 8.3–10.6)
CHLORIDE BLD-SCNC: 105 MMOL/L (ref 99–110)
CO2: 21 MMOL/L (ref 21–32)
CREAT SERPL-MCNC: 1.2 MG/DL (ref 0.8–1.3)
GFR SERPL CREATININE-BSD FRML MDRD: >60 ML/MIN/{1.73_M2}
GLUCOSE BLD-MCNC: 108 MG/DL (ref 70–99)
POTASSIUM SERPL-SCNC: 5.3 MMOL/L (ref 3.5–5.1)
PRO-BNP: 1294 PG/ML (ref 0–124)
SODIUM BLD-SCNC: 143 MMOL/L (ref 136–145)
TSH REFLEX: 0.77 UIU/ML (ref 0.27–4.2)

## 2023-02-06 ENCOUNTER — TELEPHONE (OUTPATIENT)
Dept: CARDIOLOGY CLINIC | Age: 73
End: 2023-02-06

## 2023-02-06 DIAGNOSIS — I50.32 CHRONIC DIASTOLIC HEART FAILURE (HCC): Primary | ICD-10-CM

## 2023-02-06 NOTE — TELEPHONE ENCOUNTER
Fransisco Mejia, APRN - CNP  P [facility]  TSH  ok     BNP and K+ up     Have him take an extra 40 mg of lasix for the next 4 days   Resume metoprolol tartrate 25 mg bid (prescription sent in)   ~repeat BNP / BMP after one week (order placed     I left a message for him to call back

## 2023-02-07 RX ORDER — AMIODARONE HYDROCHLORIDE 200 MG/1
200 TABLET ORAL DAILY
Qty: 90 TABLET | Refills: 3 | Status: SHIPPED | OUTPATIENT
Start: 2023-02-07

## 2023-02-27 ENCOUNTER — PROCEDURE VISIT (OUTPATIENT)
Dept: CARDIOLOGY CLINIC | Age: 73
End: 2023-02-27
Payer: MEDICARE

## 2023-02-27 DIAGNOSIS — I25.5 ISCHEMIC CARDIOMYOPATHY: ICD-10-CM

## 2023-02-27 LAB
LV EF: 53 %
LVEF MODALITY: NORMAL

## 2023-02-27 PROCEDURE — 93306 TTE W/DOPPLER COMPLETE: CPT | Performed by: INTERNAL MEDICINE

## 2023-03-02 ENCOUNTER — TELEPHONE (OUTPATIENT)
Dept: CARDIOLOGY CLINIC | Age: 73
End: 2023-03-02

## 2023-03-02 NOTE — TELEPHONE ENCOUNTER
Pt called  to request his results from his Echo on 02/27 at [facility].   Please advise

## 2023-03-15 ENCOUNTER — OFFICE VISIT (OUTPATIENT)
Dept: CARDIOLOGY CLINIC | Age: 73
End: 2023-03-15
Payer: MEDICARE

## 2023-03-15 VITALS
OXYGEN SATURATION: 100 % | WEIGHT: 200.2 LBS | DIASTOLIC BLOOD PRESSURE: 42 MMHG | HEART RATE: 55 BPM | BODY MASS INDEX: 29.65 KG/M2 | SYSTOLIC BLOOD PRESSURE: 126 MMHG | HEIGHT: 69 IN

## 2023-03-15 DIAGNOSIS — I48.92 ATRIAL FLUTTER, UNSPECIFIED TYPE (HCC): ICD-10-CM

## 2023-03-15 DIAGNOSIS — I50.32 CHRONIC DIASTOLIC HEART FAILURE (HCC): Primary | ICD-10-CM

## 2023-03-15 DIAGNOSIS — I47.20 VT (VENTRICULAR TACHYCARDIA): ICD-10-CM

## 2023-03-15 DIAGNOSIS — I25.5 ISCHEMIC CARDIOMYOPATHY: ICD-10-CM

## 2023-03-15 DIAGNOSIS — I10 ESSENTIAL HYPERTENSION: ICD-10-CM

## 2023-03-15 DIAGNOSIS — I25.83 CORONARY ARTERY DISEASE DUE TO LIPID RICH PLAQUE: Chronic | ICD-10-CM

## 2023-03-15 DIAGNOSIS — F17.200 TOBACCO DEPENDENCE SYNDROME: ICD-10-CM

## 2023-03-15 DIAGNOSIS — I25.10 CORONARY ARTERY DISEASE DUE TO LIPID RICH PLAQUE: Chronic | ICD-10-CM

## 2023-03-15 PROCEDURE — 3074F SYST BP LT 130 MM HG: CPT | Performed by: NURSE PRACTITIONER

## 2023-03-15 PROCEDURE — 3078F DIAST BP <80 MM HG: CPT | Performed by: NURSE PRACTITIONER

## 2023-03-15 PROCEDURE — 99214 OFFICE O/P EST MOD 30 MIN: CPT | Performed by: NURSE PRACTITIONER

## 2023-03-15 PROCEDURE — 1124F ACP DISCUSS-NO DSCNMKR DOCD: CPT | Performed by: NURSE PRACTITIONER

## 2023-03-15 NOTE — PROGRESS NOTES
[address]     Outpatient Follow Up Note    CHIEF COMPLAINT / HPI:  Follow Up secondary to dizziness, hypotension     Subjective:   Sid Mendez is 68 y.o. male who presents today with a history of hypotension, syncope, VT, CAD s/p CABG , ICM s/p ICD 3/2022, HLD, atrial flutter      Today, Mr James Bui denies any chest pain, palpitations, dizziness, or edema. He has exertional shortness of breath at times but overall feels like it is stable. Home weights have been stable between 197 lbs and 200 lbs and he is down 3 lbs per office scale since LOV. BP has been stable since starting metoprolol and he has not needed midodrine. Mr James Bui says he takes his eliquis once a day. His biggest issue is a rash on his left back for the past 13-14 months. He is following with a dermatologist, but says the pain is getting worse and he can hardly walk or wear clothes. Mr James Bui continues to smoke 1 pack every three days. With regard to medication therapy the patient has been compliant with prescribed regimen. They have tolerated therapy to date.      Past Medical History:   Diagnosis Date    Acid reflux     Acute MI (HCC)     CAD (coronary artery disease)     COPD (chronic obstructive pulmonary disease) (HCC)     Diverticulitis     Hypertension     Peripheral vascular disease ([facility] [address]     Shingles     per pt, started 2021, still visible 3/2022, not open wounds at that time     Social History:    Social History     Tobacco Use   Smoking Status Former    Packs/day: 0.50    Years: 50.00    Pack years: 25.00    Types: Cigarettes    Start date: 2014    Quit date: 12/15/2021    Years since quittin.2   Smokeless Tobacco Never   Tobacco Comments    5-6 CIGS/DAY     Current Medications:  Current Outpatient Medications   Medication Sig Dispense Refill    amiodarone (CORDARONE) 200 MG tablet TAKE 1 TABLET BY MOUTH DAILY 90 tablet 3    apixaban (ELIQUIS) 5 MG TABS tablet Take 1 tablet by mouth 2 times daily 60 tablet 3    metoprolol tartrate (LOPRESSOR) 25 MG tablet Take 1 tablet by mouth 2 times daily 60 tablet 2    furosemide (LASIX) 40 MG tablet Take 1 tablet by mouth daily 60 tablet 3    OXYGEN Inhale into the lungs 1.5 liters nightly      polyethylene glycol (MIRALAX) 17 g PACK packet Take 17 g by mouth daily      midodrine (PROAMATINE) 5 MG tablet TAKE 1 TABLET BY MOUTH THREE TIMES DAILY HOLD FOR SYSTOLIC BLOOD PRESSURE GREATER THAN 130 90 tablet 0    atorvastatin (LIPITOR) 20 MG tablet TAKE 1 TABLET BY MOUTH AT BEDTIME 30 tablet 1    Cholecalciferol (VITAMIN D3) 125 MCG (5000 UT) TABS Take by mouth      Zinc 22.5 MG TABS Take by mouth daily      Ascorbic Acid (VITAMIN C PO) Take 5,000 mcg by mouth      acetaminophen (TYLENOL) 325 MG tablet Take 650 mg by mouth every 6 hours as needed for Pain       No current facility-administered medications for this visit. REVIEW OF SYSTEMS:    CONSTITUTIONAL: No major weight gain or loss, fatigue, weakness, night sweats or fever. HEENT: No new vision difficulties or ringing in the ears. RESPIRATORY: No new SOB, PND, orthopnea or cough. CARDIOVASCULAR: See HPI  GI: No nausea, vomiting, diarrhea, constipation, abdominal pain or changes in bowel habits. : No urinary frequency, urgency, incontinence hematuria or dysuria. SKIN: No cyanosis or skin lesions. Pain at rash on left hip/back   MUSCULOSKELETAL: No new muscle or joint pain. NEUROLOGICAL: No syncope or TIA-like symptoms.   PSYCHIATRIC: No anxiety, pain, insomnia or depression    Objective:   PHYSICAL EXAM:      Wt Readings from Last 3 Encounters:   03/15/23 200 lb 3.2 oz (90.8 kg)   02/03/23 203 lb 6.4 oz (92.3 kg)   10/11/22 196 lb 3.2 oz (89 kg)          VITALS:  BP (!) 126/42 (Position: Sitting)   Pulse 55   Ht 5' 8.5\" (1.74 m)   Wt 200 lb 3.2 oz (90.8 kg)   SpO2 100%   BMI 30.00 kg/m²   CONSTITUTIONAL: Cooperative, no apparent distress, and appears well nourished / developed  NEUROLOGIC:  Awake and orientated to person, place and time. PSYCH: Calm affect. SKIN: Warm and dry. Rash left hip/back  HEENT: Sclera non-icteric, normocephalic, neck supple, no elevation of JVP, normal carotid pulses with no bruits and thyroid normal size. LUNGS:  No increased work of breathing and clear to auscultation, no crackles or wheezing  CARDIOVASCULAR:  Regular rate and rhythm with no murmurs, gallops, rubs, or abnormal heart sounds, normal PMI. The apical impulses not displaced  Heart tones are crisp and normal  Cervical veins are not engorged  The carotid upstroke is normal in amplitude and contour without delay or bruit  JVP is not elevated  ABDOMEN:  Normal bowel sounds, non-distended and non-tender to palpation  EXT: No edema, no calf tenderness. Pulses are present bilaterally.     DATA:    Lab Results   Component Value Date    ALT 25 04/22/2022    AST 20 04/22/2022    ALKPHOS 86 04/22/2022    BILITOT 0.3 04/22/2022     Lab Results   Component Value Date    CREATININE 1.2 02/03/2023    BUN 19 02/03/2023     02/03/2023    K 5.3 (H) 02/03/2023     02/03/2023    CO2 21 02/03/2023     Lab Results   Component Value Date    TSH 1.14 11/10/2019     Lab Results   Component Value Date    WBC 10.2 09/15/2022    HGB 11.6 (L) 09/15/2022    HCT 36.3 (L) 09/15/2022    MCV 79.6 (L) 09/15/2022     09/15/2022     No components found for: CHLPL  Lab Results   Component Value Date    TRIG 88 11/28/2017    TRIG 105 06/08/2015    TRIG 81 05/12/2014     Lab Results   Component Value Date    HDL 41 11/28/2017    HDL 30 (L) 06/08/2015    HDL 39 (L) 05/12/2014     Lab Results   Component Value Date    LDLCALC 121 (H) 11/28/2017    LDLCALC 73 06/08/2015    LDLCALC 139 (H) 05/12/2014     Lab Results   Component Value Date    LABVLDL 18 11/28/2017    LABVLDL 21 06/08/2015    LABVLDL 16 05/12/2014      No results found for: BNP  Radiology Review:  Pertinent images / reports were reviewed as a part of this visit and reveals the following:    Last Echo: 23  Summary   -Normal left ventricle size, mild to moderate concentric wall thickness, and   borderline systolic function with an estimated ejection fraction of 50-55%. -There is mild hypokinesis of the basal inferior walls. -Grade II diastolic dysfunction with elevated LV filling   pressures. E/e\"=19.0.   -Mild mitral regurgitation.   -The aortic valve is mildly thickened/calcified but opens well with normal   gradients. -Mild aortic regurgitation.   -Trivial tricuspid regurgitation.   -Estimated pulmonary artery systolic pressure is normal at 28-32 mmHg   assuming a right atrial pressure of 8 mmHg. -The left atrium is moderately dilated. -Pacemaker / ICD lead is visualized in the right heart. Last Stress Test: 21  Summary    The overall quality of the study is good. There is subdiaphragmatic    attenuation. Left ventricular cavity is noted to be mildly enlarged at 147 ml. The right    ventricle is normal in size. SPECT images demonstrate a small area of mildly decreased perfusion in the    mid inferior, mid-inferolateral, and basal inferior segments. The defect is    present at rest and worsens with stress, consistent with mixed ischemia and    scar. There is associated wall motion abnormality. The sum stress score is 8. No visual TID. Calculated TID is 1.09. Left ventricular ejection fraction is normal at 53%.         Moderate risk scan given ischemia and mildly elevated LV volumes        Recommendation    Discussed with Dr. Jessica Davis, patient to follow up     Last Angiogram: 2016  Cath with occluded SVG to RCA,patent LIMA to LAD,patent SVG to diag,OM1 with good retrograde filling of LAD,patent LIMA to LAD  EF 35-40% with inf hypokinesis,LVEDP 25  Will add daily lasix ,aldactone,imdur,f/up in 1 month  Also needs echo     Last EC/10/22  Sinus  Bradycardia   WITHIN NORMAL LIMITS    [id number] Score for Atrial Fibrillation Stroke Risk   Risk   Factors Component Value   C CHF Yes 1   H HTN Yes 1   A2 Age >= 76 No,  (78 y.o.) 0   D DM No 0   S2 Prior Stroke/TIA No 0   V Vascular Disease No 0   A Age 74-69 Yes,  (78 y.o.) 1   Sc Sex male 0    [id number]  Score  3   Score last updated 9/14/22 5:01 PM EDT    Click here for a link to the UpToDate guideline \"Atrial Fibrillation: Anticoagulation therapy to prevent embolization    Disclaimer: Risk Score calculation is dependent on accuracy of patient problem list and past encounter diagnosis. Assessment:      Diagnosis Orders   1.  2. Chronic diastolic heart failure ([address]   Ischemic cardiomyopathy   ~ appears compensated  ~ Echo 2/27/23 EF 50-55%, grade II DD  ~ Echo 1/14/22 EF 50-55%, grade II DD  ~ Echo 2019 EF 15-20%  ~ s/p single chamber ICD 3/17/22  ~ spironolactone and ACE/ARB has been stopped d/t hypotension in the past  ~ lasix / metoprolol        3. Coronary artery disease involving native coronary artery of native heart without angina pectoris   ~ stable, no complaints of angina   ~ GXT 7/14/21 mildly decreased perfusion in the mid inferior, mid-inferolateral, and basal inferior segments. Defect present at rest and worsens on stress, c/w mixed ischemia and scar. There is associated wall motion abnormality   ~ s/p CABG 2014  ~ statin, BB, no ASA d/t eliquis        4. Hypertension   ~ controlled; 126/52 on recheck by me in office   ~ has midodrine PRN for SBP <120 (hasn't needed in months)       5. Mixed hyperlipidemia   ~ atorvastatin 20  ~ lipids 10/2021  HDL 41 LDL 84 Trig 128       6. Atrial flutter, unspecified type ([address]   ~ stable, denies palpitations   ~ s/p DCCV 2019  ~ eliquis / amiodarone (TSH normal 2/3/23)       7. Snores   ~ referral sent for sleep medicine LOV        8. Syncope / VT         ~ sustained VT 3/2022 > chest compressions and single chamber ICD placed 3/17/22         ~ on metoprolol          ~ follows with EP    9.     Tobacco abuse         ~ smoking 1 pack every 3 days     ~ encouraged smoking cessation     I had the opportunity to review the clinical symptoms and presentation of Leandra Dawson.   Plan:     Take eliquis twice a day  Follow up with Dr Reece in 3-4 months and Dr Morataya in June    Overall the patient is stable from CV standpoint    I have addresed the patient's cardiac risk factors and adjusted pharmacologic treatment as needed. In addition, I have reinforced the need for patient directed risk factor modification.    Further evaluation will be based upon the patient's clinical course and testing results.    All questions and concerns were addressed to the patient.    The patient is currently smoking. The risks related to smoking were reviewed with the patient. Recommend maintaining a smoke-free lifestyle. Products available for smoking cessation were discussed in detail.    Patient is on a beta-blocker  Patient is not on an ace-i/ARB; hypotension  Patient is on a statin    Anti-coagulation has been recommended / prescribed for this patient. Education conducted on adverse reactions including bleeding was discussed.    Angiotension inhibitor/angiotension receptor blocker has not been prescribed / recommended for congestive heart failure (hypotension). Daily weight, low sodium diet were discussed. Patient instructed to call the office with a weight gain: > 3 # over night or 5# in one week; swelling, SOB/orthopnea/PND    The patient verbalizes understanding not to stop medications without discussing with us.    Discussed exercise: 30-60 minutes 7 days/week  Discussed Low saturated fat/HAYDEE diet.     Thank you for allowing to us to participate in the care of Leandra Dawson.    Electronically signed by GEORGE Fregoso CNP on 3/15/2023 at 11:31 AM     Documentation of today's visit sent to PCP

## 2023-03-15 NOTE — PATIENT INSTRUCTIONS
Take Eliquis twice a day    Weigh yourself daily. Call for weight gain of 3 lbs overnight, 5 lbs in week.  Do not consume >2000 mg of sodium daily or >64 oz of fluid daily

## 2023-03-17 RX ORDER — AMIODARONE HYDROCHLORIDE 200 MG/1
200 TABLET ORAL DAILY
Qty: 30 TABLET | Refills: 2 | Status: SHIPPED | OUTPATIENT
Start: 2023-03-17

## 2023-03-23 ENCOUNTER — NURSE ONLY (OUTPATIENT)
Dept: CARDIOLOGY CLINIC | Age: 73
End: 2023-03-23

## 2023-03-23 DIAGNOSIS — Z95.810 ICD (IMPLANTABLE CARDIOVERTER-DEFIBRILLATOR), SINGLE, IN SITU: ICD-10-CM

## 2023-03-23 DIAGNOSIS — I50.22 CHRONIC SYSTOLIC HF (HEART FAILURE) (HCC): ICD-10-CM

## 2023-03-23 DIAGNOSIS — I25.5 ISCHEMIC CARDIOMYOPATHY: Primary | ICD-10-CM

## 2023-03-23 NOTE — PROGRESS NOTES
Remote transmission received from patient's single chamber ICD home monitor. Transmission shows normal sensing and pacing function. No arrhythmias/ or events. Optivol is within normal range. TriageHF Heart Failure Risk Status on 23-Mar-2023 is Low    EP/ NP will review. See interrogation under cardiology tab in the [address] [place] [address] [address] field for more details. Will continue to monitor remotely.     (End of 91-day monitoring period 3/23/23)

## 2023-03-23 NOTE — RESULT ENCOUNTER NOTE
Diagnosis: pancreatic ca    Regimen: gemzar/xeloda  Cycle/Day: c4 d1 after MD brady Torres is supervising provider today.    ECO - No physically strenuous activity, but ambulatory and able to carry out light or sedentary work.    Patient presents with  R   port accessed by lab phlebotomist  with 20 gauge 3/4 inch non coring needle.  Blood drawn.  Implanted port site is not sensitive to touch, not swollen, not warm and not reddened.       Pre-Treatment: - Treatment consent signed  - Patient has valid pre-authorization  - VS completed  - BSA double checked  - Premed orders, including hydration, are verified prior to administration  - Treatment parameters verified in patient protocol  - Chemotherapy doses independently doubled checked & verified by two practitioners  - Chemotherapy infusion pump settings are double checked & verified by two practitioners  - Patient is identified by first & last name, Date of birth that has been verified by two practitioners with the patient chairside.    Treatment: Refer to LDA and MAR for line assessment and medication administration  Refer to Toxicity Assessment doc flowsheet   Blood return confirmed before, during and after chemotherapy administered  Infusion pump used for non-vesicant drugs    Post Treatment: Treatment tolerated well; no adverse reaction    Transfusion: Not needed    Integrative Medicine: No    Education: No new instructions needed    Next appointment scheduled: 1 wk  Patient instructed to call the office with any questions or concerns.    Patient Discharged: patient discharged to home per self, ambulatory       Reviewed.  Annye Mcardle

## 2023-04-07 ENCOUNTER — APPOINTMENT (OUTPATIENT)
Dept: GENERAL RADIOLOGY | Age: 73
End: 2023-04-07
Payer: MEDICARE

## 2023-04-07 ENCOUNTER — HOSPITAL ENCOUNTER (EMERGENCY)
Age: 73
Discharge: HOME | End: 2023-04-07
Payer: MEDICARE

## 2023-04-07 VITALS
BODY MASS INDEX: 29.97 KG/M2 | WEIGHT: 200 LBS | HEART RATE: 61 BPM | SYSTOLIC BLOOD PRESSURE: 151 MMHG | OXYGEN SATURATION: 95 % | RESPIRATION RATE: 15 BRPM | DIASTOLIC BLOOD PRESSURE: 83 MMHG | TEMPERATURE: 97.6 F

## 2023-04-07 DIAGNOSIS — I73.9 CLAUDICATION OF LEFT LOWER EXTREMITY (HCC): Primary | ICD-10-CM

## 2023-04-07 DIAGNOSIS — R60.0 EDEMA OF LEFT LOWER EXTREMITY: ICD-10-CM

## 2023-04-07 DIAGNOSIS — I73.9 PAD (PERIPHERAL ARTERY DISEASE) (HCC): ICD-10-CM

## 2023-04-07 LAB
ALBUMIN SERPL-MCNC: 3.3 G/DL (ref 3.4–5)
ALBUMIN/GLOB SERPL: 0.9 {RATIO} (ref 1.1–2.2)
ALP SERPL-CCNC: 115 U/L (ref 40–129)
ALT SERPL-CCNC: 17 U/L (ref 10–40)
ANION GAP SERPL CALCULATED.3IONS-SCNC: 12 MMOL/L (ref 3–16)
APTT BLD: 39.7 SEC (ref 22.7–35.9)
AST SERPL-CCNC: 13 U/L (ref 15–37)
BASOPHILS # BLD: 0 K/UL (ref 0–0.2)
BASOPHILS NFR BLD: 0.5 %
BILIRUB SERPL-MCNC: 0.3 MG/DL (ref 0–1)
BUN SERPL-MCNC: 20 MG/DL (ref 7–20)
CALCIUM SERPL-MCNC: 9.3 MG/DL (ref 8.3–10.6)
CHLORIDE SERPL-SCNC: 105 MMOL/L (ref 99–110)
CO2 SERPL-SCNC: 23 MMOL/L (ref 21–32)
CREAT SERPL-MCNC: 1.4 MG/DL (ref 0.8–1.3)
CRP SERPL-MCNC: 97.4 MG/L (ref 0–5.1)
DEPRECATED RDW RBC AUTO: 18.8 % (ref 12.4–15.4)
EOSINOPHIL # BLD: 0.2 K/UL (ref 0–0.6)
EOSINOPHIL NFR BLD: 1.9 %
ERYTHROCYTE [SEDIMENTATION RATE] IN BLOOD BY WESTERGREN METHOD: 120 MM/HR (ref 0–20)
GFR SERPLBLD CREATININE-BSD FMLA CKD-EPI: 53 ML/MIN/{1.73_M2}
GLUCOSE SERPL-MCNC: 170 MG/DL (ref 70–99)
HCT VFR BLD AUTO: 35.3 % (ref 40.5–52.5)
HGB BLD-MCNC: 11.4 G/DL (ref 13.5–17.5)
INR PPP: 1.27 (ref 0.84–1.16)
LACTATE BLDV-SCNC: 1.8 MMOL/L (ref 0.4–2)
LYMPHOCYTES # BLD: 1.8 K/UL (ref 1–5.1)
LYMPHOCYTES NFR BLD: 19.3 %
MCH RBC QN AUTO: 25.3 PG (ref 26–34)
MCHC RBC AUTO-ENTMCNC: 32.2 G/DL (ref 31–36)
MCV RBC AUTO: 78.5 FL (ref 80–100)
MONOCYTES # BLD: 0.5 K/UL (ref 0–1.3)
MONOCYTES NFR BLD: 5.2 %
NEUTROPHILS # BLD: 6.8 K/UL (ref 1.7–7.7)
NEUTROPHILS NFR BLD: 73.1 %
PLATELET # BLD AUTO: 308 K/UL (ref 135–450)
PMV BLD AUTO: 7.6 FL (ref 5–10.5)
POTASSIUM SERPL-SCNC: 4.4 MMOL/L (ref 3.5–5.1)
PROT SERPL-MCNC: 7 G/DL (ref 6.4–8.2)
PROTHROMBIN TIME: 15.9 SEC (ref 11.5–14.8)
RBC # BLD AUTO: 4.49 M/UL (ref 4.2–5.9)
SODIUM SERPL-SCNC: 140 MMOL/L (ref 136–145)
WBC # BLD AUTO: 9.4 K/UL (ref 4–11)

## 2023-04-07 PROCEDURE — 86140 C-REACTIVE PROTEIN: CPT

## 2023-04-07 PROCEDURE — 73590 X-RAY EXAM OF LOWER LEG: CPT

## 2023-04-07 PROCEDURE — 80053 COMPREHEN METABOLIC PANEL: CPT

## 2023-04-07 PROCEDURE — 85652 RBC SED RATE AUTOMATED: CPT

## 2023-04-07 PROCEDURE — 93971 EXTREMITY STUDY: CPT

## 2023-04-07 PROCEDURE — 85025 COMPLETE CBC W/AUTO DIFF WBC: CPT

## 2023-04-07 PROCEDURE — 85610 PROTHROMBIN TIME: CPT

## 2023-04-07 PROCEDURE — 85730 THROMBOPLASTIN TIME PARTIAL: CPT

## 2023-04-07 PROCEDURE — 83605 ASSAY OF LACTIC ACID: CPT

## 2023-04-07 ASSESSMENT — PAIN DESCRIPTION - ORIENTATION: ORIENTATION: LEFT

## 2023-04-07 ASSESSMENT — ENCOUNTER SYMPTOMS
COUGH: 0
CHEST TIGHTNESS: 0
DIARRHEA: 0
ABDOMINAL PAIN: 0
SHORTNESS OF BREATH: 0
VOMITING: 0
NAUSEA: 0

## 2023-04-07 ASSESSMENT — PAIN - FUNCTIONAL ASSESSMENT: PAIN_FUNCTIONAL_ASSESSMENT: 0-10

## 2023-04-07 ASSESSMENT — PAIN DESCRIPTION - LOCATION: LOCATION: ANKLE

## 2023-04-07 ASSESSMENT — PAIN SCALES - GENERAL: PAINLEVEL_OUTOF10: 7

## 2023-04-07 NOTE — ED PROVIDER NOTES
not have any bony tenderness on palpation. It is difficult to palpate dorsalis pedis and posterior tibial pulses. These are easily found with a Doppler. His capillary refill is about 3 seconds. Skin:     General: Skin is warm and dry. Coloration: Skin is not pale. Neurological:      Mental Status: He is alert and oriented to person, place, and time. Psychiatric:         Behavior: Behavior normal.           DIAGNOSTIC RESULTS   LABS:    Labs Reviewed   CBC WITH AUTO DIFFERENTIAL - Abnormal; Notable for the following components:       Result Value    Hemoglobin 11.4 (*)     Hematocrit 35.3 (*)     MCV 78.5 (*)     MCH 25.3 (*)     RDW 18.8 (*)     All other components within normal limits   COMPREHENSIVE METABOLIC PANEL - Abnormal; Notable for the following components:    Glucose 170 (*)     Creatinine 1.4 (*)     Est, Glom Filt Rate 53 (*)     Albumin 3.3 (*)     Albumin/Globulin Ratio 0.9 (*)     AST 13 (*)     All other components within normal limits   APTT - Abnormal; Notable for the following components:    aPTT 39.7 (*)     All other components within normal limits   PROTIME-INR - Abnormal; Notable for the following components:    Protime 15.9 (*)     INR 1.27 (*)     All other components within normal limits   SEDIMENTATION RATE - Abnormal; Notable for the following components:    Sed Rate 120 (*)     All other components within normal limits   C-REACTIVE PROTEIN - Abnormal; Notable for the following components:    CRP 97.4 (*)     All other components within normal limits   LACTIC ACID       When ordered only abnormal lab results are displayed. All other labs were within normal range or not returned as of this dictation. EKG: When ordered, EKG's are interpreted by the Emergency Department Physician in the absence of a cardiologist.  Please see their note for interpretation of EKG.     RADIOLOGY:   Non-plain film images such as CT, Ultrasound and MRI are read by the radiologist. Elis Dunn

## 2023-05-08 NOTE — PROGRESS NOTES
[facility]  Consultation Note    Chief Complaint / Reason for Consultation  PVD    History of Present Illness  Patient is a 68 y.o. male with history of Rosa class IIb left lower extremity vascular disease with history of left common femoral and external iliac artery endarterectomy as well as patch angioplasty with left common femoral to posterior tibial artery bypass with ipsilateral reversed great saphenous vein on July 30, 2021. 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 associate medical history significant for hypertension, hyperlipidemia, coronary artery disease, A-fib on chronic anticoagulation, ischemic cardiomyopathy, congestive heart failure, tobacco abuse. Patient was recently in the emergency department April 7, 2023 for leg pain and swelling. At that time venous duplex showed a patent left lower extremity bypass. Complains of pain in his left ankle. He does have fairly significant swelling but does not wear compression. Review of Systems     Denies fevers, chills, chest pain, shortness of breath, nausea, vomiting, hematemesis, diarrhea, constipation, melena, hematochezia, wt changes, vision problems, blindness, hearing problems, facial droop, slurred speech, extremity weakness, extremity numbness, dysuria. Past Medical History:   has a past medical history of Acid reflux, Acute MI ([address] CAD (coronary artery disease), COPD (chronic obstructive pulmonary disease) ([address] Diverticulitis, Hypertension, Peripheral vascular disease ([address] and Shingles. Past Surgical History:   has a past surgical history that includes vascular surgery; Cardiac surgery; Colonoscopy; Cardiac catheterization; and pacemaker placement.      Medications:  Current Outpatient Medications on File Prior to Visit   Medication Sig Dispense Refill    amiodarone (CORDARONE) 200 MG tablet TAKE 1 TABLET BY MOUTH

## 2023-05-09 ENCOUNTER — OFFICE VISIT (OUTPATIENT)
Dept: VASCULAR SURGERY | Age: 73
End: 2023-05-09
Payer: MEDICARE

## 2023-05-09 VITALS
SYSTOLIC BLOOD PRESSURE: 130 MMHG | HEIGHT: 69 IN | BODY MASS INDEX: 30.21 KG/M2 | WEIGHT: 204 LBS | DIASTOLIC BLOOD PRESSURE: 76 MMHG

## 2023-05-09 DIAGNOSIS — I70.213 ATHEROSCLEROSIS OF NATIVE ARTERIES OF EXTREMITIES WITH INTERMITTENT CLAUDICATION, BILATERAL LEGS (HCC): Primary | ICD-10-CM

## 2023-05-09 PROCEDURE — 99203 OFFICE O/P NEW LOW 30 MIN: CPT | Performed by: SURGERY

## 2023-05-09 PROCEDURE — 3075F SYST BP GE 130 - 139MM HG: CPT | Performed by: SURGERY

## 2023-05-09 PROCEDURE — 3078F DIAST BP <80 MM HG: CPT | Performed by: SURGERY

## 2023-05-09 PROCEDURE — 1124F ACP DISCUSS-NO DSCNMKR DOCD: CPT | Performed by: SURGERY

## 2023-05-10 ENCOUNTER — NURSE ONLY (OUTPATIENT)
Dept: CARDIOLOGY CLINIC | Age: 73
End: 2023-05-10

## 2023-05-10 DIAGNOSIS — Z95.810 ICD (IMPLANTABLE CARDIOVERTER-DEFIBRILLATOR), SINGLE, IN SITU: Primary | ICD-10-CM

## 2023-05-10 DIAGNOSIS — I50.22 CHRONIC SYSTOLIC HF (HEART FAILURE) (HCC): ICD-10-CM

## 2023-05-10 DIAGNOSIS — I25.5 ISCHEMIC CARDIOMYOPATHY: ICD-10-CM

## 2023-05-10 NOTE — PROGRESS NOTES
Remote transmission received from patient's single chamber ICD home monitor. Transmission shows normal sensing and pacing function. No arrhythmias/ or events. Optivol is within normal range. TriageHF Heart Failure Risk Status on 10-May-2023 is Medium    NP will review. See interrogation under cardiology tab in the [address] [place] [address] [address] field for more details. Will continue to monitor remotely. (End of 31-day monitoring period 5/10/23).

## 2023-05-18 ENCOUNTER — TELEPHONE (OUTPATIENT)
Dept: VASCULAR SURGERY | Age: 73
End: 2023-05-18

## 2023-05-18 NOTE — TELEPHONE ENCOUNTER
----- Message from Mell Dickens MD sent at 5/10/2023 10:44 AM EDT -----  Regarding: RE: [address] [address] will coordinate for him. Thanks for betty olivera  ----- Message -----  From: Corey Ortega MD  Sent: 5/10/2023   8:13 AM EDT  To: Mell Dickens MD  Subject: RE: Follow-Up Consult                            Bola Olivera  I did get your note on Chantal Cannon. He has received care for me for many years and now gets all of his care at [facility]. Your referral was initiated from ER visit when the ER doc did not hear from Dr La Quiroz after a 3 hour wait so ER doc spoke with your partner. Any case,I do not believe he has had any reliable f/up with Dr La Quiroz. I agree that he really does not need any urgent need but perhaps periodic f/up should be considered.  Thanks   Vincent  ----- Message -----  From: Mell Dickens MD  Sent: 5/9/2023   8:57 AM EDT  To: Corey Ortega MD  Subject: Follow-Up Consult

## 2023-05-22 ENCOUNTER — PROCEDURE VISIT (OUTPATIENT)
Dept: VASCULAR SURGERY | Age: 73
End: 2023-05-22
Payer: MEDICARE

## 2023-05-22 DIAGNOSIS — I73.9 PVD (PERIPHERAL VASCULAR DISEASE) (HCC): Primary | ICD-10-CM

## 2023-05-22 PROCEDURE — 93925 LOWER EXTREMITY STUDY: CPT | Performed by: SURGERY

## 2023-05-25 ENCOUNTER — TELEPHONE (OUTPATIENT)
Dept: VASCULAR SURGERY | Age: 73
End: 2023-05-25

## 2023-05-30 NOTE — TELEPHONE ENCOUNTER
Pt is calling about his results from his vascular scan. Still hasn't heard back from anyone.  Please call [phone]

## 2023-05-31 NOTE — TELEPHONE ENCOUNTER
Please read scan and call patient with results. I did tell him you had been off for a couple days.  thanks

## 2023-05-31 NOTE — TELEPHONE ENCOUNTER
Discussed results of BLE arterial duplex with patient which shows patent left fem to PT artery bypass and shows significant arterial disease in right leg with minimal reconstitution. Patient does report right leg claudication. Would recommend moving forward with right leg angiogram with possible intervention. Will have our office call to schedule. Will need to hold Eliquis for 48 hours prior.   Will need to recheck BMP prior (last 1.4) and may need hydration prior to angiogram.      Electronically signed by GEORGE Conteh CNP on 5/31/2023 at 1:25 PM

## 2023-06-01 ENCOUNTER — PREP FOR PROCEDURE (OUTPATIENT)
Dept: VASCULAR SURGERY | Age: 73
End: 2023-06-01

## 2023-06-01 ENCOUNTER — TELEPHONE (OUTPATIENT)
Dept: VASCULAR SURGERY | Age: 73
End: 2023-06-01

## 2023-06-01 DIAGNOSIS — I70.213 ATHEROSCLEROSIS OF NATIVE ARTERIES OF EXTREMITIES WITH INTERMITTENT CLAUDICATION, BILATERAL LEGS (HCC): Primary | ICD-10-CM

## 2023-06-05 RX ORDER — SODIUM CHLORIDE 9 MG/ML
INJECTION, SOLUTION INTRAVENOUS PRN
Status: CANCELLED | OUTPATIENT
Start: 2023-06-05

## 2023-06-05 RX ORDER — SODIUM CHLORIDE 0.9 % (FLUSH) 0.9 %
5-40 SYRINGE (ML) INJECTION EVERY 12 HOURS SCHEDULED
Status: CANCELLED | OUTPATIENT
Start: 2023-06-05

## 2023-06-05 RX ORDER — SODIUM CHLORIDE 0.9 % (FLUSH) 0.9 %
5-40 SYRINGE (ML) INJECTION PRN
Status: CANCELLED | OUTPATIENT
Start: 2023-06-05

## 2023-06-28 ENCOUNTER — NURSE ONLY (OUTPATIENT)
Dept: CARDIOLOGY CLINIC | Age: 73
End: 2023-06-28
Payer: MEDICARE

## 2023-06-28 DIAGNOSIS — I50.22 CHRONIC SYSTOLIC HF (HEART FAILURE) (HCC): ICD-10-CM

## 2023-06-28 DIAGNOSIS — Z95.810 ICD (IMPLANTABLE CARDIOVERTER-DEFIBRILLATOR), SINGLE, IN SITU: Primary | ICD-10-CM

## 2023-06-28 DIAGNOSIS — I25.5 ISCHEMIC CARDIOMYOPATHY: ICD-10-CM

## 2023-06-28 PROCEDURE — 93297 REM INTERROG DEV EVAL ICPMS: CPT | Performed by: NURSE PRACTITIONER

## 2023-06-28 PROCEDURE — 93296 REM INTERROG EVL PM/IDS: CPT | Performed by: INTERNAL MEDICINE

## 2023-06-28 PROCEDURE — 93295 DEV INTERROG REMOTE 1/2/MLT: CPT | Performed by: INTERNAL MEDICINE

## 2023-07-06 ENCOUNTER — HOSPITAL ENCOUNTER (OUTPATIENT)
Age: 73
Discharge: HOME | End: 2023-07-06
Payer: MEDICARE

## 2023-07-06 DIAGNOSIS — I70.213 ATHEROSCLEROSIS OF NATIVE ARTERIES OF EXTREMITIES WITH INTERMITTENT CLAUDICATION, BILATERAL LEGS (HCC): ICD-10-CM

## 2023-07-06 LAB
ANION GAP SERPL CALCULATED.3IONS-SCNC: 12 MMOL/L (ref 3–16)
BUN SERPL-MCNC: 24 MG/DL (ref 7–20)
CALCIUM SERPL-MCNC: 9.1 MG/DL (ref 8.3–10.6)
CHLORIDE SERPL-SCNC: 103 MMOL/L (ref 99–110)
CO2 SERPL-SCNC: 23 MMOL/L (ref 21–32)
CREAT SERPL-MCNC: 1.3 MG/DL (ref 0.8–1.3)
DEPRECATED RDW RBC AUTO: 20.4 % (ref 12.4–15.4)
GFR SERPLBLD CREATININE-BSD FMLA CKD-EPI: 58 ML/MIN/{1.73_M2}
GLUCOSE SERPL-MCNC: 147 MG/DL (ref 70–99)
HCT VFR BLD AUTO: 35.3 % (ref 40.5–52.5)
HGB BLD-MCNC: 11.4 G/DL (ref 13.5–17.5)
INR PPP: 1.45 (ref 0.84–1.16)
MCH RBC QN AUTO: 26.5 PG (ref 26–34)
MCHC RBC AUTO-ENTMCNC: 32.2 G/DL (ref 31–36)
MCV RBC AUTO: 82.1 FL (ref 80–100)
PLATELET # BLD AUTO: 264 K/UL (ref 135–450)
PMV BLD AUTO: 8.2 FL (ref 5–10.5)
POTASSIUM SERPL-SCNC: 5.2 MMOL/L (ref 3.5–5.1)
PROTHROMBIN TIME: 17.6 SEC (ref 11.5–14.8)
RBC # BLD AUTO: 4.31 M/UL (ref 4.2–5.9)
SODIUM SERPL-SCNC: 138 MMOL/L (ref 136–145)
WBC # BLD AUTO: 12.1 K/UL (ref 4–11)

## 2023-07-06 PROCEDURE — 80048 BASIC METABOLIC PNL TOTAL CA: CPT

## 2023-07-06 PROCEDURE — 85610 PROTHROMBIN TIME: CPT

## 2023-07-06 PROCEDURE — 36415 COLL VENOUS BLD VENIPUNCTURE: CPT

## 2023-07-06 PROCEDURE — 85027 COMPLETE CBC AUTOMATED: CPT

## 2023-07-14 ENCOUNTER — TELEPHONE (OUTPATIENT)
Dept: CARDIOLOGY CLINIC | Age: 73
End: 2023-07-14

## 2023-07-14 NOTE — TELEPHONE ENCOUNTER
[employer] FCA for Increased Potential for Reduced Energy or No Energy Delivered During High Voltage Therapy When Programmed AX > B    Device needs changes made  -Change all pathways per [employer] FCA B >AX. A FCA is basically a small recall on device parameters. In layman's terms, the FCA involves a small subset of certain Medtronic ICD models. They found that one of the pathways that a shock can be delivered is more effective than the others, therefore we are changing the pathways to the most effective one to be utilized IF the patient would need to be shocked via their ICD. Please call and schedule Patient for in office device check so that changes can be made on held device clinic dates 6/9, 6/23, 7/7 or 7/21. [employer] Representative will be available if Patient has any questions.

## 2023-07-21 ENCOUNTER — NURSE ONLY (OUTPATIENT)
Dept: CARDIOLOGY CLINIC | Age: 73
End: 2023-07-21
Payer: MEDICARE

## 2023-07-21 DIAGNOSIS — I25.5 ISCHEMIC CARDIOMYOPATHY: ICD-10-CM

## 2023-07-21 DIAGNOSIS — Z95.810 ICD (IMPLANTABLE CARDIOVERTER-DEFIBRILLATOR), SINGLE, IN SITU: ICD-10-CM

## 2023-07-21 DIAGNOSIS — I48.20 CHRONIC A-FIB (HCC): ICD-10-CM

## 2023-07-21 DIAGNOSIS — I48.92 ATRIAL FLUTTER, UNSPECIFIED TYPE (HCC): Primary | ICD-10-CM

## 2023-07-21 DIAGNOSIS — I48.91 ATRIAL FIBRILLATION WITH RVR (HCC): ICD-10-CM

## 2023-07-21 DIAGNOSIS — I50.22 CHRONIC SYSTOLIC HF (HEART FAILURE) (HCC): ICD-10-CM

## 2023-07-21 DIAGNOSIS — I47.20 VT (VENTRICULAR TACHYCARDIA) (HCC): ICD-10-CM

## 2023-07-21 PROCEDURE — 93282 PRGRMG EVAL IMPLANTABLE DFB: CPT | Performed by: INTERNAL MEDICINE

## 2023-07-24 NOTE — PROGRESS NOTES
Brought Patient into the office today due to [employer] FCA for Increased Potential for Reduced Energy or No Energy Delivered During High Voltage Therapy When Programmed AX > B  [employer] Rep checked device  Changed ALL pathways per [employer] FCA all pathways  B >AX

## 2023-08-04 ENCOUNTER — TELEPHONE (OUTPATIENT)
Dept: VASCULAR SURGERY | Age: 73
End: 2023-08-04

## 2023-08-04 RX ORDER — FUROSEMIDE 40 MG/1
40 TABLET ORAL DAILY
Qty: 60 TABLET | Refills: 3 | Status: SHIPPED | OUTPATIENT
Start: 2023-08-04

## 2023-08-04 NOTE — TELEPHONE ENCOUNTER
Patient called to cancel angiogram for Monday 8/7. He will call back to r/s once his wife is back in town.

## 2023-08-04 NOTE — TELEPHONE ENCOUNTER
Received refill request for furosemide (LASIX) 40 MG tablet from [facility]. Last OV:  8-1-2023 LES    Next OV: None.      Last Labs: 7-6-2023 BMP    Last Filled:  11-  LES

## 2023-08-04 NOTE — TELEPHONE ENCOUNTER
When patient calls back to r/s A/O w R/O possible Right Leg Intervention he will need to hold Eliquis 48 hours and re-check BMP prior to procedure to check Creatinine.

## 2023-08-07 ENCOUNTER — HOSPITAL ENCOUNTER (OUTPATIENT)
Dept: CARDIAC CATH/INVASIVE PROCEDURES | Age: 73
Discharge: HOME | End: 2023-08-07

## 2023-08-21 ENCOUNTER — HOSPITAL ENCOUNTER (OUTPATIENT)
Age: 73
Discharge: HOME | End: 2023-08-21
Payer: MEDICARE

## 2023-08-21 ENCOUNTER — OFFICE VISIT (OUTPATIENT)
Dept: CARDIOLOGY CLINIC | Age: 73
End: 2023-08-21
Payer: MEDICARE

## 2023-08-21 VITALS
WEIGHT: 205 LBS | OXYGEN SATURATION: 96 % | DIASTOLIC BLOOD PRESSURE: 72 MMHG | SYSTOLIC BLOOD PRESSURE: 142 MMHG | HEIGHT: 69 IN | HEART RATE: 62 BPM | BODY MASS INDEX: 30.36 KG/M2

## 2023-08-21 DIAGNOSIS — I10 PRIMARY HYPERTENSION: ICD-10-CM

## 2023-08-21 DIAGNOSIS — E78.2 MIXED HYPERLIPIDEMIA: ICD-10-CM

## 2023-08-21 DIAGNOSIS — I25.5 ISCHEMIC CARDIOMYOPATHY: ICD-10-CM

## 2023-08-21 DIAGNOSIS — I48.91 ATRIAL FIBRILLATION WITH RVR (HCC): Primary | ICD-10-CM

## 2023-08-21 DIAGNOSIS — I48.20 CHRONIC A-FIB (HCC): ICD-10-CM

## 2023-08-21 LAB
CHOLEST SERPL-MCNC: 209 MG/DL (ref 0–199)
HDLC SERPL-MCNC: 34 MG/DL (ref 40–60)
LDL CHOLESTEROL CALCULATED: 130 MG/DL
T4 FREE SERPL-MCNC: 1.2 NG/DL (ref 0.9–1.8)
TRIGL SERPL-MCNC: 227 MG/DL (ref 0–150)
VLDLC SERPL CALC-MCNC: 45 MG/DL

## 2023-08-21 PROCEDURE — 80151 DRUG ASSAY AMIODARONE: CPT

## 2023-08-21 PROCEDURE — 84439 ASSAY OF FREE THYROXINE: CPT

## 2023-08-21 PROCEDURE — 36415 COLL VENOUS BLD VENIPUNCTURE: CPT

## 2023-08-21 PROCEDURE — 99214 OFFICE O/P EST MOD 30 MIN: CPT | Performed by: NURSE PRACTITIONER

## 2023-08-21 PROCEDURE — 3078F DIAST BP <80 MM HG: CPT | Performed by: NURSE PRACTITIONER

## 2023-08-21 PROCEDURE — 3075F SYST BP GE 130 - 139MM HG: CPT | Performed by: NURSE PRACTITIONER

## 2023-08-21 PROCEDURE — 80061 LIPID PANEL: CPT

## 2023-08-21 PROCEDURE — 1124F ACP DISCUSS-NO DSCNMKR DOCD: CPT | Performed by: NURSE PRACTITIONER

## 2023-08-21 RX ORDER — AMIODARONE HYDROCHLORIDE 200 MG/1
200 TABLET ORAL DAILY
Qty: 30 TABLET | Refills: 2 | Status: CANCELLED | OUTPATIENT
Start: 2023-08-21

## 2023-08-21 NOTE — PROGRESS NOTES
regurgitation.   -Trivial tricuspid regurgitation.   -Estimated pulmonary artery systolic pressure is normal at 28-32 mmHg assuming a right atrial pressure of 8 mmHg. -The left atrium is moderately dilated. -Pacemaker / ICD lead is visualized in the right heart. Assessment:      Diagnosis Orders   1. Coronary artery disease involving native coronary artery of native heart without angina pectoris   ~stable : denies angina  ~s/p CABG May '14  ~statin        2. Ischemic cardiomyopathy   ~improved with mild HK basal inf walls, EF 50-55% on echo from Feb '23  ~EF 35-40% on echo from Feb '22  ~EF 53% on NM in July '21  ~s/p ICD March '22  ~metoprolol stopped d/t hypotension Jun '22  ~AF & VT noted in Dec '22 on device   ~taking amiodarone / metoprolol        3. Primary hypertension   ~controlled / reasonable  ~metoprolol / lasix       4. Chronic diastolic heart failure (HCC)   ~mild volume overload with thoracic impedence check today; wavering around baseline  ~neg to exam for edema / crackles. C/O urine concentrated ; doesn't drink a lot of fluids  ~lasix 40 mg daily   ~has not been on ace/ARB d/t hypotension; continues to take midodrine prn for SBP < 120       5. Mixed hyperlipidemia  ~not recently done  ~modest dose atorvastatin            6. Long-term use of high-risk medication   ~amiodarone  TSH with Reflex        I had the opportunity to review the clinical symptoms and presentation of Paron Stoney Gowers. Plan:     TSH, free T4 and amiodarone level today as routine ; included is a non-fasting lipid profile  F/U in 4 months    Overall the patient is stable from CV standpoint    I have addresed the patient's cardiac risk factors and adjusted pharmacologic treatment as needed. In addition, I have reinforced the need for patient directed risk factor modification. Further evaluation will be based upon the patient's clinical course and testing results.   ~expecting to have a peripheral intervention to his RLE by

## 2023-08-22 DIAGNOSIS — E78.2 MIXED HYPERLIPIDEMIA: Primary | ICD-10-CM

## 2023-08-22 RX ORDER — ATORVASTATIN CALCIUM 80 MG/1
80 TABLET, FILM COATED ORAL DAILY
Qty: 30 TABLET | Refills: 5 | Status: SHIPPED | OUTPATIENT
Start: 2023-08-22

## 2023-08-22 RX ORDER — AMIODARONE HYDROCHLORIDE 200 MG/1
200 TABLET ORAL DAILY
Qty: 30 TABLET | Refills: 5 | Status: SHIPPED | OUTPATIENT
Start: 2023-08-22

## 2023-08-22 NOTE — TELEPHONE ENCOUNTER
Called patient he is currently on lipitor 20 mg daily and gave instructions. He also needs lipitor 80 mg and amiodarone 200 mg daily sent into [facility] Corey Osler, APRN - CNP   8/22/2023  1:41 PM EDT       LDL too high; confirm that he's taking atorvastatin and if so, increase to 80 mg daily and recheck lipids/LFTs after 6 weeks

## 2023-08-24 RX ORDER — ATORVASTATIN CALCIUM 80 MG/1
80 TABLET, FILM COATED ORAL DAILY
Qty: 90 TABLET | OUTPATIENT
Start: 2023-08-24

## 2023-08-25 LAB
AMIODARONE SERPL-MCNC: 1.2 UG/ML (ref 0.5–2)
DESETHYLAMIODARONE SERPL-MCNC: 0.8 UG/ML

## 2023-09-19 PROCEDURE — G2066 INTER DEVC REMOTE 30D: HCPCS | Performed by: NURSE PRACTITIONER

## 2023-09-19 PROCEDURE — 93297 REM INTERROG DEV EVAL ICPMS: CPT | Performed by: NURSE PRACTITIONER

## 2023-10-07 PROCEDURE — 93295 DEV INTERROG REMOTE 1/2/MLT: CPT | Performed by: INTERNAL MEDICINE

## 2023-10-07 PROCEDURE — 93296 REM INTERROG EVL PM/IDS: CPT | Performed by: INTERNAL MEDICINE

## 2023-11-07 RX ORDER — APIXABAN 5 MG/1
5 TABLET, FILM COATED ORAL 2 TIMES DAILY
Qty: 60 TABLET | Refills: 3 | Status: SHIPPED | OUTPATIENT
Start: 2023-11-07

## 2023-11-07 NOTE — TELEPHONE ENCOUNTER
Received refill request for Eliquis from [address] [place] [address].     Last ov:08/21/2023 NPTS    Last labs:07/06/2023 BMP    Last Refill:02/06/2023     Next appointment:12/15/2023 NPTS

## 2023-11-20 PROCEDURE — G2066 INTER DEVC REMOTE 30D: HCPCS | Performed by: NURSE PRACTITIONER

## 2023-11-20 PROCEDURE — 93297 REM INTERROG DEV EVAL ICPMS: CPT | Performed by: NURSE PRACTITIONER

## 2023-12-21 PROCEDURE — G2066 INTER DEVC REMOTE 30D: HCPCS | Performed by: NURSE PRACTITIONER

## 2023-12-21 PROCEDURE — 93297 REM INTERROG DEV EVAL ICPMS: CPT | Performed by: NURSE PRACTITIONER

## 2024-01-06 PROCEDURE — 93295 DEV INTERROG REMOTE 1/2/MLT: CPT | Performed by: INTERNAL MEDICINE

## 2024-01-06 PROCEDURE — 93296 REM INTERROG EVL PM/IDS: CPT | Performed by: INTERNAL MEDICINE

## 2024-01-21 PROCEDURE — 93297 REM INTERROG DEV EVAL ICPMS: CPT | Performed by: NURSE PRACTITIONER

## 2024-04-10 NOTE — TELEPHONE ENCOUNTER
Last OV:  NPTS    Last labs: 23    Last EK/15/23    Appt scheduled : 24 LES                 Disp Refills Start End    amiodarone (CORDARONE) 200 MG tablet 30 tablet 5 2023 --    Sig - Route: Take 1 tablet by mouth daily - Oral    Sent to pharmacy as: Amiodarone HCl 200 MG Oral Tablet (CORDARONE)    E-Prescribing Status: Receipt confirmed by pharmacy (2023  7:11 PM EDT)

## 2024-04-11 RX ORDER — AMIODARONE HYDROCHLORIDE 200 MG/1
200 TABLET ORAL DAILY
Qty: 90 TABLET | OUTPATIENT
Start: 2024-04-11

## 2024-04-11 RX ORDER — AMIODARONE HYDROCHLORIDE 200 MG/1
200 TABLET ORAL DAILY
Qty: 30 TABLET | Refills: 0 | Status: SHIPPED | OUTPATIENT
Start: 2024-04-11

## 2024-04-26 RX ORDER — FUROSEMIDE 40 MG/1
40 TABLET ORAL DAILY
Qty: 60 TABLET | Refills: 3 | Status: SHIPPED | OUTPATIENT
Start: 2024-04-26

## 2024-04-26 NOTE — TELEPHONE ENCOUNTER
Received refill request for furosemide from [facility] pharmacy.    Last ov: 12/15/2023 LEs    Last Refill: 08/04/2023 #60 w/ 3 refills    Next appointment: 06/24/2024 LES

## 2024-05-06 ENCOUNTER — TELEPHONE (OUTPATIENT)
Dept: CARDIOLOGY CLINIC | Age: 74
End: 2024-05-06

## 2024-05-16 NOTE — TELEPHONE ENCOUNTER
Requested Prescriptions     Pending Prescriptions Disp Refills    amiodarone (CORDARONE) 200 MG tablet [Pharmacy Med Name: AMIODARONE 200MG TABLETS] 90 tablet      Sig: TAKE 1 TABLET BY MOUTH DAILY      [facility]     Last OV:  12/15/2023 NPTS    Next OV: 2024 LES     Last EK/15/2023    Last Filled: 2024 NPTS

## 2024-05-17 RX ORDER — AMIODARONE HYDROCHLORIDE 200 MG/1
200 TABLET ORAL DAILY
Qty: 30 TABLET | Refills: 0 | Status: SHIPPED | OUTPATIENT
Start: 2024-05-17

## 2024-05-20 NOTE — PROGRESS NOTES
known history of peripheral vascular disease.  Recommend repeat lower extremity arterial duplex.  If his right leg is abnormal we will recommend moving forward with peripheral angiography and this was discussed with him today.  Will contact with results of study    Thank you for allowing me to participate in the care of this individual.  Please do not hesitate to contact me with any questions.    Javier Gaitan M.D., FACS.  5/20/2024  5:22 PM

## 2024-05-21 ENCOUNTER — OFFICE VISIT (OUTPATIENT)
Dept: VASCULAR SURGERY | Age: 74
End: 2024-05-21
Payer: MEDICARE

## 2024-05-21 VITALS
DIASTOLIC BLOOD PRESSURE: 82 MMHG | WEIGHT: 211 LBS | BODY MASS INDEX: 31.98 KG/M2 | SYSTOLIC BLOOD PRESSURE: 134 MMHG | HEIGHT: 68 IN

## 2024-05-21 DIAGNOSIS — I70.213 ATHEROSCLEROSIS OF NATIVE ARTERIES OF EXTREMITIES WITH INTERMITTENT CLAUDICATION, BILATERAL LEGS (HCC): Primary | ICD-10-CM

## 2024-05-21 PROCEDURE — 3079F DIAST BP 80-89 MM HG: CPT | Performed by: SURGERY

## 2024-05-21 PROCEDURE — 3075F SYST BP GE 130 - 139MM HG: CPT | Performed by: SURGERY

## 2024-05-21 PROCEDURE — 1124F ACP DISCUSS-NO DSCNMKR DOCD: CPT | Performed by: SURGERY

## 2024-05-21 PROCEDURE — 99212 OFFICE O/P EST SF 10 MIN: CPT | Performed by: SURGERY

## 2024-05-23 RX ORDER — AMIODARONE HYDROCHLORIDE 200 MG/1
200 TABLET ORAL DAILY
Qty: 90 TABLET | Refills: 0 | Status: SHIPPED | OUTPATIENT
Start: 2024-05-23

## 2024-06-05 ENCOUNTER — TELEPHONE (OUTPATIENT)
Dept: VASCULAR SURGERY | Age: 74
End: 2024-06-05

## 2024-06-05 NOTE — TELEPHONE ENCOUNTER
Pt here for BLE ADS. Preliminary results are as follows:      Right side findings: Resting CAITLIN is 0.00.    Left side findings: Resting CAITLIN is 0.85.     Procedure Details    A gray scale, color Doppler imaging and spectral Doppler analysis ultrasound was performed. Pulsed wave doppler was performed. Overall the study quality was adequate.            Lower Extremity Arterial Findings    Right Lower Arterial    Distal Common Femoral Artery: Moderate plaque. Biphasic Doppler waveforms.   Profunda Artery: Moderate plaque. Biphasic Doppler waveforms.   Proximal Superficial Femoral Artery: Moderate plaque. Biphasic Doppler waveforms.   Middle Superficial Femoral Artery: Moderate plaque. Biphasic Doppler waveforms.   Distal Superficial Femoral Artery: Moderate plaque. Biphasic Doppler waveforms.   Proximal Popliteal Artery: Occluded. Absent Doppler waveforms.   Distal Popliteal Artery: Occluded. Absent Doppler waveforms.   Anterior Tibial Artery: Occluded. Absent Doppler waveforms.   Tibial/Peroneal Trunk: Occluded. Absent Doppler waveforms.   Posterior Tibial Artery: Occluded. Absent Doppler waveforms.   Peroneal Artery: Occluded. absent Doppler waveforms.     Left Lower Arterial         Lower Artery Bypass Graft    Graft 1: left fem-tibial graft  Graft inflow - patent and biphasic Doppler waveforms.   Proximal anastamosis - patent and biphasic Doppler waveforms.   Proximal graft - patent and biphasic Doppler waveforms.   Mid graft - patent and biphasic Doppler waveforms.   Distal graft - patent and biphasic Doppler waveforms.   Distal anastamosis - patent and biphasic Doppler waveforms.   Graft outflow - patent and biphasic Doppler waveforms.           Femoral tibial bypass graft is patent, with the following measurements:  Inflow 134cm/sec  Proximal anastomosis 155cm/sec diameter 5.4mm  Proximal graft 85cm/sec diameter 4.5mm  Mid graft 55cm/sec diameter 4.8mm  Distal graft 74cm/sec diameter 5.5mm  Distal anastomosis

## 2024-06-11 ENCOUNTER — TELEPHONE (OUTPATIENT)
Dept: VASCULAR SURGERY | Age: 74
End: 2024-06-11

## 2024-06-11 NOTE — PROGRESS NOTES
[facility]   Cardiac Follow Up     Referring Provider:  Augusto Gordillo DO     Chief Complaint   Patient presents with    Coronary Artery Disease      History of Present Illness:  Leandra Dawson is a 74 y.o.  male with a history of CAD s/p CABG May 2014 with post-op short run of SVT and brief AF, s/p DCCV Nov 2019; also cardiomyopathy, hypertension, and hyperlipidemia, PVD. His other history includes carotid stenosis and PAD s/p Lt common & external iliac PTA.. He was hospitalized Jan 2022 for covid pneumonia. In March 15 he came to ER from SNF for syncope. Found to be in afib, had an episode of sustained VT requiring chest compressions. LHC unremarkable. He had a single chamber ICD placed 3/17/22. On 4/21 he went to the ER again with syncope, found to be hypoxic. No arrhythmias on interrogation, treated for pnuemonia, lopressor increased to 50 mg bid      In 2022 he had an witnessed syncopal episode in the kitchen. Wife reports he was walking to the refrigerator, and got weak. Looked like he was having a \"seizure\" and passed out.  Wife reports this has happened three times in a month. He did not seek medical attention.  Wife called the office on 7/5/22 to notify us. He was taken off of lasix and aldactone due to suspected orthostatic hypotension.     He is still tobacco free. He still has one \"every once in a while\".     Today he still working 2 days a week. He is still having problems with his legs due to pain. He is seeing Dr Gaitan and is having a procedure on the R leg but has to see another doctor for his L leg. He has not passed out in a while. He has been following with ANGELA Nunn in our office. He is taking Midodrine everyday. No complaints of CP, palpitations or dizziness at this time.     He had a recent chest xray and had spots on his lungs and it is confirmed cancer. He is seeing a lung specialist through [facility], he had the PET scan. He is currently working that up for next steps.

## 2024-06-11 NOTE — TELEPHONE ENCOUNTER
Discussed results of BLE duplex which shows patent left fem to tibial artery bypass graft.  Does appear to have significant disease in RLE including occluded popliteal and tibial arteries.  Recommend moving forward with right leg angiogram.  Will need to hold Eliquis for 48 hours.  Will have our office call to schedule.

## 2024-06-12 RX ORDER — APIXABAN 5 MG/1
5 TABLET, FILM COATED ORAL 2 TIMES DAILY
Qty: 60 TABLET | Refills: 3 | Status: SHIPPED | OUTPATIENT
Start: 2024-06-12

## 2024-06-12 NOTE — TELEPHONE ENCOUNTER
Requested Prescriptions     Pending Prescriptions Disp Refills    ELIQUIS 5 MG TABS tablet [Pharmacy Med Name: ELIQUIS 5MG TABLETS] 60 tablet 3     Sig: TAKE 1 TABLET BY MOUTH TWICE DAILY      [facility] #[id number]     Last OV:  12/15/2023 NPTS    Next OV: 06/25/2024 LES    Last Labs: 07/06/2023 CBC    Last Filled: 11/07/2023 NPTS

## 2024-06-14 ENCOUNTER — PREP FOR PROCEDURE (OUTPATIENT)
Dept: VASCULAR SURGERY | Age: 74
End: 2024-06-14

## 2024-06-14 ENCOUNTER — TELEPHONE (OUTPATIENT)
Dept: VASCULAR SURGERY | Age: 74
End: 2024-06-14

## 2024-06-14 DIAGNOSIS — I70.213 ATHEROSCLEROSIS OF NATIVE ARTERIES OF EXTREMITIES WITH INTERMITTENT CLAUDICATION, BILATERAL LEGS (HCC): Primary | ICD-10-CM

## 2024-06-14 RX ORDER — SODIUM CHLORIDE 0.9 % (FLUSH) 0.9 %
5-40 SYRINGE (ML) INJECTION EVERY 12 HOURS SCHEDULED
Status: CANCELLED | OUTPATIENT
Start: 2024-06-14

## 2024-06-14 RX ORDER — SODIUM CHLORIDE 0.9 % (FLUSH) 0.9 %
5-40 SYRINGE (ML) INJECTION PRN
Status: CANCELLED | OUTPATIENT
Start: 2024-06-14

## 2024-06-14 RX ORDER — SODIUM CHLORIDE 9 MG/ML
INJECTION, SOLUTION INTRAVENOUS PRN
Status: CANCELLED | OUTPATIENT
Start: 2024-06-14

## 2024-06-19 PROBLEM — Z79.899 LONG-TERM USE OF HIGH-RISK MEDICATION: Status: ACTIVE | Noted: 2024-06-19

## 2024-06-21 ENCOUNTER — TELEPHONE (OUTPATIENT)
Dept: VASCULAR SURGERY | Age: 74
End: 2024-06-21

## 2024-06-21 NOTE — TELEPHONE ENCOUNTER
Patient called to cancel his angiogram for Monday 6/24 - he has to go out of town unexpectedly. He will call me back to reschedule when he gets back.

## 2024-06-25 ENCOUNTER — OFFICE VISIT (OUTPATIENT)
Dept: CARDIOLOGY CLINIC | Age: 74
End: 2024-06-25
Payer: MEDICARE

## 2024-06-25 ENCOUNTER — TELEPHONE (OUTPATIENT)
Dept: CARDIOLOGY CLINIC | Age: 74
End: 2024-06-25

## 2024-06-25 VITALS
BODY MASS INDEX: 32.2 KG/M2 | HEART RATE: 66 BPM | DIASTOLIC BLOOD PRESSURE: 52 MMHG | WEIGHT: 217.4 LBS | OXYGEN SATURATION: 93 % | SYSTOLIC BLOOD PRESSURE: 94 MMHG | HEIGHT: 69 IN

## 2024-06-25 DIAGNOSIS — I25.83 CORONARY ARTERY DISEASE DUE TO LIPID RICH PLAQUE: Primary | ICD-10-CM

## 2024-06-25 DIAGNOSIS — I25.5 ISCHEMIC CARDIOMYOPATHY: ICD-10-CM

## 2024-06-25 DIAGNOSIS — E78.2 MIXED HYPERLIPIDEMIA: ICD-10-CM

## 2024-06-25 DIAGNOSIS — Z79.899 LONG-TERM USE OF HIGH-RISK MEDICATION: ICD-10-CM

## 2024-06-25 DIAGNOSIS — I25.10 CORONARY ARTERY DISEASE DUE TO LIPID RICH PLAQUE: Primary | ICD-10-CM

## 2024-06-25 DIAGNOSIS — I10 PRIMARY HYPERTENSION: ICD-10-CM

## 2024-06-25 DIAGNOSIS — I50.32 CHRONIC DIASTOLIC HEART FAILURE (HCC): ICD-10-CM

## 2024-06-25 PROCEDURE — 3078F DIAST BP <80 MM HG: CPT | Performed by: INTERNAL MEDICINE

## 2024-06-25 PROCEDURE — 3074F SYST BP LT 130 MM HG: CPT | Performed by: INTERNAL MEDICINE

## 2024-06-25 PROCEDURE — 1124F ACP DISCUSS-NO DSCNMKR DOCD: CPT | Performed by: INTERNAL MEDICINE

## 2024-06-25 PROCEDURE — 99214 OFFICE O/P EST MOD 30 MIN: CPT | Performed by: INTERNAL MEDICINE

## 2024-06-25 NOTE — TELEPHONE ENCOUNTER
Diane from [facility] phoned asking if results of patient's most recent Echo could be faxed to them.  Fax [phone]. The Patient is scheduled to have cataract surgery on 07/02/24.  Please advise.  Thank you    They are also asking for information on the patient's device, he did not have his card.    [phone]

## 2024-06-25 NOTE — PATIENT INSTRUCTIONS
Follow up with Dr Reece in 8 months  Follow up with ANGELA Nunn in 4 months     You are okay to have an angiogram on your leg. If any surgery needed, you would need testing prior to giving clearance.      Call for any questions or concerns.

## 2024-07-25 NOTE — TELEPHONE ENCOUNTER
Received refill request for  metoprolol tartrate (LOPRESSOR) 25 MG tablet  from [facility] pharmacy.     Last OV: 6/25/24    Next OV: 10/25/24    Last EKG : 12/15/23    Last Filled: 2/6/23

## 2024-08-02 ENCOUNTER — APPOINTMENT (OUTPATIENT)
Dept: GENERAL RADIOLOGY | Age: 74
End: 2024-08-02
Payer: MEDICARE

## 2024-08-02 ENCOUNTER — APPOINTMENT (OUTPATIENT)
Dept: CT IMAGING | Age: 74
End: 2024-08-02
Payer: MEDICARE

## 2024-08-02 ENCOUNTER — HOSPITAL ENCOUNTER (OUTPATIENT)
Age: 74
Setting detail: OBSERVATION
Discharge: HOME | End: 2024-08-03
Attending: STUDENT IN AN ORGANIZED HEALTH CARE EDUCATION/TRAINING PROGRAM | Admitting: STUDENT IN AN ORGANIZED HEALTH CARE EDUCATION/TRAINING PROGRAM
Payer: MEDICARE

## 2024-08-02 DIAGNOSIS — R94.31 QT PROLONGATION: ICD-10-CM

## 2024-08-02 DIAGNOSIS — R63.0 ANOREXIA: ICD-10-CM

## 2024-08-02 DIAGNOSIS — R79.89 ELEVATED TROPONIN: Primary | ICD-10-CM

## 2024-08-02 DIAGNOSIS — R10.84 GENERALIZED ABDOMINAL PAIN: ICD-10-CM

## 2024-08-02 PROBLEM — R10.9 ABDOMINAL PAIN: Status: ACTIVE | Noted: 2024-08-02

## 2024-08-02 LAB
ALBUMIN SERPL-MCNC: 4.1 G/DL (ref 3.4–5)
ALBUMIN/GLOB SERPL: 1.2 {RATIO} (ref 1.1–2.2)
ALP SERPL-CCNC: 143 U/L (ref 40–129)
ALT SERPL-CCNC: 13 U/L (ref 10–40)
ANION GAP SERPL CALCULATED.3IONS-SCNC: 13 MMOL/L (ref 3–16)
AST SERPL-CCNC: 16 U/L (ref 15–37)
BACTERIA URNS QL MICRO: NORMAL /HPF
BASOPHILS # BLD: 0.1 K/UL (ref 0–0.2)
BASOPHILS NFR BLD: 1.2 %
BILIRUB SERPL-MCNC: 0.4 MG/DL (ref 0–1)
BILIRUB UR QL STRIP.AUTO: NEGATIVE
BUN SERPL-MCNC: 15 MG/DL (ref 7–20)
CALCIUM SERPL-MCNC: 9.6 MG/DL (ref 8.3–10.6)
CHLORIDE SERPL-SCNC: 102 MMOL/L (ref 99–110)
CLARITY UR: CLEAR
CO2 SERPL-SCNC: 24 MMOL/L (ref 21–32)
COLOR UR: YELLOW
CREAT SERPL-MCNC: 1.1 MG/DL (ref 0.8–1.3)
DEPRECATED RDW RBC AUTO: 20.7 % (ref 12.4–15.4)
EOSINOPHIL # BLD: 0.2 K/UL (ref 0–0.6)
EOSINOPHIL NFR BLD: 2.4 %
EPI CELLS #/AREA URNS AUTO: 1 /HPF (ref 0–5)
FLUAV RNA RESP QL NAA+PROBE: NOT DETECTED
FLUBV RNA RESP QL NAA+PROBE: NOT DETECTED
GFR SERPLBLD CREATININE-BSD FMLA CKD-EPI: 70 ML/MIN/{1.73_M2}
GLUCOSE SERPL-MCNC: 169 MG/DL (ref 70–99)
GLUCOSE UR STRIP.AUTO-MCNC: NEGATIVE MG/DL
HCT VFR BLD AUTO: 39.9 % (ref 40.5–52.5)
HGB BLD-MCNC: 12.9 G/DL (ref 13.5–17.5)
HGB UR QL STRIP.AUTO: NEGATIVE
HYALINE CASTS #/AREA URNS AUTO: 5 /LPF (ref 0–8)
KETONES UR STRIP.AUTO-MCNC: NEGATIVE MG/DL
LEUKOCYTE ESTERASE UR QL STRIP.AUTO: NEGATIVE
LIPASE SERPL-CCNC: 14 U/L (ref 13–60)
LYMPHOCYTES # BLD: 1.9 K/UL (ref 1–5.1)
LYMPHOCYTES NFR BLD: 26.1 %
MAGNESIUM SERPL-MCNC: 2.1 MG/DL (ref 1.8–2.4)
MCH RBC QN AUTO: 26.7 PG (ref 26–34)
MCHC RBC AUTO-ENTMCNC: 32.5 G/DL (ref 31–36)
MCV RBC AUTO: 82.2 FL (ref 80–100)
MONOCYTES # BLD: 0.6 K/UL (ref 0–1.3)
MONOCYTES NFR BLD: 7.6 %
NEUTROPHILS # BLD: 4.5 K/UL (ref 1.7–7.7)
NEUTROPHILS NFR BLD: 62.7 %
NITRITE UR QL STRIP.AUTO: NEGATIVE
PH UR STRIP.AUTO: 6 [PH] (ref 5–8)
PHOSPHATE SERPL-MCNC: 3.6 MG/DL (ref 2.5–4.9)
PLATELET # BLD AUTO: 293 K/UL (ref 135–450)
PMV BLD AUTO: 8 FL (ref 5–10.5)
POTASSIUM SERPL-SCNC: 4.6 MMOL/L (ref 3.5–5.1)
PROT SERPL-MCNC: 7.4 G/DL (ref 6.4–8.2)
PROT UR STRIP.AUTO-MCNC: 100 MG/DL
RBC # BLD AUTO: 4.85 M/UL (ref 4.2–5.9)
RBC CLUMPS #/AREA URNS AUTO: 1 /HPF (ref 0–4)
SARS-COV-2 RNA RESP QL NAA+PROBE: NOT DETECTED
SODIUM SERPL-SCNC: 139 MMOL/L (ref 136–145)
SP GR UR STRIP.AUTO: 1.02 (ref 1–1.03)
TROPONIN, HIGH SENSITIVITY: 38 NG/L (ref 0–22)
TROPONIN, HIGH SENSITIVITY: 39 NG/L (ref 0–22)
UA COMPLETE W REFLEX CULTURE PNL UR: ABNORMAL
UA DIPSTICK W REFLEX MICRO PNL UR: YES
URN SPEC COLLECT METH UR: ABNORMAL
UROBILINOGEN UR STRIP-ACNC: 1 E.U./DL
WBC # BLD AUTO: 7.3 K/UL (ref 4–11)
WBC #/AREA URNS AUTO: 1 /HPF (ref 0–5)

## 2024-08-02 PROCEDURE — 81001 URINALYSIS AUTO W/SCOPE: CPT

## 2024-08-02 PROCEDURE — 71045 X-RAY EXAM CHEST 1 VIEW: CPT

## 2024-08-02 PROCEDURE — 84484 ASSAY OF TROPONIN QUANT: CPT

## 2024-08-02 PROCEDURE — 83690 ASSAY OF LIPASE: CPT

## 2024-08-02 PROCEDURE — 6360000004 HC RX CONTRAST MEDICATION: Performed by: PHYSICIAN ASSISTANT

## 2024-08-02 PROCEDURE — G0378 HOSPITAL OBSERVATION PER HR: HCPCS

## 2024-08-02 PROCEDURE — 99285 EMERGENCY DEPT VISIT HI MDM: CPT

## 2024-08-02 PROCEDURE — 93005 ELECTROCARDIOGRAM TRACING: CPT | Performed by: PHYSICIAN ASSISTANT

## 2024-08-02 PROCEDURE — 6370000000 HC RX 637 (ALT 250 FOR IP): Performed by: NURSE PRACTITIONER

## 2024-08-02 PROCEDURE — 83735 ASSAY OF MAGNESIUM: CPT

## 2024-08-02 PROCEDURE — 80053 COMPREHEN METABOLIC PANEL: CPT

## 2024-08-02 PROCEDURE — 84100 ASSAY OF PHOSPHORUS: CPT

## 2024-08-02 PROCEDURE — 87636 SARSCOV2 & INF A&B AMP PRB: CPT

## 2024-08-02 PROCEDURE — 85025 COMPLETE CBC W/AUTO DIFF WBC: CPT

## 2024-08-02 PROCEDURE — 74177 CT ABD & PELVIS W/CONTRAST: CPT

## 2024-08-02 PROCEDURE — 2580000003 HC RX 258: Performed by: NURSE PRACTITIONER

## 2024-08-02 RX ORDER — ONDANSETRON 4 MG/1
4 TABLET, ORALLY DISINTEGRATING ORAL EVERY 8 HOURS PRN
Status: CANCELLED | OUTPATIENT
Start: 2024-08-02

## 2024-08-02 RX ORDER — ACETAMINOPHEN 650 MG/1
650 SUPPOSITORY RECTAL EVERY 6 HOURS PRN
Status: DISCONTINUED | OUTPATIENT
Start: 2024-08-02 | End: 2024-08-03 | Stop reason: HOSPADM

## 2024-08-02 RX ORDER — POTASSIUM CHLORIDE 20 MEQ/1
40 TABLET, EXTENDED RELEASE ORAL PRN
Status: DISCONTINUED | OUTPATIENT
Start: 2024-08-02 | End: 2024-08-03 | Stop reason: HOSPADM

## 2024-08-02 RX ORDER — POLYETHYLENE GLYCOL 3350 17 G/17G
17 POWDER, FOR SOLUTION ORAL DAILY
Status: DISCONTINUED | OUTPATIENT
Start: 2024-08-03 | End: 2024-08-02

## 2024-08-02 RX ORDER — PREDNISOLONE ACETATE 10 MG/ML
1 SUSPENSION/ DROPS OPHTHALMIC EVERY MORNING
COMMUNITY

## 2024-08-02 RX ORDER — ACETAMINOPHEN 325 MG/1
650 TABLET ORAL EVERY 6 HOURS PRN
Status: DISCONTINUED | OUTPATIENT
Start: 2024-08-02 | End: 2024-08-03 | Stop reason: HOSPADM

## 2024-08-02 RX ORDER — MAGNESIUM SULFATE IN WATER 40 MG/ML
2000 INJECTION, SOLUTION INTRAVENOUS PRN
Status: DISCONTINUED | OUTPATIENT
Start: 2024-08-02 | End: 2024-08-03 | Stop reason: HOSPADM

## 2024-08-02 RX ORDER — PANTOPRAZOLE SODIUM 40 MG/1
40 TABLET, DELAYED RELEASE ORAL
Status: DISCONTINUED | OUTPATIENT
Start: 2024-08-02 | End: 2024-08-03 | Stop reason: HOSPADM

## 2024-08-02 RX ORDER — LIDOCAINE 4 G/G
1 PATCH TOPICAL DAILY
Status: DISCONTINUED | OUTPATIENT
Start: 2024-08-03 | End: 2024-08-03 | Stop reason: HOSPADM

## 2024-08-02 RX ORDER — ONDANSETRON 2 MG/ML
4 INJECTION INTRAMUSCULAR; INTRAVENOUS EVERY 6 HOURS PRN
Status: CANCELLED | OUTPATIENT
Start: 2024-08-02

## 2024-08-02 RX ORDER — AMIODARONE HYDROCHLORIDE 200 MG/1
200 TABLET ORAL DAILY
Status: DISCONTINUED | OUTPATIENT
Start: 2024-08-03 | End: 2024-08-03 | Stop reason: HOSPADM

## 2024-08-02 RX ORDER — LINACLOTIDE 145 UG/1
145 CAPSULE, GELATIN COATED ORAL DAILY PRN
COMMUNITY
Start: 2024-07-12

## 2024-08-02 RX ORDER — AMIODARONE HYDROCHLORIDE 200 MG/1
200 TABLET ORAL DAILY
Status: CANCELLED | OUTPATIENT
Start: 2024-08-03

## 2024-08-02 RX ORDER — ATORVASTATIN CALCIUM 80 MG/1
80 TABLET, FILM COATED ORAL NIGHTLY
Status: DISCONTINUED | OUTPATIENT
Start: 2024-08-02 | End: 2024-08-03 | Stop reason: HOSPADM

## 2024-08-02 RX ORDER — POTASSIUM CHLORIDE 7.45 MG/ML
10 INJECTION INTRAVENOUS PRN
Status: DISCONTINUED | OUTPATIENT
Start: 2024-08-02 | End: 2024-08-03 | Stop reason: HOSPADM

## 2024-08-02 RX ORDER — POLYETHYLENE GLYCOL 3350 17 G/17G
17 POWDER, FOR SOLUTION ORAL DAILY PRN
Status: DISCONTINUED | OUTPATIENT
Start: 2024-08-02 | End: 2024-08-03 | Stop reason: HOSPADM

## 2024-08-02 RX ORDER — FUROSEMIDE 40 MG/1
40 TABLET ORAL DAILY
Status: DISCONTINUED | OUTPATIENT
Start: 2024-08-02 | End: 2024-08-03 | Stop reason: HOSPADM

## 2024-08-02 RX ORDER — SODIUM CHLORIDE 0.9 % (FLUSH) 0.9 %
5-40 SYRINGE (ML) INJECTION EVERY 12 HOURS SCHEDULED
Status: DISCONTINUED | OUTPATIENT
Start: 2024-08-02 | End: 2024-08-03 | Stop reason: HOSPADM

## 2024-08-02 RX ORDER — SODIUM CHLORIDE 0.9 % (FLUSH) 0.9 %
5-40 SYRINGE (ML) INJECTION PRN
Status: DISCONTINUED | OUTPATIENT
Start: 2024-08-02 | End: 2024-08-03 | Stop reason: HOSPADM

## 2024-08-02 RX ORDER — SODIUM CHLORIDE 9 MG/ML
INJECTION, SOLUTION INTRAVENOUS PRN
Status: DISCONTINUED | OUTPATIENT
Start: 2024-08-02 | End: 2024-08-03 | Stop reason: HOSPADM

## 2024-08-02 RX ORDER — LACTOBACILLUS RHAMNOSUS GG 10B CELL
1 CAPSULE ORAL
Status: DISCONTINUED | OUTPATIENT
Start: 2024-08-03 | End: 2024-08-03 | Stop reason: HOSPADM

## 2024-08-02 RX ADMIN — Medication 10 ML: at 23:18

## 2024-08-02 RX ADMIN — ATORVASTATIN CALCIUM 80 MG: 80 TABLET, FILM COATED ORAL at 23:17

## 2024-08-02 RX ADMIN — PANTOPRAZOLE SODIUM 40 MG: 40 TABLET, DELAYED RELEASE ORAL at 23:17

## 2024-08-02 RX ADMIN — METOPROLOL TARTRATE 25 MG: 25 TABLET, FILM COATED ORAL at 23:18

## 2024-08-02 RX ADMIN — FUROSEMIDE 40 MG: 40 TABLET ORAL at 23:18

## 2024-08-02 RX ADMIN — APIXABAN 5 MG: 5 TABLET, FILM COATED ORAL at 23:18

## 2024-08-02 RX ADMIN — IOPAMIDOL 75 ML: 755 INJECTION, SOLUTION INTRAVENOUS at 17:47

## 2024-08-02 ASSESSMENT — ENCOUNTER SYMPTOMS
EYE REDNESS: 0
DIARRHEA: 0
SORE THROAT: 0
NAUSEA: 0
ABDOMINAL PAIN: 1
RHINORRHEA: 0
VOMITING: 0
COLOR CHANGE: 0
COUGH: 0
SHORTNESS OF BREATH: 1

## 2024-08-02 ASSESSMENT — PAIN DESCRIPTION - LOCATION: LOCATION: ABDOMEN

## 2024-08-02 ASSESSMENT — PAIN SCALES - GENERAL
PAINLEVEL_OUTOF10: 0
PAINLEVEL_OUTOF10: 7

## 2024-08-02 ASSESSMENT — PAIN - FUNCTIONAL ASSESSMENT: PAIN_FUNCTIONAL_ASSESSMENT: 0-10

## 2024-08-02 NOTE — ED PROVIDER NOTES
EKG interpretation by me in absence of a face-to-face evaluation by me as follows:    The 12 lead EKG was interpreted by me independent of a cardiologist as follows:  Rate: normal with a rate of 70  Rhythm: sinus  Axis: normal  Intervals: narrow QRS, first deg AVB and long QTc  ST segments: no ST elevations or depressions  T waves: no abnormal inversions  Non-specific T wave changes: present  Prior EKG comparison: EKG dated 12/15/23 is significantly different due to long QTc        Myron Cortes MD  08/02/24 2412

## 2024-08-02 NOTE — ED PROVIDER NOTES
[facility] EMERGENCY DEPARTMENT  EMERGENCY DEPARTMENT ENCOUNTER        Pt Name: Leandra Dawson  MRN: [id number]  Birthdate 1950  Date of evaluation: 8/2/2024  Provider: CARIDAD Lr  PCP: Augusto Gordillo DO  Note Started: 4:30 PM EDT 8/2/24      BRANDON. I have evaluated this patient.        CHIEF COMPLAINT       Chief Complaint   Patient presents with    Abdominal Pain     Pt to ED with CC of generalized abd pain x2 weeks. States that the pain was worse after eating biscuits and gravy.  Denies nausea, vomiting, diarrhea, constipation, fevers, or chills.       HISTORY OF PRESENT ILLNESS: 1 or more Elements     History From: Patient  Limitations to history : None    Leandra Dawson is a 74 y.o. male who presents to the emergency department with 2-week history of worsening abdominal pain.  Patient states he has nearly completely lost his appetite.  He is eating approximately 1 meal a day.  He tried to eat a doughnut earlier today, but states he had no interest in it.  He has not eaten today.  Patient reports pain across his abdomen.  He states it feels like he is getting punched.  It is intermittent.  Laying down is better.  No notable change with eating.  Patient reports chills but denies fever.  He reports shortness of breath at baseline; he does use oxygen at night.  He denies chest pain or cough.  Patient denies nausea, vomiting, diarrhea or constipation.  On chart review, I do find that patient is currently being worked up for possible lung cancer.  Patient states they however still unsure whether or not it is malignant.  No other acute complaints at this time.      Nursing Notes were all reviewed and agreed with or any disagreements were addressed in the HPI.    REVIEW OF SYSTEMS :      Review of Systems   Constitutional:  Positive for chills. Negative for fatigue and fever.   HENT:  Negative for congestion, rhinorrhea and sore throat.    Eyes:  Negative for redness.   Respiratory:

## 2024-08-03 VITALS
SYSTOLIC BLOOD PRESSURE: 135 MMHG | OXYGEN SATURATION: 93 % | WEIGHT: 202.6 LBS | RESPIRATION RATE: 16 BRPM | BODY MASS INDEX: 30.01 KG/M2 | DIASTOLIC BLOOD PRESSURE: 58 MMHG | HEART RATE: 62 BPM | HEIGHT: 69 IN | TEMPERATURE: 97.9 F

## 2024-08-03 LAB
ALBUMIN SERPL-MCNC: 4 G/DL (ref 3.4–5)
ALBUMIN/GLOB SERPL: 1.3 {RATIO} (ref 1.1–2.2)
ALP SERPL-CCNC: 135 U/L (ref 40–129)
ALT SERPL-CCNC: 13 U/L (ref 10–40)
ANION GAP SERPL CALCULATED.3IONS-SCNC: 16 MMOL/L (ref 3–16)
AST SERPL-CCNC: 16 U/L (ref 15–37)
BASOPHILS # BLD: 0.1 K/UL (ref 0–0.2)
BASOPHILS NFR BLD: 1 %
BILIRUB SERPL-MCNC: 0.5 MG/DL (ref 0–1)
BUN SERPL-MCNC: 13 MG/DL (ref 7–20)
CALCIUM SERPL-MCNC: 9.6 MG/DL (ref 8.3–10.6)
CHLORIDE SERPL-SCNC: 104 MMOL/L (ref 99–110)
CO2 SERPL-SCNC: 22 MMOL/L (ref 21–32)
CREAT SERPL-MCNC: 1.1 MG/DL (ref 0.8–1.3)
DEPRECATED RDW RBC AUTO: 20.7 % (ref 12.4–15.4)
EKG ATRIAL RATE: 70 BPM
EKG DIAGNOSIS: NORMAL
EKG P AXIS: 23 DEGREES
EKG P-R INTERVAL: 212 MS
EKG Q-T INTERVAL: 534 MS
EKG QRS DURATION: 98 MS
EKG QTC CALCULATION (BAZETT): 576 MS
EKG R AXIS: 30 DEGREES
EKG T AXIS: 102 DEGREES
EKG VENTRICULAR RATE: 70 BPM
EOSINOPHIL # BLD: 0.4 K/UL (ref 0–0.6)
EOSINOPHIL NFR BLD: 5.1 %
EST. AVERAGE GLUCOSE BLD GHB EST-MCNC: 194.4 MG/DL
GFR SERPLBLD CREATININE-BSD FMLA CKD-EPI: 70 ML/MIN/{1.73_M2}
GLUCOSE BLD-MCNC: 194 MG/DL (ref 70–99)
GLUCOSE SERPL-MCNC: 149 MG/DL (ref 70–99)
HBA1C MFR BLD: 8.4 %
HCT VFR BLD AUTO: 38 % (ref 40.5–52.5)
HGB BLD-MCNC: 12.6 G/DL (ref 13.5–17.5)
LYMPHOCYTES # BLD: 2.2 K/UL (ref 1–5.1)
LYMPHOCYTES NFR BLD: 29.1 %
MCH RBC QN AUTO: 27.3 PG (ref 26–34)
MCHC RBC AUTO-ENTMCNC: 33.1 G/DL (ref 31–36)
MCV RBC AUTO: 82.6 FL (ref 80–100)
MONOCYTES # BLD: 0.7 K/UL (ref 0–1.3)
MONOCYTES NFR BLD: 9 %
NEUTROPHILS # BLD: 4.2 K/UL (ref 1.7–7.7)
NEUTROPHILS NFR BLD: 55.8 %
PERFORMED ON: ABNORMAL
PLATELET # BLD AUTO: 270 K/UL (ref 135–450)
PMV BLD AUTO: 7.9 FL (ref 5–10.5)
POTASSIUM SERPL-SCNC: 4.1 MMOL/L (ref 3.5–5.1)
PROT SERPL-MCNC: 7.2 G/DL (ref 6.4–8.2)
RBC # BLD AUTO: 4.6 M/UL (ref 4.2–5.9)
SODIUM SERPL-SCNC: 142 MMOL/L (ref 136–145)
TROPONIN, HIGH SENSITIVITY: 44 NG/L (ref 0–22)
WBC # BLD AUTO: 7.4 K/UL (ref 4–11)

## 2024-08-03 PROCEDURE — 85025 COMPLETE CBC W/AUTO DIFF WBC: CPT

## 2024-08-03 PROCEDURE — G0378 HOSPITAL OBSERVATION PER HR: HCPCS

## 2024-08-03 PROCEDURE — 83036 HEMOGLOBIN GLYCOSYLATED A1C: CPT

## 2024-08-03 PROCEDURE — 6370000000 HC RX 637 (ALT 250 FOR IP): Performed by: INTERNAL MEDICINE

## 2024-08-03 PROCEDURE — 93010 ELECTROCARDIOGRAM REPORT: CPT | Performed by: INTERNAL MEDICINE

## 2024-08-03 PROCEDURE — 36415 COLL VENOUS BLD VENIPUNCTURE: CPT

## 2024-08-03 PROCEDURE — 99223 1ST HOSP IP/OBS HIGH 75: CPT | Performed by: INTERNAL MEDICINE

## 2024-08-03 PROCEDURE — 6370000000 HC RX 637 (ALT 250 FOR IP): Performed by: NURSE PRACTITIONER

## 2024-08-03 PROCEDURE — 80053 COMPREHEN METABOLIC PANEL: CPT

## 2024-08-03 PROCEDURE — 93005 ELECTROCARDIOGRAM TRACING: CPT | Performed by: NURSE PRACTITIONER

## 2024-08-03 PROCEDURE — 84484 ASSAY OF TROPONIN QUANT: CPT

## 2024-08-03 PROCEDURE — 2580000003 HC RX 258: Performed by: NURSE PRACTITIONER

## 2024-08-03 RX ORDER — PANTOPRAZOLE SODIUM 40 MG/1
40 TABLET, DELAYED RELEASE ORAL
Qty: 30 TABLET | Refills: 3 | Status: SHIPPED | OUTPATIENT
Start: 2024-08-04

## 2024-08-03 RX ORDER — INSULIN LISPRO 100 [IU]/ML
0-8 INJECTION, SOLUTION INTRAVENOUS; SUBCUTANEOUS
Status: DISCONTINUED | OUTPATIENT
Start: 2024-08-03 | End: 2024-08-03 | Stop reason: HOSPADM

## 2024-08-03 RX ORDER — INSULIN LISPRO 100 [IU]/ML
0-4 INJECTION, SOLUTION INTRAVENOUS; SUBCUTANEOUS NIGHTLY
Status: DISCONTINUED | OUTPATIENT
Start: 2024-08-03 | End: 2024-08-03 | Stop reason: HOSPADM

## 2024-08-03 RX ORDER — VALACYCLOVIR HYDROCHLORIDE 1 G/1
1000 TABLET, FILM COATED ORAL 2 TIMES DAILY
Qty: 14 TABLET | Refills: 0 | Status: SHIPPED | OUTPATIENT
Start: 2024-08-03 | End: 2024-08-10

## 2024-08-03 RX ORDER — GLUCAGON 1 MG/ML
1 KIT INJECTION PRN
Status: DISCONTINUED | OUTPATIENT
Start: 2024-08-03 | End: 2024-08-03 | Stop reason: HOSPADM

## 2024-08-03 RX ORDER — DEXTROSE MONOHYDRATE 100 MG/ML
INJECTION, SOLUTION INTRAVENOUS CONTINUOUS PRN
Status: DISCONTINUED | OUTPATIENT
Start: 2024-08-03 | End: 2024-08-03 | Stop reason: HOSPADM

## 2024-08-03 RX ORDER — VALACYCLOVIR HYDROCHLORIDE 500 MG/1
1000 TABLET, FILM COATED ORAL 2 TIMES DAILY
Status: DISCONTINUED | OUTPATIENT
Start: 2024-08-03 | End: 2024-08-03 | Stop reason: HOSPADM

## 2024-08-03 RX ADMIN — METOPROLOL TARTRATE 25 MG: 25 TABLET, FILM COATED ORAL at 10:05

## 2024-08-03 RX ADMIN — VALACYCLOVIR HYDROCHLORIDE 1000 MG: 500 TABLET, FILM COATED ORAL at 10:04

## 2024-08-03 RX ADMIN — APIXABAN 5 MG: 5 TABLET, FILM COATED ORAL at 10:06

## 2024-08-03 RX ADMIN — Medication 1 CAPSULE: at 10:05

## 2024-08-03 RX ADMIN — PANTOPRAZOLE SODIUM 40 MG: 40 TABLET, DELAYED RELEASE ORAL at 10:11

## 2024-08-03 RX ADMIN — Medication 10 ML: at 10:22

## 2024-08-03 RX ADMIN — FUROSEMIDE 40 MG: 40 TABLET ORAL at 10:06

## 2024-08-03 ASSESSMENT — PAIN SCALES - GENERAL: PAINLEVEL_OUTOF10: 4

## 2024-08-03 NOTE — ED NOTES
How does patient ambulate?   [x]Low Fall Risk (ambulates by themselves without support)  []Stand by assist   []Contact Guard   []Front wheel walker  []Wheelchair   []Steady  []Bed bound  []History of Lower Extremity Amputation  []Unknown, did not assess in the emergency department   How does patient take pills?  []Whole with Water  []Crushed in applesauce  []Crushed in pudding  []Other  [x]Unknown no oral medications were given in the ED  Is patient alert?   [x]Alert  []Drowsy but responds to voice  []Doesn't respond to voice but responds to painful stimuli  []Unresponsive  Is patient oriented?   []To person  []To place  []To time  []To situation  []Confused  []Agitated  []Follows commands  If patient is disoriented or from a Skill Nursing Facility has family been notified of admission?   []Yes   []No  Patient belongings?   []Cell phone  []Wallet   []Dentures  [x]Clothing  Any specific patient or family belongings/needs/dynamics?   na  Miscellaneous comments/pending orders?  na     If there are any additional questions please reach out to the Emergency Department.

## 2024-08-03 NOTE — PROGRESS NOTES
Pt admitted to room 5916. Assessment and vitals complete. Went over plan of care and answered pt concerns. Safety measures in place. Call light within reach. Patient is resting in bed.

## 2024-08-03 NOTE — CONSULTS
Consult Note     Patient: Leandra Dawson MRN: [id number]   YOB: 1950 Age: 74 y.o. Sex: male   Unit: [address] MED/SURG Room/Bed: W3U-5682/5916-01 Location: [facility]    Admitting Physician: KATHY HOPKINS    Primary Care Physician: Augusto Gordillo DO   Admission Date: 8/2/2024   Consult Date: 8/3/2024     Assessment/Plan:  Principal Problem:    Abdominal pain  Resolved Problems:    * No resolved hospital problems. *       Assessment  Lower abdominal pain, non specific but resolved today.  CT ok.  We don't need to keep him in hospital for this.    Mild Weight loss, could be new metformin or possibly amiodarone.    Chronic anticoagulation on Eliquis    Plan:  Ok to follow up as outpt with Conor Antonio  [phone]  in 2-4 weeks to reassess anorexia and weight loss  Can discuss elective EGD/colonoscopy at that time    Subjective:    History of Present Illness: Leandra Dawson is a 74 y.o. male who is seen in consultation at the request of KATHY HOPKINS for acute abdominal pain, 12 pound weight loss    Elmer has a history of CHF wit hsevere CM. A-fib on Eliquis CAD, COPD, chronic constipation on Linzess ., degenerative disc disease.  He has had some lung nodules that are being followed at [facility] and a repeat PET scan is planned for August.    He presents with 2 weeks mild/lower abdominal pain, dull,  No nausea vomiting.  Regular bowel movements.  He has shingles pain on the lower left flank and says it is hard to know if this is related.  There is associated anorexia.  Food does not affect pain but leaning forward does.  Today, abd pain much improved.  No constipation.  He doesn't feel hungry.  Has lost about 10 lbs.  Cards is concerned about amiodarone.  Also, he started meformin 2 months ago.      CTAP with contrast shows nothing acute.  CMP notable for an alk phos of 140.  CBC with hemoglobin 12.6, normal platelet count.  H. influenzae and

## 2024-08-03 NOTE — PLAN OF CARE
Problem: Discharge Planning  Goal: Discharge to home or other facility with appropriate resources  Outcome: Completed     Problem: Pain  Goal: Verbalizes/displays adequate comfort level or baseline comfort level  Outcome: Completed     Problem: ABCDS Injury Assessment  Goal: Absence of physical injury  Outcome: Completed     Problem: Safety - Adult  Goal: Free from fall injury  Outcome: Completed     Problem: Chronic Conditions and Co-morbidities  Goal: Patient's chronic conditions and co-morbidity symptoms are monitored and maintained or improved  Outcome: Completed     Problem: Neurosensory - Adult  Goal: Achieves stable or improved neurological status  Outcome: Completed     Problem: Respiratory - Adult  Goal: Achieves optimal ventilation and oxygenation  Outcome: Completed     Problem: Cardiovascular - Adult  Goal: Maintains optimal cardiac output and hemodynamic stability  Outcome: Completed  Goal: Absence of cardiac dysrhythmias or at baseline  Outcome: Completed     Problem: Skin/Tissue Integrity - Adult  Goal: Skin integrity remains intact  Outcome: Completed  Goal: Incisions, wounds, or drain sites healing without S/S of infection  Outcome: Completed  Goal: Oral mucous membranes remain intact  Outcome: Completed     Problem: Musculoskeletal - Adult  Goal: Return mobility to safest level of function  Outcome: Completed  Goal: Maintain proper alignment of affected body part  Outcome: Completed  Goal: Return ADL status to a safe level of function  Outcome: Completed     Problem: Gastrointestinal - Adult  Goal: Minimal or absence of nausea and vomiting  Outcome: Completed  Goal: Maintains or returns to baseline bowel function  Outcome: Completed  Goal: Maintains adequate nutritional intake  Outcome: Completed     Problem: Genitourinary - Adult  Goal: Absence of urinary retention  Outcome: Completed     Problem: Infection - Adult  Goal: Absence of infection at discharge  Outcome: Completed  Goal: Absence of  the behavioral management agreement  6. If a visitor's behavior poses a threat to safety call refer to organization policy.  7. Initiate consult with , Psychosocial CNS, Spiritual Care as appropriate  Reactivated

## 2024-08-03 NOTE — H&P
V2.0  History and Physical      Name:  Leandra Dawson /Age/Sex: 1950  (74 y.o. male)   MRN & CSN:  [id number] & [id number] Encounter Date/Time: 2024 8:27 PM EDT   Location:  ED- PCP: Augusto Gordillo DO       Hospital Day: 1    Assessment and Plan:   Leandra Dawson is a 74 y.o. male who presents with Abdominal pain    Hospital Problems             Last Modified POA    * (Principal) Abdominal pain 2024 Yes       Plan:  Acute Abdominal Pain  Admit observation  Decreased appetite with 12lb weight loss  Add PPI, probiotics   CT scan no acute findings,  lipase normal,  regular BM  GI consult     2. Prolonged Qtc  Troponin 38, with repeat 39, patient denies any chest pain   Previous EKGs have had prolonged Qtc but tonight longest 576  Avoid QT prolonging medications  Mag and K stable  On Amiodarone will have Cardiology evaluated,  previous hx sustained VT,  now with pacemaker, AICD    3. Hyperlipidemia  Continue statin    4. Lung Nodules  Patient following with Pulmonary at [facility],  had PET scan scheduled to get results in Aug     5. Hypertension  BP stable continue home meds    6. CAD with Hx CABG    Disposition:   Current Living situation: home  Expected Disposition: home  Estimated D/C: 1    Diet No diet orders on file   DVT Prophylaxis [] Lovenox, []  Heparin, [] SCDs, [] Ambulation,  [x] Eliquis, [] Xarelto, [] Coumadin   Code Status Prior   Surrogate Decision Maker/ POA      Personally reviewed Lab Studies and Imaging     Discussed management of the case with Haley MCLEAN in ER who recommended admission     EKG interpreted personally and results NSR 1st degree Av block with prolonged       History from:     patient, family member - wife    History of Present Illness:     Chief Complaint: abdominal pain   Leandra Dawson is a 74 y.o. male with pmh of HTN, tobacco dependent, CAD, CABG , HLD, PVD, A-fib, pacemaker, AICD who presents with abdominal pain.  Patient reports  the last 72 hours.  UA:  Lab Results   Component Value Date/Time    NITRU Negative 08/02/2024 04:39 PM    COLORU Yellow 08/02/2024 04:39 PM    PHUR 6.0 08/02/2024 04:39 PM    PHUR 6.0 04/22/2022 06:15 PM    WBCUA 1 08/02/2024 04:39 PM    RBCUA 1 08/02/2024 04:39 PM    BACTERIA None Seen 08/02/2024 04:39 PM    CLARITYU Clear 08/02/2024 04:39 PM    LEUKOCYTESUR Negative 08/02/2024 04:39 PM    UROBILINOGEN 1.0 08/02/2024 04:39 PM    BILIRUBINUR Negative 08/02/2024 04:39 PM    BLOODU Negative 08/02/2024 04:39 PM    GLUCOSEU Negative 08/02/2024 04:39 PM    GLUCOSEU NEGATIVE 01/30/2012 11:05 AM    KETUA Negative 08/02/2024 04:39 PM     Urine Cultures:   Lab Results   Component Value Date/Time    LABURIN >100,000 CFU/ml 07/31/2016 06:48 PM     Blood Cultures:   Lab Results   Component Value Date/Time    BC No Growth after 4 days of incubation. 04/21/2022 03:17 PM     Lab Results   Component Value Date/Time    BLOODCULT2 No Growth after 4 days of incubation. 04/21/2022 03:17 PM     Organism:   Lab Results   Component Value Date/Time    ORG Escherichia coli 07/31/2016 06:48 PM       Imaging/Diagnostics Last 24 Hours   CT ABDOMEN PELVIS W IV CONTRAST Additional Contrast? None    Result Date: 8/2/2024  EXAMINATION: CT OF THE ABDOMEN AND PELVIS WITH CONTRAST 8/2/2024 5:47 pm TECHNIQUE: CT of the abdomen and pelvis was performed with the administration of intravenous contrast. Multiplanar reformatted images are provided for review. Automated exposure control, iterative reconstruction, and/or weight based adjustment of the mA/kV was utilized to reduce the radiation dose to as low as reasonably achievable. COMPARISON: 07/31/2016 HISTORY: ORDERING SYSTEM PROVIDED HISTORY: abd pain, lower TECHNOLOGIST PROVIDED HISTORY: Additional Contrast?->None Reason for exam:->abd pain, lower Decision Support Exception - unselect if not a suspected or confirmed emergency medical condition->Emergency Medical Condition (MA) Reason for Exam:

## 2024-08-03 NOTE — PROGRESS NOTES
Data- discharge order received, pt verbalized agreement to discharge, disposition to previous residence, no needs for HHC/DME.     Action- discharge instructions prepared and given to \"Elmer\", pt verbalized understanding. Medication information packet given r/t NEW and/or CHANGED prescriptions emphasizing name/purpose/side effects, pt verbalized understanding. Discharge instruction summary: Diet- low sodium, Activity- as tolerated, Primary Care Physician as follows: Augusto Gordillo [phone] f/u appointment with PCP to be scheduled for next week by pt, pt to follow up with cardiology in 2 weeks and Gi in one week. prescription medications filled [facility].      1. WEIGHT: Admit Weight - Scale: 93.2 kg (205 lb 8 oz) (08/02/24 1613)        Today  Weight - Scale: 91.9 kg (202 lb 9.6 oz) (08/03/24 0420)       2. O2 SAT.: SpO2: 93 % (08/03/24 1000)    Response- Pt belongings gathered, IV removed. Disposition is home (no HHC/DME needs), transported to [facility] via w/c w/ belongings, no complications.

## 2024-08-03 NOTE — PROGRESS NOTES
Loss of appetite over last 2 weeks      H/o vtach with cardiac arrest in past and has been on amiodarone ever since. Also now has ICD and has had recovery in EF to 50-55%    Rec- entirely possible he is having loss of appetite related to amiodarone    Would stop amiodarone. I will see in office in 2-3 weeks- will continue to follow in office. Will not see further in hospital. From my standpoint he can be discharged

## 2024-08-04 LAB
EKG ATRIAL RATE: 64 BPM
EKG DIAGNOSIS: NORMAL
EKG P AXIS: 48 DEGREES
EKG P-R INTERVAL: 206 MS
EKG Q-T INTERVAL: 548 MS
EKG QRS DURATION: 96 MS
EKG QTC CALCULATION (BAZETT): 565 MS
EKG R AXIS: 26 DEGREES
EKG T AXIS: 103 DEGREES
EKG VENTRICULAR RATE: 64 BPM

## 2024-08-04 PROCEDURE — 93010 ELECTROCARDIOGRAM REPORT: CPT | Performed by: INTERNAL MEDICINE

## 2024-08-05 ENCOUNTER — TELEPHONE (OUTPATIENT)
Dept: CARDIOLOGY CLINIC | Age: 74
End: 2024-08-05

## 2024-08-05 NOTE — CONSULTS
[place] [address]                              CONSULTATION      PATIENT NAME: LANDON CUEVAS JR              : 1950  MED REC NO: [id number]                      ROOM: [place] [address]  ACCOUNT NO: [id number]                       ADMIT DATE: 2024  PROVIDER: Jeremie Reece MD      CONSULT DATE: 2024    REASON FOR CONSULTATION:  Evaluate patient with a prolonged QT at the request of the hospitalist service.    HISTORY OF PRESENT ILLNESS:  The patient is a 74-year-old gentleman with longstanding coronary artery disease.  He has finally discontinued cigarette use, although he states it remains very hard.  He has long-standing ischemic cardiomyopathy and became quite ill in  with COVID pneumonia.  After that time, he had recurrent syncope.  He was hospitalized and had recurrent ventricular tachycardia, had a cardiac arrest, had left heart catheterization, which showed patent grafts.  At one time, his ejection fraction was down to less than 20%, but on most recent echocardiogram, his ejection fraction is increased to 55%.  Due to severe left ventricular dysfunction in  and the cardiac arrest with ventricular tachycardia, he had an ICD placed and also due to the recurrent ventricular tachycardia at that time, he has been maintained on amiodarone.  I last saw him in  of this year.  He has become tobacco free.  He still works.  He has had a remarkable recovery.  He has very symptomatic peripheral vascular disease.  He also intermittently has hypotension for which he will take midodrine as needed.  His blood pressure actually has been doing better, and he has not needed midodrine nearly as often.  Over the last 2-3 weeks, he has had a marked reduction in his appetite and has been unable to eat.  He states he has had a 10-pound weight loss.  At times, he has significant abdominal discomfort.  Also, he is being evaluated for

## 2024-08-05 NOTE — TELEPHONE ENCOUNTER
Per LES, pt need FU appointment this month.    Please call patient and schedule 8/26/24 at 8:15 am, can schedule 8/27/24 at 8:15 if needed.

## 2024-08-06 NOTE — DISCHARGE SUMMARY
Hospital Medicine Discharge Summary    Patient ID: Leandra Dawson      Patient's PCP: Augusto Gordillo DO    Admit Date: 8/2/2024     Discharge Date: 8/3/2024      Admitting Provider: Joe Hernandez MD     Discharge Provider: Clif Finley MD     Discharge Diagnoses:       Active Hospital Problems    Diagnosis     Abdominal pain [R10.9]        The patient was seen and examined on day of discharge and this discharge summary is in conjunction with any daily progress note from day of discharge.    Hospital Course:   The patient is a 74-year-old gentleman with history of hypertension, CAD, hyperlipidemia, peripheral vascular disease, atrial fibrillation, sick sinus syndrome status post pacemaker and AICD who presented with abdominal pain that started a couple weeks prior.  Patient does nausea but no vomiting.  He had decreased appetite.  Patient was consulted by cardiology and gastroenterology.  At the time of the evaluation, symptoms had resolved.  CT abdomen pelvis was unremarkable.  Recommend outpatient with clinic in 2 to 4 weeks to reassess anorexia and weight loss and to discuss elective EGD and colonoscopy at that time also to follow-up with cardiology in 2 to 3 weeks.  At the time of discharge, amiodarone was discontinued by cardiology.        Physical Exam Performed:     BP (!) 135/58   Pulse 62   Temp 97.9 °F (36.6 °C) (Temporal)   Resp 16   Ht 1.753 m (5' 9\")   Wt 91.9 kg (202 lb 9.6 oz)   SpO2 93%   BMI 29.92 kg/m²     General appearance:  No apparent distress, appears stated age and cooperative.  HEENT:  Normal cephalic, atraumatic without obvious deformity. Pupils equal, round, and reactive to light.  Extra ocular muscles intact. Conjunctivae/corneas clear.  Neck: Supple, with full range of motion. No jugular venous distention. Trachea midline.  Respiratory:  Normal respiratory effort. Clear to auscultation, bilaterally without Rales/Wheezes/Rhonchi.  Cardiovascular:

## 2024-08-08 NOTE — PROGRESS NOTES
[facility]   Cardiac Follow Up     Referring Provider:  Augusto Gordillo DO     Chief Complaint   Patient presents with    Follow-Up from Hospital     No cardiac complaints      History of Present Illness:  Leandra Dawson is a 74 y.o.  male with a history of CAD s/p CABG May 2014 with post-op short run of SVT and brief AF, s/p DCCV Nov 2019; also cardiomyopathy, hypertension, and hyperlipidemia, PVD. His other history includes carotid stenosis and PAD s/p Lt common & external iliac PTA..     He was hospitalized Jan 2022 for covid pneumonia. In March 15 he came to ER from SNF for syncope. Found to be in afib, had an episode of sustained VT requiring chest compressions. LHC unremarkable. He had a single chamber ICD placed 3/17/22.     On 4/21 he went to the ER again with syncope, found to be hypoxic. No arrhythmias on interrogation, treated for pnuemonia, lopressor increased to 50 mg bid      In 2022 he had an witnessed syncopal episode in the kitchen. Wife reports he was walking to the refrigerator, and got weak. Looked like he was having a \"seizure\" and passed out.  Wife reports this has happened three times in a month. He did not seek medical attention.  Wife called the office on 7/5/22 to notify us. He was taken off of lasix and aldactone due to suspected orthostatic hypotension.     He had a chest xray and had spots on his lungs and it is confirmed cancer. He is seeing a lung specialist through [facility], he had the PET scan.    Pt was admitted to hospital 8/2/24-8/3/24.   He presented with abdominal pain that started a couple weeks prior.  Patient does nausea but no vomiting.  He had decreased appetite.  Patient was consulted by cardiology and gastroenterology.  At the time of the evaluation, symptoms had resolved.  CT abdomen pelvis was unremarkable.  Recommend outpatient with clinic in 2 to 4 weeks to reassess anorexia and weight loss and to discuss elective EGD and colonoscopy at that time  midodrine as needed.   - remain on current medications     2.  CAD- CABG 2014  denies anginal symptoms   -  1/2022 stable grafts  EF 35-40%  -  no chf on exam  7/7/22  -  remain on Lipitor 80 mg   -  Not on asa due to eliquis  - remain on current medications     3.  Ischemic cardiomyopathy  -  2019 echo EF 15-20%  -  4/21/22  BNP 1910  -  6/18/22  bnp 789  -   no chf on exam  7/7/22  -  Spironolactone and lasix stopped due hypotension -7/5  -  stopped lopressor due to hypotension  -  May take lasix as needed for swelling, will take caution when getting up  - remain on current medications     4.  Hypertension in past  6/18/22  k 5.2  Bun 24 creat 1.3  - 78/38  7/7/22  - no longer on flomax,lopressor  - remain on current medications     5.  Hyperlipidemia  2017 ldl 121 hdl 41  8/21/23   HDL34   - continue lipitor 80  - remain on current medications     6. Typical atrial flutter  2019 DCCV  - no longer on amiodarone   - remain on eliquis 5 mg BID  - remain on current medications     7 Tobacco abuse  -  He has discontinued cigarette use, although he states it remains very hard.     Plan:     Cardiac test and lab results personally reviewed by me during this office visit and discussed.     No medication changes  Continue risk factor modifications.   Call for any change in symptoms, call to report any changes in shortness of breath or development of chest pain with activity.    Follow up in 6 mos with Dr Reece, and keep apt with NP in October        I appreciate the opportunity of cooperating in the care of this individual.    Jeremie Reece M.D., [facility]    Patient's problem list, medications, allergies, past medical, surgical, social and family histories were reviewed and updated as appropriate.    Scribe's attestation: This note was scribed in the presence of Dr Reece by Clifford Ybarra RN.    The scribe's documentation has been prepared under my direction and personally reviewed by me in its entirety. I

## 2024-08-26 ENCOUNTER — OFFICE VISIT (OUTPATIENT)
Dept: CARDIOLOGY CLINIC | Age: 74
End: 2024-08-26
Payer: MEDICARE

## 2024-08-26 VITALS
DIASTOLIC BLOOD PRESSURE: 82 MMHG | BODY MASS INDEX: 30.17 KG/M2 | OXYGEN SATURATION: 95 % | SYSTOLIC BLOOD PRESSURE: 130 MMHG | WEIGHT: 203.7 LBS | HEART RATE: 65 BPM | HEIGHT: 69 IN

## 2024-08-26 DIAGNOSIS — E78.2 MIXED HYPERLIPIDEMIA: ICD-10-CM

## 2024-08-26 DIAGNOSIS — I25.10 CORONARY ARTERY DISEASE INVOLVING NATIVE CORONARY ARTERY OF NATIVE HEART WITHOUT ANGINA PECTORIS: Primary | ICD-10-CM

## 2024-08-26 DIAGNOSIS — I25.5 ISCHEMIC CARDIOMYOPATHY: ICD-10-CM

## 2024-08-26 DIAGNOSIS — I48.92 ATRIAL FLUTTER, UNSPECIFIED TYPE (HCC): ICD-10-CM

## 2024-08-26 DIAGNOSIS — I10 PRIMARY HYPERTENSION: ICD-10-CM

## 2024-08-26 PROCEDURE — G8427 DOCREV CUR MEDS BY ELIG CLIN: HCPCS | Performed by: INTERNAL MEDICINE

## 2024-08-26 PROCEDURE — 3075F SYST BP GE 130 - 139MM HG: CPT | Performed by: INTERNAL MEDICINE

## 2024-08-26 PROCEDURE — 1036F TOBACCO NON-USER: CPT | Performed by: INTERNAL MEDICINE

## 2024-08-26 PROCEDURE — G8417 CALC BMI ABV UP PARAM F/U: HCPCS | Performed by: INTERNAL MEDICINE

## 2024-08-26 PROCEDURE — 3079F DIAST BP 80-89 MM HG: CPT | Performed by: INTERNAL MEDICINE

## 2024-08-26 PROCEDURE — 3017F COLORECTAL CA SCREEN DOC REV: CPT | Performed by: INTERNAL MEDICINE

## 2024-08-26 PROCEDURE — 99214 OFFICE O/P EST MOD 30 MIN: CPT | Performed by: INTERNAL MEDICINE

## 2024-08-26 PROCEDURE — 1124F ACP DISCUSS-NO DSCNMKR DOCD: CPT | Performed by: INTERNAL MEDICINE

## 2024-08-26 NOTE — PATIENT INSTRUCTIONS
No medication changes  Continue risk factor modifications.   Call for any change in symptoms, call to report any changes in shortness of breath or development of chest pain with activity.    Follow up in 6 mos with Dr Reece, and keep apt with NP in October

## 2024-09-30 PROBLEM — R10.9 ABDOMINAL PAIN: Status: RESOLVED | Noted: 2024-08-02 | Resolved: 2024-09-30

## 2024-09-30 PROBLEM — J96.00 ACUTE RESPIRATORY FAILURE DUE TO COVID-19: Status: RESOLVED | Noted: 2022-01-12 | Resolved: 2024-09-30

## 2024-09-30 PROBLEM — U07.1 COVID: Status: RESOLVED | Noted: 2022-01-12 | Resolved: 2024-09-30

## 2024-09-30 PROBLEM — J18.9 HCAP (HEALTHCARE-ASSOCIATED PNEUMONIA): Status: RESOLVED | Noted: 2022-04-21 | Resolved: 2024-09-30

## 2024-09-30 PROBLEM — I48.91 ATRIAL FIBRILLATION WITH RVR (HCC): Status: RESOLVED | Noted: 2019-11-10 | Resolved: 2024-09-30

## 2024-09-30 PROBLEM — U07.1 ACUTE RESPIRATORY FAILURE DUE TO COVID-19: Status: RESOLVED | Noted: 2022-01-12 | Resolved: 2024-09-30

## 2024-09-30 NOTE — PROGRESS NOTES
[facility]   Electrophysiology  MARCO Daugherty  Attending EP: Dr. Hood Kaplan    Date: 10/4/2024  I had the privilege of visiting Leandra Dawson in the office.     Chief Complaint:   Chief Complaint   Patient presents with    Follow-up     SOB     History of Present Illness: History obtained from patient and medical record.    Leandra Dawson is 74 y.o. male with a past medical history of CAD, ICM, CHF, HTN, PAD, PAF, and VT. Hx of sustained VT with loss of consciousness and CPR (3/15/22). S/p single chamber AICD (3/17/22)    -Interval history: Today, Leandra Dawson is being seen for urgent follow up (last seen by EP in 2022). He was in the hospital last week for AF with RVR. He remains in atrial fibrillation with RVR. His weight remains up. He complains of on going abdominal bloating and SOB. We discussed need for urgent cardioversion and possible ablation.     Denies having chest pain, palpitations, orthopnea/PND, cough, or dizziness at the time of this visit.    With regard to medication therapy the patient has been compliant with prescribed regimen. They have tolerated therapy to date.     Allergies:  Allergies   Allergen Reactions    Oxycodone Itching, Nausea And Vomiting and Anxiety     Home Medications:  Prior to Visit Medications    Medication Sig Taking? Authorizing Provider   gabapentin (NEURONTIN) 300 MG capsule  Yes Fay Rees MD   metoprolol tartrate (LOPRESSOR) 25 MG tablet Take 2 tablets by mouth 2 times daily Yes Conor Quiroga APRN - CNP   furosemide (LASIX) 40 MG tablet Take 1 tablet by mouth 2 times daily Yes Conor Quiroga APRN - CNP   pantoprazole (PROTONIX) 40 MG tablet Take 1 tablet by mouth every morning (before breakfast) Yes Clif Finley MD   LINZESS 145 MCG capsule Take 1 capsule by mouth daily as needed Yes Fay Rees MD   prednisoLONE acetate (PRED FORTE) 1 % ophthalmic suspension Place 1 drop into both eyes every morning Yes

## 2024-10-04 ENCOUNTER — OFFICE VISIT (OUTPATIENT)
Dept: CARDIOLOGY CLINIC | Age: 74
End: 2024-10-04
Payer: MEDICARE

## 2024-10-04 ENCOUNTER — TELEPHONE (OUTPATIENT)
Dept: CARDIOLOGY CLINIC | Age: 74
End: 2024-10-04

## 2024-10-04 ENCOUNTER — HOSPITAL ENCOUNTER (OUTPATIENT)
Age: 74
Discharge: HOME | End: 2024-10-04
Payer: MEDICARE

## 2024-10-04 ENCOUNTER — NURSE ONLY (OUTPATIENT)
Dept: CARDIOLOGY CLINIC | Age: 74
End: 2024-10-04

## 2024-10-04 VITALS
WEIGHT: 211 LBS | BODY MASS INDEX: 31.25 KG/M2 | SYSTOLIC BLOOD PRESSURE: 132 MMHG | DIASTOLIC BLOOD PRESSURE: 66 MMHG | OXYGEN SATURATION: 97 % | HEART RATE: 116 BPM | HEIGHT: 69 IN

## 2024-10-04 DIAGNOSIS — I25.83 CORONARY ARTERY DISEASE DUE TO LIPID RICH PLAQUE: Chronic | ICD-10-CM

## 2024-10-04 DIAGNOSIS — I48.92 ATRIAL FLUTTER, UNSPECIFIED TYPE (HCC): ICD-10-CM

## 2024-10-04 DIAGNOSIS — I50.32 CHRONIC DIASTOLIC HEART FAILURE (HCC): ICD-10-CM

## 2024-10-04 DIAGNOSIS — Z95.810 ICD (IMPLANTABLE CARDIOVERTER-DEFIBRILLATOR), SINGLE, IN SITU: Primary | ICD-10-CM

## 2024-10-04 DIAGNOSIS — I47.20 VT (VENTRICULAR TACHYCARDIA) (HCC): Primary | ICD-10-CM

## 2024-10-04 DIAGNOSIS — I47.20 VT (VENTRICULAR TACHYCARDIA) (HCC): ICD-10-CM

## 2024-10-04 DIAGNOSIS — I48.19 PERSISTENT ATRIAL FIBRILLATION (HCC): ICD-10-CM

## 2024-10-04 DIAGNOSIS — I25.5 ISCHEMIC CARDIOMYOPATHY: ICD-10-CM

## 2024-10-04 DIAGNOSIS — I10 PRIMARY HYPERTENSION: ICD-10-CM

## 2024-10-04 DIAGNOSIS — Z95.810 ICD (IMPLANTABLE CARDIOVERTER-DEFIBRILLATOR), SINGLE, IN SITU: ICD-10-CM

## 2024-10-04 DIAGNOSIS — I25.10 CORONARY ARTERY DISEASE DUE TO LIPID RICH PLAQUE: Chronic | ICD-10-CM

## 2024-10-04 LAB
ANION GAP SERPL CALCULATED.3IONS-SCNC: 14 MMOL/L (ref 3–16)
BUN SERPL-MCNC: 23 MG/DL (ref 7–20)
CALCIUM SERPL-MCNC: 9.4 MG/DL (ref 8.3–10.6)
CHLORIDE SERPL-SCNC: 105 MMOL/L (ref 99–110)
CO2 SERPL-SCNC: 24 MMOL/L (ref 21–32)
CREAT SERPL-MCNC: 1.4 MG/DL (ref 0.8–1.3)
GFR SERPLBLD CREATININE-BSD FMLA CKD-EPI: 53 ML/MIN/{1.73_M2}
GLUCOSE SERPL-MCNC: 132 MG/DL (ref 70–99)
NT-PROBNP SERPL-MCNC: 2169 PG/ML (ref 0–449)
POTASSIUM SERPL-SCNC: 5.1 MMOL/L (ref 3.5–5.1)
SODIUM SERPL-SCNC: 143 MMOL/L (ref 136–145)

## 2024-10-04 PROCEDURE — 3075F SYST BP GE 130 - 139MM HG: CPT | Performed by: NURSE PRACTITIONER

## 2024-10-04 PROCEDURE — 1124F ACP DISCUSS-NO DSCNMKR DOCD: CPT | Performed by: NURSE PRACTITIONER

## 2024-10-04 PROCEDURE — G8417 CALC BMI ABV UP PARAM F/U: HCPCS | Performed by: NURSE PRACTITIONER

## 2024-10-04 PROCEDURE — 36415 COLL VENOUS BLD VENIPUNCTURE: CPT

## 2024-10-04 PROCEDURE — 99215 OFFICE O/P EST HI 40 MIN: CPT | Performed by: NURSE PRACTITIONER

## 2024-10-04 PROCEDURE — 93000 ELECTROCARDIOGRAM COMPLETE: CPT | Performed by: NURSE PRACTITIONER

## 2024-10-04 PROCEDURE — 1036F TOBACCO NON-USER: CPT | Performed by: NURSE PRACTITIONER

## 2024-10-04 PROCEDURE — G8427 DOCREV CUR MEDS BY ELIG CLIN: HCPCS | Performed by: NURSE PRACTITIONER

## 2024-10-04 PROCEDURE — G8484 FLU IMMUNIZE NO ADMIN: HCPCS | Performed by: NURSE PRACTITIONER

## 2024-10-04 PROCEDURE — 80048 BASIC METABOLIC PNL TOTAL CA: CPT

## 2024-10-04 PROCEDURE — 3017F COLORECTAL CA SCREEN DOC REV: CPT | Performed by: NURSE PRACTITIONER

## 2024-10-04 PROCEDURE — 3078F DIAST BP <80 MM HG: CPT | Performed by: NURSE PRACTITIONER

## 2024-10-04 PROCEDURE — 83880 ASSAY OF NATRIURETIC PEPTIDE: CPT

## 2024-10-04 RX ORDER — GABAPENTIN 300 MG/1
CAPSULE ORAL
COMMUNITY
Start: 2024-10-02

## 2024-10-04 RX ORDER — AMIODARONE HYDROCHLORIDE 100 MG/1
TABLET ORAL
COMMUNITY
Start: 2024-06-25 | End: 2024-10-04

## 2024-10-04 RX ORDER — METOPROLOL TARTRATE 25 MG/1
50 TABLET, FILM COATED ORAL 2 TIMES DAILY
Qty: 180 TABLET | Refills: 0
Start: 2024-10-04

## 2024-10-04 RX ORDER — FUROSEMIDE 40 MG
40 TABLET ORAL 2 TIMES DAILY
Qty: 90 TABLET | Refills: 1
Start: 2024-10-04

## 2024-10-04 NOTE — TELEPHONE ENCOUNTER
LVM for pt to return call to schedule procedure    Procedure - CV   Date:  Arrival time:   Procedure time:

## 2024-10-04 NOTE — PATIENT INSTRUCTIONS
Increase lasix to 40 mg twice per day  Increase metoprolol to 50 mg twice per day  Schedule cardioversion

## 2024-10-04 NOTE — TELEPHONE ENCOUNTER
Spoke with the pt and got him scheduled for procedure. We went over instructions basic (NPO, have , take all meds minus lasix) and he verbalized understanding.     Procedure - CV   Date: 10/7/24  Arrival time: 12:00 pm   Procedure time: 1:00 pm     Smiley updated / updated epic / emailed cath lab

## 2024-10-07 ENCOUNTER — TELEPHONE (OUTPATIENT)
Dept: CARDIOLOGY CLINIC | Age: 74
End: 2024-10-07

## 2024-10-07 ENCOUNTER — HOSPITAL ENCOUNTER (OUTPATIENT)
Age: 74
Discharge: HOME | End: 2024-10-09
Attending: INTERNAL MEDICINE
Payer: MEDICARE

## 2024-10-07 VITALS
BODY MASS INDEX: 30.07 KG/M2 | OXYGEN SATURATION: 100 % | HEIGHT: 69 IN | HEART RATE: 53 BPM | TEMPERATURE: 98 F | DIASTOLIC BLOOD PRESSURE: 67 MMHG | RESPIRATION RATE: 23 BRPM | SYSTOLIC BLOOD PRESSURE: 111 MMHG | WEIGHT: 203 LBS

## 2024-10-07 DIAGNOSIS — I48.19 PERSISTENT ATRIAL FIBRILLATION (HCC): ICD-10-CM

## 2024-10-07 PROCEDURE — 7100000010 HC PHASE II RECOVERY - FIRST 15 MIN: Performed by: INTERNAL MEDICINE

## 2024-10-07 PROCEDURE — 2500000003 HC RX 250 WO HCPCS: Performed by: INTERNAL MEDICINE

## 2024-10-07 PROCEDURE — 7100000011 HC PHASE II RECOVERY - ADDTL 15 MIN: Performed by: INTERNAL MEDICINE

## 2024-10-07 RX ADMIN — METHOHEXITAL SODIUM 70 MG: 500 INJECTION, POWDER, LYOPHILIZED, FOR SOLUTION INTRAMUSCULAR; INTRAVENOUS; RECTAL at 13:18

## 2024-10-07 NOTE — TELEPHONE ENCOUNTER
----- Message from GEORGE Daugherty CNP sent at 10/4/2024  3:34 PM EDT -----  Labs stable with elevated BNP. Continue meds as discussed at visit today. Plan for cardioversion next week

## 2024-10-07 NOTE — DISCHARGE INSTRUCTIONS
CARDIOVERSION DISCHARGE INSTRUCTIONS    No driving for 24 hours. We strongly recommend that a responsible adult stay with you for the next 24 hours.    Continue Eliquis    Hydrocortisone 1% cream to reddened areas as needed.

## 2024-10-07 NOTE — H&P
H&P Update    I have reviewed the history and physical and examined the patient and updated with relevant changes.     Consent: I have discussed with the patient and/or the patient representative the indication, alternatives, and the possible risks and/or complications of the planned procedure and the anesthesia methods. The patient and/or patient representative appear to understand and agree to proceed.    Vitals:    10/07/24 1207   BP: (!) 129/98   Pulse: 80   Temp: 98 °F (36.7 °C)     Prior to Admission medications    Medication Sig Start Date End Date Taking? Authorizing Provider   gabapentin (NEURONTIN) 300 MG capsule  10/2/24  Yes Fay Rees MD   metoprolol tartrate (LOPRESSOR) 25 MG tablet Take 2 tablets by mouth 2 times daily 10/4/24  Yes Conor Quiroga APRN - CNP   furosemide (LASIX) 40 MG tablet Take 1 tablet by mouth 2 times daily 10/4/24  Yes Conor Quiroga APRN - CNP   pantoprazole (PROTONIX) 40 MG tablet Take 1 tablet by mouth every morning (before breakfast) 8/4/24  Yes Clif Finley MD   LINZESS 145 MCG capsule Take 1 capsule by mouth daily as needed 7/12/24  Yes Fay Rees MD   prednisoLONE acetate (PRED FORTE) 1 % ophthalmic suspension Place 1 drop into both eyes every morning   Yes Fay Rees MD   ELIQUIS 5 MG TABS tablet TAKE 1 TABLET BY MOUTH TWICE DAILY 6/12/24  Yes Lola Montesinos APRN - CNP   atorvastatin (LIPITOR) 80 MG tablet Take 1 tablet by mouth daily 8/22/23  Yes Lola Montesinos APRN - CNP   OXYGEN Inhale into the lungs 1.5 liters nightly   Yes Fay Rees MD   Cholecalciferol (VITAMIN D3) 125 MCG (5000 UT) TABS Take by mouth   Yes Fay Rees MD   Ascorbic Acid (VITAMIN C PO) Take 5,000 mcg by mouth   Yes Fay Rees MD     Past Medical History:   Diagnosis Date    Acid reflux     Acute MI (HCC)     CAD (coronary artery disease)     COPD (chronic obstructive pulmonary disease) (HCC)     Diverticulitis

## 2024-10-07 NOTE — PROCEDURES
[facility]     Electrophysiology Procedure Note       Date of Procedure: 10/7/2024  Patient's Name: Leandra Dawson  YOB: 1950   Medical Record Number: [id number]  Procedure Performed by: Hood Kaplan MD    Procedures performed:    External Electrical cardioversion   IV sedation.     Sedation medication was given by physician    An independent trained observer assisted in the monitoring of the patient's level of consciousness and physiological status including vital signs.     Indication of the procedure: Persistent atrial fibrillation     Details of procedure:   The patient was brought to the cath lab area in a fasting and non-sedated state. The risks, benefits and alternatives of the procedure were discussed with the patient. The patient opted to proceed with the procedure. Written informed consent was signed and placed in the chart.  A timeout protocol was completed to identify the patient and the procedure being performed.     Then we used brevital for sedation. 70 mg Brevital was given by me as one dose, and electrical DC cardioversion was perfomred using 200J, synchronized shock.     Patient was converted to sinus rhythm. The patient tolerated the procedure well and there were no complications.     Conclusion:   Successful external DC cardioversion of atrial fibrillation

## 2024-10-09 LAB
EKG ATRIAL RATE: 53 BPM
EKG ATRIAL RATE: 91 BPM
EKG DIAGNOSIS: NORMAL
EKG DIAGNOSIS: NORMAL
EKG P AXIS: 15 DEGREES
EKG P-R INTERVAL: 216 MS
EKG Q-T INTERVAL: 420 MS
EKG Q-T INTERVAL: 504 MS
EKG QRS DURATION: 92 MS
EKG QRS DURATION: 94 MS
EKG QTC CALCULATION (BAZETT): 472 MS
EKG QTC CALCULATION (BAZETT): 484 MS
EKG R AXIS: 18 DEGREES
EKG R AXIS: 26 DEGREES
EKG T AXIS: 84 DEGREES
EKG T AXIS: 98 DEGREES
EKG VENTRICULAR RATE: 53 BPM
EKG VENTRICULAR RATE: 80 BPM

## 2024-10-09 PROCEDURE — 2500000003 HC RX 250 WO HCPCS

## 2024-10-11 RX ORDER — FUROSEMIDE 40 MG
40 TABLET ORAL 2 TIMES DAILY
Qty: 180 TABLET | Refills: 1 | Status: SHIPPED | OUTPATIENT
Start: 2024-10-11

## 2024-10-11 NOTE — TELEPHONE ENCOUNTER
Pt states he went to fill lasix 40 mg and pharmacy said they could not fill it prescription was locked. Pt only has couple doses left. Please call to advise.

## 2024-10-11 NOTE — TELEPHONE ENCOUNTER
Requested Prescriptions     Pending Prescriptions Disp Refills    furosemide (LASIX) 40 MG tablet 90 tablet 1     Sig: Take 1 tablet by mouth 2 times daily      [id number]      Last OV:  10/4/2024 NPSR    Next OV: 10/25/2024 NPTS    Last Labs: 10/04/2024 BMP    Last Filled: 10/01/2024 NPSR     Spoke to Pharmacy stating they did not receive the Rx. Asked if NPSR can resend it.

## 2024-10-14 NOTE — TELEPHONE ENCOUNTER
Called and spoke with patient. Advised script sent to pharmacy and to notify us if there are any further issues.

## 2024-10-25 ENCOUNTER — OFFICE VISIT (OUTPATIENT)
Dept: CARDIOLOGY CLINIC | Age: 74
End: 2024-10-25

## 2024-10-25 VITALS
DIASTOLIC BLOOD PRESSURE: 64 MMHG | HEART RATE: 58 BPM | OXYGEN SATURATION: 89 % | SYSTOLIC BLOOD PRESSURE: 128 MMHG | BODY MASS INDEX: 31.1 KG/M2 | HEIGHT: 69 IN | WEIGHT: 210 LBS

## 2024-10-25 DIAGNOSIS — I50.32 CHRONIC DIASTOLIC HEART FAILURE (HCC): Primary | ICD-10-CM

## 2024-10-25 DIAGNOSIS — I10 PRIMARY HYPERTENSION: ICD-10-CM

## 2024-10-25 DIAGNOSIS — I48.19 PERSISTENT ATRIAL FIBRILLATION (HCC): ICD-10-CM

## 2024-10-25 DIAGNOSIS — I25.5 ISCHEMIC CARDIOMYOPATHY: ICD-10-CM

## 2024-10-25 RX ORDER — METFORMIN HYDROCHLORIDE 500 MG/1
500 TABLET, EXTENDED RELEASE ORAL
COMMUNITY

## 2024-10-25 NOTE — PATIENT INSTRUCTIONS
Continue metoprolol at 25 mg twice a day    Limited echocardiogram : reassess your heart's squeeze    Keep appt with Tiffanie Kaplan and Shanell as scheduled

## 2024-10-25 NOTE — PROGRESS NOTES
distress, and appears well nourished / developed  NEUROLOGIC:  Awake and orientated to person, place and time.  PSYCH: Calm affect.  SKIN: Warm and dry.  HEENT: Sclera non-icteric, normocephalic, neck supple, no elevation of JVP, normal carotid pulses with no bruits and thyroid normal size.  LUNGS:  No increased work of breathing and clear to auscultation, no crackles or wheezing  CARDIOVASCULAR:  Regular rate 60 and rhythm with no murmurs, gallops, rubs, or abnormal heart sounds, normal PMI.The apical impulses not displaced  JVP less than 8 cm H2O  Heart tones are crisp and normal  Cervical veins are not engorged  The carotid upstroke is normal in amplitude and contour without delay or bruit  JVP is not elevated  ABDOMEN:  Normal bowel sounds, non-distended and non-tender to palpation  EXT: mary LE : Lt > Rt edema, no calf tenderness. Pulses are present bilaterally.    DATA:    Lab Results   Component Value Date    ALT 13 08/03/2024    AST 16 08/03/2024    ALKPHOS 135 (H) 08/03/2024    BILITOT 0.5 08/03/2024     Lab Results   Component Value Date    CREATININE 1.4 (H) 10/04/2024    BUN 23 (H) 10/04/2024     10/04/2024    K 5.1 10/04/2024     10/04/2024    CO2 24 10/04/2024     Lab Results   Component Value Date    TSH 0.77 02/03/2023     Lab Results   Component Value Date    WBC 7.4 08/03/2024    HGB 12.6 (L) 08/03/2024    HCT 38.0 (L) 08/03/2024    MCV 82.6 08/03/2024     08/03/2024     No components found for: \"CHLPL\"  Lab Results   Component Value Date    TRIG 88 11/28/2017    TRIG 105 06/08/2015    TRIG 81 05/12/2014     Lab Results   Component Value Date    HDL 34 (L) 08/21/2023    HDL 41 11/28/2017    HDL 30 (L) 06/08/2015      Latest Reference Range & Units 10/04/24 10:28   NT Pro-BNP 0 - 449 pg/mL 2,169 (H)       Radiology Review:  Pertinent images / reports were reviewed as a part of this visit and reveals the following:      MURJ: 10/15/24      Procedure: 10/7/24:   Successful external

## 2024-12-05 ENCOUNTER — TELEPHONE (OUTPATIENT)
Dept: CARDIOLOGY CLINIC | Age: 74
End: 2024-12-05

## 2024-12-05 ENCOUNTER — HOSPITAL ENCOUNTER (OUTPATIENT)
Age: 74
Discharge: HOME | End: 2024-12-05
Payer: MEDICARE

## 2024-12-05 DIAGNOSIS — I47.20 VT (VENTRICULAR TACHYCARDIA) (HCC): ICD-10-CM

## 2024-12-05 DIAGNOSIS — Z79.899 LONG-TERM USE OF HIGH-RISK MEDICATION: Primary | ICD-10-CM

## 2024-12-05 DIAGNOSIS — Z79.899 LONG-TERM USE OF HIGH-RISK MEDICATION: ICD-10-CM

## 2024-12-05 LAB
ALBUMIN SERPL-MCNC: 3.8 G/DL (ref 3.4–5)
ALP SERPL-CCNC: 89 U/L (ref 40–129)
ALT SERPL-CCNC: 20 U/L (ref 10–40)
ANION GAP SERPL CALCULATED.3IONS-SCNC: 15 MMOL/L (ref 3–16)
AST SERPL-CCNC: 22 U/L (ref 15–37)
BILIRUB DIRECT SERPL-MCNC: <0.1 MG/DL (ref 0–0.3)
BILIRUB INDIRECT SERPL-MCNC: 0.2 MG/DL (ref 0–1)
BILIRUB SERPL-MCNC: 0.3 MG/DL (ref 0–1)
BUN SERPL-MCNC: 24 MG/DL (ref 7–20)
CALCIUM SERPL-MCNC: 9.7 MG/DL (ref 8.3–10.6)
CHLORIDE SERPL-SCNC: 103 MMOL/L (ref 99–110)
CO2 SERPL-SCNC: 25 MMOL/L (ref 21–32)
CREAT SERPL-MCNC: 1.3 MG/DL (ref 0.8–1.3)
GFR SERPLBLD CREATININE-BSD FMLA CKD-EPI: 57 ML/MIN/{1.73_M2}
GLUCOSE SERPL-MCNC: 153 MG/DL (ref 70–99)
PHOSPHATE SERPL-MCNC: 3.3 MG/DL (ref 2.5–4.9)
POTASSIUM SERPL-SCNC: 4.6 MMOL/L (ref 3.5–5.1)
PROT SERPL-MCNC: 6.3 G/DL (ref 6.4–8.2)
SODIUM SERPL-SCNC: 143 MMOL/L (ref 136–145)
TSH SERPL DL<=0.005 MIU/L-ACNC: 2.31 UIU/ML (ref 0.27–4.2)

## 2024-12-05 PROCEDURE — 80069 RENAL FUNCTION PANEL: CPT

## 2024-12-05 PROCEDURE — 80076 HEPATIC FUNCTION PANEL: CPT

## 2024-12-05 PROCEDURE — 84443 ASSAY THYROID STIM HORMONE: CPT

## 2024-12-05 PROCEDURE — 36415 COLL VENOUS BLD VENIPUNCTURE: CPT

## 2024-12-05 RX ORDER — AMIODARONE HYDROCHLORIDE 200 MG/1
200 TABLET ORAL 2 TIMES DAILY
Qty: 90 TABLET | Refills: 3 | Status: SHIPPED | OUTPATIENT
Start: 2024-12-05

## 2024-12-05 NOTE — TELEPHONE ENCOUNTER
We received remote transmission from patient's monitor at home.   Patient got shocked this morning FOR DUAL ARRYTHMIA af WITH VT. VR ~ 122 bpm.    Tried calling Patient LVM for Patient to call back SELENE LOMBARDO with Dr. Kaplan on 10/7/2024.

## 2024-12-05 NOTE — TELEPHONE ENCOUNTER
Patient called back.     Confirmed shocked-feels like is in Flutter but has no other cardiac symptoms.    Spoke with Dr. Montenegro. Labs ordered to be drawn before OV with Dr. Kaplan on 12/10/2024. Start Amiodarone 200 BID.     Patient should NOT have cataract surgery today. Needs to post-pone until after OV with Dr. Kaplan.     Patient VU.

## 2024-12-05 NOTE — TELEPHONE ENCOUNTER
Pt called in letting RMM know that his pacemaker keeps going off and isnt sure what is going on. Please call pt and advise  Thank you

## 2024-12-06 ENCOUNTER — TELEPHONE (OUTPATIENT)
Dept: CARDIOLOGY CLINIC | Age: 74
End: 2024-12-06

## 2024-12-06 NOTE — TELEPHONE ENCOUNTER
Please let him know that his labs are normal. Continue amiodarone as prescribed until he sees Dr. Hood Quiroga, APRN-CNP

## 2024-12-09 NOTE — TELEPHONE ENCOUNTER
I spoke with pt and relayed message per NPSR. Pt verbalized understanding. He stated that he sees RMM tomorrow.

## 2024-12-10 ENCOUNTER — NURSE ONLY (OUTPATIENT)
Dept: CARDIOLOGY CLINIC | Age: 74
End: 2024-12-10

## 2024-12-10 ENCOUNTER — OFFICE VISIT (OUTPATIENT)
Dept: CARDIOLOGY CLINIC | Age: 74
End: 2024-12-10
Payer: MEDICARE

## 2024-12-10 VITALS
SYSTOLIC BLOOD PRESSURE: 118 MMHG | BODY MASS INDEX: 30.16 KG/M2 | HEIGHT: 69 IN | DIASTOLIC BLOOD PRESSURE: 80 MMHG | WEIGHT: 203.6 LBS

## 2024-12-10 DIAGNOSIS — I47.20 VT (VENTRICULAR TACHYCARDIA) (HCC): ICD-10-CM

## 2024-12-10 DIAGNOSIS — I25.5 ISCHEMIC CARDIOMYOPATHY: ICD-10-CM

## 2024-12-10 DIAGNOSIS — I48.20 CHRONIC A-FIB (HCC): ICD-10-CM

## 2024-12-10 DIAGNOSIS — I47.20 VT (VENTRICULAR TACHYCARDIA) (HCC): Primary | ICD-10-CM

## 2024-12-10 DIAGNOSIS — Z95.810 ICD (IMPLANTABLE CARDIOVERTER-DEFIBRILLATOR), SINGLE, IN SITU: Primary | ICD-10-CM

## 2024-12-10 DIAGNOSIS — I48.92 ATRIAL FLUTTER, UNSPECIFIED TYPE (HCC): ICD-10-CM

## 2024-12-10 PROCEDURE — 99215 OFFICE O/P EST HI 40 MIN: CPT | Performed by: INTERNAL MEDICINE

## 2024-12-10 PROCEDURE — 3074F SYST BP LT 130 MM HG: CPT | Performed by: INTERNAL MEDICINE

## 2024-12-10 PROCEDURE — G8484 FLU IMMUNIZE NO ADMIN: HCPCS | Performed by: INTERNAL MEDICINE

## 2024-12-10 PROCEDURE — 1124F ACP DISCUSS-NO DSCNMKR DOCD: CPT | Performed by: INTERNAL MEDICINE

## 2024-12-10 PROCEDURE — G8427 DOCREV CUR MEDS BY ELIG CLIN: HCPCS | Performed by: INTERNAL MEDICINE

## 2024-12-10 PROCEDURE — 3079F DIAST BP 80-89 MM HG: CPT | Performed by: INTERNAL MEDICINE

## 2024-12-10 PROCEDURE — G8417 CALC BMI ABV UP PARAM F/U: HCPCS | Performed by: INTERNAL MEDICINE

## 2024-12-10 PROCEDURE — 1159F MED LIST DOCD IN RCRD: CPT | Performed by: INTERNAL MEDICINE

## 2024-12-10 PROCEDURE — 93000 ELECTROCARDIOGRAM COMPLETE: CPT | Performed by: INTERNAL MEDICINE

## 2024-12-10 PROCEDURE — 3017F COLORECTAL CA SCREEN DOC REV: CPT | Performed by: INTERNAL MEDICINE

## 2024-12-10 PROCEDURE — 4004F PT TOBACCO SCREEN RCVD TLK: CPT | Performed by: INTERNAL MEDICINE

## 2025-01-01 ENCOUNTER — NURSE ONLY (OUTPATIENT)
Dept: CARDIOLOGY CLINIC | Age: 75
End: 2025-01-01

## 2025-01-01 DIAGNOSIS — Z95.810 ICD (IMPLANTABLE CARDIOVERTER-DEFIBRILLATOR), SINGLE, IN SITU: Primary | ICD-10-CM

## 2025-01-01 DIAGNOSIS — I48.92 ATRIAL FLUTTER, UNSPECIFIED TYPE (HCC): ICD-10-CM

## 2025-01-01 DIAGNOSIS — I25.5 ISCHEMIC CARDIOMYOPATHY: ICD-10-CM

## 2025-01-01 DIAGNOSIS — I47.20 VT (VENTRICULAR TACHYCARDIA) (HCC): ICD-10-CM

## 2025-02-05 PROBLEM — Z95.1 S/P CABG (CORONARY ARTERY BYPASS GRAFT): Status: ACTIVE | Noted: 2025-02-05

## 2025-02-07 NOTE — TELEPHONE ENCOUNTER
Requested Prescriptions     Pending Prescriptions Disp Refills    ELIQUIS 5 MG TABS tablet [Pharmacy Med Name: ELIQUIS 5MG TABLETS] 60 tablet 3     Sig: TAKE 1 TABLET BY MOUTH TWICE DAILY      Last OV:  12/10/2024 RMM    Next OV: 2/25/2025 LES    Last Labs: 08/03/2024 CBC    Last Filled: 06/12/2024 NPTS

## 2025-02-09 RX ORDER — APIXABAN 5 MG/1
5 TABLET, FILM COATED ORAL 2 TIMES DAILY
Qty: 60 TABLET | Refills: 3 | Status: SHIPPED | OUTPATIENT
Start: 2025-02-09

## 2025-02-17 NOTE — PROGRESS NOTES
[facility]   Electrophysiology Follow up   Date: 12/10/2024  I had the privilege of visiting Leandra Dawson in the office.       CC: ICD discharge    HPI: Leandra Dawson is a 74 y.o. male with a past medical history of CAD, ICM, CHF, HTN, PAD, PAF, and VT. Hx of sustained VT with loss of consciousness and CPR (3/15/22).     S/p single chamber AICD (3/17/22). He was receiving amiodarone therapy.   His amiodarone was stopped on 8/3/2024 by Dr. Reece due to loss of appetite.     Found to have episodes of Afib with RVR on his device interrogation with persistent atrial fibrillation.     Atrial fibrillation, s/p DCCV 10/7/2024.      12/5/2024 received ICD shock for dual arrhythmia AF with VT ~ 222 bpm. He got out of bed and went to the bathroom and received a shock. Started amiodarone 200 mg BID. He denies any other cardiac complaints. He is suppose to have cataract surgery this month.    Assessment and plan:     -Paroxsymal  atrial fibrillation   Episodes of RVR and had cardioversion.    Also associated with VT and AICD shock.     EKG today shows SR with PVC  Continue metoprolol; increase to 50 mg BID    12/5/24 amiodarone restarted 200 mg BID; previously discontinued by Dr. Reece for decreased appetite  I have discussed potential, and rare side effects of amiodarone including but not limited to lung, liver and thyroid toxicity. Close monitoring for amiodarone toxicity with LFTs, TSH, and CXR.       Decrease amiodarone to 200 mg 12/20/2024     [id number] score:5; BLC1ND3 Vasc score and anticoagulation discussed. High risk for stroke and thromboembolism. Anticoagulation is recommended. Risk of bleeding was discussed.  Continue Eliquis 5 mg BID; tolerating well. Monitor for s/s of bleeding    Treatment options including cardioversion, rate control strategy, antiarrhythmics, anticoagulation and possible ablation were discussed with patient. Risks, benefits and alternative of each treatment options were 
EAR PAIN

## 2025-03-04 ENCOUNTER — TELEPHONE (OUTPATIENT)
Dept: CARDIOLOGY CLINIC | Age: 75
End: 2025-03-04

## 2025-03-04 DIAGNOSIS — I47.20 VT (VENTRICULAR TACHYCARDIA) (HCC): Primary | ICD-10-CM

## 2025-03-04 DIAGNOSIS — I50.22 CHRONIC SYSTOLIC HF (HEART FAILURE) (HCC): ICD-10-CM

## 2025-03-04 NOTE — TELEPHONE ENCOUNTER
Discussed with RMM. He would like for patient to to have labs completed (CMP,Mag, and pro-BNP). Orders placed. Should increase Amiodarone to 200 mg BID. He also would like to re discuss ablation. If patient feels bad, he should proceed to ED.     LMOM for patient to return call in regards to message below

## 2025-03-04 NOTE — TELEPHONE ENCOUNTER
I spoke with pt. He states that he has ahd a bad cough. PCP put pt on Steroid and Abx for 7 days , finished steroid this morning. He stated that he does not feel bad, but he saw his reader flashing this morning.

## 2025-03-04 NOTE — TELEPHONE ENCOUNTER
Spoke with the patient. He states he will be completing labs in the morning. He states he was advised per other message below to increase Amiodarone to 200 mg BID. Re discussed ablation in detail with the patient. He states he would like to hold off on scheduling ablation at this time and keep follow up as scheduled with [facility] 04/10/2025. All questions answered. Call complete    The risks, benefits and alternatives of the ablation procedure were discussed with the patient. The risks including, but not limited to, the risks of bleeding, infection, radiation exposure, injury to vascular, cardiac and surrounding structures (including pneumothorax), stroke, cardiac perforation, tamponade, need for emergent open heart surgery, need for pacemaker implantation, Injury to the phrenic nerve, injury to the esophagus, myocardial infarction and death were discussed in detail.

## 2025-03-04 NOTE — TELEPHONE ENCOUNTER
LMOM for patient to return call to assess symptoms given device findings. Will discuss with SISSY

## 2025-03-04 NOTE — TELEPHONE ENCOUNTER
EARNEST. Patients remote shows multiple recordings of VT requiring ATP. Report sent to [facility] for review. Thanks.

## 2025-03-05 ENCOUNTER — HOSPITAL ENCOUNTER (OUTPATIENT)
Age: 75
Discharge: HOME | End: 2025-03-05
Payer: MEDICARE

## 2025-03-05 DIAGNOSIS — I50.22 CHRONIC SYSTOLIC HF (HEART FAILURE) (HCC): ICD-10-CM

## 2025-03-05 DIAGNOSIS — I47.20 VT (VENTRICULAR TACHYCARDIA) (HCC): ICD-10-CM

## 2025-03-05 LAB
DEPRECATED RDW RBC AUTO: 17.3 % (ref 12.4–15.4)
HCT VFR BLD AUTO: 44 % (ref 40.5–52.5)
HGB BLD-MCNC: 14 G/DL (ref 13.5–17.5)
MAGNESIUM SERPL-MCNC: 2.12 MG/DL (ref 1.8–2.4)
MCH RBC QN AUTO: 29.5 PG (ref 26–34)
MCHC RBC AUTO-ENTMCNC: 31.8 G/DL (ref 31–36)
MCV RBC AUTO: 92.8 FL (ref 80–100)
NT-PROBNP SERPL-MCNC: 6599 PG/ML (ref 0–449)
PLATELET # BLD AUTO: 282 K/UL (ref 135–450)
PMV BLD AUTO: 8.4 FL (ref 5–10.5)
RBC # BLD AUTO: 4.74 M/UL (ref 4.2–5.9)
WBC # BLD AUTO: 11.6 K/UL (ref 4–11)

## 2025-03-05 PROCEDURE — 83735 ASSAY OF MAGNESIUM: CPT

## 2025-03-05 PROCEDURE — 85027 COMPLETE CBC AUTOMATED: CPT

## 2025-03-05 PROCEDURE — 36415 COLL VENOUS BLD VENIPUNCTURE: CPT

## 2025-03-05 PROCEDURE — 80053 COMPREHEN METABOLIC PANEL: CPT

## 2025-03-05 PROCEDURE — 83880 ASSAY OF NATRIURETIC PEPTIDE: CPT

## 2025-03-06 ENCOUNTER — TELEPHONE (OUTPATIENT)
Dept: CARDIOLOGY CLINIC | Age: 75
End: 2025-03-06

## 2025-03-06 DIAGNOSIS — I47.20 VT (VENTRICULAR TACHYCARDIA) (HCC): Primary | ICD-10-CM

## 2025-03-06 LAB
ALBUMIN SERPL-MCNC: 3.7 G/DL (ref 3.4–5)
ALBUMIN/GLOB SERPL: 1.5 {RATIO} (ref 1.1–2.2)
ALP SERPL-CCNC: 74 U/L (ref 40–129)
ALT SERPL-CCNC: 44 U/L (ref 10–40)
ANION GAP SERPL CALCULATED.3IONS-SCNC: 17 MMOL/L (ref 3–16)
AST SERPL-CCNC: 15 U/L (ref 15–37)
BILIRUB SERPL-MCNC: 0.3 MG/DL (ref 0–1)
BUN SERPL-MCNC: 31 MG/DL (ref 7–20)
CALCIUM SERPL-MCNC: 9.5 MG/DL (ref 8.3–10.6)
CHLORIDE SERPL-SCNC: 108 MMOL/L (ref 99–110)
CO2 SERPL-SCNC: 20 MMOL/L (ref 21–32)
CREAT SERPL-MCNC: 1.4 MG/DL (ref 0.8–1.3)
GFR SERPLBLD CREATININE-BSD FMLA CKD-EPI: 52 ML/MIN/{1.73_M2}
GLUCOSE SERPL-MCNC: 205 MG/DL (ref 70–99)
POTASSIUM SERPL-SCNC: 5 MMOL/L (ref 3.5–5.1)
PROT SERPL-MCNC: 6.1 G/DL (ref 6.4–8.2)
SODIUM SERPL-SCNC: 145 MMOL/L (ref 136–145)

## 2025-03-06 NOTE — TELEPHONE ENCOUNTER
Spoke with RMM. He would like patient to be seen by NPTS to discuss fluid retention sooner given LES is OOT. Patient scheduled with NPTS tomorrow.     Call placed to the lab and added on CMP to labs from yesterday. She voiced understanding and will have this completed.

## 2025-03-07 ENCOUNTER — OFFICE VISIT (OUTPATIENT)
Dept: CARDIOLOGY CLINIC | Age: 75
End: 2025-03-07

## 2025-03-07 VITALS
WEIGHT: 203 LBS | BODY MASS INDEX: 30.07 KG/M2 | HEIGHT: 69 IN | SYSTOLIC BLOOD PRESSURE: 102 MMHG | DIASTOLIC BLOOD PRESSURE: 62 MMHG | HEART RATE: 108 BPM

## 2025-03-07 DIAGNOSIS — I25.5 ISCHEMIC CARDIOMYOPATHY: ICD-10-CM

## 2025-03-07 DIAGNOSIS — I50.22 CHRONIC SYSTOLIC HF (HEART FAILURE) (HCC): Primary | ICD-10-CM

## 2025-03-07 DIAGNOSIS — I47.20 VT (VENTRICULAR TACHYCARDIA) (HCC): ICD-10-CM

## 2025-03-07 DIAGNOSIS — I48.19 PERSISTENT ATRIAL FIBRILLATION (HCC): ICD-10-CM

## 2025-03-07 DIAGNOSIS — I10 PRIMARY HYPERTENSION: ICD-10-CM

## 2025-03-07 RX ORDER — METOPROLOL TARTRATE 25 MG/1
50 TABLET, FILM COATED ORAL 2 TIMES DAILY
Qty: 180 TABLET | Refills: 0 | Status: CANCELLED | OUTPATIENT
Start: 2025-03-07

## 2025-03-07 RX ORDER — METOLAZONE 2.5 MG/1
TABLET ORAL
Qty: 2 TABLET | Refills: 0 | Status: SHIPPED | OUTPATIENT
Start: 2025-03-07

## 2025-03-07 RX ORDER — METOPROLOL SUCCINATE 25 MG/1
25 TABLET, EXTENDED RELEASE ORAL DAILY
Qty: 30 TABLET | Refills: 3 | Status: SHIPPED | OUTPATIENT
Start: 2025-03-07

## 2025-03-07 NOTE — PROGRESS NOTES
[facility]     Outpatient Follow Up Note  Acute visit    Leandra Dawson is 75 y.o. male who presents today with a history of CAD s/p CABG May 2014 with post-op short run of SVT and brief AF, s/p DCCV Nov 2019; s/p DCCV Oct '24; CM, HTN and hyperlipidemia, PVD.     His other history includes carotid stenosis and PAD s/p Lt common & external iliac PTA.    Interval hx:   Remote transmission : 3/3/25: multiple recordings of VT requiring ATP >> amiodarone increased to 200 mg bid.  Mg+ 2.12 K+ 5.0 Na+ 145 creatinine 1.4 NT pro-BNP 6599    CHIEF COMPLAINT / HPI:  Follow Up secondary to volume overload complaining of weakness / dizzy and SOB changing two weeks ago    Subjective:   His wife says that he's been very confused, like he's not getting oxygen. He continues to smoke too. She remembers that the surgeon told him his grafts would fail if he resumed smoking. She's concerned.    He has chest discomfort that comes/goes. He has discomfort anytime he has to walk a distance. His pain stays right in the center of his chest. He has pain in his elbows but not radiation from his CP. It lasts about an hour then goes away resting. It feels similar to how he felt prior to his OHS.     He resumed smoking and is now a diabetic.    He's SOB walking 4 steps into his house. He's SOB walking up his driveway. He'd be SOB walking short-mod distances.   He raises the HOB and has always done. He had to get OOB this morning because he couldn't breath; once in the past 2 weeks.    His legs started swelling about a month ago. Its been most noticeable when his belly swells which it started a few days ago. His LLE has progressively gotten bigger / swollen in the past month and hurts to walk.   His wt has not changed and staying around 200-203#    His BP usually runs ~ 163/73    He's been without metoprolol for about a couple of weeks. He didn't know he was out.  He's taking amiodarone that was increased to 200 mg bid a couple of days

## 2025-03-07 NOTE — PATIENT INSTRUCTIONS
Resume metoprolol : succinate (Toprol) 25 mg once a day    Cut back on fluids to no more than 64 oz / day    Avoid salty food items , ie pickle juice    Metolazone 2.5 mg : one tablet 1 hour before your lasix dose on Saturday and again on Tuesday    Non-fasting labs in one week    Appt in one week

## 2025-03-10 NOTE — PROGRESS NOTES
[facility]   Cardiac Follow Up     Referring Provider:  Augusto Gordillo DO     Chief Complaint   Patient presents with    6 Month Follow-Up    Shortness of Breath    Dizziness    Cardiomyopathy    Coronary Artery Disease    Hyperlipidemia    Hypertension    Leg Swelling      History of Present Illness:  Leandra Dawson is a 75 y.o.  male with a history of CAD s/p CABG May 2014 with post-op short run of SVT and brief AF, s/p DCCV Nov 2019; also cardiomyopathy, hypertension, and hyperlipidemia, PVD. His other history includes carotid stenosis and PAD s/p Lt common & external iliac PTA..     He was hospitalized Jan 2022 for covid pneumonia. In March 15 he came to ER from SNF for syncope. Found to be in afib, had an episode of sustained VT requiring chest compressions. LHC unremarkable. He had a single chamber ICD placed 3/17/22.     On 4/21 he went to the ER again with syncope, found to be hypoxic. No arrhythmias on interrogation, treated for pnuemonia, lopressor increased to 50 mg bid      In 2022 he had an witnessed syncopal episode in the kitchen. Wife reports he was walking to the refrigerator, and got weak. Looked like he was having a \"seizure\" and passed out.  Wife reports this has happened three times in a month. He did not seek medical attention.  Wife called the office on 7/5/22 to notify us. He was taken off of lasix and aldactone due to suspected orthostatic hypotension.     He had a chest xray and had spots on his lungs and it is confirmed cancer. He is seeing a lung specialist through [facility], he had the PET scan.    Pt was admitted to hospital 8/2/24-8/3/24.   He presented with abdominal pain that started a couple weeks prior.  Patient does nausea but no vomiting.  He had decreased appetite.  Patient was consulted by cardiology and gastroenterology.  At the time of the evaluation, symptoms had resolved.  CT abdomen pelvis was unremarkable.  Recommend outpatient with clinic in 2 to 4

## 2025-03-12 ENCOUNTER — TELEPHONE (OUTPATIENT)
Dept: CARDIOLOGY CLINIC | Age: 75
End: 2025-03-12

## 2025-03-12 NOTE — TELEPHONE ENCOUNTER
Tried calling Elmer : no answer    Spoke with Kandy : he's using OTC OC, SaPO2 97%  He's been light headed ; has no appetite, continues to smoke. Saying he doesn't want any more surgeries    Called to inform him that a cardiac cath is recommended (after reviewing his MURJ with ROHIT)    She does not think he will consent and wonders if treatments are just bandaids     We'll readdress at his appt this week , 3/14

## 2025-03-13 ENCOUNTER — HOSPITAL ENCOUNTER (OUTPATIENT)
Age: 75
Discharge: HOME | End: 2025-03-13
Payer: MEDICARE

## 2025-03-13 DIAGNOSIS — I50.22 CHRONIC SYSTOLIC HF (HEART FAILURE) (HCC): ICD-10-CM

## 2025-03-13 LAB
ANION GAP SERPL CALCULATED.3IONS-SCNC: 15 MMOL/L (ref 3–16)
BUN SERPL-MCNC: 38 MG/DL (ref 7–20)
CALCIUM SERPL-MCNC: 9.8 MG/DL (ref 8.3–10.6)
CHLORIDE SERPL-SCNC: 95 MMOL/L (ref 99–110)
CO2 SERPL-SCNC: 24 MMOL/L (ref 21–32)
CREAT SERPL-MCNC: 2.1 MG/DL (ref 0.8–1.3)
GFR SERPLBLD CREATININE-BSD FMLA CKD-EPI: 32 ML/MIN/{1.73_M2}
GLUCOSE SERPL-MCNC: 154 MG/DL (ref 70–99)
NT-PROBNP SERPL-MCNC: 4444 PG/ML (ref 0–449)
POTASSIUM SERPL-SCNC: 4.3 MMOL/L (ref 3.5–5.1)
SODIUM SERPL-SCNC: 134 MMOL/L (ref 136–145)

## 2025-03-13 PROCEDURE — 36415 COLL VENOUS BLD VENIPUNCTURE: CPT

## 2025-03-13 PROCEDURE — 83880 ASSAY OF NATRIURETIC PEPTIDE: CPT

## 2025-03-13 PROCEDURE — 80048 BASIC METABOLIC PNL TOTAL CA: CPT

## 2025-03-14 ENCOUNTER — OFFICE VISIT (OUTPATIENT)
Dept: CARDIOLOGY CLINIC | Age: 75
End: 2025-03-14
Payer: MEDICARE

## 2025-03-14 VITALS
DIASTOLIC BLOOD PRESSURE: 62 MMHG | HEIGHT: 69 IN | WEIGHT: 192 LBS | SYSTOLIC BLOOD PRESSURE: 104 MMHG | HEART RATE: 50 BPM | BODY MASS INDEX: 28.44 KG/M2

## 2025-03-14 DIAGNOSIS — I25.83 CORONARY ARTERY DISEASE DUE TO LIPID RICH PLAQUE: ICD-10-CM

## 2025-03-14 DIAGNOSIS — I25.5 ISCHEMIC CARDIOMYOPATHY: ICD-10-CM

## 2025-03-14 DIAGNOSIS — I50.22 CHRONIC SYSTOLIC HF (HEART FAILURE) (HCC): Primary | ICD-10-CM

## 2025-03-14 DIAGNOSIS — I47.20 VT (VENTRICULAR TACHYCARDIA) (HCC): ICD-10-CM

## 2025-03-14 DIAGNOSIS — I48.19 PERSISTENT ATRIAL FIBRILLATION (HCC): ICD-10-CM

## 2025-03-14 DIAGNOSIS — I25.10 CORONARY ARTERY DISEASE DUE TO LIPID RICH PLAQUE: ICD-10-CM

## 2025-03-14 DIAGNOSIS — I10 PRIMARY HYPERTENSION: ICD-10-CM

## 2025-03-14 PROCEDURE — G8417 CALC BMI ABV UP PARAM F/U: HCPCS | Performed by: NURSE PRACTITIONER

## 2025-03-14 PROCEDURE — G8427 DOCREV CUR MEDS BY ELIG CLIN: HCPCS | Performed by: NURSE PRACTITIONER

## 2025-03-14 PROCEDURE — 99214 OFFICE O/P EST MOD 30 MIN: CPT | Performed by: NURSE PRACTITIONER

## 2025-03-14 PROCEDURE — 1159F MED LIST DOCD IN RCRD: CPT | Performed by: NURSE PRACTITIONER

## 2025-03-14 PROCEDURE — 3017F COLORECTAL CA SCREEN DOC REV: CPT | Performed by: NURSE PRACTITIONER

## 2025-03-14 PROCEDURE — 1160F RVW MEDS BY RX/DR IN RCRD: CPT | Performed by: NURSE PRACTITIONER

## 2025-03-14 PROCEDURE — G2211 COMPLEX E/M VISIT ADD ON: HCPCS | Performed by: NURSE PRACTITIONER

## 2025-03-14 PROCEDURE — 1124F ACP DISCUSS-NO DSCNMKR DOCD: CPT | Performed by: NURSE PRACTITIONER

## 2025-03-14 PROCEDURE — 4004F PT TOBACCO SCREEN RCVD TLK: CPT | Performed by: NURSE PRACTITIONER

## 2025-03-14 PROCEDURE — 3074F SYST BP LT 130 MM HG: CPT | Performed by: NURSE PRACTITIONER

## 2025-03-14 PROCEDURE — 3078F DIAST BP <80 MM HG: CPT | Performed by: NURSE PRACTITIONER

## 2025-03-14 NOTE — PROGRESS NOTES
[facility]     Outpatient Follow Up Note  Acute visit    Leandra Dawson is 75 y.o. male who presents today with a history of CAD s/p CABG May 2014 with post-op short run of SVT and brief AF, s/p DCCV Nov 2019; s/p DCCV Oct '24; CM, HTN and hyperlipidemia, PVD.     His other history includes carotid stenosis and PAD s/p Lt common & external iliac PTA.    Interval hx:   Remote transmission : 3/3/25: multiple recordings of VT requiring ATP >> amiodarone increased to 200 mg bid.  Mg+ 2.12 K+ 5.0 Na+ 145 creatinine 1.4 NT pro-BNP 6599    CHIEF COMPLAINT / HPI:  Follow Up secondary to volume overload resuming toprol and receiving 2 doses of metolazone. His proBNP is trending down 6599 > 4444 ; thoracic impedence dropped    Subjective:   He doesn't feel much better. He's tired and wants to sleep all the time. He's using his oxygen more. It gets up to eat and smoke then goes back to bed.    He's SOB with activity. His SaPO2 runs 95-99% on 4 L NC. He pushed it up from 2 to 4 L. He sleeps on an incline and 2 pillows. He denies PND    His chest hurts 2-3 x / day. It may last up to an hour. He gets Lt shoulder radiation. He hasn't felt the need to take NTG.    He has discomfort anytime he has to walk a distance. His pain stays right in the center of his chest. He has pain in his elbows but not radiation from his CP.  It feels similar to how he felt prior to his OHS.     The swelling in his legs is gone and his belly no longer feels puffy. His wt went from 202 > 191#. His BP has been 110-133/78    He's SOB walking 4 steps into his house and has to hold onto the rail. He's SOB walking up his driveway.     These symptoms have not significantly improved since the last OV.   With regard to medication therapy the patient has not been compliant with prescribed regimen. They have tolerated therapy to date.     Past Medical History:   Diagnosis Date    Acid reflux     Acute MI (HCC)     CAD (coronary artery disease)     COPD

## 2025-03-24 ENCOUNTER — TELEPHONE (OUTPATIENT)
Dept: CARDIOLOGY CLINIC | Age: 75
End: 2025-03-24

## 2025-04-01 ENCOUNTER — OFFICE VISIT (OUTPATIENT)
Dept: CARDIOLOGY CLINIC | Age: 75
End: 2025-04-01
Payer: MEDICARE

## 2025-04-01 VITALS
BODY MASS INDEX: 29.86 KG/M2 | HEIGHT: 69 IN | DIASTOLIC BLOOD PRESSURE: 70 MMHG | OXYGEN SATURATION: 97 % | HEART RATE: 62 BPM | WEIGHT: 201.6 LBS | SYSTOLIC BLOOD PRESSURE: 106 MMHG

## 2025-04-01 DIAGNOSIS — I25.10 CORONARY ARTERY DISEASE DUE TO LIPID RICH PLAQUE: Primary | ICD-10-CM

## 2025-04-01 DIAGNOSIS — I48.19 PERSISTENT ATRIAL FIBRILLATION (HCC): ICD-10-CM

## 2025-04-01 DIAGNOSIS — I25.5 ISCHEMIC CARDIOMYOPATHY: ICD-10-CM

## 2025-04-01 DIAGNOSIS — I10 PRIMARY HYPERTENSION: ICD-10-CM

## 2025-04-01 DIAGNOSIS — Z95.1 S/P CABG (CORONARY ARTERY BYPASS GRAFT): ICD-10-CM

## 2025-04-01 DIAGNOSIS — I25.83 CORONARY ARTERY DISEASE DUE TO LIPID RICH PLAQUE: Primary | ICD-10-CM

## 2025-04-01 DIAGNOSIS — E78.2 MIXED HYPERLIPIDEMIA: ICD-10-CM

## 2025-04-01 DIAGNOSIS — F17.200 TOBACCO DEPENDENCE SYNDROME: ICD-10-CM

## 2025-04-01 DIAGNOSIS — I95.89 OTHER SPECIFIED HYPOTENSION: ICD-10-CM

## 2025-04-01 DIAGNOSIS — I47.20 VT (VENTRICULAR TACHYCARDIA) (HCC): ICD-10-CM

## 2025-04-01 PROCEDURE — 1159F MED LIST DOCD IN RCRD: CPT | Performed by: INTERNAL MEDICINE

## 2025-04-01 PROCEDURE — 3017F COLORECTAL CA SCREEN DOC REV: CPT | Performed by: INTERNAL MEDICINE

## 2025-04-01 PROCEDURE — 99214 OFFICE O/P EST MOD 30 MIN: CPT | Performed by: INTERNAL MEDICINE

## 2025-04-01 PROCEDURE — 3078F DIAST BP <80 MM HG: CPT | Performed by: INTERNAL MEDICINE

## 2025-04-01 PROCEDURE — G8417 CALC BMI ABV UP PARAM F/U: HCPCS | Performed by: INTERNAL MEDICINE

## 2025-04-01 PROCEDURE — G8427 DOCREV CUR MEDS BY ELIG CLIN: HCPCS | Performed by: INTERNAL MEDICINE

## 2025-04-01 PROCEDURE — 3074F SYST BP LT 130 MM HG: CPT | Performed by: INTERNAL MEDICINE

## 2025-04-01 PROCEDURE — 4004F PT TOBACCO SCREEN RCVD TLK: CPT | Performed by: INTERNAL MEDICINE

## 2025-04-01 PROCEDURE — 1124F ACP DISCUSS-NO DSCNMKR DOCD: CPT | Performed by: INTERNAL MEDICINE

## 2025-04-01 RX ORDER — MIDODRINE HYDROCHLORIDE 5 MG/1
5 TABLET ORAL 3 TIMES DAILY PRN
Qty: 180 TABLET | Refills: 3 | Status: SHIPPED | OUTPATIENT
Start: 2025-04-01

## 2025-04-01 RX ORDER — METOLAZONE 2.5 MG/1
TABLET ORAL
Qty: 4 TABLET | Refills: 0 | Status: SHIPPED | OUTPATIENT
Start: 2025-04-01

## 2025-04-01 NOTE — PATIENT INSTRUCTIONS
Midodrine 5 mg three times per day as needed for BP 90/60 or less   Start zaroxolyn 2.5 mg weekly as needed for weight gain > labs in 1 week after starting it  If you take zaroxolyn frequently will need labs monthly > call Dr Reece if needed  Continue risk factor modifications.   Call for any change in symptoms, call to report any changes in shortness of breath or development of chest pain with activity.    Follow up in 3-4 mos

## 2025-04-09 ENCOUNTER — TELEPHONE (OUTPATIENT)
Dept: CARDIOLOGY CLINIC | Age: 75
End: 2025-04-09

## 2025-04-09 NOTE — TELEPHONE ENCOUNTER
Spoke to Elmer.      Pt will contact wife now and plans to proceed to ER.  If he feels any worse in the meantime, he will call squad.  Pt advised not to drive himself.  Pt verbalizes understanding and is agreeable to plan.

## 2025-04-09 NOTE — TELEPHONE ENCOUNTER
I spoke with pt. He stated that he is weak. Fell down the step 4 times. He states he's having CP  for 2 days , intermittent. He has taken a Metolazone daily for the last 3 days. He is unable to get vitals because he is so weak and shaky.

## 2025-04-09 NOTE — TELEPHONE ENCOUNTER
Pt was prescribed a new med metolazone 2.5mg it is making him very weak unable to walk, have to take short steps, fell down 4 times already, shoulder and arms are hurting, both legs are hurting, wants to sleep all the time, have dry mouth, headaches, blurred vision.  Please call to discuss what he needs to do to feel better .  Thank you

## 2025-04-10 ENCOUNTER — APPOINTMENT (OUTPATIENT)
Dept: GENERAL RADIOLOGY | Age: 75
DRG: 917 | End: 2025-04-10
Payer: MEDICARE

## 2025-04-10 ENCOUNTER — APPOINTMENT (OUTPATIENT)
Dept: CT IMAGING | Age: 75
DRG: 917 | End: 2025-04-10
Payer: MEDICARE

## 2025-04-10 ENCOUNTER — HOSPITAL ENCOUNTER (INPATIENT)
Age: 75
LOS: 4 days | Discharge: HOME | DRG: 917 | End: 2025-04-14
Attending: EMERGENCY MEDICINE
Payer: MEDICARE

## 2025-04-10 DIAGNOSIS — I47.20 VT (VENTRICULAR TACHYCARDIA) (HCC): ICD-10-CM

## 2025-04-10 DIAGNOSIS — E87.6 HYPOKALEMIA: ICD-10-CM

## 2025-04-10 DIAGNOSIS — U07.1 COVID-19: ICD-10-CM

## 2025-04-10 DIAGNOSIS — T50.901A ACUTE DRUG OVERDOSE, ACCIDENTAL OR UNINTENTIONAL, INITIAL ENCOUNTER: Primary | ICD-10-CM

## 2025-04-10 DIAGNOSIS — N18.9 CHRONIC KIDNEY DISEASE, UNSPECIFIED CKD STAGE: ICD-10-CM

## 2025-04-10 DIAGNOSIS — I42.9 CARDIOMYOPATHY, UNSPECIFIED TYPE (HCC): ICD-10-CM

## 2025-04-10 LAB
ALBUMIN SERPL-MCNC: 3.9 G/DL (ref 3.4–5)
ALBUMIN/GLOB SERPL: 1.1 {RATIO} (ref 1.1–2.2)
ALP SERPL-CCNC: 99 U/L (ref 40–129)
ALT SERPL-CCNC: 30 U/L (ref 10–40)
ANION GAP SERPL CALCULATED.3IONS-SCNC: 21 MMOL/L (ref 3–16)
AST SERPL-CCNC: 29 U/L (ref 15–37)
BASE EXCESS BLDV CALC-SCNC: 0.3 MMOL/L (ref -3–3)
BASOPHILS # BLD: 0 K/UL (ref 0–0.2)
BASOPHILS NFR BLD: 0.6 %
BILIRUB SERPL-MCNC: 0.6 MG/DL (ref 0–1)
BUN SERPL-MCNC: 50 MG/DL (ref 7–20)
CALCIUM SERPL-MCNC: 9.7 MG/DL (ref 8.3–10.6)
CHLORIDE SERPL-SCNC: 93 MMOL/L (ref 99–110)
CK SERPL-CCNC: 138 U/L (ref 39–308)
CO2 BLDV-SCNC: 58 MMOL/L
CO2 SERPL-SCNC: 22 MMOL/L (ref 21–32)
COHGB MFR BLDV: 4.1 % (ref 0–1.5)
CREAT SERPL-MCNC: 2.1 MG/DL (ref 0.8–1.3)
DEPRECATED RDW RBC AUTO: 17.8 % (ref 12.4–15.4)
DO-HGB MFR BLDV: 40 %
EOSINOPHIL # BLD: 0 K/UL (ref 0–0.6)
EOSINOPHIL NFR BLD: 0.7 %
GFR SERPLBLD CREATININE-BSD FMLA CKD-EPI: 32 ML/MIN/{1.73_M2}
GLUCOSE SERPL-MCNC: 170 MG/DL (ref 70–99)
HCO3 BLDV-SCNC: 24.8 MMOL/L (ref 23–29)
HCT VFR BLD AUTO: 47.7 % (ref 40.5–52.5)
HGB BLD-MCNC: 15.7 G/DL (ref 13.5–17.5)
INR PPP: 1.13 (ref 0.85–1.15)
LIPASE SERPL-CCNC: 22 U/L (ref 13–60)
LYMPHOCYTES # BLD: 1.9 K/UL (ref 1–5.1)
LYMPHOCYTES NFR BLD: 29.8 %
MAGNESIUM SERPL-MCNC: 2.41 MG/DL (ref 1.8–2.4)
MCH RBC QN AUTO: 28.5 PG (ref 26–34)
MCHC RBC AUTO-ENTMCNC: 32.9 G/DL (ref 31–36)
MCV RBC AUTO: 86.6 FL (ref 80–100)
METHGB MFR BLDV: 0.3 %
MONOCYTES # BLD: 0.4 K/UL (ref 0–1.3)
MONOCYTES NFR BLD: 7.1 %
NEUTROPHILS # BLD: 3.9 K/UL (ref 1.7–7.7)
NEUTROPHILS NFR BLD: 61.8 %
O2 CT VFR BLDV CALC: 12 VOL %
O2 THERAPY: ABNORMAL
PCO2 BLDV: 38.5 MMHG (ref 40–50)
PH BLDV: 7.42 [PH] (ref 7.35–7.45)
PLATELET # BLD AUTO: 277 K/UL (ref 135–450)
PMV BLD AUTO: 8 FL (ref 5–10.5)
PO2 BLDV: 34.3 MMHG (ref 25–40)
POTASSIUM SERPL-SCNC: 2.9 MMOL/L (ref 3.5–5.1)
PROT SERPL-MCNC: 7.6 G/DL (ref 6.4–8.2)
PROTHROMBIN TIME: 14.7 SEC (ref 11.9–14.9)
RBC # BLD AUTO: 5.51 M/UL (ref 4.2–5.9)
SAO2 % BLDV: 58 %
SODIUM SERPL-SCNC: 136 MMOL/L (ref 136–145)
TROPONIN, HIGH SENSITIVITY: 63 NG/L (ref 0–22)
TROPONIN, HIGH SENSITIVITY: 65 NG/L (ref 0–22)
WBC # BLD AUTO: 6.2 K/UL (ref 4–11)

## 2025-04-10 PROCEDURE — 84132 ASSAY OF SERUM POTASSIUM: CPT

## 2025-04-10 PROCEDURE — 6370000000 HC RX 637 (ALT 250 FOR IP): Performed by: EMERGENCY MEDICINE

## 2025-04-10 PROCEDURE — 85610 PROTHROMBIN TIME: CPT

## 2025-04-10 PROCEDURE — 99285 EMERGENCY DEPT VISIT HI MDM: CPT

## 2025-04-10 PROCEDURE — 83690 ASSAY OF LIPASE: CPT

## 2025-04-10 PROCEDURE — 70450 CT HEAD/BRAIN W/O DYE: CPT

## 2025-04-10 PROCEDURE — 1200000000 HC SEMI PRIVATE

## 2025-04-10 PROCEDURE — 80053 COMPREHEN METABOLIC PANEL: CPT

## 2025-04-10 PROCEDURE — 82803 BLOOD GASES ANY COMBINATION: CPT

## 2025-04-10 PROCEDURE — 72125 CT NECK SPINE W/O DYE: CPT

## 2025-04-10 PROCEDURE — 6370000000 HC RX 637 (ALT 250 FOR IP)

## 2025-04-10 PROCEDURE — 2580000003 HC RX 258

## 2025-04-10 PROCEDURE — 83735 ASSAY OF MAGNESIUM: CPT

## 2025-04-10 PROCEDURE — 82550 ASSAY OF CK (CPK): CPT

## 2025-04-10 PROCEDURE — 84484 ASSAY OF TROPONIN QUANT: CPT

## 2025-04-10 PROCEDURE — 93005 ELECTROCARDIOGRAM TRACING: CPT | Performed by: EMERGENCY MEDICINE

## 2025-04-10 PROCEDURE — 2580000003 HC RX 258: Performed by: EMERGENCY MEDICINE

## 2025-04-10 PROCEDURE — 71045 X-RAY EXAM CHEST 1 VIEW: CPT

## 2025-04-10 PROCEDURE — 85025 COMPLETE CBC W/AUTO DIFF WBC: CPT

## 2025-04-10 RX ORDER — POLYETHYLENE GLYCOL 3350 17 G/17G
17 POWDER, FOR SOLUTION ORAL DAILY PRN
Status: DISCONTINUED | OUTPATIENT
Start: 2025-04-10 | End: 2025-04-14 | Stop reason: HOSPADM

## 2025-04-10 RX ORDER — ACETAMINOPHEN 650 MG/1
650 SUPPOSITORY RECTAL EVERY 6 HOURS PRN
Status: DISCONTINUED | OUTPATIENT
Start: 2025-04-10 | End: 2025-04-14 | Stop reason: HOSPADM

## 2025-04-10 RX ORDER — MIDODRINE HYDROCHLORIDE 5 MG/1
5 TABLET ORAL 3 TIMES DAILY PRN
Status: DISCONTINUED | OUTPATIENT
Start: 2025-04-10 | End: 2025-04-14 | Stop reason: HOSPADM

## 2025-04-10 RX ORDER — POTASSIUM CHLORIDE 7.45 MG/ML
10 INJECTION INTRAVENOUS PRN
Status: DISCONTINUED | OUTPATIENT
Start: 2025-04-10 | End: 2025-04-14 | Stop reason: HOSPADM

## 2025-04-10 RX ORDER — SODIUM CHLORIDE 9 MG/ML
INJECTION, SOLUTION INTRAVENOUS CONTINUOUS
Status: DISCONTINUED | OUTPATIENT
Start: 2025-04-10 | End: 2025-04-10

## 2025-04-10 RX ORDER — METOPROLOL SUCCINATE 25 MG/1
25 TABLET, EXTENDED RELEASE ORAL DAILY
Status: DISCONTINUED | OUTPATIENT
Start: 2025-04-11 | End: 2025-04-13

## 2025-04-10 RX ORDER — MAGNESIUM SULFATE IN WATER 40 MG/ML
2000 INJECTION, SOLUTION INTRAVENOUS PRN
Status: DISCONTINUED | OUTPATIENT
Start: 2025-04-10 | End: 2025-04-14 | Stop reason: HOSPADM

## 2025-04-10 RX ORDER — SODIUM CHLORIDE 9 MG/ML
INJECTION, SOLUTION INTRAVENOUS PRN
Status: DISCONTINUED | OUTPATIENT
Start: 2025-04-10 | End: 2025-04-14 | Stop reason: HOSPADM

## 2025-04-10 RX ORDER — GABAPENTIN 300 MG/1
900 CAPSULE ORAL 3 TIMES DAILY
Status: DISCONTINUED | OUTPATIENT
Start: 2025-04-11 | End: 2025-04-14 | Stop reason: HOSPADM

## 2025-04-10 RX ORDER — SODIUM CHLORIDE 0.9 % (FLUSH) 0.9 %
5-40 SYRINGE (ML) INJECTION PRN
Status: DISCONTINUED | OUTPATIENT
Start: 2025-04-10 | End: 2025-04-14 | Stop reason: HOSPADM

## 2025-04-10 RX ORDER — ACETAMINOPHEN 325 MG/1
650 TABLET ORAL EVERY 6 HOURS PRN
Status: DISCONTINUED | OUTPATIENT
Start: 2025-04-10 | End: 2025-04-14 | Stop reason: HOSPADM

## 2025-04-10 RX ORDER — TAMSULOSIN HYDROCHLORIDE 0.4 MG/1
0.4 CAPSULE ORAL DAILY
COMMUNITY

## 2025-04-10 RX ORDER — POTASSIUM CHLORIDE 1500 MG/1
40 TABLET, EXTENDED RELEASE ORAL PRN
Status: DISCONTINUED | OUTPATIENT
Start: 2025-04-10 | End: 2025-04-14 | Stop reason: HOSPADM

## 2025-04-10 RX ORDER — SODIUM CHLORIDE 0.9 % (FLUSH) 0.9 %
5-40 SYRINGE (ML) INJECTION EVERY 12 HOURS SCHEDULED
Status: DISCONTINUED | OUTPATIENT
Start: 2025-04-10 | End: 2025-04-14 | Stop reason: HOSPADM

## 2025-04-10 RX ORDER — SODIUM CHLORIDE 9 MG/ML
INJECTION, SOLUTION INTRAVENOUS CONTINUOUS
Status: ACTIVE | OUTPATIENT
Start: 2025-04-10 | End: 2025-04-11

## 2025-04-10 RX ORDER — ATORVASTATIN CALCIUM 80 MG/1
80 TABLET, FILM COATED ORAL DAILY
Status: DISCONTINUED | OUTPATIENT
Start: 2025-04-10 | End: 2025-04-14 | Stop reason: HOSPADM

## 2025-04-10 RX ORDER — POTASSIUM CHLORIDE 750 MG/1
40 TABLET, EXTENDED RELEASE ORAL ONCE
Status: COMPLETED | OUTPATIENT
Start: 2025-04-10 | End: 2025-04-10

## 2025-04-10 RX ORDER — PANTOPRAZOLE SODIUM 40 MG/1
40 TABLET, DELAYED RELEASE ORAL
Status: DISCONTINUED | OUTPATIENT
Start: 2025-04-11 | End: 2025-04-10

## 2025-04-10 RX ADMIN — APIXABAN 5 MG: 5 TABLET, FILM COATED ORAL at 22:30

## 2025-04-10 RX ADMIN — SODIUM CHLORIDE: 9 INJECTION, SOLUTION INTRAVENOUS at 21:29

## 2025-04-10 RX ADMIN — ATORVASTATIN CALCIUM 80 MG: 80 TABLET, FILM COATED ORAL at 22:30

## 2025-04-10 RX ADMIN — SODIUM CHLORIDE: 0.9 INJECTION, SOLUTION INTRAVENOUS at 18:25

## 2025-04-10 RX ADMIN — POTASSIUM CHLORIDE 40 MEQ: 750 TABLET, EXTENDED RELEASE ORAL at 18:25

## 2025-04-10 ASSESSMENT — LIFESTYLE VARIABLES
HOW OFTEN DO YOU HAVE A DRINK CONTAINING ALCOHOL: NEVER
HOW MANY STANDARD DRINKS CONTAINING ALCOHOL DO YOU HAVE ON A TYPICAL DAY: PATIENT DOES NOT DRINK

## 2025-04-10 ASSESSMENT — PAIN DESCRIPTION - FREQUENCY: FREQUENCY: CONTINUOUS

## 2025-04-10 ASSESSMENT — PAIN SCALES - GENERAL: PAINLEVEL_OUTOF10: 7

## 2025-04-10 ASSESSMENT — PAIN DESCRIPTION - PAIN TYPE: TYPE: CHRONIC PAIN

## 2025-04-10 ASSESSMENT — PAIN DESCRIPTION - LOCATION: LOCATION: BACK

## 2025-04-10 ASSESSMENT — PAIN DESCRIPTION - DESCRIPTORS: DESCRIPTORS: DISCOMFORT

## 2025-04-10 NOTE — ED PROVIDER NOTES
[facility] Emergency Department      Pt Name: Leandra Dawson  MRN: [id number]  Birthdate 1950  Date of evaluation: 4/10/2025  Provider: JHONY DILL MD  CHIEF COMPLAINT  Chief Complaint   Patient presents with    Ingestion     Pt accidentally took 3 pills he thought was something else, pt has medication with him      HPI  Leandra Dawson is a 75 y.o. male who presents because of generalized weakness.  He is prescribed metolazone and is supposed to take it once a week.  He got confused by getting a prescription for a new medication and instead of taking it once per week, he took it 3 days in a row.  He was not trying to hurt himself.  He reports severe generalized weakness over the past 3 days since he started taking this medication.  He has nausea without vomiting.  He fell 4 times over the past couple days.  He did not get injured from the falls.  He does not believe he hit his head.  He says he has been sleeping a lot.  He is prescribed Xarelto.  His appetite is decreased and he has not been eating or drinking very much.  He denies chest pain but does have a heavy sensation in his chest and has had some shortness of breath for the past two days.    REVIEW OF SYSTEMS:  No fever, generalized weakness, no cough Pertinent positives and negatives as per the HPI.  All other pertinent review of systems reviewed and negative.  Nursing notes reviewed.    PAST MEDICAL HISTORY  Past Medical History:   Diagnosis Date    Acid reflux     Acute MI (HCC)     CAD (coronary artery disease)     COPD (chronic obstructive pulmonary disease) (HCC)     Diverticulitis     Hypertension     Peripheral vascular disease     Shingles     per pt, started 12/2021, still visible 3/2022, not open wounds at that time     SURGICAL HISTORY  Past Surgical History:   Procedure Laterality Date    CARDIAC CATHETERIZATION      CARDIAC SURGERY      stent x1 --3 yrs, 5/12/14 CABG 3 V    CATARACT EXTRACTION Right 12/2024    CATARACT EXTRACTION

## 2025-04-11 ENCOUNTER — TELEPHONE (OUTPATIENT)
Dept: CARDIOLOGY CLINIC | Age: 75
End: 2025-04-11

## 2025-04-11 LAB
ALBUMIN SERPL-MCNC: 3.4 G/DL (ref 3.4–5)
ALBUMIN/GLOB SERPL: 1.1 {RATIO} (ref 1.1–2.2)
ALP SERPL-CCNC: 87 U/L (ref 40–129)
ALT SERPL-CCNC: 22 U/L (ref 10–40)
ANION GAP SERPL CALCULATED.3IONS-SCNC: 12 MMOL/L (ref 3–16)
ANION GAP SERPL CALCULATED.3IONS-SCNC: 13 MMOL/L (ref 3–16)
AST SERPL-CCNC: 26 U/L (ref 15–37)
BASOPHILS # BLD: 0 K/UL (ref 0–0.2)
BASOPHILS NFR BLD: 0.3 %
BILIRUB SERPL-MCNC: 0.4 MG/DL (ref 0–1)
BILIRUB UR QL STRIP.AUTO: NEGATIVE
BUN SERPL-MCNC: 34 MG/DL (ref 7–20)
BUN SERPL-MCNC: 44 MG/DL (ref 7–20)
CALCIUM SERPL-MCNC: 8.6 MG/DL (ref 8.3–10.6)
CALCIUM SERPL-MCNC: 9 MG/DL (ref 8.3–10.6)
CHLORIDE SERPL-SCNC: 105 MMOL/L (ref 99–110)
CHLORIDE SERPL-SCNC: 106 MMOL/L (ref 99–110)
CLARITY UR: CLEAR
CO2 SERPL-SCNC: 23 MMOL/L (ref 21–32)
CO2 SERPL-SCNC: 23 MMOL/L (ref 21–32)
COLOR UR: YELLOW
CREAT SERPL-MCNC: 1.4 MG/DL (ref 0.8–1.3)
CREAT SERPL-MCNC: 1.9 MG/DL (ref 0.8–1.3)
CREAT UR-MCNC: 75.9 MG/DL (ref 39–259)
DEPRECATED RDW RBC AUTO: 17.6 % (ref 12.4–15.4)
EKG DIAGNOSIS: NORMAL
EKG Q-T INTERVAL: 390 MS
EKG QRS DURATION: 106 MS
EKG QTC CALCULATION (BAZETT): 525 MS
EKG R AXIS: 25 DEGREES
EKG T AXIS: 165 DEGREES
EKG VENTRICULAR RATE: 109 BPM
EOSINOPHIL # BLD: 0.2 K/UL (ref 0–0.6)
EOSINOPHIL NFR BLD: 2.2 %
FLUAV RNA RESP QL NAA+PROBE: NOT DETECTED
FLUBV RNA RESP QL NAA+PROBE: NOT DETECTED
GFR SERPLBLD CREATININE-BSD FMLA CKD-EPI: 36 ML/MIN/{1.73_M2}
GFR SERPLBLD CREATININE-BSD FMLA CKD-EPI: 52 ML/MIN/{1.73_M2}
GLUCOSE SERPL-MCNC: 112 MG/DL (ref 70–99)
GLUCOSE SERPL-MCNC: 127 MG/DL (ref 70–99)
GLUCOSE UR STRIP.AUTO-MCNC: >=1000 MG/DL
HCT VFR BLD AUTO: 42.3 % (ref 40.5–52.5)
HGB BLD-MCNC: 13.8 G/DL (ref 13.5–17.5)
HGB UR QL STRIP.AUTO: NEGATIVE
KETONES UR STRIP.AUTO-MCNC: NEGATIVE MG/DL
LEUKOCYTE ESTERASE UR QL STRIP.AUTO: NEGATIVE
LYMPHOCYTES # BLD: 2.3 K/UL (ref 1–5.1)
LYMPHOCYTES NFR BLD: 30.9 %
MAGNESIUM SERPL-MCNC: 2.55 MG/DL (ref 1.8–2.4)
MAGNESIUM SERPL-MCNC: 2.55 MG/DL (ref 1.8–2.4)
MCH RBC QN AUTO: 28.5 PG (ref 26–34)
MCHC RBC AUTO-ENTMCNC: 32.6 G/DL (ref 31–36)
MCV RBC AUTO: 87.6 FL (ref 80–100)
MONOCYTES # BLD: 0.8 K/UL (ref 0–1.3)
MONOCYTES NFR BLD: 11.3 %
NEUTROPHILS # BLD: 4.1 K/UL (ref 1.7–7.7)
NEUTROPHILS NFR BLD: 55.3 %
NITRITE UR QL STRIP.AUTO: NEGATIVE
PH UR STRIP.AUTO: 6 [PH] (ref 5–8)
PLATELET # BLD AUTO: 251 K/UL (ref 135–450)
PMV BLD AUTO: 8.1 FL (ref 5–10.5)
POTASSIUM SERPL-SCNC: 2.8 MMOL/L (ref 3.5–5.1)
POTASSIUM SERPL-SCNC: 3.5 MMOL/L (ref 3.5–5.1)
POTASSIUM SERPL-SCNC: 3.7 MMOL/L (ref 3.5–5.1)
PROT SERPL-MCNC: 6.6 G/DL (ref 6.4–8.2)
PROT UR STRIP.AUTO-MCNC: NEGATIVE MG/DL
RBC # BLD AUTO: 4.83 M/UL (ref 4.2–5.9)
SARS-COV-2 RNA RESP QL NAA+PROBE: DETECTED
SODIUM SERPL-SCNC: 141 MMOL/L (ref 136–145)
SODIUM SERPL-SCNC: 141 MMOL/L (ref 136–145)
SODIUM UR-SCNC: 27 MMOL/L
SP GR UR STRIP.AUTO: 1.01 (ref 1–1.03)
UA COMPLETE W REFLEX CULTURE PNL UR: ABNORMAL
UA DIPSTICK W REFLEX MICRO PNL UR: ABNORMAL
URN SPEC COLLECT METH UR: ABNORMAL
UROBILINOGEN UR STRIP-ACNC: 0.2 E.U./DL
WBC # BLD AUTO: 7.4 K/UL (ref 4–11)

## 2025-04-11 PROCEDURE — 97535 SELF CARE MNGMENT TRAINING: CPT

## 2025-04-11 PROCEDURE — 36415 COLL VENOUS BLD VENIPUNCTURE: CPT

## 2025-04-11 PROCEDURE — 6370000000 HC RX 637 (ALT 250 FOR IP)

## 2025-04-11 PROCEDURE — 1200000000 HC SEMI PRIVATE

## 2025-04-11 PROCEDURE — 97166 OT EVAL MOD COMPLEX 45 MIN: CPT

## 2025-04-11 PROCEDURE — 6370000000 HC RX 637 (ALT 250 FOR IP): Performed by: NURSE PRACTITIONER

## 2025-04-11 PROCEDURE — 87636 SARSCOV2 & INF A&B AMP PRB: CPT

## 2025-04-11 PROCEDURE — 80053 COMPREHEN METABOLIC PANEL: CPT

## 2025-04-11 PROCEDURE — 81003 URINALYSIS AUTO W/O SCOPE: CPT

## 2025-04-11 PROCEDURE — 97161 PT EVAL LOW COMPLEX 20 MIN: CPT

## 2025-04-11 PROCEDURE — 82570 ASSAY OF URINE CREATININE: CPT

## 2025-04-11 PROCEDURE — 84300 ASSAY OF URINE SODIUM: CPT

## 2025-04-11 PROCEDURE — 93010 ELECTROCARDIOGRAM REPORT: CPT | Performed by: INTERNAL MEDICINE

## 2025-04-11 PROCEDURE — 97116 GAIT TRAINING THERAPY: CPT

## 2025-04-11 PROCEDURE — 84540 ASSAY OF URINE/UREA-N: CPT

## 2025-04-11 PROCEDURE — 6360000002 HC RX W HCPCS

## 2025-04-11 PROCEDURE — 97530 THERAPEUTIC ACTIVITIES: CPT

## 2025-04-11 PROCEDURE — 83735 ASSAY OF MAGNESIUM: CPT

## 2025-04-11 PROCEDURE — 85025 COMPLETE CBC W/AUTO DIFF WBC: CPT

## 2025-04-11 PROCEDURE — 2580000003 HC RX 258

## 2025-04-11 RX ORDER — AMIODARONE HYDROCHLORIDE 200 MG/1
200 TABLET ORAL DAILY
Status: DISCONTINUED | OUTPATIENT
Start: 2025-04-11 | End: 2025-04-14 | Stop reason: HOSPADM

## 2025-04-11 RX ADMIN — POTASSIUM CHLORIDE 10 MEQ: 7.46 INJECTION, SOLUTION INTRAVENOUS at 00:28

## 2025-04-11 RX ADMIN — GABAPENTIN 900 MG: 300 CAPSULE ORAL at 12:37

## 2025-04-11 RX ADMIN — AMIODARONE HYDROCHLORIDE 200 MG: 200 TABLET ORAL at 12:37

## 2025-04-11 RX ADMIN — GABAPENTIN 900 MG: 300 CAPSULE ORAL at 20:10

## 2025-04-11 RX ADMIN — APIXABAN 5 MG: 5 TABLET, FILM COATED ORAL at 08:54

## 2025-04-11 RX ADMIN — POTASSIUM CHLORIDE 10 MEQ: 7.46 INJECTION, SOLUTION INTRAVENOUS at 01:15

## 2025-04-11 RX ADMIN — POTASSIUM CHLORIDE 10 MEQ: 7.46 INJECTION, SOLUTION INTRAVENOUS at 02:19

## 2025-04-11 RX ADMIN — METOPROLOL SUCCINATE 25 MG: 25 TABLET, EXTENDED RELEASE ORAL at 08:54

## 2025-04-11 RX ADMIN — POTASSIUM CHLORIDE 10 MEQ: 7.46 INJECTION, SOLUTION INTRAVENOUS at 07:17

## 2025-04-11 RX ADMIN — SODIUM CHLORIDE: 9 INJECTION, SOLUTION INTRAVENOUS at 05:36

## 2025-04-11 RX ADMIN — APIXABAN 5 MG: 5 TABLET, FILM COATED ORAL at 20:10

## 2025-04-11 RX ADMIN — ATORVASTATIN CALCIUM 80 MG: 80 TABLET, FILM COATED ORAL at 08:54

## 2025-04-11 RX ADMIN — GABAPENTIN 900 MG: 300 CAPSULE ORAL at 08:54

## 2025-04-11 RX ADMIN — POTASSIUM CHLORIDE 10 MEQ: 7.46 INJECTION, SOLUTION INTRAVENOUS at 04:55

## 2025-04-11 RX ADMIN — POTASSIUM CHLORIDE 10 MEQ: 7.46 INJECTION, SOLUTION INTRAVENOUS at 03:27

## 2025-04-11 NOTE — PROGRESS NOTES
Pt arrived to unit from ED with ludivina Palmer) bedside. All admission questions complete. Assessment complete. VSSA with no  c/o pain. IVF therapy started, all evening medications given. All needs addressed. Bed locked, I lowest position, with alarm on. Bedside table and call light within reach. Plan of care ongoing.    2300- checked pt labs and seen last potassium was 2.9 @1700 and insufficient replacement. On call notified, stat labs drawn, potassium 2.8, started potassium replacement.    0245- pt having multiple runs of unsustained   vtach. On call notified. Instructed to have pt bear down and continue to monitor

## 2025-04-11 NOTE — PROGRESS NOTES
Hospitalist Progress Note      Name:  Leandra Dawson /Age/Sex: 1950  (75 y.o. male)   MRN & CSN:  [id number] & [id number] Encounter Date/Time: 2025 7:47 AM EDT   Location:  [id number]/3326-01 PCP: Augusto Gordillo DO     Attending:Sanford Negrete MD       Hospital Day: 2    Subjective:   Chief Complaint:   Chief Complaint   Patient presents with    Ingestion     Pt accidentally took 3 pills he thought was something else, pt has medication with him      Leandra Dawson is a 75 y.o. male with a past medical history of hypertension, peripheral vascular disease, CAD, COPD who presents with metolazone overdose.  Reports he accidentally took 3 pills. Noted to have severe hypokalemia and admitted for monitoring.      Interval History:  Today, he is resting in bed.  Denies CP.  Admits to SOB that is chronic.  Wife said he was exposed to covid at home with her grandson.  He feels better today.     Independently reviewed interval ancillary notes from cardiology.     Assessment and Recommendations   Problem List  Principal Problem:    Hypokalemia  Resolved Problems:    * No resolved hospital problems. *     Assessment and Plan:    Metolazone Overdose  Hypokalemia  CKD Stage IIIb   - Potassium 2.8->3.5   - Replaced   - Check potassium every 8 hours and replace prn    - Cr 1.9 (baseline cr 1.4 )   - Consult nephrology - discussed with nephrology and ok with   Multiple Falls  Generalized weakness   - CT of head without contrast no acute intracranial abnormality  - CT cervical spine without contrast no acute  - PT/OT recommends HHC  - Treated with IVF  Elevated Troponin  Atrial Fibrillation with RVR   - Trop 65->63   - AF with CVD and NSVT on telemetry   NSVT   - Several episodes of NSVT, EP aware  - Continue amiodarone and Toprol 25 mg daily   - AICD in place  Covid Infection   - No hypoxia, symptoms mild   - Grandson was covid positive   - Continue supportive care, decadron if hypoxia develops  Nicotine

## 2025-04-11 NOTE — TELEPHONE ENCOUNTER
Yesterday, patient was having trouble breathing and having palpitations and his grandson took him to the emergency room. And he is currently admitted to [facility], and patient's wife is very concerned stating that patient has become very belligerent and is not accepting his diagnosis, and she is not able to get him to calm down.  She is asking for either LES or NPTS to give her a call, [phone].

## 2025-04-11 NOTE — PROGRESS NOTES
[facility] - Inpatient Rehabilitation Department   Phone: [phone]    Physical Therapy    [x] Initial Evaluation            [] Daily Treatment Note         [] Discharge Summary      Patient: Leandra Dawson   : 1950   MRN: [id number]   Date of Service:  2025  Admitting Diagnosis: Hypokalemia  Current Admission Summary: Patient admitted via ED 4/10 with generalized weakness. Patient states he fell 4x in the last several days due to feeling weak. Patient admits to accidentally taking metolazone 3 days in a row (prescribed for once weekly). Dx: hypokalemia, +COVID19  Past Medical History:  has a past medical history of Acid reflux, Acute MI (HCC), CAD (coronary artery disease), COPD (chronic obstructive pulmonary disease) (HCC), Diverticulitis, Hypertension, Peripheral vascular disease, and Shingles.  Past Surgical History:  has a past surgical history that includes vascular surgery; Cardiac surgery; Colonoscopy; Cardiac catheterization; pacemaker placement; Cataract extraction (Right, 2024); and Cataract extraction (Left, 2025).  Discharge Recommendations: Leandra Dawson scored a 18/24 on the AM-PAC short mobility form. Current research shows that an AM-PAC score of 18 or greater is typically associated with a discharge to the patient's home setting. Based on the patient's AM-PAC score and their current functional mobility deficits, it is recommended that the patient have 2-3 sessions per week of Physical Therapy at d/c to increase the patient's independence.  At this time, this patient demonstrates the endurance and safety to discharge home with HHPT OR outpatient physical therapy *HHPT preferred* and a follow up treatment frequency of 2-3x/wk.  Please see assessment section for further patient specific details.    Re: HHPT - Patient states his wife (ex) will not let anyone into the house. He states he has been involved with  due to this same issue previously when  with use of LRAD at modified independent  Patient will ascend/descend 4 stairs with (R) ascending handrail at supervision    Above goals reviewed on 4/11/2025.  All goals are ongoing at this time unless indicated above.      Therapy Session Time      Individual Group Co-treatment   Time In     1124   Time Out     1206   Minutes     42     Timed Code Treatment Minutes:  27 Minutes  Total Treatment Minutes:  42 Minutes       Electronically Signed By: Brenda Benavides, PT    Thanks, Brenda Benavides PT, DPT #[id number] 4/11/2025

## 2025-04-11 NOTE — CONSULTS
MD Lawrence Gomez MD Aldo Estella, DO              Office: [phone]                      Fax: [phone]               [email or web address]                     Nephrology consult received. Full consult report will follow.     Thank you for allowing us to participate in this patient's care. Please do not hesitate to contact us anytime. We will follow along with you.         Eitan Jang DO

## 2025-04-11 NOTE — PROGRESS NOTES
.4 Eyes Skin Assessment     NAME:  Leandra Dawson  YOB: 1950  MEDICAL RECORD NUMBER:  [id number]    The patient is being assessed for  Admission    I agree that at least one RN has performed a thorough Head to Toe Skin Assessment on the patient. ALL assessment sites listed below have been assessed.      Areas assessed by both nurses:    Head, Face, Ears, Shoulders, Back, Chest, Arms, Elbows, Hands, Sacrum. Buttock, Coccyx, Ischium, and Legs. Feet and Heels        Does the Patient have a Wound? No noted wound(s)       Nelson Prevention initiated by RN: No  Wound Care Orders initiated by RN: No    Pressure Injury (Stage 3,4, Unstageable, DTI, NWPT, and Complex wounds) if present, place Wound referral order by RN under : No    New Ostomies, if present place, Ostomy referral order under : No     Nurse 1 eSignature: Electronically signed by Ana Montano RN on 4/10/25 at 11:06 PM EDT    **SHARE this note so that the co-signing nurse can place an eSignature**    Nurse 2 eSignature: Electronically signed by Maggie Carpenter RN on 4/10/25 at 11:07 PM EDT

## 2025-04-11 NOTE — CONSULTS
MD Lawrence Gomez MD  Eitan JangDO                 Office: [phone]                      Fax: [phone]             [employer]                   NEPHROLOGY CONSULT INITIAL NOTE        IMPRESSION/RECOMMENDATIONS:        #LONNIE on CKD3a  -bCr 1.3-1.4  -likely prerenal 2/2 CRS, hypovolemia, hypotension  -Cr 2.1 on presentation  -ct a/p (8/2024):   No change in the 1.4 x 1.6 cm indeterminate left adrenal.  mass favoring an adenoma.  -has been on lasix and accidentally taking metolazone qd x3 days.  -follow-up UA. check urine -lytes. Consider renal u/s if no improvement.  -Cr slowly improving. Keep holding diuretics, continue mIVF for today    #metolazone overdose  #hypokalemia  -took it for 3 days in a row now with relative hypotension, n/v  -hold diuretics  -no role for hemodialysis, 95%-protein bound; not dialyzable.  -replace K+ PRN. Monitor closely.    #HTN  -BPs soft,   -at home on toporol 25, jardiance, lasix, prn metolazone.  -midodrine prn in place    #dCHF - EF 40-45% recently  #hx afib  -cardiology following  -holding diuretics on admit.        Thank you for the consult.  We will follow this patient along the hospitalization.  Eitan Jang DO     High complexity, at risk of impending organ failure needing  higher level of care/monitoring.   Time spent 42 minutes that included face-to-face meeting/discussion with patient, patient's family, and treatment team (including primary/referring team and other consultants; included coordination of care with the treatment team; and review of patient's electronic medical records and ordering appropriates tests.             Reason for Consult:  CKD, metolazone overdose   Requesting Physician/Provider:  Sharyn Sawant APRN - CNP     CHIEF COMPLAINT:  metolazone accidental overdose    History obtained from records and patient.    HISTORY OF PRESENT ILLNESS:                Leandra Dawson  is 75 y.o. y.o. male with significant past medical

## 2025-04-11 NOTE — CONSULTS
[facility], [facility]   Electrophysiology   Date: 4/11/2025  Reason for Consultation: Atrial fibrillation    Consult Requesting Physician: Sanford Negrete MD     Chief Complaint   Patient presents with    Ingestion     Pt accidentally took 3 pills he thought was something else, pt has medication with him        HPI: Leandra Dawson is a 75 y.o. male  male with a past medical history of tobacco use, COPD, CAD s/p CABG (2014), ICM, CHF, HTN, PAD, PAF, and VT. Hx of sustained VT with loss of consciousness and CPR (3/15/22). S/p single chamber AICD (3/17/22). In October of 2024, He was found to have persistent AF on his device and underwent DCCV (10/7/24). On 12/5/24, he received AICD shock for dual arrhythmia AF with VT ~ 222 BPM while getting out of bed. He was started on amiodarone     Pt presented to ER due to generalized fatigue, poor appetite, and weakness. Pt reports he also had nausea, cough and nasal congestion for several days. He reports he has fallen a couple times at home. He denies any head trauma or injury from the falls. He has not been checked for covid/flu. Pt denies any fevers. He was found to be profoundly hypokalemic with creatinine of 2.1. He had taken metolazone for 3 straight days. His troponin was elevated. He is positive for covid pneumonia. EP consulted for management of atrial fibrillation     Assessment:   Persistent Atrial Fibrillation  VT s/p [employer] AICD  HTN  CAD  CHrEF  CKD  Covid infection      Plan:   Patient has presented to hospital with complaint of generalized fatigue and poor appetite and has been diagnosed with COVID infection.    He does have history of atrial fibrillation was seen last by EP in December 2024.  Ablation was discussed in detail at the time.  He was not interested in ablation.  He does have history of atrial fibrillation with rapid ventricular response and AICD shock.  At this time his heart rate appears to be controlled on telemetry.  High risk  ProviderFay MD   atorvastatin (LIPITOR) 80 MG tablet Take 1 tablet by mouth daily  Patient taking differently: Take 1 tablet by mouth every evening 23  Yes Lloa Montesinos APRN - CNP   Cholecalciferol (VITAMIN D3) 125 MCG (5000 UT) TABS Take 1 tablet by mouth daily   Yes Fay Rees MD   Ascorbic Acid (VITAMIN C PO) Take 5,000 mcg by mouth daily   Yes Fay Rees MD   pantoprazole (PROTONIX) 40 MG tablet Take 1 tablet by mouth every morning (before breakfast)  Patient not taking: Reported on 4/10/2025 8/4/24   Clif Finley MD   OXYGEN Inhale into the lungs 1.5 liters nightly    Provider, MD Fay       Physical Examination:  Vitals:    04/10/25 2115   BP: 124/85   Pulse: 91   Resp: 18   Temp: 97.4 °F (36.3 °C)   SpO2: 96%      In: -   Out: 500    Wt Readings from Last 3 Encounters:   25 84.5 kg (186 lb 4.6 oz)   25 91.4 kg (201 lb 9.6 oz)   25 87.1 kg (192 lb)     Temp  Av.5 °F (36.4 °C)  Min: 97.4 °F (36.3 °C)  Max: 97.5 °F (36.4 °C)  Pulse  Av.5  Min: 83  Max: 111  BP  Min: 94/80  Max: 151/90  SpO2  Av.5 %  Min: 89 %  Max: 100 %    Intake/Output Summary (Last 24 hours) at 2025 0712  Last data filed at 2025 0455  Gross per 24 hour   Intake --   Output 500 ml   Net -500 ml     Constitutional: Oriented. No distress.   Cardiovascular: Normal rate, Irregular rhythm, S1&S2.    Pulmonary/Chest: Bilateral respiratory sounds. No rhonchi.    Abdominal: Soft. No tenderness   Musculoskeletal: No edema    Neurological: Alert and oriented. Follows command    Active Hospital Problems    Diagnosis Date Noted    Hypokalemia [E87.6] 04/10/2025       Past Medical History:   Diagnosis Date    Acid reflux     Acute MI (HCC)     CAD (coronary artery disease)     COPD (chronic obstructive pulmonary disease) (HCC)     Diverticulitis     Hypertension     Peripheral vascular disease     Shingles     per pt, started 2021, still visible 3/2022,

## 2025-04-11 NOTE — TELEPHONE ENCOUNTER
Spoke to wife. She's concerned about his irritation . She's concerned about him being mean/anxious ; hx of ETOH  Has not been seen by cardiology yet today ; no new information to give ; did review BMP results

## 2025-04-11 NOTE — H&P
V2.0  History and Physical      Name:  Leandra Dawson /Age/Sex: 1950  (75 y.o. male)   MRN & CSN:  [id number] & [id number] Encounter Date/Time: 4/10/2025 8:01 PM EDT   Location:  ED- PCP: Augusto Gordillo DO       Hospital Day: 1    Assessment and Plan:   Leandra Dawson is a 75 y.o. male with a pmh of hypertension, peripheral vascular disease, CAD, COPD who presents with Hypokalemia    Hospital Problems           Last Modified POA    * (Principal) Hypokalemia 4/10/2025 Yes       Plan:  #Hypokalemia  Patient currently on metolazone and Lasix will hold  - Atrial fibrillation with a rate 109, PVCs, ST T wave abnormalities suggesting inferior and lateral ischemia.  QTc 525, compared with last month EKG which is same  - Potassium 2.9, ED repleted, will continue to monitor and Will replete it accordingly    #Metolazone  overdose  #Multiple falls  #Generalized weakness  Patient supposed to take weekly once metolazone however instead he took continuously 3 days in the room.  Patient took it accidentally.  - BP hypotensive 98/80, tachycardic 102, on room air saturating at 97%  - VBG normal pH 7.416, bicarb 24.8.  Sodium 136, potassium 2.9, chloride 93 anion gap 21.  - CK normal  - Chest x-ray no acute process  - CT of head without contrast no acute intracranial abnormality  - CT cervical spine without contrast no acute  - Will provide IV fluid    #Elevated troponin currently trending down  #A-fib RVR  #QTc prolonged will avoid amiodarone and Zofran  - Atrial fibrillation with a rate 109, PVCs, ST T wave abnormalities suggesting inferior and lateral ischemia.  QTc 525, compared with last month EKG which is same  - Will monitor on telemetry  - Will continue to trend trops    #BUN elevated creatinine at his baseline  Patient has not CKD stage IIIb, baseline creatinine 2.1 last month.  Currently BUN is elevated may be secondary to dehydration  - Will provide IV fluid  - Monitor with morning labs    DVT

## 2025-04-11 NOTE — PROGRESS NOTES
[facility] - Inpatient Rehabilitation Department   Phone: [phone]    Occupational Therapy    [x] Initial Evaluation            [] Daily Treatment Note         [] Discharge Summary      Patient: Leandra Dawson   : 1950   MRN: [id number]   Date of Service:  2025    Admitting Diagnosis:  Hypokalemia  Current Admission Summary: Chief complaint:        Chief Complaint   Patient presents with    Ingestion       Pt accidentally took 3 pills he thought was something else, pt has medication with him      Patient supposed to take weekly once metolazone however instead he took continuously 3 days in the room. Patient took it accidentally.     Past Medical History:  has a past medical history of Acid reflux, Acute MI (HCC), CAD (coronary artery disease), COPD (chronic obstructive pulmonary disease) (HCC), Diverticulitis, Hypertension, Peripheral vascular disease, and Shingles.  Past Surgical History:  has a past surgical history that includes vascular surgery; Cardiac surgery; Colonoscopy; Cardiac catheterization; pacemaker placement; Cataract extraction (Right, 2024); and Cataract extraction (Left, 2025).    Discharge Recommendations: Leandra Dawson scored a 22/24 on the AM-PAC ADL Inpatient form. Current research shows that an AM-PAC score of 18 or greater is typically associated with a discharge to the patient's home setting. Based on the patient's AM-PAC score, and their current ADL deficits, it is recommended that the patient have 2-3 sessions per week of Occupational Therapy at d/c to increase the patient's independence.  At this time, this patient demonstrates the endurance and safety to discharge home with home (home vs OP services) and a follow up treatment frequency of 2-3x/wk.   Please see assessment section for further patient specific details.    HOME HEALTH CARE: LEVEL 1 STANDARD    - Initial home health evaluation to occur within 24-48 hours, in patient home   - Therapy to

## 2025-04-11 NOTE — PROGRESS NOTES
Pharmacy Home Medication Reconciliation Note    A medication reconciliation has been completed for Leandra Dawson 1950    Pharmacy: [facility]  Information provided by: patient    The patient's home medication list is as follows:  No current facility-administered medications on file prior to encounter.     Current Outpatient Medications on File Prior to Encounter   Medication Sig Dispense Refill    empagliflozin (JARDIANCE) 10 MG tablet Take 1 tablet by mouth daily      tamsulosin (FLOMAX) 0.4 MG capsule Take 1 capsule by mouth daily      midodrine (PROAMATINE) 5 MG tablet Take 1 tablet by mouth 3 times daily as needed (for bp less than 90/60) 180 tablet 3    metOLazone (ZAROXOLYN) 2.5 MG tablet Weekly as needed for weight gain. 4 tablet 0    metoprolol succinate (TOPROL XL) 25 MG extended release tablet Take 1 tablet by mouth daily 30 tablet 3    ELIQUIS 5 MG TABS tablet TAKE 1 TABLET BY MOUTH TWICE DAILY 60 tablet 3    amiodarone (CORDARONE) 200 MG tablet Take 1 tablet by mouth 2 times daily 90 tablet 3    metFORMIN (GLUCOPHAGE-XR) 500 MG extended release tablet Take 1 tablet by mouth daily (with breakfast)      furosemide (LASIX) 40 MG tablet Take 1 tablet by mouth 2 times daily 180 tablet 1    gabapentin (NEURONTIN) 300 MG capsule Take 3 capsules by mouth 3 times daily.      LINZESS 145 MCG capsule Take 1 capsule by mouth daily as needed      atorvastatin (LIPITOR) 80 MG tablet Take 1 tablet by mouth daily (Patient taking differently: Take 1 tablet by mouth every evening) 30 tablet 5    Cholecalciferol (VITAMIN D3) 125 MCG (5000 UT) TABS Take 1 tablet by mouth daily      Ascorbic Acid (VITAMIN C PO) Take 5,000 mcg by mouth daily      pantoprazole (PROTONIX) 40 MG tablet Take 1 tablet by mouth every morning (before breakfast) (Patient not taking: Reported on 4/10/2025) 30 tablet 3    OXYGEN Inhale into the lungs 1.5 liters nightly         Patient is no longer taking

## 2025-04-11 NOTE — CARE COORDINATION
04/10/25 2362   Service Assessment   Support Systems Spouse/Significant Other;Children   Discharge Planning   Type of Residence House   Living Arrangements Spouse/Significant Other   Current Services Prior To Admission Oxygen Therapy   Potential Assistance Needed N/A   DME Ordered? No   Potential Assistance Purchasing Medications No   Type of Home Care Services None   Patient expects to be discharged to: House   Follow Up Appointment: Best Day/Time  Monday AM   One/Two Story Residence Two story   # of Interior Steps 13   Height of Each Step (in) 1 INCHES   Interior Rails Right   Lift Chair Available No   History of falls? 1     Noemi Camarena, MSN, RN, Case Management  Office: [phone]  Electronically signed by Noemi Camarena on 4/11/2025 at 2:45 PM

## 2025-04-11 NOTE — ED NOTES
Patient Name: Leandra Dawson  : 1950 75 y.o.  MRN: [id number]  ED Room #: ED-0012/12     Chief complaint:   Chief Complaint   Patient presents with    Ingestion     Pt accidentally took 3 pills he thought was something else, pt has medication with him      Hospital Problem/Diagnosis:   Hospital Problems           Last Modified POA    * (Principal) Hypokalemia 4/10/2025 Yes         O2 Flow Rate:    (if applicable)  Cardiac Rhythm:   (if applicable)  Active LDA's:   Peripheral IV 04/10/25 Right Antecubital (Active)            How does patient ambulate? Unknown, did not assess in the Emergency Department    2. How does patient take pills? Unknown, no oral medications were given in the Emergency Department    3. Is patient alert? Alert    4. Is patient oriented? To Person, To Place, To Time, and To Situation    5.   Patient arrived from:  home  Facility Name: ___________________________________________    6. If patient is disoriented or from a Skill Nursing Facility has family been notified of admission? No    7. Patient belongings? Belongings: Cell Phone and Clothing    Disposition of belongings? Kept with Patient     8. Any specific patient or family belongings/needs/dynamics?   a. N/a    9. Miscellaneous comments/pending orders?  a. N/a      If there are any additional questions please reach out to the Emergency Department.

## 2025-04-12 LAB
ANION GAP SERPL CALCULATED.3IONS-SCNC: 11 MMOL/L (ref 3–16)
BASOPHILS # BLD: 0.1 K/UL (ref 0–0.2)
BASOPHILS NFR BLD: 0.8 %
BUN SERPL-MCNC: 27 MG/DL (ref 7–20)
CALCIUM SERPL-MCNC: 8.3 MG/DL (ref 8.3–10.6)
CHLORIDE SERPL-SCNC: 111 MMOL/L (ref 99–110)
CO2 SERPL-SCNC: 19 MMOL/L (ref 21–32)
CREAT SERPL-MCNC: 1.3 MG/DL (ref 0.8–1.3)
DEPRECATED RDW RBC AUTO: 17.7 % (ref 12.4–15.4)
EOSINOPHIL # BLD: 0.2 K/UL (ref 0–0.6)
EOSINOPHIL NFR BLD: 2.5 %
GFR SERPLBLD CREATININE-BSD FMLA CKD-EPI: 57 ML/MIN/{1.73_M2}
GLUCOSE SERPL-MCNC: 130 MG/DL (ref 70–99)
HCT VFR BLD AUTO: 39.3 % (ref 40.5–52.5)
HGB BLD-MCNC: 12.8 G/DL (ref 13.5–17.5)
LYMPHOCYTES # BLD: 3 K/UL (ref 1–5.1)
LYMPHOCYTES NFR BLD: 35.2 %
MAGNESIUM SERPL-MCNC: 2.22 MG/DL (ref 1.8–2.4)
MCH RBC QN AUTO: 28.2 PG (ref 26–34)
MCHC RBC AUTO-ENTMCNC: 32.6 G/DL (ref 31–36)
MCV RBC AUTO: 86.5 FL (ref 80–100)
MONOCYTES # BLD: 0.6 K/UL (ref 0–1.3)
MONOCYTES NFR BLD: 7 %
NEUTROPHILS # BLD: 4.7 K/UL (ref 1.7–7.7)
NEUTROPHILS NFR BLD: 54.5 %
PLATELET # BLD AUTO: 254 K/UL (ref 135–450)
PMV BLD AUTO: 8 FL (ref 5–10.5)
POTASSIUM SERPL-SCNC: 3.6 MMOL/L (ref 3.5–5.1)
POTASSIUM SERPL-SCNC: 3.6 MMOL/L (ref 3.5–5.1)
RBC # BLD AUTO: 4.54 M/UL (ref 4.2–5.9)
SODIUM SERPL-SCNC: 141 MMOL/L (ref 136–145)
UUN UR-MCNC: 608 MG/DL (ref 800–1666)
WBC # BLD AUTO: 8.5 K/UL (ref 4–11)

## 2025-04-12 PROCEDURE — 2500000003 HC RX 250 WO HCPCS

## 2025-04-12 PROCEDURE — 6370000000 HC RX 637 (ALT 250 FOR IP): Performed by: NURSE PRACTITIONER

## 2025-04-12 PROCEDURE — 6370000000 HC RX 637 (ALT 250 FOR IP)

## 2025-04-12 PROCEDURE — 80048 BASIC METABOLIC PNL TOTAL CA: CPT

## 2025-04-12 PROCEDURE — 84132 ASSAY OF SERUM POTASSIUM: CPT

## 2025-04-12 PROCEDURE — 1200000000 HC SEMI PRIVATE

## 2025-04-12 PROCEDURE — 85025 COMPLETE CBC W/AUTO DIFF WBC: CPT

## 2025-04-12 PROCEDURE — 83735 ASSAY OF MAGNESIUM: CPT

## 2025-04-12 RX ORDER — FUROSEMIDE 20 MG/1
20 TABLET ORAL 2 TIMES DAILY
Status: DISCONTINUED | OUTPATIENT
Start: 2025-04-13 | End: 2025-04-14 | Stop reason: HOSPADM

## 2025-04-12 RX ADMIN — AMIODARONE HYDROCHLORIDE 200 MG: 200 TABLET ORAL at 09:43

## 2025-04-12 RX ADMIN — APIXABAN 5 MG: 5 TABLET, FILM COATED ORAL at 09:43

## 2025-04-12 RX ADMIN — APIXABAN 5 MG: 5 TABLET, FILM COATED ORAL at 20:59

## 2025-04-12 RX ADMIN — POLYETHYLENE GLYCOL 3350 17 G: 17 POWDER, FOR SOLUTION ORAL at 09:56

## 2025-04-12 RX ADMIN — METOPROLOL SUCCINATE 25 MG: 25 TABLET, EXTENDED RELEASE ORAL at 09:43

## 2025-04-12 RX ADMIN — ATORVASTATIN CALCIUM 80 MG: 80 TABLET, FILM COATED ORAL at 09:43

## 2025-04-12 RX ADMIN — GABAPENTIN 900 MG: 300 CAPSULE ORAL at 20:59

## 2025-04-12 RX ADMIN — SODIUM CHLORIDE, PRESERVATIVE FREE 10 ML: 5 INJECTION INTRAVENOUS at 20:59

## 2025-04-12 RX ADMIN — SODIUM CHLORIDE, PRESERVATIVE FREE 10 ML: 5 INJECTION INTRAVENOUS at 09:44

## 2025-04-12 RX ADMIN — GABAPENTIN 900 MG: 300 CAPSULE ORAL at 09:43

## 2025-04-12 RX ADMIN — GABAPENTIN 900 MG: 300 CAPSULE ORAL at 13:41

## 2025-04-12 NOTE — PROGRESS NOTES
Nutrition Note    RECOMMENDATIONS  PO Diet: Continue current diet  ONS: Pt denied at this time    ASSESSMENT   Pt triggered for positive nutrition screen. Reported decreased po intake for 1 week pta r/t not feeling well with unsure amount of unintentional wt loss. Wt hx in EMR indicates 15 lb (7.5%) wt loss in 10 days. Appetite now improved, denied ONS at this time. Two documented meal intakes each of % yesterday. RD will monitor for adequate po intake.     Malnutrition Status: Insufficient data  Acute Illness  Findings of the 6 clinical characteristics of malnutrition:  Energy Intake:  75% or less of estimated energy requirements for 7 or more days  Weight Loss:  Unable to assess (Wt hx in EMR indicates 15 lb (7.5%) wt loss in 10 days although no wt method documented for wt of 201 lb obtained on 4/1, unsure if stated or actual)     Body Fat Loss:  Unable to assess (droplet-plus isolation)     Muscle Mass Loss:  Unable to assess    Fluid Accumulation:  No fluid accumulation     Strength:  Not Performed    NUTRITION DIAGNOSIS   Inadequate oral intake related to acute injury/trauma as evidenced by poor intake prior to admission, weight loss    Goals: PO intake 50% or greater, prior to discharge     NUTRITION RELATED FINDINGS  Objective: LBM pta.  Wounds: None    CURRENT NUTRITION THERAPIES  ADULT DIET; Regular       PO Intake: %   PO Supplement Intake:None Ordered    ANTHROPOMETRICS  Current Height: 175.3 cm (5' 9\")  Current Weight - Scale: 84.2 kg (185 lb 11.2 oz)    Admission weight: 84.5 kg (186 lb 4.6 oz)    COMPARATIVE STANDARDS  Total Energy Requirements (kcals/day): 4572-1980     Protein (g):         Fluid (mL/day):  3102-4660    EDUCATION  Education/Counseling not indicated     The patient will be monitored per nutrition standards of care. Consult dietitian if additional nutrition interventions are needed prior to RD reassessment.     Silvia Boyd, MS, RD, LD    Weekend Contact: [phone].

## 2025-04-12 NOTE — PROGRESS NOTES
MD Lawrence Gomez MD Aldo Estella, DO                 Office: [phone]                      Fax: [phone]             [employer]                   NEPHROLOGY INPATIENT PROGRESS NOTE        IMPRESSION / RECOMMENDATIONS:    Admitted on - 4/10/2025  For Hypokalemia [E87.6]  Acute drug overdose, accidental or unintentional, initial encounter [T50.351A]      #LONNIE on CKD3a  -bCr 1.3-1.4  -likely prerenal 2/2 CRS, hypovolemia, hypotension  -SCr 2.1 on presentation  -Now Estimated Creatinine Clearance: 49 mL/min (based on SCr of 1.3 mg/dL).    -CT a/p (8/2024):   No change in the 1.4 x 1.6 cm indeterminate left adrenal. mass favoring an adenoma.  -has been on lasix and accidentally taking metolazone qd x3 days.    Pre-renal LONNIE - improving   Keep holding diuretics,   Stop IV fluids    ECHO - Grade II diastolic dysfunction with elevated LV filling  Weight stable ~ 84 kg  On RA w/o hypoxia     Restart low dose Lasix (40BID home) now 20 mg BID - on d-c   Continue to hold, Metolazone, Jardiance     -: follow up w/ us at  [facility] OFFICE  in 2-3 weeks after d/c. (I updated our information in discharge follow up providers' list.)       #metolazone overdose  #hypokalemia  -took it for 3 days in a row now with relative hypotension, n/v  -hold diuretics  -no role for hemodialysis, 95%-protein bound; not dialyzable.  -replace K+ PRN. Monitor closely.  Defer to #1    #HTN  -BPs soft,   -at home on toporol 25, jardiance, lasix, prn metolazone.  -midodrine prn in place    #dCHF - EF 40-45% recently  #hx afib  -cardiology following  -holding diuretics on admit.    Acidosis, metabolic, mild, show continue to monitor  With renal function improving should continue to improve      Medical decision making- high complexity. Multiple complex health problems.   Discussed with patient and treatment team, Sanford Negrete MD    Thank you for allowing me to participate in this patient's care. Please do not  hesitate to contact me for any questions/concerns. We will follow along with you.     Lawrence Adorno MD  [facility]   Phone: [phone] or [employer]  Fax: [phone]    Severally ill, at risk of impending organ failure needing  higher level of care/monitoring.   Time spent that included face-to-face meeting/discussion with patient, patient's family -as available, and treatment team (including primary/referring team and other consultants; included coordination of care with the treatment team; and review of patient's electronic medical records and ordering appropriates tests.              Reason for Consult:  CKD, metolazone overdose   Requesting Physician/Provider:  Sharyn Sawant APRN - CNP     CHIEF COMPLAINT:  metolazone accidental overdose    History obtained from records and patient.    HISTORY OF PRESENT ILLNESS:                Leandra Dawson  is 75 y.o. y.o. male with significant past medical history of CKD3a, HTN, CAD, CABG 5/2014, afib, dCHF COPD who presented with metolazone overdose, n/v. In ED, labs K+ 2.9, Cr 2.1, BUn 50, Cl 93, Mg 2.4, trop 65, VBG 7.4/38/24. Nephrology consulted for CKD, metolazone overdose .    Past Medical History:     has a past medical history of Acid reflux, Acute MI (HCC), CAD (coronary artery disease), COPD (chronic obstructive pulmonary disease) (HCC), Diverticulitis, Hypertension, Peripheral vascular disease, and Shingles.   Past Surgical History:     has a past surgical history that includes vascular surgery; Cardiac surgery; Colonoscopy; Cardiac catheterization; pacemaker placement; Cataract extraction (Right, 12/2024); and Cataract extraction (Left, 01/2025).   Current Medications:    Current Facility-Administered Medications: amiodarone (CORDARONE) tablet 200 mg, 200 mg, Oral, Daily  atorvastatin (LIPITOR) tablet 80 mg, 80 mg, Oral, Daily  apixaban (ELIQUIS) tablet 5 mg, 5 mg, Oral, BID  gabapentin (NEURONTIN) capsule 900 mg, 900

## 2025-04-12 NOTE — PROGRESS NOTES
Hospitalist Progress Note      Name:  Leandra Dawson /Age/Sex: 1950  (75 y.o. male)   MRN & CSN:  [id number] & [id number] Encounter Date/Time: 2025 7:47 AM EDT   Location:  [facility]3326/3326-01 PCP: Augusto Gordillo DO     Attending:Sanford Negrete MD       Hospital Day: 3    Subjective:   Chief Complaint:   Chief Complaint   Patient presents with    Ingestion     Pt accidentally took 3 pills he thought was something else, pt has medication with him      Leandra Dawson is a 75 y.o. male with a past medical history of hypertension, peripheral vascular disease, CAD, COPD who presents with metolazone overdose.  Reports he accidentally took 3 pills. Noted to have severe hypokalemia and admitted for monitoring.      Interval History:  Today, ***.     Independently reviewed interval ancillary notes from cardiology.     Assessment and Recommendations   Problem List  Principal Problem:    Hypokalemia  Resolved Problems:    * No resolved hospital problems. *     Assessment and Plan:    Metolazone Overdose  Hypokalemia  CKD Stage IIIb   - Potassium 2.8->3.5->3.5   -  ***    - Check potassium every 8 hours and replace prn    - Cr 1.9->1.4-> *** (baseline cr 1.4 )   - Consult nephrology - discussed with nephrology and ok with   Multiple Falls  Generalized weakness   - CT of head without contrast no acute intracranial abnormality  - CT cervical spine without contrast no acute  - PT/OT recommends HHC  - Treated with IVF  Elevated Troponin  Atrial Fibrillation with RVR   - Trop 65->63   - AF with CVD and NSVT on telemetry   NSVT- chronic   - Several episodes of NSVT, EP aware  - Continue amiodarone and Toprol 25 mg daily   - AICD in place  Covid Infection   - No hypoxia, symptoms mild   - Grandson was covid positive   - Continue supportive care, decadron if hypoxia develops  Nicotine Dependence   - Smokes 1.5 ppd    Plan:   ***    Drugs that require monitoring for toxicity include:  Metolazone overdose  and the

## 2025-04-12 NOTE — DISCHARGE SUMMARY
[facility] DISCHARGE SUMMARY    Patient Demographics    Patient. Leandra Dawson  Date of Birth. 1950  MRN. [id number]     Primary care provider. Augusto Gordillo DO  (Tel: [phone])    Admit date: 4/10/2025    Discharge date (blank if same as Note Date):   Note Date: 4/12/2025     Reason for Hospitalization.   Chief Complaint   Patient presents with    Ingestion     Pt accidentally took 3 pills he thought was something else, pt has medication with him          Significant Findings.   Principal Problem:    Hypokalemia  Resolved Problems:    * No resolved hospital problems. *     Problem-based Hospital Course.  **Leandra Dawson is a 75 y.o. male with a past medical history of *** who presented with *** x ***.     Assessment and Plan:    Discharge recommendations given to patient.  Follow Up. *** in 1 week   Disposition.  {disposition:90148}  Activity. {discharge activity:28122}  Diet: ADULT DIET; Regular      Problems and results from this hospitalization that need follow up.  None ***    Significant test results and incidental findings.  ***  CT CERVICAL SPINE WO CONTRAST   Final Result   1.  No acute fracture.         CT HEAD WO CONTRAST   Final Result   1. No acute intracranial abnormality.         XR CHEST PORTABLE   Final Result   No acute process.           Invasive procedures and treatments.   None ***    Consults.  IP CONSULT TO CARDIOLOGY  IP CONSULT TO NEPHROLOGY    Physical examination on discharge day.   BP (!) 112/53   Pulse 77   Temp 97.3 °F (36.3 °C) (Oral)   Resp 16   Ht 1.753 m (5' 9\")   Wt 84.2 kg (185 lb 11.2 oz)   SpO2 94%   BMI 27.42 kg/m²   General appearance.  Alert. Looks comfortable. Well nourished.   HEENT. Sclera clear. Moist mucus membranes.   Cardiovascular. Regular rate and rhythm, normal S1, S2. No murmur. No significant peripheral edema  Respiratory. Breathing unlabored, not using accessory muscles. Clear to auscultation bilaterally, no

## 2025-04-13 LAB
ANION GAP SERPL CALCULATED.3IONS-SCNC: 11 MMOL/L (ref 3–16)
BUN SERPL-MCNC: 22 MG/DL (ref 7–20)
CALCIUM SERPL-MCNC: 8.4 MG/DL (ref 8.3–10.6)
CHLORIDE SERPL-SCNC: 111 MMOL/L (ref 99–110)
CO2 SERPL-SCNC: 19 MMOL/L (ref 21–32)
CREAT SERPL-MCNC: 1.2 MG/DL (ref 0.8–1.3)
GFR SERPLBLD CREATININE-BSD FMLA CKD-EPI: 63 ML/MIN/{1.73_M2}
GLUCOSE SERPL-MCNC: 127 MG/DL (ref 70–99)
POTASSIUM SERPL-SCNC: 3.7 MMOL/L (ref 3.5–5.1)
POTASSIUM SERPL-SCNC: 3.8 MMOL/L (ref 3.5–5.1)
SODIUM SERPL-SCNC: 141 MMOL/L (ref 136–145)

## 2025-04-13 PROCEDURE — 6370000000 HC RX 637 (ALT 250 FOR IP)

## 2025-04-13 PROCEDURE — 6370000000 HC RX 637 (ALT 250 FOR IP): Performed by: INTERNAL MEDICINE

## 2025-04-13 PROCEDURE — 1200000000 HC SEMI PRIVATE

## 2025-04-13 PROCEDURE — 80048 BASIC METABOLIC PNL TOTAL CA: CPT

## 2025-04-13 PROCEDURE — 2500000003 HC RX 250 WO HCPCS

## 2025-04-13 PROCEDURE — 94680 O2 UPTK RST&XERS DIR SIMPLE: CPT

## 2025-04-13 PROCEDURE — 99233 SBSQ HOSP IP/OBS HIGH 50: CPT | Performed by: INTERNAL MEDICINE

## 2025-04-13 PROCEDURE — 6370000000 HC RX 637 (ALT 250 FOR IP): Performed by: NURSE PRACTITIONER

## 2025-04-13 PROCEDURE — 6370000000 HC RX 637 (ALT 250 FOR IP): Performed by: PHYSICIAN ASSISTANT

## 2025-04-13 PROCEDURE — 84132 ASSAY OF SERUM POTASSIUM: CPT

## 2025-04-13 PROCEDURE — 36415 COLL VENOUS BLD VENIPUNCTURE: CPT

## 2025-04-13 RX ORDER — METOPROLOL SUCCINATE 25 MG/1
37.5 TABLET, EXTENDED RELEASE ORAL DAILY
Status: DISCONTINUED | OUTPATIENT
Start: 2025-04-14 | End: 2025-04-14 | Stop reason: HOSPADM

## 2025-04-13 RX ORDER — POTASSIUM CHLORIDE 1500 MG/1
20 TABLET, EXTENDED RELEASE ORAL 2 TIMES DAILY WITH MEALS
Status: DISCONTINUED | OUTPATIENT
Start: 2025-04-13 | End: 2025-04-14 | Stop reason: HOSPADM

## 2025-04-13 RX ADMIN — APIXABAN 5 MG: 5 TABLET, FILM COATED ORAL at 20:58

## 2025-04-13 RX ADMIN — ATORVASTATIN CALCIUM 80 MG: 80 TABLET, FILM COATED ORAL at 09:01

## 2025-04-13 RX ADMIN — GABAPENTIN 900 MG: 300 CAPSULE ORAL at 20:58

## 2025-04-13 RX ADMIN — SODIUM CHLORIDE, PRESERVATIVE FREE 10 ML: 5 INJECTION INTRAVENOUS at 20:59

## 2025-04-13 RX ADMIN — FUROSEMIDE 20 MG: 20 TABLET ORAL at 09:01

## 2025-04-13 RX ADMIN — POTASSIUM CHLORIDE 20 MEQ: 1500 TABLET, EXTENDED RELEASE ORAL at 17:38

## 2025-04-13 RX ADMIN — METOPROLOL SUCCINATE 25 MG: 25 TABLET, EXTENDED RELEASE ORAL at 09:01

## 2025-04-13 RX ADMIN — APIXABAN 5 MG: 5 TABLET, FILM COATED ORAL at 09:01

## 2025-04-13 RX ADMIN — GABAPENTIN 900 MG: 300 CAPSULE ORAL at 09:01

## 2025-04-13 RX ADMIN — SODIUM CHLORIDE, PRESERVATIVE FREE 10 ML: 5 INJECTION INTRAVENOUS at 09:02

## 2025-04-13 RX ADMIN — FUROSEMIDE 20 MG: 20 TABLET ORAL at 17:38

## 2025-04-13 RX ADMIN — AMIODARONE HYDROCHLORIDE 200 MG: 200 TABLET ORAL at 09:01

## 2025-04-13 RX ADMIN — GABAPENTIN 900 MG: 300 CAPSULE ORAL at 17:37

## 2025-04-13 NOTE — CARE COORDINATION
Received notice from Jenna MCLEAN of need for pt to have home O2 via concentrator and tanks.  Called [facility] [phone] to see if would qualify.  Gumaro stated need to go through [facility]. Called [facility] at [phone].  Order reviewed for continuous O2 at 2L via [place]. Unable to verify Jenna NPI number.  Will need O2 for discharge.  [facility]  Asked to be arranged by [id number].  [facility] will call back.  Order faxed to [facility].  Noemi Camarena, MSN, RN, Case Management  Office: [phone]  Electronically signed by Noemi Camarena on 4/13/2025 at 2:46 PM

## 2025-04-13 NOTE — PLAN OF CARE
Problem: Chronic Conditions and Co-morbidities  Goal: Patient's chronic conditions and co-morbidity symptoms are monitored and maintained or improved  4/13/2025 1805 by Hector Crandall RN  Outcome: Progressing  4/13/2025 0618 by Ana Montano RN  Outcome: Progressing     Problem: Discharge Planning  Goal: Discharge to home or other facility with appropriate resources  4/13/2025 1805 by Hector Crandall RN  Outcome: Progressing  4/13/2025 0618 by Ana Montano RN  Outcome: Progressing     Problem: Safety - Adult  Goal: Free from fall injury  4/13/2025 1805 by Hector Crandall RN  Outcome: Progressing  4/13/2025 0618 by Ana Montano RN  Outcome: Progressing     Problem: Nutrition Deficit:  Goal: Optimize nutritional status  4/13/2025 1805 by Hector Crandall RN  Outcome: Progressing  4/13/2025 0618 by Ana Montano RN  Outcome: Progressing

## 2025-04-13 NOTE — PROGRESS NOTES
Assessment complete and morning medications given. Pt in bed A/O, VSS, and denies pain. Bed locked in lowest position and alarm set. Call light within reach.

## 2025-04-13 NOTE — PROGRESS NOTES
Hospitalist Progress Note      Name:  Leandra Dawson /Age/Sex: 1950  (75 y.o. male)   MRN & CSN:  [id number] & [id number] Encounter Date/Time: 2025 7:47 AM EDT   Location:  [facility]3326/3326-01 PCP: Augusto Gordillo DO     Attending:Sanford Negrete MD       Hospital Day: 4    Subjective:   Chief Complaint:   Chief Complaint   Patient presents with    Ingestion     Pt accidentally took 3 pills he thought was something else, pt has medication with him      Leandra Dawson is a 75 y.o. male with a past medical history of hypertension, peripheral vascular disease, CAD, COPD who presents with metolazone overdose.  Reports he accidentally took 3 pills. Noted to have severe hypokalemia and admitted for monitoring.      Interval History:  Patient seen this am. Feeling well, much better since admission. Eager to go home. Tells me his oxygen company took away his oxygen a few weeks ago due to insurance change.     Independently reviewed interval ancillary notes from cardiology.     Assessment and Recommendations   Problem List  Principal Problem:    Hypokalemia  Resolved Problems:    * No resolved hospital problems. *     Assessment and Plan:    Metolazone Overdose-unintentional. Accidentally took 3 days in a row rather than once a week.     Hypokalemia-was on metolazone and lasix outpatient. Potassium has been replaced. Discussed with cards. Keep K around 4.     V tach-has chronic nSVT vtac however had 35 beat run yesterday. Cards reconsulted. Discussed with Dr Elmore. EP to see tomorrow. ECHO ordered. May need ischemic eval--inpatient vs outpatient. Metoprolol increased.   Chronic respiratory failure-smokes 1 ppd. Wears 2L at baseline however o2 company removed his oxygen a few weeks ago. Will do home O2 eval and arrange for home O2 if needed.     Generalized weakness-likely from covid and dehydration. Improved.     Covid-has chronic hypoxia. Symptoms improved.     Nicotine Dependence   - Smokes 1.5

## 2025-04-13 NOTE — PROGRESS NOTES
Spoke with patients girlfriend Kandy and she states patient does not want the bubble study done tomorrow. I spoke with the patient and asked him if he wanted to refuse the bubble study and he stated he does not want the study done. He states he told the cardiologist he didn't want any testing done. I reached out to cardiology and awaiting callback.

## 2025-04-13 NOTE — PROGRESS NOTES
MD Lawrence Gomez MD Aldo Estella,                  Office: [phone]                      Fax: [phone]             [employer]                   NEPHROLOGY INPATIENT PROGRESS NOTE        IMPRESSION / RECOMMENDATIONS:    Admitted on - 4/10/2025  For Hypokalemia [E87.6]  Acute drug overdose, accidental or unintentional, initial encounter [T50.001A]      #LONNIE on CKD3a  -bCr 1.3-1.4  -likely prerenal 2/2 CRS, hypovolemia, hypotension  -SCr 2.1 on presentation  -Now Estimated Creatinine Clearance: 53 mL/min (based on SCr of 1.2 mg/dL).    -CT a/p (8/2024):   No change in the 1.4 x 1.6 cm indeterminate left adrenal. mass favoring an adenoma.  -has been on lasix and accidentally taking metolazone qd x3 days.    Pre-renal LONNIE - improving   Initially  holding diuretics,   Stopped IV fluids    ECHO - Grade II diastolic dysfunction with elevated LV filling  Weight stable ~ 84 kg around   On RA w/o hypoxia        Discharge plan from a standpoint okay to discharge  Restarted low dose Lasix (40BID home) now 20 mg BID - on d-c   Continue to hold, Metolazone, Jardiance     -: follow up w/ us at  [facility] OFFICE  in 2-3 weeks after d/c. (I updated our information in discharge follow up providers' list.)       #metolazone overdose  #hypokalemia  -took it for 3 days in a row now with relative hypotension, n/v  -hold diuretics  -no role for hemodialysis, 95%-protein bound; not dialyzable.  -replace K+ PRN. Monitor closely.  Defer to #1    #HTN  -BPs soft,   -at home on toporol 25, jardiance, lasix, prn metolazone.  -midodrine prn in place  /84   Pulse 77   Temp 97.5 °F (36.4 °C) (Oral)   Resp 18   Ht 1.753 m (5' 9\")   Wt 83.9 kg (184 lb 15.5 oz)   SpO2 93%   BMI 27.31 kg/m²         #dCHF - EF 40-45% recently  #hx afib  -cardiology following  -holding diuretics on admit.    Acidosis, metabolic, mild, show continue to monitor  With renal function improving should continue to

## 2025-04-13 NOTE — PROGRESS NOTES
04/13/25 1225   Resting (Room Air)   SpO2 93  (pt eating- will walk patient for oxygen with ambulation results)   HR 84   During Walk (Room Air)   SpO2 83   Walk/Assistance Device Ambulation   Rate of Dyspnea 2   Symptoms Shortness of breath   During Walk (On O2)   SpO2 90   HR 81   O2 Device Nasal cannula   O2 Flow Rate (l/min) 2 l/min   Walk/Assistance Device Ambulation   Rate of Dyspnea 2   Symptoms Shortness of breath   After Walk   SpO2 96   HR 81   O2 Device Nasal cannula   O2 Flow Rate (l/min) 2 l/min   Rate of Dyspnea 0   Does the Patient Qualify for Home O2 Yes   Liter Flow on Exertion 2   Does the Patient Need Portable Oxygen Tanks Yes

## 2025-04-13 NOTE — PROGRESS NOTES
Patient seen this am. Feeling better. Wants to go home. Had 30 sec run of VT yesterday-no further episodes today. Cards was consulted. Denies cp. Has occasional cough and SOB. He was wearing o2 at home at night however he states [employer] stopped covering and took O2 away this month. He is wanting o2. Currently sats 87-93% on RA.       Check home O2 eval. Await cards input. Possible dc home later today.       Jenna Velasquez PA-C

## 2025-04-13 NOTE — PROGRESS NOTES
Assessment complete. VSSA with no  c/o pain. All evening medications given. All needs addressed. Bed locked, in lowest position, with alarm on. Bedside table and call light within reach. Plan of care ongoing.

## 2025-04-13 NOTE — PROGRESS NOTES
[facility]     GENERAL CARDIOLOGY PROGRESS NOTE  [phone]        History of Present Illness:  Leandra Dawson is a 75 y.o. patient presenting with COVID infection.  Cardiology consulted 4/11/2025 for atrial fibrillation.  Asked today for re-consult due to NSVT.      Today is my first time seeing this patient this admisson.  I spent considerable time reviewing all progress notes, imaging studies, and relevant lab tests for this admission prior to seeing the patient.    Interval History:  Patient seen and examined, no new complaint today  Denies having chest pain, shortness of breath, palpitations, orthopnea/PND at the time of this visit  Denies any symptoms overnight at time of NSVT  No major overnight events    Clinical notes reviewed  Vital signs reviewed  Telemetry reviewed  Labs reviewed  Imaging reviewed    Relevant available office/hospital notes, medications, labs, imaging and cardiovascular diagnostics were reviewed.     ASSESSMENT AND PLAN  NSVT  AICD  Persistent AFIB  CAD CABG 2014  COVID infection    Re consulted for 35 beats NSVT on telemetry 4/12/2025 at 03:22 this morning.  Patient was sleeping at the time, denies symptoms.  Known history of VT s/p AICD 2022.  Recent AF with VT 12/2024 placed on amiodarone 200 mg daily.  Compliant with medication.  Recent reduction in LV systolic function to 40-45% (50-55% on Echo 2023). Consider ischemic evaluation given history of CAD.  Last ischemic evaluation with [facility] was 2022.  Recommend Checking Echo and having EP see patient Monday.  Continue amiodarone.  Increase Toprol XL to 37.5 mg daily.  Keep potassium ~4.  Mag 2.2.  Recommend outpatient sleep study.      Thank you for allowing to us to participate in the care or Leanrda Dawson. Further evaluation will be based upon the patient's clinical course and testing results.    Shana Elmore DO, DO JUAREZ FACC 4/13/2025 3:03 PM  [facility]  ([id number]) on 4/11/2025 8:59:30 PM   Results for orders placed or performed during the hospital encounter of 10/07/24   EKG 12 Lead    Collection Time: 10/07/24  1:21 PM   Result Value Ref Range    Ventricular Rate 53 BPM    Atrial Rate 53 BPM    P-R Interval 216 ms    QRS Duration 94 ms    Q-T Interval 504 ms    QTc Calculation (Bazett) 472 ms    P Axis 15 degrees    R Axis 18 degrees    T Axis 84 degrees    Diagnosis       Demand pacemaker; interpretation is based on intrinsic rhythmSinus bradycardia with 1st degree A-V block with Premature ventricular complexes or Fusion complexesNonspecific ST and T wave abnormalityAbnormal ECGConfirmed by JONNATHAN BAI ([id number]) on 10/9/2024 10:12:54 PM         Echo: 10/24    Left Ventricle: Mildly reduced left ventricular systolic function with a visually estimated EF of 40 - 45%. EF by 2D Simpsons Biplane is 43%. Left ventricle size is normal. Mildly increased wall thickness. Mild global hypokinesis present. Indeterminate diastolic function.    Aortic Valve: Mild sclerosis of the aortic valve cusps. Mild to moderate regurgitation. No stenosis.    Mitral Valve: Valve structure is normal. Mild to moderate regurgitation. No stenosis noted.    Tricuspid Valve: Not well visualized. Mild regurgitation. The estimated RVSP is 20 mmHg. No stenosis noted.    Left Atrium: Left atrium is mildly dilated.    Right Atrium: Lead present in the right atrium. Right atrium size is normal.    Aorta: Normal sized aortic root. Mildly dilated ascending aorta. Ao ascending diameter is 4.0 cm.    SUMMARY: Echo 9/2024     1. Left ventricle: The cavity size is moderately dilated. Wall thickness is      normal. Systolic function is severly reduced. The estimated ejection      fraction is 15-20%. There is severe diffuse hypokinesis with regional      variations. E/A is increased. Global longitudinal strain rate of -7.40%.      The longitudal strain study is abnormal.   2. Aortic valve: The peak systolic

## 2025-04-14 ENCOUNTER — APPOINTMENT (OUTPATIENT)
Dept: GENERAL RADIOLOGY | Age: 75
DRG: 917 | End: 2025-04-14
Payer: MEDICARE

## 2025-04-14 VITALS
RESPIRATION RATE: 20 BRPM | TEMPERATURE: 98.2 F | OXYGEN SATURATION: 94 % | HEART RATE: 95 BPM | BODY MASS INDEX: 29.39 KG/M2 | DIASTOLIC BLOOD PRESSURE: 70 MMHG | SYSTOLIC BLOOD PRESSURE: 117 MMHG | WEIGHT: 198.41 LBS | HEIGHT: 69 IN

## 2025-04-14 LAB
ANION GAP SERPL CALCULATED.3IONS-SCNC: 11 MMOL/L (ref 3–16)
BASOPHILS # BLD: 0.1 K/UL (ref 0–0.2)
BASOPHILS NFR BLD: 1.2 %
BUN SERPL-MCNC: 18 MG/DL (ref 7–20)
CALCIUM SERPL-MCNC: 8.7 MG/DL (ref 8.3–10.6)
CHLORIDE SERPL-SCNC: 111 MMOL/L (ref 99–110)
CO2 SERPL-SCNC: 22 MMOL/L (ref 21–32)
CREAT SERPL-MCNC: 1 MG/DL (ref 0.8–1.3)
DEPRECATED RDW RBC AUTO: 18.5 % (ref 12.4–15.4)
EOSINOPHIL # BLD: 0.2 K/UL (ref 0–0.6)
EOSINOPHIL NFR BLD: 1.6 %
GFR SERPLBLD CREATININE-BSD FMLA CKD-EPI: 78 ML/MIN/{1.73_M2}
GLUCOSE SERPL-MCNC: 116 MG/DL (ref 70–99)
HCT VFR BLD AUTO: 41.8 % (ref 40.5–52.5)
HGB BLD-MCNC: 13.3 G/DL (ref 13.5–17.5)
LYMPHOCYTES # BLD: 2.6 K/UL (ref 1–5.1)
LYMPHOCYTES NFR BLD: 25.2 %
MCH RBC QN AUTO: 27.9 PG (ref 26–34)
MCHC RBC AUTO-ENTMCNC: 32 G/DL (ref 31–36)
MCV RBC AUTO: 87.2 FL (ref 80–100)
MONOCYTES # BLD: 0.8 K/UL (ref 0–1.3)
MONOCYTES NFR BLD: 7.4 %
NEUTROPHILS # BLD: 6.7 K/UL (ref 1.7–7.7)
NEUTROPHILS NFR BLD: 64.6 %
PLATELET # BLD AUTO: 301 K/UL (ref 135–450)
PMV BLD AUTO: 8.9 FL (ref 5–10.5)
POTASSIUM SERPL-SCNC: 4.2 MMOL/L (ref 3.5–5.1)
PROCALCITONIN SERPL IA-MCNC: 0.06 NG/ML (ref 0–0.15)
RBC # BLD AUTO: 4.79 M/UL (ref 4.2–5.9)
SODIUM SERPL-SCNC: 144 MMOL/L (ref 136–145)
WBC # BLD AUTO: 10.4 K/UL (ref 4–11)

## 2025-04-14 PROCEDURE — 99232 SBSQ HOSP IP/OBS MODERATE 35: CPT

## 2025-04-14 PROCEDURE — 6370000000 HC RX 637 (ALT 250 FOR IP): Performed by: PHYSICIAN ASSISTANT

## 2025-04-14 PROCEDURE — 6370000000 HC RX 637 (ALT 250 FOR IP): Performed by: INTERNAL MEDICINE

## 2025-04-14 PROCEDURE — 36415 COLL VENOUS BLD VENIPUNCTURE: CPT

## 2025-04-14 PROCEDURE — 6370000000 HC RX 637 (ALT 250 FOR IP)

## 2025-04-14 PROCEDURE — 2500000003 HC RX 250 WO HCPCS

## 2025-04-14 PROCEDURE — 85025 COMPLETE CBC W/AUTO DIFF WBC: CPT

## 2025-04-14 PROCEDURE — 71045 X-RAY EXAM CHEST 1 VIEW: CPT

## 2025-04-14 PROCEDURE — 6370000000 HC RX 637 (ALT 250 FOR IP): Performed by: NURSE PRACTITIONER

## 2025-04-14 PROCEDURE — 80048 BASIC METABOLIC PNL TOTAL CA: CPT

## 2025-04-14 PROCEDURE — 84145 PROCALCITONIN (PCT): CPT

## 2025-04-14 RX ORDER — METOPROLOL SUCCINATE 25 MG/1
37.5 TABLET, EXTENDED RELEASE ORAL DAILY
Qty: 60 TABLET | Refills: 0 | Status: SHIPPED | OUTPATIENT
Start: 2025-04-14

## 2025-04-14 RX ORDER — POTASSIUM CHLORIDE 1500 MG/1
20 TABLET, EXTENDED RELEASE ORAL DAILY
Qty: 30 TABLET | Refills: 1 | Status: SHIPPED | OUTPATIENT
Start: 2025-04-14

## 2025-04-14 RX ORDER — FUROSEMIDE 20 MG/1
20 TABLET ORAL 2 TIMES DAILY
Qty: 60 TABLET | Refills: 1 | Status: SHIPPED | OUTPATIENT
Start: 2025-04-14

## 2025-04-14 RX ADMIN — GABAPENTIN 900 MG: 300 CAPSULE ORAL at 09:07

## 2025-04-14 RX ADMIN — FUROSEMIDE 20 MG: 20 TABLET ORAL at 09:07

## 2025-04-14 RX ADMIN — AMIODARONE HYDROCHLORIDE 200 MG: 200 TABLET ORAL at 09:07

## 2025-04-14 RX ADMIN — ATORVASTATIN CALCIUM 80 MG: 80 TABLET, FILM COATED ORAL at 09:08

## 2025-04-14 RX ADMIN — METOPROLOL SUCCINATE 37.5 MG: 25 TABLET, FILM COATED, EXTENDED RELEASE ORAL at 09:07

## 2025-04-14 RX ADMIN — SODIUM CHLORIDE, PRESERVATIVE FREE 10 ML: 5 INJECTION INTRAVENOUS at 09:09

## 2025-04-14 RX ADMIN — APIXABAN 5 MG: 5 TABLET, FILM COATED ORAL at 09:08

## 2025-04-14 RX ADMIN — POTASSIUM CHLORIDE 20 MEQ: 1500 TABLET, EXTENDED RELEASE ORAL at 09:07

## 2025-04-14 ASSESSMENT — PAIN SCALES - GENERAL: PAINLEVEL_OUTOF10: 0

## 2025-04-14 NOTE — PROGRESS NOTES
0715- Patient called out reporting shortness of breath. Vital signs obtained. BP and HR noted to be WNL. Spo2 noted to be 88% on 2L of oxygen. Oxygen increased to 3.5 L, Spo2 increased to 92%. Patient also reports that he is refusing any cardiac testing at this time and wants his diet resumed. Kamlesh notified via secure message, awaiting response.    0815- Jerrell/PA at bedside. New orders noted. Patient assisted to order breakfast. He denies pain at this time. Morning assessments and vital signs obtained. Morning labs reviewed. Patient given scheduled medications as prescribed. Update of care discussed.    1245- Call placed to ECHO to inquire of scheduled time. Per ECHO this should be completed today.     1500- Patient noted to come out to the desk stating, \"I am leaving now!\". Patient encouraged to wait until he is medically discharged. Patient declines stating, \"I am not going to stay!. Torie notified via secure message. AMA documentation explained, signed and placed in patient's chart. Patient states that he removed his own IV and took off his telemetry.

## 2025-04-14 NOTE — PROGRESS NOTES
MD Lawrence Gomez MD Aldo Estella, DO                 Office: [phone]                      Fax: [phone]             [employer]                   NEPHROLOGY INPATIENT PROGRESS NOTE        IMPRESSION / RECOMMENDATIONS:    Admitted on - 4/10/2025  For Hypokalemia [E87.6]  Acute drug overdose, accidental or unintentional, initial encounter [T50.811A]      #LONNIE on CKD3a  -bCr 1.3-1.4  -likely prerenal 2/2 CRS, hypovolemia, hypotension  -SCr 2.1 on presentation  -Now Estimated Creatinine Clearance: 71 mL/min (based on SCr of 1 mg/dL).    -CT a/p (8/2024):   No change in the 1.4 x 1.6 cm indeterminate left adrenal. mass favoring an adenoma.  -has been on lasix and accidentally taking metolazone qd x3 days.    Pre-renal LONNIE - improving   Initially  holding diuretics,   Stopped IV fluids    ECHO - Grade II diastolic dysfunction with elevated LV filling  Weight stable ~ 84 kg around   On RA w/o hypoxia        Discharge plan from a standpoint  - today   Keep restarted low dose Lasix (40BID home) now 20 mg BID - on d-c   Continue to hold, Metolazone, Jardiance     -: follow up w/ us at  [facility] OFFICE  in 2-3 weeks after d/c. (I updated our information in discharge follow up providers' list.)       #metolazone overdose  #hypokalemia  -took it for 3 days in a row now with relative hypotension, n/v  -hold diuretics  -no role for hemodialysis, 95%-protein bound; not dialyzable.  -replace K+ PRN. Monitor closely.  Defer to #1    #HTN  -BPs soft,   -at home on toporol 25, jardiance, lasix, prn metolazone.  -midodrine prn in place  /70   Pulse 95   Temp 98.2 °F (36.8 °C) (Oral)   Resp 20   Ht 1.753 m (5' 9\")   Wt 90 kg (198 lb 6.6 oz)   SpO2 94%   BMI 29.30 kg/m²         #dCHF - EF 40-45% recently  #hx afib  -cardiology following  -holding diuretics on admit.    Acidosis, metabolic, mild, show continue to monitor  With renal function improving should continue to  output data in the 24 hours ending 04/14/25 1317      General: No acute distress   CVS:  Heart sounds S1 S2 +     RS: Normal respiratory effort, Breat sound: diminished at bases.     Abd: bowel sounds +,  distension .   Extremities/MSK:  Edema,          DATA:    CBC:   Lab Results   Component Value Date/Time    WBC 10.4 04/14/2025 05:39 AM    RBC 4.79 04/14/2025 05:39 AM    HGB 13.3 04/14/2025 05:39 AM    HCT 41.8 04/14/2025 05:39 AM    MCV 87.2 04/14/2025 05:39 AM    MCH 27.9 04/14/2025 05:39 AM    MCHC 32.0 04/14/2025 05:39 AM    RDW 18.5 04/14/2025 05:39 AM     04/14/2025 05:39 AM    MPV 8.9 04/14/2025 05:39 AM     BMP:   Lab Results   Component Value Date/Time     04/14/2025 05:39 AM    K 4.2 04/14/2025 05:39 AM    K 3.8 04/13/2025 05:36 AM    K 3.7 04/13/2025 05:36 AM     04/14/2025 05:39 AM    CO2 22 04/14/2025 05:39 AM    BUN 18 04/14/2025 05:39 AM    CREATININE 1.0 04/14/2025 05:39 AM    CALCIUM 8.7 04/14/2025 05:39 AM    GFRAA >60 10/11/2022 12:14 PM    GFRAA >60 01/30/2012 10:30 AM    LABGLOM 78 04/14/2025 05:39 AM    LABGLOM 58 07/06/2023 11:40 AM    GLUCOSE 116 04/14/2025 05:39 AM   Magnesium:    Lab Results   Component Value Date/Time    MG 2.22 04/12/2025 08:31 AM   Phosphorus:    Lab Results   Component Value Date/Time    PHOS 3.3 12/05/2024 02:12 PM

## 2025-04-14 NOTE — PROGRESS NOTES
Physical Therapy  Chart reviewed, upon attempting to see Pt., Pt. Had left AMA.  Please see previous note for d/c measures.  Kacey Escudero, PT, [id number]

## 2025-04-14 NOTE — PROGRESS NOTES
[facility], [facility] [place]   Electrophysiology   Date: 4/14/2025  Reason for Follow up: NSVT/AF   Chief Complaint   Patient presents with    Ingestion     Pt accidentally took 3 pills he thought was something else, pt has medication with him        HPI: Leandra Dawson is a 75 y.o. male with h/o tobacco use, COPD, CAD s/p CABG (2014), ICM, CHF, HTN, PAD, PAF, and VT.     Hx of sustained VT with loss of consciousness and CPR (3/15/22). S/p single chamber AICD (3/17/22).     In October of 2024, He was found to have persistent AF on his device and underwent DCCV (10/7/24). On 12/5/24, he received AICD shock for dual arrhythmia AF with VT ~ 222 BPM while getting out of bed. He was started on amiodarone     Pt presented to ER due to generalized fatigue, poor appetite, and weakness. Pt reports he also had nausea, cough and nasal congestion for several days. He reports he has fallen a couple times at home. He denies any head trauma or injury from the falls. He was found to be profoundly hypokalemic with creatinine of 2.1. He had taken metolazone for 3 straight days. His troponin was elevated.     He is positive for covid pneumonia. EP consulted for management of atrial fibrillation      Interval History:    Pt seen at bedside for follow up. Clinical notes and telemetry reviewed. He does not have symptoms related to AF or NSVT. Had some chest pain earlier described as pressure which he believes was related to lying flat. Pt denies SOB, palpitations, lightheadedness, edema, PND/orthopnea.      Assessment/Plan:     NSVT  - 35 beat run on telemetry over the weekend  - S/p [employer] ICD for VT  - Keep K > 4 and Mg >2, K 4.2 this AM and Mg 2.22 at last check 4/12  - Continue amiodarone and Toprol  - Echo pending  - Can do stress test if his EF drops    Persistent atrial fibrillation  - In AF with controlled rate  - Setting of acute infection with Covid  - High risk [id number] score (CHF, HTN, age 2, CAD), continue

## 2025-04-14 NOTE — DISCHARGE INSTR - COC
Continuity of Care Form    Patient Name: Leandra Dawson   :  1950  MRN:  [id number]    Admit date:  4/10/2025  Discharge date:  ***    Code Status Order: Limited   Advance Directives:     Admitting Physician:  Oksana Machado MD  PCP: Augusto Gordillo DO    Discharging Nurse: ***  Discharging Hospital Unit/Room#: 3AN-3326/3326-01  Discharging Unit Phone Number: ***    Emergency Contact:   Extended Emergency Contact Information  Primary Emergency Contact: Kandy Dawson  Address: [address] [place] [address]  Home Phone: [phone]  Work Phone: [phone]  Relation: Spouse  Secondary Emergency Contact: Sammy Dawson   [facility]  Home Phone: [phone]  Mobile Phone: [phone]  Relation: Child  Preferred language: English   needed? No    Past Surgical History:  Past Surgical History:   Procedure Laterality Date    CARDIAC CATHETERIZATION      CARDIAC SURGERY      stent x1 --3 yrs, 14 CABG 3 V    CATARACT EXTRACTION Right 2024    CATARACT EXTRACTION Left 2025    COLONOSCOPY      PACEMAKER PLACEMENT      VASCULAR SURGERY         Immunization History:   Immunization History   Administered Date(s) Administered    COVID-19, PFIZER GRAY top, DO NOT Dilute, (age 12 y+), IM, 30 mcg/0.3 mL 03/10/2022    COVID-19, PFIZER PURPLE top, DILUTE for use, (age 12 y+), 30mcg/0.3mL 04/10/2021, 2021    Influenza Virus Vaccine 10/03/2014    Pneumococcal, PPSV23, PNEUMOVAX 23, (age 2y+), SC/IM, 0.5mL 10/03/2014       Active Problems:  Patient Active Problem List   Diagnosis Code    CAD (coronary artery disease) I25.10    Primary hypertension I10    PVD (peripheral vascular disease) I73.9    COPD (chronic obstructive pulmonary disease) ([facility]) J44.9    Anemia D64.9    Anticoagulated on Coumadin Z79.01    Hypotension I95.9    High cholesterol E78.00    Ischemic cardiomyopathy I25.5    Paroxysmal A-fib ([facility]) I48.0    Acute on chronic

## 2025-04-15 NOTE — DISCHARGE SUMMARY
Hospital Medicine Discharge Summary    Patient: Leandra Dawson     Gender: male  : 1950   Age: 75 y.o.  MRN: [id number]    Admitting Physician: Oksana Machado MD  Discharge Physician: Jenna Velasquez PA-C     Code Status: Full code    Admit Date: 4/10/2025   Discharge Date: 2025      Disposition:  AMA  Time spent arranging discharge: 35 minutes    Discharge Diagnoses:    Active Hospital Problems    Diagnosis Date Noted    Hypokalemia [E87.6] 04/10/2025       Recommendations: Follow up with PCP and cardiology in one week    Condition at Discharge:  Stable    Hospital Course:   Patient is a 75-year-old male with coronary artery disease, ventricular tachycardia, COPD, who presented with accidental overdose of metolazone.  He supposed to take this once a week but had excellently taken it 3 days in a row.  He had nausea without vomiting.  He also had generalized weakness.  He is also had decreased appetite.  In the emergency room he was found of a BUN of 58 and a creatinine of 2.1.  Chest x-ray was unremarkable.    Accidental metolazone overdose-patient was admitted and hydrated.  Metolazone was held.  He was seen by nephrology.  Metolazone will continue be held at discharge  Acute renal failure-creatinine was 2.1 at time of admission he was hydrated, diuretics were held, Jardiance was also held.  Low-dose Lasix was resumed prior to DC.  Creatinine remains normal at time of DC.  V tach-patient had a 35 beat run of V. tach over the weekend.  He was seen by EP again who is recommended he have an echocardiogram.  He was waiting to have this done but then signed out AMA.  He will need to have this done outpatient.  Afib-patient was seen by electrophysiology.  He does have an AICD.  Ablation has previously been discussed and he has not been interested in this.  He is on anticoagulation in the form of Eliquis.  Amiodarone was also continued.  Outpatient sleep study has been  11/28/2017 11:03 AM     Lab Results   Component Value Date    INR 1.13 04/10/2025    INR 1.45 (H) 07/06/2023    INR 1.27 (H) 04/07/2023       Radiology:  XR CHEST PORTABLE    Right upper lobe and left lower lobe airspace infiltrates likely representing multifocal pneumonia     CT CERVICAL SPINE WO CONTRAST    1.  No acute fracture.     CT HEAD WO CONTRAST    1. No acute intracranial abnormality.     XR CHEST PORTABLE  No acute process.         Signed:    Jenna Velasquez PA-C   4/14/2025

## 2025-04-16 NOTE — TELEPHONE ENCOUNTER
Occupational Therapy   Treatment    Name: Vic Meng  MRN: [id number]  Admitting Diagnosis:  Acute on chronic hypoxic respiratory failure       Recommendations:     Recommended therapy intensity at discharge: Low Intensity Therapy   Discharge Equipment Recommendations:  none  Barriers to discharge:       Assessment:     Vic Meng is a 64 y.o. male with a medical diagnosis of Acute on chronic hypoxic respiratory failure.  Performance deficits affecting function are weakness, impaired endurance, impaired self care skills, impaired functional mobility, impaired balance, impaired cardiopulmonary response to activity.     Rehab Prognosis:  Good; patient would benefit from acute skilled OT services to address these deficits and reach maximum level of function.       Plan:     Patient to be seen 4 x/week to address the above listed problems via self-care/home management, therapeutic activities, therapeutic exercises, wheelchair management/training  Plan of Care Expires: 05/14/25  Plan of Care Reviewed with: patient    Subjective     Pain/Comfort:       Objective:     Communicated with: RN prior to session.  Patient found up in chair with   upon OT entry to room.    General Precautions: Standard, fall    Orthopedic Precautions:N/A  Braces: N/A  Respiratory Status: Nasal cannula, flow 3 L/min  Vital Signs: WFL     Occupational Performance:     Functional Mobility/Transfers:  Patient completed Sit <> Stand Transfer with stand by assistance  with  quad cane   Patient completed Toilet Transfer Step Transfer technique with stand by assistance with  quad cane  Functional Mobility: no LOB while [facility], patient mobilized to new w/c therapist adjusted leg rest for patient height    Activities of Daily Living:  Toileting: stand by assistance voiding in urinal sitting on commode    Patient Education:  Patient provided with verbal education education regarding OT role/goals/POC.  Understanding was  Pt ask the the son called the office, pt was at his PCP 1/5 because he has passed out while at the PCP office they transported him to [facility] pt was release from the hospital and feels like they should have kept him there, Pt test positive for COVID 1/6/2022. Pt is SOB, not eating, having chest pain and chest pain is terrible while laying down. If they call the squad they will take him to [place] due to where the pt lives, pt feels like he is laying there dying. Pt would like to know what they should do?      Pau Martinez [phone]    Pls advise thank you verbalized.      Patient left up in chair with all lines intact and call button in reach.    GOALS:   Multidisciplinary Problems       Occupational Therapy Goals          Problem: Occupational Therapy    Goal Priority Disciplines Outcome Interventions   Occupational Therapy Goal     OT, PT/OT Progressing    Description: Goals to be met by 5/14/25    Pt will complete grooming standing at sink with LRAD with modified independence.  Pt will complete UB dressing with modified independence.  Pt will complete LB dressing with modified independence using LRAD.  Pt will complete toileting with modified independence using LRAD.  Pt will complete functional mobility to/from toilet and toilet transfer with modified independence using LRAD.                         Time Tracking:     OT Date of Treatment: 04/16/25  OT Start Time: 1328  OT Stop Time: 1355  OT Total Time (min): 27 min    Billable Minutes:Self Care/Home Management 2    OT/CLEMENT: CLEMENT     Number of CLEMENT visits since last OT visit: 2    4/16/2025

## 2025-04-17 NOTE — PROGRESS NOTES
Physician Progress Note      PATIENT:               LANDON CUEVAS  CSN #:                  [id number]  :                       1950  ADMIT DATE:       4/10/2025 4:33 PM  DISCH DATE:        2025 3:15 PM  RESPONDING  PROVIDER #:        ANDRÉS SULLIVAN PA-C          QUERY TEXT:    Elevated cardiac troponin levels are documented in H&P. Please clarify the   cause:    The clinical indicators include:  - troponin 65, 63  - atrial fibrillation RVR  - CKD  - Metolazone overdose  - CAD  - trending troponin, cardiology consult pending  Options provided:  -- Type 2 myocardial infarction related to Metolazone overdose  -- Type 2 myocardial infarction related to, Please specify cause.  -- Myocardial injury, non-ischemic, related to CKD  -- Myocardial injury, non-ischemic related to other cause, Please specify   cause.  -- Other - I will add my own diagnosis  -- Disagree - Not applicable / Not valid  -- Disagree - Clinically unable to determine / Unknown  -- Refer to Clinical Documentation Reviewer    PROVIDER RESPONSE TEXT:    Myocardial injury, non-ischemic, related to CKD    Query created by: Latisha Galarza on 2025 5:17 AM      QUERY TEXT:    Based on your medical judgment, please clarify these findings and document if   any of the following are being evaluated and/or treated:    The clinical indicators include:  - Atrial fibrillation  - Maintained on Eliquis  - 74yo male, HTN, hx MI  Options provided:  -- Secondary hypercoagulable state in a patient with atrial fibrillation  -- Other - I will add my own diagnosis  -- Disagree - Not applicable / Not valid  -- Disagree - Clinically unable to determine / Unknown  -- Refer to Clinical Documentation Reviewer    PROVIDER RESPONSE TEXT:    This patient has secondary hypercoagulable state in a patient with atrial   fibrillation.    Query created by: Latisha Galarza on 2025 5:17 AM      Electronically signed by:  ANDRÉS SULLIVAN PA-C 2025 1:10

## 2025-04-21 ENCOUNTER — TELEPHONE (OUTPATIENT)
Dept: CARDIOLOGY CLINIC | Age: 75
End: 2025-04-21

## 2025-04-21 DIAGNOSIS — I25.10 CORONARY ARTERY DISEASE DUE TO LIPID RICH PLAQUE: ICD-10-CM

## 2025-04-21 DIAGNOSIS — F17.200 TOBACCO DEPENDENCE SYNDROME: ICD-10-CM

## 2025-04-21 DIAGNOSIS — I50.22 CHRONIC SYSTOLIC HF (HEART FAILURE) (HCC): ICD-10-CM

## 2025-04-21 DIAGNOSIS — Z99.81 REQUIRES OXYGEN THERAPY: Primary | ICD-10-CM

## 2025-04-21 DIAGNOSIS — I73.9 PERIPHERAL VASCULAR DISEASE, UNSPECIFIED: ICD-10-CM

## 2025-04-21 DIAGNOSIS — I25.83 CORONARY ARTERY DISEASE DUE TO LIPID RICH PLAQUE: ICD-10-CM

## 2025-04-21 DIAGNOSIS — Z95.1 S/P CABG (CORONARY ARTERY BYPASS GRAFT): ICD-10-CM

## 2025-04-21 DIAGNOSIS — I25.5 ISCHEMIC CARDIOMYOPATHY: ICD-10-CM

## 2025-04-21 NOTE — TELEPHONE ENCOUNTER
Pt asking if LES would fax an order for plug in oxygen machine to [employer]  fax number [phone]

## 2025-04-21 NOTE — CARE COORDINATION
Received call for Jovita Velasquez regarding plan to have  pt get O2 at discharge on 4/11  Jovita received call from [employer] saying pt's O2 was never delivered and pt has been without O2 since 3/12  [facility] to see what happened to the O2 that was requested.  They are not sure why the 02 was not delivered and will be delivering it to the pt tonight and look into why the oxygen order was not delivered.  Jovita notified of plan to get O2 tonight or pt. She will pass information onto [employer] provider.   .

## 2025-05-02 ENCOUNTER — TELEPHONE (OUTPATIENT)
Dept: CARDIOLOGY CLINIC | Age: 75
End: 2025-05-02

## 2025-05-02 NOTE — TELEPHONE ENCOUNTER
Pt states he is going out of town next month (6/2?) for 2 weeks and is asking if it is ok that is goes out of town. Please advise pt.

## 2025-05-13 NOTE — TELEPHONE ENCOUNTER
Medication Refill    Medication needing refilled:  metoprolol succinate (TOPROL XL) 25 MG extended release tablet   Dosage of the medication:  37.5 mg   How are you taking this medication (QD, BID, TID, QID, PRN):  Take 1.5 tablets by mouth daily   30 or 90 day supply called in:   60 tablet   When will you run out of your medication:    Which Pharmacy are we sending the medication to?:    [id number] [place] [address] - P [phone] - F [phone]

## 2025-05-14 ENCOUNTER — TELEPHONE (OUTPATIENT)
Dept: CARDIOLOGY CLINIC | Age: 75
End: 2025-05-14

## 2025-05-14 RX ORDER — METOPROLOL SUCCINATE 25 MG/1
37.5 TABLET, EXTENDED RELEASE ORAL DAILY
Qty: 135 TABLET | Refills: 3 | Status: SHIPPED | OUTPATIENT
Start: 2025-05-14

## 2025-05-14 NOTE — TELEPHONE ENCOUNTER
Discussed with TS NP > pt should stay on metoprolol succinate as ordered > he should not take the metoprolol tartrate.

## 2025-05-14 NOTE — TELEPHONE ENCOUNTER
See encounter from 5/13/25. Medication pended, awaiting for LES to sign the orders.  Called pt, relayed the message above. Pt v/u.

## 2025-05-14 NOTE — TELEPHONE ENCOUNTER
Called pt to relay clarification however no answer.  Left VM for patient to return call to office.     Script for metoprolol succinate filled.

## 2025-05-14 NOTE — TELEPHONE ENCOUNTER
Pt states he called yesterday asking why npts took him off Metoprolol. Pt states the Pharmacy told him on 4/1 that they couldn't refill that medication due to he was taken off of that medication. Pt has continued to take his Metoprolol but took his last pill yesterday. I asked pt if he called Pharmacy after 12pm yesterday to see if they have that request and pt states he did and was told they do not have that medication for him. Please advise pt.

## 2025-05-15 NOTE — TELEPHONE ENCOUNTER
Spoke with the patient and advised him of the message below per NPTS. Patient voiced understanding. Call complete

## 2025-05-19 ENCOUNTER — TELEPHONE (OUTPATIENT)
Dept: CARDIOLOGY CLINIC | Age: 75
End: 2025-05-19

## 2025-05-19 NOTE — TELEPHONE ENCOUNTER
Spoke with pt  He is symptomatic for last several days    Reporting SOB that scares him.  Hard to breat at times  Has a 5# weight gain  Swelling up to his knees    Taking furosemide 20mg BID

## 2025-05-21 ENCOUNTER — TELEPHONE (OUTPATIENT)
Dept: CARDIOLOGY CLINIC | Age: 75
End: 2025-05-21

## 2025-05-21 DIAGNOSIS — I48.0 PAROXYSMAL ATRIAL FIBRILLATION (HCC): Primary | ICD-10-CM

## 2025-05-21 NOTE — TELEPHONE ENCOUNTER
I spoke with the pt wife says pt has not had any weight change since upping the dose. Pt stated that he has 40 mg tablet and he is taking BID. He says that his swelling is worse than normal. He has no increase in urination. Scheduled pt for Friday

## 2025-05-21 NOTE — TELEPHONE ENCOUNTER
Pt called back stating they have not seen any difference in wt gain or loss since taking the furosemide 20mg BID     Reporting still SOB, still Hard to breath at time, and still has swelling up to his knees     Pls advise

## 2025-05-21 NOTE — TELEPHONE ENCOUNTER
Spoke to patient he wanted to be seen NPST and NP[facility] no open appointments, no open appointments for NPKV, your schedule ihad openings selected the 10:30 am slot incase he might need IV Lasix? Hope this is ok?

## 2025-05-21 NOTE — TELEPHONE ENCOUNTER
Spoke to patient he is taking furosemide 40 mg bid current weight is 197 no change in weight or sob and swelling.

## 2025-05-22 NOTE — TELEPHONE ENCOUNTER
Date of Procedure: [facility]     Time of arrival: 7:00 am     Procedure time: 8:00 am     Called and spoke to Elmer and he is agreeable to date and time. Encouraged to call with any questions or concerns.     Published on [employer], scheduled in Epic and e-mailed to cath lab.

## 2025-05-22 NOTE — TELEPHONE ENCOUNTER
I spoke with Dr Reece who will call patient and wife to make a plan for treatment of elevated optival  He will let me know if I can help

## 2025-05-22 NOTE — TELEPHONE ENCOUNTER
NEELIMA spoke to Myron ORTEZ - Needs cardioversion only for next Wednesday (5/28/25) at 8 AM with LES.      Order placed.

## 2025-05-22 NOTE — TELEPHONE ENCOUNTER
He needs to come in today as he is in constant AF and may need a cardioversion  Call and ask him to come in today ask him not to eat anything

## 2025-05-23 RX ORDER — SODIUM CHLORIDE 0.9 % (FLUSH) 0.9 %
5-40 SYRINGE (ML) INJECTION PRN
Status: CANCELLED | OUTPATIENT
Start: 2025-05-23

## 2025-05-23 RX ORDER — SODIUM CHLORIDE 9 MG/ML
INJECTION, SOLUTION INTRAVENOUS PRN
Status: CANCELLED | OUTPATIENT
Start: 2025-05-23

## 2025-05-23 RX ORDER — SODIUM CHLORIDE 0.9 % (FLUSH) 0.9 %
5-40 SYRINGE (ML) INJECTION EVERY 12 HOURS SCHEDULED
Status: CANCELLED | OUTPATIENT
Start: 2025-05-23

## 2025-05-26 ENCOUNTER — HOSPITAL ENCOUNTER (INPATIENT)
Age: 75
LOS: 4 days | Discharge: HOSPICE | DRG: 291 | End: 2025-05-30
Attending: EMERGENCY MEDICINE | Admitting: HOSPITALIST
Payer: MEDICARE

## 2025-05-26 ENCOUNTER — APPOINTMENT (OUTPATIENT)
Dept: GENERAL RADIOLOGY | Age: 75
DRG: 291 | End: 2025-05-26
Payer: MEDICARE

## 2025-05-26 ENCOUNTER — APPOINTMENT (OUTPATIENT)
Dept: CT IMAGING | Age: 75
DRG: 291 | End: 2025-05-26
Payer: MEDICARE

## 2025-05-26 DIAGNOSIS — J18.9 MULTIFOCAL PNEUMONIA: Primary | ICD-10-CM

## 2025-05-26 DIAGNOSIS — R06.02 SHORTNESS OF BREATH: ICD-10-CM

## 2025-05-26 DIAGNOSIS — I50.9 ACUTE CONGESTIVE HEART FAILURE, UNSPECIFIED HEART FAILURE TYPE (HCC): ICD-10-CM

## 2025-05-26 DIAGNOSIS — R07.9 CHEST PAIN, UNSPECIFIED TYPE: ICD-10-CM

## 2025-05-26 PROBLEM — J90 PLEURAL EFFUSION, LEFT: Status: ACTIVE | Noted: 2025-05-26

## 2025-05-26 LAB
ALBUMIN SERPL-MCNC: 3.6 G/DL (ref 3.4–5)
ALP SERPL-CCNC: 102 U/L (ref 40–129)
ALT SERPL-CCNC: 18 U/L (ref 10–40)
ANION GAP SERPL CALCULATED.3IONS-SCNC: 12 MMOL/L (ref 3–16)
AST SERPL-CCNC: 25 U/L (ref 15–37)
BASOPHILS # BLD: 0.1 K/UL (ref 0–0.2)
BASOPHILS NFR BLD: 0.9 %
BILIRUB DIRECT SERPL-MCNC: 0.2 MG/DL (ref 0–0.3)
BILIRUB INDIRECT SERPL-MCNC: 0.3 MG/DL (ref 0–1)
BILIRUB SERPL-MCNC: 0.5 MG/DL (ref 0–1)
BUN SERPL-MCNC: 20 MG/DL (ref 7–20)
CALCIUM SERPL-MCNC: 9.3 MG/DL (ref 8.3–10.6)
CHLORIDE SERPL-SCNC: 102 MMOL/L (ref 99–110)
CO2 SERPL-SCNC: 27 MMOL/L (ref 21–32)
CREAT SERPL-MCNC: 1.2 MG/DL (ref 0.8–1.3)
DEPRECATED RDW RBC AUTO: 19.3 % (ref 12.4–15.4)
EKG DIAGNOSIS: NORMAL
EKG Q-T INTERVAL: 400 MS
EKG QRS DURATION: 106 MS
EKG QTC CALCULATION (BAZETT): 484 MS
EKG R AXIS: 44 DEGREES
EKG T AXIS: 170 DEGREES
EKG VENTRICULAR RATE: 88 BPM
EOSINOPHIL # BLD: 0.1 K/UL (ref 0–0.6)
EOSINOPHIL NFR BLD: 1.1 %
GFR SERPLBLD CREATININE-BSD FMLA CKD-EPI: 63 ML/MIN/{1.73_M2}
GLUCOSE BLD-MCNC: 130 MG/DL (ref 70–99)
GLUCOSE SERPL-MCNC: 160 MG/DL (ref 70–99)
HCT VFR BLD AUTO: 37.9 % (ref 40.5–52.5)
HGB BLD-MCNC: 12.1 G/DL (ref 13.5–17.5)
LIPASE SERPL-CCNC: 10 U/L (ref 13–60)
LYMPHOCYTES # BLD: 1.5 K/UL (ref 1–5.1)
LYMPHOCYTES NFR BLD: 22.2 %
MAGNESIUM SERPL-MCNC: 2.1 MG/DL (ref 1.8–2.4)
MCH RBC QN AUTO: 26.6 PG (ref 26–34)
MCHC RBC AUTO-ENTMCNC: 32.1 G/DL (ref 31–36)
MCV RBC AUTO: 82.9 FL (ref 80–100)
MONOCYTES # BLD: 0.5 K/UL (ref 0–1.3)
MONOCYTES NFR BLD: 6.9 %
NEUTROPHILS # BLD: 4.6 K/UL (ref 1.7–7.7)
NEUTROPHILS NFR BLD: 68.9 %
NT-PROBNP SERPL-MCNC: ABNORMAL PG/ML (ref 0–449)
PERFORMED ON: ABNORMAL
PLATELET # BLD AUTO: 320 K/UL (ref 135–450)
PMV BLD AUTO: 8 FL (ref 5–10.5)
POTASSIUM SERPL-SCNC: 3.5 MMOL/L (ref 3.5–5.1)
PROCALCITONIN SERPL IA-MCNC: 0.1 NG/ML (ref 0–0.15)
PROT SERPL-MCNC: 7.1 G/DL (ref 6.4–8.2)
RBC # BLD AUTO: 4.57 M/UL (ref 4.2–5.9)
SODIUM SERPL-SCNC: 141 MMOL/L (ref 136–145)
TROPONIN, HIGH SENSITIVITY: 42 NG/L (ref 0–22)
TROPONIN, HIGH SENSITIVITY: 45 NG/L (ref 0–22)
TROPONIN, HIGH SENSITIVITY: 53 NG/L (ref 0–22)
WBC # BLD AUTO: 6.7 K/UL (ref 4–11)

## 2025-05-26 PROCEDURE — 6360000002 HC RX W HCPCS: Performed by: EMERGENCY MEDICINE

## 2025-05-26 PROCEDURE — 6370000000 HC RX 637 (ALT 250 FOR IP): Performed by: EMERGENCY MEDICINE

## 2025-05-26 PROCEDURE — 2500000003 HC RX 250 WO HCPCS: Performed by: HOSPITALIST

## 2025-05-26 PROCEDURE — 83735 ASSAY OF MAGNESIUM: CPT

## 2025-05-26 PROCEDURE — 71045 X-RAY EXAM CHEST 1 VIEW: CPT

## 2025-05-26 PROCEDURE — 36415 COLL VENOUS BLD VENIPUNCTURE: CPT

## 2025-05-26 PROCEDURE — 0202U NFCT DS 22 TRGT SARS-COV-2: CPT

## 2025-05-26 PROCEDURE — 2580000003 HC RX 258: Performed by: EMERGENCY MEDICINE

## 2025-05-26 PROCEDURE — 2700000000 HC OXYGEN THERAPY PER DAY

## 2025-05-26 PROCEDURE — 93005 ELECTROCARDIOGRAM TRACING: CPT | Performed by: STUDENT IN AN ORGANIZED HEALTH CARE EDUCATION/TRAINING PROGRAM

## 2025-05-26 PROCEDURE — 96365 THER/PROPH/DIAG IV INF INIT: CPT

## 2025-05-26 PROCEDURE — 83880 ASSAY OF NATRIURETIC PEPTIDE: CPT

## 2025-05-26 PROCEDURE — 80076 HEPATIC FUNCTION PANEL: CPT

## 2025-05-26 PROCEDURE — 99222 1ST HOSP IP/OBS MODERATE 55: CPT | Performed by: SURGERY

## 2025-05-26 PROCEDURE — 94761 N-INVAS EAR/PLS OXIMETRY MLT: CPT

## 2025-05-26 PROCEDURE — 84145 PROCALCITONIN (PCT): CPT

## 2025-05-26 PROCEDURE — 85025 COMPLETE CBC W/AUTO DIFF WBC: CPT

## 2025-05-26 PROCEDURE — 83690 ASSAY OF LIPASE: CPT

## 2025-05-26 PROCEDURE — 74176 CT ABD & PELVIS W/O CONTRAST: CPT

## 2025-05-26 PROCEDURE — 6370000000 HC RX 637 (ALT 250 FOR IP): Performed by: HOSPITALIST

## 2025-05-26 PROCEDURE — 2580000003 HC RX 258: Performed by: HOSPITALIST

## 2025-05-26 PROCEDURE — 96375 TX/PRO/DX INJ NEW DRUG ADDON: CPT

## 2025-05-26 PROCEDURE — 94640 AIRWAY INHALATION TREATMENT: CPT

## 2025-05-26 PROCEDURE — 2500000003 HC RX 250 WO HCPCS: Performed by: EMERGENCY MEDICINE

## 2025-05-26 PROCEDURE — 1200000000 HC SEMI PRIVATE

## 2025-05-26 PROCEDURE — 80048 BASIC METABOLIC PNL TOTAL CA: CPT

## 2025-05-26 PROCEDURE — 93010 ELECTROCARDIOGRAM REPORT: CPT | Performed by: INTERNAL MEDICINE

## 2025-05-26 PROCEDURE — 84484 ASSAY OF TROPONIN QUANT: CPT

## 2025-05-26 PROCEDURE — 99285 EMERGENCY DEPT VISIT HI MDM: CPT

## 2025-05-26 PROCEDURE — 87449 NOS EACH ORGANISM AG IA: CPT

## 2025-05-26 PROCEDURE — 6360000002 HC RX W HCPCS: Performed by: HOSPITALIST

## 2025-05-26 RX ORDER — ACETAMINOPHEN 650 MG/1
650 SUPPOSITORY RECTAL EVERY 6 HOURS PRN
Status: DISCONTINUED | OUTPATIENT
Start: 2025-05-26 | End: 2025-05-30 | Stop reason: HOSPADM

## 2025-05-26 RX ORDER — SODIUM CHLORIDE 9 MG/ML
INJECTION, SOLUTION INTRAVENOUS CONTINUOUS
Status: DISCONTINUED | OUTPATIENT
Start: 2025-05-26 | End: 2025-05-27

## 2025-05-26 RX ORDER — ZINC GLUCONATE 50 MG
50 TABLET ORAL DAILY
Status: ON HOLD | COMMUNITY
End: 2025-05-30 | Stop reason: HOSPADM

## 2025-05-26 RX ORDER — FUROSEMIDE 10 MG/ML
40 INJECTION INTRAMUSCULAR; INTRAVENOUS 2 TIMES DAILY
Status: DISCONTINUED | OUTPATIENT
Start: 2025-05-26 | End: 2025-05-27

## 2025-05-26 RX ORDER — ENOXAPARIN SODIUM 100 MG/ML
40 INJECTION SUBCUTANEOUS DAILY
Status: DISCONTINUED | OUTPATIENT
Start: 2025-05-26 | End: 2025-05-28

## 2025-05-26 RX ORDER — ONDANSETRON 4 MG/1
4 TABLET, ORALLY DISINTEGRATING ORAL EVERY 8 HOURS PRN
Status: DISCONTINUED | OUTPATIENT
Start: 2025-05-26 | End: 2025-05-30 | Stop reason: HOSPADM

## 2025-05-26 RX ORDER — POLYETHYLENE GLYCOL 3350 17 G/17G
17 POWDER, FOR SOLUTION ORAL DAILY
Status: DISCONTINUED | OUTPATIENT
Start: 2025-05-26 | End: 2025-05-30 | Stop reason: HOSPADM

## 2025-05-26 RX ORDER — SENNOSIDES 8.6 MG/1
1 TABLET ORAL 2 TIMES DAILY PRN
Status: DISCONTINUED | OUTPATIENT
Start: 2025-05-26 | End: 2025-05-30 | Stop reason: HOSPADM

## 2025-05-26 RX ORDER — MAGNESIUM HYDROXIDE/ALUMINUM HYDROXICE/SIMETHICONE 120; 1200; 1200 MG/30ML; MG/30ML; MG/30ML
30 SUSPENSION ORAL EVERY 6 HOURS PRN
Status: DISCONTINUED | OUTPATIENT
Start: 2025-05-26 | End: 2025-05-30 | Stop reason: HOSPADM

## 2025-05-26 RX ORDER — SODIUM CHLORIDE 0.9 % (FLUSH) 0.9 %
5-40 SYRINGE (ML) INJECTION PRN
Status: DISCONTINUED | OUTPATIENT
Start: 2025-05-26 | End: 2025-05-30 | Stop reason: HOSPADM

## 2025-05-26 RX ORDER — IPRATROPIUM BROMIDE AND ALBUTEROL SULFATE 2.5; .5 MG/3ML; MG/3ML
1 SOLUTION RESPIRATORY (INHALATION) ONCE
Status: COMPLETED | OUTPATIENT
Start: 2025-05-26 | End: 2025-05-26

## 2025-05-26 RX ORDER — POTASSIUM CHLORIDE 1500 MG/1
40 TABLET, EXTENDED RELEASE ORAL PRN
Status: DISCONTINUED | OUTPATIENT
Start: 2025-05-26 | End: 2025-05-30 | Stop reason: HOSPADM

## 2025-05-26 RX ORDER — ONDANSETRON 2 MG/ML
4 INJECTION INTRAMUSCULAR; INTRAVENOUS ONCE
Status: COMPLETED | OUTPATIENT
Start: 2025-05-26 | End: 2025-05-26

## 2025-05-26 RX ORDER — DEXTROSE MONOHYDRATE 100 MG/ML
INJECTION, SOLUTION INTRAVENOUS CONTINUOUS PRN
Status: DISCONTINUED | OUTPATIENT
Start: 2025-05-26 | End: 2025-05-30 | Stop reason: HOSPADM

## 2025-05-26 RX ORDER — FUROSEMIDE 10 MG/ML
40 INJECTION INTRAMUSCULAR; INTRAVENOUS ONCE
Status: COMPLETED | OUTPATIENT
Start: 2025-05-26 | End: 2025-05-26

## 2025-05-26 RX ORDER — POTASSIUM CHLORIDE 7.45 MG/ML
10 INJECTION INTRAVENOUS PRN
Status: DISCONTINUED | OUTPATIENT
Start: 2025-05-26 | End: 2025-05-30 | Stop reason: HOSPADM

## 2025-05-26 RX ORDER — SODIUM CHLORIDE 0.9 % (FLUSH) 0.9 %
5-40 SYRINGE (ML) INJECTION EVERY 12 HOURS SCHEDULED
Status: DISCONTINUED | OUTPATIENT
Start: 2025-05-26 | End: 2025-05-30 | Stop reason: HOSPADM

## 2025-05-26 RX ORDER — ONDANSETRON 2 MG/ML
4 INJECTION INTRAMUSCULAR; INTRAVENOUS EVERY 6 HOURS PRN
Status: DISCONTINUED | OUTPATIENT
Start: 2025-05-26 | End: 2025-05-30 | Stop reason: HOSPADM

## 2025-05-26 RX ORDER — DICYCLOMINE HYDROCHLORIDE 10 MG/1
20 CAPSULE ORAL ONCE
Status: COMPLETED | OUTPATIENT
Start: 2025-05-26 | End: 2025-05-26

## 2025-05-26 RX ORDER — SODIUM CHLORIDE 9 MG/ML
INJECTION, SOLUTION INTRAVENOUS PRN
Status: DISCONTINUED | OUTPATIENT
Start: 2025-05-26 | End: 2025-05-30 | Stop reason: HOSPADM

## 2025-05-26 RX ORDER — GLUCAGON 1 MG/ML
1 KIT INJECTION PRN
Status: DISCONTINUED | OUTPATIENT
Start: 2025-05-26 | End: 2025-05-30 | Stop reason: HOSPADM

## 2025-05-26 RX ORDER — ACETAMINOPHEN 325 MG/1
650 TABLET ORAL EVERY 6 HOURS PRN
Status: DISCONTINUED | OUTPATIENT
Start: 2025-05-26 | End: 2025-05-30 | Stop reason: HOSPADM

## 2025-05-26 RX ORDER — MAGNESIUM SULFATE IN WATER 40 MG/ML
2000 INJECTION, SOLUTION INTRAVENOUS PRN
Status: DISCONTINUED | OUTPATIENT
Start: 2025-05-26 | End: 2025-05-30 | Stop reason: HOSPADM

## 2025-05-26 RX ORDER — FAMOTIDINE 10 MG/ML
20 INJECTION, SOLUTION INTRAVENOUS ONCE
Status: COMPLETED | OUTPATIENT
Start: 2025-05-26 | End: 2025-05-26

## 2025-05-26 RX ADMIN — MELATONIN TAB 3 MG 3 MG: 3 TAB at 20:24

## 2025-05-26 RX ADMIN — FUROSEMIDE 40 MG: 10 INJECTION, SOLUTION INTRAMUSCULAR; INTRAVENOUS at 20:16

## 2025-05-26 RX ADMIN — SODIUM CHLORIDE: 0.9 INJECTION, SOLUTION INTRAVENOUS at 14:44

## 2025-05-26 RX ADMIN — PIPERACILLIN AND TAZOBACTAM 4500 MG: 4; .5 INJECTION, POWDER, LYOPHILIZED, FOR SOLUTION INTRAVENOUS at 14:29

## 2025-05-26 RX ADMIN — SODIUM CHLORIDE, PRESERVATIVE FREE 10 ML: 5 INJECTION INTRAVENOUS at 20:16

## 2025-05-26 RX ADMIN — IPRATROPIUM BROMIDE AND ALBUTEROL SULFATE 1 DOSE: .5; 3 SOLUTION RESPIRATORY (INHALATION) at 07:51

## 2025-05-26 RX ADMIN — CEFTRIAXONE SODIUM 2000 MG: 2 INJECTION, POWDER, FOR SOLUTION INTRAMUSCULAR; INTRAVENOUS at 10:15

## 2025-05-26 RX ADMIN — ONDANSETRON 4 MG: 2 INJECTION, SOLUTION INTRAMUSCULAR; INTRAVENOUS at 07:36

## 2025-05-26 RX ADMIN — WATER 60 MG: 1 INJECTION INTRAMUSCULAR; INTRAVENOUS; SUBCUTANEOUS at 07:35

## 2025-05-26 RX ADMIN — FAMOTIDINE 20 MG: 10 INJECTION, SOLUTION INTRAVENOUS at 07:36

## 2025-05-26 RX ADMIN — FUROSEMIDE 40 MG: 10 INJECTION, SOLUTION INTRAMUSCULAR; INTRAVENOUS at 11:45

## 2025-05-26 RX ADMIN — ENOXAPARIN SODIUM 40 MG: 100 INJECTION SUBCUTANEOUS at 14:49

## 2025-05-26 RX ADMIN — AZITHROMYCIN MONOHYDRATE 500 MG: 500 INJECTION, POWDER, LYOPHILIZED, FOR SOLUTION INTRAVENOUS at 10:16

## 2025-05-26 RX ADMIN — SODIUM CHLORIDE, PRESERVATIVE FREE 10 ML: 5 INJECTION INTRAVENOUS at 14:29

## 2025-05-26 RX ADMIN — DICYCLOMINE HYDROCHLORIDE 20 MG: 10 CAPSULE ORAL at 07:34

## 2025-05-26 RX ADMIN — PIPERACILLIN AND TAZOBACTAM 3375 MG: 3; .375 INJECTION, POWDER, LYOPHILIZED, FOR SOLUTION INTRAVENOUS at 20:20

## 2025-05-26 ASSESSMENT — PAIN SCALES - GENERAL
PAINLEVEL_OUTOF10: 5
PAINLEVEL_OUTOF10: 0
PAINLEVEL_OUTOF10: 0
PAINLEVEL_OUTOF10: 6

## 2025-05-26 ASSESSMENT — ENCOUNTER SYMPTOMS
BACK PAIN: 0
ABDOMINAL PAIN: 1
EYE REDNESS: 0
SHORTNESS OF BREATH: 0
EYE PAIN: 0
VOMITING: 0
RHINORRHEA: 0
NAUSEA: 0
CONSTIPATION: 0
COUGH: 0
DIARRHEA: 1

## 2025-05-26 ASSESSMENT — PAIN DESCRIPTION - ORIENTATION: ORIENTATION: LEFT

## 2025-05-26 ASSESSMENT — PAIN DESCRIPTION - LOCATION
LOCATION: ABDOMEN
LOCATION: ABDOMEN;CHEST

## 2025-05-26 ASSESSMENT — PAIN - FUNCTIONAL ASSESSMENT: PAIN_FUNCTIONAL_ASSESSMENT: 0-10

## 2025-05-26 NOTE — H&P
History and Physical    Name:  Leandra Dawson /Age/Sex: 1950  (75 y.o. male)   MRN & CSN:  [id number] & [id number] Encounter Date/Time: :13 AM   Location:  ED-001 PCP: Augusto Gordillo DO     Admitting Physician:  Pablo Campos MD     Chief Complaint/Reason for admission:      Chief Complaint   Patient presents with    Chest Pain     Pt arrives from home by his spouse. Pt C/O left sided CP for 2 days. Pt states that he sees Dr. Reece for hx of Afib and he has an appt on Wednesday. Pt states that he is also abdominal pain with diarrhea. Pt rates his pain a 6 out of 10.        History of Present Illness:   HPI:  Leandra Dawson is a 75 y.o. male with medical history including CAD, CABG, HTN, HLD, PAD, PAF, cardiomyopathy, s/p PPM/AICD and tobacco smoking presented to emergency room with complaint of chest pain, shortness of breath, abdominal pain and nausea for last 2 to 3 days. Patient reports he has been having chest pain intermittently for last 1 week.  Patient reports chest pain mainly in the middle of the chest, 8 out of 10 at times, nonradiating, associated with some nausea, abdominal pain and some shortness of breath.  Patient reports has gained about 8 pounds in last 1 week and noted some swelling of the legs. Patient reports history suggestive of orthopnea and dyspnea on exertion.  Patient denies any fever or chills but reports cough, no blood in the sputum.  Patient reports noted some pain in abdomen mainly all over in the left side which is associated with some nausea and diarrhea.  Denies any blood in the stool or urine.  Patient denies any tingling or numbness.  In ER, patient was found to have elevated BNP, elevated troponin, CT scan abdomen, pelvis and chest suggestive of left worse than right pleural effusion, consolidative changes in lower lobes suggestive of pneumonia, gallbladder changes suggestive of acute cholecystitis. ER team recommended admit to Hospital for further  Right upper lung infiltrates have resolved as compared to 04/14/2025.       DISCLAIMER: Please note that some or all of this record was generated using voice recognition software. Efforts were made by me to ensure accuracy, however some errors may be present due to limitations of this technology and occasionally words are not transcribed correctly. If there are any questions about the content of this document, please contact the author.    Electronically signed by Pablo Campos MD on 5/26/2025 at 11:13 AM

## 2025-05-26 NOTE — ED NOTES
Presents to the ED for burning CP with some SOB lasting approx 2 days in duration.  Extensive heart history including CABG & Afib; scheduled for ablation on Wednesday.  Wife has lots of questions regarding pt symptoms; pt appears frustrated with spouse.   Monitors in place.

## 2025-05-26 NOTE — CONSULTS
[facility]     Consult                                                     Patient Name: Leandra Dawson  MRN: [id number]  YOB: 1950  Admission date: 5/26/2025  6:58 AM   Date of evaluation: 5/26/2025  Primary Care Physician: Augusto Gordillo DO  Reason for consult: Abdominal pain  History of Present Illness:    Mr. Dawson is a 75 y.o. male who presents with acute onset lower abdominal pain for the last few days, cramping, nausea and diarrhea. Never happened before. Worse with eating x1. Otherwise does not radiate. Denies epigastric or right upper quadrant pain. Does admit to dyspnea, left chest pain, weight gain and leg welling over the last week. Denies fevers, chills, jaundice, emesis, change in appetite, dysuria or other complaints. COVID last month and some concern for COVID currently. Workup this admission shows bilateral pleural effusions, pneumonia and gallbladder sludge with possible inflammation suggesting cholecystitis for which surgery is consulted. Never had abdominal surgery. Medical condition include CAD s/p CABG, hypertension, atrial fibrillation, CHF, s/p pacemaker and AICD, smoking and medical coagulopathy on eliquis. Patient/family do not want surgery unless absolutely necessary. Denies any current abdominal pain    Past Medical History:        Diagnosis Date    Acid reflux     Acute MI (HCC)     CAD (coronary artery disease)     COPD (chronic obstructive pulmonary disease) (HCC)     Diverticulitis     Hypertension     Peripheral vascular disease     Shingles     per pt, started 12/2021, still visible 3/2022, not open wounds at that time       Past Surgical History:        Procedure Laterality Date    CARDIAC CATHETERIZATION      CARDIAC SURGERY      stent x1 --3 yrs, 5/12/14 CABG 3 V    CATARACT EXTRACTION Right 12/2024    CATARACT EXTRACTION Left 01/2025    COLONOSCOPY      PACEMAKER PLACEMENT      VASCULAR SURGERY         Scheduled  mm aneurysm, unchanged.  No bulky lymphadenopathy.  No ascites. Bones/Soft Tissues: Diffuse osseous demineralization.  No acute or suspicious bony findings.  Moderate left and small right fat containing inguinal hernias.  Fat containing umbilical hernia.     1. Moderate-large left and small right pleural effusions, with adjacent atelectasis. 2. Bibasilar (left greater than right) consolidations versus atelectasis, cannot exclude multifocal infectious process. 3. Gallbladder sludge, with surrounding inflammatory changes suspicious for developing cholecystitis.  Recommend surgical consultation; right upper quadrant ultrasound available for further characterization, if clinically indicated.  No biliary ductal dilation. 4. Urinary bladder wall thickening may be due to under distension, correlate clinically for cystitis. 5. Findings suggestive of pulmonary arterial hypertension. 6. Sequela of old granulomatous disease.     XR CHEST PORTABLE  Result Date: 5/26/2025  EXAMINATION: ONE XRAY VIEW OF THE CHEST 5/26/2025 7:26 am COMPARISON: Chest radiographs dated 04/14/2025 and 04/10/2025. HISTORY: ORDERING SYSTEM PROVIDED HISTORY: chest pain TECHNOLOGIST PROVIDED HISTORY: Reason for exam:->chest pain Reason for Exam: Chest Pain FINDINGS: Lungs: Previously identified right lung infiltrates have resolved, however there are new diffuse patchy opacities bilaterally.  Left basilar/retrocardiac opacity.  Peribronchial thickening at the right lung base. Pleura: The left costophrenic angle is obscured.  No significant right pleural effusion.  No pneumothorax. Cardiomediastinal Silhouette: Unchanged cardiomegaly. Bones: No acute findings.Median sternotomy changes. Soft Tissues: Left chest wall implanted cardiac device.     1. Left basilar opacity favored to represent projectional artifact, cannot exclude developing consolidation or pleural effusion. 2. Diffuse patchy opacities may represent edema versus multifocal infectious process.  discussed at length with the patient. He was understanding and in agreement with the plan  Thank you for the consult and allowing me to participate in the care of this patient. I look forward to following him this admission    Saurabh Cortes MD, FACS  5/26/2025  6:25 PM

## 2025-05-26 NOTE — ACP (ADVANCE CARE PLANNING)
Advanced Care Planning Note:     Purpose of Encounter: Advanced care planning in light of hospitalization    Parties In Attendance: Patient     Decisional Capacity: Yes    Subjective: Patient understands that this conversation for any life threatening event and his future wishes to address long term care goal if situations arise that prevent the ability to personally give input    Objective: Patient is admitted to the hospital with chest pain      Goals of Care Determination: In patient heart stops, patient would like to pursue chest compression, CP and defibrillation. No endotracheal intubation and mechanical ventilation.    Code Status: [facility]    Time spent on Advanced care Plannin minutes    Advanced Care Planning Documents: documented patient's wishes, would like his wife Lydia Dawson  to make medical decisions if unable to make decisions ownself.    Pablo Campos MD  2025 1:33 PM

## 2025-05-26 NOTE — ED PROVIDER NOTES
[facility]  EMERGENCY DEPARTMENT ENCOUNTER        Pt Name: Leandra Dawson  MRN: [id number]  Birthdate 1950  Date of evaluation: 5/26/2025   Provider: Frida Andino DO  PCP: Augusto Gordillo DO  Note Started: 7:05 AM EDT 5/26/25    CHIEF COMPLAINT      Shortness of Breath, Chest Pain    HISTORY OF PRESENT ILLNESS: 1 or more Elements     Chief Complaint   Patient presents with    Chest Pain     Pt arrives from home by his spouse. Pt C/O left sided CP for 2 days. Pt states that he sees Dr. Reece for hx of Afib and he has an appt on Wednesday. Pt states that he is also abdominal pain with diarrhea. Pt rates his pain a 6 out of 10.      History from : Patient  Limitations to history : None    Leandra Dawson is a 75 y.o. male who presents to the emergency department secondary to concern for chest pain and shortness of breath.  Reports history of atrial fibrillation and is supposed to see cardiology this week, however came in early because he is having persistent pain specifically in the left side of his chest which does not radiate.  It is also associate with shortness of breath.  He denies take any medication for symptomatic relief.  He also admits that he has been having some generalized abdominal pain and with few episodes of diarrhea and wants that checked out as well.    Past medical history noted below. Aside from what is stated above denies any other symptoms or modifying factors.   reports that he has been smoking cigarettes. He started smoking about 11 years ago. He has a 25 pack-year smoking history. He has been exposed to tobacco smoke. He has never used smokeless tobacco. He reports that he does not currently use alcohol. He reports that he does not use drugs.  Nursing Notes were all reviewed and agreed with or any disagreements addressed in HPI/MDM.  REVIEW OF SYSTEMS :    Review of Systems   Constitutional:  Negative for chills and fever.   HENT:  Negative for congestion and rhinorrhea.    Eyes:  sepsis or septic shock?   No   Exclusion criteria - the patient is NOT to be included for SEP-1 Core Measure due to:  2+ SIRS criteria are not met  CC/HPI Summary, DDx, ED Course, and Reassessment:   Leandra Dawson is a 75 y.o. male who presents to the emergency department secondary to concern for symptoms as noted in HPI above.     This is a 75-year-old male with history of atrial fibrillation on Eliquis presenting with chest pain and cough.  He also has history of COPD.  He was given DuoNebs and IV Solu-Medrol in the ED.  He also has been complaining of generalized abdominal pain.    CT chest showed moderate to large left pleural effusion as well as bibasilar consolidations which cannot exclude multifocal infectious process. BNP also highly elevated.    Patient started on IV Rocephin and azithromycin and given IV Lasix as well.    CT abdomen/pelvis showed findings of possible developing cholecystitis.  Ultrasound of right upper quadrant was ordered, however there are no elevations in lipase, transaminases or bilirubin.    Will admit patient for continued treatment of possible pneumonia, congestive heart failure and to rule out cholecystitis with ultrasound.    I am the Primary Clinician of Record.  FINAL IMPRESSION      1. Multifocal pneumonia    2. Acute congestive heart failure, unspecified heart failure type (HCC)    3. Chest pain, unspecified type    4. Shortness of breath          DISPOSITION/PLAN   DISPOSITION Admitted 05/26/2025 11:13:49 AM               PATIENT REFERRED TO:  No follow-up provider specified.    DISCHARGE MEDICATIONS:  Current Discharge Medication List             (Please note that portions of this note were completed with a voice recognition program.  Efforts were made to edit the dictations but occasionally words are mis-transcribed.)    Frida Andino DO (electronically signed)  Attending Emergency Physician        Frida Andino DO  05/26/25 [id number]

## 2025-05-26 NOTE — ED NOTES
Wife at bedside sites pt is aggressive & pulling at lines; pt presents calm & compliant with POC for this nurse.  Wife to step out for awhile.  Pt is in bed resting with eyes closed; monitors in place.

## 2025-05-26 NOTE — PLAN OF CARE
Problem: Chronic Conditions and Co-morbidities  Goal: Patient's chronic conditions and co-morbidity symptoms are monitored and maintained or improved  Outcome: Progressing     Problem: Discharge Planning  Goal: Discharge to home or other facility with appropriate resources  Outcome: Progressing     Problem: Pain  Goal: Verbalizes/displays adequate comfort level or baseline comfort level  Outcome: Progressing     Problem: Confusion  Goal: Confusion, delirium, dementia, or psychosis is improved or at baseline  Description: INTERVENTIONS:1. Assess for possible contributors to thought disturbance, including medications, impaired vision or hearing, underlying metabolic abnormalities, dehydration, psychiatric diagnoses, and notify attending LIP2. [place] high risk fall precautions, as indicated3. Provide frequent short contacts to provide reality reorientation, refocusing and direction4. Decrease environmental stimuli, including noise as appropriate5. Monitor and intervene to maintain adequate nutrition, hydration, elimination, sleep and activity6. If unable to ensure safety without constant attention obtain sitter and review sitter guidelines with assigned personnel7. Initiate Psychosocial CNS and Spiritual Care consult, as indicated  INTERVENTIONS:1. Assess for possible contributors to thought disturbance, including medications, impaired vision or hearing, underlying metabolic abnormalities, dehydration, psychiatric diagnoses, and notify attending LIP2. [place] high risk fall precautions, as indicated3. Provide frequent short contacts to provide reality reorientation, refocusing and direction4. Decrease environmental stimuli, including noise as appropriate5. Monitor and intervene to maintain adequate nutrition, hydration, elimination, sleep and activity6. If unable to ensure safety without constant attention obtain sitter and review sitter guidelines with assigned personnel7. Initiate Psychosocial CNS and  Spiritual Care consult, as indicated  Outcome: Progressing     Problem: Neurosensory - Adult  Goal: Achieves stable or improved neurological status  Outcome: Progressing     Problem: Respiratory - Adult  Goal: Achieves optimal ventilation and oxygenation  Outcome: Progressing     Problem: Cardiovascular - Adult  Goal: Maintains optimal cardiac output and hemodynamic stability  Outcome: Progressing  Goal: Absence of cardiac dysrhythmias or at baseline  Outcome: Progressing     Problem: Skin/Tissue Integrity - Adult  Goal: Skin integrity remains intact  Outcome: Progressing  Goal: Oral mucous membranes remain intact  Outcome: Progressing     Problem: Musculoskeletal - Adult  Goal: Return mobility to safest level of function  Outcome: Progressing  Goal: Maintain proper alignment of affected body part  Outcome: Progressing  Goal: Return ADL status to a safe level of function  Outcome: Progressing     Problem: Gastrointestinal - Adult  Goal: Minimal or absence of nausea and vomiting  Outcome: Progressing  Goal: Maintains or returns to baseline bowel function  Outcome: Progressing  Goal: Maintains adequate nutritional intake  Outcome: Progressing     Problem: Genitourinary - Adult  Goal: Absence of urinary retention  Outcome: Progressing     Problem: Infection - Adult  Goal: Absence of infection at discharge  Outcome: Progressing  Goal: Absence of infection during hospitalization  Outcome: Progressing     Problem: Metabolic/Fluid and Electrolytes - Adult  Goal: Electrolytes maintained within normal limits  Outcome: Progressing  Goal: Hemodynamic stability and optimal renal function maintained  Outcome: Progressing  Goal: Glucose maintained within prescribed range  Outcome: Progressing     Problem: Hematologic - Adult  Goal: Maintains hematologic stability  Outcome: Progressing

## 2025-05-27 ENCOUNTER — APPOINTMENT (OUTPATIENT)
Age: 75
DRG: 291 | End: 2025-05-27
Attending: HOSPITALIST
Payer: MEDICARE

## 2025-05-27 ENCOUNTER — APPOINTMENT (OUTPATIENT)
Dept: NUCLEAR MEDICINE | Age: 75
DRG: 291 | End: 2025-05-27
Payer: MEDICARE

## 2025-05-27 LAB
ALBUMIN SERPL-MCNC: 3.1 G/DL (ref 3.4–5)
ALP SERPL-CCNC: 107 U/L (ref 40–129)
ALT SERPL-CCNC: 53 U/L (ref 10–40)
ANION GAP SERPL CALCULATED.3IONS-SCNC: 14 MMOL/L (ref 3–16)
APTT BLD: 30.5 SEC (ref 22.1–36.4)
AST SERPL-CCNC: 64 U/L (ref 15–37)
BASOPHILS # BLD: 0.1 K/UL (ref 0–0.2)
BASOPHILS NFR BLD: 0.8 %
BILIRUB DIRECT SERPL-MCNC: 0.3 MG/DL (ref 0–0.3)
BILIRUB INDIRECT SERPL-MCNC: 0.3 MG/DL (ref 0–1)
BILIRUB SERPL-MCNC: 0.6 MG/DL (ref 0–1)
BUN SERPL-MCNC: 24 MG/DL (ref 7–20)
CALCIUM SERPL-MCNC: 9.3 MG/DL (ref 8.3–10.6)
CHLORIDE SERPL-SCNC: 104 MMOL/L (ref 99–110)
CO2 SERPL-SCNC: 21 MMOL/L (ref 21–32)
CREAT SERPL-MCNC: 1.6 MG/DL (ref 0.8–1.3)
DEPRECATED RDW RBC AUTO: 19.1 % (ref 12.4–15.4)
ECHO AV PEAK GRADIENT: 7 MMHG
ECHO AV PEAK VELOCITY: 1.3 M/S
ECHO AV VELOCITY RATIO: 0.54
ECHO BSA: 2.1 M2
ECHO LV E' LATERAL VELOCITY: 6.12 CM/S
ECHO LV E' SEPTAL VELOCITY: 3.94 CM/S
ECHO LV EF PHYSICIAN: 20 %
ECHO LVOT MEAN GRADIENT: 1 MMHG
ECHO LVOT PEAK GRADIENT: 2 MMHG
ECHO LVOT PEAK VELOCITY: 0.7 M/S
ECHO LVOT VTI: 8.5 CM
ECHO MV REGURGITANT PEAK GRADIENT: 67 MMHG
ECHO MV REGURGITANT PEAK VELOCITY: 4.1 M/S
ECHO PV MAX VELOCITY: 0.6 M/S
ECHO PV PEAK GRADIENT: 1 MMHG
ECHO RA AREA 4C: 25.2 CM2
ECHO RA END SYSTOLIC VOLUME APICAL 4 CHAMBER INDEX BSA: 43 ML/M2
ECHO RA VOLUME: 88 ML
ECHO RV TAPSE: 1.5 CM (ref 1.7–?)
ECHO TV REGURGITANT MAX VELOCITY: 2.29 M/S
ECHO TV REGURGITANT PEAK GRADIENT: 21 MMHG
EOSINOPHIL # BLD: 0.1 K/UL (ref 0–0.6)
EOSINOPHIL NFR BLD: 1.2 %
GFR SERPLBLD CREATININE-BSD FMLA CKD-EPI: 45 ML/MIN/{1.73_M2}
GLUCOSE BLD-MCNC: 117 MG/DL (ref 70–99)
GLUCOSE BLD-MCNC: 131 MG/DL (ref 70–99)
GLUCOSE BLD-MCNC: 140 MG/DL (ref 70–99)
GLUCOSE SERPL-MCNC: 128 MG/DL (ref 70–99)
HCT VFR BLD AUTO: 36 % (ref 40.5–52.5)
HGB BLD-MCNC: 11.4 G/DL (ref 13.5–17.5)
INR PPP: 1.31 (ref 0.85–1.15)
LACTATE BLDV-SCNC: 2.9 MMOL/L (ref 0.4–2)
LEGIONELLA AG UR QL: NORMAL
LIPASE SERPL-CCNC: 11 U/L (ref 13–60)
LYMPHOCYTES # BLD: 1.9 K/UL (ref 1–5.1)
LYMPHOCYTES NFR BLD: 26.8 %
MAGNESIUM SERPL-MCNC: 2.03 MG/DL (ref 1.8–2.4)
MCH RBC QN AUTO: 27 PG (ref 26–34)
MCHC RBC AUTO-ENTMCNC: 31.8 G/DL (ref 31–36)
MCV RBC AUTO: 85 FL (ref 80–100)
MONOCYTES # BLD: 0.5 K/UL (ref 0–1.3)
MONOCYTES NFR BLD: 7.4 %
NEUTROPHILS # BLD: 4.5 K/UL (ref 1.7–7.7)
NEUTROPHILS NFR BLD: 63.8 %
PERFORMED ON: ABNORMAL
PHOSPHATE SERPL-MCNC: 5.5 MG/DL (ref 2.5–4.9)
PLATELET # BLD AUTO: 304 K/UL (ref 135–450)
PMV BLD AUTO: 7.8 FL (ref 5–10.5)
POTASSIUM SERPL-SCNC: 4 MMOL/L (ref 3.5–5.1)
PREALB SERPL-MCNC: 15.1 MG/DL (ref 20–40)
PROT SERPL-MCNC: 6.6 G/DL (ref 6.4–8.2)
PROTHROMBIN TIME: 16.5 SEC (ref 11.9–14.9)
RBC # BLD AUTO: 4.24 M/UL (ref 4.2–5.9)
REPORT: NORMAL
RESP PATH DNA+RNA PNL NPH NAA+NON-PROBE: NORMAL
S PNEUM AG UR QL: NORMAL
SODIUM SERPL-SCNC: 139 MMOL/L (ref 136–145)
TRANSFERRIN SERPL-MCNC: 218 MG/DL (ref 200–360)
WBC # BLD AUTO: 7.1 K/UL (ref 4–11)

## 2025-05-27 PROCEDURE — 78226 HEPATOBILIARY SYSTEM IMAGING: CPT

## 2025-05-27 PROCEDURE — 93321 DOPPLER ECHO F-UP/LMTD STD: CPT | Performed by: INTERNAL MEDICINE

## 2025-05-27 PROCEDURE — 85610 PROTHROMBIN TIME: CPT

## 2025-05-27 PROCEDURE — 6360000002 HC RX W HCPCS: Performed by: NURSE PRACTITIONER

## 2025-05-27 PROCEDURE — 93325 DOPPLER ECHO COLOR FLOW MAPG: CPT | Performed by: INTERNAL MEDICINE

## 2025-05-27 PROCEDURE — 99222 1ST HOSP IP/OBS MODERATE 55: CPT | Performed by: STUDENT IN AN ORGANIZED HEALTH CARE EDUCATION/TRAINING PROGRAM

## 2025-05-27 PROCEDURE — 80048 BASIC METABOLIC PNL TOTAL CA: CPT

## 2025-05-27 PROCEDURE — 1200000000 HC SEMI PRIVATE

## 2025-05-27 PROCEDURE — 6370000000 HC RX 637 (ALT 250 FOR IP): Performed by: STUDENT IN AN ORGANIZED HEALTH CARE EDUCATION/TRAINING PROGRAM

## 2025-05-27 PROCEDURE — 3430000000 HC RX DIAGNOSTIC RADIOPHARMACEUTICAL: Performed by: SURGERY

## 2025-05-27 PROCEDURE — 6360000002 HC RX W HCPCS: Performed by: HOSPITALIST

## 2025-05-27 PROCEDURE — 36415 COLL VENOUS BLD VENIPUNCTURE: CPT

## 2025-05-27 PROCEDURE — A9537 TC99M MEBROFENIN: HCPCS | Performed by: SURGERY

## 2025-05-27 PROCEDURE — 83690 ASSAY OF LIPASE: CPT

## 2025-05-27 PROCEDURE — 84466 ASSAY OF TRANSFERRIN: CPT

## 2025-05-27 PROCEDURE — 2580000003 HC RX 258: Performed by: HOSPITALIST

## 2025-05-27 PROCEDURE — 85025 COMPLETE CBC W/AUTO DIFF WBC: CPT

## 2025-05-27 PROCEDURE — APPNB30 APP NON BILLABLE TIME 0-30 MINS: Performed by: NURSE PRACTITIONER

## 2025-05-27 PROCEDURE — 84100 ASSAY OF PHOSPHORUS: CPT

## 2025-05-27 PROCEDURE — 2500000003 HC RX 250 WO HCPCS: Performed by: SURGERY

## 2025-05-27 PROCEDURE — 2500000003 HC RX 250 WO HCPCS: Performed by: INTERNAL MEDICINE

## 2025-05-27 PROCEDURE — APPSS15 APP SPLIT SHARED TIME 0-15 MINUTES: Performed by: NURSE PRACTITIONER

## 2025-05-27 PROCEDURE — 84134 ASSAY OF PREALBUMIN: CPT

## 2025-05-27 PROCEDURE — 6360000002 HC RX W HCPCS: Performed by: STUDENT IN AN ORGANIZED HEALTH CARE EDUCATION/TRAINING PROGRAM

## 2025-05-27 PROCEDURE — 93321 DOPPLER ECHO F-UP/LMTD STD: CPT

## 2025-05-27 PROCEDURE — 93308 TTE F-UP OR LMTD: CPT | Performed by: INTERNAL MEDICINE

## 2025-05-27 PROCEDURE — 2580000003 HC RX 258: Performed by: NURSE PRACTITIONER

## 2025-05-27 PROCEDURE — 80076 HEPATIC FUNCTION PANEL: CPT

## 2025-05-27 PROCEDURE — 83735 ASSAY OF MAGNESIUM: CPT

## 2025-05-27 PROCEDURE — 85730 THROMBOPLASTIN TIME PARTIAL: CPT

## 2025-05-27 PROCEDURE — 83605 ASSAY OF LACTIC ACID: CPT

## 2025-05-27 PROCEDURE — 99232 SBSQ HOSP IP/OBS MODERATE 35: CPT | Performed by: SURGERY

## 2025-05-27 PROCEDURE — 99223 1ST HOSP IP/OBS HIGH 75: CPT | Performed by: STUDENT IN AN ORGANIZED HEALTH CARE EDUCATION/TRAINING PROGRAM

## 2025-05-27 PROCEDURE — 2060000000 HC ICU INTERMEDIATE R&B

## 2025-05-27 RX ORDER — AMIODARONE HYDROCHLORIDE 200 MG/1
200 TABLET ORAL 2 TIMES DAILY
Status: DISCONTINUED | OUTPATIENT
Start: 2025-05-27 | End: 2025-05-29

## 2025-05-27 RX ORDER — ATORVASTATIN CALCIUM 80 MG/1
80 TABLET, FILM COATED ORAL NIGHTLY
Status: DISCONTINUED | OUTPATIENT
Start: 2025-05-27 | End: 2025-05-29

## 2025-05-27 RX ORDER — GABAPENTIN 100 MG/1
200 CAPSULE ORAL 3 TIMES DAILY
Status: DISCONTINUED | OUTPATIENT
Start: 2025-05-27 | End: 2025-05-30 | Stop reason: HOSPADM

## 2025-05-27 RX ORDER — FUROSEMIDE 10 MG/ML
80 INJECTION INTRAMUSCULAR; INTRAVENOUS 2 TIMES DAILY
Status: DISCONTINUED | OUTPATIENT
Start: 2025-05-27 | End: 2025-05-28

## 2025-05-27 RX ORDER — HALOPERIDOL 5 MG/ML
5 INJECTION INTRAMUSCULAR ONCE
Status: DISCONTINUED | OUTPATIENT
Start: 2025-05-27 | End: 2025-05-30 | Stop reason: HOSPADM

## 2025-05-27 RX ORDER — TAMSULOSIN HYDROCHLORIDE 0.4 MG/1
0.4 CAPSULE ORAL DAILY
Status: DISCONTINUED | OUTPATIENT
Start: 2025-05-27 | End: 2025-05-30 | Stop reason: HOSPADM

## 2025-05-27 RX ORDER — SODIUM CHLORIDE 0.9 % (FLUSH) 0.9 %
5-40 SYRINGE (ML) INJECTION PRN
Status: DISCONTINUED | OUTPATIENT
Start: 2025-05-27 | End: 2025-05-30 | Stop reason: HOSPADM

## 2025-05-27 RX ORDER — METOPROLOL SUCCINATE 25 MG/1
37.5 TABLET, EXTENDED RELEASE ORAL DAILY
Status: DISCONTINUED | OUTPATIENT
Start: 2025-05-27 | End: 2025-05-30 | Stop reason: HOSPADM

## 2025-05-27 RX ADMIN — SODIUM CHLORIDE, PRESERVATIVE FREE 10 ML: 5 INJECTION INTRAVENOUS at 20:52

## 2025-05-27 RX ADMIN — SODIUM CHLORIDE: 0.9 INJECTION, SOLUTION INTRAVENOUS at 05:53

## 2025-05-27 RX ADMIN — AMIODARONE HYDROCHLORIDE 200 MG: 200 TABLET ORAL at 13:10

## 2025-05-27 RX ADMIN — GABAPENTIN 200 MG: 100 CAPSULE ORAL at 20:55

## 2025-05-27 RX ADMIN — Medication 4.9 MILLICURIE: at 07:57

## 2025-05-27 RX ADMIN — PIPERACILLIN AND TAZOBACTAM 3375 MG: 3; .375 INJECTION, POWDER, LYOPHILIZED, FOR SOLUTION INTRAVENOUS at 11:10

## 2025-05-27 RX ADMIN — FUROSEMIDE 40 MG: 10 INJECTION, SOLUTION INTRAMUSCULAR; INTRAVENOUS at 09:39

## 2025-05-27 RX ADMIN — PIPERACILLIN AND TAZOBACTAM 3375 MG: 3; .375 INJECTION, POWDER, LYOPHILIZED, FOR SOLUTION INTRAVENOUS at 03:30

## 2025-05-27 RX ADMIN — TAMSULOSIN HYDROCHLORIDE 0.4 MG: 0.4 CAPSULE ORAL at 13:11

## 2025-05-27 RX ADMIN — LORAZEPAM 1 MG: 2 INJECTION INTRAMUSCULAR; INTRAVENOUS at 01:04

## 2025-05-27 RX ADMIN — ENOXAPARIN SODIUM 40 MG: 100 INJECTION SUBCUTANEOUS at 09:39

## 2025-05-27 RX ADMIN — METOPROLOL SUCCINATE 37.5 MG: 25 TABLET, EXTENDED RELEASE ORAL at 13:11

## 2025-05-27 RX ADMIN — GABAPENTIN 200 MG: 100 CAPSULE ORAL at 13:11

## 2025-05-27 RX ADMIN — AZITHROMYCIN MONOHYDRATE 500 MG: 500 INJECTION, POWDER, LYOPHILIZED, FOR SOLUTION INTRAVENOUS at 09:43

## 2025-05-27 RX ADMIN — PIPERACILLIN AND TAZOBACTAM 3375 MG: 3; .375 INJECTION, POWDER, LYOPHILIZED, FOR SOLUTION INTRAVENOUS at 20:54

## 2025-05-27 RX ADMIN — SODIUM CHLORIDE, PRESERVATIVE FREE 10 ML: 5 INJECTION INTRAVENOUS at 07:58

## 2025-05-27 RX ADMIN — FUROSEMIDE 80 MG: 10 INJECTION, SOLUTION INTRAMUSCULAR; INTRAVENOUS at 17:00

## 2025-05-27 RX ADMIN — ATORVASTATIN CALCIUM 80 MG: 80 TABLET, FILM COATED ORAL at 20:55

## 2025-05-27 ASSESSMENT — PAIN SCALES - GENERAL
PAINLEVEL_OUTOF10: 0

## 2025-05-27 NOTE — CONSULTS
Pulmonary and Critical Care Medicine    CC: SOB   Date: 5/27/2025  Admit Date:  5/26/2025  Reason for Consultation: Pleural effusion   Consult Requesting Physician: Volodymyr Caldwell MD     HPI     This is a 74 y/o M w/ a hx of on prn oxygen, tobacco use, CAD s/p CABG, HTN, HLD, PPM/ICD, Afib, who presented with SOB and abd pain. Work up LA 2.9, Cr. 1.6, BNP 10 404, trop 53 -> 42, AST 64, LT 53, urine strep and leg ag neg, resp viral panel neg, CT Chest bilateral pleural effusion L>R and basilar infiltrate / atelectasis. I was asked to see the patient for the pleural effusion, on my exam he was in room 5902 he was on 4L NC, denied any productive cough, he was mildly confused, no fevers, SOB better    ROS: negative except stated above    PMH:  has a past medical history of Acid reflux, Acute MI (HCC), LONNIE (acute kidney injury), Anemia, BPH (benign prostatic hyperplasia), Bradycardia, CAD (coronary artery disease), Chronic constipation, COPD (chronic obstructive pulmonary disease) (HCC), Diverticulitis, Enuresis, Flutter-fibrillation (HCC), H/O tobacco use, presenting hazards to health, Hiatal hernia, Hypertension, IFG (impaired fasting glucose), Occlusion and stenosis of bilateral carotid arteries, Paresthesia, Peripheral vascular disease, Postoperative atrial fibrillation (HCC), Presence of combination internal cardiac defibrillator (ICD) and pacemaker, Prolonged QT interval, Pulmonary nodule, Rectal polyp, Renal insufficiency, Rubeola, Shingles, Sigmoid diverticulitis, Syncope and collapse, Tobacco dependence syndrome, URI (upper respiratory infection), Urinary incontinence, and Ventricular tachycardia (HCC).   PSH:  has a past surgical history that includes vascular surgery (Left); Cardiac surgery; Colonoscopy; Cardiac catheterization; pacemaker placement; Cataract extraction (Right, 12/2024); Cataract extraction (Left, 01/2025); and TURP.    SH:  reports that he has been smoking cigarettes. He started smoking

## 2025-05-27 NOTE — PROGRESS NOTES
Patient agitated and restless, unable to sleep. Patient keeps pulling on his oxygen. RN educated, patient needs reinforcement.  at bedside, Hosp notified. Ativan given

## 2025-05-27 NOTE — PROGRESS NOTES
Patient increasingly confused, pulled his telemetry monitor and IV out and refused to get a new one initially. Patient also refused his oxygen, sat dropped to low 70's. Hosp notified. Haldol ordered. Patient now agreed to get a new IV and oxygen placed after charge RN talk to patient. Haldol held for now.  at bedside.

## 2025-05-27 NOTE — PROGRESS NOTES
Date of Admission: 5/26/2025. Hospital Day: 2       HOSPITAL COURSE  75 y.o. male with medical history including CAD, CABG, HTN, HLD, PAD, PAF, cardiomyopathy, s/p PPM/AICD and tobacco smoking presented to emergency room with complaint of chest pain, shortness of breath, abdominal pain and nausea for last 2 to 3 days.    Interval  History:   Pt appeared somewhat confused and restless , wife at bedside       Physical Exam     BP (!) 127/90   Pulse 84   Temp 97.3 °F (36.3 °C) (Temporal)   Resp 18   Ht 1.753 m (5' 9.02\")   Wt 90.5 kg (199 lb 9.6 oz)   SpO2 91%   BMI 29.46 kg/m²     General appearance: No apparent distress, appears stated age and cooperative.  Respiratory:  Normal respiratory effort. Clear to auscultation, bilaterally without Rales/Wheezes/Rhonchi.  Cardiovascular: Regular rate and rhythm with normal S1/S2 without murmurs, rubs or gallops.          Assessment/Plan:    Chest pain, unclear etiology, rule out ACS: Elevated troponin  Left greater than right pleural effusion, unclear etiology likely due to #3  Acute on chronic systolic CHF: Elevated BNP, RODGERS, orthopnea, weight gain  Abdominal pain, RUQ: CT findings suspictious for acute cholecystitis but ruled out by HIDA  Encephalopathy  suspect hospital delerium possibly worsened by missing gabapentin  Bilateral consolidative changes on CAT scan, cannot rule out multifocal pneumonia  S/p PPM/AICD  Persistent atrial fibrillation - on anticoagulation with eliquis.  History of DC cardioversion.  CAD history of MI  COPD  GERD  Hypertension  History of peripheral vascular disease  Tobacco smoking, cigarrattes  ?LONNIE,      Admission status - inpatient, Expect length of stay > 2 midnights  Start on IV diuresis, Monitor urine output, renal fns and lytes.   Obtain Echocardiogram pending   Cont on empirical IV antibiotics, follow-up cultures and for clinical improvement. Pulmonary team input  Resume gabapentin at lower dose 200 tid   Check ultrasound RUQ,  general surgery team input  Resume home meds as ordered   See orders  Advised to quit smoking  EKG 5/26/2025 interpreted personally, atrial fibrillation, ventricular rate of 88, no acute ST-T wave changes            Active Hospital Problems    Diagnosis     Pleural effusion, left [J90]            DVT Prophylaxis:    Diet: Diet NPO Exceptions are: Ice Chips, Sips of Water with Meds, Sips of Clear Liquids  Code Status: Limited      Dispo - home  .  Expected DC  in/on   .  Barrier to discharge           Chief Complaint:   Chief Complaint   Patient presents with    Chest Pain     Pt arrives from home by his spouse. Pt C/O left sided CP for 2 days. Pt states that he sees Dr. Reece for hx of Afib and he has an appt on Wednesday. Pt states that he is also abdominal pain with diarrhea. Pt rates his pain a 6 out of 10.              Medications:  Reviewed    Infusion Medications    sodium chloride 75 mL/hr at 05/27/25 0657    sodium chloride      sodium chloride      dextrose       Scheduled Medications    haloperidol lactate  5 mg IntraMUSCular Once    sodium chloride flush  5-40 mL IntraVENous 2 times per day    enoxaparin  40 mg SubCUTAneous Daily    polyethylene glycol  17 g Oral Daily    azithromycin  500 mg IntraVENous Q24H    furosemide  40 mg IntraVENous BID    sodium chloride flush  5-40 mL IntraVENous 2 times per day    piperacillin-tazobactam  3,375 mg IntraVENous Q8H     PRN Meds: sodium chloride flush, sodium chloride flush, sodium chloride, potassium chloride **OR** potassium alternative oral replacement **OR** potassium chloride, magnesium sulfate, ondansetron **OR** ondansetron, melatonin, senna, aluminum & magnesium hydroxide-simethicone, acetaminophen **OR** acetaminophen, sulfur hexafluoride microspheres, sodium chloride flush, sodium chloride, dextrose bolus **OR** dextrose bolus, glucagon (rDNA), dextrose      Intake/Output Summary (Last 24 hours) at 5/27/2025 0803  Last data filed at 5/27/2025 0657  Lam

## 2025-05-27 NOTE — CONSULTS
[facility] [place]    2025    Leandra Dawson (:  1950) is a 75 y.o. male    Referring Provider: Augusto Gordillo, DO    HISTORY:  75M hx CAD s/p CABG, HTN, HLD, PPM/ICD, A fib on Eliquis, PAD admitted with CP, Nausea, diarrhea, dyspnea, and abdominal pain for last few days. Follows with Dr. Reece. Came in to ER due to unrelenting symptoms. Initial w/u icludes CT Chest/Abd/Pelv wth Moderate-large left and small right pleural effusions as well as possible PNA and concerns for gallbladder disease. Labs show LONNIE (Baseline 1.0) with Cr 1.2 and now 1.6. Trops 53 --> 45 (baseline 30s-60s). NT Pro BNP 10,000 (4,000 in March). Gen surg and cardiology consulted.     Overnight had some confusion and was given Haldol. O2 saturations into 70s. He remains somewhat confused, per his wife. She reports recent alterations in Lasix dosing. Feels he his swollen today. He has been unable to lay flat recently. Has felt worse since admission.  No CP currently. Only complain is breathing, orthopnea and edema.     REVIEW OF SYSTEMS:  A complete review of systems was reviewed and is negative except as noted in the history of present illness.    Prior to Visit Medications    Medication Sig Taking? Authorizing Provider   zinc gluconate 50 MG tablet Take 1 tablet by mouth daily Yes Fay Rees MD   metoprolol succinate (TOPROL XL) 25 MG extended release tablet Take 1.5 tablets by mouth daily Yes Jeremie Reece MD   furosemide (LASIX) 20 MG tablet Take 1 tablet by mouth in the morning and 1 tablet in the evening. Yes Jenna Velasquez PA-C   potassium chloride (KLOR-CON M) 20 MEQ extended release tablet Take 1 tablet by mouth daily Yes Jenna Velasquez PA-C   tamsulosin (FLOMAX) 0.4 MG capsule Take 1 capsule by mouth daily Yes Fay Rees MD   midodrine (PROAMATINE) 5 MG tablet Take 1 tablet by mouth 3 times daily as needed (for bp less than 90/60) Yes Jeremie Reece MD   ELIQUIS 5 MG TABS tablet TAKE 1  -There is mild hypokinesis of the basal inferior walls.   -Grade II diastolic dysfunction with elevated LV filling   pressures.E/e\"=19.0.   -Mild mitral regurgitation.   -The aortic valve is mildly thickened/calcified but opens well with normal   gradients.   -Mild aortic regurgitation.   -Trivial tricuspid regurgitation.   -Estimated pulmonary artery systolic pressure is normal at 28-32 mmHg   assuming a right atrial pressure of 8 mmHg.   -The left atrium is moderately dilated.   -Pacemaker / ICD lead is visualized in the right heart.     1/14/22 Echo  Summary   Technically difficult examination.   Left ventricular systolic function is low normal with ejection fraction   estimated at 50-55%.   No definitive regional wall motion abnormalities are noted.   Diastolic filling parameters suggests grade II diastolic dysfunction.   Mild mitral regurgitation.   The left atrium is mildly dilated.   Aortic valve appears sclerotic but opens adequately.   Mild aortic regurgitation is present.   Inadequate tricuspid regurgitation to estimate systolic pulmonary artery   pressure.     7/14/21 Stress test  Summary    The overall quality of the study is good. There is subdiaphragmatic    attenuation.    Left ventricular cavity is noted to be mildly enlarged at 147 ml. The right    ventricle is normal in size.    SPECT images demonstrate a small area of mildly decreased perfusion in the    mid inferior, mid-inferolateral, and basal inferior segments. The defect is    present at rest and worsens with stress, consistent with mixed ischemia and    scar. There is associated wall motion abnormality.    The sum stress score is 8. No visual TID. Calculated TID is 1.09.    Left ventricular ejection fraction is normal at 53%.    Moderate risk scan given ischemia and mildly elevated LV volumes      Venous dopplers 6/16/21  IMPRESSION:   1.  Negative for deep vein thrombosis in the left lower extremity.   2.  Incidental note of a calcified  plaque in the left popliteal artery with at least moderate narrowing.     [facility] 12/2016  Cath with occluded SVG to RCA,patent LIMA to LAD,patent SVG to diag,OM1 with good retrograde filling of LAD,patent LIMA to LAD  EF 35-40% with inf hypokinesis,LVEDP 25  Will add daily lasix ,aldactone,imdur,f/up in 1 month  Also needs echo     [facility] 3/17/2022  Anatomy:   LM-nml   LAD-prox 90%, mid 99%  Cx-nml  RCA-ostial 100%   RPDA- L to R collaterals  LVEDP- 11  SVG to PDA known to be occluded  SVG to Diag, OM2/3 patent  LIMA to LAD patent    ASSESSMENT/PLAN:  Acute on Chronic Systolic CHF  Ischemic CMO  CAD s/p CABG ([facility] 2022 with Patent LIMA-LAD, SVG Diag, om2/3)  VT s/p PPM/ICD  A fib on Eliquis (holding)  LONNIE - Suspect cardiorenal after IVF given  Encephalopathy     ECHO pending (LVEF as low as 15-20% in September, had recovered to 40-45%). Lasix 40 mg IV BID (also NS ordered - significantly + I:O yesterday and Cr went up). Would stop IVF. Increase Lasix to 80 mg IV. Cont to hold Eliquis. Do not suspect LHC will be indicated at this point and that symptoms/trop more consistent with CHF.     Consider CV after diuresis.     No follow-ups on file.    An  electronic signature was used to authenticate this note.    Sanford Del Valle MD on 5/27/2025 at 10:57 AM

## 2025-05-27 NOTE — PLAN OF CARE
Problem: Chronic Conditions and Co-morbidities  Goal: Patient's chronic conditions and co-morbidity symptoms are monitored and maintained or improved  5/26/2025 2335 by Tere Landrum RN  Outcome: Progressing     Problem: Discharge Planning  Goal: Discharge to home or other facility with appropriate resources  5/26/2025 2335 by Tere Landrum RN  Outcome: Progressing      Problem: Confusion  Goal: Confusion, delirium, dementia, or psychosis is improved or at baseline  Description: INTERVENTIONS:1. Assess for possible contributors to thought disturbance, including medications, impaired vision or hearing, underlying metabolic abnormalities, dehydration, psychiatric diagnoses, and notify attending LIP2. [place] high risk fall precautions, as indicated3. Provide frequent short contacts to provide reality reorientation, refocusing and direction4. Decrease environmental stimuli, including noise as appropriate5. Monitor and intervene to maintain adequate nutrition, hydration, elimination, sleep and activity6. If unable to ensure safety without constant attention obtain sitter and review sitter guidelines with assigned personnel7. Initiate Psychosocial CNS and Spiritual Care consult, as indicated  INTERVENTIONS:1. Assess for possible contributors to thought disturbance, including medications, impaired vision or hearing, underlying metabolic abnormalities, dehydration, psychiatric diagnoses, and notify attending LIP2. [place] high risk fall precautions, as indicated3. Provide frequent short contacts to provide reality reorientation, refocusing and direction4. Decrease environmental stimuli, including noise as appropriate5. Monitor and intervene to maintain adequate nutrition, hydration, elimination, sleep and activity6. If unable to ensure safety without constant attention obtain sitter and review sitter guidelines with assigned personnel7. Initiate Psychosocial CNS and Spiritual Care consult, as

## 2025-05-27 NOTE — PROGRESS NOTES
[facility]        Progress Note    Patient Name: Leandra Dawson  MRN: [id number]  YOB: 1950  Date of Evaluation: 2025    Chief Complaint: Abdominal pain    Subjective:  Ongoing confusion, otherwise no acute events overnight  Denies pain at rest but states doesn't feel well, difficulty elaboration  No nausea or vomiting  Up to chair at this time      Vital Signs:  Patient Vitals for the past 24 hrs:   BP Temp Temp src Pulse Resp SpO2 Height Weight   25 0715 (!) 127/90 97.3 °F (36.3 °C) Temporal 84 18 91 % -- --   25 0630 -- -- -- -- -- -- 1.753 m (5' 9.02\") 90.5 kg (199 lb 9.6 oz)   25 0548 -- -- -- -- -- 97 % -- --   25 0546 -- -- -- -- 19 -- -- --   25 0531 98/72 98.3 °F (36.8 °C) Temporal -- 21 91 % -- --   25 0110 108/76 98 °F (36.7 °C) Temporal -- 16 -- -- --   25 (!) 109/95 -- -- -- -- -- -- --   25 98/77 97.2 °F (36.2 °C) Temporal -- -- 97 % -- --   25 1815 -- -- -- (!) 101 -- -- -- --   25 1709 114/65 -- -- 93 -- 96 % -- --   25 1633 -- -- -- 89 -- -- -- --   25 1427 -- -- -- -- -- 96 % -- --   25 1426 -- -- -- -- -- 93 % -- --   25 1425 -- -- -- 95 -- (!) 86 % -- --   25 1424 -- -- -- -- -- (!) 70 % -- --   25 1242 -- -- -- (!) 112 -- -- -- --   25 1240 115/76 98.7 °F (37.1 °C) Temporal 96 16 91 % -- --   25 1200 100/85 -- -- 97 16 94 % -- --   25 1145 -- -- -- (!) 101 24 -- -- --   25 1130 -- -- -- 96 23 -- -- --   25 1115 -- -- -- 93 27 -- -- --      TEMPERATURE HISTORY 24H: Temp (24hrs), Av.9 °F (36.6 °C), Min:97.2 °F (36.2 °C), Max:98.7 °F (37.1 °C)    BLOOD PRESSURE HISTORY: Systolic (36hrs), Av , Min:98 , Max:137    Diastolic (36hrs), Av, Min:65, Max:95      Intake/Output:  I/O last 3 completed shifts:  In: 2232.4 [P.O.:720; I.V.:1084.9; IV Piggyback:427.6]  Out: 385 [Urine:385]  No      XR CHEST PORTABLE  Result Date: 5/26/2025  EXAMINATION: ONE XRAY VIEW OF THE CHEST 5/26/2025 7:26 am COMPARISON: Chest radiographs dated 04/14/2025 and 04/10/2025. HISTORY: ORDERING SYSTEM PROVIDED HISTORY: chest pain TECHNOLOGIST PROVIDED HISTORY: Reason for exam:->chest pain Reason for Exam: Chest Pain FINDINGS: Lungs: Previously identified right lung infiltrates have resolved, however there are new diffuse patchy opacities bilaterally.  Left basilar/retrocardiac opacity.  Peribronchial thickening at the right lung base. Pleura: The left costophrenic angle is obscured.  No significant right pleural effusion.  No pneumothorax. Cardiomediastinal Silhouette: Unchanged cardiomegaly. Bones: No acute findings.Median sternotomy changes. Soft Tissues: Left chest wall implanted cardiac device.     1. Left basilar opacity favored to represent projectional artifact, cannot exclude developing consolidation or pleural effusion. 2. Diffuse patchy opacities may represent edema versus multifocal infectious process. 3. Right upper lung infiltrates have resolved as compared to 04/14/2025.     Scheduled Meds:   haloperidol lactate  5 mg IntraMUSCular Once    furosemide  80 mg IntraVENous BID    sodium chloride flush  5-40 mL IntraVENous 2 times per day    enoxaparin  40 mg SubCUTAneous Daily    polyethylene glycol  17 g Oral Daily    azithromycin  500 mg IntraVENous Q24H    sodium chloride flush  5-40 mL IntraVENous 2 times per day    piperacillin-tazobactam  3,375 mg IntraVENous Q8H     Continuous Infusions:   sodium chloride      sodium chloride      dextrose       PRN Meds:.sodium chloride flush, sodium chloride flush, sodium chloride, potassium chloride **OR** potassium alternative oral replacement **OR** potassium chloride, magnesium sulfate, ondansetron **OR** ondansetron, melatonin, senna, aluminum & magnesium hydroxide-simethicone, acetaminophen **OR** acetaminophen, sulfur hexafluoride microspheres, sodium chloride

## 2025-05-27 NOTE — PROGRESS NOTES
Patients head to toe assessment completed. Vital signs WNL, call light within reach. Scheduled medications given per MAR. Patient denies any pain at the moment. Patient resting in bed.  at bedside

## 2025-05-27 NOTE — PROGRESS NOTES
Hospitalist notified of confusion and agitation that family states is new since getting to the ED yesterday. Will continue to monitor

## 2025-05-28 ENCOUNTER — APPOINTMENT (OUTPATIENT)
Dept: ULTRASOUND IMAGING | Age: 75
DRG: 291 | End: 2025-05-28
Payer: MEDICARE

## 2025-05-28 ENCOUNTER — HOSPITAL ENCOUNTER (OUTPATIENT)
Age: 75
Discharge: HOME | DRG: 292 | End: 2025-05-28
Payer: MEDICARE

## 2025-05-28 LAB
ALBUMIN SERPL-MCNC: 2.8 G/DL (ref 3.4–5)
ALP SERPL-CCNC: 151 U/L (ref 40–129)
ALT SERPL-CCNC: 263 U/L (ref 10–40)
ANION GAP SERPL CALCULATED.3IONS-SCNC: 13 MMOL/L (ref 3–16)
AST SERPL-CCNC: 254 U/L (ref 15–37)
BASOPHILS # BLD: 0.1 K/UL (ref 0–0.2)
BASOPHILS NFR BLD: 1.1 %
BILIRUB DIRECT SERPL-MCNC: 0.3 MG/DL (ref 0–0.3)
BILIRUB INDIRECT SERPL-MCNC: 0.3 MG/DL (ref 0–1)
BILIRUB SERPL-MCNC: 0.6 MG/DL (ref 0–1)
BUN SERPL-MCNC: 28 MG/DL (ref 7–20)
CALCIUM SERPL-MCNC: 8.8 MG/DL (ref 8.3–10.6)
CHLORIDE SERPL-SCNC: 102 MMOL/L (ref 99–110)
CO2 SERPL-SCNC: 24 MMOL/L (ref 21–32)
CREAT SERPL-MCNC: 1.8 MG/DL (ref 0.8–1.3)
DEPRECATED RDW RBC AUTO: 19.4 % (ref 12.4–15.4)
EOSINOPHIL # BLD: 0.1 K/UL (ref 0–0.6)
EOSINOPHIL NFR BLD: 2.2 %
GFR SERPLBLD CREATININE-BSD FMLA CKD-EPI: 39 ML/MIN/{1.73_M2}
GLUCOSE BLD-MCNC: 102 MG/DL (ref 70–99)
GLUCOSE BLD-MCNC: 102 MG/DL (ref 70–99)
GLUCOSE BLD-MCNC: 115 MG/DL (ref 70–99)
GLUCOSE BLD-MCNC: 122 MG/DL (ref 70–99)
GLUCOSE SERPL-MCNC: 91 MG/DL (ref 70–99)
HCT VFR BLD AUTO: 34.8 % (ref 40.5–52.5)
HGB BLD-MCNC: 11.1 G/DL (ref 13.5–17.5)
LACTATE BLDV-SCNC: 2.8 MMOL/L (ref 0.4–2)
LYMPHOCYTES # BLD: 1.7 K/UL (ref 1–5.1)
LYMPHOCYTES NFR BLD: 27.5 %
MCH RBC QN AUTO: 27 PG (ref 26–34)
MCHC RBC AUTO-ENTMCNC: 32 G/DL (ref 31–36)
MCV RBC AUTO: 84.6 FL (ref 80–100)
MONOCYTES # BLD: 0.6 K/UL (ref 0–1.3)
MONOCYTES NFR BLD: 9.9 %
NEUTROPHILS # BLD: 3.7 K/UL (ref 1.7–7.7)
NEUTROPHILS NFR BLD: 59.3 %
NT-PROBNP SERPL-MCNC: ABNORMAL PG/ML (ref 0–449)
PERFORMED ON: ABNORMAL
PLATELET # BLD AUTO: 273 K/UL (ref 135–450)
PMV BLD AUTO: 7.9 FL (ref 5–10.5)
POTASSIUM SERPL-SCNC: 4 MMOL/L (ref 3.5–5.1)
PROT SERPL-MCNC: 6.2 G/DL (ref 6.4–8.2)
RBC # BLD AUTO: 4.12 M/UL (ref 4.2–5.9)
SODIUM SERPL-SCNC: 139 MMOL/L (ref 136–145)
WBC # BLD AUTO: 6.2 K/UL (ref 4–11)

## 2025-05-28 PROCEDURE — 6370000000 HC RX 637 (ALT 250 FOR IP)

## 2025-05-28 PROCEDURE — 99233 SBSQ HOSP IP/OBS HIGH 50: CPT | Performed by: STUDENT IN AN ORGANIZED HEALTH CARE EDUCATION/TRAINING PROGRAM

## 2025-05-28 PROCEDURE — 83880 ASSAY OF NATRIURETIC PEPTIDE: CPT

## 2025-05-28 PROCEDURE — 99232 SBSQ HOSP IP/OBS MODERATE 35: CPT | Performed by: SURGERY

## 2025-05-28 PROCEDURE — APPNB30 APP NON BILLABLE TIME 0-30 MINS: Performed by: NURSE PRACTITIONER

## 2025-05-28 PROCEDURE — 6360000002 HC RX W HCPCS

## 2025-05-28 PROCEDURE — 6370000000 HC RX 637 (ALT 250 FOR IP): Performed by: STUDENT IN AN ORGANIZED HEALTH CARE EDUCATION/TRAINING PROGRAM

## 2025-05-28 PROCEDURE — 6360000002 HC RX W HCPCS: Performed by: INTERNAL MEDICINE

## 2025-05-28 PROCEDURE — APPSS15 APP SPLIT SHARED TIME 0-15 MINUTES: Performed by: NURSE PRACTITIONER

## 2025-05-28 PROCEDURE — 99232 SBSQ HOSP IP/OBS MODERATE 35: CPT | Performed by: INTERNAL MEDICINE

## 2025-05-28 PROCEDURE — 51702 INSERT TEMP BLADDER CATH: CPT

## 2025-05-28 PROCEDURE — 6370000000 HC RX 637 (ALT 250 FOR IP): Performed by: HOSPITALIST

## 2025-05-28 PROCEDURE — 6360000002 HC RX W HCPCS: Performed by: STUDENT IN AN ORGANIZED HEALTH CARE EDUCATION/TRAINING PROGRAM

## 2025-05-28 PROCEDURE — 80076 HEPATIC FUNCTION PANEL: CPT

## 2025-05-28 PROCEDURE — 2580000003 HC RX 258

## 2025-05-28 PROCEDURE — 6360000002 HC RX W HCPCS: Performed by: HOSPITALIST

## 2025-05-28 PROCEDURE — 83605 ASSAY OF LACTIC ACID: CPT

## 2025-05-28 PROCEDURE — 94761 N-INVAS EAR/PLS OXIMETRY MLT: CPT

## 2025-05-28 PROCEDURE — 94640 AIRWAY INHALATION TREATMENT: CPT

## 2025-05-28 PROCEDURE — 76705 ECHO EXAM OF ABDOMEN: CPT

## 2025-05-28 PROCEDURE — 2580000003 HC RX 258: Performed by: HOSPITALIST

## 2025-05-28 PROCEDURE — 85025 COMPLETE CBC W/AUTO DIFF WBC: CPT

## 2025-05-28 PROCEDURE — 80048 BASIC METABOLIC PNL TOTAL CA: CPT

## 2025-05-28 PROCEDURE — 36415 COLL VENOUS BLD VENIPUNCTURE: CPT

## 2025-05-28 PROCEDURE — 99223 1ST HOSP IP/OBS HIGH 75: CPT | Performed by: INTERNAL MEDICINE

## 2025-05-28 PROCEDURE — 93005 ELECTROCARDIOGRAM TRACING: CPT | Performed by: INTERNAL MEDICINE

## 2025-05-28 PROCEDURE — 2700000000 HC OXYGEN THERAPY PER DAY

## 2025-05-28 PROCEDURE — 2060000000 HC ICU INTERMEDIATE R&B

## 2025-05-28 RX ORDER — FUROSEMIDE 10 MG/ML
40 INJECTION INTRAMUSCULAR; INTRAVENOUS 2 TIMES DAILY
Status: DISCONTINUED | OUTPATIENT
Start: 2025-05-28 | End: 2025-05-30 | Stop reason: HOSPADM

## 2025-05-28 RX ORDER — FUROSEMIDE 10 MG/ML
INJECTION INTRAMUSCULAR; INTRAVENOUS
Status: DISPENSED
Start: 2025-05-28 | End: 2025-05-28

## 2025-05-28 RX ORDER — ENOXAPARIN SODIUM 100 MG/ML
1 INJECTION SUBCUTANEOUS 2 TIMES DAILY
Status: DISCONTINUED | OUTPATIENT
Start: 2025-05-28 | End: 2025-05-28

## 2025-05-28 RX ORDER — FUROSEMIDE 10 MG/ML
INJECTION INTRAMUSCULAR; INTRAVENOUS
Status: COMPLETED
Start: 2025-05-28 | End: 2025-05-28

## 2025-05-28 RX ORDER — SODIUM CHLORIDE 9 MG/ML
INJECTION, SOLUTION INTRAVENOUS
Status: COMPLETED
Start: 2025-05-28 | End: 2025-05-28

## 2025-05-28 RX ORDER — IPRATROPIUM BROMIDE AND ALBUTEROL SULFATE 2.5; .5 MG/3ML; MG/3ML
1 SOLUTION RESPIRATORY (INHALATION)
Status: DISCONTINUED | OUTPATIENT
Start: 2025-05-28 | End: 2025-05-29

## 2025-05-28 RX ADMIN — GABAPENTIN 200 MG: 100 CAPSULE ORAL at 20:50

## 2025-05-28 RX ADMIN — FUROSEMIDE 40 MG: 10 INJECTION, SOLUTION INTRAMUSCULAR; INTRAVENOUS at 11:05

## 2025-05-28 RX ADMIN — PIPERACILLIN AND TAZOBACTAM 3375 MG: 3; .375 INJECTION, POWDER, LYOPHILIZED, FOR SOLUTION INTRAVENOUS at 05:02

## 2025-05-28 RX ADMIN — AMIODARONE HYDROCHLORIDE 200 MG: 200 TABLET ORAL at 20:49

## 2025-05-28 RX ADMIN — IPRATROPIUM BROMIDE AND ALBUTEROL SULFATE 1 DOSE: .5; 3 SOLUTION RESPIRATORY (INHALATION) at 16:29

## 2025-05-28 RX ADMIN — SODIUM CHLORIDE: 0.9 INJECTION, SOLUTION INTRAVENOUS at 13:36

## 2025-05-28 RX ADMIN — GABAPENTIN 200 MG: 100 CAPSULE ORAL at 10:48

## 2025-05-28 RX ADMIN — TAMSULOSIN HYDROCHLORIDE 0.4 MG: 0.4 CAPSULE ORAL at 10:48

## 2025-05-28 RX ADMIN — MELATONIN TAB 3 MG 3 MG: 3 TAB at 20:49

## 2025-05-28 RX ADMIN — AZITHROMYCIN MONOHYDRATE 500 MG: 500 INJECTION, POWDER, LYOPHILIZED, FOR SOLUTION INTRAVENOUS at 11:18

## 2025-05-28 RX ADMIN — IPRATROPIUM BROMIDE AND ALBUTEROL SULFATE 1 DOSE: .5; 3 SOLUTION RESPIRATORY (INHALATION) at 20:23

## 2025-05-28 RX ADMIN — PIPERACILLIN AND TAZOBACTAM 3375 MG: 3; .375 INJECTION, POWDER, LYOPHILIZED, FOR SOLUTION INTRAVENOUS at 13:40

## 2025-05-28 RX ADMIN — ATORVASTATIN CALCIUM 80 MG: 80 TABLET, FILM COATED ORAL at 20:49

## 2025-05-28 RX ADMIN — METOPROLOL SUCCINATE 37.5 MG: 25 TABLET, EXTENDED RELEASE ORAL at 10:49

## 2025-05-28 RX ADMIN — FUROSEMIDE 40 MG: 10 INJECTION, SOLUTION INTRAMUSCULAR; INTRAVENOUS at 17:29

## 2025-05-28 RX ADMIN — AMIODARONE HYDROCHLORIDE 200 MG: 200 TABLET ORAL at 10:48

## 2025-05-28 RX ADMIN — GABAPENTIN 200 MG: 100 CAPSULE ORAL at 16:22

## 2025-05-28 RX ADMIN — APIXABAN 5 MG: 5 TABLET, FILM COATED ORAL at 20:49

## 2025-05-28 RX ADMIN — SODIUM CHLORIDE: 9 INJECTION, SOLUTION INTRAVENOUS at 13:36

## 2025-05-28 RX ADMIN — ENOXAPARIN SODIUM 90 MG: 100 INJECTION SUBCUTANEOUS at 10:50

## 2025-05-28 RX ADMIN — PIPERACILLIN AND TAZOBACTAM 3375 MG: 3; .375 INJECTION, POWDER, LYOPHILIZED, FOR SOLUTION INTRAVENOUS at 20:53

## 2025-05-28 RX ADMIN — POLYETHYLENE GLYCOL 3350 17 G: 17 POWDER, FOR SOLUTION ORAL at 10:58

## 2025-05-28 ASSESSMENT — PAIN SCALES - GENERAL
PAINLEVEL_OUTOF10: 0
PAINLEVEL_OUTOF10: 0

## 2025-05-28 NOTE — PROGRESS NOTES
[facility]        Progress Note    Patient Name: Leandra Dawson  MRN: [id number]  YOB: 1950  Date of Evaluation: 2025    Chief Complaint: Abdominal pain    Subjective:  No acute events overnight  Denies pain  No nausea or vomiting, feels hungry  Resting in bed at this time      Vital Signs:  Patient Vitals for the past 24 hrs:   BP Temp Temp src Pulse Resp SpO2 Height Weight   25 0830 -- (!) 96.7 °F (35.9 °C) Temporal -- 15 -- -- --   25 0507 -- -- -- -- -- -- -- 89.5 kg (197 lb 5 oz)   25 0257 133/78 97.3 °F (36.3 °C) Temporal 88 20 92 % -- --   25 2302 111/88 97.1 °F (36.2 °C) Temporal 90 21 93 % -- --   25 1914 (!) 91/57 97 °F (36.1 °C) Temporal 86 20 93 % -- --   25 1502 107/84 -- -- -- -- -- 1.753 m (5' 9\") 90.3 kg (199 lb)   25 1454 107/84 98 °F (36.7 °C) Oral 88 18 98 % -- --   25 1123 (!) 130/109 97.3 °F (36.3 °C) Oral -- -- 97 % -- --      TEMPERATURE HISTORY 24H: Temp (24hrs), Av.2 °F (36.2 °C), Min:96.7 °F (35.9 °C), Max:98 °F (36.7 °C)    BLOOD PRESSURE HISTORY: Systolic (36hrs), Av , Min:91 , Max:133    Diastolic (36hrs), Av, Min:57, Max:109      Intake/Output:  I/O last 3 completed shifts:  In: 1392.2 [P.O.:240; I.V.:1073.2; IV Piggyback:78.9]  Out: 560 [Urine:560]  No intake/output data recorded.  Drain/tube Output:       Physical Exam:  General: awake, alert, no acute distress, loosely oriented  Cardiovascular:  regular rate and rhythm and no murmur noted  Lungs: clear to auscultation  Abdomen: soft, non-distended, non-tender, bowel sounds present    Labs:  CBC:    Recent Labs     25  0733 25  0430 25  0430   WBC 6.7 7.1 6.2   HGB 12.1* 11.4* 11.1*   HCT 37.9* 36.0* 34.8*    304 273     BMP:    Recent Labs     25  0733 25  0430 25  0430    139 139   K 3.5 4.0 4.0    104 102   CO2 27 21 24   BUN 20 24* 28*   CREATININE 1.2  1.6* 1.8*   GLUCOSE 160* 128* 91     Hepatic:    Recent Labs     05/26/25  0733 05/27/25  0430   AST 25 64*   ALT 18 53*   BILITOT 0.5 0.6   ALKPHOS 102 107     Amylase:  No results found for: \"AMYLASE\"  Lipase:    Lab Results   Component Value Date/Time    LIPASE 11.0 05/27/2025 04:30 AM    LIPASE 10.0 05/26/2025 07:33 AM    LIPASE 22.0 04/10/2025 05:09 PM      Mag:    Lab Results   Component Value Date/Time    MG 2.03 05/27/2025 04:30 AM    MG 2.10 05/26/2025 07:33 AM     Phos:     Lab Results   Component Value Date/Time    PHOS 5.5 05/27/2025 04:30 AM    PHOS 3.3 12/05/2024 02:12 PM      Coags:   Lab Results   Component Value Date/Time    PROTIME 16.5 05/27/2025 04:30 AM    INR 1.31 05/27/2025 04:30 AM    APTT 30.5 05/27/2025 04:30 AM       Cultures:  Anaerobic culture  No results found for: \"LABANAE\"  Fungus stain  No results found for requested labs within last 30 days.     Gram stain  No results found for requested labs within last 30 days.     Organism  Lab Results   Component Value Date/Time    ORG Escherichia coli (A) 07/31/2016 06:48 PM     Surgical culture  No results found for: \"CXSURG\"  Blood culture 1  No results found for requested labs within last 30 days.     Blood culture 2  No results found for requested labs within last 30 days.     Fecal occult  No results found for requested labs within last 30 days.     GI bacterial pathogens by PCR  No results found for requested labs within last 30 days.     C. difficile  No results found for requested labs within last 30 days.     Urine culture  Lab Results   Component Value Date/Time    LABURIN >100,000 CFU/ml 07/31/2016 06:48 PM       Pathology:  No relevant pathology     Imaging:  I have personally reviewed the following films:    US GALLBLADDER RUQ  Result Date: 5/28/2025  EXAMINATION: RIGHT UPPER QUADRANT ULTRASOUND 5/28/2025 9:21 am COMPARISON: None. HISTORY: ORDERING SYSTEM PROVIDED HISTORY: possible cholecystitis TECHNOLOGIST PROVIDED HISTORY:

## 2025-05-28 NOTE — PLAN OF CARE
Problem: Chronic Conditions and Co-morbidities  Goal: Patient's chronic conditions and co-morbidity symptoms are monitored and maintained or improved  Outcome: Progressing  Flowsheets (Taken 5/27/2025 2229)  Care Plan - Patient's Chronic Conditions and Co-Morbidity Symptoms are Monitored and Maintained or Improved: Monitor and assess patient's chronic conditions and comorbid symptoms for stability, deterioration, or improvement     Problem: Discharge Planning  Goal: Discharge to home or other facility with appropriate resources  Outcome: Progressing  Flowsheets (Taken 5/27/2025 2229)  Discharge to home or other facility with appropriate resources: Identify discharge learning needs (meds, wound care, etc)     Problem: Pain  Goal: Verbalizes/displays adequate comfort level or baseline comfort level  Outcome: Progressing  Flowsheets (Taken 5/27/2025 2229)  Verbalizes/displays adequate comfort level or baseline comfort level:   Assess pain using appropriate pain scale   Encourage patient to monitor pain and request assistance     Problem: Confusion  Goal: Confusion, delirium, dementia, or psychosis is improved or at baseline  Description: INTERVENTIONS:1. Assess for possible contributors to thought disturbance, including medications, impaired vision or hearing, underlying metabolic abnormalities, dehydration, psychiatric diagnoses, and notify attending LIP2. [place] high risk fall precautions, as indicated3. Provide frequent short contacts to provide reality reorientation, refocusing and direction4. Decrease environmental stimuli, including noise as appropriate5. Monitor and intervene to maintain adequate nutrition, hydration, elimination, sleep and activity6. If unable to ensure safety without constant attention obtain sitter and review sitter guidelines with assigned personnel7. Initiate Psychosocial CNS and Spiritual Care consult, as indicated  INTERVENTIONS:1. Assess for possible contributors to thought

## 2025-05-28 NOTE — PROGRESS NOTES
Pulmonary and Critical Care Medicine    CC: SOB   Date: 2025  Admit Date:  2025  Reason for Consultation: Pleural effusion   Consult Requesting Physician: Volodymyr Caldwell MD     HPI     This is a 74 y/o M w/ a hx of on prn oxygen, tobacco use, CAD s/p CABG, HTN, HLD, PPM/ICD, Afib, who presented with SOB and abd pain.     Overnight:  He is on 4L NC  His Cr is worse      ROS: negative except stated above    OBJECTIVE DATA       PHYSICAL EXAM:   Temp  Av.2 °F (36.2 °C)  Min: 96.7 °F (35.9 °C)  Max: 98 °F (36.7 °C)  Pulse  Av  Min: 86  Max: 93  BP  Min: 91/57  Max: 133/78  SpO2  Av %  Min: 92 %  Max: 98 %    General: NAD  Neuro: awake and confused   Lung: faint crackles, wheezing  Heart: regular rate  LE: edema equal     MEDICATIONS: Reviewed  Scheduled Meds:   enoxaparin  1 mg/kg SubCUTAneous BID    furosemide  40 mg IntraVENous BID    furosemide        furosemide        haloperidol lactate  5 mg IntraMUSCular Once    amiodarone  200 mg Oral BID    atorvastatin  80 mg Oral Nightly    gabapentin  200 mg Oral TID    metoprolol succinate  37.5 mg Oral Daily    tamsulosin  0.4 mg Oral Daily    sodium chloride flush  5-40 mL IntraVENous 2 times per day    polyethylene glycol  17 g Oral Daily    azithromycin  500 mg IntraVENous Q24H    sodium chloride flush  5-40 mL IntraVENous 2 times per day    piperacillin-tazobactam  3,375 mg IntraVENous Q8H     Continuous Infusions:   sodium chloride 250 mL/hr at 25 1336    sodium chloride      dextrose       PRN Meds:.furosemide, furosemide, sodium chloride flush, sodium chloride flush, sodium chloride, potassium chloride **OR** potassium alternative oral replacement **OR** potassium chloride, magnesium sulfate, ondansetron **OR** ondansetron, melatonin, senna, aluminum & magnesium hydroxide-simethicone, acetaminophen **OR** acetaminophen, sulfur hexafluoride microspheres, sodium chloride flush, sodium chloride, dextrose bolus **OR** dextrose bolus,

## 2025-05-28 NOTE — CONSULTS
MD Lawrence Gomez MD Aldo Estella, DO              Office: [phone]                      Fax: [phone]               [email or web address]                     Nephrology consult received. Full consult report will follow.  Chart reviewed now   Initial plan-  Lasix 40 IV twice daily increased to 80 mg IV on Tuesday  Today decrease to 40 IV twice daily for 1 day    Stopped IV fluids    Follow-up pending echo  Pulmonary team following, thinking more of a heart failure rather than pneumonia  General Surgery team following        In past -has been on lasix and accidentally taking metolazone qd x3 days too on last admission.      Pre-renal LONNIE again likely      In past - ECHO - Grade II diastolic dysfunction with elevated LV filling  Weight in past ~ 84 kg around   Hypoxia needing nasal cannula newly changed    Last Weight Metrics:      5/28/2025     5:07 AM 5/27/2025     3:02 PM 5/27/2025     6:30 AM 5/26/2025     7:16 AM 4/14/2025     6:15 AM 4/13/2025     6:00 AM 4/12/2025     9:16 AM   Weight Loss Metrics   Height  5' 9\" 5' 9.016\" 5' 9\"   5' 9\"   Weight - Scale 197 lbs 5 oz 199 lbs 199 lbs 10 oz 197 lbs 198 lbs 7 oz 184 lbs 15 oz    BMI (Calculated) 29.2 kg/m2 29.4 kg/m2 29.5 kg/m2 29.2 kg/m2 29.4 kg/m2 27.4 kg/m2        Thank you for allowing us to participate in this patient's care. Please do not hesitate to contact us anytime. We will follow along with you.       Lawrence Adorno MD  [facility]   [phone] or [employer].

## 2025-05-28 NOTE — PROGRESS NOTES
Date of Admission: 5/26/2025. Hospital Day: 3       HOSPITAL COURSE  75 y.o. male with medical history including CAD, CABG, HTN, HLD, PAD, PAF, cardiomyopathy, s/p PPM/AICD and tobacco smoking presented to emergency room with complaint of chest pain, shortness of breath, abdominal pain and nausea for last 2 to 3 days.    Interval  History:   Pt less confused and more calm today , wife at bedside       Physical Exam     /78   Pulse 93   Temp (!) 96.7 °F (35.9 °C) (Temporal)   Resp 15   Ht 1.753 m (5' 9\")   Wt 89.5 kg (197 lb 5 oz)   SpO2 92%   BMI 29.14 kg/m²     General appearance: No apparent distress, appears stated age and cooperative.  Respiratory:  Normal respiratory effort. Clear to auscultation, bilaterally without Rales/Wheezes/Rhonchi.  Cardiovascular: Regular rate and rhythm with normal S1/S2 without murmurs, rubs or gallops.          Assessment/Plan:    Chest pain, unclear etiology, rule out ACS: Elevated troponin  Left greater than right pleural effusion, unclear etiology likely due to #3  Acute on chronic systolic CHF: Elevated BNP, RODGERS, orthopnea, weight gain  LONNIE  cr 1.8, baseline 1 today . U/o  475 ml 24 hr  Abdominal pain, RUQ: CT findings suspictious for acute cholecystitis but ruled out by HIDA  Encephalopathy  suspect hospital delerium possibly worsened by missing gabapentin. improving  Bilateral consolidative changes on CAT scan, cannot rule out multifocal pneumonia  S/p PPM/AICD  Persistent atrial fibrillation - on anticoagulation with eliquis.  History of DC cardioversion.  CAD history of MI  COPD  GERD  Hypertension  History of peripheral vascular disease  Tobacco smoking, cigarrattes        Admission status - inpatient, Expect length of stay > 2 midnights  Start on IV diuresis, Monitor urine output, renal fns and lytes.    Echocardiogram show EF down to 20 %,will dw cardio regarding need for inotropes   Cont on empirical IV antibiotics, follow-up cultures and for clinical  atelectasis,   cannot exclude multifocal infectious process.   3. Gallbladder sludge, with surrounding inflammatory changes suspicious for   developing cholecystitis.  Recommend surgical consultation; right upper   quadrant ultrasound available for further characterization, if clinically   indicated.  No biliary ductal dilation.   4. Urinary bladder wall thickening may be due to under distension, correlate   clinically for cystitis.   5. Findings suggestive of pulmonary arterial hypertension.   6. Sequela of old granulomatous disease.         XR CHEST PORTABLE   Final Result   1. Left basilar opacity favored to represent projectional artifact, cannot   exclude developing consolidation or pleural effusion.   2. Diffuse patchy opacities may represent edema versus multifocal infectious   process.   3. Right upper lung infiltrates have resolved as compared to 04/14/2025.             IP CONSULT TO PULMONOLOGY  IP CONSULT TO CARDIOLOGY  IP CONSULT TO GENERAL SURGERY  IP CONSULT TO NEPHROLOGY      Volodymyr Caldwell MD

## 2025-05-28 NOTE — CONSULTS
[facility] [place]   Electrophysiology   Date: 5/28/2025  Reason for Consultation: Persistent atrial fibrillation    Consult Requesting Physician: Volodymyr Caldwell MD     Chief Complaint   Patient presents with    Chest Pain     Pt arrives from home by his spouse. Pt C/O left sided CP for 2 days. Pt states that he sees Dr. Reece for hx of Afib and he has an appt on Wednesday. Pt states that he is also abdominal pain with diarrhea. Pt rates his pain a 6 out of 10.        CC: CP,SOB, abdominal pain    HPI: Leandra Dawson is a 75 y.o. male with a past medical history of tobacco use, COPD, CAD s/p CABG (2014), ICM, CHF, HTN, PAD, PAF, and VT. Hx of sustained VT with loss of consciousness and CPR (3/15/22). S/p single chamber AICD (3/17/22). In October of 2024, He was found to have persistent AF on his device and underwent DCCV (10/7/24). On 12/5/24, he received AICD shock for dual arrhythmia AF with VT ~ 222 BPM while getting out of bed. He was started on amiodarone    Patient presented to hospital with complaints of chest pain , shortness of breath and abdominal pain. EP has been consulted for management of persistent atrial fibrillation.     Assessment and Plan:    Paroxysmal AF  -Hx of DCCV 10/2024  - Looks to have been back in AF since mid February per device interrogation    -In AF on telemetry- rate controlled   -Symptomatic with SOB  -TSH 2.31  -[id number] Score is at least 5 for age3, HTN,CAD,HFrEF.  Anticoagulation is recommended- Eliquis 5 mg BID resumed    -Treatment options including cardioversion, antiarrhythmic medications, rhythm control vs rate control and ablation were discussed with patient  - He is not interested in ablation . Can consider DCCV prior to discharge   - Continue Amiodarone 200 BID, Toprol XL 37.5 mg QD for rate control .  Will switch to amiodarone to 200 mg daily after 1 or 2 weeks.    Acute on chronic HFrEF, severe cardiomyopathy  -S/p single chamber AICD (3/17/22)  -EF  --   Output 250 ml   Net -250 ml       Constitutional: Oriented. No distress.   Cardiovascular: Normal rate, irregular rhythm, S1&S2.    Pulmonary/Chest: Bilateral respiratory sounds. No rhonchi.    Abdominal: Soft. No tenderness   Musculoskeletal: LE edema   Neurological: Alert and oriented. Follows command    Active Hospital Problems    Diagnosis Date Noted    Multifocal pneumonia [J18.9] 04/21/2022     Priority: Medium    Pleural effusion, left [J90] 05/26/2025       Past Medical History:   Diagnosis Date    Acid reflux     Acute MI (HCC)     LONNIE (acute kidney injury)     Anemia     BPH (benign prostatic hyperplasia)     h/o TURP w/ Dr Sheikh    Bradycardia     CAD (coronary artery disease)     CAD w MI, s/p CABG 2014    Chronic constipation     COPD (chronic obstructive pulmonary disease) (HCC)     Diverticulitis     Enuresis     Flutter-fibrillation (HCC) 2019    s/p DCCV    H/O tobacco use, presenting hazards to health     Hiatal hernia     Hypertension     diagnosed at age 22    IFG (impaired fasting glucose)     Occlusion and stenosis of bilateral carotid arteries     Paresthesia     Peripheral vascular disease     with left leg revascularization    Postoperative atrial fibrillation (HCC) 2014    Presence of combination internal cardiac defibrillator (ICD) and pacemaker     Prolonged QT interval     Pulmonary nodule     Rectal polyp 12/2016    Renal insufficiency     Rubeola     Shingles     per pt, started 12/2021, still visible 3/2022, not open wounds at that time    Sigmoid diverticulitis 12/01/2015    Syncope and collapse 01/05/2022    Tobacco dependence syndrome     URI (upper respiratory infection) 11/15/2024    Urinary incontinence     Ventricular tachycardia (HCC)         Past Surgical History:   Procedure Laterality Date    CARDIAC CATHETERIZATION      CARDIAC SURGERY      stent x1 --3 yrs, 5/12/14 CABG 3 V    CATARACT EXTRACTION Right 12/2024    CATARACT EXTRACTION Left 01/2025    COLONOSCOPY       PACEMAKER PLACEMENT      PPM/AICD    TURP      VASCULAR SURGERY Left     Fem-Tib bypass & endarterectomy w/ wound V.A.C placement       Allergies   Allergen Reactions    Oxycodone Itching, Nausea And Vomiting and Anxiety        reports that he has been smoking cigarettes. He started smoking about 11 years ago. He has a 25 pack-year smoking history. He has been exposed to tobacco smoke. He has never used smokeless tobacco. He reports that he does not currently use alcohol. He reports that he does not use drugs.       I independently reviewed and interpreted ECG, telemetry, cardiac labs, other relevant labs,  echo stress test  CXR device interrogation. Unless otherwise reflected in the note, my interpretation is in agreement with the report and use them for decision making whether mentioned or not.  All previous relevant notes including notes and data from other hospitals and prior records were reviewed.    Thank you for allowing me to participate in the care of this patient.    MEDICAL DECISION-MAKING COMPLEXITY    [x] High (any 2)    A. Problems (any 1)  [x] Acute/Chronic Illness/injury posing threat to life or bodily function: Acute heart failure with high risk of adverse outcome  [x] Severe exacerbation of chronic illness: Recurrent atrial fibrillation with decompensation and risk of stroke  ---------------------------------------------------------------------  B. Risk of Treatment (any 1)   [x] Drugs/treatments that require intensive monitoring for toxicity include: Amiodarone, anticoagulation  [] Change in code status:    [] Decision to escalate care:    [] Major surgery/procedure with associated risk factors:    ----------------------------------------------------------------------  C. Data (any 2)  [x] Discussed management of the case with: Team  [x] Imaging personally reviewed and interpreted, includes:    [x] Data Review (any 3)  [] Collateral history obtained from:    [x] All available Consultant notes

## 2025-05-28 NOTE — PROGRESS NOTES
[facility]   Progress Note  Cardiology      Leandra Dawson   Admission date:  5/26/2025  CC-f/up for acute CHF  Subjective:  He feels better. No longer with abdominal pain    Objective:  Medications/Labs all Reviewed     enoxaparin  1 mg/kg SubCUTAneous BID    furosemide  40 mg IntraVENous BID    furosemide        furosemide        haloperidol lactate  5 mg IntraMUSCular Once    amiodarone  200 mg Oral BID    atorvastatin  80 mg Oral Nightly    gabapentin  200 mg Oral TID    metoprolol succinate  37.5 mg Oral Daily    tamsulosin  0.4 mg Oral Daily    sodium chloride flush  5-40 mL IntraVENous 2 times per day    polyethylene glycol  17 g Oral Daily    azithromycin  500 mg IntraVENous Q24H    sodium chloride flush  5-40 mL IntraVENous 2 times per day    piperacillin-tazobactam  3,375 mg IntraVENous Q8H       BMP:   Lab Results   Component Value Date/Time     05/28/2025 04:30 AM    K 4.0 05/28/2025 04:30 AM     05/28/2025 04:30 AM    CO2 24 05/28/2025 04:30 AM    BUN 28 05/28/2025 04:30 AM    CREATININE 1.8 05/28/2025 04:30 AM    MG 2.03 05/27/2025 04:30 AM     CBC:    Lab Results   Component Value Date/Time    WBC 6.2 05/28/2025 04:30 AM    RBC 4.12 05/28/2025 04:30 AM    HGB 11.1 05/28/2025 04:30 AM    HCT 34.8 05/28/2025 04:30 AM    MCV 84.6 05/28/2025 04:30 AM    RDW 19.4 05/28/2025 04:30 AM     05/28/2025 04:30 AM      PT/INR:    Lab Results   Component Value Date    INR 1.31 (H) 05/27/2025    PROTIME 16.5 (H) 05/27/2025     Cardiac Enzymes:    Lab Results   Component Value Date/Time    CKTOTAL 138 04/10/2025 05:09 PM     Lab Results   Component Value Date    TROPONINI 0.08 (H) 04/21/2022    TROPONINI 0.08 (H) 03/15/2022    TROPONINI <0.01 01/12/2022     BNP:  No results found for: \"BNP\"  FASTING LIPID PANEL:    Lab Results   Component Value Date/Time    CHOL 180 11/28/2017 11:03 AM    HDL 34 08/21/2023 01:31 PM    HDL 37 04/29/2010 05:10 PM    TRIG 88 11/28/2017 11:03 AM        Physical Examination:    /78   Pulse 93   Temp (!) 96.7 °F (35.9 °C) (Temporal)   Resp 15   Ht 1.753 m (5' 9\")   Wt 89.5 kg (197 lb 5 oz)   SpO2 92%   BMI 29.14 kg/m²      Respiratory:  Resp Assessment: Normal respiratory effort  Resp Auscultation: Clear to auscultation bilaterally   Cardiovascular:  Auscultation: irregular rhythm due to atrial fib  Palpation:  Nl PMI  JVP: elevated  Extremities: No Edema  Abdomen:  Soft, non-tender  Normal bowel sounds  Extremities:   No Cyanosis or Clubbing  Neurological/Psychiatric:  Seems ok to me,agitated at times  Non-anxious  Skin Warm and dry  Echo reviewed  Assessment:    Principal Problem:    Pleural effusion, left    Active Problems:    Ischemic cardiomyopathy with acute systolic CHF    Persistent atrial fibrillation    Recurrent v. Tach with shocks from ICD. Seems to have calmed down with amiodarone use  Last cath in 2019-unlikely to be a revascularization candidate    Plan:   Restart eliquis  Not a candidate for gallbladder surgery  Continue diuresis  EP consult-consider cardioversion  I have spent  _35___  minutes of face to face time with the patient with more than 50%  spent  counseling and coordinating care for Leandra Dawson

## 2025-05-28 NOTE — CONSULTS
MD Lawrence Gomez MD Aldo Estella, DO                 Office: [phone]                      Fax: [phone]             [employer]                   NEPHROLOGY INITIAL CONSULT NOTE:     PATIENT NAME: Leandra Dawson  : 1950  MRN: [id number]  REASON FOR CONSULT: For evaluation and management of Acute Kidney Injury . (My recommendations will be communicated by way of shared medical record.) by Volodymyr Caldwell MD       RECOMMENDATIONS:     Lasix 40 IV twice daily increased to 80 mg IV on Tuesday  Today decrease to 40 IV twice daily for 1 day     Stopped IV fluids     Follow-up pending echo  Pulmonary team following, thinking more of a heart failure rather than pneumonia  General Surgery team following     In past -has been on lasix and accidentally taking metolazone qd x3 days too on last admission.       Pre-renal LONNIE again likely      In past - ECHO - Grade II diastolic dysfunction with elevated LV filling  Weight in past ~ 84 kg around   Hypoxia needing nasal cannula newly changed  *    no need for dialysis,     at higher risk for decompensation, needing closer monitoring.    D/C plan from renal stand point:- likely ~2-3 days  Failed to follow-up after return by our office for posthospital follow-up  -: follow up w/ us at  [facility] OFFICE  in 2-3  weeks after d/c. (I updated our information in discharge follow up providers' list.)       Thank you for allowing me to participate in this patient's care. Please do not hesitate to contact me anytime. We will follow along with you.       Lawrence Adorno MD,  [facility]  Office: [phone] or [employer]  Fax: [phone]      =======================================================================================      IMPRESSION:       Admitted on:  2025  6:58 AM   For:    Hospital Problems           Last Modified POA    * (Principal) Pleural effusion, left 2025 Yes    Multifocal    Tobacco Use    Smoking status: Every Day     Current packs/day: 0.00     Average packs/day: 0.5 packs/day for 50.0 years (25.0 ttl pk-yrs)     Types: Cigarettes     Start date: 5/12/2014     Last attempt to quit: 12/15/2021     Years since quitting: 3.4     Passive exposure: Current    Smokeless tobacco: Never    Tobacco comments:     5-6 CIGS/DAY   Vaping Use    Vaping status: Never Used   Substance and Sexual Activity    Alcohol use: Not Currently     Comment: occasional    Drug use: No     Social Drivers of Health     Food Insecurity: No Food Insecurity (5/26/2025)    Hunger Vital Sign     Worried About Running Out of Food in the Last Year: Never true     Ran Out of Food in the Last Year: Never true   Transportation Needs: No Transportation Needs (5/26/2025)    PRAPARE - Transportation     Lack of Transportation (Medical): No     Lack of Transportation (Non-Medical): No    Received from [facility], [facility]    Interpersonal Safety   Housing Stability: Low Risk  (5/26/2025)    Housing Stability Vital Sign     Unable to Pay for Housing in the Last Year: No     Number of Times Moved in the Last Year: 0     Homeless in the Last Year: No         MEDICATIONS: reviewed by me.   Medications Prior to Admission:  No current facility-administered medications on file prior to encounter.     Current Outpatient Medications on File Prior to Encounter   Medication Sig Dispense Refill    zinc gluconate 50 MG tablet Take 1 tablet by mouth daily      metoprolol succinate (TOPROL XL) 25 MG extended release tablet Take 1.5 tablets by mouth daily 135 tablet 3    furosemide (LASIX) 20 MG tablet Take 1 tablet by mouth in the morning and 1 tablet in the evening. 60 tablet 1    potassium chloride (KLOR-CON M) 20 MEQ extended release tablet Take 1 tablet by mouth daily 30 tablet 1    tamsulosin (FLOMAX) 0.4 MG capsule Take 1 capsule by mouth daily      midodrine (PROAMATINE) 5  found for: \"RF\"  DSDNA:  No components found for: \"DNA\"  AMYLASE:  No results found for: \"AMYLASE\"  LIPASE:    Lab Results   Component Value Date/Time    LIPASE 11.0 05/27/2025 04:30 AM     Fibrinogen Level:  No components found for: \"FIB\"       BELOW MENTIONED RADIOLOGY STUDY RESULTS BY ME (AS NEEDED FOR MY EVALUATION AND MANAGEMENT).     US GALLBLADDER RUQ  Result Date: 5/28/2025  EXAMINATION: RIGHT UPPER QUADRANT ULTRASOUND 5/28/2025 9:21 am COMPARISON: None. HISTORY: ORDERING SYSTEM PROVIDED HISTORY: possible cholecystitis TECHNOLOGIST PROVIDED HISTORY: Reason for exam:->possible cholecystitis Reason for Exam: possible cholecystitis FINDINGS: LIVER:  The liver demonstrates normal echogenicity without evidence of intrahepatic biliary ductal dilatation.  There are anechoic cysts of the liver without color Doppler flow measuring up to 6 mm in size. BILIARY SYSTEM: Gallbladder is under distended without pericholecystic fluid, or layering stones.  The gallbladder wall measures 7 mm in thickness. Negative sonographic Marmolejo's sign.  Common bile duct is within normal limits measuring 4 mm. RIGHT KIDNEY: The right kidney is normal in size without hydronephrosis. There is a 1.2 cm anechoic cyst. PANCREAS:  Visualized portions of the pancreas are unremarkable. OTHER: No evidence of right upper quadrant ascites.     1. Gallbladder wall thickening without pericholecystic fluid or layering stones. This is nonspecific and may be related to underdistention.  Correlate with previously performed CT and nuclear medicine scan. 2. Hepatic and right renal cysts.     Echo (TTE) limited (PRN contrast/bubble/strain/3D)  Result Date: 5/27/2025    Left Ventricle: Severely reduced left ventricular systolic function with a visually estimated EF of 20 - 25%. Left ventricle is mildly dilated. Normal wall thickness. Severe global hypokinesis present.   Right Ventricle: Right ventricle size is normal. Reduced systolic function. TAPSE is 1.5

## 2025-05-29 PROBLEM — I48.19 PERSISTENT ATRIAL FIBRILLATION (HCC): Status: ACTIVE | Noted: 2025-05-29

## 2025-05-29 PROBLEM — R79.89 ELEVATED LFTS: Status: ACTIVE | Noted: 2025-05-29

## 2025-05-29 LAB
ANION GAP SERPL CALCULATED.3IONS-SCNC: 13 MMOL/L (ref 3–16)
BASOPHILS # BLD: 0.1 K/UL (ref 0–0.2)
BASOPHILS NFR BLD: 1.2 %
BUN SERPL-MCNC: 34 MG/DL (ref 7–20)
CALCIUM SERPL-MCNC: 8.8 MG/DL (ref 8.3–10.6)
CHLORIDE SERPL-SCNC: 103 MMOL/L (ref 99–110)
CO2 SERPL-SCNC: 24 MMOL/L (ref 21–32)
CREAT SERPL-MCNC: 2 MG/DL (ref 0.8–1.3)
DEPRECATED RDW RBC AUTO: 19.2 % (ref 12.4–15.4)
EOSINOPHIL # BLD: 0.1 K/UL (ref 0–0.6)
EOSINOPHIL NFR BLD: 1.9 %
GFR SERPLBLD CREATININE-BSD FMLA CKD-EPI: 34 ML/MIN/{1.73_M2}
GLUCOSE BLD-MCNC: 134 MG/DL (ref 70–99)
GLUCOSE BLD-MCNC: 169 MG/DL (ref 70–99)
GLUCOSE BLD-MCNC: 179 MG/DL (ref 70–99)
GLUCOSE SERPL-MCNC: 108 MG/DL (ref 70–99)
HCT VFR BLD AUTO: 35.7 % (ref 40.5–52.5)
HGB BLD-MCNC: 11.3 G/DL (ref 13.5–17.5)
LYMPHOCYTES # BLD: 2.1 K/UL (ref 1–5.1)
LYMPHOCYTES NFR BLD: 32.7 %
MCH RBC QN AUTO: 26.6 PG (ref 26–34)
MCHC RBC AUTO-ENTMCNC: 31.5 G/DL (ref 31–36)
MCV RBC AUTO: 84.5 FL (ref 80–100)
MONOCYTES # BLD: 0.6 K/UL (ref 0–1.3)
MONOCYTES NFR BLD: 9.8 %
NEUTROPHILS # BLD: 3.4 K/UL (ref 1.7–7.7)
NEUTROPHILS NFR BLD: 54.4 %
PERFORMED ON: ABNORMAL
PLATELET # BLD AUTO: 273 K/UL (ref 135–450)
PMV BLD AUTO: 8.1 FL (ref 5–10.5)
POTASSIUM SERPL-SCNC: 3.7 MMOL/L (ref 3.5–5.1)
RBC # BLD AUTO: 4.23 M/UL (ref 4.2–5.9)
SODIUM SERPL-SCNC: 140 MMOL/L (ref 136–145)
WBC # BLD AUTO: 6.3 K/UL (ref 4–11)

## 2025-05-29 PROCEDURE — 2500000003 HC RX 250 WO HCPCS: Performed by: HOSPITALIST

## 2025-05-29 PROCEDURE — 99232 SBSQ HOSP IP/OBS MODERATE 35: CPT | Performed by: STUDENT IN AN ORGANIZED HEALTH CARE EDUCATION/TRAINING PROGRAM

## 2025-05-29 PROCEDURE — 85025 COMPLETE CBC W/AUTO DIFF WBC: CPT

## 2025-05-29 PROCEDURE — 97166 OT EVAL MOD COMPLEX 45 MIN: CPT

## 2025-05-29 PROCEDURE — 2700000000 HC OXYGEN THERAPY PER DAY

## 2025-05-29 PROCEDURE — 6360000002 HC RX W HCPCS: Performed by: HOSPITALIST

## 2025-05-29 PROCEDURE — 97530 THERAPEUTIC ACTIVITIES: CPT

## 2025-05-29 PROCEDURE — 6370000000 HC RX 637 (ALT 250 FOR IP)

## 2025-05-29 PROCEDURE — 5A0935A ASSISTANCE WITH RESPIRATORY VENTILATION, LESS THAN 24 CONSECUTIVE HOURS, HIGH NASAL FLOW/VELOCITY: ICD-10-PCS | Performed by: HOSPITALIST

## 2025-05-29 PROCEDURE — 99232 SBSQ HOSP IP/OBS MODERATE 35: CPT | Performed by: NURSE PRACTITIONER

## 2025-05-29 PROCEDURE — 2580000003 HC RX 258: Performed by: HOSPITALIST

## 2025-05-29 PROCEDURE — 2060000000 HC ICU INTERMEDIATE R&B

## 2025-05-29 PROCEDURE — 6360000002 HC RX W HCPCS: Performed by: STUDENT IN AN ORGANIZED HEALTH CARE EDUCATION/TRAINING PROGRAM

## 2025-05-29 PROCEDURE — 6370000000 HC RX 637 (ALT 250 FOR IP): Performed by: HOSPITALIST

## 2025-05-29 PROCEDURE — 36415 COLL VENOUS BLD VENIPUNCTURE: CPT

## 2025-05-29 PROCEDURE — 92610 EVALUATE SWALLOWING FUNCTION: CPT

## 2025-05-29 PROCEDURE — 6360000002 HC RX W HCPCS: Performed by: INTERNAL MEDICINE

## 2025-05-29 PROCEDURE — 97162 PT EVAL MOD COMPLEX 30 MIN: CPT

## 2025-05-29 PROCEDURE — 6370000000 HC RX 637 (ALT 250 FOR IP): Performed by: STUDENT IN AN ORGANIZED HEALTH CARE EDUCATION/TRAINING PROGRAM

## 2025-05-29 PROCEDURE — 99291 CRITICAL CARE FIRST HOUR: CPT | Performed by: INTERNAL MEDICINE

## 2025-05-29 PROCEDURE — 80048 BASIC METABOLIC PNL TOTAL CA: CPT

## 2025-05-29 PROCEDURE — 94761 N-INVAS EAR/PLS OXIMETRY MLT: CPT

## 2025-05-29 PROCEDURE — 94640 AIRWAY INHALATION TREATMENT: CPT

## 2025-05-29 PROCEDURE — 97116 GAIT TRAINING THERAPY: CPT

## 2025-05-29 PROCEDURE — 2580000003 HC RX 258: Performed by: STUDENT IN AN ORGANIZED HEALTH CARE EDUCATION/TRAINING PROGRAM

## 2025-05-29 RX ORDER — IPRATROPIUM BROMIDE AND ALBUTEROL SULFATE 2.5; .5 MG/3ML; MG/3ML
1 SOLUTION RESPIRATORY (INHALATION)
Status: DISCONTINUED | OUTPATIENT
Start: 2025-05-29 | End: 2025-05-30 | Stop reason: HOSPADM

## 2025-05-29 RX ORDER — IPRATROPIUM BROMIDE AND ALBUTEROL SULFATE 2.5; .5 MG/3ML; MG/3ML
1 SOLUTION RESPIRATORY (INHALATION) EVERY 4 HOURS PRN
Status: DISCONTINUED | OUTPATIENT
Start: 2025-05-29 | End: 2025-05-30 | Stop reason: HOSPADM

## 2025-05-29 RX ORDER — MORPHINE SULFATE 2 MG/ML
2 INJECTION, SOLUTION INTRAMUSCULAR; INTRAVENOUS
Status: DISCONTINUED | OUTPATIENT
Start: 2025-05-29 | End: 2025-05-30 | Stop reason: HOSPADM

## 2025-05-29 RX ORDER — FUROSEMIDE 10 MG/ML
60 INJECTION INTRAMUSCULAR; INTRAVENOUS ONCE
Status: COMPLETED | OUTPATIENT
Start: 2025-05-29 | End: 2025-05-29

## 2025-05-29 RX ADMIN — MORPHINE SULFATE 2 MG: 2 INJECTION, SOLUTION INTRAMUSCULAR; INTRAVENOUS at 23:23

## 2025-05-29 RX ADMIN — MORPHINE SULFATE 2 MG: 2 INJECTION, SOLUTION INTRAMUSCULAR; INTRAVENOUS at 21:19

## 2025-05-29 RX ADMIN — IPRATROPIUM BROMIDE AND ALBUTEROL SULFATE 1 DOSE: .5; 3 SOLUTION RESPIRATORY (INHALATION) at 16:00

## 2025-05-29 RX ADMIN — TAMSULOSIN HYDROCHLORIDE 0.4 MG: 0.4 CAPSULE ORAL at 08:26

## 2025-05-29 RX ADMIN — FUROSEMIDE 40 MG: 10 INJECTION, SOLUTION INTRAMUSCULAR; INTRAVENOUS at 16:03

## 2025-05-29 RX ADMIN — IPRATROPIUM BROMIDE AND ALBUTEROL SULFATE 1 DOSE: .5; 3 SOLUTION RESPIRATORY (INHALATION) at 12:16

## 2025-05-29 RX ADMIN — APIXABAN 5 MG: 5 TABLET, FILM COATED ORAL at 21:19

## 2025-05-29 RX ADMIN — PIPERACILLIN AND TAZOBACTAM 3375 MG: 3; .375 INJECTION, POWDER, LYOPHILIZED, FOR SOLUTION INTRAVENOUS at 12:49

## 2025-05-29 RX ADMIN — FUROSEMIDE 60 MG: 10 INJECTION, SOLUTION INTRAMUSCULAR; INTRAVENOUS at 12:46

## 2025-05-29 RX ADMIN — IPRATROPIUM BROMIDE AND ALBUTEROL SULFATE 1 DOSE: .5; 3 SOLUTION RESPIRATORY (INHALATION) at 21:02

## 2025-05-29 RX ADMIN — AMIODARONE HYDROCHLORIDE 200 MG: 200 TABLET ORAL at 08:26

## 2025-05-29 RX ADMIN — APIXABAN 5 MG: 5 TABLET, FILM COATED ORAL at 08:27

## 2025-05-29 RX ADMIN — PIPERACILLIN AND TAZOBACTAM 3375 MG: 3; .375 INJECTION, POWDER, LYOPHILIZED, FOR SOLUTION INTRAVENOUS at 03:53

## 2025-05-29 RX ADMIN — GABAPENTIN 200 MG: 100 CAPSULE ORAL at 14:03

## 2025-05-29 RX ADMIN — AZITHROMYCIN MONOHYDRATE 500 MG: 500 INJECTION, POWDER, LYOPHILIZED, FOR SOLUTION INTRAVENOUS at 10:36

## 2025-05-29 RX ADMIN — POLYETHYLENE GLYCOL 3350 17 G: 17 POWDER, FOR SOLUTION ORAL at 08:26

## 2025-05-29 RX ADMIN — LORAZEPAM 0.5 MG: 2 INJECTION INTRAMUSCULAR; INTRAVENOUS at 16:03

## 2025-05-29 RX ADMIN — ONDANSETRON 4 MG: 2 INJECTION, SOLUTION INTRAMUSCULAR; INTRAVENOUS at 14:03

## 2025-05-29 RX ADMIN — METOPROLOL SUCCINATE 37.5 MG: 25 TABLET, EXTENDED RELEASE ORAL at 08:26

## 2025-05-29 RX ADMIN — GABAPENTIN 200 MG: 100 CAPSULE ORAL at 08:26

## 2025-05-29 RX ADMIN — GABAPENTIN 200 MG: 100 CAPSULE ORAL at 21:19

## 2025-05-29 RX ADMIN — MELATONIN TAB 3 MG 3 MG: 3 TAB at 21:19

## 2025-05-29 RX ADMIN — IPRATROPIUM BROMIDE AND ALBUTEROL SULFATE 1 DOSE: .5; 3 SOLUTION RESPIRATORY (INHALATION) at 07:50

## 2025-05-29 RX ADMIN — SODIUM CHLORIDE, PRESERVATIVE FREE 10 ML: 5 INJECTION INTRAVENOUS at 08:27

## 2025-05-29 ASSESSMENT — ENCOUNTER SYMPTOMS
SHORTNESS OF BREATH: 1
CHEST TIGHTNESS: 1
EYES NEGATIVE: 1
ALLERGIC/IMMUNOLOGIC NEGATIVE: 1
ABDOMINAL DISTENTION: 1

## 2025-05-29 ASSESSMENT — PAIN SCALES - GENERAL
PAINLEVEL_OUTOF10: 0
PAINLEVEL_OUTOF10: 0

## 2025-05-29 NOTE — CARE COORDINATION
DISCHARGE PLANNING:  Palliative & hospice care have been consulted.  The Palliative Care NP calling patient's spouse who had questions regarding Hospice care.

## 2025-05-29 NOTE — PLAN OF CARE
Problem: Discharge Planning  Goal: Discharge to home or other facility with appropriate resources  Outcome: Progressing     Problem: Pain  Goal: Verbalizes/displays adequate comfort level or baseline comfort level  Outcome: Progressing     Problem: Skin/Tissue Integrity - Adult  Goal: Skin integrity remains intact  Description: 1.  Monitor for areas of redness and/or skin breakdown2.  Assess vascular access sites hourly3.  Every 4-6 hours minimum:  Change oxygen saturation probe site4.  Every 4-6 hours:  If on nasal continuous positive airway pressure, respiratory therapy assess nares and determine need for appliance change or resting period  Outcome: Progressing     Problem: Musculoskeletal - Adult  Goal: Return mobility to safest level of function  Outcome: Progressing     Problem: Skin/Tissue Integrity  Goal: Skin integrity remains intact  Description: 1.  Monitor for areas of redness and/or skin breakdown2.  Assess vascular access sites hourly3.  Every 4-6 hours minimum:  Change oxygen saturation probe site4.  Every 4-6 hours:  If on nasal continuous positive airway pressure, respiratory therapy assess nares and determine need for appliance change or resting period  Outcome: Progressing

## 2025-05-29 NOTE — RT PROTOCOL NOTE
RT Nebulizer Bronchodilator Protocol Note    There is a bronchodilator order in the chart from a provider indicating to follow the RT Bronchodilator Protocol and there is an “Initiate RT Bronchodilator Protocol” order as well (see protocol at bottom of note).    CXR Findings:  No results found.    The findings from the last RT Protocol Assessment were as follows:  Smoking: Chronic pulmonary disease  Respiratory Pattern: Dyspnea on exertion or RR 21-25 bpm  Breath Sounds: Slightly diminished and/or crackles  Cough: Strong, spontaneous, non-productive  Indication for Bronchodilator Therapy:    Bronchodilator Assessment Score: 6    Aerosolized bronchodilator medication orders have been revised according to the RT Nebulizer Bronchodilator Protocol below.    Respiratory Therapist to perform RT Therapy Protocol Assessment initially then follow the protocol.  Repeat RT Therapy Protocol Assessment PRN for score 0-3 or on second treatment, BID, and PRN for scores above 3.    No Indications - adjust the frequency to every 6 hours PRN wheezing or bronchospasm, if no treatments needed after 48 hours then discontinue using Per Protocol order mode.     If indication present, adjust the RT bronchodilator orders based on the Bronchodilator Assessment Score as indicated below.  If a patient is on this medication at home then do not decrease Frequency below that used at home.    0-3 - enter or revise RT bronchodilator order(s) to equivalent RT Bronchodilator order with Frequency of every 4 hours PRN for wheezing or increased work of breathing using Per Protocol order mode.       4-6 - enter or revise RT Bronchodilator order(s) to two equivalent RT bronchodilator orders with one order with BID Frequency and one order with Frequency of every 4 hours PRN wheezing or increased work of breathing using Per Protocol order mode.         7-10 - enter or revise RT Bronchodilator order(s) to two equivalent RT bronchodilator orders with one

## 2025-05-29 NOTE — PROGRESS NOTES
Pulmonary and Critical Care Medicine    CC: SOB   Date: 2025  Admit Date:  2025  Reason for Consultation: Pleural effusion   Consult Requesting Physician: Volodymyr Caldwell MD     HPI     This is a 74 y/o M w/ a hx of on prn oxygen, tobacco use, CAD s/p CABG, HTN, HLD, PPM/ICD, Afib, who presented with SOB and abd pain.     Overnight:  He is on 4L NC  Cr is worse, BNP is worse  LFT are worse  Low UOP    ROS: negative except stated above    OBJECTIVE DATA       PHYSICAL EXAM:   Temp  Av.5 °F (36.4 °C)  Min: 97.1 °F (36.2 °C)  Max: 97.8 °F (36.6 °C)  Pulse  Av  Min: 76  Max: 93  BP  Min: 94/62  Max: 112/71  SpO2  Av.8 %  Min: 91 %  Max: 96 %    General: NAD  Neuro: A0x3  Lung: crackles equal non labored  Heart: regular rate    MEDICATIONS: Reviewed  Scheduled Meds:   furosemide  40 mg IntraVENous BID    ipratropium 0.5 mg-albuterol 2.5 mg  1 Dose Inhalation Q4H WA RT    apixaban  5 mg Oral BID    haloperidol lactate  5 mg IntraMUSCular Once    gabapentin  200 mg Oral TID    metoprolol succinate  37.5 mg Oral Daily    tamsulosin  0.4 mg Oral Daily    sodium chloride flush  5-40 mL IntraVENous 2 times per day    polyethylene glycol  17 g Oral Daily    azithromycin  500 mg IntraVENous Q24H    sodium chloride flush  5-40 mL IntraVENous 2 times per day    piperacillin-tazobactam  3,375 mg IntraVENous Q8H     Continuous Infusions:   sodium chloride Stopped (25 1340)    sodium chloride      dextrose       PRN Meds:.sodium chloride flush, sodium chloride flush, sodium chloride, potassium chloride **OR** potassium alternative oral replacement **OR** potassium chloride, magnesium sulfate, ondansetron **OR** ondansetron, melatonin, senna, aluminum & magnesium hydroxide-simethicone, acetaminophen **OR** acetaminophen, sulfur hexafluoride microspheres, sodium chloride flush, sodium chloride, dextrose bolus **OR** dextrose bolus, glucagon (rDNA), dextrose     LABS: Reviewed.   Recent Labs      hospitalist     Patient sister was at the bedside, also talked to wife on the phone    Pulmonary medicine will sign off    Moderate risk     Jose L Gillespie MD  Pulmonary and Critical Care Medicine   [facility]

## 2025-05-29 NOTE — PLAN OF CARE
Problem: Chronic Conditions and Co-morbidities  Goal: Patient's chronic conditions and co-morbidity symptoms are monitored and maintained or improved  Outcome: Progressing     Problem: Discharge Planning  Goal: Discharge to home or other facility with appropriate resources  Outcome: Progressing     Problem: Pain  Goal: Verbalizes/displays adequate comfort level or baseline comfort level  Outcome: Progressing     Problem: Confusion  Goal: Confusion, delirium, dementia, or psychosis is improved or at baseline  Description: INTERVENTIONS:1. Assess for possible contributors to thought disturbance, including medications, impaired vision or hearing, underlying metabolic abnormalities, dehydration, psychiatric diagnoses, and notify attending LIP2. [place] high risk fall precautions, as indicated3. Provide frequent short contacts to provide reality reorientation, refocusing and direction4. Decrease environmental stimuli, including noise as appropriate5. Monitor and intervene to maintain adequate nutrition, hydration, elimination, sleep and activity6. If unable to ensure safety without constant attention obtain sitter and review sitter guidelines with assigned personnel7. Initiate Psychosocial CNS and Spiritual Care consult, as indicated  INTERVENTIONS:1. Assess for possible contributors to thought disturbance, including medications, impaired vision or hearing, underlying metabolic abnormalities, dehydration, psychiatric diagnoses, and notify attending LIP2. [place] high risk fall precautions, as indicated3. Provide frequent short contacts to provide reality reorientation, refocusing and direction4. Decrease environmental stimuli, including noise as appropriate5. Monitor and intervene to maintain adequate nutrition, hydration, elimination, sleep and activity6. If unable to ensure safety without constant attention obtain sitter and review sitter guidelines with assigned personnel7. Initiate Psychosocial CNS and  injury  Outcome: Progressing

## 2025-05-29 NOTE — CONSULTS
PALLIATIVE MEDICINE CONSULTATION     Patient name:Leandra Dawson   MRN:[id number]    :1950  Room/Bed:A1J-8839/5902-01   LOS: 3 days         Date of consult:2025    Inpatient consult to Palliative Care  Consult performed by: Linda Marques APRN - CNP  Consult ordered by: Volodymyr Caldwell MD  Reason for consult: GOC and code status              ASSESSMENT/RECOMMENDATIONS     75 y.o. male with CHF, pleural effusion with Dyspnea and COPD      Symptom Management:  Dyspnea- pt on Oxygen at bedtime at home on 6L today and short of breath with talking   CHF- EF 20% on ECHO  this admission pt with elevating Cr up to 2.0 with diuresis pt positive 1.4L yesterday with decreased UO  Goals of Care-talk to patient along with his sister and brother-in-law at the bedside regarding goals of care.  Patient states over the last 3 months his health has started to rapidly decline with continued breathing issues and weakness and exercise intolerance.  He says his wife takes care of most things in the house he just pays the bills.  Discussed CODE STATUS patient states that he does not want to be intubated he does not want shocked but he would be okay with chest compressions.  Educated patient on CPR how it works and inability to say yes to just compressions only he verbalized understanding he would like to discuss with his wife right now he is a limited code.  Patient's long-term goal is to stay home as long as possible he does not want to live in a nursing home or go somewhere where he is dependent on others.  He is waiting to talk to his cardiologist he is asking about what options are if there is no further cardiac interventions available.  We discussed briefly the advanced cardiac program to [facility]. We talked about the nature of CHF and that diuresis is treating the symptom of heart failure but doing nothing to fix the heart itself. [facility] advanced cardiac program for aggressive fluid  team    If new document completed, original was provided to patient and/or family member.    Copy was placed for scanning into the [facility] EMR.      I spent 20 minutes providing separately identifiable ACP services with the patient and/or surrogate decision maker in a voluntary, in-person conversation discussing the patient's wishes and goals as detailed in the above note.                    Signed By: Electronically signed by GEORGE Pradhan CNP on 5/29/2025 at 12:51 PM  Palliative Medicine     May 29, 2025

## 2025-05-29 NOTE — PROGRESS NOTES
Date of Admission: 5/26/2025. Hospital Day: 4       HOSPITAL COURSE  75 y.o. male with medical history including CAD, CABG, HTN, HLD, PAD, PAF, cardiomyopathy, s/p PPM/AICD and tobacco smoking presented to emergency room with complaint of chest pain, shortness of breath, abdominal pain and nausea for last 2 to 3 days.    Interval  History:   Patient seen sitting in the chair, no distress in the morning but had episodes of respiratory distress in the afternoon.       Physical Exam     BP 96/72   Pulse 81   Temp 97 °F (36.1 °C) (Temporal)   Resp 16   Ht 1.753 m (5' 9\")   Wt 88.9 kg (195 lb 15.8 oz)   SpO2 93%   BMI 28.94 kg/m²     General appearance: No apparent distress, appears stated age and cooperative.  Respiratory:  Normal respiratory effort. Clear to auscultation, bilaterally without Rales/Wheezes/Rhonchi.  Cardiovascular: Regular rate and rhythm with normal S1/S2 without murmurs, rubs or gallops.          Assessment/Plan:    Chest pain, unclear etiology, rule out ACS: Elevated troponin  Left greater than right pleural effusion, unclear etiology likely due to #3  Acute on chronic systolic CHF: Elevated BNP, RODGERS, orthopnea, weight gain  LONNIE  cr 1.8, baseline 1 today . U/o  475 ml 24 hr  Abdominal pain, RUQ: CT findings suspictious for acute cholecystitis but ruled out by HIDA  Encephalopathy  suspect hospital delerium possibly worsened by missing gabapentin. improving  Bilateral consolidative changes on CAT scan, cannot rule out multifocal pneumonia  S/p PPM/AICD  Persistent atrial fibrillation - on anticoagulation with eliquis.  History of DC cardioversion.  CAD history of MI  COPD  GERD  Hypertension  History of peripheral vascular disease  Tobacco smoking, cigarrattes         Echocardiogram show EF down to 20 %.  No improvement in respiratory status with poor output and worsening LONNIE.  Discussed in detail with cardiology regarding prognosis as well as pulmonary and nephrology.  With worsening  **OR** potassium alternative oral replacement **OR** potassium chloride, magnesium sulfate, ondansetron **OR** ondansetron, melatonin, senna, aluminum & magnesium hydroxide-simethicone, acetaminophen **OR** acetaminophen, sulfur hexafluoride microspheres, sodium chloride flush, sodium chloride, dextrose bolus **OR** dextrose bolus, glucagon (rDNA), dextrose      Intake/Output Summary (Last 24 hours) at 5/29/2025 1401  Last data filed at 5/29/2025 1028  Gross per 24 hour   Intake 1854.15 ml   Output 490 ml   Net 1364.15 ml                 Labs:   Recent Labs     05/27/25  0430 05/28/25  0430 05/29/25  0501   WBC 7.1 6.2 6.3   HGB 11.4* 11.1* 11.3*   HCT 36.0* 34.8* 35.7*    273 273     Recent Labs     05/27/25  0430 05/28/25  0430 05/29/25  0501    139 140   K 4.0 4.0 3.7    102 103   CO2 21 24 24   BUN 24* 28* 34*   CREATININE 1.6* 1.8* 2.0*   CALCIUM 9.3 8.8 8.8   PHOS 5.5*  --   --      Recent Labs     05/27/25  0430 05/28/25  1352   AST 64* 254*   ALT 53* 263*   BILIDIR 0.3 0.3   BILITOT 0.6 0.6   ALKPHOS 107 151*     Recent Labs     05/27/25  0430   INR 1.31*     No results for input(s): \"CKTOTAL\", \"TROPHS\" in the last 72 hours.      Urinalysis:      Lab Results   Component Value Date/Time    NITRU Negative 04/11/2025 02:40 AM    WBCUA 1 08/02/2024 04:39 PM    BACTERIA None Seen 08/02/2024 04:39 PM    RBCUA 1 08/02/2024 04:39 PM    BLOODU Negative 04/11/2025 02:40 AM    GLUCOSEU >=1000 04/11/2025 02:40 AM    GLUCOSEU NEGATIVE 01/30/2012 11:05 AM       Radiology:  US GALLBLADDER RUQ   Final Result   1. Gallbladder wall thickening without pericholecystic fluid or layering   stones. This is nonspecific and may be related to underdistention.  Correlate   with previously performed CT and nuclear medicine scan.   2. Hepatic and right renal cysts.         NM HEPATOBILIARY   Final Result   No convincing scintigraphic evidence of acute cholecystitis.         CT CHEST ABDOMEN PELVIS WO CONTRAST  Additional Contrast? None   Final Result   1. Moderate-large left and small right pleural effusions, with adjacent   atelectasis.   2. Bibasilar (left greater than right) consolidations versus atelectasis,   cannot exclude multifocal infectious process.   3. Gallbladder sludge, with surrounding inflammatory changes suspicious for   developing cholecystitis.  Recommend surgical consultation; right upper   quadrant ultrasound available for further characterization, if clinically   indicated.  No biliary ductal dilation.   4. Urinary bladder wall thickening may be due to under distension, correlate   clinically for cystitis.   5. Findings suggestive of pulmonary arterial hypertension.   6. Sequela of old granulomatous disease.         XR CHEST PORTABLE   Final Result   1. Left basilar opacity favored to represent projectional artifact, cannot   exclude developing consolidation or pleural effusion.   2. Diffuse patchy opacities may represent edema versus multifocal infectious   process.   3. Right upper lung infiltrates have resolved as compared to 04/14/2025.             IP CONSULT TO PULMONOLOGY  IP CONSULT TO CARDIOLOGY  IP CONSULT TO GENERAL SURGERY  IP CONSULT TO NEPHROLOGY  IP CONSULT TO PALLIATIVE CARE  IP CONSULT TO HOSPICE      Volodymyr Caldwell MD

## 2025-05-29 NOTE — PROGRESS NOTES
rhythm due to his cardiomyopathy. Will plan for possible YUSRA/DCCV in the next few days when he is more stable. He will need YUSRA as he was off therapeutic anti-coagulation for 48 hours initially upon his admission. He is meeting with palliative care today to discuss his goals of care.     2.ICM  - S/p Dual chamber [employer] AICD (3/2022)  - Follow up with device clinic as scheduled    3. VT   - No recurrence    4. Acute on Chronic systolic heart failure (NYHA Class IV)  - Appears decompensated   ~ EF 20% per echo  - Continue with BB  - Diuresis with IV lasix  - Monitor I&O's, Daily weights    5.CAD  - Stable  - No complaints of angina  - Continue ASA, BB, and statin    6. Acute Respiratory Failure, Pleural Effusions   - Stable, on 4L   - Continue diuresis    7. Elevated LFTs   - Likely due to volume overload and liver congestion  - Hold statin and amio  - Check every couple days    8. Goals of Care   - Pt is considering his goals of care/code status, which is reasonable given his array of cardiac co-morbidities    Allergies:  Allergies   Allergen Reactions    Oxycodone Itching, Nausea And Vomiting and Anxiety     Home Meds:  Prior to Visit Medications    Medication Sig Taking? Authorizing Provider   zinc gluconate 50 MG tablet Take 1 tablet by mouth daily Yes Fay Rees MD   metoprolol succinate (TOPROL XL) 25 MG extended release tablet Take 1.5 tablets by mouth daily Yes Jeremie Reece MD   furosemide (LASIX) 20 MG tablet Take 1 tablet by mouth in the morning and 1 tablet in the evening. Yes Jenna Velasquez PA-C   potassium chloride (KLOR-CON M) 20 MEQ extended release tablet Take 1 tablet by mouth daily Yes Jenna Velasquez PA-C   tamsulosin (FLOMAX) 0.4 MG capsule Take 1 capsule by mouth daily Yes Fay Rees MD   midodrine (PROAMATINE) 5 MG tablet Take 1 tablet by mouth 3 times daily as needed (for bp less than 90/60) Yes Jeremie Reece MD   ELIQUIS 5 MG TABS tablet TAKE 1 TABLET BY MOUTH  acetaminophen **OR** acetaminophen, sulfur hexafluoride microspheres, sodium chloride flush, sodium chloride, dextrose bolus **OR** dextrose bolus, glucagon (rDNA), dextrose     Past Medical History:  Past Medical History:   Diagnosis Date    Acid reflux     Acute MI (HCC)     LONNIE (acute kidney injury)     Anemia     BPH (benign prostatic hyperplasia)     h/o TURP w/ Dr Sheikh    Bradycardia     CAD (coronary artery disease)     CAD w MI, s/p CABG 2014    Chronic constipation     COPD (chronic obstructive pulmonary disease) (HCC)     Diverticulitis     Enuresis     Flutter-fibrillation (HCC) 2019    s/p DCCV    H/O tobacco use, presenting hazards to health     Hiatal hernia     Hypertension     diagnosed at age 22    IFG (impaired fasting glucose)     Occlusion and stenosis of bilateral carotid arteries     Paresthesia     Peripheral vascular disease     with left leg revascularization    Postoperative atrial fibrillation (HCC) 2014    Presence of combination internal cardiac defibrillator (ICD) and pacemaker     Prolonged QT interval     Pulmonary nodule     Rectal polyp 12/2016    Renal insufficiency     Rubeola     Shingles     per pt, started 12/2021, still visible 3/2022, not open wounds at that time    Sigmoid diverticulitis 12/01/2015    Syncope and collapse 01/05/2022    Tobacco dependence syndrome     URI (upper respiratory infection) 11/15/2024    Urinary incontinence     Ventricular tachycardia (HCC)       Past Surgical History:    has a past surgical history that includes vascular surgery (Left); Cardiac surgery; Colonoscopy; Cardiac catheterization; pacemaker placement; Cataract extraction (Right, 12/2024); Cataract extraction (Left, 01/2025); and TURP.     Social History:  Reviewed.  reports that he has been smoking cigarettes. He started smoking about 11 years ago. He has a 25 pack-year smoking history. He has been exposed to tobacco smoke. He has never used smokeless tobacco. He reports that he  Ventricle: Severely reduced left ventricular systolic function with a visually estimated EF of 20 - 25%. Left ventricle is mildly dilated. Normal wall thickness. Severe global hypokinesis present.    Right Ventricle: Right ventricle size is normal. Reduced systolic function. TAPSE is 1.5 cm.    Aortic Valve: Mild regurgitation.    Mitral Valve: No regurgitation. Mild paravalvular regurgitation.    Tricuspid Valve: Mild regurgitation.    Left Atrium: Left atrium is dilated.    Right Atrium: Right atrium is dilated.    GXT: 2021   Left ventricular ejection fraction is normal at 53%.   Moderate risk scan given ischemia and mildly elevated LV volumes     Cath: 3/22  ~Coronary Angiography w/ severe MVD and 2 patent grafts.  ~no ischemic cause for VT  ~normal lvedp    CXR: 5/26/25  1. Left basilar opacity favored to represent projectional artifact, cannot  exclude developing consolidation or pleural effusion.  2. Diffuse patchy opacities may represent edema versus multifocal infectious  process.  3. Right upper lung infiltrates have resolved as compared to 04/14/2025.    Problem List:   Patient Active Problem List    Diagnosis Date Noted    CAD (coronary artery disease) 08/24/2012    Mixed hyperlipidemia 09/15/2022    Atrial flutter (HCC) 09/15/2022    Tobacco dependence syndrome 06/21/2022    VT (ventricular tachycardia) ([facility])     Multifocal pneumonia 04/21/2022    Syncope 04/21/2022    Primary hypertension 08/24/2012    Pleural effusion, left 05/26/2025    Hypokalemia 04/10/2025    S/P CABG (coronary artery bypass graft) 02/05/2025    Long-term use of high-risk medication 06/19/2024    Chronic diastolic heart failure (HCC) 03/21/2022    ICD (implantable cardioverter-defibrillator), single, in situ 03/18/2022    Syncope and collapse 03/15/2022    Ischemic cardiomyopathy     PAF (paroxysmal atrial fibrillation) ([facility])     Acute on chronic systolic heart failure, NYHA class 3 ([facility])     High cholesterol 01/12/2022

## 2025-05-29 NOTE — PROGRESS NOTES
[facility]   Progress Note  Cardiology      Leandra Dawson   Admission date:  5/26/2025  CC-f/up for acute CHF  Subjective:  Marked dyspnea today    Objective:  Medications/Labs all Reviewed     LORazepam  0.5 mg IntraVENous Once    furosemide  40 mg IntraVENous BID    ipratropium 0.5 mg-albuterol 2.5 mg  1 Dose Inhalation Q4H WA RT    apixaban  5 mg Oral BID    haloperidol lactate  5 mg IntraMUSCular Once    gabapentin  200 mg Oral TID    metoprolol succinate  37.5 mg Oral Daily    tamsulosin  0.4 mg Oral Daily    sodium chloride flush  5-40 mL IntraVENous 2 times per day    polyethylene glycol  17 g Oral Daily    azithromycin  500 mg IntraVENous Q24H    sodium chloride flush  5-40 mL IntraVENous 2 times per day    piperacillin-tazobactam  3,375 mg IntraVENous Q8H       BMP:   Lab Results   Component Value Date/Time     05/29/2025 05:01 AM    K 3.7 05/29/2025 05:01 AM     05/29/2025 05:01 AM    CO2 24 05/29/2025 05:01 AM    BUN 34 05/29/2025 05:01 AM    CREATININE 2.0 05/29/2025 05:01 AM    MG 2.03 05/27/2025 04:30 AM     CBC:    Lab Results   Component Value Date/Time    WBC 6.3 05/29/2025 05:01 AM    RBC 4.23 05/29/2025 05:01 AM    HGB 11.3 05/29/2025 05:01 AM    HCT 35.7 05/29/2025 05:01 AM    MCV 84.5 05/29/2025 05:01 AM    RDW 19.2 05/29/2025 05:01 AM     05/29/2025 05:01 AM      PT/INR:    Lab Results   Component Value Date    INR 1.31 (H) 05/27/2025    PROTIME 16.5 (H) 05/27/2025     Cardiac Enzymes:    Lab Results   Component Value Date/Time    CKTOTAL 138 04/10/2025 05:09 PM     Lab Results   Component Value Date    TROPONINI 0.08 (H) 04/21/2022    TROPONINI 0.08 (H) 03/15/2022    TROPONINI <0.01 01/12/2022     BNP:  No results found for: \"BNP\"  FASTING LIPID PANEL:    Lab Results   Component Value Date/Time    CHOL 180 11/28/2017 11:03 AM    HDL 34 08/21/2023 01:31 PM    HDL 37 04/29/2010 05:10 PM    TRIG 88 11/28/2017 11:03 AM       Physical Examination:    BP 96/72

## 2025-05-29 NOTE — PROGRESS NOTES
[facility] - Inpatient Rehabilitation Department   Phone: [phone]    Occupational Therapy    [x] Initial Evaluation            [] Daily Treatment Note         [] Discharge Summary      Patient: Leandra Dawson   : 1950   MRN: [id number]   Date of Service:  2025    Admitting Diagnosis:  Pleural effusion, left  Current Admission Summary: Leandra Dawson is a 75 y.o. male with medical history including CAD, CABG, HTN, HLD, PAD, PAF, cardiomyopathy, s/p PPM/AICD and tobacco smoking presented to emergency room with complaint of chest pain, shortness of breath, abdominal pain and nausea for last 2 to 3 days. Patient reports he has been having chest pain intermittently for last 1 week.  Patient reports chest pain mainly in the middle of the chest, 8 out of 10 at times, nonradiating, associated with some nausea, abdominal pain and some shortness of breath.  Patient reports has gained about 8 pounds in last 1 week and noted some swelling of the legs. Patient reports history suggestive of orthopnea and dyspnea on exertion.  Patient denies any fever or chills but reports cough, no blood in the sputum.  Patient reports noted some pain in abdomen mainly all over in the left side which is associated with some nausea and diarrhea.  Denies any blood in the stool or urine.  Patient denies any tingling or numbness.  In ER, patient was found to have elevated BNP, elevated troponin, CT scan abdomen, pelvis and chest suggestive of left worse than right pleural effusion, consolidative changes in lower lobes suggestive of pneumonia, gallbladder changes suggestive of acute cholecystitis. ER team recommended admit to Hospital for further management under Hospitalist service.   Past Medical History:  has a past medical history of Acid reflux, Acute MI (HCC), LONNIE (acute kidney injury), Anemia, BPH (benign prostatic hyperplasia), Bradycardia, CAD (coronary artery disease), Chronic constipation, COPD (chronic  reviewed and approve the above documentation. I provided supervision throughout session and guidance in clinical decision making as needed. )

## 2025-05-29 NOTE — PROGRESS NOTES
Speech Language Pathology  [facility] - Inpatient Rehabilitation Services  [phone]  SLP Clinical Swallow Evaluation       Patient: Leandra Dawson   : 1950   MRN: [id number]      Evaluation Date: 2025      Admitting Dx: Shortness of breath [R06.02]  Pleural effusion, left [J90]  Chest pain, unspecified type [R07.9]  Multifocal pneumonia [J18.9]  Acute congestive heart failure, unspecified heart failure type (HCC) [I50.9]  Treatment Diagnosis: Oropharyngeal Dysphagia   Pain: Did not state                                  Recommendations      Recommended Diet and Intervention 2025:  Diet Solids Recommendation:  Regular texture diet  in conjunction with energy conservation strategies (see below) Liquid Consistency Recommendation:  Thin liquids, No straws   Strict small open cup sips Recommended form of Meds: Meds in puree  or Meds crushed as able in puree    If pt respiratory status declines recommend considering NPO pending SLP re-assessment.     1) If aggressive means of care are desired would recommend modified barium swallow study   2) If comfort care measure are desired would recommend continuing the above diet with use of aspiration precautions, oral care and safe swallow strategies      Compensatory strategies: Eat or Feed Slowly, No Straws , Remain Upright 30-45 min , Upright as possible with all PO intake , To Be Determined     Discharge Recommendations:  Discharge recommendations to be determined pending ongoing follow-up during acute care stay    History/Course of Treatment     H&P: 75 y.o. male who presents to the emergency department secondary to concern for chest pain and shortness of breath.  Reports history of atrial fibrillation and is supposed to see cardiology this week, however came in early because he is having persistent pain specifically in the left side of his chest which does not radiate.  It is also associate with shortness of breath.  He denies take any

## 2025-05-29 NOTE — PROGRESS NOTES
Pt family alerted this nurse that pt was sob. Pt had wet sounding lungs and a wet cough on assessment PRN anxiety med and Diuretics given. Pt placed on High flow cannula and bumped up to 5L of o2. Pt calm and comfortable. Provider updated. Cpap being brought to bedside by RT later this evening. Family in room visiting with pt. Pt looks comfortable and has no further complaints.   ED MD

## 2025-05-29 NOTE — PROGRESS NOTES
05/29/25 1542   RT Protocol   History Pulmonary Disease 2   Respiratory pattern 2   Breath sounds 2   Cough 0   Bronchodilator Assessment Score 6

## 2025-05-29 NOTE — CARE COORDINATION
Case Management Assessment  Initial Evaluation    Date/Time of Evaluation: 5/29/2025 1:57 PM  Assessment Completed by: MARCO CERVATNES RN    If patient is discharged prior to next notation, then this note serves as note for discharge by case management.    Patient Name: Leandra Dawson                   YOB: 1950  Diagnosis: Shortness of breath [R06.02]  Pleural effusion, left [J90]  Chest pain, unspecified type [R07.9]  Multifocal pneumonia [J18.9]  Acute congestive heart failure, unspecified heart failure type (HCC) [I50.9]                   Date / Time: 5/26/2025  6:58 AM    Patient Admission Status: Inpatient   Readmission Risk (Low < 19, Mod (19-27), High > 27): Readmission Risk Score: 22    Current PCP: Augusto Gordillo, DO  PCP verified by ? No    Chart Reviewed: Yes      History Provided by: Patient, Medical Record, Other (see comment) (family member in the room)  Patient Orientation: Alert and Oriented, Person, Place    Patient Cognition: Alert    Hospitalization in the last 30 days (Readmission):  No    If yes, Readmission Assessment in  Navigator will be completed.    Advance Directives:      Code Status: Limited   Patient's Primary Decision Maker is: Legal Next of Kin    Primary Decision Maker: Kandy Dawson AIMEE - Spouse - [phone]    Discharge Planning:    Patient lives with: Spouse/Significant Other Type of Home: House  Primary Care Giver: Spouse  Patient Support Systems include: Spouse/Significant Other, Family Members   Current Financial resources: Medicare  Current community resources: Other (Comment)  Current services prior to admission: Durable Medical Equipment, Oxygen Therapy            Current DME: Cane, Oxygen Therapy (Comment) (via propell)            Type of Home Care services:  None    ADLS  Prior functional level: Assistance with the following:, Cooking, Housework, Shopping  Current functional level: Assistance with the following:, Cooking, Housework, Shopping    PT

## 2025-05-29 NOTE — FLOWSHEET NOTE
05/28/25 2115   Assessment   Charting Type Shift assessment   Psychosocial   Psychosocial (WDL) X   Patient Behaviors Congruent   Rest/Sleep for Patient Poor   Neurological   Level of Consciousness 0   Orientation Level Oriented to person;Disoriented to place;Disoriented to time;Disoriented to situation   Cognition Short term memory loss;Follows commands;Poor attention/concentration;Poor safety awareness;Poor judgement   Speech Clear;Appropriate for developmental age   R Pupil Size (mm) 2   R Pupil Shape Round   R Pupil Reaction Brisk   L Pupil Size (mm) 2   L Pupil Shape Round   L Pupil Reaction Brisk   R Hand  Strong   L Hand  Strong   R Foot Dorsiflexion Strong   L Foot Dorsiflexion Strong   R Foot Plantar Flexion Strong   L Foot Plantar Flexion Strong   RUE Motor Response Responds to command;Normal flexion;Normal extension;Movement to painful stimulus   RUE Sensation  Full sensation;No numbness;No pain;No tingling   LUE Motor Response Responds to command;Normal flexion;Normal extension;Movement to painful stimulus   LUE Sensation  Full sensation;No numbness;No pain;No tingling   RLE Motor Response Responds to command;Normal flexion;Normal extension;Movement to painful stimulus   RLE Sensation  Decreased;Numbness;Tingling;No pain   LLE Motor Response Responds to command;Normal flexion;Normal extension;Movement to painful stimulus   LLE Sensation  Decreased;Numbness;Tingling;No pain   Gag Present   R Babinski Reflex Absent   L Babinski Reflex Absent   Cough Reflex Present   Neuro (WDL) X   Mcpherson Coma Scale   Eye Opening 4   Best Verbal Response 5   Best Motor Response 6   Richard Coma Scale Score 15   HEENT (Head, Ears, Eyes, Nose, & Throat)   HEENT (WDL) X   Right Eye Impaired vision   Left Eye Impaired vision   Teeth Dentures upper;Partial plate lower   Respiratory   Respiratory Pattern Regular   Respiratory Depth Normal   Respiratory Quality/Effort Unlabored;Dyspnea with exertion;Orthopneic   Chest  than/Equal to 3 seconds   Color Appropriate for Ethnicity   Temperature Warm   L Pedal Pulse +1   Claudication Assessment None   Anti-Embolism   Anti-Embolism Devices Other (Comment)  (medication-Eliquis)   Anti-Embolism Intervention Medication  (Eliquis)   Skin Integumentary    Skin Integumentary (WDL) WDL   Musculoskeletal   Musculoskeletal (WDL) WDL   Urinary Catheter 05/28/25 Johnson   Placement Date/Time: 05/28/25 1620   Inserted by: WALDO  Insertion attempts: 1  Catheter Type: Johnson  Catheter Size: 16 FR  Catheter Balloon Size: 10 mL  Insertion Procedure Practices: Insertion date placed on johnson drainage bag;Order written;Pre-connect...   Catheter Indications Need for fluid volume management of the critically ill patient in a critical care setting   Site Assessment No urethral drainage;Pale   Urine Color Yellow   Urine Appearance Clear   Urine Odor Malodorous   Collection Container Standard   Securement Method Securing device (Describe)   Catheter Best Practices  Drainage tube clipped to bed;Catheter secured to thigh;Tamper seal intact;Bag below bladder;Bag not on floor;Lack of dependent loop in tubing;Drainage bag less than half full   Status Patent;Draining

## 2025-05-29 NOTE — PLAN OF CARE
Problem: Discharge Planning  Goal: Discharge to home or other facility with appropriate resources  5/28/2025 2045 by Gifty Diamond RN  Outcome: Progressing

## 2025-05-29 NOTE — PROGRESS NOTES
[facility] - Inpatient Rehabilitation Department   Phone: [phone]    Physical Therapy    [x] Initial Evaluation            [] Daily Treatment Note         [] Discharge Summary      Patient: Leandra Dawson   : 1950   MRN: [id number]   Date of Service:  2025  Admitting Diagnosis: Pleural effusion, left  Current Admission Summary: 75 y.o. male who presents to the emergency department secondary to concern for chest pain and shortness of breath.  Reports history of atrial fibrillation and is supposed to see cardiology this week, however came in early because he is having persistent pain specifically in the left side of his chest which does not radiate.  It is also associate with shortness of breath.  He denies take any medication for symptomatic relief.  He also admits that he has been having some generalized abdominal pain and with few episodes of diarrhea and wants that checked out as well.   Past Medical History:  has a past medical history of Acid reflux, Acute MI (HCC), LONNIE (acute kidney injury), Anemia, BPH (benign prostatic hyperplasia), Bradycardia, CAD (coronary artery disease), Chronic constipation, COPD (chronic obstructive pulmonary disease) (HCC), Diverticulitis, Enuresis, Flutter-fibrillation (HCC), H/O tobacco use, presenting hazards to health, Hiatal hernia, Hypertension, IFG (impaired fasting glucose), Occlusion and stenosis of bilateral carotid arteries, Paresthesia, Peripheral vascular disease, Postoperative atrial fibrillation (HCC), Presence of combination internal cardiac defibrillator (ICD) and pacemaker, Prolonged QT interval, Pulmonary nodule, Rectal polyp, Renal insufficiency, Rubeola, Shingles, Sigmoid diverticulitis, Syncope and collapse, Tobacco dependence syndrome, URI (upper respiratory infection), Urinary incontinence, and Ventricular tachycardia (HCC).  Past Surgical History:  has a past surgical history that includes vascular surgery (Left); Cardiac  good  Clinical Assessment: Patient is a 74 y/o male presenting to [facility] secondary to chest and abdominal pain. Today he is presenting below baseline, requiring CGA for ambulation with a RW at household distances. Patient was significantly limited by fatigue levels, SOB, and decreased endurance. Would benefit from use of RW at home for energy conservation and safety when ambulating. Patient would benefit from continued skilled therapy to address functional deficits and help return to baseline.   Safety Interventions: patient left in chair, chair alarm in place, call light within reach, gait belt, patient at risk for falls, sitter present, and nurse notified    Plan  Frequency: 3-5 x/per week  Current Treatment Recommendations: strengthening, ROM, balance training, functional mobility training, transfer training, gait training, endurance training, safety education, and equipment evaluation/education    Goals  Patient Goals: Return home    Short Term Goals:  Time Frame: Upon d/c  Patient will complete bed mobility at modified independent   Patient will complete transfers at stand by assistance   Patient will ambulate 150 ft with use of rolling walker at stand by assistance    Above goals reviewed on 5/29/2025.  All goals are ongoing at this time unless indicated above.      Therapy Session Time      Individual Group Co-treatment   Time In     0922   Time Out     1010   Minutes     48     Timed Code Treatment Minutes:  33 Minutes  Total Treatment Minutes:  48 Minutes        Electronically Signed By: Cielo Lloyd, GIN      PT providing direct supervision during session and assisting in making skilled judgements throughout session.   Vida Rajan, PT, DPT [id number]

## 2025-05-29 NOTE — PROGRESS NOTES
[facility]  TC to pt's wife Kandy. Per wife's preference meeting set for 9:30am tomorrow.     Thank you,    Virginie Rene, BSN, RN  [phone]

## 2025-05-29 NOTE — PROGRESS NOTES
MD Lawrence Gomez MD Aldo Estella, DO                 Office: [phone]                      Fax: [phone]             [employer]                   NEPHROLOGY INPATIENT PROGRESS NOTE:     PATIENT NAME: Leandra Dawson  : 1950  MRN: [id number]      RECOMMENDATIONS:   Ok to give lasix now  D/w other team - they are suggesting palliative care   Consult - fup w/ them   Stopped IV fluids     Follow-up pending echo  Pulmonary team following, thinking more of a heart failure rather than pneumonia  General Surgery team following     In past -has been on lasix and accidentally taking metolazone qd x3 days too on last admission.       Pre-renal LONNIE again likely      In past - ECHO - Grade II diastolic dysfunction with elevated LV filling  Weight in past ~ 84 kg around   Hypoxia needing nasal cannula newly changed  *    no need for dialysis,     at higher risk for decompensation, needing closer monitoring.    D/C plan from renal stand point:- likely ~2-3 days  Failed to follow-up after return by our office for posthospital follow-up  -: follow up w/ us at  [facility] OFFICE  in 2-3  weeks after d/c. (I updated our information in discharge follow up providers' list.)       Thank you for allowing me to participate in this patient's care. Please do not hesitate to contact me anytime. We will follow along with you.       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  Associated signs & symptoms (ex: edema, SOB): As mentioned above in CC and HPI    Past medical, Surgical, Social, Family medical history reviewed as needed for my evaluation as needed for my evaulation.      PAST MEDICAL HISTORY:   Past Medical History:   Diagnosis Date    Acid reflux     Acute MI (HCC)     LONNIE (acute kidney injury)     Anemia     BPH (benign prostatic hyperplasia)     h/o TURP w/ Dr Sheikh    Bradycardia     CAD (coronary artery disease)     CAD w MI, s/p CABG 2014    Chronic constipation     COPD (chronic obstructive pulmonary disease) (HCC)     Diverticulitis     Enuresis     Flutter-fibrillation (HCC) 2019    s/p DCCV    H/O tobacco use, presenting hazards to health     Hiatal hernia     Hypertension     diagnosed at age 22    IFG (impaired fasting glucose)     Occlusion and stenosis of bilateral carotid arteries     Paresthesia     Peripheral vascular disease     with left leg revascularization    Postoperative atrial fibrillation (HCC) 2014    Presence of combination internal cardiac defibrillator (ICD) and pacemaker     Prolonged QT interval     Pulmonary nodule     Rectal polyp 12/2016    Renal insufficiency     Rubeola     Shingles     per pt, started 12/2021, still visible 3/2022, not open wounds at that time    Sigmoid diverticulitis 12/01/2015    Syncope and collapse 01/05/2022    Tobacco dependence syndrome     URI (upper respiratory infection) 11/15/2024    Urinary incontinence     Ventricular tachycardia ([facility])      PAST SURGICAL HISTORY:   Past Surgical History:   Procedure Laterality Date    CARDIAC CATHETERIZATION      CARDIAC SURGERY      stent x1 --3 yrs, 5/12/14 CABG 3 V    CATARACT EXTRACTION Right 12/2024    CATARACT EXTRACTION Left 01/2025    COLONOSCOPY      PACEMAKER PLACEMENT      PPM/AICD    TURP      VASCULAR SURGERY Left     Fem-Tib bypass & endarterectomy w/ wound V.A.C placement     FAMILY HISTORY:   Family History   Problem Relation Age of Onset     transcription may not be entered as intended.  However, inadvertent computerized transcription errors may be present.  So please contact us if any clarification needed.

## 2025-05-30 VITALS
HEART RATE: 86 BPM | HEIGHT: 69 IN | WEIGHT: 198.19 LBS | OXYGEN SATURATION: 95 % | TEMPERATURE: 97.2 F | BODY MASS INDEX: 29.36 KG/M2 | RESPIRATION RATE: 18 BRPM | DIASTOLIC BLOOD PRESSURE: 78 MMHG | SYSTOLIC BLOOD PRESSURE: 128 MMHG

## 2025-05-30 LAB
ALBUMIN SERPL-MCNC: 2.9 G/DL (ref 3.4–5)
ANION GAP SERPL CALCULATED.3IONS-SCNC: 10 MMOL/L (ref 3–16)
BUN SERPL-MCNC: 33 MG/DL (ref 7–20)
CALCIUM SERPL-MCNC: 8.9 MG/DL (ref 8.3–10.6)
CHLORIDE SERPL-SCNC: 103 MMOL/L (ref 99–110)
CO2 SERPL-SCNC: 24 MMOL/L (ref 21–32)
CREAT SERPL-MCNC: 1.8 MG/DL (ref 0.8–1.3)
EKG DIAGNOSIS: NORMAL
EKG Q-T INTERVAL: 410 MS
EKG QRS DURATION: 104 MS
EKG QTC CALCULATION (BAZETT): 515 MS
EKG R AXIS: 102 DEGREES
EKG T AXIS: -49 DEGREES
EKG VENTRICULAR RATE: 95 BPM
GFR SERPLBLD CREATININE-BSD FMLA CKD-EPI: 39 ML/MIN/{1.73_M2}
GLUCOSE BLD-MCNC: 154 MG/DL (ref 70–99)
GLUCOSE BLD-MCNC: 155 MG/DL (ref 70–99)
GLUCOSE SERPL-MCNC: 122 MG/DL (ref 70–99)
MAGNESIUM SERPL-MCNC: 2.18 MG/DL (ref 1.8–2.4)
PERFORMED ON: ABNORMAL
PERFORMED ON: ABNORMAL
PHOSPHATE SERPL-MCNC: 4.6 MG/DL (ref 2.5–4.9)
POTASSIUM SERPL-SCNC: 4.2 MMOL/L (ref 3.5–5.1)
SODIUM SERPL-SCNC: 137 MMOL/L (ref 136–145)

## 2025-05-30 PROCEDURE — 2700000000 HC OXYGEN THERAPY PER DAY

## 2025-05-30 PROCEDURE — 6370000000 HC RX 637 (ALT 250 FOR IP): Performed by: STUDENT IN AN ORGANIZED HEALTH CARE EDUCATION/TRAINING PROGRAM

## 2025-05-30 PROCEDURE — 6360000002 HC RX W HCPCS: Performed by: STUDENT IN AN ORGANIZED HEALTH CARE EDUCATION/TRAINING PROGRAM

## 2025-05-30 PROCEDURE — 93010 ELECTROCARDIOGRAM REPORT: CPT | Performed by: INTERNAL MEDICINE

## 2025-05-30 PROCEDURE — 94640 AIRWAY INHALATION TREATMENT: CPT

## 2025-05-30 PROCEDURE — 6370000000 HC RX 637 (ALT 250 FOR IP): Performed by: HOSPITALIST

## 2025-05-30 PROCEDURE — 83735 ASSAY OF MAGNESIUM: CPT

## 2025-05-30 PROCEDURE — 80069 RENAL FUNCTION PANEL: CPT

## 2025-05-30 PROCEDURE — 36415 COLL VENOUS BLD VENIPUNCTURE: CPT

## 2025-05-30 PROCEDURE — 6370000000 HC RX 637 (ALT 250 FOR IP)

## 2025-05-30 PROCEDURE — 94761 N-INVAS EAR/PLS OXIMETRY MLT: CPT

## 2025-05-30 PROCEDURE — 6360000002 HC RX W HCPCS: Performed by: INTERNAL MEDICINE

## 2025-05-30 RX ORDER — FUROSEMIDE 20 MG/1
40 TABLET ORAL DAILY
Qty: 60 TABLET | Refills: 1 | Status: SHIPPED | OUTPATIENT
Start: 2025-05-30 | End: 2025-06-07

## 2025-05-30 RX ADMIN — APIXABAN 5 MG: 5 TABLET, FILM COATED ORAL at 08:39

## 2025-05-30 RX ADMIN — GABAPENTIN 200 MG: 100 CAPSULE ORAL at 08:39

## 2025-05-30 RX ADMIN — MORPHINE SULFATE 2 MG: 2 INJECTION, SOLUTION INTRAMUSCULAR; INTRAVENOUS at 15:29

## 2025-05-30 RX ADMIN — POLYETHYLENE GLYCOL 3350 17 G: 17 POWDER, FOR SOLUTION ORAL at 08:39

## 2025-05-30 RX ADMIN — IPRATROPIUM BROMIDE AND ALBUTEROL SULFATE 1 DOSE: .5; 3 SOLUTION RESPIRATORY (INHALATION) at 14:57

## 2025-05-30 RX ADMIN — MORPHINE SULFATE 2 MG: 2 INJECTION, SOLUTION INTRAMUSCULAR; INTRAVENOUS at 11:40

## 2025-05-30 RX ADMIN — METOPROLOL SUCCINATE 37.5 MG: 25 TABLET, EXTENDED RELEASE ORAL at 08:39

## 2025-05-30 RX ADMIN — FUROSEMIDE 40 MG: 10 INJECTION, SOLUTION INTRAMUSCULAR; INTRAVENOUS at 08:39

## 2025-05-30 RX ADMIN — GABAPENTIN 200 MG: 100 CAPSULE ORAL at 15:28

## 2025-05-30 RX ADMIN — IPRATROPIUM BROMIDE AND ALBUTEROL SULFATE 1 DOSE: .5; 3 SOLUTION RESPIRATORY (INHALATION) at 07:44

## 2025-05-30 RX ADMIN — TAMSULOSIN HYDROCHLORIDE 0.4 MG: 0.4 CAPSULE ORAL at 08:40

## 2025-05-30 ASSESSMENT — PAIN SCALES - GENERAL
PAINLEVEL_OUTOF10: 2
PAINLEVEL_OUTOF10: 0
PAINLEVEL_OUTOF10: 0

## 2025-05-30 ASSESSMENT — PAIN DESCRIPTION - LOCATION: LOCATION: ABDOMEN

## 2025-05-30 NOTE — PROGRESS NOTES
No acute events over night. VSS- Pt up to 6L HFNC. PRN morphine given for dyspnea- see MAR. Pt A&Ox4 with some intermittent confusion. Pt had 650mL of yellow urine out in his johnson. Bed alarm on and gripper socks on for fall precautions. Pt appears to have slept well.     Electronically signed by Jose Eduardo Gacria RN on 5/30/2025 at 6:14 AM      Pt now at 7L HFNC   Electronically signed by Jose Eduardo Garcia RN on 5/30/2025 at 6:20 AM

## 2025-05-30 NOTE — PROGRESS NOTES
Hospice [facility]    Met with wife Kandy and son Sammy and patient to discuss hospice philosophy, services, and level of care. Family would like to go to the [place] inpatient unit to manage symptoms. Pt is agreeable to deactivating AICD. JOVON Veloz will talk to physician for an order. Transport will be scheduled once this is completed. [employer] rep called spoke to Delmer and reported his is through them and they will page the rep. Rep Manjeet called back and said it would be 4 to 5pm at the earliest. Therefore transport scheduled for 6pm with [facility] transport. Thank you for allowing me to meet with this family.     Thank you,  Virginie Rene, BSN, RN  [phone]

## 2025-05-30 NOTE — PROGRESS NOTES
Spiritual Health History and Assessment/Progress Note  [facility]    (P) Spiritual/Emotional Needs,  , (P) New Diagnosis, Adjustment to illness,      Name: Leandra Dawson MRN: [id number]    Age: 75 y.o.     Sex: male   Language: English   Confucianist: None   Pleural effusion, left     Date: 5/30/2025            Total Time Calculated: (P) 15 min              Spiritual Assessment began in [facility] 5[facility]        Referral/Consult From: (P) Rounding   Encounter Overview/Reason: (P) Spiritual/Emotional Needs  Service Provided For: (P) Patient and family together (spouse at bedside)    Jenny, Belief, Meaning:   Patient identifies as spiritual  Family/Friends identify as spiritual      Importance and Influence:  Patient has spiritual/personal beliefs that influence decisions regarding their health  Family/Friends have spiritual/personal beliefs that influence decisions regarding the patient's health    Community:  Patient feels well-supported. Support system includes: Spouse/Partner, Children, and Extended family  Family/Friends feel well-supported. Support system includes: Spouse/Partner, Children, and Extended family    Assessment and Plan of Care:     Patient Interventions include: Facilitated expression of thoughts and feelings, Explored spiritual coping/struggle/distress, Affirmed coping skills/support systems, and Facilitated life review and/ or legacy  Family/Friends Interventions include: Facilitated expression of thoughts and feelings, Explored spiritual coping/struggle/distress, Affirmed coping skills/support systems, and Facilitated life review and/or legacy    Patient Plan of Care: No spiritual needs identified for follow-up and Spiritual Care available upon further referral  Family/Friends Plan of Care: No spiritual needs identified for follow-up and Spiritual Care available upon further referral    Electronically signed by Chaplain Zunilda on 5/30/2025 at 10:35 AM

## 2025-05-30 NOTE — PROGRESS NOTES
[employer] rep just came to deactivate AICD in chest. Hospice Nurse made aware and will update this nurse on departure time.

## 2025-05-30 NOTE — DISCHARGE INSTR - COC
Assessment Score:  [facility] RISK OF UNPLANNED READMISSION 2.0             22 Total Score        Discharging to Facility/ Agency   Name:   Address:  Phone:  Fax:    Dialysis Facility (if applicable)   Name:  Address:  Dialysis Schedule:  Phone:  Fax:    / signature: {Esignature:[id number]}    PHYSICIAN SECTION    Prognosis: Guarded    Condition at Discharge: Stable    Rehab Potential (if transferring to Rehab): Guarded    Recommended Labs or Other Treatments After Discharge:     Physician Certification: I certify the above information and transfer of Leandra Dawson  is necessary for the continuing treatment of the diagnosis listed and that he requires Hospice for less 30 days.     Update Admission H&P: No change in H&P    PHYSICIAN SIGNATURE:  Electronically signed by Volodymyr Caldwell MD on 5/30/25 at 12:39 PM EDT

## 2025-06-02 NOTE — DISCHARGE SUMMARY
05/28/2025 102 99 - 110 mmol/L Final     CO2   Date Value Ref Range Status   05/30/2025 24 21 - 32 mmol/L Final   05/29/2025 24 21 - 32 mmol/L Final   05/28/2025 24 21 - 32 mmol/L Final     BUN   Date Value Ref Range Status   05/30/2025 33 (H) 7 - 20 mg/dL Final   05/29/2025 34 (H) 7 - 20 mg/dL Final   05/28/2025 28 (H) 7 - 20 mg/dL Final     Creatinine   Date Value Ref Range Status   05/30/2025 1.8 (H) 0.8 - 1.3 mg/dL Final   05/29/2025 2.0 (H) 0.8 - 1.3 mg/dL Final   05/28/2025 1.8 (H) 0.8 - 1.3 mg/dL Final     Calcium   Date Value Ref Range Status   05/30/2025 8.9 8.3 - 10.6 mg/dL Final   05/29/2025 8.8 8.3 - 10.6 mg/dL Final   05/28/2025 8.8 8.3 - 10.6 mg/dL Final     Phosphorus   Date Value Ref Range Status   05/30/2025 4.6 2.5 - 4.9 mg/dL Final   05/27/2025 5.5 (H) 2.5 - 4.9 mg/dL Final   12/05/2024 3.3 2.5 - 4.9 mg/dL Final     No results for input(s): \"AST\", \"ALT\", \"BILIDIR\", \"BILITOT\", \"ALKPHOS\" in the last 72 hours.  No results for input(s): \"INR\" in the last 72 hours.  No results for input(s): \"CKTOTAL\", \"TROPHS\" in the last 72 hours.    Urinalysis:      Lab Results   Component Value Date/Time    NITRU Negative 04/11/2025 02:40 AM    WBCUA 1 08/02/2024 04:39 PM    BACTERIA None Seen 08/02/2024 04:39 PM    RBCUA 1 08/02/2024 04:39 PM    BLOODU Negative 04/11/2025 02:40 AM    GLUCOSEU >=1000 04/11/2025 02:40 AM    GLUCOSEU NEGATIVE 01/30/2012 11:05 AM       Radiology:  US GALLBLADDER RUQ   Final Result   1. Gallbladder wall thickening without pericholecystic fluid or layering   stones. This is nonspecific and may be related to underdistention.  Correlate   with previously performed CT and nuclear medicine scan.   2. Hepatic and right renal cysts.         NM HEPATOBILIARY   Final Result   No convincing scintigraphic evidence of acute cholecystitis.         CT CHEST ABDOMEN PELVIS WO CONTRAST Additional Contrast? None   Final Result   1. Moderate-large left and small right pleural effusions, with adjacent  MG tablet  Commonly known as: LASIX  Take 2 tablets by mouth daily  What changed:   how much to take  when to take this            CONTINUE taking these medications      gabapentin 300 MG capsule  Commonly known as: NEURONTIN     OXYGEN     tamsulosin 0.4 MG capsule  Commonly known as: FLOMAX            STOP taking these medications      amiodarone 200 MG tablet  Commonly known as: CORDARONE     atorvastatin 80 MG tablet  Commonly known as: LIPITOR     Eliquis 5 MG Tabs tablet  Generic drug: apixaban     Linzess 145 MCG capsule  Generic drug: linaclotide     metFORMIN 500 MG extended release tablet  Commonly known as: GLUCOPHAGE-XR     metoprolol succinate 25 MG extended release tablet  Commonly known as: Toprol XL     midodrine 5 MG tablet  Commonly known as: PROAMATINE     potassium chloride 20 MEQ extended release tablet  Commonly known as: KLOR-CON M     VITAMIN C PO     vitamin D3 125 MCG (5000 UT) Tabs tablet  Commonly known as: CHOLECALCIFEROL     zinc gluconate 50 MG tablet               Where to Get Your Medications        These medications were sent to [id number] - [place], [place] - [address] - P [phone] - F [phone] [address] [facility] [address]      Phone: [phone]   furosemide 20 MG tablet         33 Minutes spent on patient evaluation, counseling and discharge planning.     Signed:  Volodymyr Caldwell MD  6/1/2025, 10:31 PM **

## 2025-06-04 NOTE — PROGRESS NOTES
Physician Progress Note      PATIENT:               LEANDRA CUEVAS  CSN #:                  [id number]  :                       1950  ADMIT DATE:       2025 6:58 AM  DISCH DATE:        2025 4:00 PM  RESPONDING  PROVIDER #:        Volodymyr Caldwell MD          QUERY TEXT:    Congestive Heart Failure is documented in the medical record H&P  and by   cardiology   Please document further specificity regarding the most   likely etiology of the CHF:    The clinical indicators include:  -- Admitted with acute on chronic HFrEF. History of HTN, ICMP, and CAD  -- Cardiology  \"Acute on Chronic Systolic CHF, Ischemic CMO, CAD s/p CABG   ([facility]  with Patent LIMA-LAD, SVG Diag, om2/3)\"  -- H&P  \"Leandra Cuevas is a 75 y.o. male with medical history including   CAD, CABG, HTN, HLD, PAD, PAF, cardiomyopathy...............Acute on chronic   systolic CHF: Elevated BNP, RODGERS, orthopnea, weight gain\"  -- ECHO, IV lasix, metoprolol, cardiology consult, cerial labs, and supportive   care  Options provided:  -- CHF related to Hypertensive Heart Disease  -- CHF related to Hypertensive Heart Disease and CAD  -- CHF not related to Hypertension but related to CAD  -- CHF related to Hypertensive Heart Disease and ICMP  -- CHF not related to Hypertension but related to ICMP  -- CHF related to HTN, CAD, and ICMP  -- Other - I will add my own diagnosis  -- Disagree - Not applicable / Not valid  -- Disagree - Clinically unable to determine / Unknown  -- Refer to Clinical Documentation Reviewer    PROVIDER RESPONSE TEXT:    This patient has CHF not related to hypertensive heart disease, CHF is related   to ischemic cardiomyopathy.    Query created by: Haley Stanley on 2025 12:08 PM      Electronically signed by:  Volodymyr Caldwell MD 2025 5:49 PM

## 2025-06-07 NOTE — PROGRESS NOTES
Physician Progress Note      PATIENT:               LANDON CUEVAS  CSN #:                  [id number]  :                       1950  ADMIT DATE:       2025 6:58 AM  DISCH DATE:        2025 4:00 PM  RESPONDING  PROVIDER #:        Volodymyr Caldwell MD          QUERY TEXT:    Noted documentation of acute hypoxic respiratory failure by pulmonology .    Based on your medical judgment, please clarify these findings and document   if any of the following are being evaluated and/or treated:    The clinical indicators include:  -- Admitted with acute on chronic CHF. History of AFIB, CAD, COPD, and CHF  -- Pulmonology and  \"ASSESSMENT AND PLAN:  Acute hypoxic resp failure  - he is currently on 4L NC wean as tolerated  -- DC summary  \"CONSTITUTIONAL:  awake, somewhat confused, mild resp distress\"  -- HFNC, pulmonolgy consult, Duoneb, serial labs, and supportive care  Options provided:  -- acute hypoxic respiratory failure confirmed present on admission  -- acute hypoxic respiratory failure ruled out after study  -- Other - I will add my own diagnosis  -- Disagree - Not applicable / Not valid  -- Disagree - Clinically unable to determine / Unknown  -- Refer to Clinical Documentation Reviewer    PROVIDER RESPONSE TEXT:    The diagnosis of acute hypoxic respiratory failure was confirmed to be present   on admission.    Query created by: Haley Stanley on 2025 9:27 AM      Electronically signed by:  Volodymyr Caldwell MD 2025 8:51 AM